# Patient Record
Sex: FEMALE | Race: OTHER | Employment: OTHER | URBAN - METROPOLITAN AREA
[De-identification: names, ages, dates, MRNs, and addresses within clinical notes are randomized per-mention and may not be internally consistent; named-entity substitution may affect disease eponyms.]

---

## 2017-01-03 ENCOUNTER — APPOINTMENT (OUTPATIENT)
Dept: PHYSICAL THERAPY | Facility: HOSPITAL | Age: 67
End: 2017-01-03
Attending: ORTHOPAEDIC SURGERY
Payer: COMMERCIAL

## 2017-01-14 ENCOUNTER — TELEPHONE (OUTPATIENT)
Dept: INTERNAL MEDICINE CLINIC | Facility: CLINIC | Age: 67
End: 2017-01-14

## 2017-01-18 NOTE — TELEPHONE ENCOUNTER
PA for Simvastatin 20 mg tab completed with [employer] via CMM response time 3-5 business days KEY [id number].

## 2017-01-30 ENCOUNTER — TELEPHONE (OUTPATIENT)
Dept: INTERNAL MEDICINE CLINIC | Facility: CLINIC | Age: 67
End: 2017-01-30

## 2017-01-30 RX ORDER — SIMVASTATIN 20 MG
20 TABLET ORAL NIGHTLY
Qty: 90 TABLET | Refills: 1 | Status: SHIPPED | OUTPATIENT
Start: 2017-01-30 | End: 2018-12-10

## 2017-01-30 NOTE — TELEPHONE ENCOUNTER
Current outpatient prescriptions:  Needs 90 days refill         •    LANTUS SOLOSTAR 100 UNIT/ML Subcutaneous Solution Pen-injector, INJECT 32 UNITS SUBCUTANEOUSLY IN THE MORNING AND 25 UNITS IN THE EVENING, Disp: 15 mL, Rfl: 1  •    •  • simvastatin 20

## 2017-02-02 ENCOUNTER — TELEPHONE (OUTPATIENT)
Dept: OPHTHALMOLOGY | Facility: CLINIC | Age: 67
End: 2017-02-02

## 2017-02-02 NOTE — TELEPHONE ENCOUNTER
Patients daughter wanted to schedule appt for patient with Dr Stevie Smith for a NP DM EE. Wanted to see if patient can get in sooner than next available due to patients insurance expiring soon. Please advise.  Thank you

## 2017-02-14 ENCOUNTER — OFFICE VISIT (OUTPATIENT)
Dept: OPHTHALMOLOGY | Facility: CLINIC | Age: 67
End: 2017-02-14

## 2017-02-14 DIAGNOSIS — Z98.890 HISTORY OF PTERYGIUM EXCISION: ICD-10-CM

## 2017-02-14 DIAGNOSIS — E10.9 TYPE 1 DIABETES MELLITUS WITHOUT RETINOPATHY (HCC): Primary | ICD-10-CM

## 2017-02-14 DIAGNOSIS — H02.88A MEIBOMIAN GLAND DYSFUNCTION (MGD), BILATERAL, BOTH UPPER AND LOWER LIDS: ICD-10-CM

## 2017-02-14 DIAGNOSIS — H02.88B MEIBOMIAN GLAND DYSFUNCTION (MGD), BILATERAL, BOTH UPPER AND LOWER LIDS: ICD-10-CM

## 2017-02-14 DIAGNOSIS — H25.13 AGE-RELATED NUCLEAR CATARACT OF BOTH EYES: ICD-10-CM

## 2017-02-14 PROCEDURE — 99243 OFF/OP CNSLTJ NEW/EST LOW 30: CPT | Performed by: OPHTHALMOLOGY

## 2017-02-14 PROCEDURE — 99212 OFFICE O/P EST SF 10 MIN: CPT | Performed by: OPHTHALMOLOGY

## 2017-02-14 PROCEDURE — 92015 DETERMINE REFRACTIVE STATE: CPT | Performed by: OPHTHALMOLOGY

## 2017-02-14 NOTE — ASSESSMENT & PLAN NOTE
Diabetes type I: no background retinopathy, no signs of neovascularization noted. Discussed ocular and systemic benefits of blood sugar control.

## 2017-02-14 NOTE — PATIENT INSTRUCTIONS
Type 1 diabetes mellitus without retinopathy ([facility] [address]  Diabetes type I: no background retinopathy, no signs of neovascularization noted. Discussed ocular and systemic benefits of blood sugar control.     Age-related nuclear cataract of both eyes  Discussed mi

## 2017-02-14 NOTE — ASSESSMENT & PLAN NOTE
Patient had pterygium surgery in [facility] about 30 years ago. No treatment is needed.

## 2017-02-14 NOTE — ASSESSMENT & PLAN NOTE
Patient is instructed to use warm compresses twice a day to both eyelids forever for ocular comfort. Blepharitis info given. Diagnosis discussed with patient.   Use artificial tears (any over the counter brand is okay) up to 4 times per day as needed for

## 2017-02-14 NOTE — ASSESSMENT & PLAN NOTE
Discussed mild cataracts in both eyes that are not affecting vision and are not surgical at this time. New glasses today; suggest update.

## 2017-02-14 NOTE — PROGRESS NOTES
Deon Almonte is a 73year old female.     HPI:     HPI     Diabetic Eye Exam    Additional comments: Pt has been a diabetic for 20 years  20 years on pills/ 2 years on Insulin   Pt checks her BS once a day  Pt's last blood sugar was 111 this morning  Las Father      CAD   • Diabetes Other    • Dementia Other    • Glaucoma Neg    • Macular degeneration Neg        Social History:   Smoking Status: Never Smoker                      Smokeless Status: Never Used                        Alcohol Use: No Multiple Vitamin (MULTIVITAMINS) Oral Cap Take  by mouth.  take 1 capsule by oral route  every day Disp:  Rfl:        Allergies:    Metformin               Diarrhea    ROS:     ROS     Positive for: Endocrine, Eyes    Negative for: Constitutional, Derrel Boxer Sphere Cylinder Axis Dist Add Near   Right -0.75 +0.50 180 20/20- +2.75 20/20   Left +1.00 Sphere  20/20- +2.75 20/20         Final Rx      Sphere Cylinder Axis Add   Right -0.75 +0.50 180 +2.75   Left +1.00 Sphere  +2.75       Type:  Progressive bifocal

## 2017-03-13 ENCOUNTER — TELEPHONE (OUTPATIENT)
Dept: INTERNAL MEDICINE CLINIC | Facility: CLINIC | Age: 67
End: 2017-03-13

## 2017-03-13 DIAGNOSIS — R52 PAIN: Primary | ICD-10-CM

## 2017-03-13 RX ORDER — HYDRALAZINE HYDROCHLORIDE 100 MG/1
100 TABLET, FILM COATED ORAL 2 TIMES DAILY
Qty: 180 TABLET | Refills: 0 | Status: SHIPPED | OUTPATIENT
Start: 2017-03-13 | End: 2017-06-29 | Stop reason: ALTCHOICE

## 2017-03-13 NOTE — TELEPHONE ENCOUNTER
Dr. Chris Devine, I have that she is testing once a day, would you like to keep her on the same plan?

## 2017-03-13 NOTE — TELEPHONE ENCOUNTER
Patient is asking for a order to get a discount for her strips she has a machine that is [employer] brand called [facility] on [address] needs order with diagnosis and how many times she checks a day  Send to [address] [facility] in [place]

## 2017-03-16 RX ORDER — GLIPIZIDE 10 MG/1
TABLET ORAL
Qty: 45 TABLET | Refills: 3 | Status: SHIPPED | OUTPATIENT
Start: 2017-03-16 | End: 2017-10-02

## 2017-03-18 RX ORDER — INSULIN GLARGINE 100 [IU]/ML
INJECTION, SOLUTION SUBCUTANEOUS
Qty: 15 ML | Refills: 0 | Status: SHIPPED | OUTPATIENT
Start: 2017-03-18 | End: 2017-04-26

## 2017-03-20 ENCOUNTER — TELEPHONE (OUTPATIENT)
Dept: INTERNAL MEDICINE CLINIC | Facility: CLINIC | Age: 67
End: 2017-03-20

## 2017-03-20 NOTE — TELEPHONE ENCOUNTER
Prior authorization required for the following:    GLIPIZIDE 10 MG Oral Tab 45 tablet 3 3/16/2017        Sig: TAKE 1/2 TABLET BY MOUTH EVERY MORNING & TAKE ONE TABLET EVERY EVENING      E-Prescribing Status: Receipt confirmed by pharmacy (3/16/2017 10:52 A

## 2017-03-22 NOTE — TELEPHONE ENCOUNTER
PA for Glipizide 10 mg tab completed with [employer] via CMM response time 3-5 business days KEY [id number].

## 2017-03-23 ENCOUNTER — OFFICE VISIT (OUTPATIENT)
Dept: INTERNAL MEDICINE CLINIC | Facility: CLINIC | Age: 67
End: 2017-03-23

## 2017-03-23 VITALS
HEART RATE: 76 BPM | BODY MASS INDEX: 35.96 KG/M2 | TEMPERATURE: 97 F | SYSTOLIC BLOOD PRESSURE: 136 MMHG | WEIGHT: 242.81 LBS | DIASTOLIC BLOOD PRESSURE: 38 MMHG | HEIGHT: 69 IN | OXYGEN SATURATION: 95 %

## 2017-03-23 DIAGNOSIS — IMO0001 UNCONTROLLED TYPE 2 DIABETES MELLITUS WITHOUT COMPLICATION, WITHOUT LONG-TERM CURRENT USE OF INSULIN: Primary | ICD-10-CM

## 2017-03-23 DIAGNOSIS — E11.649 TYPE 2 DIABETES MELLITUS WITH HYPOGLYCEMIA WITHOUT COMA, WITH LONG-TERM CURRENT USE OF INSULIN (HCC): ICD-10-CM

## 2017-03-23 DIAGNOSIS — I10 ESSENTIAL HYPERTENSION WITH GOAL BLOOD PRESSURE LESS THAN 130/80: ICD-10-CM

## 2017-03-23 DIAGNOSIS — I83.90 VARICOSE VEIN OF LEG: ICD-10-CM

## 2017-03-23 DIAGNOSIS — E53.8 B12 DEFICIENCY: ICD-10-CM

## 2017-03-23 DIAGNOSIS — Z79.4 TYPE 2 DIABETES MELLITUS WITH HYPOGLYCEMIA WITHOUT COMA, WITH LONG-TERM CURRENT USE OF INSULIN (HCC): ICD-10-CM

## 2017-03-23 PROBLEM — R93.89 ABNORMAL COMPUTED TOMOGRAPHY ANGIOGRAPHY (CTA): Status: ACTIVE | Noted: 2017-03-23

## 2017-03-23 LAB
ALBUMIN SERPL BCP-MCNC: 3.9 G/DL (ref 3.5–4.8)
ALBUMIN/GLOB SERPL: 1 {RATIO} (ref 1–2)
ALP SERPL-CCNC: 67 U/L (ref 32–100)
ALT SERPL-CCNC: 18 U/L (ref 14–54)
ANION GAP SERPL CALC-SCNC: 11 MMOL/L (ref 0–18)
AST SERPL-CCNC: 23 U/L (ref 15–41)
BILIRUB SERPL-MCNC: 0.6 MG/DL (ref 0.3–1.2)
BUN SERPL-MCNC: 14 MG/DL (ref 8–20)
BUN/CREAT SERPL: 14.7 (ref 10–20)
CALCIUM SERPL-MCNC: 9.2 MG/DL (ref 8.5–10.5)
CHLORIDE SERPL-SCNC: 98 MMOL/L (ref 95–110)
CHOLEST SERPL-MCNC: 139 MG/DL (ref 110–200)
CO2 SERPL-SCNC: 28 MMOL/L (ref 22–32)
CREAT SERPL-MCNC: 0.95 MG/DL (ref 0.5–1.5)
CREAT UR-MCNC: 50.3 MG/DL
GLOBULIN PLAS-MCNC: 3.8 G/DL (ref 2.5–3.7)
GLUCOSE SERPL-MCNC: 123 MG/DL (ref 70–99)
HBA1C MFR BLD: 6.3 % (ref 4–6)
HDLC SERPL-MCNC: 47 MG/DL
LDLC SERPL CALC-MCNC: 75 MG/DL (ref 0–99)
MICROALBUMIN UR-MCNC: 0.2 MG/DL (ref 0–1.8)
MICROALBUMIN/CREAT UR: 4 MG/G{CREAT} (ref 0–20)
NONHDLC SERPL-MCNC: 92 MG/DL
OSMOLALITY UR CALC.SUM OF ELEC: 286 MOSM/KG (ref 275–295)
POTASSIUM SERPL-SCNC: 4.2 MMOL/L (ref 3.3–5.1)
PROT SERPL-MCNC: 7.7 G/DL (ref 5.9–8.4)
SODIUM SERPL-SCNC: 137 MMOL/L (ref 136–144)
TRIGL SERPL-MCNC: 83 MG/DL (ref 1–149)
VIT B12 SERPL-MCNC: 419 PG/ML (ref 181–914)

## 2017-03-23 PROCEDURE — 36415 COLL VENOUS BLD VENIPUNCTURE: CPT | Performed by: INTERNAL MEDICINE

## 2017-03-23 PROCEDURE — 99214 OFFICE O/P EST MOD 30 MIN: CPT | Performed by: INTERNAL MEDICINE

## 2017-03-23 PROCEDURE — G0463 HOSPITAL OUTPT CLINIC VISIT: HCPCS | Performed by: INTERNAL MEDICINE

## 2017-03-23 RX ORDER — MECLIZINE HYDROCHLORIDE 25 MG/1
25 TABLET ORAL 3 TIMES DAILY PRN
Qty: 90 TABLET | Refills: 1 | Status: SHIPPED | OUTPATIENT
Start: 2017-03-23

## 2017-03-23 RX ORDER — METOPROLOL SUCCINATE 50 MG/1
TABLET, EXTENDED RELEASE ORAL
Qty: 180 TABLET | Refills: 1 | Status: SHIPPED | OUTPATIENT
Start: 2017-03-23 | End: 2017-12-04 | Stop reason: ALTCHOICE

## 2017-03-23 RX ORDER — MECLIZINE HYDROCHLORIDE 25 MG/1
25 TABLET ORAL 3 TIMES DAILY PRN
Qty: 90 TABLET | Refills: 1 | Status: SHIPPED | OUTPATIENT
Start: 2017-03-23 | End: 2017-03-23

## 2017-03-23 RX ORDER — VALSARTAN AND HYDROCHLOROTHIAZIDE 320; 25 MG/1; MG/1
1 TABLET, FILM COATED ORAL
Qty: 90 TABLET | Refills: 1 | Status: SHIPPED | OUTPATIENT
Start: 2017-03-23 | End: 2017-10-02

## 2017-03-23 NOTE — PROGRESS NOTES
HPI:    Patient ID: Carmina Miller is a 73year old female. HPI Comments: Varicose veins. No pain. Diabetes  Patient here for follow up of Diabetes. Has been taking medications regularly. Checks sugars 1 times daily.   Fasting sugar K 3.3 12/07/2016 01:12 AM   CL 92* 12/07/2016 12:05 AM   CO2 30 12/07/2016 12:05 AM   CREATSERUM 0.94 12/07/2016 12:05 AM   CA 8.9 12/07/2016 12:05 AM   AST 20 12/07/2016 01:12 AM   ALT 17 12/07/2016 01:12 AM   TSH 0.38 03/07/2013 07:52 PM   T4F 1.28 05/24 HydrALAZINE HCl 100 MG Oral Tab Take 1 tablet (100 mg total) by mouth 2 (two) times daily. Disp: 180 tablet Rfl: 0   simvastatin 20 MG Oral Tab Take 1 tablet (20 mg total) by mouth nightly.  take 1 tablet (20MG)  by oral route  3 times a week M/W/F Disp: 90 Comment SCANNED TO MEDIA TAB - 01/03/2014    THYROIDECTOMY  08/17/15    Comment TOTAL    FRACTURE SURGERY Left     Comment LEFT FOOT SURGERY WITH HARDWARE    KNEE SCOPE,ABRASN ARTHROPLASTY Right 10-4-16    CATARACT EXTRACTION W/  INTRAOCULAR LENS IMPLANT Psychiatric: She has a normal mood and affect. Her behavior is normal. Thought content normal.   Nursing note and vitals reviewed.   Bilateral barefoot skin diabetic exam is normal, visualized feet and the appearance is normal. Except B/L big toes with thic Meclizine HCl 25 MG Oral Tab 90 tablet 1      Sig: Take 1 tablet (25 mg total) by mouth 3 (three) times daily as needed.  take 1 tablet (25MG)  by oral route 3 times every day as needed      Valsartan-Hydrochlorothiazide 320-25 MG Oral Tab 90 tablet 1

## 2017-03-23 NOTE — PATIENT INSTRUCTIONS
ASSESSMENT/PLAN:   Uncontrolled type 2 diabetes mellitus without complication, without long-term current use of insulin (hcc)  (primary encounter diagnosis) Good control. Careful with diet and excercise at least 30 minutes 3-4 times a week.  Check sugars at

## 2017-03-25 NOTE — PROGRESS NOTES
Quick Note:    CMP Normal (electrolytes, sugar, kidney and liver functions),   Lipid (choilesterol) is good,   B12 is good but on the lower limits of normal will need to recheck again in 3 months. A1c is better. Will recheck again in 3-6 months.   Urine sh

## 2017-04-04 ENCOUNTER — TELEPHONE (OUTPATIENT)
Dept: INTERNAL MEDICINE CLINIC | Facility: CLINIC | Age: 67
End: 2017-04-04

## 2017-04-05 NOTE — TELEPHONE ENCOUNTER
Spoke with patient's daughter, Mame uNnez (legal on file) and advised that medication was temporary filled for 30 days but was not covered by patient's insurance.   Advised patient that if her mom still needed medication for vertigo, to please let the office kn

## 2017-04-10 PROBLEM — E66.01 SEVERE OBESITY (BMI 35.0-39.9) WITH COMORBIDITY (HCC): Chronic | Status: ACTIVE | Noted: 2017-04-10

## 2017-04-24 NOTE — PROGRESS NOTES
Received faxed request from pharmacy for NPI number and ICD 10 codes. Re-ordered patient's testing strips to include what was requested.

## 2017-04-26 ENCOUNTER — TELEPHONE (OUTPATIENT)
Dept: INTERNAL MEDICINE CLINIC | Facility: CLINIC | Age: 67
End: 2017-04-26

## 2017-04-26 NOTE — TELEPHONE ENCOUNTER
Current outpatient prescriptions:     •   LANTUS SOLOSTAR 100 UNIT/ML Subcutaneous Solution Pen-injector, INJECT 32 UNITS EVERY MORNING & 25 UNITS EVERY EVENING, Disp: 15 mL, Rfl: 3 send to   [place] [facility]         •  Needs a new prescription  for  Reli on

## 2017-04-27 ENCOUNTER — TELEPHONE (OUTPATIENT)
Dept: INTERNAL MEDICINE CLINIC | Facility: CLINIC | Age: 67
End: 2017-04-27

## 2017-04-28 NOTE — TELEPHONE ENCOUNTER
[facility] sent fax [facility] machine no longer covered new Rx for Dinh contour sent to pharmacy as requested.

## 2017-05-01 ENCOUNTER — TELEPHONE (OUTPATIENT)
Dept: INTERNAL MEDICINE CLINIC | Facility: CLINIC | Age: 67
End: 2017-05-01

## 2017-05-19 ENCOUNTER — TELEPHONE (OUTPATIENT)
Dept: INTERNAL MEDICINE CLINIC | Facility: CLINIC | Age: 67
End: 2017-05-19

## 2017-05-19 DIAGNOSIS — E03.9 HYPOTHYROIDISM (ACQUIRED): Primary | Chronic | ICD-10-CM

## 2017-05-19 RX ORDER — LEVOTHYROXINE SODIUM 175 UG/1
175 TABLET ORAL
Qty: 90 TABLET | Refills: 1 | Status: CANCELLED | OUTPATIENT
Start: 2017-05-19

## 2017-05-19 NOTE — TELEPHONE ENCOUNTER
Can we do TSH? If she needs RF now, can do 90 only. Other wise til get results on TSH which maybe 3 days.

## 2017-05-19 NOTE — TELEPHONE ENCOUNTER
Needs refills  Does she continue same dosage her thyroid has not been checked was told to it was going to be check       Current outpatient prescriptions:   •  Levothyroxine Sodium 175 MCG Oral Tab, Take 1 tablet (175 mcg total) by mouth before breakfast.,

## 2017-05-19 NOTE — PROGRESS NOTES
Patient last seen in office 3/23/17. Last TSH lab June 2016. Refill on Levothyroxine pended. Received error notification in inbox that a refill authorization failed so checked patient's chart and last filled 9/2016. Routing to clinical pool.     There i

## 2017-05-23 NOTE — TELEPHONE ENCOUNTER
Phone number belongs to daughter. Has four pills left. TSH lab ordered and patient will have lab draw tomorrow.

## 2017-05-24 ENCOUNTER — APPOINTMENT (OUTPATIENT)
Dept: LAB | Facility: HOSPITAL | Age: 67
End: 2017-05-24
Attending: INTERNAL MEDICINE
Payer: COMMERCIAL

## 2017-05-24 DIAGNOSIS — E03.9 HYPOTHYROIDISM (ACQUIRED): Chronic | ICD-10-CM

## 2017-05-24 PROCEDURE — 36415 COLL VENOUS BLD VENIPUNCTURE: CPT

## 2017-05-24 PROCEDURE — 84443 ASSAY THYROID STIM HORMONE: CPT

## 2017-05-25 ENCOUNTER — TELEPHONE (OUTPATIENT)
Dept: INTERNAL MEDICINE CLINIC | Facility: CLINIC | Age: 67
End: 2017-05-25

## 2017-05-25 RX ORDER — LEVOTHYROXINE SODIUM 175 UG/1
175 TABLET ORAL
Qty: 90 TABLET | Refills: 1 | Status: SHIPPED | OUTPATIENT
Start: 2017-05-25 | End: 2017-11-17

## 2017-05-25 NOTE — TELEPHONE ENCOUNTER
Does patient continue with same dosage of thyroid ?  If so she needs refill as soon as possible send to pharmacy

## 2017-06-29 ENCOUNTER — OFFICE VISIT (OUTPATIENT)
Dept: INTERNAL MEDICINE CLINIC | Facility: CLINIC | Age: 67
End: 2017-06-29

## 2017-06-29 VITALS
WEIGHT: 245 LBS | BODY MASS INDEX: 36.29 KG/M2 | HEART RATE: 73 BPM | SYSTOLIC BLOOD PRESSURE: 132 MMHG | DIASTOLIC BLOOD PRESSURE: 69 MMHG | TEMPERATURE: 98 F | OXYGEN SATURATION: 95 % | HEIGHT: 69 IN

## 2017-06-29 DIAGNOSIS — E53.8 B12 DEFICIENCY: ICD-10-CM

## 2017-06-29 DIAGNOSIS — K43.9 HERNIA OF ABDOMINAL WALL: ICD-10-CM

## 2017-06-29 DIAGNOSIS — I10 ESSENTIAL HYPERTENSION WITH GOAL BLOOD PRESSURE LESS THAN 130/80: ICD-10-CM

## 2017-06-29 DIAGNOSIS — R21 RASH: ICD-10-CM

## 2017-06-29 DIAGNOSIS — IMO0001 UNCONTROLLED TYPE 2 DIABETES MELLITUS WITHOUT COMPLICATION, WITHOUT LONG-TERM CURRENT USE OF INSULIN: ICD-10-CM

## 2017-06-29 DIAGNOSIS — Z12.39 BREAST CANCER SCREENING: ICD-10-CM

## 2017-06-29 DIAGNOSIS — R19.7 DIARRHEA, UNSPECIFIED TYPE: ICD-10-CM

## 2017-06-29 DIAGNOSIS — M85.80 OSTEOPENIA, UNSPECIFIED LOCATION: ICD-10-CM

## 2017-06-29 DIAGNOSIS — E03.9 HYPOTHYROIDISM (ACQUIRED): Primary | ICD-10-CM

## 2017-06-29 PROCEDURE — 36415 COLL VENOUS BLD VENIPUNCTURE: CPT | Performed by: INTERNAL MEDICINE

## 2017-06-29 PROCEDURE — G0463 HOSPITAL OUTPT CLINIC VISIT: HCPCS | Performed by: INTERNAL MEDICINE

## 2017-06-29 PROCEDURE — 99214 OFFICE O/P EST MOD 30 MIN: CPT | Performed by: INTERNAL MEDICINE

## 2017-06-29 RX ORDER — BETAMETHASONE DIPROPIONATE 0.5 MG/G
CREAM TOPICAL
Qty: 50 G | Refills: 0 | Status: SHIPPED | OUTPATIENT
Start: 2017-06-29 | End: 2018-07-05 | Stop reason: ALTCHOICE

## 2017-06-29 RX ORDER — DICYCLOMINE HYDROCHLORIDE 10 MG/1
10 CAPSULE ORAL 4 TIMES DAILY PRN
Qty: 40 CAPSULE | Refills: 1 | Status: SHIPPED | OUTPATIENT
Start: 2017-06-29 | End: 2017-07-09

## 2017-06-29 NOTE — PROGRESS NOTES
HPI:    Patient ID: Brooke Rasmussen is a 78year old female. Rash   This is a new problem. The current episode started more than 1 month ago. The problem has been waxing and waning since onset.  The affected locations include the chest (L екатерина Hypertension  Patient is here for follow up of hypertension. BP at home:  Not check. Has been compliant with medications. Exercise level: moderately active and has been following low salt diet. Weight has been stable.   Wt Readings from Last 3 Encounter Genitourinary: Negative for decreased urine volume, difficulty urinating, dysuria, flank pain, frequency, hematuria, urgency, vaginal bleeding, vaginal discharge and vaginal pain. Skin: Positive for rash. Negative for nail changes.    Neurological: Ruben Lockwood Valsartan-Hydrochlorothiazide 320-25 MG Oral Tab Take 1 tablet by mouth once daily.  Disp: 90 tablet Rfl: 1   GLIPIZIDE 10 MG Oral Tab TAKE 1/2 TABLET BY MOUTH EVERY MORNING & TAKE ONE TABLET EVERY EVENING Disp: 45 tablet Rfl: 3   HydrALAZINE HCl 100 MG Ora • Dementia Mother      Alzheimer's Disease   • Diabetes Brother    • Lipids Brother    • Hypertension Brother    • Diabetes Paternal Uncle    • Heart Disease Father      CAD   • Diabetes Other    • Dementia Other    • Glaucoma Neg    • Macular degeneration Lymphadenopathy:     She has no cervical adenopathy. Neurological: She is alert and oriented to person, place, and time. Skin: Skin is warm and dry. She is not diaphoretic. Psychiatric: She has a normal mood and affect.  Her behavior is normal. Judg Orders Placed This Encounter      CELIAC DISEASE SCREEN Reflex [E]      Adult Food Allergy Prof [E]    Meds This Visit:    Signed Prescriptions Disp Refills    Dicyclomine HCl 10 MG Oral Cap 40 capsule 1      Sig: Take 1 capsule (10 mg total) by mouth 4 (f

## 2017-06-29 NOTE — PATIENT INSTRUCTIONS
ASSESSMENT/PLAN:   Hypothyroidism (acquired)  (primary encounter diagnosis) Stable. Uncontrolled type 2 diabetes mellitus without complication, without long-term current use of insulin (hcc) Not well controlled. Eat regular.  Take glipizide 15 minutes 0.05 % External Cream 50 g 0      Sig: Apply q12hrs. For 1 week and 1 cm beyond rash.       Insulin Glargine (LANTUS SOLOSTAR) 100 UNIT/ML Subcutaneous Solution Pen-injector 15 mL 2      Sig: INJECT 32 UNITS EVERY MORNING & 25 UNITS EVERY EVENING

## 2017-07-05 LAB
CLAM IGE QN: <0.1 KUA/L (ref ?–0.1)
CODFISH IGE QN: <0.1 KUA/L (ref ?–0.1)
CORN IGE QN: <0.1 KUA/L (ref ?–0.1)
COW MILK IGE QN: <0.1 KUA/L (ref ?–0.1)
EGG WHITE IGE QN: <0.1 KUA/L (ref ?–0.1)
IGE SERPL-ACNC: 78.4 KU/L (ref 2–214)
PEANUT IGE QN: <0.1 KUA/L (ref ?–0.1)
SCALLOP IGE QN: <0.1 KUA/L (ref ?–0.1)
SESAME SEED IGE QN: <0.1 KUA/L (ref ?–0.1)
SHRIMP IGE QN: <0.1 KUA/L (ref ?–0.1)
SOYBEAN IGE QN: <0.1 KUA/L (ref ?–0.1)
WALNUT IGE QN: <0.1 KUA/L (ref ?–0.1)
WHEAT IGE QN: <0.1 KUA/L (ref ?–0.1)

## 2017-07-06 ENCOUNTER — TELEPHONE (OUTPATIENT)
Dept: INTERNAL MEDICINE CLINIC | Facility: CLINIC | Age: 67
End: 2017-07-06

## 2017-07-06 DIAGNOSIS — K46.9 NON-RECURRENT ABDOMINAL HERNIA WITHOUT OBSTRUCTION OR GANGRENE, UNSPECIFIED HERNIA TYPE: ICD-10-CM

## 2017-07-06 DIAGNOSIS — T78.40XA ALLERGIC, INITIAL ENCOUNTER: Primary | ICD-10-CM

## 2017-07-06 NOTE — ADDENDUM NOTE
Encounter addended by: Anjelica Willis on: 7/6/2017  2:39 PM<BR>    Actions taken: Letter status changed

## 2017-07-06 NOTE — ADDENDUM NOTE
Encounter addended by: Armando Walker on: 7/6/2017  2:38 PM<BR>    Actions taken: Letter status changed

## 2017-07-07 ENCOUNTER — TELEPHONE (OUTPATIENT)
Dept: INTERNAL MEDICINE CLINIC | Facility: CLINIC | Age: 67
End: 2017-07-07

## 2017-07-07 NOTE — TELEPHONE ENCOUNTER
Request Hong Konger speaking doctor for Surgeon, could you please change referral to Dr. Eric Gonzalez instead of Dr. Basilia Stewart thank you

## 2017-07-07 NOTE — TELEPHONE ENCOUNTER
Spoke with daughter on emergency contact given Dr. Jose Luis Melvin response and  instructions for patient to continue with home glucose monitoring and call in 1 week with readings. Daughter agreed.

## 2017-07-07 NOTE — TELEPHONE ENCOUNTER
Patient is calling to give you some  Glucose reading   July 1 st  114 am  89 pm   July 2nd   79 am   116 pm   July 3rd   120 am    180 pm   July 4 th  150 am       July 6th    121 am   July 7th    113 am

## 2017-07-18 ENCOUNTER — TELEPHONE (OUTPATIENT)
Dept: FAMILY MEDICINE CLINIC | Facility: CLINIC | Age: 67
End: 2017-07-18

## 2017-07-18 NOTE — TELEPHONE ENCOUNTER
Pt is due for [facility] [place] [facility] wellness exam anytime this calendar year. Left message to call back [phone].

## 2017-08-04 ENCOUNTER — OFFICE VISIT (OUTPATIENT)
Dept: SURGERY | Facility: CLINIC | Age: 67
End: 2017-08-04

## 2017-08-04 VITALS — HEIGHT: 69 IN | BODY MASS INDEX: 34.66 KG/M2 | WEIGHT: 234 LBS

## 2017-08-04 DIAGNOSIS — M62.08 DIASTASIS RECTI: ICD-10-CM

## 2017-08-04 DIAGNOSIS — K43.9 HERNIA OF ABDOMINAL WALL: Primary | ICD-10-CM

## 2017-08-04 PROCEDURE — G0463 HOSPITAL OUTPT CLINIC VISIT: HCPCS | Performed by: SURGERY

## 2017-08-04 PROCEDURE — 99204 OFFICE O/P NEW MOD 45 MIN: CPT | Performed by: SURGERY

## 2017-08-04 NOTE — PROGRESS NOTES
Visit Note    Active Problems   Diastasis recti  Hernia of abdominal wall  (primary encounter diagnosis)  Chief Complaint: Patient presents with:  Hernia: Pt referred by Dr. Meenu Wild regarding incisional hernia above umbilical area x 5 yrs +.   Pt states shankar Tab TAKE 1/2 TABLET BY MOUTH EVERY MORNING & TAKE ONE TABLET EVERY EVENING Disp: 45 tablet Rfl: 3   simvastatin 20 MG Oral Tab Take 1 tablet (20 mg total) by mouth nightly.  take 1 tablet (20MG)  by oral route  3 times a week M/W/F Disp: 90 tablet Rfl: 1 Other    • Dementia Other    • Glaucoma Neg    • Macular degeneration Neg      Social History    Social History  Social History   Marital status:   Spouse name: N/A    Years of education: N/A  Number of children: 4     Occupational History  None on pulses. Pulmonary/Chest: Effort normal and breath sounds normal.   Abdominal: Soft. Bowel sounds are normal. She exhibits no distension. There is no tenderness. Diastasis Recti present.  No epigastric Hernia was palpable on today's exam. CT was ordered

## 2017-08-06 NOTE — PATIENT INSTRUCTIONS
Assessment   Hernia of abdominal wall  (primary encounter diagnosis)  Diastasis recti    Plan                              Diastasis Recti present.  No epigastric Hernia was palpable on today's exam. CT was ordered to R/O the Possibility of one hernia been

## 2017-08-19 ENCOUNTER — HOSPITAL ENCOUNTER (OUTPATIENT)
Dept: CT IMAGING | Facility: HOSPITAL | Age: 67
Discharge: HOME | End: 2017-08-19
Attending: SURGERY
Payer: MEDICARE

## 2017-08-19 DIAGNOSIS — K43.9 HERNIA OF ABDOMINAL WALL: ICD-10-CM

## 2017-08-19 LAB — CREAT BLD-MCNC: 0.8 MG/DL (ref 0.5–1.5)

## 2017-08-19 PROCEDURE — 74177 CT ABD & PELVIS W/CONTRAST: CPT | Performed by: SURGERY

## 2017-08-19 PROCEDURE — 82565 ASSAY OF CREATININE: CPT

## 2017-08-24 ENCOUNTER — TELEPHONE (OUTPATIENT)
Dept: SURGERY | Facility: CLINIC | Age: 67
End: 2017-08-24

## 2017-08-25 NOTE — TELEPHONE ENCOUNTER
With  Angel, #[id number], contacted patient. Per MEV, CT scan confirms hernia and he would like to review results and recommendations in the office with patient.     Relayed above information and follow up visit was scheduled 09/06/2017 a

## 2017-09-06 ENCOUNTER — OFFICE VISIT (OUTPATIENT)
Dept: SURGERY | Facility: CLINIC | Age: 67
End: 2017-09-06

## 2017-09-06 DIAGNOSIS — K43.2 VENTRAL INCISIONAL HERNIA WITHOUT OBSTRUCTION OR GANGRENE: Primary | ICD-10-CM

## 2017-09-06 PROCEDURE — G0463 HOSPITAL OUTPT CLINIC VISIT: HCPCS | Performed by: SURGERY

## 2017-09-06 PROCEDURE — 99214 OFFICE O/P EST MOD 30 MIN: CPT | Performed by: SURGERY

## 2017-09-08 NOTE — PROGRESS NOTES
Follow Up Visit Note       Active Problems   Ventral incisional hernia without obstruction or gangrene, small  (primary encounter diagnosis)  Chief Complaint: Patient presents with:  Hernia: Hernia of abdominal wall.   Patient states she has no pain, no pro biliary dilatation up to 0.9 cm is noted. However, there is tapering of the   periampullary region. PANCREAS:                There is mild diffuse fatty replacement without discernible lesion, fluid collection, or ductal dilatation.   SPLEEN: infiltration of the ventral abdominal subcutaneous fat may relate to prior injection sites. There is a fat containing right femoral hernia of small size. OTHER:                       No free air or fluid is seen in the abdomen or pelvis.           Dictated Hr TAKE ONE TABLET BY MOUTH EVERY 12 HOURS Disp: 180 tablet Rfl: 1   Meclizine HCl 25 MG Oral Tab Take 1 tablet (25 mg total) by mouth 3 (three) times daily as needed.  take 1 tablet (25MG)  by oral route 3 times every day as needed Disp: 90 tablet Rfl: 1 or gangrene, small  (primary encounter diagnosis)      Plan                Conservative approach wait and watch. Hernia is small one and should not need repair unless gets larger and symptomatic. Call us as needed for any concern regards the problem.  Linda

## 2017-09-08 NOTE — PATIENT INSTRUCTIONS
Assessment   Ventral incisional hernia without obstruction or gangrene, small  (primary encounter diagnosis)      Plan                Conservative approach wait and watch. Hernia is small one and should not need repair unless gets larger and symptomatic.  C

## 2017-09-28 ENCOUNTER — TELEPHONE (OUTPATIENT)
Dept: INTERNAL MEDICINE CLINIC | Facility: CLINIC | Age: 67
End: 2017-09-28

## 2017-09-28 DIAGNOSIS — E53.8 B12 DEFICIENCY: ICD-10-CM

## 2017-09-28 DIAGNOSIS — IMO0001 UNCONTROLLED TYPE 2 DIABETES MELLITUS WITHOUT COMPLICATION, WITHOUT LONG-TERM CURRENT USE OF INSULIN: Primary | Chronic | ICD-10-CM

## 2017-09-30 ENCOUNTER — LAB ENCOUNTER (OUTPATIENT)
Dept: LAB | Facility: HOSPITAL | Age: 67
End: 2017-09-30
Attending: INTERNAL MEDICINE
Payer: MEDICARE

## 2017-09-30 DIAGNOSIS — E53.8 B12 DEFICIENCY: ICD-10-CM

## 2017-09-30 DIAGNOSIS — IMO0001 UNCONTROLLED TYPE 2 DIABETES MELLITUS WITHOUT COMPLICATION, WITHOUT LONG-TERM CURRENT USE OF INSULIN: Chronic | ICD-10-CM

## 2017-09-30 PROCEDURE — 80061 LIPID PANEL: CPT

## 2017-09-30 PROCEDURE — 82607 VITAMIN B-12: CPT

## 2017-09-30 PROCEDURE — 80053 COMPREHEN METABOLIC PANEL: CPT

## 2017-09-30 PROCEDURE — 83036 HEMOGLOBIN GLYCOSYLATED A1C: CPT

## 2017-09-30 PROCEDURE — 36415 COLL VENOUS BLD VENIPUNCTURE: CPT

## 2017-10-01 NOTE — PROGRESS NOTES
CMP Normal (electrolytes, kidney and liver functions),   Lipid (choilesterol) is good,   A1C is worse. Careful with diet. Lower carbs. Stay away from red meat, shellfish, pork. Try not to cheatham foods.   Check 2 hour postmeal sugar should be less than 140s

## 2017-10-02 ENCOUNTER — MED REC SCAN ONLY (OUTPATIENT)
Dept: INTERNAL MEDICINE CLINIC | Facility: CLINIC | Age: 67
End: 2017-10-02

## 2017-10-02 ENCOUNTER — OFFICE VISIT (OUTPATIENT)
Dept: INTERNAL MEDICINE CLINIC | Facility: CLINIC | Age: 67
End: 2017-10-02

## 2017-10-02 VITALS
HEIGHT: 69 IN | SYSTOLIC BLOOD PRESSURE: 148 MMHG | HEART RATE: 102 BPM | OXYGEN SATURATION: 100 % | WEIGHT: 239 LBS | TEMPERATURE: 98 F | DIASTOLIC BLOOD PRESSURE: 76 MMHG | BODY MASS INDEX: 35.4 KG/M2

## 2017-10-02 DIAGNOSIS — M85.80 OSTEOPENIA, UNSPECIFIED LOCATION: ICD-10-CM

## 2017-10-02 DIAGNOSIS — E03.9 HYPOTHYROIDISM (ACQUIRED): ICD-10-CM

## 2017-10-02 DIAGNOSIS — IMO0001 UNCONTROLLED TYPE 2 DIABETES MELLITUS WITHOUT COMPLICATION, WITHOUT LONG-TERM CURRENT USE OF INSULIN: ICD-10-CM

## 2017-10-02 DIAGNOSIS — Z12.39 BREAST CANCER SCREENING: Primary | ICD-10-CM

## 2017-10-02 DIAGNOSIS — E53.8 B12 DEFICIENCY: ICD-10-CM

## 2017-10-02 DIAGNOSIS — I10 ESSENTIAL HYPERTENSION WITH GOAL BLOOD PRESSURE LESS THAN 130/80: ICD-10-CM

## 2017-10-02 DIAGNOSIS — R19.7 DIARRHEA, UNSPECIFIED TYPE: ICD-10-CM

## 2017-10-02 PROCEDURE — 90653 IIV ADJUVANT VACCINE IM: CPT | Performed by: INTERNAL MEDICINE

## 2017-10-02 PROCEDURE — G0008 ADMIN INFLUENZA VIRUS VAC: HCPCS | Performed by: INTERNAL MEDICINE

## 2017-10-02 PROCEDURE — 90732 PPSV23 VACC 2 YRS+ SUBQ/IM: CPT | Performed by: INTERNAL MEDICINE

## 2017-10-02 PROCEDURE — 99214 OFFICE O/P EST MOD 30 MIN: CPT | Performed by: INTERNAL MEDICINE

## 2017-10-02 PROCEDURE — G0463 HOSPITAL OUTPT CLINIC VISIT: HCPCS | Performed by: INTERNAL MEDICINE

## 2017-10-02 PROCEDURE — G0009 ADMIN PNEUMOCOCCAL VACCINE: HCPCS | Performed by: INTERNAL MEDICINE

## 2017-10-02 RX ORDER — GLIPIZIDE 10 MG/1
TABLET ORAL
Qty: 45 TABLET | Refills: 1 | Status: SHIPPED | OUTPATIENT
Start: 2017-10-02 | End: 2017-12-19

## 2017-10-02 RX ORDER — VALSARTAN AND HYDROCHLOROTHIAZIDE 320; 25 MG/1; MG/1
1 TABLET, FILM COATED ORAL
Qty: 90 TABLET | Refills: 1 | Status: SHIPPED | OUTPATIENT
Start: 2017-10-02 | End: 2018-04-02

## 2017-10-02 NOTE — PATIENT INSTRUCTIONS
ASSESSMENT/PLAN:   Breast cancer screening  (primary encounter diagnosis) Check mammogram. Continue self breast exam every month. Hypothyroidism (acquired) Stable. B12 deficiency Stable 9-17.      Osteopenia, unspecified location C ania dexa in 2019 Subcutaneous Solution Pen-injector 10 pen 2      Sig: INJECT 35 UNITS EVERY MORNING & 28 UNITS EVERY EVENING      Valsartan-Hydrochlorothiazide 320-25 MG Oral Tab 90 tablet 1      Sig: Take 1 tablet by mouth once daily.       glipiZIDE 10 MG Oral Tab 45 tab

## 2017-10-02 NOTE — PROGRESS NOTES
HPI:    Patient ID: Poonam Stewart is a 78year old female. Diabetes   Pertinent negatives for hypoglycemia include no dizziness, headaches, seizures or tremors.  Pertinent negatives for diabetes include no chest pain, no fatigue, no polydip [place] 83 09/30/2017 07:22 AM         Lab Results  Component Value Date/Time   A1C 7.2 (H) 09/30/2017 07:22 AM      No results found for: VITD        Checks feet nightly, no open sores. Hypertension  Patient is here for follow up of hypertension.  BP at Gastrointestinal: Negative for abdominal distention and abdominal pain. Endocrine: Negative for cold intolerance, heat intolerance, polydipsia, polyphagia and polyuria. Genitourinary: Negative for dysuria.    Musculoskeletal: Negative for myalgias and n simvastatin 20 MG Oral Tab Take 1 tablet (20 mg total) by mouth nightly. take 1 tablet (20MG)  by oral route  3 times a week M/W/F Disp: 90 tablet Rfl: 1   GLUCOSAMINE CHONDROITIN COMPLX OR Take by mouth.  Disp:  Rfl:    Insulin Pen Needle (BD PEN NEEDLE UL • Macular degeneration Neg       Social History: Smoking status: Never Smoker                                                              Smokeless tobacco: Never Used                      Alcohol use:  No                 PHYSICAL EXAM:    Physical Exam Neck: Trachea normal and phonation normal. Neck supple. No thyroid mass and no thyromegaly present. Cardiovascular: Normal rate, regular rhythm, S1 normal, S2 normal, normal heart sounds, intact distal pulses and normal pulses.     Pulses:       Carotid p Hypothyroidism (acquired) Stable. B12 deficiency Stable 9-17. Osteopenia, unspecified location C ania dexa in 2019. Essential hypertension with goal blood pressure less than 130/80 ? Control. Bring in machine prior to next OV to check.  Careful Valsartan-Hydrochlorothiazide 320-25 MG Oral Tab 90 tablet 1      Sig: Take 1 tablet by mouth once daily.       glipiZIDE 10 MG Oral Tab 45 tablet 1      Sig: TAKE 1/2 TABLET BY MOUTH EVERY MORNING & TAKE ONE TABLET EVERY EVENING           Imaging & Referr

## 2017-10-09 ENCOUNTER — APPOINTMENT (OUTPATIENT)
Dept: LAB | Facility: HOSPITAL | Age: 67
End: 2017-10-09
Attending: INTERNAL MEDICINE
Payer: MEDICARE

## 2017-10-09 DIAGNOSIS — R19.7 DIARRHEA, UNSPECIFIED TYPE: ICD-10-CM

## 2017-10-09 PROCEDURE — 87329 GIARDIA AG IA: CPT

## 2017-10-09 PROCEDURE — 87493 C DIFF AMPLIFIED PROBE: CPT

## 2017-10-09 PROCEDURE — 87272 CRYPTOSPORIDIUM AG IF: CPT

## 2017-10-29 ENCOUNTER — HOSPITAL ENCOUNTER (OUTPATIENT)
Dept: MAMMOGRAPHY | Facility: HOSPITAL | Age: 67
Discharge: HOME | End: 2017-10-29
Attending: INTERNAL MEDICINE
Payer: MEDICARE

## 2017-10-29 DIAGNOSIS — Z12.39 BREAST CANCER SCREENING: ICD-10-CM

## 2017-10-29 PROCEDURE — 77067 SCR MAMMO BI INCL CAD: CPT | Performed by: INTERNAL MEDICINE

## 2017-10-30 ENCOUNTER — TELEPHONE (OUTPATIENT)
Dept: INTERNAL MEDICINE CLINIC | Facility: CLINIC | Age: 67
End: 2017-10-30

## 2017-10-30 RX ORDER — HYDRALAZINE HYDROCHLORIDE 100 MG/1
100 TABLET, FILM COATED ORAL 2 TIMES DAILY
Qty: 60 TABLET | Refills: 1 | Status: SHIPPED | OUTPATIENT
Start: 2017-10-30 | End: 2017-11-17

## 2017-10-30 NOTE — TELEPHONE ENCOUNTER
Spoke to pt. [facility] has taken bp with home bp machine averaging between 150/164-70 and at [facility] bp was at 172/76. [facility] noticed bp has been increasing since stopping Hydralazine HCI 100mg.   [facility] has been having  Headache during the mornin

## 2017-10-30 NOTE — TELEPHONE ENCOUNTER
Icelandic speaking pt  High blood pressure, headaches,nauseas for 4 days, pt does it have a b/pmachine that works, pt has been going to [facility] to check it and yesterday was 175 she said. Pt is requesting to see dr Natasha Ruiz only today asap.

## 2017-10-30 NOTE — TELEPHONE ENCOUNTER
Not Sure if rash was related to the hydralazine? But would recommend restarting monitor blood pressures different days different times follow-up in 6 weeks with blood pressure readings.   If rash recurs or rash gets worse she needs to call us sooner or any

## 2017-11-09 ENCOUNTER — TELEPHONE (OUTPATIENT)
Dept: INTERNAL MEDICINE CLINIC | Facility: CLINIC | Age: 67
End: 2017-11-09

## 2017-11-09 ENCOUNTER — NURSE ONLY (OUTPATIENT)
Dept: INTERNAL MEDICINE CLINIC | Facility: CLINIC | Age: 67
End: 2017-11-09

## 2017-11-09 VITALS — HEART RATE: 72 BPM | SYSTOLIC BLOOD PRESSURE: 146 MMHG | DIASTOLIC BLOOD PRESSURE: 67 MMHG

## 2017-11-09 DIAGNOSIS — Z01.30 BLOOD PRESSURE CHECK: Primary | ICD-10-CM

## 2017-11-09 DIAGNOSIS — L98.9 FIBROHISTIOCYTIC PROLIFERATION OF THE SKIN: Primary | ICD-10-CM

## 2017-11-09 NOTE — TELEPHONE ENCOUNTER
Spoke to [employer] (HIPPA verified) informed of Dr. Elvia hernandez. Verbalized understanding. No questions or concerns at the time of call.

## 2017-11-09 NOTE — TELEPHONE ENCOUNTER
Current Outpatient Prescriptions:   ••Levothyroxine Sodium 175 MCG Oral Tab, Take 1 tablet (175 mcg total) by mouth before breakfast., Disp: 90 tablet, Rfl: 1  •

## 2017-11-09 NOTE — TELEPHONE ENCOUNTER
Patient is complaining of elevated blood pressure (reading on your desk ) she is here in the office to get her blood pressure check with our machine and compare .  She also started taking the hydralazine again to see if that will help it has been 3 weeks it

## 2017-11-09 NOTE — TELEPHONE ENCOUNTER
Not sure if the headaches are from the hydralazine or from the blood pressure being elevated. Have her check her blood pressures twice a day for the next week may be headaches remain there than have her come and we can reevaluate.

## 2017-11-17 ENCOUNTER — TELEPHONE (OUTPATIENT)
Dept: INTERNAL MEDICINE CLINIC | Facility: CLINIC | Age: 67
End: 2017-11-17

## 2017-11-17 RX ORDER — HYDRALAZINE HYDROCHLORIDE 100 MG/1
100 TABLET, FILM COATED ORAL 3 TIMES DAILY
Qty: 90 TABLET | Refills: 1 | Status: SHIPPED | OUTPATIENT
Start: 2017-11-17 | End: 2017-12-19

## 2017-11-17 NOTE — TELEPHONE ENCOUNTER
Dr. Story Messing please see FYI message below. Pt called inquiring if she should continue taking BP medications. States noted bp increasing last night max 154/74 pulse 73. Todays readings max 162/82 pulse 76 in the morning and now (1025am) 140/73 pulse 74.  Jacinto Leigh

## 2017-11-17 NOTE — TELEPHONE ENCOUNTER
Spoke to pts daughter Jason Minaya (HIPPA verified). Relayed Dr. Garcia Brain message as shown below. Daughter verbalized understanding of whole message and had no further questions at this time.

## 2017-11-17 NOTE — TELEPHONE ENCOUNTER
Check blood pressure readings 2 times a day but different days different times. Increase hydralazine to 100 3 times daily. Careful with salt. Avoid red meat, shellfish, pork. Try not to cheatham foods. Follow-up on December 4.

## 2017-11-20 RX ORDER — LEVOTHYROXINE SODIUM 175 UG/1
TABLET ORAL
Qty: 90 TABLET | Refills: 0 | Status: SHIPPED | OUTPATIENT
Start: 2017-11-20 | End: 2018-02-20

## 2017-12-04 ENCOUNTER — TELEPHONE (OUTPATIENT)
Dept: INTERNAL MEDICINE CLINIC | Facility: CLINIC | Age: 67
End: 2017-12-04

## 2017-12-04 ENCOUNTER — OFFICE VISIT (OUTPATIENT)
Dept: INTERNAL MEDICINE CLINIC | Facility: CLINIC | Age: 67
End: 2017-12-04

## 2017-12-04 VITALS
TEMPERATURE: 98 F | OXYGEN SATURATION: 98 % | BODY MASS INDEX: 35.55 KG/M2 | WEIGHT: 240 LBS | HEART RATE: 71 BPM | SYSTOLIC BLOOD PRESSURE: 129 MMHG | DIASTOLIC BLOOD PRESSURE: 71 MMHG | HEIGHT: 69 IN

## 2017-12-04 DIAGNOSIS — E03.9 HYPOTHYROIDISM (ACQUIRED): Primary | ICD-10-CM

## 2017-12-04 DIAGNOSIS — E53.8 B12 DEFICIENCY: ICD-10-CM

## 2017-12-04 DIAGNOSIS — M85.80 OSTEOPENIA, UNSPECIFIED LOCATION: ICD-10-CM

## 2017-12-04 DIAGNOSIS — IMO0001 UNCONTROLLED TYPE 2 DIABETES MELLITUS WITHOUT COMPLICATION, WITHOUT LONG-TERM CURRENT USE OF INSULIN: ICD-10-CM

## 2017-12-04 DIAGNOSIS — I10 ESSENTIAL HYPERTENSION WITH GOAL BLOOD PRESSURE LESS THAN 130/80: ICD-10-CM

## 2017-12-04 PROCEDURE — 99214 OFFICE O/P EST MOD 30 MIN: CPT | Performed by: INTERNAL MEDICINE

## 2017-12-04 PROCEDURE — G0463 HOSPITAL OUTPT CLINIC VISIT: HCPCS | Performed by: INTERNAL MEDICINE

## 2017-12-04 RX ORDER — CARVEDILOL 12.5 MG/1
25 TABLET ORAL 2 TIMES DAILY WITH MEALS
Qty: 60 TABLET | Refills: 1 | Status: SHIPPED | OUTPATIENT
Start: 2017-12-04 | End: 2017-12-11 | Stop reason: ALTCHOICE

## 2017-12-04 RX ORDER — FLUOCINONIDE 0.5 MG/G
OINTMENT TOPICAL 2 TIMES DAILY
COMMUNITY
End: 2018-07-05 | Stop reason: ALTCHOICE

## 2017-12-04 NOTE — PATIENT INSTRUCTIONS
ASSESSMENT/PLAN:   Hypothyroidism (acquired)  (primary encounter diagnosis) Check blood. Essential hypertension with goal blood pressure less than 130/80 Good control. Careful with diet and excercise at least 30 minutes 3-4 times a week.  Check blood pr

## 2017-12-04 NOTE — PROGRESS NOTES
HPI:    Patient ID: Julian Arora is a 78year old female. HPI   Hypertension  Patient is here for follow up of hypertension. BP at home: not check. Has been compliant with medications.   Exercise level: somewhat active and has been follo 1.28 05/24/2016 05:40 PM          Lab Results  Component Value Date/Time   CHOLEST 134 09/30/2017 07:22 AM   HDL 51 09/30/2017 07:22 AM   TRIG 74 09/30/2017 07:22 AM   LDL 68 09/30/2017 07:22 AM   [place] 83 09/30/2017 07:22 AM         Lab Results  Compone Gastrointestinal: Negative for abdominal distention and abdominal pain. Endocrine: Negative for cold intolerance, heat intolerance, polydipsia, polyphagia and polyuria.    Neurological: Negative for dizziness, tremors, seizures, syncope, weakness, light tablet (20 mg total) by mouth nightly. take 1 tablet (20MG)  by oral route  3 times a week M/W/F Disp: 90 tablet Rfl: 1   GLUCOSAMINE CHONDROITIN COMPLX OR Take by mouth.  Disp:  Rfl:    Insulin Pen Needle (BD PEN NEEDLE ULTRAFINE) 29G X 12.7MM Does not phil Known Problems Daughter    • No Known Problems Maternal Grandmother    • No Known Problems Paternal Grandmother    • No Known Problems Maternal Aunt    • No Known Problems Paternal Aunt    • No Known Problems Maternal Cousin Female    • No Known Problems M and breath sounds normal. No accessory muscle usage. No apnea, no tachypnea and no bradypnea. No respiratory distress. She has no decreased breath sounds. She has no wheezes. She has no rhonchi. She has no rales. She exhibits no tenderness.    Musculoskelet Visit:  Signed Prescriptions Disp Refills    carvedilol 12.5 MG Oral Tab 60 tablet 1      Sig: Take 2 tablets (25 mg total) by mouth 2 (two) times daily with meals.       insulin glargine (LANTUS SOLOSTAR) 100 UNIT/ML Subcutaneous Solution Pen-injector 10 p

## 2017-12-11 ENCOUNTER — NURSE ONLY (OUTPATIENT)
Dept: INTERNAL MEDICINE CLINIC | Facility: CLINIC | Age: 67
End: 2017-12-11

## 2017-12-11 ENCOUNTER — TELEPHONE (OUTPATIENT)
Dept: INTERNAL MEDICINE CLINIC | Facility: CLINIC | Age: 67
End: 2017-12-11

## 2017-12-11 VITALS — SYSTOLIC BLOOD PRESSURE: 163 MMHG | HEART RATE: 66 BPM | DIASTOLIC BLOOD PRESSURE: 72 MMHG

## 2017-12-11 DIAGNOSIS — Z01.30 BLOOD PRESSURE CHECK: Primary | ICD-10-CM

## 2017-12-11 RX ORDER — AMLODIPINE BESYLATE 2.5 MG/1
2.5 TABLET ORAL 2 TIMES DAILY
Qty: 60 TABLET | Refills: 1 | Status: SHIPPED | OUTPATIENT
Start: 2017-12-11 | End: 2017-12-19

## 2017-12-11 NOTE — TELEPHONE ENCOUNTER
Current Outpatient Prescriptions:   ••  insulin glargine (LANTUS SOLOSTAR) 100 UNIT/ML Subcutaneous Solution Pen-injector, INJECT 35 UNITS EVERY MORNING & 20 UNITS EVERY EVENING, Disp: 10 pen, Rfl: 2  With  Refills please  •

## 2017-12-11 NOTE — TELEPHONE ENCOUNTER
Patient daughter received a message she is to  new medication which she is confused. She  Came in the mroning to get her blood pressure check but she feels the cardovol is making her sick has dizziness every times she takes it

## 2017-12-11 NOTE — PROGRESS NOTES
Pt states that since she started taking the carvedilol she has been experiencing dizzyness, blurry vision and just does not feel well for 2 hours after taking it, pt feels that she is taking too much BP medication, pt would like to speak with Md.

## 2017-12-11 NOTE — TELEPHONE ENCOUNTER
Informed Daughter Shanda Rayo rx refilled already. To call pharmacy [facility] in [place] to  rx. Call if questions or concerns. Verbalzied understanding

## 2017-12-12 ENCOUNTER — TELEPHONE (OUTPATIENT)
Dept: INTERNAL MEDICINE CLINIC | Facility: CLINIC | Age: 67
End: 2017-12-12

## 2017-12-12 NOTE — TELEPHONE ENCOUNTER
Some B/P's are higher. Would continue to check at different times on new meds. And write down time of when check and call in 1-2 weeks with new medication readings.

## 2017-12-12 NOTE — TELEPHONE ENCOUNTER
So her B/P is still high. So its not helping. She was on toprol an dB/P's seemed better. ? Try again? Toprol XL 50 q12 hrs. #60 with 1 Rf. ? Try again? Careful with diet and excercise at least 30 minutes 3-4 times a week.  Check blood pressures at different

## 2017-12-12 NOTE — TELEPHONE ENCOUNTER
Patient sates she not feeling good feels very disoriented and dizzy her blood pressure was 171 and her sugar was 151.  Can can she do

## 2017-12-14 ENCOUNTER — TELEPHONE (OUTPATIENT)
Dept: INTERNAL MEDICINE CLINIC | Facility: CLINIC | Age: 67
End: 2017-12-14

## 2017-12-14 NOTE — TELEPHONE ENCOUNTER
patient is requesting to see you today she still not feeling well her blood pressure medication is not working .  Any time

## 2017-12-14 NOTE — TELEPHONE ENCOUNTER
Pt states that Amlodipine is making her sick, feels heart palpitations and has diarrhea ever since she started medication. Current b/p is 170/79 and only had Hydralazine in the a.m.  Also, pt is not eating well because she has poor appetite; bs level was 16

## 2017-12-14 NOTE — TELEPHONE ENCOUNTER
Dermatologist note. She was very well controlled with Toprol. I am not sure she is reading the side effects before taking the medication therefore she gets medications side effects.   She needs to be careful about what she read she can always ask or best

## 2017-12-18 PROBLEM — E10.9 TYPE 1 DIABETES MELLITUS WITHOUT RETINOPATHY (HCC): Status: RESOLVED | Noted: 2017-02-14 | Resolved: 2017-12-18

## 2017-12-19 ENCOUNTER — OFFICE VISIT (OUTPATIENT)
Dept: INTERNAL MEDICINE CLINIC | Facility: CLINIC | Age: 67
End: 2017-12-19

## 2017-12-19 VITALS
OXYGEN SATURATION: 98 % | DIASTOLIC BLOOD PRESSURE: 56 MMHG | BODY MASS INDEX: 34.96 KG/M2 | SYSTOLIC BLOOD PRESSURE: 132 MMHG | HEIGHT: 69 IN | WEIGHT: 236 LBS | TEMPERATURE: 98 F | HEART RATE: 76 BPM

## 2017-12-19 DIAGNOSIS — M85.80 OSTEOPENIA, UNSPECIFIED LOCATION: ICD-10-CM

## 2017-12-19 DIAGNOSIS — E53.8 B12 DEFICIENCY: ICD-10-CM

## 2017-12-19 DIAGNOSIS — E03.9 HYPOTHYROIDISM (ACQUIRED): Primary | ICD-10-CM

## 2017-12-19 DIAGNOSIS — I10 ESSENTIAL HYPERTENSION WITH GOAL BLOOD PRESSURE LESS THAN 130/80: ICD-10-CM

## 2017-12-19 DIAGNOSIS — IMO0001 UNCONTROLLED TYPE 2 DIABETES MELLITUS WITHOUT COMPLICATION, WITHOUT LONG-TERM CURRENT USE OF INSULIN: ICD-10-CM

## 2017-12-19 PROCEDURE — 99214 OFFICE O/P EST MOD 30 MIN: CPT | Performed by: INTERNAL MEDICINE

## 2017-12-19 PROCEDURE — G0463 HOSPITAL OUTPT CLINIC VISIT: HCPCS | Performed by: INTERNAL MEDICINE

## 2017-12-19 RX ORDER — HYDRALAZINE HYDROCHLORIDE 100 MG/1
100 TABLET, FILM COATED ORAL 3 TIMES DAILY
Qty: 90 TABLET | Refills: 1 | Status: SHIPPED | OUTPATIENT
Start: 2017-12-19 | End: 2018-03-12

## 2017-12-19 RX ORDER — METOPROLOL SUCCINATE 50 MG/1
50 TABLET, EXTENDED RELEASE ORAL 2 TIMES DAILY
Qty: 180 TABLET | Refills: 1 | Status: SHIPPED | OUTPATIENT
Start: 2017-12-19 | End: 2018-04-02 | Stop reason: ALTCHOICE

## 2017-12-19 RX ORDER — GLIPIZIDE 10 MG/1
TABLET ORAL
Qty: 90 TABLET | Refills: 1 | Status: SHIPPED | OUTPATIENT
Start: 2017-12-19 | End: 2018-06-06

## 2017-12-19 NOTE — PATIENT INSTRUCTIONS
ASSESSMENT/PLAN:   Hypothyroidism (acquired)  (primary encounter diagnosis) Clinically and biochemically euthyroid. Essential hypertension with goal blood pressure less than 130/80 Better.  Careful with diet and excercise at least 30 minutes 3-4 times a

## 2017-12-19 NOTE — PROGRESS NOTES
HPI:    Patient ID: Karyn Giraldo is a 78year old female. HPI   No changes with rash L post. Ankle when stopped metoprolol. Wants to continue the same. Diabetes  Patient here for follow up of Diabetes.   Has been taking medications r Results  Component Value Date/Time    (H) 09/30/2017 07:22 AM    (L) 09/30/2017 07:22 AM   K 4.8 09/30/2017 07:22 AM   CL 98 09/30/2017 07:22 AM   CO2 31 09/30/2017 07:22 AM   CREATSERUM 0.84 09/30/2017 07:22 AM   CA 9.1 09/30/2017 07:22 AM SODIUM 175 MCG Oral Tab TAKE 1 TABLET BY MOUTH IN THE MORNING BEFORE BREAKFAST Disp: 90 tablet Rfl: 0   Valsartan-Hydrochlorothiazide 320-25 MG Oral Tab Take 1 tablet by mouth once daily.  Disp: 90 tablet Rfl: 1   Multiple Vitamins-Minerals (ONE-A-DAY WOMEN complication, not stated as uncontrolled     DIABETIC FOR APPROX 20 YRS   • Unspecified essential hypertension    • Vertigo, aural       Past Surgical History:  1993: APPENDECTOMY  1988: CATARACT EXTRACTION W/  INTRAOCULAR LENS IMPLA* Right      Comment: i Normal rate, regular rhythm, S1 normal, S2 normal, normal heart sounds, intact distal pulses and normal pulses. Pulses:       Carotid pulses are 2+ on the right side, and 2+ on the left side.        Radial pulses are 2+ on the right side, and 2+ on the l at different times on different dates. Careful with low sugars. Carry something with you and check sugar if can. Can carry carolyne cracker, etc. Decrease carbohydrates. But also, careful with fruits and natural sugars. One serving a day and no more than 1 ha

## 2018-01-09 ENCOUNTER — TELEPHONE (OUTPATIENT)
Dept: INTERNAL MEDICINE CLINIC | Facility: CLINIC | Age: 68
End: 2018-01-09

## 2018-01-09 DIAGNOSIS — IMO0001 UNCONTROLLED TYPE 2 DIABETES MELLITUS WITHOUT COMPLICATION, WITHOUT LONG-TERM CURRENT USE OF INSULIN: Primary | Chronic | ICD-10-CM

## 2018-01-09 NOTE — TELEPHONE ENCOUNTER
Spoke with pharmacist Mateus Wilson from [facility]. Per pharmacist, insurance has changed to Contour supplies instead of Accu-chek. Requesting DME order to include \"dispense Countour Next\" and have diagnosis. DME pending with 3 months supply with one refill.  Vern

## 2018-01-09 NOTE — TELEPHONE ENCOUNTER
Patient insurance does not want to print in regular paper they need a prescription paper for her diabetic supplies and machine as of January 1, 2018 Lauern Martinez (paper work in your desk ) please mail to her home

## 2018-01-09 NOTE — TELEPHONE ENCOUNTER
Spoke with daughter Horacio Cornejo, informed DME for diabetic supplies faxed to [facility] in [facility] [address]

## 2018-01-22 ENCOUNTER — TELEPHONE (OUTPATIENT)
Dept: INTERNAL MEDICINE CLINIC | Facility: CLINIC | Age: 68
End: 2018-01-22

## 2018-01-22 NOTE — TELEPHONE ENCOUNTER
Burmese speaking pt out of diabetic supplies  Insurance cover changed    PLEASE SEE ENCOUNTER FROM 01/09/18

## 2018-01-23 ENCOUNTER — TELEPHONE (OUTPATIENT)
Dept: INTERNAL MEDICINE CLINIC | Facility: CLINIC | Age: 68
End: 2018-01-23

## 2018-01-24 NOTE — TELEPHONE ENCOUNTER
Pt daughter requesting to speak to Dr Thomas Kemar   her MOM out of diabetic supplies,almos for a week   please see previus  encounter. Pharmacy has been faxing forms for  To fill and sign. she said but the one faxed yesterday   it was incomplete she said

## 2018-01-24 NOTE — TELEPHONE ENCOUNTER
Spoke with pharmacist, informed order for diabetic testing supplies has been faxed. They want the DME signed with NPI number for physician. DME signed with NPI number. Faxed to [facility] in [facility] [address]. Daughter aware.

## 2018-01-24 NOTE — TELEPHONE ENCOUNTER
Complete as far as I know and everything was done 3 times we have faxed the forms back to the pharmacy and not sure what the pharmacies information is that they are not getting it right.

## 2018-02-20 NOTE — TELEPHONE ENCOUNTER
Needs refills 2 months      Current Outpatient Prescriptions:     •  LEVOTHYROXINE SODIUM 175 MCG Oral Tab, TAKE 1 TABLET BY MOUTH IN THE MORNING BEFORE BREAKFAST, Disp: 30 tablet, Rfl: 2

## 2018-02-21 RX ORDER — LEVOTHYROXINE SODIUM 175 UG/1
TABLET ORAL
Qty: 90 TABLET | Refills: 0 | Status: SHIPPED | OUTPATIENT
Start: 2018-02-21 | End: 2018-04-04 | Stop reason: DRUGHIGH

## 2018-02-21 NOTE — TELEPHONE ENCOUNTER
Hypothyroid Medications  Protocol Criteria:  Appointment scheduled in the past 12 months or the next 3 months  TSH resulted in the past 12 months that is normal  Recent Outpatient Visits            2 months ago Hypothyroidism (acquired)    [facility], [facility],

## 2018-03-12 RX ORDER — HYDRALAZINE HYDROCHLORIDE 100 MG/1
TABLET, FILM COATED ORAL
Qty: 90 TABLET | Refills: 0 | Status: SHIPPED | OUTPATIENT
Start: 2018-03-12 | End: 2018-04-11

## 2018-03-14 ENCOUNTER — TELEPHONE (OUTPATIENT)
Dept: INTERNAL MEDICINE CLINIC | Facility: CLINIC | Age: 68
End: 2018-03-14

## 2018-03-14 NOTE — TELEPHONE ENCOUNTER
Needs a new rx for   BENNY CONTOUR NEXT TEST In Vitro Strip, TEST ONCE A DAY, Disp: 100 strip, Rfl: 0.     She is checking twice a day  Please send new rx

## 2018-04-02 ENCOUNTER — TELEPHONE (OUTPATIENT)
Dept: INTERNAL MEDICINE CLINIC | Facility: CLINIC | Age: 68
End: 2018-04-02

## 2018-04-02 ENCOUNTER — OFFICE VISIT (OUTPATIENT)
Dept: INTERNAL MEDICINE CLINIC | Facility: CLINIC | Age: 68
End: 2018-04-02

## 2018-04-02 VITALS
DIASTOLIC BLOOD PRESSURE: 60 MMHG | HEART RATE: 67 BPM | SYSTOLIC BLOOD PRESSURE: 122 MMHG | BODY MASS INDEX: 35.4 KG/M2 | OXYGEN SATURATION: 97 % | TEMPERATURE: 98 F | HEIGHT: 69 IN | WEIGHT: 239 LBS

## 2018-04-02 DIAGNOSIS — R51.9 HEADACHE AROUND THE EYES: ICD-10-CM

## 2018-04-02 DIAGNOSIS — E53.8 B12 DEFICIENCY: ICD-10-CM

## 2018-04-02 DIAGNOSIS — E03.9 HYPOTHYROIDISM (ACQUIRED): ICD-10-CM

## 2018-04-02 DIAGNOSIS — I83.90 VARICOSE VEIN OF LEG: ICD-10-CM

## 2018-04-02 DIAGNOSIS — R21 RASH: ICD-10-CM

## 2018-04-02 DIAGNOSIS — IMO0001 UNCONTROLLED TYPE 2 DIABETES MELLITUS WITHOUT COMPLICATION, WITHOUT LONG-TERM CURRENT USE OF INSULIN: Primary | ICD-10-CM

## 2018-04-02 DIAGNOSIS — I10 ESSENTIAL HYPERTENSION WITH GOAL BLOOD PRESSURE LESS THAN 130/80: ICD-10-CM

## 2018-04-02 DIAGNOSIS — M85.80 OSTEOPENIA, UNSPECIFIED LOCATION: ICD-10-CM

## 2018-04-02 PROCEDURE — 99215 OFFICE O/P EST HI 40 MIN: CPT | Performed by: INTERNAL MEDICINE

## 2018-04-02 RX ORDER — CLONIDINE HYDROCHLORIDE 0.1 MG/1
0.1 TABLET ORAL 2 TIMES DAILY
Qty: 60 TABLET | Refills: 1 | Status: SHIPPED | OUTPATIENT
Start: 2018-04-02 | End: 2018-05-31

## 2018-04-02 RX ORDER — VALSARTAN AND HYDROCHLOROTHIAZIDE 320; 25 MG/1; MG/1
1 TABLET, FILM COATED ORAL
Qty: 90 TABLET | Refills: 1 | Status: SHIPPED | OUTPATIENT
Start: 2018-04-02 | End: 2018-07-26 | Stop reason: ALTCHOICE

## 2018-04-02 RX ORDER — AMLODIPINE BESYLATE 5 MG/1
5 TABLET ORAL 2 TIMES DAILY
Qty: 60 TABLET | Refills: 1 | Status: SHIPPED | OUTPATIENT
Start: 2018-04-02 | End: 2018-04-02 | Stop reason: ALTCHOICE

## 2018-04-02 NOTE — TELEPHONE ENCOUNTER
New Rx for clonidine 0.1 mg twice a day. Can start from tonight or maybe start from tomorrow hold off on the metoprolol for right now follow-up with the dermatologist.  This is a completely different medicine she has never tried this before.

## 2018-04-02 NOTE — PATIENT INSTRUCTIONS
ASSESSMENT/PLAN:   Uncontrolled type 2 diabetes mellitus without complication, without long-term current use of insulin (hcc)  (primary encounter diagnosis) Good control. Careful with diet and excercise at least 30 minutes 3-4 times a week.  Check sugars at strip 6      Sig: TEST 2 times  A DAY           Imaging & Referrals:  OPHTHALMOLOGY - INTERNAL  DERM - INTERNAL  OP REFERRAL TO INTERVENTIONAL RADIOLOGY  XR DEXA BONE DENSITOMETRY (CPT=77080)  CT BRAIN OR HEAD ([id number])

## 2018-04-02 NOTE — TELEPHONE ENCOUNTER
Pt states that she remembered that Dr. Malian Otoole prescribed Amlodipine before and she had an allergic reaction to it and that's why she had gone back on the metoprolol. Pt states that Amlodipine caused chest pressure, sob and BP went up.  Pt states that she wo

## 2018-04-02 NOTE — PROGRESS NOTES
HPI:    Patient ID: Rohini Barajas is a 78year old female. Tried JASON hose to knees and then to waist and too tight. Varicose vein RLE. Rash comes and goes on L ankle. NT. Occ. Itched. Saw dermatology.  Told to stop metoprolol but when d 12/19/17 : 132/56  12/11/17 : (!) 163/72    Labs:     Lab Results  Component Value Date/Time    (H) 09/30/2017 07:22 AM    (L) 09/30/2017 07:22 AM   K 4.8 09/30/2017 07:22 AM   CL 98 09/30/2017 07:22 AM   CO2 31 09/30/2017 07:22 AM   MABEL Constitutional: Negative. Negative for activity change, appetite change, chills, diaphoresis, fatigue, fever, unexpected weight change and weight loss.    HENT: Negative for congestion, dental problem, drooling, ear discharge, ear pain, facial swelling, he Valsartan-Hydrochlorothiazide 320-25 MG Oral Tab Take 1 tablet by mouth once daily.  Disp: 90 tablet Rfl: 1   Glucose Blood (BENNY CONTOUR NEXT TEST) In Vitro Strip TEST 2 times  A DAY Disp: 100 strip Rfl: 6   HYDRALAZINE  MG Oral Tab TAKE 1 TABLET B Allergies:  Metformin               Diarrhea    HISTORY:  Past Medical History:   Diagnosis Date   • Appendicitis    • Cholelithiasis    • Disorder of thyroid    • Goiter     Multinodular goiter S/P thyroidectomy.     • High blood pressure    • High cholest Social History: Smoking status: Never Smoker                                                              Smokeless tobacco: Never Used                      Alcohol use:  No                 PHYSICAL EXAM:    Physical Exam   Constitutional: She is oriented t Eyes: Conjunctivae and EOM are normal. Pupils are equal, round, and reactive to light. Left eye exhibits no chemosis, no discharge, no exudate and no hordeolum. No foreign body present in the left eye.    Neck: Trachea normal and phonation normal. Neck supp Left: No supraclavicular adenopathy present. Neurological: She is alert and oriented to person, place, and time. She displays no atrophy and no tremor. No cranial nerve deficit or sensory deficit. She exhibits normal muscle tone.    Skin: Skin is war Orders Placed This Encounter      Lipid Panel [E]      Comp Metabolic Panel (14) [E]      CELIAC DISEASE SCREEN Reflex [E]    Meds This Visit:  Signed Prescriptions Disp Refills    AmLODIPine Besylate 5 MG Oral Tab 60 tablet 1      Sig: Take 1 tablet (5 mg

## 2018-04-03 ENCOUNTER — LAB ENCOUNTER (OUTPATIENT)
Dept: LAB | Facility: HOSPITAL | Age: 68
End: 2018-04-03
Attending: INTERNAL MEDICINE
Payer: MEDICARE

## 2018-04-03 DIAGNOSIS — IMO0001 UNCONTROLLED TYPE 2 DIABETES MELLITUS WITHOUT COMPLICATION, WITHOUT LONG-TERM CURRENT USE OF INSULIN: ICD-10-CM

## 2018-04-03 DIAGNOSIS — R51.9 HEADACHE AROUND THE EYES: ICD-10-CM

## 2018-04-03 PROCEDURE — 83516 IMMUNOASSAY NONANTIBODY: CPT

## 2018-04-03 PROCEDURE — 83036 HEMOGLOBIN GLYCOSYLATED A1C: CPT | Performed by: INTERNAL MEDICINE

## 2018-04-03 PROCEDURE — 84443 ASSAY THYROID STIM HORMONE: CPT | Performed by: INTERNAL MEDICINE

## 2018-04-03 PROCEDURE — 80053 COMPREHEN METABOLIC PANEL: CPT

## 2018-04-03 PROCEDURE — 84439 ASSAY OF FREE THYROXINE: CPT | Performed by: INTERNAL MEDICINE

## 2018-04-03 PROCEDURE — 82784 ASSAY IGA/IGD/IGG/IGM EACH: CPT

## 2018-04-03 PROCEDURE — 80061 LIPID PANEL: CPT

## 2018-04-03 PROCEDURE — 36415 COLL VENOUS BLD VENIPUNCTURE: CPT | Performed by: INTERNAL MEDICINE

## 2018-04-05 ENCOUNTER — TELEPHONE (OUTPATIENT)
Dept: INTERNAL MEDICINE CLINIC | Facility: CLINIC | Age: 68
End: 2018-04-05

## 2018-04-05 NOTE — TELEPHONE ENCOUNTER
Patient was called. LMTCB regarding blood test results. Please transfer call to Triage RN at ext. [phone].  Thanks !!

## 2018-04-05 NOTE — TELEPHONE ENCOUNTER
----- Message from Alexandru Valenzuela MD sent at 4/4/2018  7:35 PM CDT -----  Hemoglobin A1c is good. Great job. Throat shows may be slightly hypothyroid. Would increase levothyroxine to 188 mcg. Recheck levels in 3 months. Orders written. New Rx sent to pharmacy.

## 2018-04-05 NOTE — TELEPHONE ENCOUNTER
----- Message from Alexandru Fernandez MD sent at 4/4/2018  7:25 PM CDT -----  CMP Normal (electrolytes, sugar, kidney and liver functions),   Lipid (choilesterol) is good,   Celiac panel is negative.

## 2018-04-05 NOTE — PROGRESS NOTES
CMP Normal (electrolytes, sugar, kidney and liver functions),   Lipid (choilesterol) is good,   Celiac panel is negative.

## 2018-04-05 NOTE — TELEPHONE ENCOUNTER
Patient was called. LMTCB regarding blood test results. Please transfer call to Triage RN at ext. [phone].  Thanks !!

## 2018-04-06 NOTE — TELEPHONE ENCOUNTER
Information was given to daughter, daughter voiced understanding and will relate the message to the pt regarding the new medication.

## 2018-04-06 NOTE — TELEPHONE ENCOUNTER
Pt's daughter has been informed of lab results and Md's advise, daughter voiced understanding and will relate message to pt.

## 2018-04-11 RX ORDER — HYDRALAZINE HYDROCHLORIDE 100 MG/1
TABLET, FILM COATED ORAL
Qty: 270 TABLET | Refills: 0 | Status: SHIPPED | OUTPATIENT
Start: 2018-04-11 | End: 2018-08-03

## 2018-05-08 ENCOUNTER — TELEPHONE (OUTPATIENT)
Dept: INTERNAL MEDICINE CLINIC | Facility: CLINIC | Age: 68
End: 2018-05-08

## 2018-05-10 ENCOUNTER — TELEPHONE (OUTPATIENT)
Dept: INTERNAL MEDICINE CLINIC | Facility: CLINIC | Age: 68
End: 2018-05-10

## 2018-05-10 NOTE — TELEPHONE ENCOUNTER
Current Outpatient Prescriptions:   •  HYDRALAZINE  MG Oral Tab, take 1 tablet by mouth 3 times a day, Disp: 270 tablet, Rfl: 3    needs strip uses counter next refills  She checks 2 a day with norman

## 2018-05-10 NOTE — TELEPHONE ENCOUNTER
Patient has refills left at pharmacy for test strips and just received 90 day supply for hydralazine on 4/11/18.

## 2018-05-11 NOTE — TELEPHONE ENCOUNTER
Spoke with pharmacy and pharmacist Tamra Su states that pt only received #90 pills of the Hydralazine not 90 day, that is why pt needs a refill so soon, Tamra Su will get refill ready for pt.  also Tamra Su will fax over the order for the strips, it needs to have Dx and s

## 2018-05-14 ENCOUNTER — TELEPHONE (OUTPATIENT)
Dept: INTERNAL MEDICINE CLINIC | Facility: CLINIC | Age: 68
End: 2018-05-14

## 2018-05-14 ENCOUNTER — OFFICE VISIT (OUTPATIENT)
Dept: INTERNAL MEDICINE CLINIC | Facility: CLINIC | Age: 68
End: 2018-05-14

## 2018-05-14 VITALS
WEIGHT: 228 LBS | TEMPERATURE: 99 F | SYSTOLIC BLOOD PRESSURE: 136 MMHG | BODY MASS INDEX: 33.77 KG/M2 | HEIGHT: 69 IN | HEART RATE: 84 BPM | DIASTOLIC BLOOD PRESSURE: 67 MMHG

## 2018-05-14 DIAGNOSIS — R42 VERTIGO: ICD-10-CM

## 2018-05-14 DIAGNOSIS — R42 DIZZY SPELLS: Primary | ICD-10-CM

## 2018-05-14 DIAGNOSIS — H92.01 DISCOMFORT OF RIGHT EAR: ICD-10-CM

## 2018-05-14 PROCEDURE — G0463 HOSPITAL OUTPT CLINIC VISIT: HCPCS | Performed by: INTERNAL MEDICINE

## 2018-05-14 PROCEDURE — 99214 OFFICE O/P EST MOD 30 MIN: CPT | Performed by: INTERNAL MEDICINE

## 2018-05-14 RX ORDER — MECLIZINE HYDROCHLORIDE 25 MG/1
25 TABLET ORAL 3 TIMES DAILY PRN
Qty: 90 TABLET | Refills: 0 | Status: SHIPPED | OUTPATIENT
Start: 2018-05-14 | End: 2018-07-05 | Stop reason: ALTCHOICE

## 2018-05-14 NOTE — TELEPHONE ENCOUNTER
Patient was called. States for the past two days she has been experiencing ringing sensation in both ears, headaches, dizziness, one episode of vomitus yesterday and drowsiness. Her vitals signs this morning are / 73, HR 88 bpm. Glucose Levels of 152. Patient says is following a healthy dietary intake and complaint with all her medications. Inquiring for recommendations from Dr. Cindy Birch. Clinical info is being sent to MD for review and further advise. Would you like to see this patient in Consultation today ?

## 2018-05-14 NOTE — TELEPHONE ENCOUNTER
Ku[place]i speaking calling to ask dr Ann Ayala if she can see her today, pt is very dizzy, nauseas, blood  Very is high, meds not working she said.   Please advise

## 2018-05-14 NOTE — TELEPHONE ENCOUNTER
Patient was called. Appt with Dr. Jarrell Smith has been scheduled on 05/14/18 at 5:45 pm @ [facility]. Patient made aware and agreed. No further questions / concerns at this time.

## 2018-05-15 NOTE — PATIENT INSTRUCTIONS
ASSESSMENT/PLAN:   Dizzy spells  (primary encounter diagnosis) seems to be due to vertigo will start we will treat with meclizine take as prescribed let us know if not better she should have the CAT scan done if symptoms persist get better.   Vertigo as abo

## 2018-05-15 NOTE — PROGRESS NOTES
HPI:    Patient ID: Nallely Mccauley is a 80year old female. HPI   Patient comes in today with complaint of dizzy spells room spinning and some muscles to ears some right side.   Patient had seen Dr. Itzel Camacho about a month ago had some new head BENNY MICROLET LANCETS Does not apply Misc Testing 3 times a day  Diagnosis E11.65 Disp: 300 each Rfl: 2   glipiZIDE 10 MG Oral Tab TAKE 1/2 TABLET BY MOUTH EVERY MORNING & TAKE ONE TABLET EVERY EVENING Disp: 90 tablet Rfl: 1   Fluocinonide 0.05 % Extern unspecified hyperlipidemia    • Type II or unspecified type diabetes mellitus without mention of complication, not stated as uncontrolled     DIABETIC FOR APPROX 20 YRS   • Unspecified essential hypertension    • Vertigo, aural       Past Surgical History: Normocephalic and atraumatic. Eyes: Conjunctivae are normal. Pupils are equal, round, and reactive to light. No scleral icterus. Neck: Normal range of motion. Neck supple. Cardiovascular: Normal rate and regular rhythm.     Pulmonary/Chest: Effort nor

## 2018-05-22 ENCOUNTER — TELEPHONE (OUTPATIENT)
Dept: INTERNAL MEDICINE CLINIC | Facility: CLINIC | Age: 68
End: 2018-05-22

## 2018-05-22 DIAGNOSIS — IMO0001 UNCONTROLLED TYPE 2 DIABETES MELLITUS WITHOUT COMPLICATION, WITHOUT LONG-TERM CURRENT USE OF INSULIN: Primary | Chronic | ICD-10-CM

## 2018-05-22 RX ORDER — INSULIN GLARGINE 100 [IU]/ML
INJECTION, SOLUTION SUBCUTANEOUS
Qty: 30 ML | Refills: 1 | Status: SHIPPED | OUTPATIENT
Start: 2018-05-22 | End: 2018-10-03

## 2018-05-22 NOTE — TELEPHONE ENCOUNTER
JANETH--Dr. Drea Salazar office received a referral for pt and they have been trying to get a hold of the pt and leaving messages but there has been no call back from pt. They will try the ell number and if still no response then they will inform  Dr. Terri Penn.

## 2018-05-31 RX ORDER — CLONIDINE HYDROCHLORIDE 0.1 MG/1
TABLET ORAL
Qty: 60 TABLET | Refills: 0 | Status: SHIPPED | OUTPATIENT
Start: 2018-05-31 | End: 2018-06-06

## 2018-05-31 NOTE — TELEPHONE ENCOUNTER
Pt stated was seen by Dr. Primo Dempsey last year eye Doctor, at [facility]. Pt stated will call back to make appt to see Dr. Alysia Rodriguez.

## 2018-06-04 NOTE — TELEPHONE ENCOUNTER
Daughter informed Pt is eligible for Medicare Annual Health Assessment visit and offered assistance in scheduling, states will call back to schedule.

## 2018-06-05 ENCOUNTER — TELEPHONE (OUTPATIENT)
Dept: INTERNAL MEDICINE CLINIC | Facility: CLINIC | Age: 68
End: 2018-06-05

## 2018-06-05 NOTE — TELEPHONE ENCOUNTER
Needs refill   Current Outpatient Prescriptions:   •  CLONIDINE HCL 0.1 MG Oral Tab, take 1 tablet by mouth twice a day, Disp: 60 tablet, Rfl: 0  •  •  glipiZIDE 10 MG Oral Tab, TAKE 1/2 TABLET BY MOUTH EVERY MORNING & TAKE ONE TABLET EVERY EVENING, Disp:

## 2018-06-06 ENCOUNTER — MED REC SCAN ONLY (OUTPATIENT)
Dept: INTERNAL MEDICINE CLINIC | Facility: CLINIC | Age: 68
End: 2018-06-06

## 2018-06-06 RX ORDER — CLONIDINE HYDROCHLORIDE 0.1 MG/1
0.1 TABLET ORAL 2 TIMES DAILY
Qty: 60 TABLET | Refills: 1 | Status: SHIPPED | OUTPATIENT
Start: 2018-06-06 | End: 2018-09-05

## 2018-06-06 RX ORDER — GLIPIZIDE 10 MG/1
TABLET ORAL
Qty: 90 TABLET | Refills: 0 | Status: SHIPPED | OUTPATIENT
Start: 2018-06-06 | End: 2018-09-05

## 2018-06-15 ENCOUNTER — TELEPHONE (OUTPATIENT)
Dept: INTERNAL MEDICINE CLINIC | Facility: CLINIC | Age: 68
End: 2018-06-15

## 2018-06-15 ENCOUNTER — OFFICE VISIT (OUTPATIENT)
Dept: INTERNAL MEDICINE CLINIC | Facility: CLINIC | Age: 68
End: 2018-06-15

## 2018-06-15 VITALS
HEART RATE: 78 BPM | DIASTOLIC BLOOD PRESSURE: 75 MMHG | BODY MASS INDEX: 34.36 KG/M2 | SYSTOLIC BLOOD PRESSURE: 150 MMHG | HEIGHT: 69 IN | RESPIRATION RATE: 18 BRPM | TEMPERATURE: 99 F | WEIGHT: 232 LBS

## 2018-06-15 DIAGNOSIS — E66.01 SEVERE OBESITY (BMI 35.0-39.9) WITH COMORBIDITY (HCC): ICD-10-CM

## 2018-06-15 DIAGNOSIS — H93.11 TINNITUS OF RIGHT EAR: ICD-10-CM

## 2018-06-15 DIAGNOSIS — I10 HTN (HYPERTENSION), MALIGNANT: Primary | ICD-10-CM

## 2018-06-15 DIAGNOSIS — E23.2 DIABETES INSIPIDUS (HCC): ICD-10-CM

## 2018-06-15 PROCEDURE — 99214 OFFICE O/P EST MOD 30 MIN: CPT | Performed by: INTERNAL MEDICINE

## 2018-06-15 PROCEDURE — G0463 HOSPITAL OUTPT CLINIC VISIT: HCPCS | Performed by: INTERNAL MEDICINE

## 2018-06-15 NOTE — TELEPHONE ENCOUNTER
Patient will see Dr. Jaden Peng and bring all her medications with . She states last time she saw one of your partners they did change any medication because only the primary had to.

## 2018-06-15 NOTE — PROGRESS NOTES
HPI:    Patient ID: Bobby Soria is a 80year old female. HPI she  came in today due   High bp .  She  States that   When she is taking her bp medication she is not feeling good, she feels that her gait is unsteady she has ringing on her r disturbance. The patient is not nervous/anxious.             Current Outpatient Prescriptions:  GLIPIZIDE 10 MG Oral Tab TAKE 1/2 TABLET BY MOUTH EVERY MORNING AND TAKE 1 TABLET BY MOUTH EVERY EVENING  Disp: 90 tablet Rfl: 0   CloNIDine HCl 0.1 MG Oral Tab times daily as needed. take 1 tablet (25MG)  by oral route 3 times every day as needed Disp: 90 tablet Rfl: 1   simvastatin 20 MG Oral Tab Take 1 tablet (20 mg total) by mouth nightly.  take 1 tablet (20MG)  by oral route  3 times a week M/W/F Disp: 90 tabl Dementia Other    • No Known Problems Self    • No Known Problems Sister    • No Known Problems Daughter    • No Known Problems Maternal Grandmother    • No Known Problems Paternal Grandmother    • No Known Problems Maternal Aunt    • No Known Problems Pat No tracheal deviation present. No thyroid mass and no thyromegaly present. Cardiovascular: Normal rate, regular rhythm, normal heart sounds and intact distal pulses. Exam reveals no gallop and no friction rub. No murmur heard.   Pulmonary/Chest: Effor encounter.       Meds This Visit:  No prescriptions requested or ordered in this encounter    Imaging & Referrals:  NEPHROLOGY - INTERNAL  ENT - INTERNAL        SD#[id number]

## 2018-06-15 NOTE — TELEPHONE ENCOUNTER
Patient feels very sick when she takes her blood pressure medication feels light headed and nauseas .  Can  you see her to review her blood pressure medication or please advice

## 2018-06-15 NOTE — PATIENT INSTRUCTIONS
Htn (hypertension), malignant  (primary encounter diagnosis) not well controlled , advised her to continue with current meds , explained to her that symptoms can be due to uncontrolled blood pressure , we may need to increase her clonidine , patient is ref

## 2018-06-19 ENCOUNTER — OFFICE VISIT (OUTPATIENT)
Dept: OTOLARYNGOLOGY | Facility: CLINIC | Age: 68
End: 2018-06-19

## 2018-06-19 ENCOUNTER — OFFICE VISIT (OUTPATIENT)
Dept: AUDIOLOGY | Facility: CLINIC | Age: 68
End: 2018-06-19

## 2018-06-19 VITALS
TEMPERATURE: 98 F | BODY MASS INDEX: 34 KG/M2 | WEIGHT: 232 LBS | SYSTOLIC BLOOD PRESSURE: 143 MMHG | DIASTOLIC BLOOD PRESSURE: 64 MMHG

## 2018-06-19 DIAGNOSIS — H90.3 SENSORINEURAL HEARING LOSS, BILATERAL: ICD-10-CM

## 2018-06-19 DIAGNOSIS — R42 DIZZINESS: ICD-10-CM

## 2018-06-19 DIAGNOSIS — H93.13 RINGING IN EAR, BILATERAL: Primary | ICD-10-CM

## 2018-06-19 DIAGNOSIS — H91.20 SUDDEN-ONSET SENSORINEURAL HEARING LOSS: ICD-10-CM

## 2018-06-19 PROCEDURE — 92557 COMPREHENSIVE HEARING TEST: CPT | Performed by: AUDIOLOGIST

## 2018-06-19 PROCEDURE — 92567 TYMPANOMETRY: CPT | Performed by: AUDIOLOGIST

## 2018-06-19 PROCEDURE — 99214 OFFICE O/P EST MOD 30 MIN: CPT | Performed by: OTOLARYNGOLOGY

## 2018-06-19 PROCEDURE — G0463 HOSPITAL OUTPT CLINIC VISIT: HCPCS | Performed by: OTOLARYNGOLOGY

## 2018-06-19 RX ORDER — PREDNISONE 10 MG/1
TABLET ORAL
Qty: 44 TABLET | Refills: 0 | Status: SHIPPED | OUTPATIENT
Start: 2018-06-19 | End: 2018-07-05 | Stop reason: ALTCHOICE

## 2018-06-19 RX ORDER — VALACYCLOVIR HYDROCHLORIDE 500 MG/1
500 TABLET, FILM COATED ORAL EVERY 8 HOURS
Qty: 21 TABLET | Refills: 0 | Status: SHIPPED | OUTPATIENT
Start: 2018-06-19 | End: 2018-06-26

## 2018-06-19 NOTE — PROGRESS NOTES
AUDIOLOGY REPORT      Laura Del Valle is a 80year old female     Referring Provider: Cm Wagner   YOB: 1950  Medical Record: [id number]      Patient Hearing History:   Patient was referred for hearing testing by Dr. Bonnie Seo.

## 2018-06-19 NOTE — PROGRESS NOTES
[place]  is a 80year old female. Patient presents with:  Ringing In Ear: for 1 month, dizziness, no pain      HISTORY OF PRESENT ILLNESS  She is now 8 days out from total thyroidectomy for a multinodular goiter.  She is doing quite well Maternal Cousin Male    • No Known Problems Paternal Cousin Female    • No Known Problems Paternal Cousin Male    • Glaucoma Neg    • Macular degeneration Neg    • Breast Cancer Neg    • Ovarian Cancer Neg    • DCIS Neg    • LCIS Neg    • BRCA gene + Neg Sitting, Cuff Size: adult)   Temp 97.6 °F (36.4 °C) (Tympanic)   Wt 232 lb (105.2 kg)   BMI 34.26 kg/m²        Constitutional Normal Overall appearance - Normal.   Psychiatric Normal Orientation - Oriented to time, place, person & situation.  Appropriate mo SOLOSTAR 100 UNIT/ML Subcutaneous Solution Pen-injector, inject 35 units every morning and 20 units every evening, Disp: 30 mL, Rfl: 1  •  Meclizine HCl 25 MG Oral Tab, Take 1 tablet (25 mg total) by mouth 3 (three) times daily as needed. , Disp: 90 tablet, GLUCOSAMINE CHONDROITIN COMPLX OR, Take by mouth., Disp: , Rfl:   •  Insulin Pen Needle (BD PEN NEEDLE ULTRAFINE) 29G X 12.7MM Does not apply Misc, Dx. 250.00, Disp: 100 each, Rfl: 6  •  Omega-3-acid Ethyl Esters (LOVAZA) 1 G Oral Cap, Take 1 g by mouth 2

## 2018-06-21 ENCOUNTER — TELEPHONE (OUTPATIENT)
Dept: OTOLARYNGOLOGY | Facility: CLINIC | Age: 68
End: 2018-06-21

## 2018-06-21 NOTE — TELEPHONE ENCOUNTER
Spoke with pt via language line . States she started medications prescribed (valtrex and prednisone) and around noon today the ringing in her ears increased and she developed a frontal headache.   She states she has never had a headache like thi

## 2018-06-21 NOTE — TELEPHONE ENCOUNTER
Pt states she is having a severe headache and her ears are still ringing. Informed pt clinical staff gone fore the day. Pt is primarily [address] speaking. Please us  to return call. Thank you.

## 2018-06-22 NOTE — TELEPHONE ENCOUNTER
Discussed with BLANKA. Pt should make sure she is not taking her prednisone all at once. She should split the dose up throughout the day. Report back if symptoms do not improve. Pt contacted via language line  and notified with understanding.

## 2018-06-23 ENCOUNTER — TELEPHONE (OUTPATIENT)
Dept: INTERNAL MEDICINE CLINIC | Facility: CLINIC | Age: 68
End: 2018-06-23

## 2018-06-23 NOTE — TELEPHONE ENCOUNTER
Pt is diabetic she was send to ENT for ear issue she was put on a medications.  This medication gives her sayda blood pressure please advise

## 2018-06-25 ENCOUNTER — TELEPHONE (OUTPATIENT)
Dept: OTOLARYNGOLOGY | Facility: CLINIC | Age: 68
End: 2018-06-25

## 2018-06-25 NOTE — TELEPHONE ENCOUNTER
Pt states now she is having dizziness and nausea and vomiting which started last night at 2 am. Advised pt to hold off on taking prednisone and valacyclovir. Pt states she doesn't know if it is the prednisone or valacyclovir.  , please advise on ho

## 2018-06-25 NOTE — TELEPHONE ENCOUNTER
Probably the prednisone. Its the only med that can help with her issue. She should stop med if things have gotten so bad.

## 2018-06-25 NOTE — TELEPHONE ENCOUNTER
Pt still continues to feel bad even after splitting up doses of rx - pls call - [place]  speaking

## 2018-06-25 NOTE — TELEPHONE ENCOUNTER
pts daughter Zak Lai called. Following up, pt is still dizzy with nausea. Please call the patient. She speaks English too. Thank you.

## 2018-06-26 NOTE — TELEPHONE ENCOUNTER
Spoke with daughter will inform patient should follow up with Doctor to evaluate BP and blood sugar on prednisone.

## 2018-06-26 NOTE — TELEPHONE ENCOUNTER
More likely tgo raise sugars. Needs ot check sugars. But can raise B/P. Needs to monitor. Can come in and see anyone.

## 2018-06-27 ENCOUNTER — TELEPHONE (OUTPATIENT)
Dept: OTOLARYNGOLOGY | Facility: CLINIC | Age: 68
End: 2018-06-27

## 2018-06-27 NOTE — TELEPHONE ENCOUNTER
Pt has been off of prednisone and valacyclovir for past 2 days, states still having the dizziness and nauseas. One episode of emesis last night.  Pt is inquiring if she should and if she can restart the prednisone and valacyclovir since these medications we

## 2018-06-27 NOTE — TELEPHONE ENCOUNTER
Pts daughter states pt stopped taking prednisone but dizziness continues, asking if she should start taking rx again. Pls advise thank you.

## 2018-06-27 NOTE — TELEPHONE ENCOUNTER
Spoke to pt daughter and relayed Dr. Timoteo Garcia message as shown below. Verbalized understanding and had no further questions at this time.     Ana Maria Arriola MD   You 22 minutes ago (3:12 PM)     Yes (Routing comment)

## 2018-06-28 ENCOUNTER — TELEPHONE (OUTPATIENT)
Dept: INTERNAL MEDICINE CLINIC | Facility: CLINIC | Age: 68
End: 2018-06-28

## 2018-06-28 NOTE — TELEPHONE ENCOUNTER
Prednisone increases sugars. Usu. effect is temp. Increase 38 qam and 25 units qpm. Check sugars more often and after meals. Watch for lo sugars. Call in 3 days.

## 2018-06-28 NOTE — TELEPHONE ENCOUNTER
Patient was given prednisone for her ear infection and now her sugar is elevated it has been running  200s and  300s she wants to know if increased or add medication please advice

## 2018-06-29 NOTE — TELEPHONE ENCOUNTER
Spoke with patient's daughter. Informed of Dr. Jase Alegre instructions. Patient should check sugar more often and careful for low sugars  Should call on Monday with readings.

## 2018-06-30 ENCOUNTER — HOSPITAL ENCOUNTER (OUTPATIENT)
Dept: CT IMAGING | Facility: HOSPITAL | Age: 68
Discharge: HOME | End: 2018-06-30
Attending: INTERNAL MEDICINE
Payer: MEDICARE

## 2018-06-30 DIAGNOSIS — R51.9 HEADACHE AROUND THE EYES: ICD-10-CM

## 2018-06-30 PROCEDURE — 70450 CT HEAD/BRAIN W/O DYE: CPT | Performed by: INTERNAL MEDICINE

## 2018-07-03 ENCOUNTER — TELEPHONE (OUTPATIENT)
Dept: INTERNAL MEDICINE CLINIC | Facility: CLINIC | Age: 68
End: 2018-07-03

## 2018-07-03 ENCOUNTER — OFFICE VISIT (OUTPATIENT)
Dept: NEPHROLOGY | Facility: CLINIC | Age: 68
End: 2018-07-03

## 2018-07-03 VITALS — BODY MASS INDEX: 32.58 KG/M2 | HEIGHT: 69 IN | WEIGHT: 220 LBS

## 2018-07-03 DIAGNOSIS — I10 UNCONTROLLED HYPERTENSION: Primary | ICD-10-CM

## 2018-07-03 DIAGNOSIS — N28.9 RENAL INSUFFICIENCY: Primary | ICD-10-CM

## 2018-07-03 PROCEDURE — G0463 HOSPITAL OUTPT CLINIC VISIT: HCPCS | Performed by: INTERNAL MEDICINE

## 2018-07-03 PROCEDURE — 99205 OFFICE O/P NEW HI 60 MIN: CPT | Performed by: INTERNAL MEDICINE

## 2018-07-03 NOTE — PATIENT INSTRUCTIONS
Renal ultrasound - call to make an appointment  Follow up in 4 weeks   Bring home blood pressure readings on next visit   Urine test

## 2018-07-03 NOTE — PROGRESS NOTES
Consult Requested By: Dr. Nava Ip     Reason for Consult: Uncontrolled Hypertension     HPI:     Patient is a 80 yrs old [address] speaking female with pmh of multinodular goiter s/p thyroidectomy, HL, DM II, HTN x 4 yrs who presented today for work up and TOTAL  No date: TUBAL LIGATION   Family History   Problem Relation Age of Onset   • Diabetes Mother    • Hypertension Mother    • Dementia Mother      Alzheimer's Disease   • Diabetes Brother    • Lipids Brother    • Hypertension Brother    • Diabetes Sita Ramírez Tab Take 1 tablet (100 mcg total) by mouth before breakfast. Disp: 90 tablet Rfl: 1   Valsartan-Hydrochlorothiazide 320-25 MG Oral Tab Take 1 tablet by mouth once daily.  Disp: 90 tablet Rfl: 1   Multiple Vitamins-Minerals (ONE-A-DAY WOMENS 50 PLUS OR) Take Disp: 100 each Rfl: 6       Allergies:    Metformin               DIARRHEA  Amlodipine              SHORTNESS OF BREATH    Comment:Chest pressure      ROS:     Constitutional:  Negative for decreased activity, fever, irritability and lethargy  ENMT:  Nora Kuhn kidneys and adrenals  - Aic good control - on insulin  - monitor blood pressure at home and bring readings on next visit   -UA unremarkable, UACR wnl  -Lab work showed BUN/Cr 14/1.0 mg/dl with an eGFR 59 ml/min      Follow up in 4 weeks        Orders This

## 2018-07-05 ENCOUNTER — MED REC SCAN ONLY (OUTPATIENT)
Dept: INTERNAL MEDICINE CLINIC | Facility: CLINIC | Age: 68
End: 2018-07-05

## 2018-07-05 ENCOUNTER — OFFICE VISIT (OUTPATIENT)
Dept: OTOLARYNGOLOGY | Facility: CLINIC | Age: 68
End: 2018-07-05

## 2018-07-05 ENCOUNTER — OFFICE VISIT (OUTPATIENT)
Dept: AUDIOLOGY | Facility: CLINIC | Age: 68
End: 2018-07-05

## 2018-07-05 VITALS
HEIGHT: 69 IN | TEMPERATURE: 91 F | BODY MASS INDEX: 32.58 KG/M2 | SYSTOLIC BLOOD PRESSURE: 140 MMHG | WEIGHT: 220 LBS | DIASTOLIC BLOOD PRESSURE: 70 MMHG

## 2018-07-05 DIAGNOSIS — H90.3 SENSORINEURAL HEARING LOSS, BILATERAL: Primary | ICD-10-CM

## 2018-07-05 DIAGNOSIS — R42 DIZZINESS: ICD-10-CM

## 2018-07-05 DIAGNOSIS — H91.90 HEARING LOSS, UNSPECIFIED HEARING LOSS TYPE, UNSPECIFIED LATERALITY: Primary | ICD-10-CM

## 2018-07-05 PROCEDURE — 92567 TYMPANOMETRY: CPT | Performed by: AUDIOLOGIST

## 2018-07-05 PROCEDURE — 92557 COMPREHENSIVE HEARING TEST: CPT | Performed by: AUDIOLOGIST

## 2018-07-05 PROCEDURE — G0463 HOSPITAL OUTPT CLINIC VISIT: HCPCS | Performed by: OTOLARYNGOLOGY

## 2018-07-05 PROCEDURE — 99214 OFFICE O/P EST MOD 30 MIN: CPT | Performed by: OTOLARYNGOLOGY

## 2018-07-05 NOTE — PROGRESS NOTES
AUDIOGRAM     Maxx Patel maurisio Tirado Click was referred for follow-up testing by Magdaleno Saxena.    4/21/1950  [id number]    History: 6/19/2018  Audiometric Test Results:  Pure-tone air and bone-conduction test results reveal a moderate sensori-neural heari

## 2018-07-05 NOTE — PROGRESS NOTES
Jesse Silverman is a 80year old female. Patient presents with:  Ear Problem: Ringing in the right ear f/u  Dizziness: F/u      HISTORY OF PRESENT ILLNESS  She is now 8 days out from total thyroidectomy for a multinodular goiter.  She is doing Daughter    • No Known Problems Maternal Grandmother    • No Known Problems Paternal Grandmother    • No Known Problems Maternal Aunt    • No Known Problems Paternal Aunt    • No Known Problems Maternal Cousin Female    • No Known Problems Maternal Cousin Anxiety and depression. Integumentary Negative Frequent skin infections, pigment change and rash. Hema/Lymph Negative Easy bleeding and easy bruising.            PHYSICAL EXAM    /70   Temp (!) 90.8 °F (32.7 °C)   Ht 5' 9\" (1.753 m)   Wt 220 lb ( differently: inject 30 units every morning and 15 units every evening), Disp: 30 mL, Rfl: 1  •  Glucose Blood (BENNY CONTOUR NEXT TEST) In Vitro Strip, TEST 2 times  A DAY Dx: Uncontrolled type 2 diabetes mellitus without complication, without long-term cu shows stable right-sided hearing loss. We discussed looking into this further with an MRI or more conservatively with a repeat audiogram in 6 months.   At this time she chooses the latter will return to see me towards the end of the year for repeat audiogr

## 2018-07-16 ENCOUNTER — TELEPHONE (OUTPATIENT)
Dept: INTERNAL MEDICINE CLINIC | Facility: CLINIC | Age: 68
End: 2018-07-16

## 2018-07-16 ENCOUNTER — LAB ENCOUNTER (OUTPATIENT)
Dept: LAB | Facility: HOSPITAL | Age: 68
End: 2018-07-16
Attending: INTERNAL MEDICINE
Payer: MEDICARE

## 2018-07-16 ENCOUNTER — HOSPITAL ENCOUNTER (OUTPATIENT)
Dept: ULTRASOUND IMAGING | Facility: HOSPITAL | Age: 68
Discharge: HOME | End: 2018-07-16
Attending: INTERNAL MEDICINE
Payer: MEDICARE

## 2018-07-16 DIAGNOSIS — I10 UNCONTROLLED HYPERTENSION: ICD-10-CM

## 2018-07-16 DIAGNOSIS — E03.9 HYPOTHYROIDISM (ACQUIRED): ICD-10-CM

## 2018-07-16 LAB
BACTERIA UR QL AUTO: NEGATIVE /HPF
BILIRUB UR QL: NEGATIVE
CLARITY UR: CLEAR
COLOR UR: YELLOW
GLUCOSE UR-MCNC: NEGATIVE MG/DL
HGB UR QL STRIP.AUTO: NEGATIVE
HYALINE CASTS #/AREA URNS AUTO: 1 /LPF
KETONES UR-MCNC: NEGATIVE MG/DL
NITRITE UR QL STRIP.AUTO: NEGATIVE
PH UR: 6 [PH] (ref 5–8)
PROT UR-MCNC: NEGATIVE MG/DL
RBC #/AREA URNS AUTO: <1 /HPF
SP GR UR STRIP: 1.01 (ref 1–1.03)
T4 FREE SERPL-MCNC: 1.61 NG/DL (ref 0.58–1.64)
TSH SERPL-ACNC: 0.25 UIU/ML (ref 0.45–5.33)
UROBILINOGEN UR STRIP-ACNC: <2
VIT C UR-MCNC: NEGATIVE MG/DL
WBC #/AREA URNS AUTO: 1 /HPF

## 2018-07-16 PROCEDURE — 84443 ASSAY THYROID STIM HORMONE: CPT

## 2018-07-16 PROCEDURE — 84439 ASSAY OF FREE THYROXINE: CPT

## 2018-07-16 PROCEDURE — 76770 US EXAM ABDO BACK WALL COMP: CPT | Performed by: INTERNAL MEDICINE

## 2018-07-16 PROCEDURE — 36415 COLL VENOUS BLD VENIPUNCTURE: CPT

## 2018-07-16 PROCEDURE — 81001 URINALYSIS AUTO W/SCOPE: CPT

## 2018-07-16 NOTE — TELEPHONE ENCOUNTER
They need to verify which statin patient is taking . They received a answer from Dr. Charity Rico that she should be taking  simvastatin but ins and pharmacy has no record .

## 2018-07-16 NOTE — TELEPHONE ENCOUNTER
Should be on simvastatin as far as I know. Did not change. How is it that insurance and pharmacy has no record. ? Is she taking? ?

## 2018-07-17 ENCOUNTER — TELEPHONE (OUTPATIENT)
Dept: INTERNAL MEDICINE CLINIC | Facility: CLINIC | Age: 68
End: 2018-07-17

## 2018-07-17 NOTE — TELEPHONE ENCOUNTER
This medication is on recall please advice. What is she to take       •  Valsartan-Hydrochlorothiazide 320-25 MG Oral Tab, Take 1 tablet by mouth once daily. , Disp: 90 tablet, Rfl: 1

## 2018-07-19 RX ORDER — CANDESARTAN CILEXETIL AND HYDROCHLOROTHIAZIDE 32; 12.5 MG/1; MG/1
1 TABLET ORAL DAILY
Qty: 30 TABLET | Refills: 2 | Status: SHIPPED | OUTPATIENT
Start: 2018-07-19 | End: 2018-10-14

## 2018-07-19 NOTE — TELEPHONE ENCOUNTER
STOP. See new Rx, sent to pharmacy please notify patient. Please make patient aware since is a different drug not sure what type of reaction she is getting And how her blood pressure readings are going ago. Careful with diet and excercise at least 30 minut

## 2018-07-19 NOTE — TELEPHONE ENCOUNTER
Pt has been informed to stop the valsartan and start on the new medication, pt was instructed of Md's advise, pt voiced understanding.

## 2018-07-21 ENCOUNTER — TELEPHONE (OUTPATIENT)
Dept: INTERNAL MEDICINE CLINIC | Facility: CLINIC | Age: 68
End: 2018-07-21

## 2018-07-21 NOTE — TELEPHONE ENCOUNTER
Pt is looking for US results, also pt states that her BP readings have been elevated for the past couple of days, 150, 162, 174 today was 151/81, pt has not started the new BP med yet, will start today.

## 2018-07-23 NOTE — TELEPHONE ENCOUNTER
Pt has been informed of Md's advise, pt voiced understanding, she already started taking the new BP med yesterday and will call with readings later this week.

## 2018-07-26 ENCOUNTER — OFFICE VISIT (OUTPATIENT)
Dept: INTERNAL MEDICINE CLINIC | Facility: CLINIC | Age: 68
End: 2018-07-26

## 2018-07-26 VITALS
DIASTOLIC BLOOD PRESSURE: 68 MMHG | HEIGHT: 69 IN | HEART RATE: 94 BPM | WEIGHT: 222 LBS | OXYGEN SATURATION: 97 % | TEMPERATURE: 99 F | SYSTOLIC BLOOD PRESSURE: 135 MMHG | BODY MASS INDEX: 32.88 KG/M2

## 2018-07-26 DIAGNOSIS — IMO0001 UNCONTROLLED TYPE 2 DIABETES MELLITUS WITHOUT COMPLICATION, WITHOUT LONG-TERM CURRENT USE OF INSULIN: ICD-10-CM

## 2018-07-26 DIAGNOSIS — E03.9 HYPOTHYROIDISM (ACQUIRED): ICD-10-CM

## 2018-07-26 DIAGNOSIS — I10 HTN (HYPERTENSION), MALIGNANT: ICD-10-CM

## 2018-07-26 DIAGNOSIS — N28.9 RENAL INSUFFICIENCY: Primary | ICD-10-CM

## 2018-07-26 DIAGNOSIS — H93.11 TINNITUS OF RIGHT EAR: ICD-10-CM

## 2018-07-26 DIAGNOSIS — E53.8 B12 DEFICIENCY: ICD-10-CM

## 2018-07-26 PROCEDURE — G0463 HOSPITAL OUTPT CLINIC VISIT: HCPCS | Performed by: INTERNAL MEDICINE

## 2018-07-26 PROCEDURE — 99214 OFFICE O/P EST MOD 30 MIN: CPT | Performed by: INTERNAL MEDICINE

## 2018-07-26 RX ORDER — MECLIZINE HCL 12.5 MG/1
12.5 TABLET ORAL 3 TIMES DAILY PRN
Qty: 30 TABLET | Refills: 0 | Status: SHIPPED | OUTPATIENT
Start: 2018-07-26 | End: 2018-08-03

## 2018-07-26 NOTE — PROGRESS NOTES
HPI:    Patient ID: Marielle Acosta is a 80year old female. Feels like hung over feeling since awakened this AM.       HTN   Pertinent negatives include no chest pain, headaches, palpitations or shortness of breath.       Diabetes  Patient h 04/03/2018 09:07 AM    (L) 04/03/2018 09:07 AM   K 4.0 04/03/2018 09:07 AM   CL 98 04/03/2018 09:07 AM   CO2 28 04/03/2018 09:07 AM   CREATSERUM 1.01 04/03/2018 09:07 AM   CA 8.9 04/03/2018 09:07 AM   AST 24 04/03/2018 09:07 AM   ALT 17 04/03/2018 09 negative. Current Outpatient Prescriptions:  Meclizine HCl 12.5 MG Oral Tab Take 1 tablet (12.5 mg total) by mouth 3 (three) times daily as needed.  Disp: 30 tablet Rfl: 0   Insulin Pen Needle (BD PEN NEEDLE ULTRAFINE) 29G X 12.7MM Does not apply M (20 mg total) by mouth nightly. take 1 tablet (20MG)  by oral route  3 times a week M/W/F Disp: 90 tablet Rfl: 1   GLUCOSAMINE CHONDROITIN COMPLX OR Take by mouth.  Disp:  Rfl:    Omega-3-acid Ethyl Esters (LOVAZA) 1 G Oral Cap Take 1 g by mouth 2 (two) johnson • No Known Problems Maternal Cousin Female    • No Known Problems Maternal Cousin Male    • No Known Problems Paternal Cousin Female    • No Known Problems Paternal Cousin Male    • Glaucoma Neg    • Macular degeneration Neg    • Breast Cancer Neg    • O and not cyanotic. She does not have dentures. No oral lesions. No trismus in the jaw. No dental abscesses, uvula swelling or lacerations. No oropharyngeal exudate, posterior oropharyngeal edema, posterior oropharyngeal erythema or tonsillar abscesses.    Ey submandibular, no tonsillar, no preauricular, no posterior auricular and no occipital adenopathy present. She has no cervical adenopathy. Right cervical: No superficial cervical, no deep cervical and no posterior cervical adenopathy present. a week. Check sugars at different times on different dates. Careful with low sugars. Carry something with you and check sugar if can. Can carry carolyne cracker, etc. Decrease carbohydrates. But also, careful with fruits and natural sugars. One serving a day

## 2018-07-26 NOTE — PATIENT INSTRUCTIONS
al Exam         ASSESSMENT/PLAN:   Renal insufficiency  (primary encounter diagnosis) F/U Renal. No motrin, ibuprofen, advil, alleve, naprosyn  with these medications. Htn (hypertension), malignant Better.  Careful with diet and excercise at least 30 mi

## 2018-07-28 PROBLEM — E04.9 GOITER: Status: ACTIVE | Noted: 2018-07-28

## 2018-08-03 ENCOUNTER — OFFICE VISIT (OUTPATIENT)
Dept: NEPHROLOGY | Facility: CLINIC | Age: 68
End: 2018-08-03

## 2018-08-03 VITALS
HEART RATE: 93 BPM | WEIGHT: 224 LBS | HEIGHT: 69 IN | TEMPERATURE: 98 F | SYSTOLIC BLOOD PRESSURE: 124 MMHG | BODY MASS INDEX: 33.18 KG/M2 | DIASTOLIC BLOOD PRESSURE: 55 MMHG

## 2018-08-03 DIAGNOSIS — I10 ESSENTIAL HYPERTENSION: Primary | ICD-10-CM

## 2018-08-03 PROCEDURE — 99214 OFFICE O/P EST MOD 30 MIN: CPT | Performed by: INTERNAL MEDICINE

## 2018-08-03 PROCEDURE — G0463 HOSPITAL OUTPT CLINIC VISIT: HCPCS | Performed by: INTERNAL MEDICINE

## 2018-08-03 RX ORDER — HYDRALAZINE HYDROCHLORIDE 100 MG/1
TABLET, FILM COATED ORAL
Qty: 270 TABLET | Refills: 1 | Status: SHIPPED | OUTPATIENT
Start: 2018-08-03 | End: 2019-01-17

## 2018-08-03 NOTE — PROGRESS NOTES
Progress Note:      Patient is a 80 yrs old [address] speaking female with pmh of multinodular goiter s/p thyroidectomy, HL, DM II, HTN x 4 yrs who presented today for work up and evaluation of uncontrolled HTN    Lab work showed BUN/Cr 14/1.0 mg/dl with a Daughter    • No Known Problems Maternal Grandmother    • No Known Problems Paternal Grandmother    • No Known Problems Maternal Aunt    • No Known Problems Paternal Aunt    • No Known Problems Maternal Cousin Female    • No Known Problems Maternal Cousin 1 tablet (20MG)  by oral route  3 times a week M/W/F (Patient taking differently: Take 20 mg by mouth nightly.  ) Disp: 90 tablet Rfl: 1   GLUCOSAMINE CHONDROITIN COMPLX OR Take by mouth.  Disp:  Rfl:    Omega-3-acid Ethyl Esters (LOVAZA) 1 G Oral Cap Take Constitutional: appears well hydrated alert and responsive no acute distress noted  Head/Face: normocephalic  Eyes/Vision: normal extraocular motion is intact  Nose/Mouth/Throat:mucous membranes are moist   Neck/Thyroid: neck is supple without adenopat

## 2018-08-06 ENCOUNTER — TELEPHONE (OUTPATIENT)
Dept: INTERNAL MEDICINE CLINIC | Facility: CLINIC | Age: 68
End: 2018-08-06

## 2018-08-06 NOTE — TELEPHONE ENCOUNTER
[facility] called [phone] in regards to rx for Haleigh Freeze, requesting new rx.   Please send in new rx for

## 2018-09-04 ENCOUNTER — TELEPHONE (OUTPATIENT)
Dept: INTERNAL MEDICINE CLINIC | Facility: CLINIC | Age: 68
End: 2018-09-04

## 2018-09-04 NOTE — TELEPHONE ENCOUNTER
•  GLIPIZIDE 10 MG Oral Tab, TAKE 1/2 TABLET BY MOUTH EVERY MORNING AND TAKE 1 TABLET BY MOUTH EVERY EVENING , Disp: 90 tablet, Rfl: 0  •  CloNIDine HCl 0.1 MG Oral Tab, Take 1 tablet (0.1 mg total) by mouth 2 (two) times daily. , Disp: 60 tablet, Rfl

## 2018-09-05 RX ORDER — GLIPIZIDE 10 MG/1
TABLET ORAL
Qty: 90 TABLET | Refills: 0 | Status: SHIPPED | OUTPATIENT
Start: 2018-09-05 | End: 2018-10-25

## 2018-09-05 RX ORDER — CLONIDINE HYDROCHLORIDE 0.1 MG/1
TABLET ORAL
Qty: 60 TABLET | Refills: 0 | Status: SHIPPED | OUTPATIENT
Start: 2018-09-05 | End: 2018-10-07

## 2018-10-03 RX ORDER — INSULIN GLARGINE 100 [IU]/ML
INJECTION, SOLUTION SUBCUTANEOUS
Qty: 30 ML | Refills: 0 | Status: SHIPPED | OUTPATIENT
Start: 2018-10-03 | End: 2018-10-25

## 2018-10-08 RX ORDER — CLONIDINE HYDROCHLORIDE 0.1 MG/1
TABLET ORAL
Qty: 60 TABLET | Refills: 1 | Status: SHIPPED | OUTPATIENT
Start: 2018-10-08 | End: 2018-12-10

## 2018-10-15 RX ORDER — CANDESARTAN CILEXETIL AND HYDROCHLOROTHIAZIDE 32; 12.5 MG/1; MG/1
TABLET ORAL
Qty: 30 TABLET | Refills: 1 | Status: SHIPPED | OUTPATIENT
Start: 2018-10-15 | End: 2018-12-10

## 2018-10-15 RX ORDER — LEVOTHYROXINE SODIUM 175 UG/1
TABLET ORAL
Qty: 90 TABLET | Refills: 0 | Status: SHIPPED | OUTPATIENT
Start: 2018-10-15 | End: 2018-11-09 | Stop reason: DRUGHIGH

## 2018-10-25 ENCOUNTER — OFFICE VISIT (OUTPATIENT)
Dept: INTERNAL MEDICINE CLINIC | Facility: CLINIC | Age: 68
End: 2018-10-25

## 2018-10-25 VITALS
TEMPERATURE: 98 F | WEIGHT: 217 LBS | HEIGHT: 69 IN | SYSTOLIC BLOOD PRESSURE: 120 MMHG | OXYGEN SATURATION: 99 % | HEART RATE: 92 BPM | DIASTOLIC BLOOD PRESSURE: 61 MMHG | BODY MASS INDEX: 32.14 KG/M2

## 2018-10-25 DIAGNOSIS — E03.9 HYPOTHYROIDISM (ACQUIRED): Chronic | ICD-10-CM

## 2018-10-25 DIAGNOSIS — R21 RASH: ICD-10-CM

## 2018-10-25 DIAGNOSIS — I10 ESSENTIAL HYPERTENSION: Chronic | ICD-10-CM

## 2018-10-25 DIAGNOSIS — I83.813 VARICOSE VEINS OF BOTH LOWER EXTREMITIES WITH PAIN: ICD-10-CM

## 2018-10-25 DIAGNOSIS — IMO0001 UNCONTROLLED TYPE 2 DIABETES MELLITUS WITHOUT COMPLICATION, WITHOUT LONG-TERM CURRENT USE OF INSULIN: Primary | Chronic | ICD-10-CM

## 2018-10-25 DIAGNOSIS — M25.511 ACUTE PAIN OF RIGHT SHOULDER: ICD-10-CM

## 2018-10-25 DIAGNOSIS — I10 HTN (HYPERTENSION), MALIGNANT: ICD-10-CM

## 2018-10-25 DIAGNOSIS — Z12.39 BREAST CANCER SCREENING: ICD-10-CM

## 2018-10-25 PROCEDURE — 99214 OFFICE O/P EST MOD 30 MIN: CPT | Performed by: INTERNAL MEDICINE

## 2018-10-25 PROCEDURE — 36415 COLL VENOUS BLD VENIPUNCTURE: CPT | Performed by: INTERNAL MEDICINE

## 2018-10-25 RX ORDER — TIZANIDINE HYDROCHLORIDE 4 MG/1
4 CAPSULE, GELATIN COATED ORAL NIGHTLY
Qty: 90 CAPSULE | Refills: 1 | Status: SHIPPED | OUTPATIENT
Start: 2018-10-25 | End: 2018-12-10

## 2018-10-25 RX ORDER — GLIPIZIDE 10 MG/1
TABLET ORAL
Qty: 90 TABLET | Refills: 0 | Status: SHIPPED | OUTPATIENT
Start: 2018-10-25 | End: 2018-12-10

## 2018-10-25 NOTE — PROGRESS NOTES
HPI:    Patient ID: Nia Meyer is a 80year old female. HPI   Hypertension  Patient is here for follow up of hypertension. BP at home: same. Has been compliant with medications.   Exercise level: somewhat active or trying to do more an CA 8.9 04/03/2018 09:07 AM    AST 24 04/03/2018 09:07 AM    ALT 17 04/03/2018 09:07 AM    TSH 0.25 (L) 07/16/2018 11:15 AM    T4F 1.61 07/16/2018 11:15 AM        Lab Results   Component Value Date/Time    CHOLEST 122 04/03/2018 09:07 AM    HDL 49 04/03/20 Glucose Blood In [employer] q12hrs.  Dx. E11.65 Disp: 200 strip Rfl: 6   CANDESARTAN CILEXETIL-HCTZ 32-12.5 MG Oral Tab TAKE ONE TABLET BY MOUTH ONE TIME DAILY  Disp: 30 tablet Rfl: 1   LEVOTHYROXINE SODIUM 175 MCG Oral Tab TAKE ONE TABLET BY MOUTH HISTORY:  Past Medical History:   Diagnosis Date   • Appendicitis    • Cholelithiasis    • Goiter     Multinodular goiter S/P thyroidectomy.     • Osteoarthritis     DIANE  KNEE PAIN, ARTHRITIS IN SPINE   • Osteopenia    • Vertigo, aural       Past Surgical Constitutional: She is oriented to person, place, and time. She appears well-developed and well-nourished. No distress. Neck: Trachea normal and phonation normal. No thyroid mass and no thyromegaly present.    Cardiovascular: Normal rate, regular rhythm, Left wrist: She exhibits normal range of motion, no tenderness, no swelling, no effusion, no crepitus, no deformity and no laceration. Cervical back: She exhibits decreased range of motion, pain and spasm.  She exhibits no tenderness, no bony te Uncontrolled type 2 diabetes mellitus without complication, without long-term current use of insulin (hcc)  (primary encounter diagnosis) Not good control with hypoglycemia.  Decrease insulin to 35 units in AM and 12 units in PM. Try 1/2 glypizide to 1/2 ta Sig: Take 1 capsule (4 mg total) by mouth nightly. • Glucose Blood In Vitro Strip 200 strip 6     Sig: Checks q12hrs.  Dx. E11.65       Imaging & Referrals:  [facility] - INTERNAL  OP REFERRAL TO INTERVENTIONAL RADIOLOGY  XR SHOULDER, COMPLETE (MIN 2 VIEWS),

## 2018-10-25 NOTE — PATIENT INSTRUCTIONS
ASSESSMENT/PLAN:   Uncontrolled type 2 diabetes mellitus without complication, without long-term current use of insulin (hcc)  (primary encounter diagnosis) Not good control with hypoglycemia.  Decrease insulin to 35 units in AM and 12 units in PM. Try 1/ total) by mouth nightly. • Glucose Blood In Vitro Strip 200 strip 6     Sig: Checks q12hrs.  Dx. E11.65       Imaging & Referrals:  [facility] - INTERNAL  OP REFERRAL TO INTERVENTIONAL RADIOLOGY  XR SHOULDER, COMPLETE (MIN 2 VIEWS), RIGHT (CPT=73030)  XR WRIST

## 2018-10-26 ENCOUNTER — TELEPHONE (OUTPATIENT)
Dept: INTERNAL MEDICINE CLINIC | Facility: CLINIC | Age: 68
End: 2018-10-26

## 2018-10-26 NOTE — TELEPHONE ENCOUNTER
Medicare New Prescription Order for Diabetic Testing Supplies was signed by pcp and faxed to [phone]; received successfully. Form sent to scan.

## 2018-11-02 DIAGNOSIS — I83.813 VARICOSE VEINS OF BOTH LOWER EXTREMITIES WITH PAIN: Primary | ICD-10-CM

## 2018-11-06 ENCOUNTER — TELEPHONE (OUTPATIENT)
Dept: INTERNAL MEDICINE CLINIC | Facility: CLINIC | Age: 68
End: 2018-11-06

## 2018-11-07 NOTE — TELEPHONE ENCOUNTER
Dr. Charity Rico, her daughter was inform. Can you please re-place blood test orders, said she's going to take to to [facility]. Thank you.

## 2018-11-07 NOTE — TELEPHONE ENCOUNTER
I spoke to the lab and I was informed that specimen was never scan into the packing list and it was never received by the lab.

## 2018-11-07 NOTE — TELEPHONE ENCOUNTER
Not sure Dr. Santino Sosa, I was not in charge of that blood drawn. I will call pt and inform her that she needs to go to the lab.

## 2018-11-09 ENCOUNTER — LAB ENCOUNTER (OUTPATIENT)
Dept: LAB | Facility: HOSPITAL | Age: 68
End: 2018-11-09
Attending: INTERNAL MEDICINE
Payer: MEDICARE

## 2018-11-09 DIAGNOSIS — E03.9 HYPOTHYROIDISM: Primary | ICD-10-CM

## 2018-11-09 PROCEDURE — 84439 ASSAY OF FREE THYROXINE: CPT

## 2018-11-09 PROCEDURE — 84443 ASSAY THYROID STIM HORMONE: CPT

## 2018-11-09 PROCEDURE — 36415 COLL VENOUS BLD VENIPUNCTURE: CPT

## 2018-11-09 NOTE — TELEPHONE ENCOUNTER
Need new orders (TSH and free T4). Patient presented to lab at [facility] this morning. Had them draw as not to inconvenience patient any more, but do need new orders entered.

## 2018-11-09 NOTE — TELEPHONE ENCOUNTER
Spoke to Dr. Neftaly Culp to clarify. She states ok to leave order as is for TSH with reflex to Ft4. I did call the lab and confirmed that they have the order and the blood sample and they are processing now.

## 2018-11-09 NOTE — TELEPHONE ENCOUNTER
I see that there is an order already in place from today for a TSH with reflex to FT4 placed this morning. The lab is currently in process. Dr. Yannick Culp did you want separate TSH and FT4? If so I can call the lab at [phone] and have them add on.  Otherwise TSH

## 2018-11-20 ENCOUNTER — TELEPHONE (OUTPATIENT)
Dept: INTERNAL MEDICINE CLINIC | Facility: CLINIC | Age: 68
End: 2018-11-20

## 2018-11-20 ENCOUNTER — HOSPITAL ENCOUNTER (OUTPATIENT)
Dept: ULTRASOUND IMAGING | Facility: HOSPITAL | Age: 68
Discharge: HOME | End: 2018-11-20
Attending: RADIOLOGY
Payer: MEDICARE

## 2018-11-20 DIAGNOSIS — I83.813 VARICOSE VEINS OF BOTH LOWER EXTREMITIES WITH PAIN: ICD-10-CM

## 2018-11-20 PROCEDURE — 93970 EXTREMITY STUDY: CPT | Performed by: RADIOLOGY

## 2018-11-20 NOTE — TELEPHONE ENCOUNTER
Spoke with José Miguel Stokes who was doing an ultrasound as an outpatient. Noticed a small DVT peroneal vein. It was just done as a routine. .  Patient without symptoms. Recheck ultrasound in 2 weeks if patient not traveling to monitor.

## 2018-11-21 ENCOUNTER — OFFICE VISIT (OUTPATIENT)
Dept: INTERNAL MEDICINE CLINIC | Facility: CLINIC | Age: 68
End: 2018-11-21

## 2018-11-21 VITALS
SYSTOLIC BLOOD PRESSURE: 145 MMHG | WEIGHT: 215 LBS | BODY MASS INDEX: 32 KG/M2 | DIASTOLIC BLOOD PRESSURE: 66 MMHG | HEART RATE: 86 BPM

## 2018-11-21 DIAGNOSIS — E11.9 CONTROLLED TYPE 2 DIABETES MELLITUS WITHOUT COMPLICATION, WITHOUT LONG-TERM CURRENT USE OF INSULIN (HCC): Chronic | ICD-10-CM

## 2018-11-21 DIAGNOSIS — Z00.00 ENCOUNTER FOR MEDICARE ANNUAL WELLNESS EXAM: ICD-10-CM

## 2018-11-21 DIAGNOSIS — Z12.39 BREAST CANCER SCREENING: ICD-10-CM

## 2018-11-21 DIAGNOSIS — I82.402 DEEP VEIN THROMBOSIS (DVT) OF LEFT LOWER EXTREMITY, UNSPECIFIED CHRONICITY, UNSPECIFIED VEIN (HCC): ICD-10-CM

## 2018-11-21 DIAGNOSIS — R21 RASH: ICD-10-CM

## 2018-11-21 DIAGNOSIS — Z00.00 ENCOUNTER FOR ANNUAL HEALTH EXAMINATION: ICD-10-CM

## 2018-11-21 DIAGNOSIS — I10 ESSENTIAL HYPERTENSION WITH GOAL BLOOD PRESSURE LESS THAN 130/80: ICD-10-CM

## 2018-11-21 DIAGNOSIS — E03.9 HYPOTHYROIDISM (ACQUIRED): Primary | Chronic | ICD-10-CM

## 2018-11-21 PROCEDURE — G0439 PPPS, SUBSEQ VISIT: HCPCS | Performed by: INTERNAL MEDICINE

## 2018-11-21 PROCEDURE — 96160 PT-FOCUSED HLTH RISK ASSMT: CPT | Performed by: INTERNAL MEDICINE

## 2018-11-21 NOTE — PATIENT INSTRUCTIONS
Jose Sommers's SCREENING SCHEDULE   Tests on this list are recommended by your physician but may not be covered, or covered at this frequency, by your insurer. Please check with your insurance carrier before scheduling to verify coverage. Men who are 73-68 years old and have smoked more than 100 cigarettes in their lifetime   • Anyone with a family history    Colorectal Cancer Screening  Covered up to Age 76     Colonoscopy Screen   Covered every 10 years- more often if abnormal Colonoscopy Annually Orders placed or performed in visit on 10/25/16   • FLU VACC PRSV FREE INC ANTIG   Orders placed or performed in visit on 10/08/15   • FLU VAC NO PRSV 4 LIONEL 3 YRS+   Orders placed or performed in visit on 10/06/14   • FLU VAC NO PRSV 4 LIONEL 3 YRS+ using their home computer and printer. (the forms are also available in [address])  [email or web address]  This link also has information from the [address] [place] [address] regarding Advance Directives.          Physical Exam     Vaccination History at local pharmacy. Bake foods more and grill occasionally. Avoid fried foods. No salt. Use other seasonings. Encounter for annual health examination Check blood.      Encounter for Medicare annual wellness exam    Breast cancer screening Check mammogram

## 2018-11-21 NOTE — PROGRESS NOTES
HPI:    Patient ID: Neeraj Ramirez is a 80year old female. HPI   Neeraj Ramirez is a 80year old female who presents for a complete physical exam.   HPI:   Patient presents with:   Well Adult: medicare        Symptoms: is menopausal.     Wt Readings f TiZANidine HCl 4 MG Oral Cap Take 1 capsule (4 mg total) by mouth nightly. Disp: 90 capsule Rfl: 0   glipiZIDE 10 MG Oral Tab 1/2 tab prebreakfest and 1/2 predinner.  Disp: 90 tablet Rfl: 0   insulin glargine (LANTUS SOLOSTAR) 100 UNIT/ML Subcutaneous Solut • Goiter     Multinodular goiter S/P thyroidectomy.     • Osteoarthritis     DIANE  KNEE PAIN, ARTHRITIS IN SPINE   • Osteopenia    • Vertigo, aural       Past Surgical History:   Procedure Laterality Date   • APPENDECTOMY  1993   • CATARACT EXTRACTION W/  IN Alcohol use: No      Drug use: No      Sexual activity: Yes        Partners: Male    Other Topics      Concerns:        Caffeine Concern: Yes          Coffee, tea - 2 cups per day         Hx of Pap: all Paps normal      Hypertension  Patient is here fo CO2 28 04/03/2018 09:07 AM    CREATSERUM 1.01 04/03/2018 09:07 AM    CA 8.9 04/03/2018 09:07 AM    AST 24 04/03/2018 09:07 AM    ALT 17 04/03/2018 09:07 AM    TSH 7.50 (H) 11/09/2018 09:32 AM    T4F 1.17 11/09/2018 09:32 AM        Lab Results   Component Skin: Positive for rash (Seems to be getting worse. Denies contacts. Denies any new products. Denies change with diet to explain rash. Saw Derm. Told allergic reaction. Still has a rash. ). Allergic/Immunologic: Negative for environmental allergies. Insulin Pen Needle (BD PEN NEEDLE ULTRAFINE) 29G X 12.7MM Does not apply Misc Dx. E11.9. Checks q12hrs. BD ultrafine MINI.  Disp: 100 each Rfl: 6   Glucose Blood (BENNY CONTOUR NEXT TEST) In Vitro Strip TEST 2 times  A DAY Dx: Uncontrolled type 2 diabetes m • THYROIDECTOMY  08/17/15    TOTAL   • TUBAL LIGATION        Family History   Problem Relation Age of Onset   • Diabetes Mother    • Hypertension Mother    • Dementia Mother         Alzheimer's Disease   • Diabetes Brother    • Lipids Brother    • Hyperten Left Ear: Tympanic membrane, external ear and ear canal normal. No lacerations. No drainage, swelling or tenderness. No foreign bodies. No mastoid tenderness.  Tympanic membrane is not injected, not scarred, not perforated, not erythematous, not retracted a Neck: Trachea normal and phonation normal. Neck supple. Normal carotid pulses, no hepatojugular reflux and no JVD present. No tracheal tenderness present. Carotid bruit is not present. No tracheal deviation present.  No thyroid mass and no thyromegaly prese Head (left side): No submental, no submandibular, no tonsillar, no preauricular, no posterior auricular and no occipital adenopathy present. She has no cervical adenopathy.         Right cervical: No superficial cervical, no deep cervical and no po She has been screened for Falls and is low risk: Fall/Risk Scorin    Cognitive Assessment     What day of the week is this?: Correct  What month is it?: Correct  What year is it?: Correct  Recall \"Ball\": Correct  Recall \"Flag\": Correct  Recall \"Tr Duncan Youngblood was screened for Alcohol abuse and had a score of 0 so is at low risk.     Patient Care Team: Patient Care Team:  Nayan Peña MD as PCP - General (Internal Medicine)  Willi Aggarwal, PT as Physical Therapist (Physical Therapy)  Zachery She  has a past medical history of Appendicitis, Cholelithiasis, Goiter, Osteoarthritis, Osteopenia, and Vertigo, aural.    She  has a past surgical history that includes appendectomy (1993); cholecystectomy (2006); tubal ligation; electrocardiogram, compl • Pneumococcal (Prevnar 13) 11/08/2016   • Pneumovax 23 10/02/2017   • Zoster Vaccine Live (Zostavax) 10/04/2014        ASSESSMENT AND OTHER RELEVANT CHRONIC CONDITIONS:   Alison Parada is a 80year old female who presents for a Medicare Assessment. Fasting Blood Sugar (FSB)Annually Glucose (mg/dL)   Date Value   04/03/2018 118 (H)     GLUCOSE (P) (mg/dL)   Date Value   07/15/2016 153 (H)          Cardiovascular Disease Screening     LDL Annually LDL Cholesterol (mg/dL)   Date Value   04/03/2018 61 End-stage renal disease   Hemophiliacs who received Factor VIII or IX concentrates   Clients of institutions for the mentally retarded   Persons who live in the same house as a HepB virus carrier   Homosexual men   Illicit injectable drug abusers     Robin Gastrointestinal: Negative for nausea, vomiting, abdominal pain, diarrhea, constipation, blood in stool, abdominal distention, anal bleeding and rectal pain. Endocrine: Negative for cold intolerance, heat intolerance, polydipsia, polyphagia and polyuria. HENT:   Head: Normocephalic and atraumatic. Right Ear: Tympanic membrane, external ear and ear canal normal. No lacerations. No drainage, swelling or tenderness. No foreign bodies. No mastoid tenderness.  Tympanic membrane is not injected, not scarred, no Eyes: Conjunctivae, EOM and lids are normal. Pupils are equal, round, and reactive to light. Right eye exhibits no chemosis, no discharge, no exudate and no hordeolum. No foreign body present in the right eye.  Left eye exhibits no chemosis, no discharge, n Abdominal: Soft. Bowel sounds are normal. She exhibits no distension, no fluid wave, no ascites and no mass. There is no hepatosplenomegaly, splenomegaly or hepatomegaly. There is no tenderness.  There is no rigidity, no rebound, no guarding and no CVA tend • >=3 YRS FLUZONE OR FLUARIX QUAD PRESERVE FREE SINGLE DOSE (58836) FLU CLINIC 10/06/2014, 10/06/2014, 10/08/2015   • FLUAD High Dose 65 yr and older (24021) 10/02/2017   • HIGH DOSE FLU 65 YRS AND OLDER PRSV FREE SINGLE D (17425) FLU CLINIC 10/25/2016   • -     [facility] SCREENING BILAT (CPT=77067); Future    Rash  Follow up with dermatology. Deep vein thrombosis (DVT) of left lower extremity, unspecified chronicity, unspecified vein (HCC) Recheck ultrasound in 2 weeks.           Diet assessment: good     PLAN Flex Sigmoidoscopy Screen every 10 years No results found for this or any previous visit. No flowsheet data found. Fecal Occult Blood Annually No results found for: FOB No flowsheet data found.     Glaucoma Screening      Ophthalmology Visit Annually: Not covered by Medicare Part B No vaccine history found This may be covered with your pharmacy  prescription benefits      [facility] [facility] Internal Lab or Procedure External Lab or Procedure      Annual Monitoring of Persistent     Medications

## 2018-11-30 ENCOUNTER — TELEPHONE (OUTPATIENT)
Dept: INTERNAL MEDICINE CLINIC | Facility: CLINIC | Age: 68
End: 2018-11-30

## 2018-11-30 NOTE — TELEPHONE ENCOUNTER
She is never mentioned pain. Needs to be seen and evaluated. Cannot just send pain meds for pain which was not evaluated. In and see anyone. Or ER.

## 2018-11-30 NOTE — TELEPHONE ENCOUNTER
[place] speaking   Pt in a lot pain, pt has a test scheduled Dec 12th.  Pt would like to ask Dr Marisel Lyn if she can send pain pills  or inflammatory to her  Aleve and ibuprofen not working it all

## 2018-12-04 ENCOUNTER — HOSPITAL ENCOUNTER (OUTPATIENT)
Dept: ULTRASOUND IMAGING | Facility: HOSPITAL | Age: 68
Discharge: HOME | End: 2018-12-04
Attending: CLINICAL NURSE SPECIALIST
Payer: MEDICARE

## 2018-12-04 DIAGNOSIS — I83.893 VARICOSE VEINS OF LEG WITH SWELLING, BILATERAL: ICD-10-CM

## 2018-12-04 PROCEDURE — 93970 EXTREMITY STUDY: CPT | Performed by: CLINICAL NURSE SPECIALIST

## 2018-12-10 ENCOUNTER — TELEPHONE (OUTPATIENT)
Dept: INTERNAL MEDICINE CLINIC | Facility: CLINIC | Age: 68
End: 2018-12-10

## 2018-12-10 RX ORDER — CLONIDINE HYDROCHLORIDE 0.1 MG/1
0.1 TABLET ORAL 2 TIMES DAILY
Qty: 180 TABLET | Refills: 1 | Status: SHIPPED | OUTPATIENT
Start: 2018-12-10 | End: 2019-05-26

## 2018-12-10 RX ORDER — GLIPIZIDE 10 MG/1
TABLET ORAL
Qty: 90 TABLET | Refills: 0 | Status: SHIPPED | OUTPATIENT
Start: 2018-12-10 | End: 2019-03-06

## 2018-12-10 RX ORDER — SIMVASTATIN 20 MG
20 TABLET ORAL NIGHTLY
Qty: 90 TABLET | Refills: 0 | Status: SHIPPED | OUTPATIENT
Start: 2018-12-10 | End: 2019-08-19

## 2018-12-10 RX ORDER — TIZANIDINE HYDROCHLORIDE 4 MG/1
4 CAPSULE, GELATIN COATED ORAL NIGHTLY
Qty: 90 CAPSULE | Refills: 0 | Status: SHIPPED | OUTPATIENT
Start: 2018-12-10 | End: 2019-01-17 | Stop reason: ALTCHOICE

## 2018-12-10 RX ORDER — CANDESARTAN CILEXETIL AND HYDROCHLOROTHIAZIDE 32; 12.5 MG/1; MG/1
1 TABLET ORAL
Qty: 90 TABLET | Refills: 0 | Status: SHIPPED | OUTPATIENT
Start: 2018-12-10 | End: 2019-02-01 | Stop reason: ALTCHOICE

## 2018-12-10 NOTE — TELEPHONE ENCOUNTER
Current Outpatient Medications:   •  •  insulin glargine (LANTUS SOLOSTAR) 100 UNIT/ML Subcutaneous Solution Pen-injector, 35 units in AM and 12 units in PM., Disp: 30 mL, Rfl: 0  •  glipiZIDE 10 MG Oral Tab, 1/2 tab prebreakfest and 1/2 predinner., Disp

## 2018-12-12 ENCOUNTER — HOSPITAL ENCOUNTER (OUTPATIENT)
Dept: GENERAL RADIOLOGY | Facility: HOSPITAL | Age: 68
Discharge: HOME | End: 2018-12-12
Attending: INTERNAL MEDICINE
Payer: MEDICARE

## 2018-12-12 ENCOUNTER — APPOINTMENT (OUTPATIENT)
Dept: LAB | Facility: HOSPITAL | Age: 68
End: 2018-12-12
Attending: INTERNAL MEDICINE
Payer: MEDICARE

## 2018-12-12 ENCOUNTER — OFFICE VISIT (OUTPATIENT)
Dept: RHEUMATOLOGY | Facility: CLINIC | Age: 68
End: 2018-12-12

## 2018-12-12 VITALS
SYSTOLIC BLOOD PRESSURE: 113 MMHG | DIASTOLIC BLOOD PRESSURE: 64 MMHG | WEIGHT: 212.38 LBS | HEART RATE: 62 BPM | HEIGHT: 69 IN | BODY MASS INDEX: 31.45 KG/M2

## 2018-12-12 DIAGNOSIS — R21 RASH: ICD-10-CM

## 2018-12-12 DIAGNOSIS — R76.8 POSITIVE ANA (ANTINUCLEAR ANTIBODY): ICD-10-CM

## 2018-12-12 DIAGNOSIS — R21 RASH: Primary | ICD-10-CM

## 2018-12-12 DIAGNOSIS — M25.50 POLYARTHRALGIA: ICD-10-CM

## 2018-12-12 DIAGNOSIS — M25.511 ACUTE PAIN OF RIGHT SHOULDER: ICD-10-CM

## 2018-12-12 PROBLEM — L40.9 PSORIASIS: Status: ACTIVE | Noted: 2018-12-12

## 2018-12-12 PROCEDURE — 85613 RUSSELL VIPER VENOM DILUTED: CPT

## 2018-12-12 PROCEDURE — 86225 DNA ANTIBODY NATIVE: CPT

## 2018-12-12 PROCEDURE — 86146 BETA-2 GLYCOPROTEIN ANTIBODY: CPT | Performed by: INTERNAL MEDICINE

## 2018-12-12 PROCEDURE — 86431 RHEUMATOID FACTOR QUANT: CPT | Performed by: INTERNAL MEDICINE

## 2018-12-12 PROCEDURE — 83516 IMMUNOASSAY NONANTIBODY: CPT | Performed by: INTERNAL MEDICINE

## 2018-12-12 PROCEDURE — 73110 X-RAY EXAM OF WRIST: CPT | Performed by: INTERNAL MEDICINE

## 2018-12-12 PROCEDURE — G0463 HOSPITAL OUTPT CLINIC VISIT: HCPCS | Performed by: INTERNAL MEDICINE

## 2018-12-12 PROCEDURE — 84165 PROTEIN E-PHORESIS SERUM: CPT

## 2018-12-12 PROCEDURE — 86140 C-REACTIVE PROTEIN: CPT | Performed by: INTERNAL MEDICINE

## 2018-12-12 PROCEDURE — 73030 X-RAY EXAM OF SHOULDER: CPT | Performed by: INTERNAL MEDICINE

## 2018-12-12 PROCEDURE — 86147 CARDIOLIPIN ANTIBODY EA IG: CPT

## 2018-12-12 PROCEDURE — 86235 NUCLEAR ANTIGEN ANTIBODY: CPT

## 2018-12-12 PROCEDURE — 85610 PROTHROMBIN TIME: CPT

## 2018-12-12 PROCEDURE — 86160 COMPLEMENT ANTIGEN: CPT | Performed by: INTERNAL MEDICINE

## 2018-12-12 PROCEDURE — 85390 FIBRINOLYSINS SCREEN I&R: CPT

## 2018-12-12 PROCEDURE — 36415 COLL VENOUS BLD VENIPUNCTURE: CPT

## 2018-12-12 PROCEDURE — 85730 THROMBOPLASTIN TIME PARTIAL: CPT

## 2018-12-12 PROCEDURE — 99204 OFFICE O/P NEW MOD 45 MIN: CPT | Performed by: INTERNAL MEDICINE

## 2018-12-12 PROCEDURE — 85598 HEXAGNAL PHOSPH PLTLT NEUTRL: CPT

## 2018-12-12 PROCEDURE — 86200 CCP ANTIBODY: CPT | Performed by: INTERNAL MEDICINE

## 2018-12-12 NOTE — PATIENT INSTRUCTIONS
- you were seen today for +ADRIEL but my thought on lupus is low on the differential  - rash and joint pain looks more looks more like psoriasis and psoriatic arthritis   - plan to obtain more blood work and xrays  - plan to have you see dermatology, possibly consumir aceite de pescado es agregarlo a un jugo, [facility] o [facility].  Las semillas de linaza, el aceite de semillas de linaza, el aceite de canola, la nuez de nogal, los frijoles de soya y el tofu son convertidos en ácidos grasos omega-3 por el organismo de infección cutánea (enrojecimiento, olor creciente, hinchazón, pus)  · [place] de 100.4°F (38°C) o más cody, o edwin le [place] indicado lo proveedor de atención médica  Date Last Reviewed: 1/12/2014  © 8025-0341 [employer].  [facility] psoriásica no desaparece. Es Jarome Goran afección de pan plazo (crónica) que requiere tratamiento prolongado. Los medicamentos son Jarome Goran parte importante del tratamiento. Los siguientes medicamentos se usan con frecuencia:  · Calmantes  de venta dayo o recetados. professional medical care. Always follow your healthcare professional's instructions.

## 2018-12-12 NOTE — PROGRESS NOTES
Estefani Horvath is a 80year old female who presents for Patient presents with:  Abnormal Labs: ADRIEL +  Joint Pain: bilateral wrists, R shoulder, nasal bridge, mid-back pain   Itchiness: scalp itchy + bumps  Rash: legs and chest  .   HPI:     I had the plea or feet. Work showed ADRIEL 1: 640 with elevated ESR of 46    She reports sicca symptoms, mid back pain but no radicular apathy symptoms.   Denies any miscarriages or previous blood clots, raynaunds, chest pain or shortness of breath, abdominal pain, nausea o Misc Dx. E11.9. Checks q12hrs. BD ultrafine MINI.  Disp: 100 each Rfl: 6   Glucose Blood (BENNY CONTOUR NEXT TEST) In Vitro Strip TEST 2 times  A DAY Dx: Uncontrolled type 2 diabetes mellitus without complication, without long-term current use of insulin (H Diabetes Brother    • Lipids Brother    • Hypertension Brother    • Diabetes Paternal Uncle    • Heart Disease Father         CAD   • Diabetes Other    • Dementia Other    • No Known Problems Self    • No Known Problems Sister    • No Known Problems Daught NAD  HEENT: EOMI, PERRLA, no injection or icterus, oral mucosa moist, no oral lesions. No lymphadenopathy. No facial rash  CVS: RRR, no murmurs rubs or gallops. Equal 2+ distal pulses.    LUNGS: CTAB, no increased work of breathing  ABDOMEN:  soft NT/ND, +B mOsm/kg 282   GFR, Non-      >=60 58 (L)   GFR, -American      >=60 >60   Patient Fasting?        Yes     Component      Latest Ref Rng & Units 7/16/2018   URINE-COLOR      Yellow Yellow   CLARITY URINE      Clear Clear   SPECIFIC GRA extremity that was unprovoked  - Plan to obtain SSA, SSB, dsDNA, smith, RNP, C3 and C4, APL panel    Rash and joint pain likely secondary to Psoriasis and Psoriatic arthritis  - We will refer to dermatology and may need a biopsy  - Plan to obtain rheumatoi

## 2018-12-15 ENCOUNTER — TELEPHONE (OUTPATIENT)
Dept: INTERNAL MEDICINE CLINIC | Facility: CLINIC | Age: 68
End: 2018-12-15

## 2018-12-15 NOTE — TELEPHONE ENCOUNTER
She should follow up with Dr. Sienna Danielson. After rest of xrays done.  Maybe referred [pain ie coming from neck etc.

## 2018-12-20 ENCOUNTER — TELEPHONE (OUTPATIENT)
Dept: RHEUMATOLOGY | Facility: CLINIC | Age: 68
End: 2018-12-20

## 2018-12-20 DIAGNOSIS — R21 RASH: Primary | ICD-10-CM

## 2018-12-20 NOTE — TELEPHONE ENCOUNTER
Please see below. Contacted pt via  Jack Griffiths ([phone]) to inform that Dr. Rossy Rios is out of the office and will review lab results upon return. F/u appt 1/16 - does she need to be seen sooner? Please advise.      FYI- Pt states she will be seei

## 2018-12-26 NOTE — TELEPHONE ENCOUNTER
Call patient back Via . She was unavailable but spoke to her daughter. She was seen approxi-1 week ago for joint pain and rash.   Blood work is consistent with possible lupus she was found to have + antiphospholipid antibodies, low C4 a

## 2018-12-28 ENCOUNTER — HOSPITAL ENCOUNTER (OUTPATIENT)
Dept: GENERAL RADIOLOGY | Facility: HOSPITAL | Age: 68
Discharge: HOME | End: 2018-12-28
Attending: INTERNAL MEDICINE
Payer: MEDICARE

## 2018-12-28 DIAGNOSIS — M25.50 PAIN IN JOINT, MULTIPLE SITES: ICD-10-CM

## 2018-12-28 DIAGNOSIS — R21 RASH: ICD-10-CM

## 2018-12-28 DIAGNOSIS — R76.8 POSITIVE ANA (ANTINUCLEAR ANTIBODY): ICD-10-CM

## 2018-12-28 DIAGNOSIS — M25.50 POLYARTHRALGIA: ICD-10-CM

## 2018-12-28 PROCEDURE — 73030 X-RAY EXAM OF SHOULDER: CPT | Performed by: INTERNAL MEDICINE

## 2018-12-28 PROCEDURE — 73130 X-RAY EXAM OF HAND: CPT | Performed by: INTERNAL MEDICINE

## 2019-01-08 ENCOUNTER — HOSPITAL ENCOUNTER (OUTPATIENT)
Dept: ULTRASOUND IMAGING | Facility: HOSPITAL | Age: 69
Discharge: HOME | End: 2019-01-08
Attending: CLINICAL NURSE SPECIALIST
Payer: MEDICARE

## 2019-01-08 ENCOUNTER — TELEPHONE (OUTPATIENT)
Dept: INTERNAL MEDICINE CLINIC | Facility: CLINIC | Age: 69
End: 2019-01-08

## 2019-01-08 DIAGNOSIS — I83.813 VARICOSE VEINS OF BOTH LOWER EXTREMITIES WITH PAIN: ICD-10-CM

## 2019-01-08 DIAGNOSIS — I83.893 VARICOSE VEINS OF LEG WITH SWELLING, BILATERAL: ICD-10-CM

## 2019-01-08 DIAGNOSIS — R21 RASH: ICD-10-CM

## 2019-01-08 DIAGNOSIS — M25.50 ARTHRALGIA, UNSPECIFIED JOINT: Primary | ICD-10-CM

## 2019-01-08 PROCEDURE — 93971 EXTREMITY STUDY: CPT | Performed by: CLINICAL NURSE SPECIALIST

## 2019-01-08 NOTE — TELEPHONE ENCOUNTER
Patient daughter is calling patient need referrals to see Dermatology, rheumatology  and veins specialist. They need to be under new insurance  [employer]  As soon as possible

## 2019-01-09 ENCOUNTER — OFFICE VISIT (OUTPATIENT)
Dept: DERMATOLOGY CLINIC | Facility: CLINIC | Age: 69
End: 2019-01-09
Payer: MEDICARE

## 2019-01-09 DIAGNOSIS — L40.0 PSORIASIS VULGARIS: Primary | ICD-10-CM

## 2019-01-09 PROCEDURE — G0463 HOSPITAL OUTPT CLINIC VISIT: HCPCS | Performed by: DERMATOLOGY

## 2019-01-09 PROCEDURE — 99203 OFFICE O/P NEW LOW 30 MIN: CPT | Performed by: DERMATOLOGY

## 2019-01-09 RX ORDER — CLOBETASOL PROPIONATE 0.46 MG/ML
1 SOLUTION TOPICAL 2 TIMES DAILY
Qty: 50 ML | Refills: 6 | Status: SHIPPED | OUTPATIENT
Start: 2019-01-09 | End: 2019-06-04

## 2019-01-09 RX ORDER — CLOBETASOL PROPIONATE 0.5 MG/G
1 CREAM TOPICAL 2 TIMES DAILY
Qty: 45 G | Refills: 1 | Status: SHIPPED | OUTPATIENT
Start: 2019-01-09 | End: 2019-04-18

## 2019-01-16 ENCOUNTER — OFFICE VISIT (OUTPATIENT)
Dept: RHEUMATOLOGY | Facility: CLINIC | Age: 69
End: 2019-01-16
Payer: MEDICARE

## 2019-01-16 ENCOUNTER — TELEPHONE (OUTPATIENT)
Dept: INTERNAL MEDICINE CLINIC | Facility: CLINIC | Age: 69
End: 2019-01-16

## 2019-01-16 ENCOUNTER — APPOINTMENT (OUTPATIENT)
Dept: LAB | Facility: HOSPITAL | Age: 69
End: 2019-01-16
Attending: INTERNAL MEDICINE
Payer: MEDICARE

## 2019-01-16 VITALS
SYSTOLIC BLOOD PRESSURE: 131 MMHG | BODY MASS INDEX: 30.42 KG/M2 | DIASTOLIC BLOOD PRESSURE: 67 MMHG | HEIGHT: 70 IN | WEIGHT: 212.5 LBS | HEART RATE: 93 BPM

## 2019-01-16 DIAGNOSIS — L40.50 PSORIATIC ARTHRITIS (HCC): ICD-10-CM

## 2019-01-16 DIAGNOSIS — D68.61 ANTIPHOSPHOLIPID SYNDROME (HCC): ICD-10-CM

## 2019-01-16 DIAGNOSIS — R76.0 ANTIPHOSPHOLIPID ANTIBODY POSITIVE: ICD-10-CM

## 2019-01-16 DIAGNOSIS — I82.4Z2 ACUTE DEEP VEIN THROMBOSIS (DVT) OF DISTAL VEIN OF LEFT LOWER EXTREMITY (HCC): Primary | ICD-10-CM

## 2019-01-16 DIAGNOSIS — L40.50 PSORIATIC ARTHRITIS (HCC): Primary | ICD-10-CM

## 2019-01-16 PROBLEM — N18.30 CKD (CHRONIC KIDNEY DISEASE) STAGE 3, GFR 30-59 ML/MIN (HCC): Chronic | Status: ACTIVE | Noted: 2019-01-16

## 2019-01-16 LAB
ALBUMIN SERPL BCP-MCNC: 3.5 G/DL (ref 3.5–4.8)
ALBUMIN/GLOB SERPL: 0.9 {RATIO} (ref 1–2)
ALP SERPL-CCNC: 75 U/L (ref 32–100)
ALT SERPL-CCNC: 13 U/L (ref 14–54)
ANION GAP SERPL CALC-SCNC: 15 MMOL/L (ref 0–18)
AST SERPL-CCNC: 19 U/L (ref 15–41)
BASOPHILS # BLD: 0 K/UL (ref 0–0.2)
BASOPHILS NFR BLD: 1 %
BILIRUB SERPL-MCNC: 0.3 MG/DL (ref 0.3–1.2)
BUN SERPL-MCNC: 34 MG/DL (ref 8–20)
BUN/CREAT SERPL: 25.8 (ref 10–20)
CALCIUM SERPL-MCNC: 9.1 MG/DL (ref 8.5–10.5)
CHLORIDE SERPL-SCNC: 94 MMOL/L (ref 95–110)
CO2 SERPL-SCNC: 25 MMOL/L (ref 22–32)
CREAT SERPL-MCNC: 1.32 MG/DL (ref 0.5–1.5)
EOSINOPHIL # BLD: 0.1 K/UL (ref 0–0.7)
EOSINOPHIL NFR BLD: 2 %
ERYTHROCYTE [DISTWIDTH] IN BLOOD BY AUTOMATED COUNT: 13.3 % (ref 11–15)
GLOBULIN PLAS-MCNC: 3.9 G/DL (ref 2.5–3.7)
GLUCOSE SERPL-MCNC: 101 MG/DL (ref 70–99)
HCT VFR BLD AUTO: 32.1 % (ref 35–48)
HGB BLD-MCNC: 10.8 G/DL (ref 12–16)
LYMPHOCYTES # BLD: 1 K/UL (ref 1–4)
LYMPHOCYTES NFR BLD: 15 %
MCH RBC QN AUTO: 29.4 PG (ref 27–32)
MCHC RBC AUTO-ENTMCNC: 33.7 G/DL (ref 32–37)
MCV RBC AUTO: 87.2 FL (ref 80–100)
MONOCYTES # BLD: 0.5 K/UL (ref 0–1)
MONOCYTES NFR BLD: 7 %
NEUTROPHILS # BLD AUTO: 5.1 K/UL (ref 1.8–7.7)
NEUTROPHILS NFR BLD: 76 %
OSMOLALITY UR CALC.SUM OF ELEC: 286 MOSM/KG (ref 275–295)
PATIENT FASTING: NO
PLATELET # BLD AUTO: 377 K/UL (ref 140–400)
PMV BLD AUTO: 7.7 FL (ref 7.4–10.3)
POTASSIUM SERPL-SCNC: 4.6 MMOL/L (ref 3.3–5.1)
PROT SERPL-MCNC: 7.4 G/DL (ref 5.9–8.4)
RBC # BLD AUTO: 3.68 M/UL (ref 3.7–5.4)
SODIUM SERPL-SCNC: 134 MMOL/L (ref 136–144)
WBC # BLD AUTO: 6.7 K/UL (ref 4–11)

## 2019-01-16 PROCEDURE — 36415 COLL VENOUS BLD VENIPUNCTURE: CPT | Performed by: INTERNAL MEDICINE

## 2019-01-16 PROCEDURE — G0463 HOSPITAL OUTPT CLINIC VISIT: HCPCS | Performed by: INTERNAL MEDICINE

## 2019-01-16 PROCEDURE — 86480 TB TEST CELL IMMUN MEASURE: CPT

## 2019-01-16 PROCEDURE — 85025 COMPLETE CBC W/AUTO DIFF WBC: CPT | Performed by: INTERNAL MEDICINE

## 2019-01-16 PROCEDURE — 82955 ASSAY OF G6PD ENZYME: CPT | Performed by: INTERNAL MEDICINE

## 2019-01-16 PROCEDURE — 80053 COMPREHEN METABOLIC PANEL: CPT | Performed by: INTERNAL MEDICINE

## 2019-01-16 PROCEDURE — 86704 HEP B CORE ANTIBODY TOTAL: CPT | Performed by: INTERNAL MEDICINE

## 2019-01-16 PROCEDURE — 86803 HEPATITIS C AB TEST: CPT | Performed by: INTERNAL MEDICINE

## 2019-01-16 PROCEDURE — 86706 HEP B SURFACE ANTIBODY: CPT | Performed by: INTERNAL MEDICINE

## 2019-01-16 PROCEDURE — 99213 OFFICE O/P EST LOW 20 MIN: CPT | Performed by: INTERNAL MEDICINE

## 2019-01-17 ENCOUNTER — OFFICE VISIT (OUTPATIENT)
Dept: INTERNAL MEDICINE CLINIC | Facility: CLINIC | Age: 69
End: 2019-01-17
Payer: MEDICARE

## 2019-01-17 ENCOUNTER — TELEPHONE (OUTPATIENT)
Dept: INTERNAL MEDICINE CLINIC | Facility: CLINIC | Age: 69
End: 2019-01-17

## 2019-01-17 VITALS
BODY MASS INDEX: 30.96 KG/M2 | DIASTOLIC BLOOD PRESSURE: 53 MMHG | WEIGHT: 209 LBS | SYSTOLIC BLOOD PRESSURE: 121 MMHG | HEART RATE: 80 BPM | TEMPERATURE: 98 F | HEIGHT: 69 IN

## 2019-01-17 DIAGNOSIS — Z12.39 BREAST CANCER SCREENING: ICD-10-CM

## 2019-01-17 DIAGNOSIS — I82.90 ACUTE VENOUS THROMBOSIS: ICD-10-CM

## 2019-01-17 DIAGNOSIS — E11.9 CONTROLLED TYPE 2 DIABETES MELLITUS WITHOUT COMPLICATION, WITHOUT LONG-TERM CURRENT USE OF INSULIN (HCC): Primary | Chronic | ICD-10-CM

## 2019-01-17 DIAGNOSIS — R76.0 ANTIPHOSPHOLIPID ANTIBODY POSITIVE: ICD-10-CM

## 2019-01-17 DIAGNOSIS — I10 ESSENTIAL HYPERTENSION WITH GOAL BLOOD PRESSURE LESS THAN 130/80: ICD-10-CM

## 2019-01-17 LAB
HBV CORE AB SERPL QL IA: NONREACTIVE
HBV SURFACE AB SER-ACNC: <3.1 MIU/ML (ref ?–10)
HBV SURFACE AG SERPL QL IA: NONREACTIVE
HCV AB SERPL QL IA: NONREACTIVE

## 2019-01-17 PROCEDURE — 99214 OFFICE O/P EST MOD 30 MIN: CPT | Performed by: INTERNAL MEDICINE

## 2019-01-17 RX ORDER — HYDRALAZINE HYDROCHLORIDE 100 MG/1
TABLET, FILM COATED ORAL
Qty: 270 TABLET | Refills: 1 | Status: SHIPPED | OUTPATIENT
Start: 2019-01-17 | End: 2019-01-17

## 2019-01-17 RX ORDER — HYDRALAZINE HYDROCHLORIDE 100 MG/1
TABLET, FILM COATED ORAL
Qty: 270 TABLET | Refills: 1 | Status: SHIPPED | OUTPATIENT
Start: 2019-01-17 | End: 2019-07-25

## 2019-01-17 NOTE — PATIENT INSTRUCTIONS
ASSESSMENT/PLAN:   Controlled type 2 diabetes mellitus without complication, without long-term current use of insulin (hcc)  (primary encounter diagnosis) Not well controlled. Slightly hypoglycemia.  Decrease lantus to 35 units in AM and 8 units in PM. Call Referrals:  JOHN SCREENING BILAT (CPT=[id number])

## 2019-01-17 NOTE — PATIENT INSTRUCTIONS
- you have two issues going on (likely 2 autoimmune diseases going ont)  1.   You were seen today for joint pain likely due to psoriatic arthritis   - plan to get more blood work like Hep B, C and TB before we start you on any medications   - will provide y trata la artritis psoriásica? La artritis psoriásica no desaparece. Es [facility] afección de pan plazo (crónica) que requiere tratamiento prolongado. Los medicamentos son [place] parte importante del tratamiento.  Los siguientes medicamentos se usan con frecuencia is not intended as a substitute for professional medical care. Always follow your healthcare professional's instructions. Anticuerpo antifosfolipídico  [facility] otros nombres? APA, anticoagulante del lupus, anticuerpos anticardiolipina. posible que necesite gagandeep análisis si usted:  · Tiene abortos espontáneos reiterados  · Desarrolla coágulos sanguíneos anormales que podrían provocar ataques al corazón o ataques cerebrales  · Tiene el síndrome del anticuerpo antifosfolipídico, un dior de tratamiento que incluye warfarina, un medicamento anticoagulante. Lo proveedor de Cristinaie tomando en cuenta lo estado de horace general.   ¿Cómo se realiza gagandeep análisis?   ONEOK, se requiere Kun Reyna

## 2019-01-17 NOTE — PROGRESS NOTES
Tiffanie Dasilva is a 80year old female. HPI:   Patient presents with: Follow - Up: discuss labs results   Joint Pain: shoulders, elbow, wrists, fingers.  Right > Left       I had the pleasure of seeing Tiffanie Dasilva on 1/16/20 Normal dsDNA, SSA, SSB, Scl-70, centromere, em, RNP. Negative RF and CCP    HISTORY:  Past Medical History:   Diagnosis Date   • Appendicitis    • Cholelithiasis    • Goiter     Multinodular goiter S/P thyroidectomy.     • Osteoarthritis     DIANE  KNEE PA ULTRAFINE) 29G X 12.7MM Does not apply Misc Dx. E11.9. Checks q12hrs. BD ultrafine MINI.  Disp: 100 each Rfl: 6   Glucose Blood (BENNY CONTOUR NEXT TEST) In Vitro Strip TEST 2 times  A DAY Dx: Uncontrolled type 2 diabetes mellitus without complication, with anterior region, L shoulder not pain, FROM  Hip: normal log roll, no lateral hip pain, MULUGETA test negative b/l  Knees: FROM, no warmth or effusion present. No pain with ROM.    Ankles: FROM, no pain or swelling or warmth on palpation  Feet: no pain with MTP Anti-Sjogren's B      Negative Negative   Scleroderma (Scl-70) (JAH) Antibody, IgG      0 - 40 AU/mL 3   Anti Double Strand DNA      <10 <10     Component      Latest Ref Rng & Units 12/12/2018 10/25/2018   ADRIEL Titer/Pattern      <80  640 (A)   Reviewed also  - X-rays the wrist showed carpal narrowing and on examination she does have chronic synovitis in her wrists. - Plan to start a DMARD.   Need to obtain hepatitis B, C and QuantiFERON TB prior to starting  - Plan to avoid TNF inhibitor because of her p

## 2019-01-17 NOTE — PROGRESS NOTES
HPI:    Patient ID: Juaquin Hilton is a 80year old female. HPI   Had ultrasound in preparation for varicose vein removal.  Found superficial clot. Follow-up approximately a week later showed a superficial clot had not progressed.   Venous Patient is here for follow up of hypertension. BP at home:  Not check. Has been compliant with medications. Exercise level: moderately active and has been following low salt diet. Weight has been down.   Wt Readings from Last 3 Encounters:  01/17/19 : 20 Diclofenac Sodium 50 MG Oral Tab EC Take 1 tablet (50 mg total) by mouth 2 (two) times daily.  Disp: 30 tablet Rfl: 0   insulin glargine (LANTUS SOLOSTAR) 100 UNIT/ML Subcutaneous Solution Pen-injector 35 units in AM and 8 units in PM. Disp: 30 mL Rfl: 0 BENNY MICROLET LANCETS Does not apply Misc Testing 3 times a day  Diagnosis E11.65 Disp: 300 each Rfl: 2   Multiple Vitamins-Minerals (ONE-A-DAY WOMENS 50 PLUS OR) Take by mouth.  Disp:  Rfl:    Blood Glucose Monitoring Suppl ([employer] CONTOUR NEXT MONITOR) w/ • Dementia Other    • No Known Problems Self    • No Known Problems Sister    • No Known Problems Daughter    • No Known Problems Maternal Grandmother    • No Known Problems Paternal Grandmother    • No Known Problems Maternal Aunt    • No Known Problems P Psychiatric: She has a normal mood and affect. Her behavior is normal.   Nursing note and vitals reviewed.   Bilateral barefoot skin diabetic exam is normal, visualized feet and the appearance is normal.  Bilateral sensation of both feet is normal.  Pulsati No orders of the defined types were placed in this encounter.       Meds This Visit:  Requested Prescriptions     Signed Prescriptions Disp Refills   • Diclofenac Sodium 50 MG Oral Tab EC 30 tablet 0     Sig: Take 1 tablet (50 mg total) by mouth 2 (two) johnson

## 2019-01-17 NOTE — TELEPHONE ENCOUNTER
Spoke with Dr. Serena Starkey.   Since we are doing conservative management for the superficial clot and she is not progressing it is fine to just watch for right now but they do want to see her back in sooner Dr. Ama Renee office will give patient a call to schedule her s

## 2019-01-17 NOTE — TELEPHONE ENCOUNTER
Left a message with A [facility] nurse regarding an appt request with dr Shy Hicks.    They will contact pt to schedule an appt

## 2019-01-18 LAB
M TB IFN-G CD4+ BCKGRND COR BLD-ACNC: 0.07 IU/ML
M TB IFN-G CD4+ T-CELLS BLD-ACNC: 0.14 IU/ML
M TB TUBERC IFN-G BLD QL: NEGATIVE
M TB TUBERC IGNF/MITOGEN IGNF CONTROL: 8.98 IU/ML

## 2019-01-19 LAB — GLUCOSE-6-PHOSPHATE DEHYDROGENASE: 11.7 U/G HB

## 2019-01-20 PROBLEM — I82.90 ACUTE VENOUS THROMBOSIS: Status: ACTIVE | Noted: 2019-01-16

## 2019-01-20 NOTE — PROGRESS NOTES
Judith Krueger is a 80year old female. Patient presents with:  Rash: New pt presenting with rash to to scalp, chest and bilateral lower legs. Pt beleives it may be psoriasis. c/o itching and dryness.  - Pt notes previously had consult wit day  Diagnosis E11.65 Disp: 300 each Rfl: 2   Multiple Vitamins-Minerals (ONE-A-DAY WOMENS 50 PLUS OR) Take by mouth.  Disp:  Rfl:    Blood Glucose Monitoring Suppl ([employer] CONTOUR NEXT MONITOR) w/Device Does not apply Kit 1 kit by Does not apply route daily Disp: 90 tablet Rfl: 0   CloNIDine HCl 0.1 MG Oral Tab Take 1 tablet (0.1 mg total) by mouth 2 (two) times daily. Disp: 180 tablet Rfl: 1   Candesartan Cilexetil-HCTZ 32-12.5 MG Oral Tab Take 1 tablet by mouth once daily.  Disp: 90 tablet Rfl: 0   glipiZIDE Solution Pen-injector 35 units in AM and 8 units in PM. Disp: 30 mL Rfl: 0   HydrALAZINE HCl 100 MG Oral Tab TAKE 1 TABLET BY MOUTH 3 TIMES A DAY Disp: 270 tablet Rfl: 1     Allergies:     Metformin               DIARRHEA  Amlodipine              SHORTNESS Exposure: Not Asked        Hobby Hazards: Not Asked        Sleep Concern: Not Asked        Stress Concern: Not Asked        Weight Concern: Not Asked        Special Diet: Not Asked        Back Care: Not Asked        Exercise: Not Asked        Bike Helmet: blockers without improvement. No joint complaints presently. Patient frustrated and is lesions are fairly widespread very itchy. In particular scalp is most bothersome.   No one else in the family with a history of psoriasis or skin problems as far she i fact that psoriasis does cluster with other auto inflammatory conditions including thyroid arthritis discussed. Obesity hypertension hyperlipidemia diabetes frequently seen in conjunction with this.   Her active psoriasis increases risk for hypertension he

## 2019-01-21 ENCOUNTER — TELEPHONE (OUTPATIENT)
Dept: INTERNAL MEDICINE CLINIC | Facility: CLINIC | Age: 69
End: 2019-01-21

## 2019-01-21 NOTE — TELEPHONE ENCOUNTER
Patient want to know when is  when she has to be fasting to her appointment with the specialist ?or does he tell her when to come back for any procedure

## 2019-01-22 ENCOUNTER — MED REC SCAN ONLY (OUTPATIENT)
Dept: DERMATOLOGY CLINIC | Facility: CLINIC | Age: 69
End: 2019-01-22

## 2019-01-28 NOTE — TELEPHONE ENCOUNTER
Pt has a new Insurance plan and would like for her medication to be send to her Mail order pharmacy.   Pt will bring letter with pharmacy info

## 2019-01-29 ENCOUNTER — OFFICE VISIT (OUTPATIENT)
Dept: HEMATOLOGY/ONCOLOGY | Facility: HOSPITAL | Age: 69
End: 2019-01-29
Attending: INTERNAL MEDICINE
Payer: MEDICARE

## 2019-01-29 VITALS
WEIGHT: 209 LBS | TEMPERATURE: 98 F | HEART RATE: 79 BPM | DIASTOLIC BLOOD PRESSURE: 44 MMHG | RESPIRATION RATE: 16 BRPM | OXYGEN SATURATION: 98 % | BODY MASS INDEX: 30.96 KG/M2 | SYSTOLIC BLOOD PRESSURE: 134 MMHG | HEIGHT: 69 IN

## 2019-01-29 DIAGNOSIS — I82.532 CHRONIC DEEP VEIN THROMBOSIS (DVT) OF LEFT POPLITEAL VEIN (HCC): Primary | ICD-10-CM

## 2019-01-29 DIAGNOSIS — R76.0 ANTIPHOSPHOLIPID ANTIBODY POSITIVE: ICD-10-CM

## 2019-01-29 PROCEDURE — 99203 OFFICE O/P NEW LOW 30 MIN: CPT | Performed by: INTERNAL MEDICINE

## 2019-01-29 NOTE — PROGRESS NOTES
JENN     Sigifredo Levy is a 80year old female here for evaluation of Antiphospholipid antibody positive  Chronic deep vein thrombosis (dvt) of left popliteal vein (hcc)  (primary encounter diagnosis)    Patient went to see IR for evaluation Apply 1 mL topically 2 (two) times daily. Disp: 50 mL Rfl: 6   triamcinolone acetonide 0.1 % External Cream Apply 1 Application topically 2 (two) times daily. Disp: 454 g Rfl: 2   simvastatin 20 MG Oral Tab Take 1 tablet (20 mg total) by mouth nightly.  joya mouth. Disp:  Rfl:    Omega-3-acid Ethyl Esters (LOVAZA) 1 G Oral Cap Take 1 g by mouth 2 (two) times daily with meals.    Disp:  Rfl:      Allergies:     Metformin               DIARRHEA  Amlodipine              SHORTNESS OF BREATH    Comment:Chest pressur Not Asked        Sleep Concern: Not Asked        Stress Concern: Not Asked        Weight Concern: Not Asked        Special Diet: Not Asked        Back Care: Not Asked        Exercise: Not Asked        Bike Helmet: Not Asked        Seat Belt: Not Asked heard.  Pulmonary/Chest: Effort normal and breath sounds normal. No respiratory distress. She has no wheezes. Abdominal: Soft. Bowel sounds are normal. She exhibits no distension. There is no tenderness. Musculoskeletal: She exhibits no edema.    Signif Positive        Resulting Agency             Collected:  12/12/2018 13:36 Status:  Final result Dx:  Rash; Polyarthralgia; Positive ADRIEL (a...     Ref Range & Units 12/12/18 1336   Cardiolipin IgG Antibody 0.0 - 14.9 GPL 6.2    Cardiolipin IgM Antibody 0.0 - antibody. Antiphospholipid antibodies have been associated with an increased incidence of thromboembolism and spontaneous .      If the possibility of antiphospholipid syndrome is suspected clinically, recommend correlation with repeat DRVVT/hex MID:      Incompetent. Diameter 3.1 mm with reflux time of 2.20 seconds. DISTAL:                  Incompetent. Diameter 2.5 mm with reflux time of 0.58 seconds. SMALL SAPHENOUS VEIN:  PROXIMAL:       Competent.   Diameter 2 small saphenous, posterior tibial and peroneal veins appear normal.                   The left femoral and popliteal veins appear normal.  Normal flow was demonstrated with color and pulsed Doppler.   Visualized portions of the great and small saphenous and extremities. SPECTRAL:      Where interrogated, augmentation is visualized on compression. OTHER:             A few superficial varicosities are noted.        =====  CONCLUSION:   Deep venous thrombosis involving one of the left peroneal veins.

## 2019-01-31 ENCOUNTER — TELEPHONE (OUTPATIENT)
Dept: INTERNAL MEDICINE CLINIC | Facility: CLINIC | Age: 69
End: 2019-01-31

## 2019-01-31 DIAGNOSIS — E11.9 CONTROLLED TYPE 2 DIABETES MELLITUS WITHOUT COMPLICATION, WITHOUT LONG-TERM CURRENT USE OF INSULIN (HCC): Primary | Chronic | ICD-10-CM

## 2019-01-31 RX ORDER — LANCETS 30 GAUGE
EACH MISCELLANEOUS
Qty: 50 EACH | Refills: 6 | Status: SHIPPED | OUTPATIENT
Start: 2019-01-31 | End: 2019-04-01

## 2019-01-31 NOTE — TELEPHONE ENCOUNTER
Pt needs a PRINTED SCRIPT for Lantus. Needs a 90 days supply and refills if possible. Requesting to be inform once Rx script it's ready for  at [address] [place], [place] [address].

## 2019-01-31 NOTE — TELEPHONE ENCOUNTER
Per pt her insurance is not going to cover her current glucometer. Glucometer that will be cover is Accucheck. Please prescribe an Accucheck glucometer. Pt wants a PRINTED SCRIPT for the glucometer and all the diabetic supplies.      To order: Accucheck glu

## 2019-02-01 ENCOUNTER — OFFICE VISIT (OUTPATIENT)
Dept: NEPHROLOGY | Facility: CLINIC | Age: 69
End: 2019-02-01
Payer: MEDICARE

## 2019-02-01 VITALS
SYSTOLIC BLOOD PRESSURE: 119 MMHG | HEIGHT: 69 IN | BODY MASS INDEX: 30.9 KG/M2 | TEMPERATURE: 99 F | HEART RATE: 89 BPM | WEIGHT: 208.63 LBS | DIASTOLIC BLOOD PRESSURE: 56 MMHG

## 2019-02-01 DIAGNOSIS — R76.0 ANTIPHOSPHOLIPID ANTIBODY POSITIVE: ICD-10-CM

## 2019-02-01 DIAGNOSIS — N18.30 CKD (CHRONIC KIDNEY DISEASE), STAGE III (HCC): Primary | ICD-10-CM

## 2019-02-01 PROCEDURE — G0463 HOSPITAL OUTPT CLINIC VISIT: HCPCS | Performed by: INTERNAL MEDICINE

## 2019-02-01 PROCEDURE — 99215 OFFICE O/P EST HI 40 MIN: CPT | Performed by: INTERNAL MEDICINE

## 2019-02-01 RX ORDER — LOSARTAN POTASSIUM 100 MG/1
100 TABLET ORAL DAILY
Qty: 90 TABLET | Refills: 1 | Status: SHIPPED | OUTPATIENT
Start: 2019-02-01 | End: 2019-07-13

## 2019-02-01 NOTE — PATIENT INSTRUCTIONS
Stop candersartan/hctz   Start losartan 100 mg daily   Urine test and blood test in 2-3 weeks   Follow up in 4 weeks   Stop diclofenac

## 2019-02-01 NOTE — PROGRESS NOTES
Progress Note:      Patient is a 80 yrs old [address] speaking female with pmh of multinodular goiter s/p thyroidectomy, HL, DM II, HTN x 4 yrs, +ve antiphospholipid with popliteal DVT in Nov 2018, psoriatic arthritis  who presented today for follow up Disease   • Diabetes Brother    • Lipids Brother    • Hypertension Brother    • Diabetes Paternal Uncle    • Heart Disease Father         CAD   • Diabetes Other    • Dementia Other    • No Known Problems Self    • No Known Problems Sister    • No Known Pro by mouth nightly. take 1 tablet (20MG)  by oral route  3 times a week M/W/F Disp: 90 tablet Rfl: 0   CloNIDine HCl 0.1 MG Oral Tab Take 1 tablet (0.1 mg total) by mouth 2 (two) times daily.  Disp: 180 tablet Rfl: 1   glipiZIDE 10 MG Oral Tab 1/2 tab prebrea needed for Nausea.  Disp: 20 tablet Rfl: 0       Allergies:    Metformin               DIARRHEA  Amlodipine              SHORTNESS OF BREATH    Comment:Chest pressure      ROS:     Constitutional:  Negative for decreased activity, fever, irritability and le unremarkable, UACR wnl  - Lab work showed BUN/Cr 14/1.0 mg/dl with an eGFR 59 ml/min -> creatinine 1.3 mg/dl with an eGFR 42 ml/min  - US kidney showed normal size kidneys - no hydronephrosis      2.  Antiphospholipid antibody +ve and psoriatic arthritis

## 2019-02-06 ENCOUNTER — TELEPHONE (OUTPATIENT)
Dept: INTERNAL MEDICINE CLINIC | Facility: CLINIC | Age: 69
End: 2019-02-06

## 2019-02-06 ENCOUNTER — HOSPITAL ENCOUNTER (EMERGENCY)
Facility: HOSPITAL | Age: 69
Discharge: HOME | End: 2019-02-06
Attending: EMERGENCY MEDICINE
Payer: MEDICARE

## 2019-02-06 ENCOUNTER — TELEPHONE (OUTPATIENT)
Dept: RHEUMATOLOGY | Facility: CLINIC | Age: 69
End: 2019-02-06

## 2019-02-06 ENCOUNTER — OFFICE VISIT (OUTPATIENT)
Dept: RHEUMATOLOGY | Facility: CLINIC | Age: 69
End: 2019-02-06
Payer: MEDICARE

## 2019-02-06 VITALS
WEIGHT: 209 LBS | DIASTOLIC BLOOD PRESSURE: 61 MMHG | HEIGHT: 69 IN | HEART RATE: 97 BPM | BODY MASS INDEX: 30.96 KG/M2 | SYSTOLIC BLOOD PRESSURE: 103 MMHG

## 2019-02-06 VITALS
RESPIRATION RATE: 16 BRPM | WEIGHT: 209 LBS | DIASTOLIC BLOOD PRESSURE: 52 MMHG | OXYGEN SATURATION: 96 % | SYSTOLIC BLOOD PRESSURE: 94 MMHG | HEIGHT: 69 IN | BODY MASS INDEX: 30.96 KG/M2 | TEMPERATURE: 98 F | HEART RATE: 78 BPM

## 2019-02-06 DIAGNOSIS — L40.50 PSORIATIC ARTHRITIS (HCC): ICD-10-CM

## 2019-02-06 DIAGNOSIS — E86.0 DEHYDRATION: ICD-10-CM

## 2019-02-06 DIAGNOSIS — K62.5 BRIGHT RED BLOOD PER RECTUM: ICD-10-CM

## 2019-02-06 DIAGNOSIS — L40.9 PSORIASIS: Primary | ICD-10-CM

## 2019-02-06 DIAGNOSIS — K52.9 GASTROENTERITIS: Primary | ICD-10-CM

## 2019-02-06 DIAGNOSIS — D68.61 ANTIPHOSPHOLIPID SYNDROME (HCC): ICD-10-CM

## 2019-02-06 LAB
ANION GAP SERPL CALC-SCNC: 13 MMOL/L (ref 0–18)
ANTIBODY SCREEN: POSITIVE
BASOPHILS # BLD AUTO: 0.02 X10(3) UL (ref 0–0.2)
BASOPHILS NFR BLD AUTO: 0.3 %
BUN SERPL-MCNC: 27 MG/DL (ref 8–20)
BUN/CREAT SERPL: 22.7 (ref 10–20)
CALCIUM SERPL-MCNC: 9 MG/DL (ref 8.5–10.5)
CHLORIDE SERPL-SCNC: 97 MMOL/L (ref 95–110)
CO2 SERPL-SCNC: 24 MMOL/L (ref 22–32)
CREAT SERPL-MCNC: 1.19 MG/DL (ref 0.5–1.5)
DEPRECATED RDW RBC AUTO: 40.6 FL (ref 35.1–46.3)
EOSINOPHIL # BLD AUTO: 0.05 X10(3) UL (ref 0–0.7)
EOSINOPHIL NFR BLD AUTO: 0.7 %
ERYTHROCYTE [DISTWIDTH] IN BLOOD BY AUTOMATED COUNT: 13 % (ref 11–15)
GLUCOSE BLDC GLUCOMTR-MCNC: 82 MG/DL (ref 70–99)
GLUCOSE SERPL-MCNC: 107 MG/DL (ref 70–99)
HCT VFR BLD AUTO: 31.7 % (ref 35–48)
HGB BLD-MCNC: 10.5 G/DL (ref 12–16)
IMM GRANULOCYTES # BLD AUTO: 0.03 X10(3) UL (ref 0–1)
IMM GRANULOCYTES NFR BLD: 0.4 %
LYMPHOCYTES # BLD AUTO: 1 X10(3) UL (ref 1–4)
LYMPHOCYTES NFR BLD AUTO: 13.3 %
MCH RBC QN AUTO: 28.5 PG (ref 26–34)
MCHC RBC AUTO-ENTMCNC: 33.1 G/DL (ref 31–37)
MCV RBC AUTO: 86.1 FL (ref 80–100)
MONOCYTES # BLD AUTO: 0.43 X10(3) UL (ref 0.1–1)
MONOCYTES NFR BLD AUTO: 5.7 %
NEUTROPHILS # BLD AUTO: 6.01 X10 (3) UL (ref 1.5–7.7)
NEUTROPHILS # BLD AUTO: 6.01 X10(3) UL (ref 1.5–7.7)
NEUTROPHILS NFR BLD AUTO: 79.6 %
OSMOLALITY UR CALC.SUM OF ELEC: 284 MOSM/KG (ref 275–295)
PLATELET # BLD AUTO: 361 10(3)UL (ref 150–450)
POTASSIUM SERPL-SCNC: 3.5 MMOL/L (ref 3.3–5.1)
RBC # BLD AUTO: 3.68 X10(6)UL (ref 3.8–5.3)
RH BLOOD TYPE: POSITIVE
SODIUM SERPL-SCNC: 134 MMOL/L (ref 136–144)
WBC # BLD AUTO: 7.5 X10(3) UL (ref 4–11)

## 2019-02-06 PROCEDURE — 80048 BASIC METABOLIC PNL TOTAL CA: CPT

## 2019-02-06 PROCEDURE — 82962 GLUCOSE BLOOD TEST: CPT

## 2019-02-06 PROCEDURE — 85025 COMPLETE CBC W/AUTO DIFF WBC: CPT

## 2019-02-06 PROCEDURE — 86850 RBC ANTIBODY SCREEN: CPT

## 2019-02-06 PROCEDURE — 99213 OFFICE O/P EST LOW 20 MIN: CPT | Performed by: INTERNAL MEDICINE

## 2019-02-06 PROCEDURE — 96361 HYDRATE IV INFUSION ADD-ON: CPT

## 2019-02-06 PROCEDURE — 86900 BLOOD TYPING SEROLOGIC ABO: CPT | Performed by: EMERGENCY MEDICINE

## 2019-02-06 PROCEDURE — 82272 OCCULT BLD FECES 1-3 TESTS: CPT

## 2019-02-06 PROCEDURE — 96374 THER/PROPH/DIAG INJ IV PUSH: CPT

## 2019-02-06 PROCEDURE — 86880 COOMBS TEST DIRECT: CPT | Performed by: EMERGENCY MEDICINE

## 2019-02-06 PROCEDURE — G0463 HOSPITAL OUTPT CLINIC VISIT: HCPCS | Performed by: INTERNAL MEDICINE

## 2019-02-06 PROCEDURE — 86900 BLOOD TYPING SEROLOGIC ABO: CPT

## 2019-02-06 PROCEDURE — 99284 EMERGENCY DEPT VISIT MOD MDM: CPT

## 2019-02-06 PROCEDURE — 86850 RBC ANTIBODY SCREEN: CPT | Performed by: EMERGENCY MEDICINE

## 2019-02-06 PROCEDURE — 86077 PHYS BLOOD BANK SERV XMATCH: CPT | Performed by: EMERGENCY MEDICINE

## 2019-02-06 PROCEDURE — 80048 BASIC METABOLIC PNL TOTAL CA: CPT | Performed by: EMERGENCY MEDICINE

## 2019-02-06 PROCEDURE — 86901 BLOOD TYPING SEROLOGIC RH(D): CPT

## 2019-02-06 PROCEDURE — 85025 COMPLETE CBC W/AUTO DIFF WBC: CPT | Performed by: EMERGENCY MEDICINE

## 2019-02-06 PROCEDURE — 86901 BLOOD TYPING SEROLOGIC RH(D): CPT | Performed by: EMERGENCY MEDICINE

## 2019-02-06 PROCEDURE — 86870 RBC ANTIBODY IDENTIFICATION: CPT | Performed by: EMERGENCY MEDICINE

## 2019-02-06 RX ORDER — ONDANSETRON 2 MG/ML
4 INJECTION INTRAMUSCULAR; INTRAVENOUS ONCE
Status: COMPLETED | OUTPATIENT
Start: 2019-02-06 | End: 2019-02-06

## 2019-02-06 RX ORDER — ONDANSETRON 4 MG/1
4 TABLET, ORALLY DISINTEGRATING ORAL EVERY 8 HOURS PRN
Qty: 20 TABLET | Refills: 0 | Status: SHIPPED | OUTPATIENT
Start: 2019-02-06 | End: 2019-04-18

## 2019-02-06 RX ORDER — METHYLPREDNISOLONE 4 MG/1
TABLET ORAL
Qty: 1 PACKAGE | Refills: 0 | Status: SHIPPED | OUTPATIENT
Start: 2019-02-06 | End: 2019-04-18

## 2019-02-06 NOTE — PATIENT INSTRUCTIONS
- you were seen today for psoriatic arthrtiis  - plan to start medrol dose pack to help with the pain and inflammation to see if it helps  - plan to start Shelbi Cleaves but need to get prior authorization from insurance so will contact you if its approved or not

## 2019-02-06 NOTE — TELEPHONE ENCOUNTER
Plan to start [facility] for psoriatic arthritis, PA will be needed.  Pt will sign form

## 2019-02-06 NOTE — PROGRESS NOTES
Giovana Hughes is a 80year old female.     HPI:   Patient presents with:  Hand Pain: bilateral; fingers, wrists; swelling  Shoulder Pain: bilateral; pain radiates to elbow      I had the pleasure of seeing Giovana Hughes on 1/16/2 the right hand and wrist.  Right elbow and shoulder. Her rash is significantly improved in her left leg and scalp. She is also found to have slightly elevated creatinine of 1.3. She does have diabetes and follows with nephrology.   Currently pending repe Oral Tab Take 1 tablet (0.1 mg total) by mouth 2 (two) times daily. Disp: 180 tablet Rfl: 1   glipiZIDE 10 MG Oral Tab 1/2 tab prebreakfest and 1/2 predinner.  Disp: 90 tablet Rfl: 0   Levothyroxine Sodium 100 MCG Oral Tab Take 1 tablet (100 mcg total) by m no murmurs rubs or gallops. Equal 2+ distal pulses.    LUNGS: CTAB, no increased work of breathing  ABDOMEN:  soft NT/ND, +BS, no HSM  SKIN: plaque-like rash on L ankle, calf, chest, behind L ear, gluteal region that has improved, no nail changes   MSK:  Ce 48 (L)   Patient Fasting?        No   WBC      4.0 - 11.0 K/UL 6.7   RBC      3.70 - 5.40 M/UL 3.68 (L)   Hemoglobin      12.0 - 16.0 g/dL 10.8 (L)   Hematocrit      35.0 - 48.0 % 32.1 (L)   MCV      80.0 - 100.0 fL 87.2   MCH      27.0 - 32.0 pg 29.4   MCH 5.21 g/dL 3.74 (L)   ALPHA-1-GLOBULINS      0.19 - 0.46 g/dL 0.41   ALPHA-2-GLOBULINS      0.48 - 1.05 g/dL 0.88   BETA GLOBULINS      0.68 - 1.23 g/dL 1.00   GAMMA GLOBULINS      0.62 - 1.70 g/dL 1.49   ALBUMIN/GLOBULIN RATIO      1.00 - 2.00 0.99 (L)   S her scalp and her rash is improved. She also reports joint pain and swelling in her wrists, MCPs and PIPs with morning stiffness. On examination there is synovitis in her wrists and decreased range of motion.   Symptoms consistent with psoriatic arthritis repeating blood work renal function, UA and urine protein in 1 week.     Per pt can contact daughter Carlos Moeller [phone]  Pt will f/u in 6 weeks    Terry Hanna MD  2/6/2019   1:45 PM

## 2019-02-06 NOTE — ED INITIAL ASSESSMENT (HPI)
Patient here for evaluation of diarrhea that began today-describes stool as loose. 4 episodes of loose stool with 4th episode bright red blood. States she feel hot, c/o abdominal pain this morning.

## 2019-02-07 ENCOUNTER — OFFICE VISIT (OUTPATIENT)
Dept: INTERNAL MEDICINE CLINIC | Facility: CLINIC | Age: 69
End: 2019-02-07
Payer: MEDICARE

## 2019-02-07 VITALS
HEART RATE: 87 BPM | SYSTOLIC BLOOD PRESSURE: 111 MMHG | DIASTOLIC BLOOD PRESSURE: 65 MMHG | OXYGEN SATURATION: 97 % | WEIGHT: 208 LBS | BODY MASS INDEX: 30.81 KG/M2 | TEMPERATURE: 98 F | HEIGHT: 69 IN

## 2019-02-07 DIAGNOSIS — E11.9 CONTROLLED TYPE 2 DIABETES MELLITUS WITHOUT COMPLICATION, WITHOUT LONG-TERM CURRENT USE OF INSULIN (HCC): Chronic | ICD-10-CM

## 2019-02-07 DIAGNOSIS — I10 ESSENTIAL HYPERTENSION WITH GOAL BLOOD PRESSURE LESS THAN 130/80: Primary | ICD-10-CM

## 2019-02-07 DIAGNOSIS — K62.5 BRBPR (BRIGHT RED BLOOD PER RECTUM): ICD-10-CM

## 2019-02-07 DIAGNOSIS — R76.0 ANTIPHOSPHOLIPID ANTIBODY POSITIVE: ICD-10-CM

## 2019-02-07 DIAGNOSIS — Z12.39 BREAST CANCER SCREENING: ICD-10-CM

## 2019-02-07 PROCEDURE — 99214 OFFICE O/P EST MOD 30 MIN: CPT | Performed by: INTERNAL MEDICINE

## 2019-02-07 NOTE — TELEPHONE ENCOUNTER
Patient called c/o sudden onset of abdominal pain and rectal bleeding. Red blood in toilet bowl. Has never had this before. Was given losartan yesterday   Also given medrol dose pack today  Instructed not to take this medication.   Advise should go to ER

## 2019-02-07 NOTE — PATIENT INSTRUCTIONS
ASSESSMENT/PLAN:   Essential hypertension with goal blood pressure less than 130/80  (primary encounter diagnosis) Good control. Careful with diet and excercise at least 30 minutes 3-4 times a week.  Check blood pressures at different times on different

## 2019-02-07 NOTE — PROGRESS NOTES
HPI:    Patient ID: Dennise Lang is a 80year old female. HPI   2013 colonoscopy 3 benign Bx. and small hemmorrhoids. Hypertension  Patient is here for follow up of hypertension. BP at home: same. Has been compliant with medications. 02/06/2019 06:21 PM    AST 19 01/16/2019 07:44 PM    ALT 13 (L) 01/16/2019 07:44 PM    TSH 7.50 (H) 11/09/2018 09:32 AM    T4F 1.17 11/09/2018 09:32 AM        Lab Results   Component Value Date/Time    CHOLEST 122 04/03/2018 09:07 AM    HDL 49 04/03/2018 0 Disp: 50 strip Rfl: 6   Lancets Does not apply Misc Checks qam. Dx. E11.9.  Disp: 50 each Rfl: 6   insulin glargine (LANTUS SOLOSTAR) 100 UNIT/ML Subcutaneous Solution Pen-injector 35 units in AM and 8 units in PM. Disp: 30 mL Rfl: 1   HydrALAZINE HCl 100 M CONTOUR NEXT MONITOR) w/Device Does not apply Kit 1 kit by Does not apply route daily. Testing 3 times a day,  Diagnosis  E11.65 Disp: 1 kit Rfl: 0   GLUCOSAMINE CHONDROITIN COMPLX OR Take by mouth.  Disp:  Rfl:    Omega-3-acid Ethyl Esters (LOVAZA) 1 G Ora • Dementia Other    • No Known Problems Self    • No Known Problems Sister    • No Known Problems Daughter    • No Known Problems Maternal Grandmother    • No Known Problems Paternal Grandmother    • No Known Problems Maternal Aunt    • No Known Problems Soft. Bowel sounds are normal. She exhibits no distension and no mass. There is no tenderness. There is no rebound, no guarding and no CVA tenderness. Musculoskeletal: She exhibits no edema. Lymphadenopathy:     She has no cervical adenopathy.    Neurol Urine      Meds This Visit:  Requested Prescriptions      No prescriptions requested or ordered in this encounter       Imaging & Referrals:  JOHN SCREENING BILAT (CPT=[id number])        MR#[id number]

## 2019-02-07 NOTE — ED PROVIDER NOTES
Patient Seen in: [facility] Emergency Department    History   Patient presents with:  GI Bleeding (gastrointestinal)    Stated Complaint:     HPI    Patient presents to the emergency department today with diarrhea that started approximately 10 AM mg total) by mouth daily. Glucose Blood In Vitro Strip,  Checks qam, E11.9 with new meter and supplies needed for meter.    Lancets Does not apply Misc,  Checks qam. Dx. E11.9.   insulin glargine (LANTUS SOLOSTAR) 100 UNIT/ML Subcutaneous Solution Pen-inj (three) times daily as needed. take 1 tablet (25MG)  by oral route 3 times every day as needed   GLUCOSAMINE CHONDROITIN COMPLX OR,  Take by mouth. Omega-3-acid Ethyl Esters (LOVAZA) 1 G Oral Cap,  Take 1 g by mouth 2 (two) times daily with meals. 97.9 °F (36.6 °C) (Oral)   Resp 16   Ht 175.3 cm (5' 9\")   Wt 94.8 kg   SpO2 96%   BMI 30.86 kg/m²         Physical Exam  Constitutional:  Alert, well nourished adult lying in bed in no distress. Vital signs noted. Eye:  No scleral icterus.   Eyelids phil (*)     BUN 27 (*)     BUN/CREA Ratio 22.7 (*)     GFR, Non- 47 (*)     GFR, -American 55 (*)     All other components within normal limits   CBC W/ DIFFERENTIAL - Abnormal; Notable for the following components:    RBC 3.68 (*)     H file for this visit.     Follow-up:  MD Kiley Mohamud [facility] [id number] [phone]    In 1 day  For re-check      Medications Prescribed:  Current Discharge Medication List    START taking these medications    ondansetron 4 MG Oral

## 2019-02-11 NOTE — TELEPHONE ENCOUNTER
[facility] [phone] states that she faxed over today the benefits summary and prior authorization for to be completed and faxed to [facility].

## 2019-02-12 LAB
DIRECT COOMBS POLY: NEGATIVE
TREATCELL: 11

## 2019-02-13 NOTE — TELEPHONE ENCOUNTER
Alex GRESHAM denied - not on [employer]'s list of preferred drugs. Preferred drug for health plan: Cosentyx. Please advise.

## 2019-02-13 NOTE — TELEPHONE ENCOUNTER
Madi seeking additional information for [employer] PA:     \"Please explain in detail why each of the following lower cost formulary alternatives would be clinically ineffective or inappropriate for this member: Elfrieda [place] syringe and Cosentyx [place] syringe\".

## 2019-02-14 ENCOUNTER — TELEPHONE (OUTPATIENT)
Dept: RHEUMATOLOGY | Facility: CLINIC | Age: 69
End: 2019-02-14

## 2019-02-14 NOTE — TELEPHONE ENCOUNTER
FYI- Will await labs prior to sending in PA for Cosentyx - possible contraindication to methotrexate due to kidney function?

## 2019-02-14 NOTE — TELEPHONE ENCOUNTER
Medication PA Requested:            Cosentyx 150mg pen injector                   Pt insurance/number to contact: [employer] ([phone])  Insurance ID# and group: [id number]  Dx.: L40.50  Medications tried previously with date and SIG:   Pt authorization num

## 2019-02-17 ENCOUNTER — LAB ENCOUNTER (OUTPATIENT)
Dept: LAB | Facility: HOSPITAL | Age: 69
End: 2019-02-17
Attending: INTERNAL MEDICINE
Payer: MEDICARE

## 2019-02-17 DIAGNOSIS — E11.9 CONTROLLED TYPE 2 DIABETES MELLITUS WITHOUT COMPLICATION, WITHOUT LONG-TERM CURRENT USE OF INSULIN (HCC): Chronic | ICD-10-CM

## 2019-02-17 DIAGNOSIS — R76.0 ANTIPHOSPHOLIPID ANTIBODY POSITIVE: ICD-10-CM

## 2019-02-17 DIAGNOSIS — N18.30 CKD (CHRONIC KIDNEY DISEASE), STAGE III (HCC): ICD-10-CM

## 2019-02-17 LAB
ALBUMIN SERPL-MCNC: 3.1 G/DL (ref 3.4–5)
ALBUMIN/GLOB SERPL: 0.7 {RATIO} (ref 1–2)
ALP LIVER SERPL-CCNC: 77 U/L (ref 55–142)
ALT SERPL-CCNC: 15 U/L (ref 13–56)
ANION GAP SERPL CALC-SCNC: 8 MMOL/L (ref 0–18)
AST SERPL-CCNC: 9 U/L (ref 15–37)
BILIRUB SERPL-MCNC: 0.5 MG/DL (ref 0.1–2)
BILIRUB UR QL: NEGATIVE
BUN BLD-MCNC: 15 MG/DL (ref 7–18)
BUN/CREAT SERPL: 17 (ref 10–20)
CALCIUM BLD-MCNC: 8.4 MG/DL (ref 8.5–10.1)
CHLORIDE SERPL-SCNC: 101 MMOL/L (ref 98–107)
CHOLEST SMN-MCNC: 141 MG/DL (ref ?–200)
CO2 SERPL-SCNC: 27 MMOL/L (ref 21–32)
COLOR UR: YELLOW
CREAT BLD-MCNC: 0.88 MG/DL (ref 0.55–1.02)
CREAT UR-SCNC: 173 MG/DL
EST. AVERAGE GLUCOSE BLD GHB EST-MCNC: 120 MG/DL (ref 68–126)
GLOBULIN PLAS-MCNC: 4.3 G/DL (ref 2.8–4.4)
GLUCOSE BLD-MCNC: 141 MG/DL (ref 70–99)
GLUCOSE UR-MCNC: NEGATIVE MG/DL
HBA1C MFR BLD HPLC: 5.8 % (ref ?–5.7)
HDLC SERPL-MCNC: 50 MG/DL (ref 40–59)
KETONES UR-MCNC: NEGATIVE MG/DL
LDLC SERPL CALC-MCNC: 74 MG/DL (ref ?–100)
M PROTEIN MFR SERPL ELPH: 7.4 G/DL (ref 6.4–8.2)
MICROALBUMIN UR-MCNC: 11.8 MG/DL
MICROALBUMIN/CREAT 24H UR-RTO: 68.2 UG/MG (ref ?–30)
NITRITE UR QL STRIP.AUTO: NEGATIVE
NONHDLC SERPL-MCNC: 91 MG/DL (ref ?–130)
OSMOLALITY SERPL CALC.SUM OF ELEC: 285 MOSM/KG (ref 275–295)
PH UR: 5 [PH] (ref 5–8)
PHOSPHATE SERPL-MCNC: 3.3 MG/DL (ref 2.5–4.9)
POTASSIUM SERPL-SCNC: 3.9 MMOL/L (ref 3.5–5.1)
PROT UR-MCNC: 30 MG/DL
PROT UR-MCNC: 47.2 MG/DL
RBC #/AREA URNS AUTO: 10 /HPF
SODIUM SERPL-SCNC: 136 MMOL/L (ref 136–145)
SP GR UR STRIP: 1.02 (ref 1–1.03)
TRIGL SERPL-MCNC: 87 MG/DL (ref 30–149)
UROBILINOGEN UR STRIP-ACNC: <2
VIT C UR-MCNC: NEGATIVE MG/DL
WBC #/AREA URNS AUTO: 4 /HPF

## 2019-02-17 PROCEDURE — 84156 ASSAY OF PROTEIN URINE: CPT

## 2019-02-17 PROCEDURE — 80061 LIPID PANEL: CPT

## 2019-02-17 PROCEDURE — 83036 HEMOGLOBIN GLYCOSYLATED A1C: CPT

## 2019-02-17 PROCEDURE — 36415 COLL VENOUS BLD VENIPUNCTURE: CPT

## 2019-02-17 PROCEDURE — 84100 ASSAY OF PHOSPHORUS: CPT

## 2019-02-17 PROCEDURE — 82570 ASSAY OF URINE CREATININE: CPT

## 2019-02-17 PROCEDURE — 80053 COMPREHEN METABOLIC PANEL: CPT

## 2019-02-17 PROCEDURE — 81001 URINALYSIS AUTO W/SCOPE: CPT

## 2019-02-17 PROCEDURE — 82043 UR ALBUMIN QUANTITATIVE: CPT

## 2019-02-18 ENCOUNTER — PATIENT MESSAGE (OUTPATIENT)
Dept: RHEUMATOLOGY | Facility: CLINIC | Age: 69
End: 2019-02-18

## 2019-02-18 DIAGNOSIS — L40.50 PSORIATIC ARTHRITIS (HCC): Primary | ICD-10-CM

## 2019-02-19 ENCOUNTER — TELEPHONE (OUTPATIENT)
Dept: NEPHROLOGY | Facility: CLINIC | Age: 69
End: 2019-02-19

## 2019-02-19 DIAGNOSIS — R31.29 MICROSCOPIC HEMATURIA: Primary | ICD-10-CM

## 2019-02-19 NOTE — TELEPHONE ENCOUNTER
From: Sujatha Cassidy  To: Madhuri Matamoros MD  Sent: 2/18/2019 9:25 PM CST  Subject: Test Results Question    Dear dr. Caitlin Doyle,    Just dropping a quick message to give you the latest test results. ..       Urine test  Microalbumin 11.8mg/dl   Creatin

## 2019-02-19 NOTE — TELEPHONE ENCOUNTER
[address] please see mychart message from pt and advise. See also prior auth encounter documentation 2/14/19.

## 2019-02-20 NOTE — PROGRESS NOTES
Tried contacting pts daughter Barrie Ovalles twice today regarding treatment plan. First time it went voicemail and second there was no answer. Sent patient a my chart message regarding treatment plan.

## 2019-02-21 RX ORDER — METHOTREXATE 2.5 MG/1
10 TABLET ORAL WEEKLY
Qty: 80 TABLET | Refills: 0 | Status: SHIPPED | OUTPATIENT
Start: 2019-02-21 | End: 2019-03-20

## 2019-02-21 RX ORDER — FOLIC ACID 1 MG/1
1 TABLET ORAL DAILY
Qty: 90 TABLET | Refills: 0 | Status: SHIPPED | OUTPATIENT
Start: 2019-02-21 | End: 2019-03-20

## 2019-02-21 NOTE — PROGRESS NOTES
Sent daughter a message via [employer].  Wrote a prescription for methotrexate and folic acid, it was sen to her pharmacy today

## 2019-02-21 NOTE — TELEPHONE ENCOUNTER
PA process stopped due to pt will be started on methotrexate first. Please see 2/20 MyChart encounter.

## 2019-02-26 ENCOUNTER — APPOINTMENT (OUTPATIENT)
Dept: LAB | Facility: HOSPITAL | Age: 69
End: 2019-02-26
Attending: INTERNAL MEDICINE
Payer: MEDICARE

## 2019-02-26 DIAGNOSIS — R31.29 MICROSCOPIC HEMATURIA: ICD-10-CM

## 2019-02-26 LAB
BILIRUB UR QL: NEGATIVE
CLARITY UR: CLEAR
COLOR UR: YELLOW
GLUCOSE UR-MCNC: NEGATIVE MG/DL
KETONES UR-MCNC: NEGATIVE MG/DL
LEUKOCYTE ESTERASE UR QL STRIP.AUTO: NEGATIVE
NITRITE UR QL STRIP.AUTO: NEGATIVE
PH UR: 6 [PH] (ref 5–8)
PROT UR-MCNC: NEGATIVE MG/DL
RBC #/AREA URNS AUTO: <1 /HPF
SP GR UR STRIP: 1.01 (ref 1–1.03)
UROBILINOGEN UR STRIP-ACNC: <2
VIT C UR-MCNC: NEGATIVE MG/DL
WBC #/AREA URNS AUTO: <1 /HPF

## 2019-02-26 PROCEDURE — 81001 URINALYSIS AUTO W/SCOPE: CPT

## 2019-02-27 ENCOUNTER — TELEPHONE (OUTPATIENT)
Dept: RHEUMATOLOGY | Facility: CLINIC | Age: 69
End: 2019-02-27

## 2019-02-27 NOTE — TELEPHONE ENCOUNTER
Trev Rain would like to know if the doctor is going appeal for the xeljanx SR medication for the patient. No further information was given.

## 2019-02-27 NOTE — TELEPHONE ENCOUNTER
Pt was started on alternate therapy (methotrexate) per 2/20 MyChart encounter. [employer] representative Fam Miller contacted and updated.

## 2019-03-01 ENCOUNTER — PATIENT MESSAGE (OUTPATIENT)
Dept: RHEUMATOLOGY | Facility: CLINIC | Age: 69
End: 2019-03-01

## 2019-03-01 ENCOUNTER — OFFICE VISIT (OUTPATIENT)
Dept: NEPHROLOGY | Facility: CLINIC | Age: 69
End: 2019-03-01
Payer: MEDICARE

## 2019-03-01 VITALS
BODY MASS INDEX: 30.81 KG/M2 | TEMPERATURE: 99 F | WEIGHT: 208 LBS | DIASTOLIC BLOOD PRESSURE: 64 MMHG | HEIGHT: 69 IN | HEART RATE: 90 BPM | SYSTOLIC BLOOD PRESSURE: 125 MMHG

## 2019-03-01 DIAGNOSIS — R76.0 ANTIPHOSPHOLIPID ANTIBODY POSITIVE: Primary | ICD-10-CM

## 2019-03-01 PROCEDURE — G0463 HOSPITAL OUTPT CLINIC VISIT: HCPCS | Performed by: INTERNAL MEDICINE

## 2019-03-01 PROCEDURE — 99214 OFFICE O/P EST MOD 30 MIN: CPT | Performed by: INTERNAL MEDICINE

## 2019-03-01 RX ORDER — METHYLPREDNISOLONE 4 MG/1
TABLET ORAL
Qty: 1 PACKAGE | Refills: 0 | Status: SHIPPED | OUTPATIENT
Start: 2019-03-01 | End: 2019-04-18

## 2019-03-01 NOTE — PROGRESS NOTES
Progress Note:      Patient is a 80 yrs old [address] speaking female with pmh of multinodular goiter s/p thyroidectomy, HL, DM II x 25 yrs , HTN x 4 yrs, +ve antiphospholipid with popliteal DVT in Nov 2018, psoriatic arthritis who presented today for foll ARTHROPLASTY Right 10-4-16   • KNEE TOTAL REPLACEMENT Right 10/4/2016    Performed by Jesus Blum MD at [address] [place] [address] MAIN OR   • THYROIDECTOMY  08/17/15    TOTAL   • TUBAL LIGATION        Family History   Problem Relation Age of Onset   • Diabetes Mother    • In [place], E11.9 with new meter and supplies needed for meter. Disp: 50 strip Rfl: 6   Lancets Does not apply Misc Checks qam. Dx. E11.9.  Disp: 50 each Rfl: 6   insulin glargine (LANTUS SOLOSTAR) 100 UNIT/ML Subcutaneous Solution Pen-inject 50 PLUS OR) Take by mouth. Disp:  Rfl:    Blood Glucose Monitoring Suppl (BENNY CONTOUR NEXT MONITOR) w/Device Does not apply Kit 1 kit by Does not apply route daily.  Testing 3 times a day,  Diagnosis  E11.65 Disp: 1 kit Rfl: 0   Meclizine HCl 25 MG Oral T membranes are moist   Neck/Thyroid: neck is supple without adenopathy  Lymphatic: no abnormal cervical, supraclavicular adenopathy is noted  Respiratory:  lungs are clear to auscultation bilaterally  Cardiovascular: regular rate and rhythm   Abdomen: soft, encounter       Imaging & Referrals:  None     3/1/2019  Darshan Goins MD

## 2019-03-01 NOTE — TELEPHONE ENCOUNTER
From: Nel Aquino  To: Rafael Shaw MD  Sent: 3/1/2019 12:55 PM CST  Subject: Non-Urgent Medical Question    Dear Dr. Tejeda Later,    I'm writing this message to update you on my mother's condition after going on the methotrexate.  She got on it l

## 2019-03-06 ENCOUNTER — TELEPHONE (OUTPATIENT)
Dept: INTERNAL MEDICINE CLINIC | Facility: CLINIC | Age: 69
End: 2019-03-06

## 2019-03-06 ENCOUNTER — APPOINTMENT (OUTPATIENT)
Dept: LAB | Facility: HOSPITAL | Age: 69
End: 2019-03-06
Attending: INTERNAL MEDICINE
Payer: MEDICARE

## 2019-03-06 DIAGNOSIS — I82.532 CHRONIC DEEP VEIN THROMBOSIS (DVT) OF LEFT POPLITEAL VEIN (HCC): ICD-10-CM

## 2019-03-06 DIAGNOSIS — I82.532 CHRONIC DEEP VEIN THROMBOSIS (DVT) OF LEFT POPLITEAL VEIN (HCC): Primary | ICD-10-CM

## 2019-03-06 PROCEDURE — 86147 CARDIOLIPIN ANTIBODY EA IG: CPT

## 2019-03-06 PROCEDURE — 36415 COLL VENOUS BLD VENIPUNCTURE: CPT

## 2019-03-06 PROCEDURE — 86146 BETA-2 GLYCOPROTEIN ANTIBODY: CPT

## 2019-03-06 RX ORDER — GLIPIZIDE 10 MG/1
TABLET ORAL
Qty: 90 TABLET | Refills: 0 | Status: SHIPPED | OUTPATIENT
Start: 2019-03-06 | End: 2019-06-07

## 2019-03-07 LAB
CARDIOLIPIN IGG SERPL-ACNC: 3.9 GPL (ref 0–14.9)
CARDIOLIPIN IGM SERPL-ACNC: 88.7 MPL (ref 0–12.4)

## 2019-03-08 LAB
BETA 2 GLYCOPROTEIN 1 AB, IGG: 9.9 U/ML (ref ?–15)
BETA 2 GLYCOPROTEIN 1 AB, IGM: 152.8 U/ML (ref ?–15)

## 2019-03-13 RX ORDER — CANDESARTAN CILEXETIL AND HYDROCHLOROTHIAZIDE 32; 12.5 MG/1; MG/1
1 TABLET ORAL
Qty: 90 TABLET | Refills: 0 | OUTPATIENT
Start: 2019-03-13

## 2019-03-16 ENCOUNTER — LAB ENCOUNTER (OUTPATIENT)
Dept: LAB | Facility: HOSPITAL | Age: 69
End: 2019-03-16
Attending: INTERNAL MEDICINE
Payer: MEDICARE

## 2019-03-16 DIAGNOSIS — I82.532 CHRONIC DEEP VEIN THROMBOSIS (DVT) OF LEFT POPLITEAL VEIN (HCC): ICD-10-CM

## 2019-03-16 DIAGNOSIS — L40.50 PSORIATIC ARTHRITIS (HCC): ICD-10-CM

## 2019-03-16 DIAGNOSIS — D68.61 ANTIPHOSPHOLIPID SYNDROME (HCC): ICD-10-CM

## 2019-03-16 LAB
ALBUMIN SERPL-MCNC: 3.4 G/DL (ref 3.4–5)
ALBUMIN/GLOB SERPL: 0.8 {RATIO} (ref 1–2)
ALP LIVER SERPL-CCNC: 66 U/L (ref 55–142)
ALT SERPL-CCNC: 20 U/L (ref 13–56)
ANION GAP SERPL CALC-SCNC: 9 MMOL/L (ref 0–18)
AST SERPL-CCNC: 23 U/L (ref 15–37)
BASOPHILS # BLD AUTO: 0.03 X10(3) UL (ref 0–0.2)
BASOPHILS NFR BLD AUTO: 0.5 %
BILIRUB SERPL-MCNC: 0.5 MG/DL (ref 0.1–2)
BUN BLD-MCNC: 16 MG/DL (ref 7–18)
BUN/CREAT SERPL: 19.5 (ref 10–20)
CALCIUM BLD-MCNC: 9 MG/DL (ref 8.5–10.1)
CHLORIDE SERPL-SCNC: 102 MMOL/L (ref 98–107)
CO2 SERPL-SCNC: 28 MMOL/L (ref 21–32)
CREAT BLD-MCNC: 0.82 MG/DL (ref 0.55–1.02)
CRP SERPL-MCNC: 1.69 MG/DL (ref ?–0.3)
DEPRECATED RDW RBC AUTO: 47.5 FL (ref 35.1–46.3)
EOSINOPHIL # BLD AUTO: 0.16 X10(3) UL (ref 0–0.7)
EOSINOPHIL NFR BLD AUTO: 2.8 %
ERYTHROCYTE [DISTWIDTH] IN BLOOD BY AUTOMATED COUNT: 14.7 % (ref 11–15)
ERYTHROCYTE [SEDIMENTATION RATE] IN BLOOD: 57 MM/HR (ref 0–30)
GLOBULIN PLAS-MCNC: 4.2 G/DL (ref 2.8–4.4)
GLUCOSE BLD-MCNC: 94 MG/DL (ref 70–99)
HCT VFR BLD AUTO: 35.4 % (ref 35–48)
HGB BLD-MCNC: 11 G/DL (ref 12–16)
IMM GRANULOCYTES # BLD AUTO: 0.02 X10(3) UL (ref 0–1)
IMM GRANULOCYTES NFR BLD: 0.3 %
LYMPHOCYTES # BLD AUTO: 1.26 X10(3) UL (ref 1–4)
LYMPHOCYTES NFR BLD AUTO: 21.8 %
M PROTEIN MFR SERPL ELPH: 7.6 G/DL (ref 6.4–8.2)
MCH RBC QN AUTO: 28 PG (ref 26–34)
MCHC RBC AUTO-ENTMCNC: 31.1 G/DL (ref 31–37)
MCV RBC AUTO: 90.1 FL (ref 80–100)
MONOCYTES # BLD AUTO: 0.51 X10(3) UL (ref 0.1–1)
MONOCYTES NFR BLD AUTO: 8.8 %
NEUTROPHILS # BLD AUTO: 3.79 X10 (3) UL (ref 1.5–7.7)
NEUTROPHILS # BLD AUTO: 3.79 X10(3) UL (ref 1.5–7.7)
NEUTROPHILS NFR BLD AUTO: 65.8 %
OSMOLALITY SERPL CALC.SUM OF ELEC: 289 MOSM/KG (ref 275–295)
PLATELET # BLD AUTO: 338 10(3)UL (ref 150–450)
POTASSIUM SERPL-SCNC: 4.2 MMOL/L (ref 3.5–5.1)
RBC # BLD AUTO: 3.93 X10(6)UL (ref 3.8–5.3)
SODIUM SERPL-SCNC: 139 MMOL/L (ref 136–145)
WBC # BLD AUTO: 5.8 X10(3) UL (ref 4–11)

## 2019-03-16 PROCEDURE — 85390 FIBRINOLYSINS SCREEN I&R: CPT

## 2019-03-16 PROCEDURE — 86140 C-REACTIVE PROTEIN: CPT

## 2019-03-16 PROCEDURE — 86146 BETA-2 GLYCOPROTEIN ANTIBODY: CPT

## 2019-03-16 PROCEDURE — 85610 PROTHROMBIN TIME: CPT

## 2019-03-16 PROCEDURE — 80053 COMPREHEN METABOLIC PANEL: CPT

## 2019-03-16 PROCEDURE — 36415 COLL VENOUS BLD VENIPUNCTURE: CPT

## 2019-03-16 PROCEDURE — 86147 CARDIOLIPIN ANTIBODY EA IG: CPT

## 2019-03-16 PROCEDURE — 85730 THROMBOPLASTIN TIME PARTIAL: CPT

## 2019-03-16 PROCEDURE — 85652 RBC SED RATE AUTOMATED: CPT

## 2019-03-16 PROCEDURE — 85025 COMPLETE CBC W/AUTO DIFF WBC: CPT

## 2019-03-16 PROCEDURE — 85613 RUSSELL VIPER VENOM DILUTED: CPT

## 2019-03-16 PROCEDURE — 85598 HEXAGNAL PHOSPH PLTLT NEUTRL: CPT

## 2019-03-18 LAB
APTT PPP: 30.3 SECONDS (ref 23.2–35.3)
CONFIRM APTT STACLOT: POSITIVE
CONFIRM DRVVT: 1.4 S (ref 0–1.1)
CONFIRM DRVVT: 1.4 S (ref 0–1.1)
PROTHROMBIN TIME: 13 SECONDS (ref 11.8–14.5)
SCREEN DRVVT: 1.4 S (ref 0–1.1)

## 2019-03-19 ENCOUNTER — OFFICE VISIT (OUTPATIENT)
Dept: HEMATOLOGY/ONCOLOGY | Facility: HOSPITAL | Age: 69
End: 2019-03-19
Attending: INTERNAL MEDICINE
Payer: MEDICARE

## 2019-03-19 VITALS
HEIGHT: 69 IN | BODY MASS INDEX: 30.66 KG/M2 | HEART RATE: 96 BPM | RESPIRATION RATE: 18 BRPM | WEIGHT: 207 LBS | SYSTOLIC BLOOD PRESSURE: 150 MMHG | TEMPERATURE: 99 F | DIASTOLIC BLOOD PRESSURE: 58 MMHG

## 2019-03-19 DIAGNOSIS — D68.61 ANTIPHOSPHOLIPID SYNDROME (HCC): Primary | ICD-10-CM

## 2019-03-19 DIAGNOSIS — R76.0 ANTIPHOSPHOLIPID ANTIBODY POSITIVE: ICD-10-CM

## 2019-03-19 LAB
BETA 2 GLYCOPROTEIN 1 AB, IGG: 5 U/ML (ref ?–15)
BETA 2 GLYCOPROTEIN 1 AB, IGM: 112.3 U/ML (ref ?–15)

## 2019-03-19 PROCEDURE — 99214 OFFICE O/P EST MOD 30 MIN: CPT | Performed by: INTERNAL MEDICINE

## 2019-03-19 RX ORDER — WARFARIN SODIUM 5 MG/1
5 TABLET ORAL NIGHTLY
Qty: 30 TABLET | Refills: 0 | Status: SHIPPED | OUTPATIENT
Start: 2019-03-19 | End: 2019-04-08

## 2019-03-19 RX ORDER — ENOXAPARIN SODIUM 150 MG/ML
1 INJECTION SUBCUTANEOUS EVERY 12 HOURS
Qty: 10 SYRINGE | Refills: 0 | Status: SHIPPED | OUTPATIENT
Start: 2019-03-19 | End: 2019-03-28

## 2019-03-19 NOTE — PATIENT INSTRUCTIONS
Injectarse 0.63 ml a las 8PM y 8 AM    Jackelin coumadina a las 10 PM    Yary con clinica de anticoagulacion el viernes para revisar el INR, debe de [employer] 2-3. Enoxaparin injection  Brand Name: Lovenox  What is this medicine?   ENOXAPARIN (ee nox a PA care professional if they continue or are bothersome):  · pain, redness, or irritation at site where injected  What may interact with this medicine?     · aspirin and aspirin-like medicines  · certain medicines that treat or prevent blood clots  · dipyridam that your condition has gotten worse. If you are going to have surgery, tell your doctor or health care professional that you are taking this medicine. Do not stop taking this medicine without first talking to your doctor.  Be sure to refill your prescrip If you are told to stop taking your medicine until your next blood test, call your doctor or healthcare professional if you do not hear anything within 24 hours of the test to find out your new dose or when to restart your prior dose.   A special MedGuide w infections  · azathioprine  · barbiturate medicines for inducing sleep or treating seizures  · certain medicines for diabetes  · certain medicines for heart rhythm problems  · certain medicines for hepatitis C virus infections like daclatasvir, dasabuvir; disorders  · cancer  · diabetes  · heart disease  · high blood pressure  · history of bleeding in the gastrointestinal tract  · history of stroke or other brain injury or disease  · kidney or liver disease  · protein C deficiency  · protein S deficiency  · to eat a consistent amount of foods with vitamin K.  Other foods with vitamin K that you should eat in consistent amounts are asparagus, basil, black eyed peas, broccoli, brussel sprouts, cabbage, green onions, green tea, parsley, green leafy vegetables lik clots that may cause serious health problems. These problems include heart attack (myocardial infarction), stroke, a blockage in an artery or vein (thrombus), or a blood clot that travels to the lungs (pulmonary embolism).     Before you start taking this m taking warfarin for quite a while, carry an ID card or get a Medic-Alert bracelet. This will alert medical staff in case you aren’t able to do so yourself. Also carry with you the name and number of the person to contact in case of an emergency.   · Keep yo as soon as you get your lab results.      What to do at home  1. Adjust your warfarin dose as directed by your healthcare provider, ____________________ (name of healthcare provider).   2. Have your blood clotting test done every _______ days at ___________ them you eat steady (about the same day to day). If you change your diet for any reason, such as for illness or to lose weight, tell your healthcare provider.   · Foods that have vitamin K include asparagus, avocado, broccoli, cabbage, kale, spinach, and so swallowing or breathing  [NOTE: This information topic may not include all directions, precautions, medical conditions, medicine/food interactions, and warnings for this medicine.  Check with your doctor, nurse, or pharmacist for any questions that you may

## 2019-03-19 NOTE — PROGRESS NOTES
JENN Oneil Husbands is a 80year old female here for evaluation of Antiphospholipid syndrome (hcc)  (primary encounter diagnosis)  Antiphospholipid antibody positive    Patient here for f/u for testing for anti phospholipid antibodies r 1 Package Rfl: 0   ondansetron 4 MG Oral Tablet Dispersible Take 1 tablet (4 mg total) by mouth every 8 (eight) hours as needed for Nausea. Disp: 20 tablet Rfl: 0   losartan 100 MG Oral Tab Take 1 tablet (100 mg total) by mouth daily.  Disp: 90 tablet Rfl: E11.65 Disp: 200 strip Rfl: 1   BENNY MICROLET LANCETS Does not apply Misc Testing 3 times a day  Diagnosis E11.65 Disp: 300 each Rfl: 2   Multiple Vitamins-Minerals (ONE-A-DAY WOMENS 50 PLUS OR) Take by mouth.  Disp:  Rfl:    Blood Glucose Monitoring Suppl strain: Not on file      Food insecurity:        Worry: Not on file        Inability: Not on file      Transportation needs:        Medical: Not on file        Non-medical: Not on file    Tobacco Use      Smoking status: Never Smoker      Smokeless tobacco Brother    • Lipids Brother    • Hypertension Brother    • Diabetes Paternal Uncle    • Heart Disease Father         CAD   • Diabetes Other    • Dementia Other    • No Known Problems Self    • No Known Problems Sister    • No Known Problems Daughter    • N antibody IgM and [id number] IgM positive.      Discussed with the patient that she meets modified [place] criteria for antiphospholipid syndrome based on having one episode of DVT, as well as per laboratory criteria with positive lupus anticoagulant on Negative Positive (A)    DRVVT Ratio      0.0 - 1.1 1.4 (H)    DRVV Mix Ratio      0.0 - 1.1 1.4 (H)    DRVV Confirm      0.0 - 1.1 1.4 (H)    BETA 2 GLYCOPROTEIN 1 AB, IgM      <=15.0 U/mL 112.3 (H) 152.8 (H)   BETA 2 GLYCOPROTEIN 1 AB, IgG      <=15.0 U/

## 2019-03-20 ENCOUNTER — OFFICE VISIT (OUTPATIENT)
Dept: RHEUMATOLOGY | Facility: CLINIC | Age: 69
End: 2019-03-20
Payer: MEDICARE

## 2019-03-20 ENCOUNTER — TELEPHONE (OUTPATIENT)
Dept: INTERNAL MEDICINE CLINIC | Facility: CLINIC | Age: 69
End: 2019-03-20

## 2019-03-20 VITALS
SYSTOLIC BLOOD PRESSURE: 144 MMHG | HEART RATE: 87 BPM | BODY MASS INDEX: 31 KG/M2 | DIASTOLIC BLOOD PRESSURE: 71 MMHG | WEIGHT: 207 LBS

## 2019-03-20 DIAGNOSIS — L40.9 PSORIASIS: ICD-10-CM

## 2019-03-20 DIAGNOSIS — L40.50 PSORIATIC ARTHRITIS (HCC): Primary | ICD-10-CM

## 2019-03-20 DIAGNOSIS — R76.0 ANTIPHOSPHOLIPID ANTIBODY POSITIVE: Primary | ICD-10-CM

## 2019-03-20 DIAGNOSIS — D68.61 ANTIPHOSPHOLIPID SYNDROME (HCC): ICD-10-CM

## 2019-03-20 DIAGNOSIS — M25.50 POLYARTHRALGIA: ICD-10-CM

## 2019-03-20 DIAGNOSIS — Z51.81 MEDICATION MONITORING ENCOUNTER: ICD-10-CM

## 2019-03-20 PROCEDURE — 99214 OFFICE O/P EST MOD 30 MIN: CPT | Performed by: INTERNAL MEDICINE

## 2019-03-20 PROCEDURE — G0463 HOSPITAL OUTPT CLINIC VISIT: HCPCS | Performed by: INTERNAL MEDICINE

## 2019-03-20 RX ORDER — ONDANSETRON 4 MG/1
4 TABLET, FILM COATED ORAL EVERY 8 HOURS PRN
Qty: 30 TABLET | Refills: 0 | Status: SHIPPED | OUTPATIENT
Start: 2019-03-20 | End: 2021-09-23 | Stop reason: ALTCHOICE

## 2019-03-20 RX ORDER — FOLIC ACID 1 MG/1
1 TABLET ORAL DAILY
Qty: 90 TABLET | Refills: 0 | Status: SHIPPED | OUTPATIENT
Start: 2019-03-20 | End: 2019-07-17

## 2019-03-20 RX ORDER — METHOTREXATE 2.5 MG/1
20 TABLET ORAL WEEKLY
Qty: 80 TABLET | Refills: 0 | Status: SHIPPED | OUTPATIENT
Start: 2019-03-20 | End: 2019-06-07

## 2019-03-20 NOTE — TELEPHONE ENCOUNTER
To Dr. Perez Pastures today. Recheck on antiphospholipid is positive. She is a high chance of having a clot. Because of the high chance she needs to be on lifelong anticoagulation with Coumadin. She is now started on Lovenox and Coumadin.   Will need an INR in the

## 2019-03-20 NOTE — PATIENT INSTRUCTIONS
- you were seen today for psoriatic arthritis  - plan to continue methotrexate 6 pills weekly for 1 more week, if she continues to have pain and swelling increase to 8 pills weekly but split the dose in half (4 in am and 4 in pm)  - would recommend for the

## 2019-03-20 NOTE — PROGRESS NOTES
Mirna Nunes is a 80year old female. HPI:   Patient presents with: Follow - Up: Pt states she still feels discomfort, nausea, itchyness.   Other: Pt states methotrexate is not helping      I had the pleasure of seeing Roz Briscoe that the pruritus has resolved. She was also seen by hematology to evaluate for APL.  Repeat testing 12 weeks apart confirmed the diagnosis of APL and she will be started on Lovenox and coumadin    She was started on methotrexate approxi-1 month ago, miguel UNIT/ML Subcutaneous Solution Pen-injector 35 units in AM and 12 units in PM. Disp: 30 mL Rfl: 0   Tofacitinib Citrate (XELJANZ XR) 11 MG Oral Tablet 24 Hr Take 11 mg by mouth daily.  Disp: 30 tablet Rfl: 0   methylPREDNISolone 4 MG Oral Tablet Therapy Pack ULTRAFINE) 29G X 12.7MM Does not apply Misc Dx. E11.9. Checks q12hrs. BD ultrafine MINI.  Disp: 100 each Rfl: 6   Glucose Blood (BENNY CONTOUR NEXT TEST) In Vitro Strip TEST 2 times  A DAY Dx: Uncontrolled type 2 diabetes mellitus without complication, with ROM, flexion 110 degrees, TTP in anterior region, L shoulder not pain, FROM  Hip: normal log roll, no lateral hip pain, MULUGETA test negative b/l  Knees: FROM, no warmth or effusion present. No pain with ROM.    Ankles: FROM, no pain or swelling or warmth on BUN      7 - 18 mg/dL 16   CREATININE      0.55 - 1.02 mg/dL 0.82   BUN/CREAT Ratio      10.0 - 20.0 19.5   CALCIUM      8.5 - 10.1 mg/dL 9.0   CALCULATED OSMOLALITY      275 - 295 mOsm/kg 289   GFR, Non-      >=60 74   GFR, -Ameri 1.2 (H)   DRVV Mix Ratio      0.0 - 1.1 1.3 (H)   DRVV Confirm      0.0 - 1.1 1.2 (H)   BETA 2 GLYCOPROTEIN 1 AB, IgM      <=15.0 U/mL 66.8 (H)   BETA 2 GLYCOPROTEIN 1 AB, IgG      <=15.0 U/mL 6.3   Cardiolipin IgG Antibody      0.0 - 14.9 GPL 6.2   Cardio peroneal veins with mild partial flow at the mid aspect. Otherwise, no evidence of left lower extremity deep venous thrombosis. 2. Unremarkable right lower extremity study. No evidence of right-sided DVT.     ASSESSMENT/PLAN:   80year-old Turkish-speaking to start.   - If her symptoms do not improve by next visit may consider switching to [facility] or starting Cosentyx    APL  - She was found to have an incidental left DVT, ultrasound was done for screening of varicose veins  - APL Abs were positive (tripple +)

## 2019-03-21 LAB
CARDIOLIPIN IGG SERPL-ACNC: 3.8 GPL (ref 0–14.9)
CARDIOLIPIN IGM SERPL-ACNC: 74 MPL (ref 0–12.4)

## 2019-03-21 RX ORDER — ENOXAPARIN SODIUM 150 MG/ML
1 INJECTION SUBCUTANEOUS EVERY 12 HOURS
Qty: 10 ML | Refills: 0 | OUTPATIENT
Start: 2019-03-21

## 2019-03-22 ENCOUNTER — TELEPHONE (OUTPATIENT)
Dept: INTERNAL MEDICINE CLINIC | Facility: CLINIC | Age: 69
End: 2019-03-22

## 2019-03-22 ENCOUNTER — ANTI-COAG VISIT (OUTPATIENT)
Dept: INTERNAL MEDICINE CLINIC | Facility: CLINIC | Age: 69
End: 2019-03-22
Payer: MEDICARE

## 2019-03-22 DIAGNOSIS — D68.61 ANTIPHOSPHOLIPID SYNDROME (HCC): ICD-10-CM

## 2019-03-22 DIAGNOSIS — E11.9 CONTROLLED TYPE 2 DIABETES MELLITUS WITHOUT COMPLICATION, WITHOUT LONG-TERM CURRENT USE OF INSULIN (HCC): Chronic | ICD-10-CM

## 2019-03-22 DIAGNOSIS — I82.532 CHRONIC DEEP VEIN THROMBOSIS (DVT) OF LEFT POPLITEAL VEIN (HCC): Primary | ICD-10-CM

## 2019-03-22 LAB
INR: 1 (ref 0.8–1.2)
TEST STRIP LOT #: NORMAL NUMERIC

## 2019-03-22 PROCEDURE — 93793 ANTICOAG MGMT PT WARFARIN: CPT | Performed by: INTERNAL MEDICINE

## 2019-03-22 PROCEDURE — 85610 PROTHROMBIN TIME: CPT | Performed by: INTERNAL MEDICINE

## 2019-03-22 PROCEDURE — 36416 COLLJ CAPILLARY BLOOD SPEC: CPT | Performed by: INTERNAL MEDICINE

## 2019-03-22 NOTE — TELEPHONE ENCOUNTER
Patient called asking for an order for an INR machine to do checking at home. Also wants a new order for her glucometer strips.  States she checks her sugar BID but her test strips that she picks up from the pharmacy keep saying to check once daily, there

## 2019-03-25 ENCOUNTER — TELEPHONE (OUTPATIENT)
Dept: INTERNAL MEDICINE CLINIC | Facility: CLINIC | Age: 69
End: 2019-03-25

## 2019-03-25 DIAGNOSIS — D68.61 ANTIPHOSPHOLIPID SYNDROME (HCC): ICD-10-CM

## 2019-03-25 NOTE — TELEPHONE ENCOUNTER
Zak Lopes  ID [id number] [facility]    Patient has been taking Coumadin 5 mg a day since 3/19/19   And Lovenox twice a day since 3/19  She is out of Lovenox and the insurance told her they will not cover further refills would cost her over $300

## 2019-03-25 NOTE — TELEPHONE ENCOUNTER
See orders placed this encounter  We will have to call the MD INR company and fax some stuff over. To find out what information that the knee with the knee the patient's insurance and they will authorized with insurance and get the machine. Covered.

## 2019-03-25 NOTE — TELEPHONE ENCOUNTER
Patient was here on Friday for INR never received instruction she finished all injection today when is her next time to do an INR

## 2019-03-25 NOTE — TELEPHONE ENCOUNTER
Get dosing of Coumadin that she is on she does need to increase the dose we can give her new sheet. Always always if I am not here someone needs to be notified of INR that is a more urgent. Especially for her.

## 2019-03-26 ENCOUNTER — MED REC SCAN ONLY (OUTPATIENT)
Dept: INTERNAL MEDICINE CLINIC | Facility: CLINIC | Age: 69
End: 2019-03-26

## 2019-03-26 NOTE — TELEPHONE ENCOUNTER
Spoke with pt and instructed her to take 7.5 mg today, 5 mg tomorrow and 5 mg Wednesday, pt was informed to come back on Thursday for INR check, pt was also instructed that Dr. Yehuda Davsi wants her to continue on the Lovenox injections until further notice, Pe

## 2019-03-28 ENCOUNTER — ANTI-COAG VISIT (OUTPATIENT)
Dept: INTERNAL MEDICINE CLINIC | Facility: CLINIC | Age: 69
End: 2019-03-28
Payer: MEDICARE

## 2019-03-28 DIAGNOSIS — D68.61 ANTIPHOSPHOLIPID SYNDROME (HCC): ICD-10-CM

## 2019-03-28 LAB
INR: 1.5 (ref 0.8–1.2)
TEST STRIP LOT #: ABNORMAL NUMERIC

## 2019-03-28 PROCEDURE — 36416 COLLJ CAPILLARY BLOOD SPEC: CPT | Performed by: INTERNAL MEDICINE

## 2019-03-28 PROCEDURE — 85610 PROTHROMBIN TIME: CPT | Performed by: INTERNAL MEDICINE

## 2019-03-28 PROCEDURE — 93793 ANTICOAG MGMT PT WARFARIN: CPT | Performed by: INTERNAL MEDICINE

## 2019-03-28 RX ORDER — ENOXAPARIN SODIUM 150 MG/ML
1 INJECTION SUBCUTANEOUS EVERY 12 HOURS
Qty: 10 SYRINGE | Refills: 0 | Status: SHIPPED | OUTPATIENT
Start: 2019-03-28 | End: 2019-04-02

## 2019-03-28 NOTE — PROGRESS NOTES
EM ANTICOAG TREND 3/28/2019   LAST INR DATE  3/28/2019   ANTICOAG FULL INSTRUCTIONS 7.5 mg every Mon, Thu; 5 mg all other days   LAST 7 DAY DOSE TOTAL  37.5 mg   RETURN DATE  4/4/2019   TARGET END DATE  Indefinite   NEXT 7 DAY DOSE TOTAL  40 mg   DAILY DOS

## 2019-03-28 NOTE — PROGRESS NOTES
Verbally confirmed with Dr Braden Marrufo. Patient to continue injections until her INR result comes out >2.0 for 2 consecutive checkings. Patient states understanding. Will be needing refill as she only has 5 syringes left.    Scheduled patient for 4/4/19 fo

## 2019-03-28 NOTE — PROGRESS NOTES
Patient called back. Informed patient to take 7.5mg every Monday and Thursday. 5mg rest of the other days. Patients states understanding, however, would like to know if she is to continue taking Enoxaparin injections?

## 2019-03-28 NOTE — TELEPHONE ENCOUNTER
Patient state she only has 5 syringes left of the Enoxaparin. Wondering if she will get a refill for this?

## 2019-04-01 ENCOUNTER — TELEPHONE (OUTPATIENT)
Dept: INTERNAL MEDICINE CLINIC | Facility: CLINIC | Age: 69
End: 2019-04-01

## 2019-04-01 DIAGNOSIS — E11.9 CONTROLLED TYPE 2 DIABETES MELLITUS WITHOUT COMPLICATION, WITHOUT LONG-TERM CURRENT USE OF INSULIN (HCC): Chronic | ICD-10-CM

## 2019-04-01 RX ORDER — LANCETS 30 GAUGE
EACH MISCELLANEOUS
Qty: 50 EACH | Refills: 6 | Status: SHIPPED | OUTPATIENT
Start: 2019-04-01 | End: 2019-04-11

## 2019-04-02 RX ORDER — ENOXAPARIN SODIUM 150 MG/ML
INJECTION SUBCUTANEOUS
Qty: 10 ML | Refills: 0 | Status: SHIPPED | OUTPATIENT
Start: 2019-04-02 | End: 2019-04-08

## 2019-04-04 ENCOUNTER — ANTI-COAG VISIT (OUTPATIENT)
Dept: INTERNAL MEDICINE CLINIC | Facility: CLINIC | Age: 69
End: 2019-04-04
Payer: MEDICARE

## 2019-04-04 DIAGNOSIS — D68.61 ANTIPHOSPHOLIPID SYNDROME (HCC): ICD-10-CM

## 2019-04-04 PROCEDURE — 93793 ANTICOAG MGMT PT WARFARIN: CPT | Performed by: INTERNAL MEDICINE

## 2019-04-04 PROCEDURE — 36416 COLLJ CAPILLARY BLOOD SPEC: CPT | Performed by: INTERNAL MEDICINE

## 2019-04-04 PROCEDURE — 85610 PROTHROMBIN TIME: CPT | Performed by: INTERNAL MEDICINE

## 2019-04-04 NOTE — PROGRESS NOTES
Per patient she informed me that she is only injecting herself between (Enoxaparin) 0.3-0.4mL Q12hrs as these were instructions given to the daughter by the pharmacist. Pharmacist had told the daughter that patient should NOT be injecting herself the whole

## 2019-04-04 NOTE — PROGRESS NOTES
Called daughter, no answer. Left VM to give us a call back to discuss her medications dosaging. Jarvis Mas

## 2019-04-04 NOTE — PROGRESS NOTES
Spoke to the patient to inform her of medications instructions and INR orders. Patients daughter states understanding by repeating to me the instructions for the warfarin and the Enoxaparin, denies having any further questions.   Already scheduled her to

## 2019-04-08 DIAGNOSIS — L40.50 PSORIATIC ARTHRITIS (HCC): ICD-10-CM

## 2019-04-08 RX ORDER — WARFARIN SODIUM 5 MG/1
5 TABLET ORAL NIGHTLY
Qty: 30 TABLET | Refills: 0 | Status: CANCELLED | OUTPATIENT
Start: 2019-04-08

## 2019-04-08 RX ORDER — METHOTREXATE 2.5 MG/1
TABLET ORAL
Qty: 48 TABLET | Refills: 0 | OUTPATIENT
Start: 2019-04-08

## 2019-04-08 RX ORDER — ENOXAPARIN SODIUM 150 MG/ML
INJECTION SUBCUTANEOUS
Qty: 10 ML | Refills: 0 | Status: SHIPPED | OUTPATIENT
Start: 2019-04-08 | End: 2019-04-15

## 2019-04-08 RX ORDER — WARFARIN SODIUM 5 MG/1
5 TABLET ORAL NIGHTLY
Qty: 30 TABLET | Refills: 5 | Status: SHIPPED | OUTPATIENT
Start: 2019-04-08 | End: 2019-04-30

## 2019-04-08 RX ORDER — WARFARIN SODIUM 5 MG/1
5 TABLET ORAL NIGHTLY
Qty: 30 TABLET | Refills: 0 | Status: SHIPPED | OUTPATIENT
Start: 2019-04-08 | End: 2019-04-08

## 2019-04-08 NOTE — TELEPHONE ENCOUNTER
Needs refills    Current Outpatient Medications:   ••  Warfarin Sodium 5 MG Oral Tab, Take 1 tablet (5 mg total) by mouth nightly.  Then as instructed by the coumadin clinic., Disp: 30 tablet, Rfl: 0  •   •

## 2019-04-08 NOTE — TELEPHONE ENCOUNTER
Review pended refill request as it does not fall under a protocol.     Last Rx:3/19/19  LOV: 3/20/19

## 2019-04-08 NOTE — TELEPHONE ENCOUNTER
Requested medication: METHOTREXATE 2.5 MG Oral Tab  Last refilled: 3/20/19 #80 tab with 0 refills  LOV: 3/20/19  Future Appointments   Date Time Provider Zac Choi   4/12/2019 11:30 AM [facility] [address] [facility] [place] 429   4/18/2019 11:30 AM Roise Meigs., MD [id number] [facility] [id number]   6/6/2019  5:00 PM Carolyn Masters MD [facility] [place] [facility]   7/17/2019  6:30 PM Sweetie Macias MD [facility] access hospital       Called pharmacy and spoke with [place] regarding this refill is too soon. Per insurance pt is able to be dispensed a certain amount of tablets at a time. 48 tabs were dispensed, 32 tabs remaining. Okay to disregard refill.

## 2019-04-08 NOTE — TELEPHONE ENCOUNTER
Current Outpatient Medications:   •  ENOXAPARIN SODIUM 150 MG/ML Subcutaneous Solution, Inject 0.63 mL  into the skin every 12 hours. , Disp: 10 mL, Rfl: 0    •  Warfarin Sodium 5 MG Oral Tab, Take 1 tablet (5 mg total) by mouth nightly.  Then as instructe

## 2019-04-09 NOTE — TELEPHONE ENCOUNTER
Third time explaining to patient that she should continue on it with until her INR becomes therapeutic which is 2 and 3. She was not I am therapeutic with last visit.   So this was explained 3 times in Kazakh to the patient

## 2019-04-11 ENCOUNTER — TELEPHONE (OUTPATIENT)
Dept: INTERNAL MEDICINE CLINIC | Facility: CLINIC | Age: 69
End: 2019-04-11

## 2019-04-11 DIAGNOSIS — E11.9 CONTROLLED TYPE 2 DIABETES MELLITUS WITHOUT COMPLICATION, WITHOUT LONG-TERM CURRENT USE OF INSULIN (HCC): Chronic | ICD-10-CM

## 2019-04-11 RX ORDER — LANCETS 30 GAUGE
EACH MISCELLANEOUS
Qty: 100 EACH | Refills: 6 | Status: SHIPPED | OUTPATIENT
Start: 2019-04-11

## 2019-04-11 NOTE — TELEPHONE ENCOUNTER
Madi stated that lancets and test strips SIG aren't the same. Lancets says to check qam and test strips to check q12hrs. Please clarify every how often do you want the patient to check her glucose levels.      Madi- P: [phone]   Order#: [id number]

## 2019-04-12 ENCOUNTER — ANTI-COAG VISIT (OUTPATIENT)
Dept: INTERNAL MEDICINE CLINIC | Facility: CLINIC | Age: 69
End: 2019-04-12
Payer: MEDICARE

## 2019-04-12 DIAGNOSIS — D68.61 ANTIPHOSPHOLIPID SYNDROME (HCC): ICD-10-CM

## 2019-04-12 PROCEDURE — 85610 PROTHROMBIN TIME: CPT | Performed by: INTERNAL MEDICINE

## 2019-04-12 PROCEDURE — 93793 ANTICOAG MGMT PT WARFARIN: CPT | Performed by: INTERNAL MEDICINE

## 2019-04-12 PROCEDURE — 36416 COLLJ CAPILLARY BLOOD SPEC: CPT | Performed by: INTERNAL MEDICINE

## 2019-04-12 NOTE — PROGRESS NOTES
Patient was informed of coumadin dosage. During phone call patient asked why does she need to keep injecting Lovenox if her blood is thick.  I informed her that her blood it's not thin, it's thick and like it was previously discussed she has to inject [employer]

## 2019-04-15 ENCOUNTER — TELEPHONE (OUTPATIENT)
Dept: INTERNAL MEDICINE CLINIC | Facility: CLINIC | Age: 69
End: 2019-04-15

## 2019-04-15 ENCOUNTER — ANTI-COAG VISIT (OUTPATIENT)
Dept: INTERNAL MEDICINE CLINIC | Facility: CLINIC | Age: 69
End: 2019-04-15
Payer: MEDICARE

## 2019-04-15 DIAGNOSIS — D68.61 ANTIPHOSPHOLIPID SYNDROME (HCC): ICD-10-CM

## 2019-04-15 PROCEDURE — 85610 PROTHROMBIN TIME: CPT | Performed by: INTERNAL MEDICINE

## 2019-04-15 PROCEDURE — 93793 ANTICOAG MGMT PT WARFARIN: CPT | Performed by: INTERNAL MEDICINE

## 2019-04-15 PROCEDURE — 36416 COLLJ CAPILLARY BLOOD SPEC: CPT | Performed by: INTERNAL MEDICINE

## 2019-04-15 RX ORDER — ENOXAPARIN SODIUM 150 MG/ML
INJECTION SUBCUTANEOUS
Qty: 10 ML | Refills: 0 | Status: SHIPPED | OUTPATIENT
Start: 2019-04-15 | End: 2019-04-15

## 2019-04-15 RX ORDER — ENOXAPARIN SODIUM 150 MG/ML
INJECTION SUBCUTANEOUS
Qty: 10 ML | Refills: 0 | Status: SHIPPED | OUTPATIENT
Start: 2019-04-15 | End: 2019-04-18

## 2019-04-15 NOTE — TELEPHONE ENCOUNTER
Pt came in for INR check and states that she takes Glucerna Carb Steady shakes because sometimes she does not feel like eating. Label states that there is 25% of Vitamin K in this product. Patient wants to know if she should continue taking this product.  P

## 2019-04-18 ENCOUNTER — TELEPHONE (OUTPATIENT)
Dept: INTERNAL MEDICINE CLINIC | Facility: CLINIC | Age: 69
End: 2019-04-18

## 2019-04-18 ENCOUNTER — ANTI-COAG VISIT (OUTPATIENT)
Dept: INTERNAL MEDICINE CLINIC | Facility: CLINIC | Age: 69
End: 2019-04-18

## 2019-04-18 ENCOUNTER — OFFICE VISIT (OUTPATIENT)
Dept: INTERNAL MEDICINE CLINIC | Facility: CLINIC | Age: 69
End: 2019-04-18
Payer: MEDICARE

## 2019-04-18 VITALS
WEIGHT: 206 LBS | DIASTOLIC BLOOD PRESSURE: 60 MMHG | HEIGHT: 69 IN | HEART RATE: 84 BPM | SYSTOLIC BLOOD PRESSURE: 136 MMHG | OXYGEN SATURATION: 98 % | BODY MASS INDEX: 30.51 KG/M2 | TEMPERATURE: 99 F

## 2019-04-18 DIAGNOSIS — D68.61 ANTIPHOSPHOLIPID SYNDROME (HCC): ICD-10-CM

## 2019-04-18 DIAGNOSIS — E11.9 CONTROLLED TYPE 2 DIABETES MELLITUS WITHOUT COMPLICATION, WITHOUT LONG-TERM CURRENT USE OF INSULIN (HCC): Primary | Chronic | ICD-10-CM

## 2019-04-18 DIAGNOSIS — N18.30 CKD (CHRONIC KIDNEY DISEASE) STAGE 3, GFR 30-59 ML/MIN (HCC): Chronic | ICD-10-CM

## 2019-04-18 DIAGNOSIS — I10 ESSENTIAL HYPERTENSION WITH GOAL BLOOD PRESSURE LESS THAN 130/80: ICD-10-CM

## 2019-04-18 DIAGNOSIS — R63.4 WEIGHT LOSS: ICD-10-CM

## 2019-04-18 PROCEDURE — 85610 PROTHROMBIN TIME: CPT | Performed by: INTERNAL MEDICINE

## 2019-04-18 PROCEDURE — 36416 COLLJ CAPILLARY BLOOD SPEC: CPT | Performed by: INTERNAL MEDICINE

## 2019-04-18 PROCEDURE — 99214 OFFICE O/P EST MOD 30 MIN: CPT | Performed by: INTERNAL MEDICINE

## 2019-04-18 RX ORDER — ENOXAPARIN SODIUM 150 MG/ML
INJECTION SUBCUTANEOUS
Qty: 10 ML | Refills: 0 | Status: SHIPPED | OUTPATIENT
Start: 2019-04-18 | End: 2019-04-22

## 2019-04-18 NOTE — PROGRESS NOTES
HPI:    Patient ID: Vivi Porras is a 80year old female. HPI   Hypertension  Patient is here for follow up of hypertension. BP at home: same. Has been compliant with medications.   Exercise level: somewhat active and has been following AM    AST 23 03/16/2019 09:19 AM    ALT 20 03/16/2019 09:19 AM    TSH 7.50 (H) 11/09/2018 09:32 AM    T4F 1.17 11/09/2018 09:32 AM        Lab Results   Component Value Date/Time    CHOLEST 141 02/17/2019 08:53 AM    HDL 50 02/17/2019 08:53 AM    TRIG 87 02 90 tablet Rfl: 0   glipiZIDE 10 MG Oral Tab take 1/2 tablet prebreakfast and 1/2 tablet predinner Disp: 90 tablet Rfl: 0   losartan 100 MG Oral Tab Take 1 tablet (100 mg total) by mouth daily.  Disp: 90 tablet Rfl: 1   HydrALAZINE HCl 100 MG Oral Tab TAKE 1 total) by mouth every 8 (eight) hours as needed for Nausea. Disp: 30 tablet Rfl: 0   Multiple Vitamins-Minerals (ONE-A-DAY WOMENS 50 PLUS OR) Take by mouth.  Disp:  Rfl:    Meclizine HCl 25 MG Oral Tab Take 1 tablet (25 mg total) by mouth 3 (three) times da Female    • No Known Problems Paternal Cousin Male    • Glaucoma Neg    • Macular degeneration Neg    • Breast Cancer Neg    • Ovarian Cancer Neg    • DCIS Neg    • LCIS Neg    • BRCA gene + Neg    • Ashkenazi Christian Descent Neg       Social History: Trina Summers (hcc)  (primary encounter diagnosis) Not good control. Careful with diet and excercise at least 30 minutes 3-4 times a week. Check sugars at different times on different dates. Careful with low sugars. Carry something with you and check sugar if can.  Can c

## 2019-04-18 NOTE — PATIENT INSTRUCTIONS
ASSESSMENT/PLAN:   Controlled type 2 diabetes mellitus without complication, without long-term current use of insulin (hcc)  (primary encounter diagnosis) Not good control. Careful with diet and excercise at least 30 minutes 3-4 times a week.  Check sugar

## 2019-04-19 ENCOUNTER — APPOINTMENT (OUTPATIENT)
Dept: LAB | Facility: HOSPITAL | Age: 69
End: 2019-04-19
Attending: INTERNAL MEDICINE
Payer: MEDICARE

## 2019-04-19 PROCEDURE — 85025 COMPLETE CBC W/AUTO DIFF WBC: CPT | Performed by: INTERNAL MEDICINE

## 2019-04-19 PROCEDURE — 84439 ASSAY OF FREE THYROXINE: CPT | Performed by: INTERNAL MEDICINE

## 2019-04-19 PROCEDURE — 36415 COLL VENOUS BLD VENIPUNCTURE: CPT | Performed by: INTERNAL MEDICINE

## 2019-04-19 PROCEDURE — 84443 ASSAY THYROID STIM HORMONE: CPT | Performed by: INTERNAL MEDICINE

## 2019-04-22 ENCOUNTER — ANTI-COAG VISIT (OUTPATIENT)
Dept: INTERNAL MEDICINE CLINIC | Facility: CLINIC | Age: 69
End: 2019-04-22
Payer: MEDICARE

## 2019-04-22 DIAGNOSIS — D68.61 ANTIPHOSPHOLIPID SYNDROME (HCC): ICD-10-CM

## 2019-04-22 PROCEDURE — 36416 COLLJ CAPILLARY BLOOD SPEC: CPT | Performed by: INTERNAL MEDICINE

## 2019-04-22 PROCEDURE — 85610 PROTHROMBIN TIME: CPT | Performed by: INTERNAL MEDICINE

## 2019-04-22 PROCEDURE — 93793 ANTICOAG MGMT PT WARFARIN: CPT | Performed by: INTERNAL MEDICINE

## 2019-04-22 RX ORDER — ENOXAPARIN SODIUM 150 MG/ML
INJECTION SUBCUTANEOUS
Qty: 10 ML | Refills: 3 | Status: SHIPPED | OUTPATIENT
Start: 2019-04-22 | End: 2019-04-30

## 2019-04-26 ENCOUNTER — TELEPHONE (OUTPATIENT)
Dept: INTERNAL MEDICINE CLINIC | Facility: CLINIC | Age: 69
End: 2019-04-26

## 2019-04-27 NOTE — TELEPHONE ENCOUNTER
Pt. Calling regarding increase thyroid dose. Yesterday began with thyroid. Afternoon, headache. 170/100. Compliant with rest of meds. Denies any other Sx. Was taking 188 mcg of thyroid meds. Now. Only taking 112. Told to increase to 200 mcg starting day.

## 2019-04-29 ENCOUNTER — TELEPHONE (OUTPATIENT)
Dept: INTERNAL MEDICINE CLINIC | Facility: CLINIC | Age: 69
End: 2019-04-29

## 2019-04-29 ENCOUNTER — ANTI-COAG VISIT (OUTPATIENT)
Dept: INTERNAL MEDICINE CLINIC | Facility: CLINIC | Age: 69
End: 2019-04-29
Payer: MEDICARE

## 2019-04-29 DIAGNOSIS — D68.61 ANTIPHOSPHOLIPID SYNDROME (HCC): ICD-10-CM

## 2019-04-29 PROCEDURE — 85610 PROTHROMBIN TIME: CPT | Performed by: INTERNAL MEDICINE

## 2019-04-29 PROCEDURE — 36416 COLLJ CAPILLARY BLOOD SPEC: CPT | Performed by: INTERNAL MEDICINE

## 2019-04-29 PROCEDURE — 93793 ANTICOAG MGMT PT WARFARIN: CPT | Performed by: INTERNAL MEDICINE

## 2019-04-29 NOTE — TELEPHONE ENCOUNTER
Called patient back to explain to her to continue BP meds a little longer and to also continue with Synthroid meds. Give us a call back to let us know how she is doing.

## 2019-04-29 NOTE — TELEPHONE ENCOUNTER
Pt came in for INR check and BP check. BP in office today was at 148/65, pulse 81. Pt brought in 15 BP readings from Saturday and Sunday. (on Dr. Zena Rocha desk). Pt feels that thyroid med is causing her elevated BP, taking 200 mcg daily.  Pt with slight hea

## 2019-04-30 ENCOUNTER — MED REC SCAN ONLY (OUTPATIENT)
Dept: INTERNAL MEDICINE CLINIC | Facility: CLINIC | Age: 69
End: 2019-04-30

## 2019-04-30 ENCOUNTER — TELEPHONE (OUTPATIENT)
Dept: INTERNAL MEDICINE CLINIC | Facility: CLINIC | Age: 69
End: 2019-04-30

## 2019-04-30 RX ORDER — ENOXAPARIN SODIUM 150 MG/ML
INJECTION SUBCUTANEOUS
Qty: 20 SYRINGE | Refills: 3 | Status: SHIPPED | OUTPATIENT
Start: 2019-04-30 | End: 2019-05-17

## 2019-04-30 RX ORDER — WARFARIN SODIUM 5 MG/1
5 TABLET ORAL 2 TIMES DAILY
Qty: 180 TABLET | Refills: 1 | Status: SHIPPED | OUTPATIENT
Start: 2019-04-30 | End: 2019-11-22

## 2019-04-30 NOTE — TELEPHONE ENCOUNTER
Pt needs refills and dose or quantity changes on the warfarin and lovenox, pharmacy has only been giving her injections for 5 days # 10 and her insurance won't pay for more so she has to pay out of pocket, she needs #14 for one week also needs warfarin ref

## 2019-05-01 NOTE — TELEPHONE ENCOUNTER
[facility]  ID #[id number]  Notified patient that refill was sent to the pharmacy on warfarin and Enoxaparin injection with corrected amount. Patient agreed.

## 2019-05-02 ENCOUNTER — TELEPHONE (OUTPATIENT)
Dept: INTERNAL MEDICINE CLINIC | Facility: CLINIC | Age: 69
End: 2019-05-02

## 2019-05-02 NOTE — TELEPHONE ENCOUNTER
Pharmacy states that insurance does not pay for lovenox bid, they will only pay for it once daily so that is why pt keeps running out before the end of the week. Needs PA called in [phone] ID # [id number] group # [id number].

## 2019-05-02 NOTE — TELEPHONE ENCOUNTER
Once a day is not the treatment dosing it is preventative dosing we will need to start the prior Auth and we need she needs ASAP she cannot be off the Lovenox.

## 2019-05-06 NOTE — TELEPHONE ENCOUNTER
Called aby and said that they noticed a claim from the pharmacy that the patient already picked up injections for one week.  They went ahead and submitted another prior auth for another 5 days,

## 2019-05-07 ENCOUNTER — TELEPHONE (OUTPATIENT)
Dept: INTERNAL MEDICINE CLINIC | Facility: CLINIC | Age: 69
End: 2019-05-07

## 2019-05-07 ENCOUNTER — ANTI-COAG VISIT (OUTPATIENT)
Dept: INTERNAL MEDICINE CLINIC | Facility: CLINIC | Age: 69
End: 2019-05-07
Payer: MEDICARE

## 2019-05-07 DIAGNOSIS — D68.61 ANTIPHOSPHOLIPID SYNDROME (HCC): ICD-10-CM

## 2019-05-07 PROCEDURE — 93793 ANTICOAG MGMT PT WARFARIN: CPT | Performed by: INTERNAL MEDICINE

## 2019-05-07 PROCEDURE — 36416 COLLJ CAPILLARY BLOOD SPEC: CPT | Performed by: INTERNAL MEDICINE

## 2019-05-07 PROCEDURE — 85610 PROTHROMBIN TIME: CPT | Performed by: INTERNAL MEDICINE

## 2019-05-07 NOTE — TELEPHONE ENCOUNTER
Patient stated that she has lumps on her abdomen area and has bruises all over. She said lumps are due to Lovenox. She also wanted to report her b/p it was 129/65 taken at the office.

## 2019-05-07 NOTE — PROGRESS NOTES
Patient notified of new coumadin dose through interpretation. And should recheck INR 5/13/19 appt made.

## 2019-05-07 NOTE — TELEPHONE ENCOUNTER
Blood pressures look good. Unfortunate that is from the Lovenox. She needs to be careful when she does poke herself she can try heating pack or moist heat to the area and it went kind of rubbing it a little bit and that will help.   But because of the blo

## 2019-05-08 ENCOUNTER — MED REC SCAN ONLY (OUTPATIENT)
Dept: INTERNAL MEDICINE CLINIC | Facility: CLINIC | Age: 69
End: 2019-05-08

## 2019-05-13 ENCOUNTER — ANTI-COAG VISIT (OUTPATIENT)
Dept: INTERNAL MEDICINE CLINIC | Facility: CLINIC | Age: 69
End: 2019-05-13
Payer: MEDICARE

## 2019-05-13 DIAGNOSIS — D68.61 ANTIPHOSPHOLIPID SYNDROME (HCC): ICD-10-CM

## 2019-05-13 PROCEDURE — 36416 COLLJ CAPILLARY BLOOD SPEC: CPT | Performed by: INTERNAL MEDICINE

## 2019-05-13 PROCEDURE — 93793 ANTICOAG MGMT PT WARFARIN: CPT | Performed by: INTERNAL MEDICINE

## 2019-05-13 PROCEDURE — 85610 PROTHROMBIN TIME: CPT | Performed by: INTERNAL MEDICINE

## 2019-05-13 NOTE — PROGRESS NOTES
Patient was unable to talk at the moment. She will return our phone call.      EM ANTICOAG TREND 5/13/2019   LAST INR  1.3   LAST INR DATE  5/13/2019   ANTICOAG FULL INSTRUCTIONS 10 mg every day   LAST 7 DAY DOSE TOTAL  60 mg   RETURN DATE  5/27/2019   BRIANNA

## 2019-05-17 RX ORDER — ENOXAPARIN SODIUM 150 MG/ML
INJECTION SUBCUTANEOUS
Qty: 20 SYRINGE | Refills: 3 | Status: SHIPPED | OUTPATIENT
Start: 2019-05-17 | End: 2019-10-14

## 2019-05-21 DIAGNOSIS — L40.50 PSORIATIC ARTHRITIS (HCC): ICD-10-CM

## 2019-05-21 RX ORDER — FOLIC ACID 1 MG/1
TABLET ORAL
Qty: 90 TABLET | Refills: 3 | Status: SHIPPED | OUTPATIENT
Start: 2019-05-21 | End: 2019-10-14

## 2019-05-21 NOTE — TELEPHONE ENCOUNTER
LOV:3-20  Last Filled:#90 with 0 refill  Labs:   Future Appointments   Date Time Provider Zac Silvai   5/28/2019  3:00 PM [facility] NURSING [id number] [place] [address]   6/6/2019  5:00 PM Rohith Jung MD [facility] [place] [facility]   7/17/2019  6:30 PM Melba Morrow MD [address] [place] [address]   8/19/2019  3:30 PM Tavon Zheng MD [id number] [place] [address]       Please Advise

## 2019-05-25 NOTE — TELEPHONE ENCOUNTER
PA at bedside Refill passed per [facility] protocol.   Diabetes Medications  Protocol Criteria:  · Appointment scheduled in the past 6 months or the next 3 months  · A1C < 7.5 in the past 6 months  · Creatinine in the past 12 months  · Creatinine result < 1.5   Rece

## 2019-05-28 ENCOUNTER — TELEPHONE (OUTPATIENT)
Dept: INTERNAL MEDICINE CLINIC | Facility: CLINIC | Age: 69
End: 2019-05-28

## 2019-05-28 ENCOUNTER — ANTI-COAG VISIT (OUTPATIENT)
Dept: INTERNAL MEDICINE CLINIC | Facility: CLINIC | Age: 69
End: 2019-05-28
Payer: MEDICARE

## 2019-05-28 DIAGNOSIS — D68.61 ANTIPHOSPHOLIPID SYNDROME (HCC): ICD-10-CM

## 2019-05-28 PROCEDURE — 36416 COLLJ CAPILLARY BLOOD SPEC: CPT | Performed by: INTERNAL MEDICINE

## 2019-05-28 PROCEDURE — 85610 PROTHROMBIN TIME: CPT | Performed by: INTERNAL MEDICINE

## 2019-05-28 PROCEDURE — 93793 ANTICOAG MGMT PT WARFARIN: CPT | Performed by: INTERNAL MEDICINE

## 2019-05-28 RX ORDER — CLONIDINE HYDROCHLORIDE 0.1 MG/1
TABLET ORAL
Qty: 180 TABLET | Refills: 1 | Status: SHIPPED | OUTPATIENT
Start: 2019-05-28 | End: 2019-08-03

## 2019-05-28 NOTE — TELEPHONE ENCOUNTER
Refill passed per [facility] protocol.   Hypertensive Medications  Protocol Criteria:  · Appointment scheduled in the past 6 months or in the next 3 months  · BMP or CMP in the past 12 months  · Creatinine result < 2  Recent Outpatient Visits 03/16/2019    [facility] [phone] 03/16/2019

## 2019-05-28 NOTE — TELEPHONE ENCOUNTER
Okay to go to dentist just for teeth cleaning. Make sure the dentist is aware that she is taking Coumadin and is so that they will probably need to just give her something local that they can do and be aware of the fact that she is taking Coumadin.   SHOUL

## 2019-05-28 NOTE — PROGRESS NOTES
Patient was informed of coumadin dosage and to STOP lovenox, verbalized understanding. Denied any questions/concerns at the time of call. NV scheduled for 06/04/19.

## 2019-05-28 NOTE — TELEPHONE ENCOUNTER
Patient wants to know if is she able to go to the dentist for a cleaning? Wanted to make sure since she's taking coumadin. Please advice.

## 2019-06-02 ENCOUNTER — LAB ENCOUNTER (OUTPATIENT)
Dept: LAB | Facility: HOSPITAL | Age: 69
End: 2019-06-02
Attending: INTERNAL MEDICINE
Payer: MEDICARE

## 2019-06-02 DIAGNOSIS — L40.50 PSORIATIC ARTHRITIS (HCC): ICD-10-CM

## 2019-06-02 DIAGNOSIS — R76.0 ANTIPHOSPHOLIPID ANTIBODY POSITIVE: ICD-10-CM

## 2019-06-02 PROCEDURE — 82043 UR ALBUMIN QUANTITATIVE: CPT

## 2019-06-02 PROCEDURE — 85652 RBC SED RATE AUTOMATED: CPT

## 2019-06-02 PROCEDURE — 81001 URINALYSIS AUTO W/SCOPE: CPT

## 2019-06-02 PROCEDURE — 86140 C-REACTIVE PROTEIN: CPT

## 2019-06-02 PROCEDURE — 82570 ASSAY OF URINE CREATININE: CPT

## 2019-06-02 PROCEDURE — 36415 COLL VENOUS BLD VENIPUNCTURE: CPT

## 2019-06-02 PROCEDURE — 85025 COMPLETE CBC W/AUTO DIFF WBC: CPT

## 2019-06-02 PROCEDURE — 80053 COMPREHEN METABOLIC PANEL: CPT

## 2019-06-03 ENCOUNTER — TELEPHONE (OUTPATIENT)
Dept: NEPHROLOGY | Facility: CLINIC | Age: 69
End: 2019-06-03

## 2019-06-03 DIAGNOSIS — R31.29 MICROSCOPIC HEMATURIA: Primary | ICD-10-CM

## 2019-06-03 NOTE — TELEPHONE ENCOUNTER
Urine showed small amount of blood. Kidney ultrasound - ordered .  Call to make an appointment    Will see you at the follow up visit on 6/6

## 2019-06-04 ENCOUNTER — ANTI-COAG VISIT (OUTPATIENT)
Dept: INTERNAL MEDICINE CLINIC | Facility: CLINIC | Age: 69
End: 2019-06-04
Payer: MEDICARE

## 2019-06-04 DIAGNOSIS — D68.61 ANTIPHOSPHOLIPID SYNDROME (HCC): ICD-10-CM

## 2019-06-04 DIAGNOSIS — L40.50 PSORIATIC ARTHRITIS (HCC): ICD-10-CM

## 2019-06-04 PROCEDURE — 36416 COLLJ CAPILLARY BLOOD SPEC: CPT | Performed by: INTERNAL MEDICINE

## 2019-06-04 PROCEDURE — 93793 ANTICOAG MGMT PT WARFARIN: CPT | Performed by: INTERNAL MEDICINE

## 2019-06-04 PROCEDURE — 85610 PROTHROMBIN TIME: CPT | Performed by: INTERNAL MEDICINE

## 2019-06-04 RX ORDER — LEVOTHYROXINE SODIUM 0.1 MG/1
200 TABLET ORAL
Qty: 180 TABLET | Refills: 1 | Status: SHIPPED | OUTPATIENT
Start: 2019-06-04 | End: 2019-11-24

## 2019-06-04 RX ORDER — CLOBETASOL PROPIONATE 0.46 MG/ML
1 SOLUTION TOPICAL 2 TIMES DAILY
Qty: 50 ML | Refills: 6 | Status: SHIPPED | OUTPATIENT
Start: 2019-06-04 | End: 2019-10-14

## 2019-06-04 RX ORDER — METHOTREXATE 2.5 MG/1
20 TABLET ORAL WEEKLY
Qty: 80 TABLET | Refills: 0 | OUTPATIENT
Start: 2019-06-04

## 2019-06-04 RX ORDER — LEVOTHYROXINE SODIUM 0.1 MG/1
200 TABLET ORAL
COMMUNITY
End: 2019-06-04

## 2019-06-04 NOTE — PROGRESS NOTES
Pt has been informed to continue on the 10 mg of warfarin, pt voiced understanding, next appt has been scheduled.

## 2019-06-04 NOTE — TELEPHONE ENCOUNTER
Synthroid 100mcg - patient states that she was instructed by you  to take 2 tabs (200mcg) PO daily. I didn't see it in her med list but she did show me her pill bottle said \"1 tab (100mcg) PO daily\".    Methotrexate - usually gets refills from [facility]

## 2019-06-06 ENCOUNTER — OFFICE VISIT (OUTPATIENT)
Dept: NEPHROLOGY | Facility: CLINIC | Age: 69
End: 2019-06-06
Payer: MEDICARE

## 2019-06-06 VITALS
HEART RATE: 95 BPM | WEIGHT: 218.63 LBS | DIASTOLIC BLOOD PRESSURE: 55 MMHG | BODY MASS INDEX: 32.76 KG/M2 | SYSTOLIC BLOOD PRESSURE: 141 MMHG | HEIGHT: 68.5 IN | TEMPERATURE: 99 F

## 2019-06-06 DIAGNOSIS — R31.29 MICROSCOPIC HEMATURIA: Primary | ICD-10-CM

## 2019-06-06 DIAGNOSIS — R76.0 ANTIPHOSPHOLIPID ANTIBODY POSITIVE: ICD-10-CM

## 2019-06-06 DIAGNOSIS — I10 ESSENTIAL HYPERTENSION: ICD-10-CM

## 2019-06-06 PROCEDURE — G0463 HOSPITAL OUTPT CLINIC VISIT: HCPCS | Performed by: INTERNAL MEDICINE

## 2019-06-06 PROCEDURE — 99214 OFFICE O/P EST MOD 30 MIN: CPT | Performed by: INTERNAL MEDICINE

## 2019-06-06 NOTE — TELEPHONE ENCOUNTER
Patient's daughter returned call. Dr. Cesar Escalera message relayed. Scheduling phone# provided to get kidney US done. Patient will see Dr. Real Thompson this evening at the office.

## 2019-06-06 NOTE — PROGRESS NOTES
Progress Note:      Patient is a 80 yrs old [address] speaking female with pmh of multinodular goiter s/p thyroidectomy, HL, DM II x 25 yrs , HTN x 4 yrs, +ve antiphospholipid with popliteal DVT in Nov 2018 on [facility], psoriatic arthritis who presented today fo SCOPE,ABRASN ARTHROPLASTY Right 10-4-16   • KNEE TOTAL REPLACEMENT Right 10/4/2016    Performed by Dariela Escobedo MD at [address] [place] [address] MAIN OR   • THYROIDECTOMY  08/17/15    TOTAL   • TUBAL LIGATION        Family History   Problem Relation Age of Onset   • Diabete Solution Pen-injector INJECT 30UNITS IN THE MORNING AND 6 UNITS IN THE EVENING Disp: 30 mL Rfl: 1   Lancets Does not apply Misc Checks qam and PM. Dx. E11.9. Disp: 100 each Rfl: 6   Glucose Blood In Vitro [employer] q12hrs.  Dx. E11.65 Disp: 200 strip Rfl breakfast. Disp: 90 tablet Rfl: 1   Ondansetron HCl (ZOFRAN) 4 mg tablet Take 1 tablet (4 mg total) by mouth every 8 (eight) hours as needed for Nausea. Disp: 30 tablet Rfl: 0   simvastatin 20 MG Oral Tab Take 1 tablet (20 mg total) by mouth nightly.  take intact  Nose/Mouth/Throat:mucous membranes are moist   Neck/Thyroid: neck is supple without adenopathy  Lymphatic: no abnormal cervical, supraclavicular adenopathy is noted  Respiratory:  lungs are clear to auscultation bilaterally  Cardiovascular: regular Prescriptions      No prescriptions requested or ordered in this encounter       Imaging & Referrals:  UROLOGY - INTERNAL     6/6/2019  Nel Walsh MD

## 2019-06-07 DIAGNOSIS — L40.50 PSORIATIC ARTHRITIS (HCC): ICD-10-CM

## 2019-06-07 RX ORDER — GLIPIZIDE 10 MG/1
TABLET ORAL
Qty: 90 TABLET | Refills: 1 | Status: SHIPPED | OUTPATIENT
Start: 2019-06-07 | End: 2020-01-08

## 2019-06-07 NOTE — TELEPHONE ENCOUNTER
Sent to Dr Darcy Long for Dr Victorina Marroquin please advise on methotrexate refill. Thanks. LOV 03/20/19  Last Labs :  Component      Latest Ref Rng & Units 6/2/2019   WBC      4.0 - 11.0 x10(3) uL 6.1   RBC      3.80 - 5.30 x10(6)uL 3.60 (L)   Hemoglobin      12.0 - 16.0 g/dL 11.1 (L)   Hematocrit      35.0 - 48.0 % 33.8 (L)   MCV      80.0 - 100.0 fL 93.9   MCH      26.0 - 34.0 pg 30.8   MCHC      31.0 - 37.0 g/dL 32.8   RDW-SD      35.1 - 46.3 fL 54.5 (H)   RDW      11.0 - 15.0 % 16.1 (H)   Platelet Count      260.6 - 450.0 10(3)uL 306.0   Prelim Neutrophil Abs      1.50 - 7.70 x10 (3) uL 3.85   Neutrophils Absolute      1.50 - 7.70 x10(3) uL 3.85   Lymphocytes Absolute      1.00 - 4.00 x10(3) uL 1.30   Monocytes Absolute      0.10 - 1.00 x10(3) uL 0.71   Eosinophils Absolute      0.00 - 0.70 x10(3) uL 0.21   Basophils Absolute      0.00 - 0.20 x10(3) uL 0.03   Immature Granulocyte Absolute      0.00 - 1.00 x10(3) uL 0.04   Neutrophils %      % 62.6   Lymphocytes %      % 21.2   Monocytes %      % 11.6   Eosinophils %      % 3.4   Basophils %      % 0.5   Immature Granulocyte %      % 0.7   Glucose      70 - 99 mg/dL 92   Sodium      136 - 145 mmol/L 139   Potassium      3.5 - 5.1 mmol/L 4.0   Chloride      98 - 112 mmol/L 103   Carbon Dioxide, Total      21.0 - 32.0 mmol/L 28.0   ANION GAP      0 - 18 mmol/L 8   BUN      7 - 18 mg/dL 16   CREATININE      0.55 - 1.02 mg/dL 0.94   BUN/CREAT Ratio      10.0 - 20.0 17.0   CALCIUM      8.5 - 10.1 mg/dL 9.2   CALCULATED OSMOLALITY      275 - 295 mOsm/kg 289   GFR, Non-      >=60 63   GFR, -American      >=60 72   ALT (SGPT)      13 - 56 U/L 31   AST (SGOT)      15 - 37 U/L 15   ALKALINE PHOSPHATASE      55 - 142 U/L 75   Total Bilirubin      0.1 - 2.0 mg/dL 0.4   TOTAL PROTEIN      6.4 - 8.2 g/dL 7.4   Albumin      3.4 - 5.0 g/dL 3.6   Globulin      2.8 - 4.4 g/dL 3.8   A/G Ratio      1.0 - 2.0 0.9 (L)   Patient Fasting?        Yes   MALB URINE      mg/dL 0. 97   CREATININE UR RANDOM      mg/dL 67.40   MALB/CRE CALC      <=30.0 ug/mg 14.4   C-REACTIVE PROTEIN      <0.30 mg/dL 0.62 (H)   SED RATE      0 - 30 mm/Hr 43 (H)

## 2019-06-07 NOTE — TELEPHONE ENCOUNTER
Refill passed per [facility] protocol.   Diabetes Medications  Protocol Criteria:  · Appointment scheduled in the past 6 months or the next 3 months  · A1C < 7.5 in the past 6 months  · Creatinine in the past 12 months  · Creatinine result < 1.5   Rece

## 2019-06-15 ENCOUNTER — HOSPITAL ENCOUNTER (OUTPATIENT)
Dept: ULTRASOUND IMAGING | Facility: HOSPITAL | Age: 69
Discharge: HOME | End: 2019-06-15
Attending: INTERNAL MEDICINE
Payer: MEDICARE

## 2019-06-15 DIAGNOSIS — R31.29 MICROSCOPIC HEMATURIA: ICD-10-CM

## 2019-06-15 PROCEDURE — 76770 US EXAM ABDO BACK WALL COMP: CPT | Performed by: INTERNAL MEDICINE

## 2019-06-19 ENCOUNTER — TELEPHONE (OUTPATIENT)
Dept: INTERNAL MEDICINE CLINIC | Facility: CLINIC | Age: 69
End: 2019-06-19

## 2019-06-19 DIAGNOSIS — E11.9 CONTROLLED TYPE 2 DIABETES MELLITUS WITHOUT COMPLICATION, WITHOUT LONG-TERM CURRENT USE OF INSULIN (HCC): Primary | Chronic | ICD-10-CM

## 2019-06-19 RX ORDER — AMOXICILLIN 500 MG/1
500 CAPSULE ORAL 3 TIMES DAILY
Qty: 30 CAPSULE | Refills: 0 | Status: SHIPPED | OUTPATIENT
Start: 2019-06-19 | End: 2019-06-29

## 2019-06-19 NOTE — TELEPHONE ENCOUNTER
Patient with cough for a week with a lot phlegm what can she take over the counter or she can get something called in . She is afraid to take some over the counter with all medication she takes . Please advice .  She is aware Dr. Joseluis Hanna not in office

## 2019-06-19 NOTE — TELEPHONE ENCOUNTER
Looking for referral for home INR monitor   Called manage care as referral was put in computer in March and marked does not need authorization

## 2019-06-19 NOTE — TELEPHONE ENCOUNTER
Start antibiotics ASAP and take as directed with food til done. Take probiotics while on antibiotics if can to prevent yeast infections.  Most colds are viral, Can try Mucinex or Robitussin if very thick secretions, if watery, can try dayquil or nyquil whic

## 2019-06-19 NOTE — TELEPHONE ENCOUNTER
What id the color of the phlegm? Any fever ? Sob? Pain when she coughs? Any sinus pressure ? Sore throat?

## 2019-06-19 NOTE — TELEPHONE ENCOUNTER
Spoke with pt, pt states that she has had this cough for over a week, cough with grayish phlegm, sore throat, hurts to swallow, head congestion, no temp., no SOB, no pain when coughing, no sinus pressure. Please advise.

## 2019-06-20 RX ORDER — CLOBETASOL PROPIONATE 0.5 MG/G
CREAM TOPICAL
Qty: 45 G | Refills: 0 | Status: SHIPPED | OUTPATIENT
Start: 2019-06-20 | End: 2021-07-21 | Stop reason: ALTCHOICE

## 2019-06-20 NOTE — TELEPHONE ENCOUNTER
LOV 1/9/2019 for psoriasis. Pt currently does not have any f/u appointments schedule.  Kindly advise on refill request.

## 2019-06-21 RX ORDER — CLOBETASOL PROPIONATE 0.5 MG/G
CREAM TOPICAL
Qty: 45 G | Refills: 0 | OUTPATIENT
Start: 2019-06-21

## 2019-06-21 NOTE — TELEPHONE ENCOUNTER
Spoke w/ pt using Uzbek interpretor on language line.  Pt reports medication is helping her but psoriasis flares up when she runs out of medication, pt refused to schedule follow up at this time but took down clinic phone # to call back and schedule a fol

## 2019-06-26 ENCOUNTER — ANTI-COAG VISIT (OUTPATIENT)
Dept: INTERNAL MEDICINE CLINIC | Facility: CLINIC | Age: 69
End: 2019-06-26
Payer: MEDICARE

## 2019-06-26 DIAGNOSIS — D68.61 ANTIPHOSPHOLIPID SYNDROME (HCC): ICD-10-CM

## 2019-06-26 PROCEDURE — 36416 COLLJ CAPILLARY BLOOD SPEC: CPT | Performed by: INTERNAL MEDICINE

## 2019-06-26 PROCEDURE — 85610 PROTHROMBIN TIME: CPT | Performed by: INTERNAL MEDICINE

## 2019-06-26 PROCEDURE — 93793 ANTICOAG MGMT PT WARFARIN: CPT | Performed by: INTERNAL MEDICINE

## 2019-06-26 NOTE — PROGRESS NOTES
Pt came in today for INR   Pt was notified will receive a call when physician place order for next dosage. Pt noted understanding.

## 2019-07-10 ENCOUNTER — MED REC SCAN ONLY (OUTPATIENT)
Dept: INTERNAL MEDICINE CLINIC | Facility: CLINIC | Age: 69
End: 2019-07-10

## 2019-07-15 RX ORDER — LOSARTAN POTASSIUM 100 MG/1
TABLET ORAL
Qty: 90 TABLET | Refills: 0 | Status: SHIPPED | OUTPATIENT
Start: 2019-07-15 | End: 2019-09-12

## 2019-07-15 NOTE — TELEPHONE ENCOUNTER
LOV 6/6/19. RTC 4 months (10/6/19). Upcoming appt scheduled 10/10/19. No changes made to losartan at LOV. Rx refilled per [facility] [place] refill protocol in her absence.

## 2019-07-17 ENCOUNTER — OFFICE VISIT (OUTPATIENT)
Dept: RHEUMATOLOGY | Facility: CLINIC | Age: 69
End: 2019-07-17
Payer: MEDICARE

## 2019-07-17 VITALS
DIASTOLIC BLOOD PRESSURE: 62 MMHG | BODY MASS INDEX: 31.61 KG/M2 | HEIGHT: 68.5 IN | HEART RATE: 79 BPM | SYSTOLIC BLOOD PRESSURE: 147 MMHG | WEIGHT: 211 LBS

## 2019-07-17 DIAGNOSIS — D68.61 ANTIPHOSPHOLIPID SYNDROME (HCC): ICD-10-CM

## 2019-07-17 DIAGNOSIS — L40.9 PSORIASIS: ICD-10-CM

## 2019-07-17 DIAGNOSIS — L40.50 PSORIATIC ARTHRITIS (HCC): Primary | ICD-10-CM

## 2019-07-17 DIAGNOSIS — Z51.81 MEDICATION MONITORING ENCOUNTER: ICD-10-CM

## 2019-07-17 DIAGNOSIS — R76.8 POSITIVE ANA (ANTINUCLEAR ANTIBODY): ICD-10-CM

## 2019-07-17 PROCEDURE — 99214 OFFICE O/P EST MOD 30 MIN: CPT | Performed by: INTERNAL MEDICINE

## 2019-07-17 RX ORDER — FOLIC ACID 1 MG/1
1 TABLET ORAL DAILY
Qty: 90 TABLET | Refills: 1 | Status: SHIPPED | OUTPATIENT
Start: 2019-07-17 | End: 2020-03-04

## 2019-07-17 NOTE — PATIENT INSTRUCTIONS
You were seen today for psoriatic arthritis  Since you are having some flares lets increase the Methotrexate to 10 pills weekly  If she still has flares then may add sulfasalazine  Let me know when you need a refill  Blood work September F/u 3 mos

## 2019-07-17 NOTE — PROGRESS NOTES
Con Ruiz is a 77year old female.     HPI:   Patient presents with:  Psoriatic Arthritis: pt reports improvement in symptoms   Hand Pain: R wrist inflammation yesterday - resolved       I had the pleasure of seeing Geoff Rodriguez Reviewed   Family Hx Reviewed     Medications (Active prior to today's visit):    Current Outpatient Medications:  LOSARTAN 100 MG Oral Tab TAKE ONE TABLET BY MOUTH ONE TIME DAILY  Disp: 90 tablet Rfl: 0   CLOBETASOL PROPIONATE 0.05 % External Cream apply take 1 tablet (20MG)  by oral route  3 times a week M/W/F ) Disp: 90 tablet Rfl: 0   Insulin Pen Needle (BD PEN NEEDLE ULTRAFINE) 29G X 12.7MM Does not apply Misc Dx. E11.9. Checks q12hrs. BD ultrafine MINI.  Disp: 100 each Rfl: 6   Glucose Blood (BENNY CON facial rash  CVS: RRR, no murmurs rubs or gallops. Equal 2+ distal pulses.    LUNGS: CTAB, no increased work of breathing  ABDOMEN:  soft NT/ND, +BS, no HSM  SKIN: plaque-like rash on L ankle, calf, chest, behind L ear, gluteal region that has improved, no 0.00 - 0.70 x10(3) uL 0.21   Basophils Absolute      0.00 - 0.20 x10(3) uL 0.03   Immature Granulocyte Absolute      0.00 - 1.00 x10(3) uL 0.04   Neutrophils %      % 62.6   Lymphocytes %      % 21.2   Monocytes %      % 11.6   Eosinophils %      % 3.4   B Ratio      0.0 - 1.1 1.4 (H)    DRVV Confirm      0.0 - 1.1 1.4 (H)    BETA 2 GLYCOPROTEIN 1 AB, IgM      <=15.0 U/mL 112.3 (H) 152.8 (H)   BETA 2 GLYCOPROTEIN 1 AB, IgG      <=15.0 U/mL 5.0 9.9   Cardiolipin IgG Antibody      0.0 - 14.9 GPL  3.9   Cardiol <80  640 (A)   Reviewed By:        Julio C Loo M.D.    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 - Her symptoms have improved but she continues to have intermittent flares in her wrist and MTPs. Also inflammatory markers remain high.   Plan to i

## 2019-07-23 ENCOUNTER — TELEPHONE (OUTPATIENT)
Dept: INTERNAL MEDICINE CLINIC | Facility: CLINIC | Age: 69
End: 2019-07-23

## 2019-07-23 NOTE — TELEPHONE ENCOUNTER
Patient's pharmacy, [facility] in [place], asking if patient should be restarted on a statin therapy.  They are aware Dr. Joseluis Hanna won't be back until early August.

## 2019-07-24 ENCOUNTER — ANTI-COAG VISIT (OUTPATIENT)
Dept: INTERNAL MEDICINE CLINIC | Facility: CLINIC | Age: 69
End: 2019-07-24

## 2019-07-24 ENCOUNTER — ANTI-COAG VISIT (OUTPATIENT)
Dept: INTERNAL MEDICINE CLINIC | Facility: CLINIC | Age: 69
End: 2019-07-24
Payer: MEDICARE

## 2019-07-24 ENCOUNTER — TELEPHONE (OUTPATIENT)
Dept: INTERNAL MEDICINE CLINIC | Facility: CLINIC | Age: 69
End: 2019-07-24

## 2019-07-24 DIAGNOSIS — D68.61 ANTIPHOSPHOLIPID SYNDROME (HCC): ICD-10-CM

## 2019-07-24 PROCEDURE — 36416 COLLJ CAPILLARY BLOOD SPEC: CPT | Performed by: INTERNAL MEDICINE

## 2019-07-24 PROCEDURE — 93793 ANTICOAG MGMT PT WARFARIN: CPT | Performed by: INTERNAL MEDICINE

## 2019-07-24 PROCEDURE — 85610 PROTHROMBIN TIME: CPT | Performed by: INTERNAL MEDICINE

## 2019-07-24 NOTE — TELEPHONE ENCOUNTER
Not sure who discontinued statins? I reviewed the last note from Dr. Yuliana Hawley, in the med list statins are listed. If Dr. Yuliana Hawley has discontinued statins, I think it is better not to resume now.   I will CC Dr. Yuliana Hawley in this message as well so that she can

## 2019-07-25 RX ORDER — HYDRALAZINE HYDROCHLORIDE 100 MG/1
TABLET, FILM COATED ORAL
Qty: 270 TABLET | Refills: 1 | Status: SHIPPED | OUTPATIENT
Start: 2019-07-25 | End: 2019-12-31

## 2019-07-25 NOTE — TELEPHONE ENCOUNTER
Spoke with pt and she states that she still continues to take the simvastatin but she only takes it a couple of times per week. Pharmacy informed.  [facility] states that pt has not filled this Rx since 12/2018 and that is why they thought pt no longer t

## 2019-07-30 ENCOUNTER — MED REC SCAN ONLY (OUTPATIENT)
Dept: INTERNAL MEDICINE CLINIC | Facility: CLINIC | Age: 69
End: 2019-07-30

## 2019-07-30 ENCOUNTER — ANTI-COAG VISIT (OUTPATIENT)
Dept: INTERNAL MEDICINE CLINIC | Facility: CLINIC | Age: 69
End: 2019-07-30
Payer: MEDICARE

## 2019-07-30 ENCOUNTER — TELEPHONE (OUTPATIENT)
Dept: INTERNAL MEDICINE CLINIC | Facility: CLINIC | Age: 69
End: 2019-07-30

## 2019-07-30 ENCOUNTER — ANTI-COAG VISIT (OUTPATIENT)
Dept: INTERNAL MEDICINE CLINIC | Facility: CLINIC | Age: 69
End: 2019-07-30

## 2019-07-30 VITALS — SYSTOLIC BLOOD PRESSURE: 154 MMHG | HEART RATE: 81 BPM | DIASTOLIC BLOOD PRESSURE: 54 MMHG

## 2019-07-30 DIAGNOSIS — D68.61 ANTIPHOSPHOLIPID SYNDROME (HCC): ICD-10-CM

## 2019-07-30 DIAGNOSIS — I82.532 CHRONIC DEEP VEIN THROMBOSIS (DVT) OF LEFT POPLITEAL VEIN (HCC): Primary | ICD-10-CM

## 2019-07-30 LAB — INR: 2.7 (ref 0.8–1.2)

## 2019-07-30 PROCEDURE — 36416 COLLJ CAPILLARY BLOOD SPEC: CPT | Performed by: INTERNAL MEDICINE

## 2019-07-30 PROCEDURE — 85610 PROTHROMBIN TIME: CPT | Performed by: INTERNAL MEDICINE

## 2019-07-30 PROCEDURE — 93793 ANTICOAG MGMT PT WARFARIN: CPT | Performed by: INTERNAL MEDICINE

## 2019-07-30 NOTE — PROGRESS NOTES
Spoke to the patient informing her of her coumadin orders; continue 10mg PO daily, recheck on 8/6/19.    Advised her to call the coumadin clinic to set up appt to have it checked over there as we will not be checking it as of Aug. Patient states understandi

## 2019-07-30 NOTE — TELEPHONE ENCOUNTER
Yes her blood pressure is little bit in the higher side. However there are a few numbers that is good as well. Let us monitor the blood pressure for another 1 more week. If it is still high we will adjust medication for her.   Let HER  try to eliminate s

## 2019-07-30 NOTE — TELEPHONE ENCOUNTER
Home BP  150/66     87    PM  134/65     71    PM  155/77     67    AM  154/65     88    AM  142/71     71    AM  143/64     77    PM  157/79     63    AM  144/59     79    PM  139/62     74    PM  154/66     69    AM  138/76     73    PM

## 2019-07-31 NOTE — TELEPHONE ENCOUNTER
Pt called back, explained to pt to continue checking for one more wk, if still high, dr will look to see to adjust some of her BP meds. Pt states understanding, denies having any further questions.  She will give us a call back in a wk to give us her read

## 2019-07-31 NOTE — TELEPHONE ENCOUNTER
The Coumadin clinic referral order should be signed by Dr. Sigifredo Holliday upon her return.   Thank you

## 2019-08-02 ENCOUNTER — TELEPHONE (OUTPATIENT)
Dept: INTERNAL MEDICINE CLINIC | Facility: CLINIC | Age: 69
End: 2019-08-02

## 2019-08-03 ENCOUNTER — TELEPHONE (OUTPATIENT)
Dept: INTERNAL MEDICINE CLINIC | Facility: CLINIC | Age: 69
End: 2019-08-03

## 2019-08-03 RX ORDER — CLONIDINE HYDROCHLORIDE 0.1 MG/1
TABLET ORAL
Qty: 180 TABLET | Refills: 1 | Status: SHIPPED | OUTPATIENT
Start: 2019-08-03 | End: 2019-08-19

## 2019-08-04 NOTE — TELEPHONE ENCOUNTER
Blood pressures are still kind of high. Would increase clonidine to 0.1 mg in the morning and 0.2 mg in the p.m.

## 2019-08-05 ENCOUNTER — TELEPHONE (OUTPATIENT)
Dept: INTERNAL MEDICINE CLINIC | Facility: CLINIC | Age: 69
End: 2019-08-05

## 2019-08-05 NOTE — TELEPHONE ENCOUNTER
Pt daughter called in she stated she was returning the call to [facility].   Can call her back anytime after 5:15 pm    Called site [facility] said she will call back in 5 mins daughter was advised

## 2019-08-06 ENCOUNTER — ANTI-COAG VISIT (OUTPATIENT)
Dept: INTERNAL MEDICINE CLINIC | Facility: CLINIC | Age: 69
End: 2019-08-06
Payer: MEDICARE

## 2019-08-06 DIAGNOSIS — D68.61 ANTIPHOSPHOLIPID SYNDROME (HCC): ICD-10-CM

## 2019-08-06 DIAGNOSIS — I82.532 CHRONIC DEEP VEIN THROMBOSIS (DVT) OF LEFT POPLITEAL VEIN (HCC): ICD-10-CM

## 2019-08-06 LAB
INR: 4.1 (ref 0.8–1.2)
TEST STRIP LOT #: ABNORMAL NUMERIC

## 2019-08-06 PROCEDURE — 93793 ANTICOAG MGMT PT WARFARIN: CPT | Performed by: INTERNAL MEDICINE

## 2019-08-06 PROCEDURE — 36416 COLLJ CAPILLARY BLOOD SPEC: CPT | Performed by: INTERNAL MEDICINE

## 2019-08-06 PROCEDURE — 85610 PROTHROMBIN TIME: CPT | Performed by: INTERNAL MEDICINE

## 2019-08-06 NOTE — TELEPHONE ENCOUNTER
Call transferred from [facility] after providing dose to patient, to schedule appt. S/w Julia Via language line Doyle Pitch ID# [id number]  Appointment scheduled for 8/13 at [facility].

## 2019-08-06 NOTE — PROGRESS NOTES
Informed pt coumadin dosage. Next INR check is 8/12/19. Pt transferred to coumadin clinic to make appt. Verbalized understanding.

## 2019-08-12 ENCOUNTER — TELEPHONE (OUTPATIENT)
Dept: INTERNAL MEDICINE CLINIC | Facility: CLINIC | Age: 69
End: 2019-08-12

## 2019-08-12 DIAGNOSIS — E03.9 HYPOTHYROIDISM (ACQUIRED): Primary | Chronic | ICD-10-CM

## 2019-08-12 DIAGNOSIS — I10 ESSENTIAL HYPERTENSION WITH GOAL BLOOD PRESSURE LESS THAN 130/80: Primary | ICD-10-CM

## 2019-08-12 DIAGNOSIS — E11.9 CONTROLLED TYPE 2 DIABETES MELLITUS WITHOUT COMPLICATION, WITHOUT LONG-TERM CURRENT USE OF INSULIN (HCC): Chronic | ICD-10-CM

## 2019-08-12 RX ORDER — METOPROLOL SUCCINATE 25 MG/1
25 TABLET, EXTENDED RELEASE ORAL 2 TIMES DAILY
Qty: 60 TABLET | Refills: 1 | Status: SHIPPED | OUTPATIENT
Start: 2019-08-12 | End: 2019-08-12

## 2019-08-12 NOTE — TELEPHONE ENCOUNTER
Can do tylenol 650 mg every 6 hrs. Max. 3000 mg in 24 hrs. Maybe add toprol 25 mg every 12 hrs. Monitor HR and B/P. Careful with diet and excercise at least 30 minutes 3-4 times a week. Check blood pressures at different times on different days.  Can purcha

## 2019-08-12 NOTE — TELEPHONE ENCOUNTER
Patient is having frequent headaches She thinks is her blood pressure she checks at home and is always around 156 or 154 what can she take for a headches since she is taking blood thinners please advice

## 2019-08-13 ENCOUNTER — ANTI-COAG VISIT (OUTPATIENT)
Dept: INTERNAL MEDICINE CLINIC | Facility: CLINIC | Age: 69
End: 2019-08-13
Payer: MEDICARE

## 2019-08-13 DIAGNOSIS — D68.61 ANTIPHOSPHOLIPID SYNDROME (HCC): ICD-10-CM

## 2019-08-13 DIAGNOSIS — R76.0 ANTIPHOSPHOLIPID ANTIBODY POSITIVE: ICD-10-CM

## 2019-08-13 DIAGNOSIS — I82.532 CHRONIC DEEP VEIN THROMBOSIS (DVT) OF LEFT POPLITEAL VEIN (HCC): ICD-10-CM

## 2019-08-13 LAB — INR: 3.7 (ref 2–3)

## 2019-08-13 PROCEDURE — 36416 COLLJ CAPILLARY BLOOD SPEC: CPT

## 2019-08-13 PROCEDURE — 85610 PROTHROMBIN TIME: CPT

## 2019-08-13 PROCEDURE — 93793 ANTICOAG MGMT PT WARFARIN: CPT

## 2019-08-13 NOTE — TELEPHONE ENCOUNTER
Will check renal artery ultrasound to see if there is any blockages in the arteries in the kidneys that is causing blood pressure elevation since kidneys control blood pressure. Orders were written for thyroid to be checked she is due for blood work.   She

## 2019-08-14 ENCOUNTER — APPOINTMENT (OUTPATIENT)
Dept: LAB | Facility: HOSPITAL | Age: 69
End: 2019-08-14
Attending: INTERNAL MEDICINE
Payer: MEDICARE

## 2019-08-14 DIAGNOSIS — E11.9 CONTROLLED TYPE 2 DIABETES MELLITUS WITHOUT COMPLICATION, WITHOUT LONG-TERM CURRENT USE OF INSULIN (HCC): Chronic | ICD-10-CM

## 2019-08-14 DIAGNOSIS — E03.9 HYPOTHYROIDISM (ACQUIRED): Chronic | ICD-10-CM

## 2019-08-14 LAB
ALBUMIN SERPL-MCNC: 3.6 G/DL (ref 3.4–5)
ALBUMIN/GLOB SERPL: 1 {RATIO} (ref 1–2)
ALP LIVER SERPL-CCNC: 70 U/L (ref 55–142)
ALT SERPL-CCNC: 20 U/L (ref 13–56)
ANION GAP SERPL CALC-SCNC: 7 MMOL/L (ref 0–18)
AST SERPL-CCNC: 24 U/L (ref 15–37)
BILIRUB SERPL-MCNC: 0.6 MG/DL (ref 0.1–2)
BUN BLD-MCNC: 18 MG/DL (ref 7–18)
BUN/CREAT SERPL: 18.6 (ref 10–20)
CALCIUM BLD-MCNC: 9.2 MG/DL (ref 8.5–10.1)
CHLORIDE SERPL-SCNC: 105 MMOL/L (ref 98–112)
CHOLEST SMN-MCNC: 115 MG/DL (ref ?–200)
CO2 SERPL-SCNC: 28 MMOL/L (ref 21–32)
CREAT BLD-MCNC: 0.97 MG/DL (ref 0.55–1.02)
EST. AVERAGE GLUCOSE BLD GHB EST-MCNC: 108 MG/DL (ref 68–126)
GLOBULIN PLAS-MCNC: 3.7 G/DL (ref 2.8–4.4)
GLUCOSE BLD-MCNC: 96 MG/DL (ref 70–99)
HBA1C MFR BLD HPLC: 5.4 % (ref ?–5.7)
HDLC SERPL-MCNC: 59 MG/DL (ref 40–59)
LDLC SERPL CALC-MCNC: 45 MG/DL (ref ?–100)
M PROTEIN MFR SERPL ELPH: 7.3 G/DL (ref 6.4–8.2)
NONHDLC SERPL-MCNC: 56 MG/DL (ref ?–130)
OSMOLALITY SERPL CALC.SUM OF ELEC: 292 MOSM/KG (ref 275–295)
PATIENT FASTING: YES
PATIENT FASTING: YES
POTASSIUM SERPL-SCNC: 4.6 MMOL/L (ref 3.5–5.1)
SODIUM SERPL-SCNC: 140 MMOL/L (ref 136–145)
TRIGL SERPL-MCNC: 57 MG/DL (ref 30–149)
TSI SER-ACNC: 1.32 MIU/ML (ref 0.36–3.74)
VLDLC SERPL CALC-MCNC: 11 MG/DL (ref 0–30)

## 2019-08-14 PROCEDURE — 84443 ASSAY THYROID STIM HORMONE: CPT

## 2019-08-14 PROCEDURE — 80053 COMPREHEN METABOLIC PANEL: CPT

## 2019-08-14 PROCEDURE — 36415 COLL VENOUS BLD VENIPUNCTURE: CPT

## 2019-08-14 PROCEDURE — 83036 HEMOGLOBIN GLYCOSYLATED A1C: CPT

## 2019-08-14 PROCEDURE — 80061 LIPID PANEL: CPT

## 2019-08-16 NOTE — TELEPHONE ENCOUNTER
Spoke to pt, said she already had the labs done on Wednesday and will also discuss results with  on Monday (her appt date)

## 2019-08-18 RX ORDER — METOPROLOL SUCCINATE 25 MG/1
TABLET, EXTENDED RELEASE ORAL
COMMUNITY
Start: 2019-08-12 | End: 2019-08-19

## 2019-08-19 ENCOUNTER — OFFICE VISIT (OUTPATIENT)
Dept: INTERNAL MEDICINE CLINIC | Facility: CLINIC | Age: 69
End: 2019-08-19
Payer: MEDICARE

## 2019-08-19 VITALS
DIASTOLIC BLOOD PRESSURE: 58 MMHG | SYSTOLIC BLOOD PRESSURE: 152 MMHG | HEART RATE: 73 BPM | OXYGEN SATURATION: 98 % | WEIGHT: 208 LBS | TEMPERATURE: 98 F | BODY MASS INDEX: 31 KG/M2

## 2019-08-19 DIAGNOSIS — Z12.39 BREAST CANCER SCREENING: ICD-10-CM

## 2019-08-19 DIAGNOSIS — I82.532 CHRONIC DEEP VEIN THROMBOSIS (DVT) OF LEFT POPLITEAL VEIN (HCC): ICD-10-CM

## 2019-08-19 DIAGNOSIS — D68.61 ANTIPHOSPHOLIPID SYNDROME (HCC): ICD-10-CM

## 2019-08-19 DIAGNOSIS — E03.9 HYPOTHYROIDISM (ACQUIRED): Chronic | ICD-10-CM

## 2019-08-19 DIAGNOSIS — I10 ESSENTIAL HYPERTENSION WITH GOAL BLOOD PRESSURE LESS THAN 130/80: ICD-10-CM

## 2019-08-19 DIAGNOSIS — E11.9 CONTROLLED TYPE 2 DIABETES MELLITUS WITHOUT COMPLICATION, WITHOUT LONG-TERM CURRENT USE OF INSULIN (HCC): Primary | Chronic | ICD-10-CM

## 2019-08-19 DIAGNOSIS — R51.9 HEADACHE DISORDER: ICD-10-CM

## 2019-08-19 DIAGNOSIS — N18.30 CKD (CHRONIC KIDNEY DISEASE) STAGE 3, GFR 30-59 ML/MIN (HCC): Chronic | ICD-10-CM

## 2019-08-19 PROCEDURE — 99214 OFFICE O/P EST MOD 30 MIN: CPT | Performed by: INTERNAL MEDICINE

## 2019-08-19 RX ORDER — CLONIDINE HYDROCHLORIDE 0.1 MG/1
TABLET ORAL
Qty: 180 TABLET | Refills: 1 | Status: SHIPPED | OUTPATIENT
Start: 2019-08-19 | End: 2019-12-19

## 2019-08-19 RX ORDER — SIMVASTATIN 20 MG
TABLET ORAL
Qty: 90 TABLET | Refills: 0 | Status: SHIPPED | OUTPATIENT
Start: 2019-08-19 | End: 2020-08-22

## 2019-08-19 NOTE — PATIENT INSTRUCTIONS
ASSESSMENT/PLAN:   Controlled type 2 diabetes mellitus without complication, without long-term current use of insulin (hcc)  (primary encounter diagnosis) Good control. Careful with diet and excercise at least 30 minutes 3-4 times a week.  Check sugars at d simvastatin 20 MG Oral Tab 90 tablet 0     Sig: take 1 tablet (20MG)  by oral route  2 times a week       Imaging & Referrals:  [facility] SCREENING BILAT (CPT=77067)  CT BRAIN OR HEAD ([id number])

## 2019-08-19 NOTE — TELEPHONE ENCOUNTER
Sent pt a [place] message to see if she responds. If she does not, please mail certified letter with details of Dr. Oj Arndt directives. Will postpone for 1 day.

## 2019-08-19 NOTE — PROGRESS NOTES
HPI:    Patient ID: Aubrey Springer is a 77year old female. HPI   Hypertension  Patient is here for follow up of hypertension. BP at home: same. Has been compliant with medications.   Exercise level: not active and has been following low sa 1.7 04/19/2019 08:56 AM        Lab Results   Component Value Date/Time    CHOLEST 115 08/14/2019 07:51 AM    HDL 59 08/14/2019 07:51 AM    TRIG 57 08/14/2019 07:51 AM    LDL 45 08/14/2019 07:51 AM    NONHDLC 56 08/14/2019 07:51 AM       Lab Results   [place] self-injury, sleep disturbance and suicidal ideas. The patient is not nervous/anxious and is not hyperactive. All other systems reviewed and are negative.           Current Outpatient Medications:  cloNIDine HCl 0.1 MG Oral Tab 0.1 mg every morning and 0 Vitro [employer]. Dx. E11.65 Disp: 200 strip Rfl: 6   Ondansetron HCl (ZOFRAN) 4 mg tablet Take 1 tablet (4 mg total) by mouth every 8 (eight) hours as needed for Nausea.  Disp: 30 tablet Rfl: 0   triamcinolone acetonide 0.1 % External Cream Apply Past Surgical History:   Procedure Laterality Date   • APPENDECTOMY  1993   • CATARACT EXTRACTION W/  INTRAOCULAR LENS IMPLANT Right 1988    in [facility]   • CHOLECYSTECTOMY  2006   • ELECTROCARDIOGRAM, COMPLETE  01/03/2014    SCANNED TO MEDIA TAB - 01/03/2 membrane, external ear and ear canal normal. No lacerations. No drainage, swelling or tenderness. No foreign bodies. No mastoid tenderness. Tympanic membrane is not injected, not scarred, not perforated, not erythematous, not retracted and not bulging.  Tym thyromegaly present. Cardiovascular: Normal rate, regular rhythm, S1 normal, S2 normal, normal heart sounds, intact distal pulses and normal pulses. Pulses:       Carotid pulses are 2+ on the right side, and 2+ on the left side.        Radial pulses are diaphoretic. Psychiatric: She has a normal mood and affect. Her behavior is normal. Thought content normal.   Nursing note and vitals reviewed.            ASSESSMENT/PLAN:   Controlled type 2 diabetes mellitus without complication, without long-term curre Prescriptions     Signed Prescriptions Disp Refills   • cloNIDine HCl 0.1 MG Oral Tab 180 tablet 1     Si.1 mg every morning and 0.2 mg nightly.    • simvastatin 20 MG Oral Tab 90 tablet 0     Sig: take 1 tablet (20MG)  by oral route  2 times a week

## 2019-08-20 ENCOUNTER — MED REC SCAN ONLY (OUTPATIENT)
Dept: INTERNAL MEDICINE CLINIC | Facility: CLINIC | Age: 69
End: 2019-08-20

## 2019-08-20 ENCOUNTER — ANTI-COAG VISIT (OUTPATIENT)
Dept: INTERNAL MEDICINE CLINIC | Facility: CLINIC | Age: 69
End: 2019-08-20
Payer: MEDICARE

## 2019-08-20 DIAGNOSIS — R76.0 ANTIPHOSPHOLIPID ANTIBODY POSITIVE: ICD-10-CM

## 2019-08-20 DIAGNOSIS — D68.61 ANTIPHOSPHOLIPID SYNDROME (HCC): ICD-10-CM

## 2019-08-20 DIAGNOSIS — I82.532 CHRONIC DEEP VEIN THROMBOSIS (DVT) OF LEFT POPLITEAL VEIN (HCC): ICD-10-CM

## 2019-08-20 LAB — INR: 2 (ref 2–3)

## 2019-08-20 PROCEDURE — 36416 COLLJ CAPILLARY BLOOD SPEC: CPT

## 2019-08-20 PROCEDURE — 85610 PROTHROMBIN TIME: CPT

## 2019-08-20 PROCEDURE — 93793 ANTICOAG MGMT PT WARFARIN: CPT

## 2019-08-20 NOTE — TELEPHONE ENCOUNTER
The patient was seen in the office on 8/19/19 and the change of medication was addressed. Essential hypertension with goal blood pressure less than 130/80 Not well controlled.  Increase clonidine 1 in Am ad 2 in PM. Careful with diet and excercise at eli

## 2019-08-26 DIAGNOSIS — L40.50 PSORIATIC ARTHRITIS (HCC): ICD-10-CM

## 2019-08-27 ENCOUNTER — TELEPHONE (OUTPATIENT)
Dept: INTERNAL MEDICINE CLINIC | Facility: CLINIC | Age: 69
End: 2019-08-27

## 2019-08-27 NOTE — TELEPHONE ENCOUNTER
Pt states her US apt got rescheduled for Friday was told to by  not to take her diabetic medications and was informed to refer back to her primary care physician for adjustment of diabetic medications. Please Advise.

## 2019-08-27 NOTE — TELEPHONE ENCOUNTER
PT AWAKE POST PROCEDURE, ANSWERING QUESTIONS APPROPRIATELY.  PT DENIES PAIN
OR DISCOMFORT.  PT IN SR 60-70'S, SPO2 93% RA. With Language Line #[id number], advised Dr Lauren Broderick note and verbalized understanding. Note      It is an ultrasound of her kidneys she does not have to be n.p.o. I do not see why she should be able to not take her insulin.

## 2019-08-27 NOTE — TELEPHONE ENCOUNTER
With Language Line #[id number], spoke with patient (verified name and ), clarified diabetes medication, states that she is taking Glipizide and insulin Lantus. With US kidney schedule this coming 19 at 08:30 am.    Dr Valenzuela=patient calling and r

## 2019-08-27 NOTE — TELEPHONE ENCOUNTER
It is an ultrasound of her kidneys she does not have to be n.p.o. I do not see why she should be able to not take her insulin.

## 2019-08-28 ENCOUNTER — TELEPHONE (OUTPATIENT)
Dept: CASE MANAGEMENT | Age: 69
End: 2019-08-28

## 2019-08-28 NOTE — TELEPHONE ENCOUNTER
Told her this at the appointment on August 19 and I said that let see how she does with the headache it is on her after visit summary. I also said that with a  and myself.

## 2019-08-28 NOTE — TELEPHONE ENCOUNTER
Hi,     Patient was notified with a  about the CT Brain prior authorization from her insurance. She states she is not aware of the CT [place] that was ordered. She is only aware and scheduled for an ultrasound. Please contact patient to inform about the CT Brain she only speaks Lao.     Thank you,   Claude Pitter

## 2019-08-29 NOTE — TELEPHONE ENCOUNTER
Spoke with patient via [address] [place] [address]  Dieter Rolling ID# [id number]. Informed that she has an order to get a CT of the head per Dr. Marcelo Gaines, telephone number provided. Advised when she goes for the US of the kidney before she leaves to schedule the CT of the head, and that a staff member onsite can assist her. Also advised she can have one of her daughters call to schedule the test. Patient verbalized to  understanding, no further questions at this time.

## 2019-08-30 ENCOUNTER — HOSPITAL ENCOUNTER (OUTPATIENT)
Dept: ULTRASOUND IMAGING | Facility: HOSPITAL | Age: 69
Discharge: HOME | End: 2019-08-30
Attending: INTERNAL MEDICINE
Payer: MEDICARE

## 2019-08-30 DIAGNOSIS — I10 ESSENTIAL HYPERTENSION WITH GOAL BLOOD PRESSURE LESS THAN 130/80: ICD-10-CM

## 2019-08-30 PROCEDURE — 76775 US EXAM ABDO BACK WALL LIM: CPT | Performed by: INTERNAL MEDICINE

## 2019-08-30 PROCEDURE — 93975 VASCULAR STUDY: CPT | Performed by: INTERNAL MEDICINE

## 2019-09-10 ENCOUNTER — ANTI-COAG VISIT (OUTPATIENT)
Dept: INTERNAL MEDICINE CLINIC | Facility: CLINIC | Age: 69
End: 2019-09-10
Payer: MEDICARE

## 2019-09-10 DIAGNOSIS — R76.0 ANTIPHOSPHOLIPID ANTIBODY POSITIVE: ICD-10-CM

## 2019-09-10 DIAGNOSIS — I82.532 CHRONIC DEEP VEIN THROMBOSIS (DVT) OF LEFT POPLITEAL VEIN (HCC): ICD-10-CM

## 2019-09-10 DIAGNOSIS — D68.61 ANTIPHOSPHOLIPID SYNDROME (HCC): ICD-10-CM

## 2019-09-10 LAB — INR: 2.6 (ref 2–3)

## 2019-09-10 PROCEDURE — 36416 COLLJ CAPILLARY BLOOD SPEC: CPT

## 2019-09-10 PROCEDURE — 93793 ANTICOAG MGMT PT WARFARIN: CPT

## 2019-09-10 PROCEDURE — 85610 PROTHROMBIN TIME: CPT

## 2019-09-12 ENCOUNTER — IMMUNIZATION (OUTPATIENT)
Dept: INTERNAL MEDICINE CLINIC | Facility: CLINIC | Age: 69
End: 2019-09-12
Payer: MEDICARE

## 2019-09-12 ENCOUNTER — MED REC SCAN ONLY (OUTPATIENT)
Dept: INTERNAL MEDICINE CLINIC | Facility: CLINIC | Age: 69
End: 2019-09-12

## 2019-09-12 DIAGNOSIS — Z23 NEED FOR VACCINATION: ICD-10-CM

## 2019-09-12 PROCEDURE — G0008 ADMIN INFLUENZA VIRUS VAC: HCPCS | Performed by: INTERNAL MEDICINE

## 2019-09-12 PROCEDURE — 90662 IIV NO PRSV INCREASED AG IM: CPT | Performed by: INTERNAL MEDICINE

## 2019-09-12 NOTE — TELEPHONE ENCOUNTER
90 days with refill   Current Outpatient Medications:   ••  LOSARTAN 100 MG Oral Tab, TAKE ONE TABLET BY MOUTH ONE TIME DAILY , Disp: 90 tablet, Rfl: 0  sp: , Rfl:

## 2019-09-13 RX ORDER — LOSARTAN POTASSIUM 100 MG/1
100 TABLET ORAL
Qty: 90 TABLET | Refills: 1 | Status: SHIPPED | OUTPATIENT
Start: 2019-09-13 | End: 2019-12-23 | Stop reason: ALTCHOICE

## 2019-09-13 NOTE — TELEPHONE ENCOUNTER
Refill passed per [facility] protocol.   Hypertensive Medications  Protocol Criteria:  · Appointment scheduled in the past 6 months or in the next 3 months  · BMP or CMP in the past 12 months  · Creatinine result < 2  Recent Outpatient Visits 105 08/14/2019    CO2 28.0 08/14/2019    GLOBULIN 3.7 08/14/2019    AGRATIO 1.1 07/15/2016    ANIONGAP 7 08/14/2019    OSMOCALC 292 08/14/2019

## 2019-09-20 ENCOUNTER — HOSPITAL ENCOUNTER (OUTPATIENT)
Dept: CT IMAGING | Facility: HOSPITAL | Age: 69
Discharge: HOME | End: 2019-09-20
Attending: INTERNAL MEDICINE
Payer: MEDICARE

## 2019-09-20 ENCOUNTER — LAB ENCOUNTER (OUTPATIENT)
Dept: LAB | Facility: HOSPITAL | Age: 69
End: 2019-09-20
Attending: INTERNAL MEDICINE
Payer: MEDICARE

## 2019-09-20 DIAGNOSIS — L40.9 PSORIASIS: ICD-10-CM

## 2019-09-20 DIAGNOSIS — R76.8 POSITIVE ANA (ANTINUCLEAR ANTIBODY): ICD-10-CM

## 2019-09-20 DIAGNOSIS — D68.61 ANTIPHOSPHOLIPID SYNDROME (HCC): ICD-10-CM

## 2019-09-20 DIAGNOSIS — R51.9 HEADACHE DISORDER: ICD-10-CM

## 2019-09-20 DIAGNOSIS — Z51.81 MEDICATION MONITORING ENCOUNTER: ICD-10-CM

## 2019-09-20 DIAGNOSIS — L40.50 PSORIATIC ARTHRITIS (HCC): ICD-10-CM

## 2019-09-20 LAB
ALBUMIN SERPL-MCNC: 3.5 G/DL (ref 3.4–5)
ALT SERPL-CCNC: 17 U/L (ref 13–56)
AST SERPL-CCNC: 14 U/L (ref 15–37)
BASOPHILS # BLD AUTO: 0.04 X10(3) UL (ref 0–0.2)
BASOPHILS NFR BLD AUTO: 0.5 %
CREAT BLD-MCNC: 0.99 MG/DL (ref 0.55–1.02)
CRP SERPL-MCNC: 1.17 MG/DL (ref ?–0.3)
DEPRECATED RDW RBC AUTO: 47.1 FL (ref 35.1–46.3)
EOSINOPHIL # BLD AUTO: 0.17 X10(3) UL (ref 0–0.7)
EOSINOPHIL NFR BLD AUTO: 2.2 %
ERYTHROCYTE [DISTWIDTH] IN BLOOD BY AUTOMATED COUNT: 13.9 % (ref 11–15)
ERYTHROCYTE [SEDIMENTATION RATE] IN BLOOD: 72 MM/HR (ref 0–30)
HCT VFR BLD AUTO: 31 % (ref 35–48)
HGB BLD-MCNC: 10.2 G/DL (ref 12–16)
IMM GRANULOCYTES # BLD AUTO: 0.03 X10(3) UL (ref 0–1)
IMM GRANULOCYTES NFR BLD: 0.4 %
LYMPHOCYTES # BLD AUTO: 1.46 X10(3) UL (ref 1–4)
LYMPHOCYTES NFR BLD AUTO: 18.6 %
MCH RBC QN AUTO: 31.5 PG (ref 26–34)
MCHC RBC AUTO-ENTMCNC: 32.9 G/DL (ref 31–37)
MCV RBC AUTO: 95.7 FL (ref 80–100)
MONOCYTES # BLD AUTO: 0.62 X10(3) UL (ref 0.1–1)
MONOCYTES NFR BLD AUTO: 7.9 %
NEUTROPHILS # BLD AUTO: 5.53 X10 (3) UL (ref 1.5–7.7)
NEUTROPHILS # BLD AUTO: 5.53 X10(3) UL (ref 1.5–7.7)
NEUTROPHILS NFR BLD AUTO: 70.4 %
PLATELET # BLD AUTO: 420 10(3)UL (ref 150–450)
RBC # BLD AUTO: 3.24 X10(6)UL (ref 3.8–5.3)
WBC # BLD AUTO: 7.9 X10(3) UL (ref 4–11)

## 2019-09-20 PROCEDURE — 85652 RBC SED RATE AUTOMATED: CPT

## 2019-09-20 PROCEDURE — 84450 TRANSFERASE (AST) (SGOT): CPT

## 2019-09-20 PROCEDURE — 84460 ALANINE AMINO (ALT) (SGPT): CPT

## 2019-09-20 PROCEDURE — 70450 CT HEAD/BRAIN W/O DYE: CPT | Performed by: INTERNAL MEDICINE

## 2019-09-20 PROCEDURE — 85025 COMPLETE CBC W/AUTO DIFF WBC: CPT

## 2019-09-20 PROCEDURE — 86140 C-REACTIVE PROTEIN: CPT

## 2019-09-20 PROCEDURE — 82565 ASSAY OF CREATININE: CPT

## 2019-09-20 PROCEDURE — 36415 COLL VENOUS BLD VENIPUNCTURE: CPT

## 2019-09-20 PROCEDURE — 82040 ASSAY OF SERUM ALBUMIN: CPT

## 2019-09-24 ENCOUNTER — NURSE TRIAGE (OUTPATIENT)
Dept: OTHER | Age: 69
End: 2019-09-24

## 2019-09-24 NOTE — TELEPHONE ENCOUNTER
Dr. Keyla Fernandez after given pt her directions on how to take her medication and gave her a sooner appt with . Pls see result encounter. Pt stated that  she just checked her blood pressure and it was 174/62 HR86.  She stated that she does not feel a he

## 2019-09-25 ENCOUNTER — OFFICE VISIT (OUTPATIENT)
Dept: RHEUMATOLOGY | Facility: CLINIC | Age: 69
End: 2019-09-25
Payer: MEDICARE

## 2019-09-25 VITALS
SYSTOLIC BLOOD PRESSURE: 160 MMHG | BODY MASS INDEX: 31.46 KG/M2 | HEART RATE: 90 BPM | DIASTOLIC BLOOD PRESSURE: 67 MMHG | WEIGHT: 210 LBS | HEIGHT: 68.5 IN | RESPIRATION RATE: 16 BRPM

## 2019-09-25 DIAGNOSIS — Z51.81 MEDICATION MONITORING ENCOUNTER: ICD-10-CM

## 2019-09-25 DIAGNOSIS — L40.9 PSORIASIS: ICD-10-CM

## 2019-09-25 DIAGNOSIS — D68.61 ANTIPHOSPHOLIPID SYNDROME (HCC): ICD-10-CM

## 2019-09-25 DIAGNOSIS — L40.50 PSORIATIC ARTHRITIS (HCC): Primary | ICD-10-CM

## 2019-09-25 DIAGNOSIS — R76.8 POSITIVE ANA (ANTINUCLEAR ANTIBODY): ICD-10-CM

## 2019-09-25 PROCEDURE — 99214 OFFICE O/P EST MOD 30 MIN: CPT | Performed by: INTERNAL MEDICINE

## 2019-09-25 NOTE — PATIENT INSTRUCTIONS
You are seen today for psoriatic arthritis  Continue methotrexate 10 pills weekly and folic acid daily  You continue to have flares plan to start initiation of Cosentyx, prior authorization will be needed  Follow up in about 6 to 8 weeks

## 2019-09-25 NOTE — PROGRESS NOTES
Aubrey Springer is a 77year old female. HPI:   Patient presents with:   Follow - Up  Psoriatic Arthritis: pt c/o pain and swelling of joints  Medication Follow-Up      I had the pleasure of seeing Aubrey Springer on 9/25/2019 for Take 1 tablet (100 mg total) by mouth once daily. Disp: 90 tablet Rfl: 1   Methotrexate Sodium 2.5 MG Oral Tab Take 10 tablets (25 mg total) by mouth once a week.  Disp: 120 tablet Rfl: 0   cloNIDine HCl 0.1 MG Oral Tab 0.1 mg every morning and 0.2 mg night ultrafine MINI.  Disp: 100 each Rfl: 6   Glucose Blood (BENNY CONTOUR NEXT TEST) In Vitro Strip TEST 2 times  A DAY Dx: Uncontrolled type 2 diabetes mellitus without complication, without long-term current use of insulin ([facility])  E11.65 Disp: 200 strip Rfl: 1 changes   MSK:  Cervical spine: FROM  Hands: no synovitis in DIP, PIP and MCP, strong full fists  Wrist: b/l wrist with chronic synovitis, minimal swelling.  Able to extend and flex with minimal pain  Elbow: FROM, no pain or swelling or warmth on palpation % 0.5   Immature Granulocyte %      % 0.4   CREATININE      0.55 - 1.02 mg/dL 0.99   eGFR NON-AFR.  AMERICAN      >=60 59 (L)   eGFR       >=60 68   Albumin      3.4 - 5.0 g/dL 3.5   ALT (SGPT)      13 - 56 U/L 17   AST (SGOT)      15 - 37 Antibody      0.0 - 14.9 GPL 6.2   Cardiolipin IgM Antibody      0.0 - 12.4 MPL 82.2 (H)       Component      Latest Ref Rng & Units 12/12/2018   TOTAL PROTEIN      6.5 - 9.1 g/dL 7.5   Albumin      3.75 - 5.21 g/dL 3.74 (L)   ALPHA-1-GLOBULINS      0.19 - DVT.    ASSESSMENT/PLAN:   61-year-old Solomon Islander-speaking female initially referred for a + ADRIEL and rash. She was seen by dermatology and rashes consistent with psoriasis. She was prescribed clobetasol cream and shampoo her scalp and her rash is improved. Lovenox and Coumadin    Kidney insuffiencey- resolved  - She follows with nephrology     HA and blurry vision likely due to uncontrolled HTN  - no jaw pain but also has elevated inflammatory markers.   Elevated ESR likely due to uncontrolled psoriatic arthr

## 2019-09-26 ENCOUNTER — TELEPHONE (OUTPATIENT)
Dept: RHEUMATOLOGY | Facility: CLINIC | Age: 69
End: 2019-09-26

## 2019-09-26 NOTE — TELEPHONE ENCOUNTER
Spoke to patient and states she was already contacted and given specific instructions on how to take her medications. She is to f/u with BP readings in a week. Chart reviewed. Refer to result notes CT brain 9/20/19.   Documentation noted that patient was

## 2019-09-26 NOTE — TELEPHONE ENCOUNTER
Increase clonidine to 0.1 mg in the morning which is 1 tablet and 1 tablet of 0.01 mg in the afternoon and 1 tablet of 0.2 mg in the evening. In 1 week with blood pressure readings.   Yes patient does spend a lot of time she goes over the same questions ov

## 2019-10-01 NOTE — TELEPHONE ENCOUNTER
Active 9/26/2019 Lena Garcia 4/21/1950 Cosentyx Pending 9/30: PA already in place! 9/27/2019-9/27/2021, authorization #: [id number]. Spoke with Valerie Wiggins and we are helping her to sign up for the [employer] PAP.  We will send you the form you

## 2019-10-05 ENCOUNTER — LAB ENCOUNTER (OUTPATIENT)
Dept: LAB | Facility: HOSPITAL | Age: 69
End: 2019-10-05
Attending: INTERNAL MEDICINE
Payer: MEDICARE

## 2019-10-05 DIAGNOSIS — N18.30 CKD (CHRONIC KIDNEY DISEASE), STAGE III (HCC): ICD-10-CM

## 2019-10-05 DIAGNOSIS — R80.9 NON-NEPHROTIC RANGE PROTEINURIA: ICD-10-CM

## 2019-10-05 PROCEDURE — 36415 COLL VENOUS BLD VENIPUNCTURE: CPT

## 2019-10-05 PROCEDURE — 80048 BASIC METABOLIC PNL TOTAL CA: CPT

## 2019-10-06 ENCOUNTER — APPOINTMENT (OUTPATIENT)
Dept: LAB | Facility: HOSPITAL | Age: 69
End: 2019-10-06
Attending: INTERNAL MEDICINE
Payer: MEDICARE

## 2019-10-08 ENCOUNTER — ANTI-COAG VISIT (OUTPATIENT)
Dept: INTERNAL MEDICINE CLINIC | Facility: CLINIC | Age: 69
End: 2019-10-08
Payer: MEDICARE

## 2019-10-08 DIAGNOSIS — R76.0 ANTIPHOSPHOLIPID ANTIBODY POSITIVE: ICD-10-CM

## 2019-10-08 DIAGNOSIS — D68.61 ANTIPHOSPHOLIPID SYNDROME (HCC): ICD-10-CM

## 2019-10-08 DIAGNOSIS — I82.532 CHRONIC DEEP VEIN THROMBOSIS (DVT) OF LEFT POPLITEAL VEIN (HCC): ICD-10-CM

## 2019-10-08 PROCEDURE — 85610 PROTHROMBIN TIME: CPT

## 2019-10-08 PROCEDURE — 93793 ANTICOAG MGMT PT WARFARIN: CPT

## 2019-10-08 PROCEDURE — 36416 COLLJ CAPILLARY BLOOD SPEC: CPT

## 2019-10-09 ENCOUNTER — TELEPHONE (OUTPATIENT)
Dept: INTERNAL MEDICINE CLINIC | Facility: CLINIC | Age: 69
End: 2019-10-09

## 2019-10-09 NOTE — PROGRESS NOTES
See TE 10/8/19 anti coagulation note. With Language Line #[id number], spoke with patient and states that she is aware of the coumadin dose.     Notes recorded by Huang Hernandez MD on 10/9/2019 at 3:10 PM CDT  Who is managing her INR can you check please wit

## 2019-10-10 ENCOUNTER — OFFICE VISIT (OUTPATIENT)
Dept: NEPHROLOGY | Facility: CLINIC | Age: 69
End: 2019-10-10
Payer: MEDICARE

## 2019-10-10 VITALS
HEART RATE: 94 BPM | DIASTOLIC BLOOD PRESSURE: 50 MMHG | BODY MASS INDEX: 31.08 KG/M2 | SYSTOLIC BLOOD PRESSURE: 146 MMHG | TEMPERATURE: 99 F | WEIGHT: 209.81 LBS | HEIGHT: 69 IN

## 2019-10-10 DIAGNOSIS — R31.29 MICROSCOPIC HEMATURIA: ICD-10-CM

## 2019-10-10 DIAGNOSIS — N18.30 CKD (CHRONIC KIDNEY DISEASE), STAGE III (HCC): Primary | ICD-10-CM

## 2019-10-10 PROCEDURE — 99214 OFFICE O/P EST MOD 30 MIN: CPT | Performed by: INTERNAL MEDICINE

## 2019-10-10 NOTE — PATIENT INSTRUCTIONS
Follow up in 8 weeks   Blood test in 4 weeks   Schedule an appointment with urologist   Call in 2 weeks with home BP readings when not in pain

## 2019-10-10 NOTE — PROGRESS NOTES
Progress Note:      Patient is a 77 yrs old [address] speaking female with pmh of multinodular goiter s/p thyroidectomy, HL, DM II x 25 yrs , HTN x 4 yrs, +ve antiphospholipid with popliteal DVT in Nov 2018 on [facility], psoriatic arthritis who presented today fo Performed by Josi Sullivan MD at [address] [place] [address] OR   • THYROIDECTOMY  08/17/15    TOTAL   • TUBAL LIGATION        Family History   Problem Relation Age of Onset   • Diabetes Mother    • Hypertension Mother    • Dementia Mother         Alzheimer's Disease   • mouth before breakfast and 1/2 tablet before dinner Disp: 90 tablet Rfl: 1   Levothyroxine Sodium 100 MCG Oral Tab Take 2 tablets (200 mcg total) by mouth before breakfast. Disp: 180 tablet Rfl: 1   FOLIC ACID 1 MG Oral Tab take 1 tablet by mouth daily Dis area two times a day  Disp: 45 g Rfl: 0   Clobetasol Propionate 0.05 % External Solution Apply 1 mL topically 2 (two) times daily. Disp: 50 mL Rfl: 6   Enoxaparin Sodium 150 MG/ML Subcutaneous Solution Inject 0.63 mL  into the skin every 12 hours.  Disp: 20 adenopathy  Lymphatic: no abnormal cervical, supraclavicular adenopathy is noted  Respiratory:  lungs are clear to auscultation bilaterally  Cardiovascular: regular rate and rhythm   Abdomen: soft, non-tender, non-distended  Skin/Hair:  no abnormal bruisin DIL    Orders This Visit:  Orders Placed This Encounter      Renal Function Panel      Meds This Visit:  Requested Prescriptions      No prescriptions requested or ordered in this encounter       Imaging & Referrals:  None     10/10/2019  Sterling Camilo,

## 2019-10-14 ENCOUNTER — TELEPHONE (OUTPATIENT)
Dept: INTERNAL MEDICINE CLINIC | Facility: CLINIC | Age: 69
End: 2019-10-14

## 2019-10-14 ENCOUNTER — OFFICE VISIT (OUTPATIENT)
Dept: NEUROLOGY | Facility: CLINIC | Age: 69
End: 2019-10-14
Payer: MEDICARE

## 2019-10-14 VITALS
DIASTOLIC BLOOD PRESSURE: 60 MMHG | HEART RATE: 88 BPM | BODY MASS INDEX: 30.96 KG/M2 | HEIGHT: 69 IN | WEIGHT: 209 LBS | SYSTOLIC BLOOD PRESSURE: 160 MMHG | RESPIRATION RATE: 16 BRPM

## 2019-10-14 DIAGNOSIS — G44.209 TENSION HEADACHE: Primary | ICD-10-CM

## 2019-10-14 DIAGNOSIS — H53.8 BLURRED VISION, BILATERAL: ICD-10-CM

## 2019-10-14 PROCEDURE — 99204 OFFICE O/P NEW MOD 45 MIN: CPT | Performed by: OTHER

## 2019-10-14 RX ORDER — PROPRANOLOL HCL 60 MG
60 CAPSULE, EXTENDED RELEASE 24HR ORAL DAILY
Qty: 30 CAPSULE | Refills: 3 | Status: SHIPPED | OUTPATIENT
Start: 2019-10-14 | End: 2019-11-13

## 2019-10-14 NOTE — PROGRESS NOTES
Neurology Outpatient Consult Note    Zo Valentine : 1950   Referring Physician: Dr. Lisa Aguirre  HPI:     Zo Valentine is a 77year old female who is being seen in neurologic evaluation.     Patient being seen in [facility] daily., Disp: 90 tablet, Rfl: 1  glipiZIDE 10 MG Oral Tab, take 1/2 tablet by mouth before breakfast and 1/2 tablet before dinner, Disp: 90 tablet, Rfl: 1  Levothyroxine Sodium 100 MCG Oral Tab, Take 2 tablets (200 mcg total) by mouth before breakfast., Shad Barber Disp: 30 tablet, Rfl: 0  triamcinolone acetonide 0.1 % External Cream, Apply 1 Application topically 2 (two) times daily. , Disp: 454 g, Rfl: 2  Levothyroxine Sodium 88 MCG Oral Tab, Take 1 tablet (88 mcg total) by mouth before breakfast., Disp: 90 tablet, Paternal Cousin Male    • Colon Cancer Son 37   • Glaucoma Neg    • Macular degeneration Neg    • Breast Cancer Neg    • Ovarian Cancer Neg    • DCIS Neg    • LCIS Neg    • BRCA gene + Neg    • Ashkenazi Catholic Descent Neg       Social History:  Social His bilateral biceps, brachioradialis, patella  Coordination / gait: no finger-nose-finger dysmetria, normal gait    Studies / labs: Reviewed  ESR 72  CRP 1.17  HgbA1c 5.4  TSH nl    CT head, images and report reviewed  CONCLUSION:   1.  No acute intracranial p

## 2019-10-14 NOTE — TELEPHONE ENCOUNTER
Noted; CHRISTOPHER clinical staff, if we can please call and instruct re: initiating propranolol 60 mg/day, monitor BP and HR and advise us if below limits outlined (HR < 60, BP < 135/85). Can cause drowsiness, dizziness. Rx sent.

## 2019-10-14 NOTE — PATIENT INSTRUCTIONS
Dolor de [place] tensional    [place] tensión muscular es kodak causa muy común de dolor de [place]. También se conoce edwin “dolor de ghanshyam por estrés”.  Cuando están estresadas, algunas personas tensan los músculos de los hombros, del luz y el cuero cabellud Programe kodak visita de control con lo proveedor de atención médica si lo dolor de ghanshyam no ha karen en las 24 horas siguientes. Hable con lo proveedor si tiene karen de ghanshyam frecuentes. El proveedor puede darle un plan de tratamiento.  Pregúntele si Cuanto antes reconozca los síntomas de un dolor de ghanshyam tensional, más fácil será tratarlo.  Los karen de ghanshyam tensionales pueden:  · Comenzar con fatiga, tensión o dolor en el luz y los hombros.  · Producir kodak sensación edwin si Lillia Marcie kodak cinta · El acetaminofén, que se [place] para aliviar el dolor, y algunas fórmulas que contienen cafeína.   · Los relajantes musculares pueden reducir las contracciones musculares dolorosas.   [place] nasima medicamentos en forma persaud  Tenga en cuenta que:  · El consumo d © 7300-4856 [address] [facility], [address] [facility]. Todos los derechos reservados. Esta información no pretende sustituir la atención médica profesional. Sólo lo médico puede diagnosticar y tratar un problema de horace. Shankar Gonzalez y [address], [place]win el ibuprofeno y el paracetamol, pueden aliviar el dolor de [place]. Recuerde: Ethel dé aspirina a kodak persona de menos de 25 años por el riesgo de que presenten síndrome de [place].  Use calmantes para el · Dolor de ghanshyam acompañado de dificultad para articular las palabras, cambios en la visión, o insensibilidad o debilidad en los brazos o en las piernas. · Un dolor de ghanshyam que dura más de aura amanda.   · Flaca de ghanshyam frecuentes, especialmente por l

## 2019-10-14 NOTE — TELEPHONE ENCOUNTER
Can try propranolol XL may be 60-week at night. Patient needs to check blood pressures and heart rate will help blood pressure and lower heart rate. Heart rate less than 60 do not want.   Being careful with blood pressure we want to get around less than 1

## 2019-10-14 NOTE — TELEPHONE ENCOUNTER
Spoke to aIn, patient's daughter and notified her of below. She was understanding and thankful for the call.

## 2019-10-14 NOTE — TELEPHONE ENCOUNTER
Dr. Jonah Lui, is this message intended for Dr. Wanda Ogden or are you ordering propranolol XL and are these instructions you want RN triage to give the patient? Please advise.

## 2019-10-14 NOTE — TELEPHONE ENCOUNTER
----- Message from Rocael Cancino MD sent at 10/14/2019  4:19 PM CDT -----  Regarding: RE: ? propranolol  Thank you. I saw your note in the Epic chart. I will let our office know to call pt.     Briscoe Bernheim  ----- Message -----  From: Zackary Carrel., MD  Sent:

## 2019-10-14 NOTE — TELEPHONE ENCOUNTER
----- Message from Cristofer Day MD sent at 10/14/2019 10:53 AM CDT -----  Regarding: ? propranolol  Carmelo Ruiz,    I just sent you the note for this pt today, but realized I forgot to ask you directly: do you think it would be feasible to put her on propra

## 2019-10-16 NOTE — TELEPHONE ENCOUNTER
Copy of [employer] pt assistance application mailed to pt. Pt aware to complete section 1-5 and return to office for completion of provider portion.

## 2019-11-05 ENCOUNTER — ANTI-COAG VISIT (OUTPATIENT)
Dept: INTERNAL MEDICINE CLINIC | Facility: CLINIC | Age: 69
End: 2019-11-05
Payer: MEDICARE

## 2019-11-05 DIAGNOSIS — D68.61 ANTIPHOSPHOLIPID SYNDROME (HCC): ICD-10-CM

## 2019-11-05 DIAGNOSIS — R76.0 ANTIPHOSPHOLIPID ANTIBODY POSITIVE: ICD-10-CM

## 2019-11-05 DIAGNOSIS — I82.532 CHRONIC DEEP VEIN THROMBOSIS (DVT) OF LEFT POPLITEAL VEIN (HCC): ICD-10-CM

## 2019-11-05 PROCEDURE — 93793 ANTICOAG MGMT PT WARFARIN: CPT

## 2019-11-05 PROCEDURE — 85610 PROTHROMBIN TIME: CPT

## 2019-11-05 PROCEDURE — 36416 COLLJ CAPILLARY BLOOD SPEC: CPT

## 2019-11-06 ENCOUNTER — TELEPHONE (OUTPATIENT)
Dept: NEPHROLOGY | Facility: CLINIC | Age: 69
End: 2019-11-06

## 2019-11-06 NOTE — TELEPHONE ENCOUNTER
Patient needs clarification, patient was to follow up with Dr.Sher Corinna Pinto in 8 weeks. Patient scheduled with Urology 12/17/19 and asking if appointment with Dr.Sher Corinna Pinto should be changed to later date after her appointment with Urology.  Please call with yara

## 2019-11-06 NOTE — TELEPHONE ENCOUNTER
Patient calling with results from blood pressure readings. Patient indicates her reading are higher when done at the doctors office, but when checked at home the readings are lower.    Am Hr Pm Hr Noc hr  DATE BP  10/25 150/78 76  154/70 65  134/5       97

## 2019-11-08 NOTE — TELEPHONE ENCOUNTER
Patient contacted using [employer] interpretor Gene [id number]. Dr. Jamaal Whiteside's advice regarding blood pressure readings. Patient understands and will do as advised.

## 2019-11-08 NOTE — TELEPHONE ENCOUNTER
These are only 4 days reading - call back with atleast 2 weeks for readings.  Once a day readings and 4-5 times a week is sufficient

## 2019-11-08 NOTE — TELEPHONE ENCOUNTER
Patient contacted using [employer] I[place]ian interpretor Gene [id number].  Advised per Dr. Jenna Meyer that it is ok to see Urology first. Original appointment was cancelled and rescheduled for 12/23/19 at 10:30 am.

## 2019-11-08 NOTE — TELEPHONE ENCOUNTER
Prescriber portion of pt application faxed to [facility], confirmation received. Pt's daughter updated.

## 2019-11-14 ENCOUNTER — MA CHART PREP (OUTPATIENT)
Dept: FAMILY MEDICINE CLINIC | Facility: CLINIC | Age: 69
End: 2019-11-14

## 2019-11-14 PROBLEM — R31.29 MICROSCOPIC HEMATURIA: Status: ACTIVE | Noted: 2019-11-14

## 2019-11-14 PROBLEM — I67.2 INTRACRANIAL ATHEROSCLEROSIS: Status: ACTIVE | Noted: 2019-11-14

## 2019-11-15 ENCOUNTER — TELEPHONE (OUTPATIENT)
Dept: RHEUMATOLOGY | Facility: CLINIC | Age: 69
End: 2019-11-15

## 2019-11-15 NOTE — TELEPHONE ENCOUNTER
Notification received from [employer] - pt may be eligible for assistance from an alternate funding source (Medicare -[phone]).  Pt should contact alternate program. While awaiting outcome, pt may receive up to 3 month supply of Cosentyx from [employer]

## 2019-11-15 NOTE — TELEPHONE ENCOUNTER
Notification received from [employer] - pt may be eligible for assistance from an alternate funding source (Medicare -[id number]).  Pt should contact alternate program. While awaiting outcome, pt may receive up tp 3 month supply of Cosentyx from [employer]

## 2019-11-15 NOTE — TELEPHONE ENCOUNTER
Addi from [facility] called said she is trying schedule delivery for patient's medication    Please call

## 2019-11-18 DIAGNOSIS — L40.50 PSORIATIC ARTHRITIS (HCC): ICD-10-CM

## 2019-11-18 NOTE — TELEPHONE ENCOUNTER
Last filled: 8-28-19 #120 tab with 0 refills   LOV: 9-25-19  Future Appointments   Date Time Provider Zac Steph   11/25/2019  2:30 PM Lashae Boone MD [id number]   12/3/2019  6:15 PM [facility] Sobia Hogan   12/17/201 positive (tripple +). She is also found to have low C4  - She has no other features of lupus therefore diagnosis is mostly consistent with antiphospholipid syndrome.   Repeat testing 12 weeks apart confirm diagnosis of APL  - She is following with hematolo

## 2019-11-18 NOTE — TELEPHONE ENCOUNTER
See 9/26 telephone encounter - daughter Gr Space was informed of alternate funding option and will discuss with mother.

## 2019-11-18 NOTE — TELEPHONE ENCOUNTER
Contacted pt's daughter Maximino Tam and informed of below. She will discuss with her mother and apply for Medicare LIS.  Pt's daughter is aware that [facility] pt assistance program can provide up to 3 month supply of Cosentyx while awaiting answer from Medicare L

## 2019-11-20 DIAGNOSIS — L40.50 PSORIATIC ARTHRITIS (HCC): ICD-10-CM

## 2019-11-22 DIAGNOSIS — L40.50 PSORIATIC ARTHRITIS (HCC): ICD-10-CM

## 2019-11-22 RX ORDER — WARFARIN SODIUM 5 MG/1
TABLET ORAL
Qty: 180 TABLET | Refills: 0 | Status: SHIPPED | OUTPATIENT
Start: 2019-11-22 | End: 2020-04-06

## 2019-11-23 RX ORDER — WARFARIN SODIUM 5 MG/1
TABLET ORAL
Qty: 180 TABLET | Refills: 0 | OUTPATIENT
Start: 2019-11-23

## 2019-11-23 NOTE — PROGRESS NOTES
HPI:    Patient ID: Tiffanie Dasilva is a 77year old female.     HPI   Tiffanie Dasilva is a 77year old female who presents for a complete physical exam.   HPI:   Patient presents with:  Wellness Visit: medicare annual       No LMP r 2.5 MG Oral Tab Take 10 tablets by mouth once a week. 120 tablet 0   • losartan 100 MG Oral Tab Take 1 tablet (100 mg total) by mouth once daily. 90 tablet 1   • cloNIDine HCl 0.1 MG Oral Tab 0.1 mg every morning and 0.2 mg nightly.  180 tablet 1   • simvas Ethyl Esters (LOVAZA) 1 G Oral Cap Take 1 g by mouth 2 (two) times daily with meals.        • LEVOTHYROXINE SODIUM 100 MCG Oral Tab Take 2 tablets (200 mcg total) by mouth before breakfast. 180 tablet 1   • CLOBETASOL PROPIONATE 0.05 % External Cream apply Glaucoma Neg    • Macular degeneration Neg    • Breast Cancer Neg    • Ovarian Cancer Neg    • DCIS Neg    • LCIS Neg    • BRCA gene + Neg    • Ashkenazi Restorationism Descent Neg       Social History:  Social History    Socioeconomic History      Marital status: sugars 1 times daily. Fasting sugars average 's. Watches diabetic diet, low salt.   Diabetic Flow sheet  DIABETIC FLOWSHEET (EEH) Latest Ref Rng & Units 11/25/2019 11/25/2019 11/25/2019 11/25/2019   BMI (kg/m2) - - - - -   Hgb A1c <5.7 % - - - - pain and vomiting. Endocrine: Negative for cold intolerance, heat intolerance, polydipsia, polyphagia and polyuria.    Genitourinary: Negative for decreased urine volume, difficulty urinating, dysuria, flank pain, frequency, hematuria, menstrual problem, Blood In [place]. Dx. E11.65 200 strip 6   • Ondansetron HCl (ZOFRAN) 4 mg tablet Take 1 tablet (4 mg total) by mouth every 8 (eight) hours as needed for Nausea.  30 tablet 0   • Insulin Pen Needle (BD PEN NEEDLE ULTRAFINE) 29G X 12.7MM Do • APPENDECTOMY  1993   • CATARACT EXTRACTION W/  INTRAOCULAR LENS IMPLANT Right 1988    in [facility]   • CHOLECYSTECTOMY  2006   • ELECTROCARDIOGRAM, COMPLETE  01/03/2014    SCANNED TO MEDIA TAB - 01/03/2014   • FRACTURE SURGERY Left     LEFT FOOT SURGERY W Tympanic membrane, external ear and ear canal normal. No lacerations. No drainage, swelling or tenderness. No foreign bodies. No mastoid tenderness.  Tympanic membrane is not injected, not scarred, not perforated, not erythematous, not retracted and not bul hemorrhage. No scleral icterus. Right eye exhibits normal extraocular motion and no nystagmus. Left eye exhibits normal extraocular motion and no nystagmus. Neck: Trachea normal and phonation normal. Neck supple.  Normal carotid pulses, no hepatojugular r submental, no submandibular, no preauricular, no posterior auricular and no occipital adenopathy present. Head (left side): No submental, no submandibular, no preauricular, no posterior auricular and no occipital adenopathy present.      She has no c But also, careful with fruits and natural sugars. One serving a day and no more than 1 handful every day. Check feet  every AM and careful with sores and ulcers on feet bilaterally.  Check eyes every year with dilated eye exam. Check sugar before 110, eat mo see flow sheet section for details.       Depression Screening (PHQ-2/PHQ-9): Over the LAST 2 WEEKS   Little interest or pleasure in doing things (over the last two weeks)?: Not at all  Feeling down, depressed, or hopeless (over the last two weeks)?: Not at both upper and lower lids     History of pterygium excision     Essential hypertension with goal blood pressure less than 130/80     B12 deficiency     Abnormal computed tomography angiography (CTA)     Severe obesity (BMI 35.0-39. 9) with comorbidity ([address] dinner  [DISCONTINUED] Levothyroxine Sodium 100 MCG Oral Tab, Take 2 tablets (200 mcg total) by mouth before breakfast.  insulin glargine (LANTUS SOLOSTAR) 100 UNIT/ML Subcutaneous Solution Pen-injector, INJECT 30UNITS IN THE MORNING AND 6 UNITS IN THE ALBINO her son; Dementia in her mother and another family member; Diabetes in her brother, mother, paternal uncle, and another family member; Heart Disease in her father; Hypertension in her brother and mother; Lipids in her brother; No Known Problems in her daug annoyed because I misunderstand what they say:  Sometimes   I misunderstand what others are saying and make inappropriate responses:  Sometimes I avoid social activities because I cannot hear well and fear I will reply improperly:  No   Family members and health examination         Diet assessment: good     PLAN:  The patient indicates understanding of these issues and agrees to the plan. Reinforced healthy diet, lifestyle, and exercise. Prescription medication ordered. Lab work ordered.   Further testing Glaucoma, AA>50, > 65 Data entered on: 7/8/2019   Last Dilated Eye Exam 6/22/2019       Bone Density Screening      Dexascan Every two years Last Dexa Scan:   XR DEXA BONE DENSITOMETRY (CPT=77080) 06/04/2016    No flowsheet data found.     Pap and P (mmol/L)   Date Value   10/05/2019 4.9     POTASSIUM (P) (mmol/L)   Date Value   07/15/2016 3.9    No flowsheet data found. Creatinine  Annually Creatinine (mg/dL)   Date Value   10/05/2019 1.07 (H)    No flowsheet data found.     Drug Serum Conc  Annual

## 2019-11-25 ENCOUNTER — MED REC SCAN ONLY (OUTPATIENT)
Dept: INTERNAL MEDICINE CLINIC | Facility: CLINIC | Age: 69
End: 2019-11-25

## 2019-11-25 ENCOUNTER — OFFICE VISIT (OUTPATIENT)
Dept: INTERNAL MEDICINE CLINIC | Facility: CLINIC | Age: 69
End: 2019-11-25
Payer: MEDICARE

## 2019-11-25 VITALS
DIASTOLIC BLOOD PRESSURE: 82 MMHG | HEART RATE: 67 BPM | SYSTOLIC BLOOD PRESSURE: 152 MMHG | OXYGEN SATURATION: 98 % | TEMPERATURE: 98 F | RESPIRATION RATE: 18 BRPM | BODY MASS INDEX: 31.99 KG/M2 | HEIGHT: 69 IN | WEIGHT: 216 LBS

## 2019-11-25 DIAGNOSIS — Z12.39 BREAST CANCER SCREENING: ICD-10-CM

## 2019-11-25 DIAGNOSIS — Z00.00 ENCOUNTER FOR ANNUAL HEALTH EXAMINATION: ICD-10-CM

## 2019-11-25 DIAGNOSIS — R31.21 ASYMPTOMATIC MICROSCOPIC HEMATURIA: ICD-10-CM

## 2019-11-25 DIAGNOSIS — E11.9 CONTROLLED TYPE 2 DIABETES MELLITUS WITHOUT COMPLICATION, WITHOUT LONG-TERM CURRENT USE OF INSULIN (HCC): Chronic | ICD-10-CM

## 2019-11-25 DIAGNOSIS — I10 ESSENTIAL HYPERTENSION WITH GOAL BLOOD PRESSURE LESS THAN 130/80: ICD-10-CM

## 2019-11-25 DIAGNOSIS — N18.30 CKD (CHRONIC KIDNEY DISEASE) STAGE 3, GFR 30-59 ML/MIN (HCC): Primary | Chronic | ICD-10-CM

## 2019-11-25 DIAGNOSIS — E03.9 HYPOTHYROIDISM (ACQUIRED): Chronic | ICD-10-CM

## 2019-11-25 PROCEDURE — 99497 ADVNCD CARE PLAN 30 MIN: CPT | Performed by: INTERNAL MEDICINE

## 2019-11-25 PROCEDURE — 96160 PT-FOCUSED HLTH RISK ASSMT: CPT | Performed by: INTERNAL MEDICINE

## 2019-11-25 PROCEDURE — 99397 PER PM REEVAL EST PAT 65+ YR: CPT | Performed by: INTERNAL MEDICINE

## 2019-11-25 PROCEDURE — G0439 PPPS, SUBSEQ VISIT: HCPCS | Performed by: INTERNAL MEDICINE

## 2019-11-25 PROCEDURE — 81003 URINALYSIS AUTO W/O SCOPE: CPT | Performed by: INTERNAL MEDICINE

## 2019-11-25 RX ORDER — LEVOTHYROXINE SODIUM 0.1 MG/1
200 TABLET ORAL
Qty: 180 TABLET | Refills: 1 | Status: SHIPPED | OUTPATIENT
Start: 2019-11-25 | End: 2020-04-06

## 2019-11-25 NOTE — PATIENT INSTRUCTIONS
ASSESSMENT/PLAN:   Ckd (chronic kidney disease) stage 3, gfr 30-59 ml/min ([facility])  (primary encounter diagnosis)No motrin, ibuprofen, advil, alleve, naprosyn  with these medications.  Follow up with renal.     Controlled type 2 diabetes mellitus without UROGRAM(W+WO)SH(RNL=44581)     Kerri Montelongo's SCREENING SCHEDULE   Tests on this list are recommended by your physician but may not be covered, or covered at this frequency, by your insurer.  Please check with your insurance carrier before sc Colonoscopy Screen   Covered every 10 years- more often if abnormal Colonoscopy due on 12/20/2023 Update [facility] if applicable    Flex Sigmoidoscopy Screen  Covered every 5 years No results found for this or any previous visit.  No flowsheet data Orders placed or performed in visit on 10/08/15   • FLU VAC NO PRSV 4 LIONEL 3 YRS+   Orders placed or performed in visit on 10/06/14   • FLU VAC NO PRSV 4 LIONEL 3 YRS+   • FLU VAC NO PRSV 4 LIONEL 3 YRS+    Please get every year    Pneumococcal 13 (Prevnar)  Co [place] citizen)  [email or web address]  This link also has information from the [address] [place] [address] regarding Advance Directives.

## 2019-11-25 NOTE — TELEPHONE ENCOUNTER
Refill passed per [facility]3], [facility] protocol.   Conversation: Refill Request   (Newest Message First)    6/4/19 1:10 PM   Efrain Macias MD routed this conversation to [facility] Staff     6/4/19 12:04 PM   Darshan Brian CMA route In 1 week [facility]     In 3 weeks Kierra Bales MD [facility], [address] [facility] blood in urine     In 4 weeks Mars Cee MD [facility], [facility], [address] [place] [address]

## 2019-11-27 ENCOUNTER — MED REC SCAN ONLY (OUTPATIENT)
Dept: INTERNAL MEDICINE CLINIC | Facility: CLINIC | Age: 69
End: 2019-11-27

## 2019-12-03 ENCOUNTER — ANTI-COAG VISIT (OUTPATIENT)
Dept: INTERNAL MEDICINE CLINIC | Facility: CLINIC | Age: 69
End: 2019-12-03
Payer: MEDICARE

## 2019-12-03 DIAGNOSIS — R76.0 ANTIPHOSPHOLIPID ANTIBODY POSITIVE: ICD-10-CM

## 2019-12-03 DIAGNOSIS — D68.61 ANTIPHOSPHOLIPID SYNDROME (HCC): ICD-10-CM

## 2019-12-03 DIAGNOSIS — I82.532 CHRONIC DEEP VEIN THROMBOSIS (DVT) OF LEFT POPLITEAL VEIN (HCC): ICD-10-CM

## 2019-12-03 PROCEDURE — 93793 ANTICOAG MGMT PT WARFARIN: CPT

## 2019-12-03 PROCEDURE — 36416 COLLJ CAPILLARY BLOOD SPEC: CPT

## 2019-12-03 PROCEDURE — 85610 PROTHROMBIN TIME: CPT

## 2019-12-09 ENCOUNTER — NURSE ONLY (OUTPATIENT)
Dept: INTERNAL MEDICINE CLINIC | Facility: CLINIC | Age: 69
End: 2019-12-09
Payer: MEDICARE

## 2019-12-09 ENCOUNTER — MED REC SCAN ONLY (OUTPATIENT)
Dept: INTERNAL MEDICINE CLINIC | Facility: CLINIC | Age: 69
End: 2019-12-09

## 2019-12-09 ENCOUNTER — TELEPHONE (OUTPATIENT)
Dept: INTERNAL MEDICINE CLINIC | Facility: CLINIC | Age: 69
End: 2019-12-09

## 2019-12-09 VITALS — HEART RATE: 64 BPM | DIASTOLIC BLOOD PRESSURE: 70 MMHG | SYSTOLIC BLOOD PRESSURE: 171 MMHG

## 2019-12-09 DIAGNOSIS — Z01.30 BLOOD PRESSURE CHECK: Primary | ICD-10-CM

## 2019-12-09 NOTE — TELEPHONE ENCOUNTER
Her B/P readings were better when she was on diovan. I tim like to switch her back to diovan 160 mg 2 times a day in place of losartan if OK with pt. ? Then, have her schedule another RN only visit in 1 week after change to re-assess B/P.

## 2019-12-10 NOTE — TELEPHONE ENCOUNTER
Attempt made to contact patient's daughter Jocelyne Wade at [phone], and phone did not ring. Spoke with patient's daughter named Cony Willson who could not verify the patient's date of birth.      This nurse spoke with the patient and informed her of Dr. Sherrie de

## 2019-12-10 NOTE — TELEPHONE ENCOUNTER
Attempted to call and no voice mail. Sent a Insem Spa.     We need to call the daughter after 3:00 pm

## 2019-12-11 ENCOUNTER — TELEPHONE (OUTPATIENT)
Dept: RHEUMATOLOGY | Facility: CLINIC | Age: 69
End: 2019-12-11

## 2019-12-11 RX ORDER — VALSARTAN 160 MG/1
160 TABLET ORAL 2 TIMES DAILY
Qty: 60 TABLET | Refills: 1 | Status: SHIPPED | OUTPATIENT
Start: 2019-12-11 | End: 2020-02-03

## 2019-12-11 NOTE — TELEPHONE ENCOUNTER
Shipment of Cosentyx received by office - 2x 150mg pens. Contacted [employer] to f/u. Per representative Rhianna Rajput pt has been approved for assistance through February 12th, 2020 while establishing alternative funding with Medicare LIS.  Remaining loading dose

## 2019-12-12 NOTE — TELEPHONE ENCOUNTER
Pt's daughter called back and has scheduled injection teaching for 12/18 in [facility].

## 2019-12-16 ENCOUNTER — TELEPHONE (OUTPATIENT)
Dept: INTERNAL MEDICINE CLINIC | Facility: CLINIC | Age: 69
End: 2019-12-16

## 2019-12-16 NOTE — TELEPHONE ENCOUNTER
Jocelyne Wade (patient's daughter), stated there seems to be a misunderstanding with her mom's appointment. Her mom needs to come in tomorrow 12/17 and she would like to schedule her for tomorrow.     Please give her a call back and if she does not , yo

## 2019-12-17 ENCOUNTER — ANTI-COAG VISIT (OUTPATIENT)
Dept: INTERNAL MEDICINE CLINIC | Facility: CLINIC | Age: 69
End: 2019-12-17
Payer: MEDICARE

## 2019-12-17 ENCOUNTER — OFFICE VISIT (OUTPATIENT)
Dept: SURGERY | Facility: CLINIC | Age: 69
End: 2019-12-17
Payer: MEDICARE

## 2019-12-17 ENCOUNTER — APPOINTMENT (OUTPATIENT)
Dept: LAB | Facility: HOSPITAL | Age: 69
End: 2019-12-17
Attending: INTERNAL MEDICINE
Payer: MEDICARE

## 2019-12-17 VITALS — SYSTOLIC BLOOD PRESSURE: 152 MMHG | DIASTOLIC BLOOD PRESSURE: 84 MMHG | WEIGHT: 216 LBS | BODY MASS INDEX: 32 KG/M2

## 2019-12-17 DIAGNOSIS — R76.0 ANTIPHOSPHOLIPID ANTIBODY POSITIVE: ICD-10-CM

## 2019-12-17 DIAGNOSIS — D68.61 ANTIPHOSPHOLIPID SYNDROME (HCC): ICD-10-CM

## 2019-12-17 DIAGNOSIS — I82.532 CHRONIC DEEP VEIN THROMBOSIS (DVT) OF LEFT POPLITEAL VEIN (HCC): ICD-10-CM

## 2019-12-17 DIAGNOSIS — R31.29 MICROSCOPIC HEMATURIA: Primary | ICD-10-CM

## 2019-12-17 DIAGNOSIS — N18.30 CKD (CHRONIC KIDNEY DISEASE), STAGE III (HCC): ICD-10-CM

## 2019-12-17 PROCEDURE — 99203 OFFICE O/P NEW LOW 30 MIN: CPT | Performed by: UROLOGY

## 2019-12-17 PROCEDURE — 93793 ANTICOAG MGMT PT WARFARIN: CPT

## 2019-12-17 PROCEDURE — 85610 PROTHROMBIN TIME: CPT

## 2019-12-17 PROCEDURE — 80069 RENAL FUNCTION PANEL: CPT

## 2019-12-17 PROCEDURE — 36416 COLLJ CAPILLARY BLOOD SPEC: CPT

## 2019-12-17 PROCEDURE — 36415 COLL VENOUS BLD VENIPUNCTURE: CPT

## 2019-12-17 RX ORDER — PROPRANOLOL HCL 60 MG
CAPSULE, EXTENDED RELEASE 24HR ORAL
COMMUNITY
Start: 2019-12-11 | End: 2020-02-01

## 2019-12-17 NOTE — PROGRESS NOTES
[facility]  Initial Office Consultation    HPI:   Sujatha Cassidy is a 77year old female here today for consultation at the request of, and a copy of this note will be sent to, Tisha Enriquez.  Ian Shaikh MD.  She is accompanied by her husb reviewed. Constitutional: She is oriented to person, place, and time. She appears well-developed and well-nourished. No distress. HENT:   Head: Atraumatic. Eyes: Conjunctivae are normal.   Neck: Normal range of motion. Cardiovascular: Normal rate. results of the workup as outlined above. The patient wishes to proceed with the workup as discussed. They asked appropriate questions all of which were answered to their satisfaction.      PLAN:  1. Schedule and obtain CT-Urogram for upper tract evaluation

## 2019-12-18 ENCOUNTER — NURSE ONLY (OUTPATIENT)
Dept: RHEUMATOLOGY | Facility: CLINIC | Age: 69
End: 2019-12-18
Payer: MEDICARE

## 2019-12-18 DIAGNOSIS — L40.50 PSORIATIC ARTHROPATHY (HCC): Primary | ICD-10-CM

## 2019-12-18 DIAGNOSIS — Z51.81 ENCOUNTER FOR THERAPEUTIC DRUG MONITORING: ICD-10-CM

## 2019-12-18 PROCEDURE — 99211 OFF/OP EST MAY X REQ PHY/QHP: CPT | Performed by: INTERNAL MEDICINE

## 2019-12-18 NOTE — PATIENT INSTRUCTIONS
Secukinumab injection  Brand Name: Cosentyx  What is this medicine? SECUKINUMAB (sek ue KIN ue mab) is used to treat psoriasis. It is also used to treat psoriatic arthritis and ankylosing spondylitis. How should I use this medicine?   This medicine is f It is important not to miss your dose. Call your doctor of health care professional if you are unable to keep an appointment. If you give yourself the medicine and you miss a dose, take it as soon as you can.  If it is almost time for your next dose, take o Call your doctor or healthcare professional for advice if you get a fever, chills or sore throat, or other symptoms of a cold or flu. Do not treat yourself. This drug decreases your body's ability to fight infections.  Try to avoid being around people who a

## 2019-12-19 ENCOUNTER — TELEPHONE (OUTPATIENT)
Dept: INTERNAL MEDICINE CLINIC | Facility: CLINIC | Age: 69
End: 2019-12-19

## 2019-12-19 ENCOUNTER — NURSE ONLY (OUTPATIENT)
Dept: INTERNAL MEDICINE CLINIC | Facility: CLINIC | Age: 69
End: 2019-12-19
Payer: MEDICARE

## 2019-12-19 VITALS — HEART RATE: 81 BPM | DIASTOLIC BLOOD PRESSURE: 68 MMHG | SYSTOLIC BLOOD PRESSURE: 165 MMHG

## 2019-12-19 DIAGNOSIS — Z01.30 BLOOD PRESSURE CHECK: Primary | ICD-10-CM

## 2019-12-19 RX ORDER — CLONIDINE HYDROCHLORIDE 0.1 MG/1
TABLET ORAL
Qty: 180 TABLET | Refills: 1 | Status: SHIPPED | OUTPATIENT
Start: 2019-12-19 | End: 2019-12-23

## 2019-12-19 NOTE — PROGRESS NOTES
Patient came in with daughter who provided translation for [employer] teaching. Name and  verified. Patient educated on proper handling/storage/disposal of medications. Patient informed of proper injection and aseptic technique.  Patient educated on potent

## 2019-12-19 NOTE — TELEPHONE ENCOUNTER
Pt came in for b/p check today, also entered in vitals.     Left Arm    151/68     Pulse   78    Right Arm  165/68    Pulse    81

## 2019-12-19 NOTE — TELEPHONE ENCOUNTER
Increase clonidine 0.1 mg in the morning and 0.3 mg at night. Very important to make sure take clonidine approximately couple hours from the prior dose. Do not take too far apart because cat can come times raise the blood pressure.   Follow-up in another

## 2019-12-20 NOTE — TELEPHONE ENCOUNTER
With  [facility] ID#   Informed pt of messages by Pt stating there are too many pills.   Pt is current taking Clonidine 0.1mg in AM , then at 4pm  then 10pm    Tried to explain over and over with pt same , unable to understand and continues to talk a

## 2019-12-20 NOTE — TELEPHONE ENCOUNTER
Pt/daughter numbers call no answer, spoke to [place]- stated she do not live with Pt    Need to know what time pt takes clonidine

## 2019-12-21 NOTE — TELEPHONE ENCOUNTER
Called with the assistance of [employer] (#). Agiliance [id number]  relayed Dr Elvia Gomez message clonidine one tablet in am one tablet at 4pm and 2 tablets at 10pm, patient repeated instructions.  Will f/u with BP check on 12/26/19 at [facility]

## 2019-12-21 NOTE — TELEPHONE ENCOUNTER
Can do that way but 2 tabs at 10 PM.  Patient has done that at every office visit will says she wants to start medications to many medications  Medications explained several times to her about the need for medication she cannot come off.

## 2019-12-23 ENCOUNTER — OFFICE VISIT (OUTPATIENT)
Dept: NEPHROLOGY | Facility: CLINIC | Age: 69
End: 2019-12-23
Payer: MEDICARE

## 2019-12-23 ENCOUNTER — HOSPITAL ENCOUNTER (OUTPATIENT)
Dept: CT IMAGING | Facility: HOSPITAL | Age: 69
Discharge: HOME | End: 2019-12-23
Attending: INTERNAL MEDICINE
Payer: MEDICARE

## 2019-12-23 VITALS
DIASTOLIC BLOOD PRESSURE: 60 MMHG | TEMPERATURE: 98 F | BODY MASS INDEX: 31.72 KG/M2 | WEIGHT: 214.19 LBS | HEART RATE: 72 BPM | SYSTOLIC BLOOD PRESSURE: 150 MMHG | HEIGHT: 69 IN

## 2019-12-23 DIAGNOSIS — N18.30 CKD (CHRONIC KIDNEY DISEASE), STAGE III (HCC): Primary | ICD-10-CM

## 2019-12-23 DIAGNOSIS — R31.21 ASYMPTOMATIC MICROSCOPIC HEMATURIA: ICD-10-CM

## 2019-12-23 DIAGNOSIS — R31.29 MICROSCOPIC HEMATURIA: ICD-10-CM

## 2019-12-23 PROCEDURE — 99214 OFFICE O/P EST MOD 30 MIN: CPT | Performed by: INTERNAL MEDICINE

## 2019-12-23 PROCEDURE — 74178 CT ABD&PLV WO CNTR FLWD CNTR: CPT | Performed by: INTERNAL MEDICINE

## 2019-12-23 RX ORDER — CLONIDINE HYDROCHLORIDE 0.1 MG/1
TABLET ORAL
Qty: 360 TABLET | Refills: 1 | Status: SHIPPED | OUTPATIENT
Start: 2019-12-23 | End: 2019-12-26

## 2019-12-23 NOTE — PROGRESS NOTES
Progress Note:      Patient is a 77 yrs old [address] speaking female with pmh of multinodular goiter s/p thyroidectomy, HL, DM II x 25 yrs , HTN x 4 yrs, +ve antiphospholipid with popliteal DVT in Nov 2018 on [facility], psoriatic arthritis who presented today fo 10/4/2016    Performed by Rexann Prader, MD at [facility] OR   • THYROIDECTOMY  08/17/15    TOTAL   • TUBAL LIGATION        Family History   Problem Relation Age of Onset   • Diabetes Mother    • Hypertension Mother    • Dementia Mother         Alzheimer' METHOTREXATE 2.5 MG Oral Tab Take 10 tablets by mouth once a week. 120 tablet 0   • simvastatin 20 MG Oral Tab take 1 tablet (20MG)  by oral route  2 times a week 90 tablet 0   • folic acid 1 MG Oral Tab Take 1 tablet (1 mg total) by mouth daily.  90 tablet every day as needed 90 tablet 1   • Omega-3-acid Ethyl Esters (LOVAZA) 1 G Oral Cap Take 1 g by mouth 2 (two) times daily with meals. • hydrALAZINE HCl 100 MG Oral Tab 1 tablet at 9 AM in the morning.   Take half a tablet at 1 PM in the afternoon and deformities  Extremities: no edema  Neurological:  Grossly normal       ASSESSMENT/PLAN:     1. HTN:   - BP initially high and retake 140/50s. Counseled about anxiety  - on hydralazine 100 mg TID, clonidine 0.3 mg BID, losartan 100 mg.  Clonidine dose was i

## 2019-12-23 NOTE — TELEPHONE ENCOUNTER
Refill passed per [facility] protocol.   Hypertensive Medications  Protocol Criteria:  · Appointment scheduled in the past 6 months or in the next 3 months  · BMP or CMP in the past 12 months  · Creatinine result < 2  Recent Outpatient Visits 12/17/2019    K 4.8 12/17/2019     12/17/2019    CO2 30.0 12/17/2019    GLOBULIN 3.7 08/14/2019    AGRATIO 1.1 07/15/2016    ANIONGAP 4 12/17/2019    OSMOCALC 294 12/17/2019

## 2019-12-23 NOTE — TELEPHONE ENCOUNTER
Patient's daughter requesting refill as she is out of pills due to dose increase. Pharmacy requesting new directions on refill.

## 2019-12-26 ENCOUNTER — TELEPHONE (OUTPATIENT)
Dept: INTERNAL MEDICINE CLINIC | Facility: CLINIC | Age: 69
End: 2019-12-26

## 2019-12-26 ENCOUNTER — NURSE ONLY (OUTPATIENT)
Dept: INTERNAL MEDICINE CLINIC | Facility: CLINIC | Age: 69
End: 2019-12-26
Payer: MEDICARE

## 2019-12-26 VITALS — SYSTOLIC BLOOD PRESSURE: 149 MMHG | HEART RATE: 67 BPM | DIASTOLIC BLOOD PRESSURE: 66 MMHG

## 2019-12-26 DIAGNOSIS — Z01.30 BLOOD PRESSURE CHECK: Primary | ICD-10-CM

## 2019-12-26 DIAGNOSIS — I10 ESSENTIAL HYPERTENSION: Primary | ICD-10-CM

## 2019-12-26 RX ORDER — CLONIDINE HYDROCHLORIDE 0.1 MG/1
TABLET ORAL
Qty: 360 TABLET | Refills: 1 | Status: SHIPPED | OUTPATIENT
Start: 2019-12-26 | End: 2020-03-24

## 2019-12-26 NOTE — TELEPHONE ENCOUNTER
I talked to [place] the daughter who is on HIPPA. The daughter stated that she does not think that increases her medication is right. Her mother is on to many medications.     The daughter feels that the patient  needs to see specialist to control her bl

## 2019-12-26 NOTE — TELEPHONE ENCOUNTER
Pt came in for b/p check today:    Left arm    153/66          Pulse  75    Right arm  149/66         Pulse   67

## 2019-12-26 NOTE — TELEPHONE ENCOUNTER
Increase in the medicines and maximizing whatever medications she is on will help in terms of blood pressure either way she needs blood pressure control.   She can see a specialist which has not a problem but we cannot stop the medications which is what she

## 2019-12-27 ENCOUNTER — TELEPHONE (OUTPATIENT)
Dept: INTERNAL MEDICINE CLINIC | Facility: CLINIC | Age: 69
End: 2019-12-27

## 2019-12-27 NOTE — TELEPHONE ENCOUNTER
This message referral was already placed for renal doctors for blood pressure control. If she is willing to take medications. She has a lot of the side effects even before a lot of the medications.   She does not want to be on so any medication she keeps

## 2019-12-27 NOTE — TELEPHONE ENCOUNTER
[place] Bobby           12/27/19 10:06 AM   Note      Patient on the phone states she does not agree with Dr Rowena Casiano making her dosage higher. She states she has nauseas and headaches due to medication. She also experiences lack of appetite and dizziness.

## 2019-12-27 NOTE — TELEPHONE ENCOUNTER
Call transferred by CSS: Pt informed in Albanian of Dr Diallo Garcia response/recommendations below.  Pt states is just tired of having blurry vision almost daily, at times has dizziness, nausea, and loss of appetite from high blood pressure and nothing Dr Braden Marrufo

## 2019-12-27 NOTE — TELEPHONE ENCOUNTER
Patient on the phone states she does not agree with Dr Travis Marcus making her dosage higher. She states she has nauseas and headaches due to medication. She also experiences lack of appetite and dizziness.

## 2019-12-31 ENCOUNTER — OFFICE VISIT (OUTPATIENT)
Dept: INTERNAL MEDICINE CLINIC | Facility: CLINIC | Age: 69
End: 2019-12-31
Payer: MEDICARE

## 2019-12-31 VITALS
SYSTOLIC BLOOD PRESSURE: 138 MMHG | RESPIRATION RATE: 18 BRPM | HEART RATE: 58 BPM | DIASTOLIC BLOOD PRESSURE: 59 MMHG | TEMPERATURE: 98 F | HEIGHT: 69 IN | WEIGHT: 213 LBS | OXYGEN SATURATION: 97 % | BODY MASS INDEX: 31.55 KG/M2

## 2019-12-31 DIAGNOSIS — Z12.39 BREAST CANCER SCREENING: ICD-10-CM

## 2019-12-31 DIAGNOSIS — E03.9 HYPOTHYROIDISM (ACQUIRED): Chronic | ICD-10-CM

## 2019-12-31 DIAGNOSIS — I10 ESSENTIAL HYPERTENSION WITH GOAL BLOOD PRESSURE LESS THAN 130/80: Primary | ICD-10-CM

## 2019-12-31 DIAGNOSIS — R68.89 BLOOD PRESSURE ALTERATION: ICD-10-CM

## 2019-12-31 DIAGNOSIS — N18.30 CKD (CHRONIC KIDNEY DISEASE) STAGE 3, GFR 30-59 ML/MIN (HCC): Chronic | ICD-10-CM

## 2019-12-31 DIAGNOSIS — E11.9 CONTROLLED TYPE 2 DIABETES MELLITUS WITHOUT COMPLICATION, WITHOUT LONG-TERM CURRENT USE OF INSULIN (HCC): Chronic | ICD-10-CM

## 2019-12-31 DIAGNOSIS — I73.9 PVD (PERIPHERAL VASCULAR DISEASE) (HCC): ICD-10-CM

## 2019-12-31 DIAGNOSIS — R42 DIZZINESS: ICD-10-CM

## 2019-12-31 PROCEDURE — 99215 OFFICE O/P EST HI 40 MIN: CPT | Performed by: INTERNAL MEDICINE

## 2019-12-31 RX ORDER — HYDRALAZINE HYDROCHLORIDE 100 MG/1
TABLET, FILM COATED ORAL
Qty: 270 TABLET | Refills: 1 | Status: SHIPPED | OUTPATIENT
Start: 2019-12-31 | End: 2020-01-22

## 2019-12-31 NOTE — PATIENT INSTRUCTIONS
Wt Readings from Last 3 Encounters:  12/31/19 : 213 lb (96.6 kg)  12/23/19 : 214 lb 3.2 oz (97.2 kg)  12/17/19 : 216 lb (98 kg)      Physical Exam         ASSESSMENT/PLAN:   Essential hypertension with goal blood pressure less than 130/80  (primary encount after eating before going to bed to allow digestion. PVD. Check NICOLAS. No orders of the defined types were placed in this encounter.       Meds This Visit:  Requested Prescriptions     Signed Prescriptions Disp Refills   • hydrALAZINE HCl 100 MG Oral

## 2019-12-31 NOTE — PROGRESS NOTES
HPI:    Patient ID: Amalia Wilson is a 77year old female. Patient here for follow up of Diabetes. Has been taking medications regularly. Checks sugars 1 times daily. Fasting sugars average 120's. No lows.    Watches diabetic diet, low 09/20/2019 08:39 AM    TSH 1.320 08/14/2019 07:51 AM    T4F 1.7 04/19/2019 08:56 AM        Lab Results   Component Value Date/Time    CHOLEST 115 08/14/2019 07:51 AM    HDL 59 08/14/2019 07:51 AM    TRIG 57 08/14/2019 07:51 AM    LDL 45 08/14/2019 07:51 AM headaches (Frontal. Diffuse. Off-and-on. Mild not disturbing no associated symptoms. Not sure if associated with blood pressure or low sugar. Does not check blood pressure sugar at that time. ).  Negative for dizziness, tremors, seizures, syncope, facial before dinner 90 tablet 1   • insulin glargine (LANTUS SOLOSTAR) 100 UNIT/ML Subcutaneous Solution Pen-injector INJECT 30UNITS IN THE MORNING AND 6 UNITS IN THE EVENING 30 mL 1   • Lancets Does not apply Misc Checks qam and PM. Dx. E11.9. 100 each 6   • Gl Appendicitis    • Cholelithiasis    • Goiter     Multinodular goiter S/P thyroidectomy.     • Osteoarthritis     DIANE  KNEE PAIN, ARTHRITIS IN SPINE   • Osteopenia    • Vertigo, aural       Past Surgical History:   Procedure Laterality Date   • APPENDECTOMY °F (36.6 °C) (Oral)   Resp 18   Ht 5' 9\" (1.753 m)   Wt 213 lb (96.6 kg)   SpO2 97%   BMI 31.45 kg/m²   BP Readings from Last 3 Encounters:  12/31/19 : 138/59  12/26/19 : 149/66  12/23/19 : 150/60    Wt Readings from Last 3 Encounters:  12/31/19 : 213 lb jaw. No dental abscesses, uvula swelling or lacerations. No oropharyngeal exudate, posterior oropharyngeal edema, posterior oropharyngeal erythema or tonsillar abscesses. Eyes: Pupils are equal, round, and reactive to light.  Conjunctivae and EOM are norm edema, no deformity, no laceration, no pain, no spasm and normal pulse. Lymphadenopathy:        Head (right side): No submental, no submandibular, no preauricular, no posterior auricular and no occipital adenopathy present. Head (left side):  No dates. Careful with low sugars. Carry something with you and check sugar if can. Can carry carolyne cracker, etc. Decrease carbohydrates. But also, careful with fruits and natural sugars. One serving a day and no more than 1 handful every day.  Check feet  bonnie detail. No orders of the defined types were placed in this encounter. Meds This Visit:  Requested Prescriptions     Signed Prescriptions Disp Refills   • hydrALAZINE HCl 100 MG Oral Tab 270 tablet 1     Si tablet at 9 AM in the morning.   Take h

## 2020-01-02 ENCOUNTER — MED REC SCAN ONLY (OUTPATIENT)
Dept: INTERNAL MEDICINE CLINIC | Facility: CLINIC | Age: 70
End: 2020-01-02

## 2020-01-08 RX ORDER — GLIPIZIDE 10 MG/1
TABLET ORAL
Qty: 90 TABLET | Refills: 1 | Status: SHIPPED | OUTPATIENT
Start: 2020-01-08 | End: 2020-08-28

## 2020-01-09 NOTE — TELEPHONE ENCOUNTER
Refill passed per [facility] protocol.     Requested Prescriptions   Pending Prescriptions Disp Refills   • GLIPIZIDE 10 MG Oral Tab [Pharmacy Med Name: Glipizide 10 Mg Tab Apot] 90 tablet 0     Sig: take 1/2 tablet by mouth before breakfast and 1/2 ta

## 2020-01-14 ENCOUNTER — ANTI-COAG VISIT (OUTPATIENT)
Dept: INTERNAL MEDICINE CLINIC | Facility: CLINIC | Age: 70
End: 2020-01-14
Payer: MEDICARE

## 2020-01-14 DIAGNOSIS — I82.532 CHRONIC DEEP VEIN THROMBOSIS (DVT) OF LEFT POPLITEAL VEIN (HCC): ICD-10-CM

## 2020-01-14 DIAGNOSIS — D68.61 ANTIPHOSPHOLIPID SYNDROME (HCC): ICD-10-CM

## 2020-01-14 DIAGNOSIS — R76.0 ANTIPHOSPHOLIPID ANTIBODY POSITIVE: ICD-10-CM

## 2020-01-14 LAB — INR: 4.2 (ref 0.8–1.2)

## 2020-01-14 PROCEDURE — 36416 COLLJ CAPILLARY BLOOD SPEC: CPT

## 2020-01-14 PROCEDURE — 85610 PROTHROMBIN TIME: CPT

## 2020-01-14 PROCEDURE — 93793 ANTICOAG MGMT PT WARFARIN: CPT

## 2020-01-15 ENCOUNTER — OFFICE VISIT (OUTPATIENT)
Dept: SURGERY | Facility: CLINIC | Age: 70
End: 2020-01-15
Payer: MEDICARE

## 2020-01-15 ENCOUNTER — TELEPHONE (OUTPATIENT)
Dept: SURGERY | Facility: CLINIC | Age: 70
End: 2020-01-15

## 2020-01-15 VITALS
DIASTOLIC BLOOD PRESSURE: 71 MMHG | WEIGHT: 213 LBS | SYSTOLIC BLOOD PRESSURE: 161 MMHG | HEIGHT: 69 IN | TEMPERATURE: 98 F | HEART RATE: 64 BPM | BODY MASS INDEX: 31.55 KG/M2

## 2020-01-15 DIAGNOSIS — R35.1 NOCTURIA: ICD-10-CM

## 2020-01-15 DIAGNOSIS — R31.29 MICROSCOPIC HEMATURIA: Primary | ICD-10-CM

## 2020-01-15 PROCEDURE — 51798 US URINE CAPACITY MEASURE: CPT | Performed by: UROLOGY

## 2020-01-15 PROCEDURE — 52000 CYSTOURETHROSCOPY: CPT | Performed by: UROLOGY

## 2020-01-15 RX ORDER — CIPROFLOXACIN 500 MG/1
500 TABLET, FILM COATED ORAL ONCE
Status: COMPLETED | OUTPATIENT
Start: 2020-01-15 | End: 2020-01-15

## 2020-01-15 RX ADMIN — CIPROFLOXACIN 500 MG: 500 TABLET, FILM COATED ORAL at 14:27:00

## 2020-01-15 NOTE — TELEPHONE ENCOUNTER
Per pt has cystoscopy today 01/15 at 1pm. Is wanting to know if she should be taking her blood pressure medication. Also went to coumadin clinic yesterday and her blood was at 4.2. Wanting to know if this is okay for the procedure.  Was not able to reach an

## 2020-01-15 NOTE — PROCEDURES
Jemima Wilson  : 1950  Referring Physician: Kermit Rodríguez. Ton Solares MD    Patient presents with: Patient presents today for cystoscopy to investigate cause of microscopic hematuria with ZH. Patient denies gross hematuria and dysuria.      Me

## 2020-01-21 RX ORDER — HYDRALAZINE HYDROCHLORIDE 100 MG/1
TABLET, FILM COATED ORAL
Qty: 270 TABLET | Refills: 0 | OUTPATIENT
Start: 2020-01-21

## 2020-01-21 NOTE — TELEPHONE ENCOUNTER
Daughter Bibiana Maciel calling back and verified pt is taking hydralazine with new instruction as sent to [address] [place] [address] on 12/31/19.      Wants Dr Alexandria Castano to be informed that BP has still been high with new dose of Hydralazine; reports systolic has been [phone] (unsure of d

## 2020-01-21 NOTE — TELEPHONE ENCOUNTER
Contacted the patient's daughter Raul Headley verified.  She will check with her mother and make sure she is following the directions for the new prescription and call us back

## 2020-01-21 NOTE — TELEPHONE ENCOUNTER
Please follow up with patient as med just refilled to same pharmacy on 12/31/19 with different dosing.

## 2020-01-22 ENCOUNTER — ANTI-COAG VISIT (OUTPATIENT)
Dept: INTERNAL MEDICINE CLINIC | Facility: CLINIC | Age: 70
End: 2020-01-22
Payer: MEDICARE

## 2020-01-22 DIAGNOSIS — R76.0 ANTIPHOSPHOLIPID ANTIBODY POSITIVE: ICD-10-CM

## 2020-01-22 DIAGNOSIS — D68.61 ANTIPHOSPHOLIPID SYNDROME (HCC): ICD-10-CM

## 2020-01-22 DIAGNOSIS — I82.532 CHRONIC DEEP VEIN THROMBOSIS (DVT) OF LEFT POPLITEAL VEIN (HCC): ICD-10-CM

## 2020-01-22 LAB
INR: 1.8 (ref 0.8–1.2)
TEST STRIP EXPIRATION DATE: ABNORMAL DATE

## 2020-01-22 PROCEDURE — 93793 ANTICOAG MGMT PT WARFARIN: CPT

## 2020-01-22 PROCEDURE — 85610 PROTHROMBIN TIME: CPT

## 2020-01-22 PROCEDURE — 36416 COLLJ CAPILLARY BLOOD SPEC: CPT

## 2020-01-22 RX ORDER — HYDRALAZINE HYDROCHLORIDE 100 MG/1
TABLET, FILM COATED ORAL
Qty: 270 TABLET | Refills: 1 | Status: SHIPPED | OUTPATIENT
Start: 2020-01-22 | End: 2020-03-24

## 2020-01-22 NOTE — TELEPHONE ENCOUNTER
Check to make sure she is taking all her B/P meds. And at correct doses. Maybe have her see renal. Not sure what to do now. Goal B/P at 120/70's. maxed on meds. Every med. She seems to have some Side effect or other. She wants to come off her meds.  And sta

## 2020-01-23 NOTE — TELEPHONE ENCOUNTER
Contacted pt's daughter, Randy Alpers. She declines to make an appt with Dr. Piero Camilo at this time as her mother wishes to switch to Dr. Essie Charlton for a second opinion. She is scheduled to see him on 2/10/2020.

## 2020-01-23 NOTE — TELEPHONE ENCOUNTER
Hi Dr. Braden Marrufo, last I saw her was in December and she was to be on 100 mg TID. I can have her come early to my clinic next week to sort out her BP meds and readings. She has anxiety issues and retake BP are better.      Staff, pls see if she can come

## 2020-01-23 NOTE — TELEPHONE ENCOUNTER
I talked to the daughter Ivelisse Dickinson who is on HIPPA. She stated the patient is taking her medications and the proper doses has instructed. The patient has an appointment to see Dr. Glenda Pruitt on 2/10/20 at 1:00 pm who the daughter stated Dr. Sigifredo Holliday recommended.

## 2020-01-23 NOTE — TELEPHONE ENCOUNTER
Sure why the prescription sleep seen refused. I refilled it on January 22, 2020 with 90-day with a refill order.

## 2020-01-24 ENCOUNTER — LAB ENCOUNTER (OUTPATIENT)
Dept: LAB | Facility: HOSPITAL | Age: 70
End: 2020-01-24
Attending: INTERNAL MEDICINE
Payer: MEDICARE

## 2020-01-24 DIAGNOSIS — Z51.81 ENCOUNTER FOR THERAPEUTIC DRUG MONITORING: ICD-10-CM

## 2020-01-24 DIAGNOSIS — L40.50 PSORIATIC ARTHROPATHY (HCC): ICD-10-CM

## 2020-01-24 LAB
ALBUMIN SERPL-MCNC: 3.5 G/DL (ref 3.4–5)
ALT SERPL-CCNC: 82 U/L (ref 13–56)
AST SERPL-CCNC: 49 U/L (ref 15–37)
BASOPHILS # BLD AUTO: 0.02 X10(3) UL (ref 0–0.2)
BASOPHILS NFR BLD AUTO: 0.3 %
CREAT BLD-MCNC: 1.19 MG/DL (ref 0.55–1.02)
CRP SERPL-MCNC: <0.29 MG/DL (ref ?–0.3)
DEPRECATED RDW RBC AUTO: 50.5 FL (ref 35.1–46.3)
EOSINOPHIL # BLD AUTO: 0.3 X10(3) UL (ref 0–0.7)
EOSINOPHIL NFR BLD AUTO: 5 %
ERYTHROCYTE [DISTWIDTH] IN BLOOD BY AUTOMATED COUNT: 14.6 % (ref 11–15)
ERYTHROCYTE [SEDIMENTATION RATE] IN BLOOD: 37 MM/HR (ref 0–30)
HCT VFR BLD AUTO: 34.5 % (ref 35–48)
HGB BLD-MCNC: 11.3 G/DL (ref 12–16)
IMM GRANULOCYTES # BLD AUTO: 0.02 X10(3) UL (ref 0–1)
IMM GRANULOCYTES NFR BLD: 0.3 %
LYMPHOCYTES # BLD AUTO: 1.06 X10(3) UL (ref 1–4)
LYMPHOCYTES NFR BLD AUTO: 17.5 %
MCH RBC QN AUTO: 31.5 PG (ref 26–34)
MCHC RBC AUTO-ENTMCNC: 32.8 G/DL (ref 31–37)
MCV RBC AUTO: 96.1 FL (ref 80–100)
MONOCYTES # BLD AUTO: 0.5 X10(3) UL (ref 0.1–1)
MONOCYTES NFR BLD AUTO: 8.3 %
NEUTROPHILS # BLD AUTO: 4.16 X10 (3) UL (ref 1.5–7.7)
NEUTROPHILS # BLD AUTO: 4.16 X10(3) UL (ref 1.5–7.7)
NEUTROPHILS NFR BLD AUTO: 68.6 %
PLATELET # BLD AUTO: 351 10(3)UL (ref 150–450)
RBC # BLD AUTO: 3.59 X10(6)UL (ref 3.8–5.3)
WBC # BLD AUTO: 6.1 X10(3) UL (ref 4–11)

## 2020-01-24 PROCEDURE — 84450 TRANSFERASE (AST) (SGOT): CPT

## 2020-01-24 PROCEDURE — 85652 RBC SED RATE AUTOMATED: CPT

## 2020-01-24 PROCEDURE — 86140 C-REACTIVE PROTEIN: CPT

## 2020-01-24 PROCEDURE — 82040 ASSAY OF SERUM ALBUMIN: CPT

## 2020-01-24 PROCEDURE — 84460 ALANINE AMINO (ALT) (SGPT): CPT

## 2020-01-24 PROCEDURE — 85025 COMPLETE CBC W/AUTO DIFF WBC: CPT

## 2020-01-24 PROCEDURE — 82565 ASSAY OF CREATININE: CPT

## 2020-01-24 PROCEDURE — 36415 COLL VENOUS BLD VENIPUNCTURE: CPT

## 2020-01-27 ENCOUNTER — OFFICE VISIT (OUTPATIENT)
Dept: INTERNAL MEDICINE CLINIC | Facility: CLINIC | Age: 70
End: 2020-01-27
Payer: MEDICARE

## 2020-01-27 VITALS
BODY MASS INDEX: 31.67 KG/M2 | HEART RATE: 65 BPM | TEMPERATURE: 99 F | RESPIRATION RATE: 18 BRPM | SYSTOLIC BLOOD PRESSURE: 148 MMHG | HEIGHT: 69 IN | WEIGHT: 213.81 LBS | DIASTOLIC BLOOD PRESSURE: 60 MMHG

## 2020-01-27 DIAGNOSIS — R11.0 NAUSEA: ICD-10-CM

## 2020-01-27 DIAGNOSIS — N18.30 CKD (CHRONIC KIDNEY DISEASE) STAGE 3, GFR 30-59 ML/MIN (HCC): Chronic | ICD-10-CM

## 2020-01-27 DIAGNOSIS — R51.9 HEADACHE DISORDER: ICD-10-CM

## 2020-01-27 DIAGNOSIS — E66.01 SEVERE OBESITY (BMI 35.0-39.9) WITH COMORBIDITY (HCC): Chronic | ICD-10-CM

## 2020-01-27 DIAGNOSIS — M17.11 PRIMARY OSTEOARTHRITIS OF RIGHT KNEE: Chronic | ICD-10-CM

## 2020-01-27 DIAGNOSIS — E04.9 GOITER: ICD-10-CM

## 2020-01-27 DIAGNOSIS — I82.532 CHRONIC DEEP VEIN THROMBOSIS (DVT) OF LEFT POPLITEAL VEIN (HCC): ICD-10-CM

## 2020-01-27 DIAGNOSIS — E11.9 CONTROLLED TYPE 2 DIABETES MELLITUS WITHOUT COMPLICATION, WITHOUT LONG-TERM CURRENT USE OF INSULIN (HCC): Primary | Chronic | ICD-10-CM

## 2020-01-27 DIAGNOSIS — E03.9 HYPOTHYROIDISM (ACQUIRED): Chronic | ICD-10-CM

## 2020-01-27 DIAGNOSIS — R93.89 ABNORMAL COMPUTED TOMOGRAPHY ANGIOGRAPHY (CTA): ICD-10-CM

## 2020-01-27 DIAGNOSIS — L29.9 ITCHING OF EAR: ICD-10-CM

## 2020-01-27 DIAGNOSIS — D68.61 ANTIPHOSPHOLIPID SYNDROME (HCC): ICD-10-CM

## 2020-01-27 DIAGNOSIS — Z00.00 ENCOUNTER FOR ANNUAL HEALTH EXAMINATION: ICD-10-CM

## 2020-01-27 DIAGNOSIS — I10 ESSENTIAL HYPERTENSION WITH GOAL BLOOD PRESSURE LESS THAN 130/80: ICD-10-CM

## 2020-01-27 DIAGNOSIS — H02.88B MEIBOMIAN GLAND DYSFUNCTION (MGD), BILATERAL, BOTH UPPER AND LOWER LIDS: ICD-10-CM

## 2020-01-27 DIAGNOSIS — H93.13 TINNITUS OF BOTH EARS: ICD-10-CM

## 2020-01-27 DIAGNOSIS — H02.88A MEIBOMIAN GLAND DYSFUNCTION (MGD), BILATERAL, BOTH UPPER AND LOWER LIDS: ICD-10-CM

## 2020-01-27 DIAGNOSIS — R31.29 MICROSCOPIC HEMATURIA: ICD-10-CM

## 2020-01-27 DIAGNOSIS — M85.80 OSTEOPENIA, UNSPECIFIED LOCATION: ICD-10-CM

## 2020-01-27 DIAGNOSIS — L40.50 PSORIATIC ARTHRITIS (HCC): ICD-10-CM

## 2020-01-27 LAB
APPEARANCE: CLEAR
BACTERIA UR QL AUTO: NEGATIVE /HPF
BILIRUB UR QL: NEGATIVE
BILIRUBIN: NEGATIVE
CLARITY UR: CLEAR
COLOR UR: COLORLESS
GLUCOSE (URINE DIPSTICK): NEGATIVE MG/DL
GLUCOSE UR-MCNC: NEGATIVE MG/DL
KETONES (URINE DIPSTICK): NEGATIVE MG/DL
KETONES UR-MCNC: NEGATIVE MG/DL
LEUKOCYTE ESTERASE UR QL STRIP.AUTO: NEGATIVE
MULTISTIX LOT#: NORMAL NUMERIC
NITRITE UR QL STRIP.AUTO: NEGATIVE
NITRITE, URINE: NEGATIVE
PH UR: 7 [PH] (ref 5–8)
PH, URINE: 7 (ref 4.5–8)
PROT UR-MCNC: NEGATIVE MG/DL
PROTEIN (URINE DIPSTICK): NEGATIVE MG/DL
RBC #/AREA URNS AUTO: <1 /HPF
SP GR UR STRIP: 1 (ref 1–1.03)
SPECIFIC GRAVITY: 1.01 (ref 1–1.03)
URINE-COLOR: YELLOW
UROBILINOGEN UR STRIP-ACNC: <2
UROBILINOGEN,SEMI-QN: 0.2 MG/DL (ref 0–1.9)
WBC #/AREA URNS AUTO: <1 /HPF

## 2020-01-27 PROCEDURE — 96160 PT-FOCUSED HLTH RISK ASSMT: CPT | Performed by: INTERNAL MEDICINE

## 2020-01-27 PROCEDURE — 99397 PER PM REEVAL EST PAT 65+ YR: CPT | Performed by: INTERNAL MEDICINE

## 2020-01-27 PROCEDURE — 81003 URINALYSIS AUTO W/O SCOPE: CPT | Performed by: INTERNAL MEDICINE

## 2020-01-27 PROCEDURE — G0439 PPPS, SUBSEQ VISIT: HCPCS | Performed by: INTERNAL MEDICINE

## 2020-01-27 RX ORDER — BETAMETHASONE DIPROPIONATE 0.5 MG/ML
1 LOTION, AUGMENTED TOPICAL 2 TIMES DAILY
Qty: 1 BOTTLE | Refills: 1 | Status: SHIPPED | OUTPATIENT
Start: 2020-01-27 | End: 2021-07-21 | Stop reason: ALTCHOICE

## 2020-01-27 NOTE — PROGRESS NOTES
HPI:    Patient ID: Aaron Tipton is a 77year old female.     Patient presents with:  Wellness Visit: medicare annual     Aaron Tipton is a 77year old female who presents for a complete physical exam.   HPI:   Patient presents Apply 1 Application topically 2 (two) times daily. 1 Bottle 1   • hydrALAZINE HCl 100 MG Oral Tab 1 tablet at 9 AM in the morning. Take half a tablet at 1 PM in the afternoon and take 1 tablet at 9 PM in the evening.  270 tablet 1   • GLIPIZIDE 10 MG Oral 1   • BENNY MICROLET LANCETS Does not apply Misc Testing 3 times a day  Diagnosis E11.65 (Patient not taking: Reported on 2/1/2020 ) 300 each 2   • Multiple Vitamins-Minerals (ONE-A-DAY WOMENS 50 PLUS OR) Take by mouth.      • Blood Glucose Monitoring Suppl ELECTROCARDIOGRAM, COMPLETE  01/03/2014    SCANNED TO MEDIA TAB - 01/03/2014   • FRACTURE SURGERY Left     LEFT FOOT SURGERY WITH HARDWARE   • KNEE SCOPE,ABRASN ARTHROPLASTY Right 10-4-16   • KNEE TOTAL REPLACEMENT Right 10/4/2016    Performed by Helena Lopez, Has been taking medications regularly. Checks sugars 1 times daily. Fasting sugars average 120's. No lows. Watches diabetic diet, low salt.   Diabetic Flow sheet  DIABETIC FLOWSHEET [facility]) Latest Ref Rng & Units 2/10/2020 2/3/2020 2/1/2020 1/29/2020 12/17/2019 08:20 AM    CREATSERUM 1.19 (H) 01/24/2020 08:22 AM    CA 8.5 12/17/2019 08:20 AM    AST 49 (H) 01/24/2020 08:22 AM    ALT 82 (H) 01/24/2020 08:22 AM    TSH 1.320 08/14/2019 07:51 AM    T4F 1.7 04/19/2019 08:56 AM        Lab Results   Component pain, sneezing, sore throat, trouble swallowing and voice change. Ears seems to itch all the time. More seasonal.  Denies any pain. Eyes: Negative for photophobia, pain, discharge, redness, itching and visual disturbance.    Respiratory: Negative in the afternoon and take 1 tablet at 9 PM in the evening.  270 tablet 1   • GLIPIZIDE 10 MG Oral Tab take 1/2 tablet by mouth before breakfast and 1/2 tablet before dinner 90 tablet 1   • cloNIDine HCl 0.1 MG Oral Tab take 2 tablets every morning and 2 tab Vitamins-Minerals (ONE-A-DAY WOMENS 50 PLUS OR) Take by mouth. • Blood Glucose Monitoring Suppl ([employer] CONTOUR NEXT MONITOR) w/Device Does not apply Kit 1 kit by Does not apply route daily.  Testing 3 times a day,  Diagnosis  E11.65 1 kit 0   • Meclizin Constitutional: She is oriented to person, place, and time. Vital signs are normal. She appears well-developed and well-nourished. She is active. Non-toxic appearance. She does not have a sickly appearance. She does not appear ill. No distress.  She is n no hordeolum. No foreign body present in the right eye. Left eye exhibits no chemosis, no discharge, no exudate and no hordeolum. No foreign body present in the left eye. Right conjunctiva is not injected. Right conjunctiva has no hemorrhage.  Left conjunct laceration, no pain, no spasm and normal pulse. Lymphadenopathy:        Head (right side): No submental, no submandibular, no preauricular, no posterior auricular and no occipital adenopathy present.         Head (left side): No submental, no submandibu Panel [E]      Comp Metabolic Panel (14) [E]      Hemoglobin A1C [E]      CELIAC DISEASE SCREEN Reflex [E]      Urinalysis, Routine [E]      POC Urinalysis, Automated Dip without microscopy (PCA and EMMG ONLY) [68055]      Urine Culture, Routine [E]      M including the explanation and discussion of advance directives standard forms performed Face to Face with patient and Family/surrogate (if present), and forms available to patient in AVS  16+ minutes was spent with all Advanced Care Planning elements today Lab Results   Component Value Date    CHOLEST 115 08/14/2019    HDL 59 08/14/2019    LDL 45 08/14/2019    TRIG 57 08/14/2019          Last Chemistry Labs:   Lab Results   Component Value Date    AST 49 (H) 01/24/2020    ALT 82 (H) 01/24/2020    CA 8.5 12 glargine (LANTUS SOLOSTAR) 100 UNIT/ML Subcutaneous Solution Pen-injector, INJECT 30UNITS IN THE MORNING AND 6 UNITS IN THE EVENING  Lancets Does not apply Misc, Checks qam and PM. Dx. E11.9. Glucose Blood In [employer], Checks q12hrs.  Dx. E11.65  Ondans in her son; Dementia in her mother and another family member; Diabetes in her brother, mother, paternal uncle, and another family member; Heart Disease in her father; Hypertension in her brother and mother; Lipids in her brother; No Known Problems in her d misunderstand what others are saying and make inappropriate responses:  Sometimes I avoid social activities because I cannot hear well and fear I will reply improperly:  No   Family members and friends have told me they think I may have hearing loss:   No every year with dilated eye exam.     CKD (chronic kidney disease) stage 3, GFR 30-59 ml/min ([facility])No motrin, ibuprofen, advil, alleve, naprosyn  with these medications. Encounter for annual health examination Check urine.      Tinnitus of both ears  - Stable.         Age-related nuclear cataract of both eyes Follow up with optho.         Meibomian gland dysfunction (MGD), bilateral, both upper and lower lids Stable.  Follow up with optho.         History of pterygium excision S/P excision.         B12 de if symptoms worsen or fail to improve, for extended follow up. Alexandria Barnett.  Noel Simons MD, 1/27/2020     General Health     In the past six months, have you lost more than 10 pounds without trying?: 1 - Yes  Has your appetite been poor?: Yes  How does the pa Chlamydia  Annually if high risk No results found for: CHLAMYDIA No flowsheet data found.     Screening Mammogram      Mammogram Annually to 77, then as discussed Mammogram due on 10/29/2018 Update Health Maintenance if applicable     Immunizations (Update Malb/Cre Calc (ug/mg)   Date Value   10/06/2019 13.5        LDL  Annually LDL Cholesterol (mg/dL)   Date Value   08/14/2019 45    No flowsheet data found.      Dilated Eye exam  Annually Data entered on: 7/8/2019   Last Dilated Eye Exam 6/22/2019     No jigar

## 2020-01-27 NOTE — PATIENT INSTRUCTIONS
ASSESSMENT AND OTHER RELEVANT CHRONIC CONDITIONS:   Dennise Lang is a 77year old female who presents for a Medicare Assessment.      PLAN SUMMARY:   Diagnoses and all orders for this visit:    Controlled type 2 diabetes mellitus without co allow digestion. Other orders  -     Betamethasone Dipropionate Aug 0.05 % External Lotion; Apply 1 Application topically 2 (two) times daily.        Val Hernandez's SCREENING SCHEDULE   Tests on this list are recommended by your physician cigarettes in their lifetime   • Anyone with a family history    Colorectal Cancer Screening  Covered up to Age 76     Colonoscopy Screen   Covered every 10 years- more often if abnormal Colonoscopy due on 12/20/2023 Update Health Maintenance if applicable FLU VACC HIGH DOSE PRSV FREE   Orders placed or performed in visit on 10/25/16   • FLU VACC PRSV FREE INC ANTIG   Orders placed or performed in visit on 10/08/15   • FLU VAC NO PRSV 4 LIONEL 3 YRS+   Orders placed or performed in visit on 10/06/14   • FLU VAC to review and print using their home computer and printer. (the forms are also available in [address])  [email or web address]  This link also has information from the [facility] regarding Advance Directives.

## 2020-01-29 ENCOUNTER — OFFICE VISIT (OUTPATIENT)
Dept: RHEUMATOLOGY | Facility: CLINIC | Age: 70
End: 2020-01-29
Payer: MEDICARE

## 2020-01-29 VITALS
HEIGHT: 69 IN | BODY MASS INDEX: 31.84 KG/M2 | SYSTOLIC BLOOD PRESSURE: 168 MMHG | HEART RATE: 64 BPM | DIASTOLIC BLOOD PRESSURE: 66 MMHG | WEIGHT: 215 LBS

## 2020-01-29 DIAGNOSIS — L40.50 PSORIATIC ARTHROPATHY (HCC): Primary | ICD-10-CM

## 2020-01-29 DIAGNOSIS — R79.89 ELEVATED LFTS: ICD-10-CM

## 2020-01-29 DIAGNOSIS — Z51.81 ENCOUNTER FOR THERAPEUTIC DRUG MONITORING: ICD-10-CM

## 2020-01-29 DIAGNOSIS — D68.61 ANTIPHOSPHOLIPID SYNDROME (HCC): ICD-10-CM

## 2020-01-29 DIAGNOSIS — L40.50 PSORIATIC ARTHRITIS (HCC): ICD-10-CM

## 2020-01-29 DIAGNOSIS — L40.9 PSORIASIS: ICD-10-CM

## 2020-01-29 PROCEDURE — 99214 OFFICE O/P EST MOD 30 MIN: CPT | Performed by: INTERNAL MEDICINE

## 2020-01-30 NOTE — PROGRESS NOTES
Gale Villagran is a 77year old female.     HPI:   Patient presents with:  Medication Follow-Up: patient states her joints hurt less than before      I had the pleasure of seeing Gale Villagran on 1/29/2020 for follow up +ADRIEL and r PM in the afternoon and take 1 tablet at 9 PM in the evening.  270 tablet 1   • GLIPIZIDE 10 MG Oral Tab take 1/2 tablet by mouth before breakfast and 1/2 tablet before dinner 90 tablet 1   • cloNIDine HCl 0.1 MG Oral Tab take 2 tablets every morning and 2 NEXT TEST) In Vitro Strip TEST 2 times  A DAY Dx: Uncontrolled type 2 diabetes mellitus without complication, without long-term current use of insulin (HCC)  E11.65 200 strip 1   • BENNY MICROLET LANCETS Does not apply Misc Testing 3 times a day  Diagnosis pain, MULUGETA test negative b/l  Knees: FROM, no warmth or effusion present. No pain with ROM.    Ankles: FROM, no pain or swelling or warmth on palpation  Feet: no pain with MTP squeeze, no toe swelling or pain or warmth on palpation with FROM  Spine: no lum Component      Latest Ref Rng & Units 1/16/2019   Quantiferon TB NIL      IU/mL 0.140   QUANTIFERON TB MINUS NIL      IU/mL 0.070   Quantiferon TB Mitogen minus NIL      IU/mL 8.977   QTB. RESULT      Negative Negative   HEPATITIS B SURF AB QUANT g/dL 1.00   GAMMA GLOBULINS      0.62 - 1.70 g/dL 1.49   ALBUMIN/GLOBULIN RATIO      1.00 - 2.00 0.99 (L)   SPE INTERPRETATION       Minimally decreased albumin is present. . . .   Reviewed By:       Fredo Mobley M.D.    C-Citrullinated Peptide IgG AB she initially had synovitis in her wrists and decreased range of motion. Symptoms consistent with psoriatic arthritis. She also was found to have an incidental L LE DVT diagnosed in December 2018.   Further workup was done and she was found to have + anti

## 2020-01-30 NOTE — PATIENT INSTRUCTIONS
Blood work looks better  Go down on MTX to 8 pills weekly for 2 weeks then down to 6 pills weekly  Cont cosentyx  Blood work in 3 mos   Follow up in 3 mos

## 2020-02-01 ENCOUNTER — OFFICE VISIT (OUTPATIENT)
Dept: NEUROLOGY | Facility: CLINIC | Age: 70
End: 2020-02-01
Payer: MEDICARE

## 2020-02-01 VITALS
DIASTOLIC BLOOD PRESSURE: 60 MMHG | BODY MASS INDEX: 31.84 KG/M2 | SYSTOLIC BLOOD PRESSURE: 150 MMHG | RESPIRATION RATE: 16 BRPM | WEIGHT: 215 LBS | HEIGHT: 69 IN | HEART RATE: 68 BPM

## 2020-02-01 DIAGNOSIS — M54.2 NECK PAIN: ICD-10-CM

## 2020-02-01 DIAGNOSIS — G44.209 TENSION HEADACHE: Primary | ICD-10-CM

## 2020-02-01 PROCEDURE — 99213 OFFICE O/P EST LOW 20 MIN: CPT | Performed by: OTHER

## 2020-02-01 RX ORDER — PROPRANOLOL HCL 60 MG
1 CAPSULE, EXTENDED RELEASE 24HR ORAL DAILY
Qty: 90 CAPSULE | Refills: 4 | Status: SHIPPED | OUTPATIENT
Start: 2020-02-01 | End: 2020-03-24

## 2020-02-03 RX ORDER — VALSARTAN 160 MG/1
TABLET ORAL
Qty: 180 TABLET | Refills: 1 | Status: SHIPPED | OUTPATIENT
Start: 2020-02-03 | End: 2020-04-03

## 2020-02-05 ENCOUNTER — ANTI-COAG VISIT (OUTPATIENT)
Dept: INTERNAL MEDICINE CLINIC | Facility: CLINIC | Age: 70
End: 2020-02-05
Payer: MEDICARE

## 2020-02-05 DIAGNOSIS — D68.61 ANTIPHOSPHOLIPID SYNDROME (HCC): ICD-10-CM

## 2020-02-05 DIAGNOSIS — R76.0 ANTIPHOSPHOLIPID ANTIBODY POSITIVE: ICD-10-CM

## 2020-02-05 DIAGNOSIS — I82.532 CHRONIC DEEP VEIN THROMBOSIS (DVT) OF LEFT POPLITEAL VEIN (HCC): ICD-10-CM

## 2020-02-05 LAB
INR: 2.1 (ref 0.8–1.2)
TEST STRIP EXPIRATION DATE: ABNORMAL DATE

## 2020-02-05 PROCEDURE — 85610 PROTHROMBIN TIME: CPT

## 2020-02-05 PROCEDURE — 36416 COLLJ CAPILLARY BLOOD SPEC: CPT

## 2020-02-05 PROCEDURE — 93793 ANTICOAG MGMT PT WARFARIN: CPT

## 2020-02-06 ENCOUNTER — PATIENT MESSAGE (OUTPATIENT)
Dept: RHEUMATOLOGY | Facility: CLINIC | Age: 70
End: 2020-02-06

## 2020-02-06 NOTE — PROGRESS NOTES
[facility]    Enedina Montanez is a 77year old female. HPI:     Patient being seen in follow-up. I saw her in clinic last in October 2019. Interval notes reviewed.   It appears her rheumatologic condition most taking differently: take 1 tablet (20MG)  by oral route  1 times a week ) 90 tablet 0   • folic acid 1 MG Oral Tab Take 1 tablet (1 mg total) by mouth daily.  90 tablet 1   • insulin glargine (LANTUS SOLOSTAR) 100 UNIT/ML Subcutaneous Solution Pen-injector 2/1/2020 ) 300 each 2   • Meclizine HCl 25 MG Oral Tab Take 1 tablet (25 mg total) by mouth 3 (three) times daily as needed.  take 1 tablet (25MG)  by oral route 3 times every day as needed (Patient not taking: Reported on 2/1/2020 ) 90 tablet 1       Aller mg/day     –Trigger point injections to target left plus/minus right trapezius, splenius capitis muscles if symptoms do not improve       Chema Moreau MD

## 2020-02-07 NOTE — TELEPHONE ENCOUNTER
From: Judith Krueger  To: Brenton Lepe MD  Sent: 2/6/2020 8:17 PM CST  Subject: Prescription Question    Just dropping a message to inform you that the Medicare LIS program denied my mother coverage for cosentyx. .. [employer] has their own presc

## 2020-02-10 ENCOUNTER — OFFICE VISIT (OUTPATIENT)
Dept: NEPHROLOGY | Facility: CLINIC | Age: 70
End: 2020-02-10
Payer: MEDICARE

## 2020-02-10 ENCOUNTER — TELEPHONE (OUTPATIENT)
Dept: INTERNAL MEDICINE CLINIC | Facility: CLINIC | Age: 70
End: 2020-02-10

## 2020-02-10 VITALS
WEIGHT: 219.63 LBS | DIASTOLIC BLOOD PRESSURE: 69 MMHG | BODY MASS INDEX: 32.53 KG/M2 | SYSTOLIC BLOOD PRESSURE: 163 MMHG | HEIGHT: 69 IN | HEART RATE: 74 BPM

## 2020-02-10 DIAGNOSIS — L40.50 PSORIATIC ARTHRITIS (HCC): Primary | ICD-10-CM

## 2020-02-10 DIAGNOSIS — N18.30 CKD (CHRONIC KIDNEY DISEASE) STAGE 3, GFR 30-59 ML/MIN (HCC): Chronic | ICD-10-CM

## 2020-02-10 DIAGNOSIS — E11.9 CONTROLLED TYPE 2 DIABETES MELLITUS WITHOUT COMPLICATION, WITHOUT LONG-TERM CURRENT USE OF INSULIN (HCC): Chronic | ICD-10-CM

## 2020-02-10 DIAGNOSIS — E03.9 HYPOTHYROIDISM (ACQUIRED): Chronic | ICD-10-CM

## 2020-02-10 PROCEDURE — 99215 OFFICE O/P EST HI 40 MIN: CPT | Performed by: INTERNAL MEDICINE

## 2020-02-10 RX ORDER — MINOXIDIL 2.5 MG/1
2.5 TABLET ORAL 2 TIMES DAILY
Qty: 60 TABLET | Refills: 3 | Status: SHIPPED | OUTPATIENT
Start: 2020-02-10 | End: 2020-06-06

## 2020-02-10 RX ORDER — SPIRONOLACTONE 25 MG/1
25 TABLET ORAL 2 TIMES DAILY
Qty: 60 TABLET | Refills: 3 | Status: SHIPPED | OUTPATIENT
Start: 2020-02-10 | End: 2020-06-06

## 2020-02-10 NOTE — PATIENT INSTRUCTIONS
ADD MINOXIDIL 2.5 MG TWICE A DAY    TAKE HYDRALAZINE TWICE A DAY ONLY    STOP CLONIDINE    ADD SPIRINOLACTONE  25MG  TWICE A DAY    DO LABS IN THREE WEEKS, SEE ME FOUR WEEKS     CHECK BP  TWICE A DAY AND PIERRE DOWN    KEEP UNDER 140/80    NICE TO MEET YOU B

## 2020-02-11 NOTE — ASSESSMENT & PLAN NOTE
UA 10/6/19  Normal cystourethroscopy. Negative microscopic hematuria work-up including CT urogram and flexible cystourethroscopy. Cystocele identified on pelvic exam. No further urological work-up indicated at this time for patient's microhematuria.  Benigno

## 2020-02-11 NOTE — TELEPHONE ENCOUNTER
Patient wants to get a repeat on the bone density check with osteopenia or bone loss. She is also. Follow-up with the CT of the lung. Orders written for both of those.

## 2020-02-25 NOTE — H&P
[address] [facility] [address] Gastroenterology                                                                                                  Clinic History and Physical     Pa Left/sigmoid colon incompletely distended and suboptimally evaluated. No suspicious inflammatory manifestations or bowel obstructions from the visualized area.     She describes shortness of breath when climbing stairs but no acute chest pain or shortness REPLACEMENT Right 10/4/2016    Performed by Ry Patel MD at [facility] OR   • THYROIDECTOMY  08/17/15    TOTAL   • TUBAL LIGATION        Family Hx:   Family History   Problem Relation Age of Onset   • Diabetes Mother    • Hypertension Mother    • Dem daily. 90 capsule 4   • methotrexate 2.5 MG Oral Tab Take 8 tablets (20 mg total) by mouth every 7 days. 96 tablet 0   • Betamethasone Dipropionate Aug 0.05 % External Lotion Apply 1 Application topically 2 (two) times daily.  1 Bottle 1   • hydrALAZINE HCl q12hrs. BD ultrafine MINI.  100 each 6   • Glucose Blood (BENNY CONTOUR NEXT TEST) In Vitro Strip TEST 2 times  A DAY Dx: Uncontrolled type 2 diabetes mellitus without complication, without long-term current use of insulin ([facility])  E11.65 200 strip 1   • ENRIQUE distress  HEENT: conjunctiva pink, the sclera appears anicteric, oropharynx clear, mucus membranes appear moist  CV: regular rate and rhythm, the extremities are warm and well perfused   Lung: effort normal and breath sounds normal, no respiratory distress daily which may be titrated pending the patient's needs. Additionally, we discussed testing for H. pylori and if negative starting a trial of Protonix 20 mg daily x2-3 months and reassessing for symptomatic response.   No red flag upper GI symptoms that wo contact prescribing MD (Dr. Noel Simons) to confirm if the above instructions are appropriate (per Dr. Salome Stafford procedure preferences)  -Diabetes meds: Glipizide, insulin (Please contact prescribing MD (Dr. Noel Simons) for specific recommendations including number o CHLOE Khan Caro  3/3/2020

## 2020-02-26 ENCOUNTER — ANTI-COAG VISIT (OUTPATIENT)
Dept: INTERNAL MEDICINE CLINIC | Facility: CLINIC | Age: 70
End: 2020-02-26
Payer: MEDICARE

## 2020-02-26 ENCOUNTER — TELEPHONE (OUTPATIENT)
Dept: INTERNAL MEDICINE CLINIC | Facility: CLINIC | Age: 70
End: 2020-02-26

## 2020-02-26 DIAGNOSIS — I82.532 CHRONIC DEEP VEIN THROMBOSIS (DVT) OF LEFT POPLITEAL VEIN (HCC): ICD-10-CM

## 2020-02-26 DIAGNOSIS — R76.0 ANTIPHOSPHOLIPID ANTIBODY POSITIVE: ICD-10-CM

## 2020-02-26 DIAGNOSIS — D68.61 ANTIPHOSPHOLIPID SYNDROME (HCC): ICD-10-CM

## 2020-02-26 LAB
INR: 1.8 (ref 0.8–1.2)
TEST STRIP EXPIRATION DATE: ABNORMAL DATE

## 2020-02-26 PROCEDURE — 93793 ANTICOAG MGMT PT WARFARIN: CPT

## 2020-02-26 PROCEDURE — 36416 COLLJ CAPILLARY BLOOD SPEC: CPT

## 2020-02-26 PROCEDURE — 85610 PROTHROMBIN TIME: CPT

## 2020-02-26 NOTE — TELEPHONE ENCOUNTER
Today's INR 1.8  Goal 2.0-3.0    Per protocol, weekly dose increased by 10% (actual increase 6.7%). Repeat INR in 2 weeks.

## 2020-03-02 ENCOUNTER — PATIENT MESSAGE (OUTPATIENT)
Dept: NEUROLOGY | Facility: CLINIC | Age: 70
End: 2020-03-02

## 2020-03-03 ENCOUNTER — TELEPHONE (OUTPATIENT)
Dept: GASTROENTEROLOGY | Facility: CLINIC | Age: 70
End: 2020-03-03

## 2020-03-03 ENCOUNTER — OFFICE VISIT (OUTPATIENT)
Dept: GASTROENTEROLOGY | Facility: CLINIC | Age: 70
End: 2020-03-03
Payer: MEDICARE

## 2020-03-03 VITALS
WEIGHT: 213 LBS | HEART RATE: 100 BPM | SYSTOLIC BLOOD PRESSURE: 131 MMHG | HEIGHT: 69 IN | BODY MASS INDEX: 31.55 KG/M2 | DIASTOLIC BLOOD PRESSURE: 56 MMHG

## 2020-03-03 DIAGNOSIS — R10.10 UPPER ABDOMINAL PAIN: Primary | ICD-10-CM

## 2020-03-03 DIAGNOSIS — R11.0 NAUSEA: ICD-10-CM

## 2020-03-03 DIAGNOSIS — R19.8 IRREGULAR BOWEL HABITS: ICD-10-CM

## 2020-03-03 DIAGNOSIS — R63.4 WEIGHT LOSS: ICD-10-CM

## 2020-03-03 DIAGNOSIS — Z80.0 FAMILY HISTORY OF COLON CANCER: Primary | ICD-10-CM

## 2020-03-03 DIAGNOSIS — Z80.0 FAMILY HISTORY OF COLON CANCER: ICD-10-CM

## 2020-03-03 PROCEDURE — 99204 OFFICE O/P NEW MOD 45 MIN: CPT | Performed by: NURSE PRACTITIONER

## 2020-03-03 NOTE — TELEPHONE ENCOUNTER
Scheduled for:  Colonoscopy   Provider Name:  Dr Perla Gerardo  Date:  05/15/2020  Location:  [facility]  Sedation: MAC  Time: 10:00am (pt is aware to arrive at 9:00am)    Prep:  Colyte  Meds/Allergies Reconciled?:  Physician reviewed    Diagnosis with codes:  Family His

## 2020-03-03 NOTE — TELEPHONE ENCOUNTER
From: Artem Diop  To:  Sweetie Villa MD  Sent: 3/2/2020 8:27 PM CST  Subject: Prescription Question    Hi Dr. Daniel Ashton will be needing a refill authorization for the Propranolol HCI 60 mg in the next several days but she wanted to kno

## 2020-03-03 NOTE — PATIENT INSTRUCTIONS
Recommend:  -Schedule colonoscopy w/Dr. Colin Whitehead, Dr. Garrison Essex, or Dr. Venessa Bryant w/ [facility]  Dx: [facility] [place]   -Eligible for NE: No r/t medical hx  -Prep: Split dose Colyte/TriLyte or equivalent  -Anti-platelets and anti-coagulants: Warfarin (Please contact prescribing

## 2020-03-03 NOTE — TELEPHONE ENCOUNTER
Dr. Niya Ortiz-    Patient is scheduled for a colonoscopy w/ Dr. Daniel Coombs on 5/15/2020 @ 1000 .     Please advise on Warfarin order adjustments. Thank You.

## 2020-03-03 NOTE — TELEPHONE ENCOUNTER
Dr. Mckenzie Sep-    Patient is scheduled for a colonoscopy w/ Dr. Ni Aguero on 5/15/2020 @ 1000 . She will be on a clear liquid diet after breakfast the day before the procedure up to 3 hours prior to the procedure.     Please advise on Warfarin, Lantus and Glipizi

## 2020-03-03 NOTE — TELEPHONE ENCOUNTER
GI RNs - Please advise on DM orders as well as Warfarin orders patient is scheduled for 05/15/2020.   Dr Santino Sosa

## 2020-03-03 NOTE — TELEPHONE ENCOUNTER
She will take all her medicines like she normally does even on the morning of surgery with a little sip of water except her spironolactone and her methotrexate. He will not take any of her diabetic morning medicines on the morning of the procedure.   The n

## 2020-03-04 ENCOUNTER — PATIENT MESSAGE (OUTPATIENT)
Dept: NEUROLOGY | Facility: CLINIC | Age: 70
End: 2020-03-04

## 2020-03-04 DIAGNOSIS — L40.50 PSORIATIC ARTHRITIS (HCC): ICD-10-CM

## 2020-03-04 RX ORDER — FOLIC ACID 1 MG/1
TABLET ORAL
Qty: 90 TABLET | Refills: 1 | Status: SHIPPED | OUTPATIENT
Start: 2020-03-04 | End: 2020-04-29

## 2020-03-04 NOTE — TELEPHONE ENCOUNTER
Lovenox bridge   Received:  Today   Message Contents   Manny Gann, RN  Sae Putnam RN Cc: Stevie Weinstein MD   Caller: Unspecified (Today,  9:49 AM)             Refugia Romina,   As discussed on the phone: Dr. Camacho Cornejo gave Dr. Taylor Credit a recommendation for b

## 2020-03-04 NOTE — TELEPHONE ENCOUNTER
Triage RN's:    Dr. Mi Solis has recommended coumadin/lovenox orders for colonoscopy on 5/15/2020. Per their RN, Dr. Mi Solis will not be placing orders as he is not following the pt's coumadin orders or INR.      Dr. Joseluis Hanna will need to place orders for recommended

## 2020-03-04 NOTE — TELEPHONE ENCOUNTER
I spoke to Daughter Irineo Sheth (ABIGAIL on file) and relayed medication adjustments from Dr. Palmira Carmichael and Lucie Gonzales below. Per her request a letter with these instructions was printed and sent. Will f/u w/ coumadin clinic regarding lovenox bridge.

## 2020-03-04 NOTE — TELEPHONE ENCOUNTER
Plan for colonoscopy seen by [facility]. We do not teach or bridge to lovenox injections. We will dose patient's coumadin post procedure and monitor INR's.  Per MD instructions, once INR levels are between 2.0--3.0 for 2-3 days, we will instruct patient to discont

## 2020-03-04 NOTE — TELEPHONE ENCOUNTER
Note from [facility] reviewed. Triage RN's & Dr. Alexei Sellers RN's:    Please schedule patient for a nurse education visit for Lovenox teaching. This is a new medication for this patient. Daughter Candelaria Tavarez is aware of plan.  Unfortunately our office does not provide teach

## 2020-03-04 NOTE — TELEPHONE ENCOUNTER
Last filled: 7/17/19 #90 tab with 1 refill  LOV: 1/29/2020  Future Appointments   Date Time Provider Zac Choi   3/11/2020 10:15 AM EC [facility]ADIN CLIN[facility]   3/13/2020  4:40 PM Elsie Browning MD [facility] Chantelle ALANIZ   4/20/2020 to DVT     Elevated LFT  - She is on methotrexate can cause elevated LFTs, plan on decreasing it to 8 pills weekly for 2 weeks then go down to 6 pills weekly  - Repeat blood work in 3 months     APL  - She was found to have an incidental left DVT, ultrasou

## 2020-03-04 NOTE — TELEPHONE ENCOUNTER
AGUS/ [facility] RN's: This patient is having a procedure on 5/15/2020 w/ Dr Yamini Lubin (GI). Dr. Chica Caraballo has orders for her to stop her coumadin 3 days prior to her procedure and begin a lovenox bridge.     I wanted to verify that your clinic can see

## 2020-03-05 NOTE — TELEPHONE ENCOUNTER
From: Christina Arthur  To: Kay Sanford MD  Sent: 3/4/2020 7:44 PM CST  Subject: Prescription Question    Hi Dr. Diamond Serrano,  I was unable to reply to your message. My only option appears to be to create a new message.  Just wanted to let you know

## 2020-03-06 ENCOUNTER — APPOINTMENT (OUTPATIENT)
Dept: LAB | Facility: HOSPITAL | Age: 70
End: 2020-03-06
Attending: INTERNAL MEDICINE
Payer: MEDICARE

## 2020-03-06 PROCEDURE — 87338 HPYLORI STOOL AG IA: CPT

## 2020-03-06 NOTE — TELEPHONE ENCOUNTER
Spoke with daughter last night.   Specifically, we did discuss rebound tachycardia that can sometimes occur on abrupt discontinuation of propranolol, and advised daughter that patient should be advised not to abruptly discontinue medications in the future w

## 2020-03-07 ENCOUNTER — TELEPHONE (OUTPATIENT)
Dept: GASTROENTEROLOGY | Facility: CLINIC | Age: 70
End: 2020-03-07

## 2020-03-07 ENCOUNTER — LAB ENCOUNTER (OUTPATIENT)
Dept: LAB | Facility: HOSPITAL | Age: 70
End: 2020-03-07
Attending: INTERNAL MEDICINE
Payer: MEDICARE

## 2020-03-07 DIAGNOSIS — R63.4 WEIGHT LOSS: ICD-10-CM

## 2020-03-07 DIAGNOSIS — R10.10 UPPER ABDOMINAL PAIN: ICD-10-CM

## 2020-03-07 DIAGNOSIS — E03.9 HYPOTHYROIDISM (ACQUIRED): ICD-10-CM

## 2020-03-07 DIAGNOSIS — R19.8 IRREGULAR BOWEL HABITS: ICD-10-CM

## 2020-03-07 DIAGNOSIS — E11.9 CONTROLLED TYPE 2 DIABETES MELLITUS WITHOUT COMPLICATION, WITHOUT LONG-TERM CURRENT USE OF INSULIN (HCC): ICD-10-CM

## 2020-03-07 DIAGNOSIS — N18.30 CKD (CHRONIC KIDNEY DISEASE) STAGE 3, GFR 30-59 ML/MIN (HCC): ICD-10-CM

## 2020-03-07 DIAGNOSIS — L40.50 PSORIATIC ARTHRITIS (HCC): ICD-10-CM

## 2020-03-07 DIAGNOSIS — R19.8 IRREGULAR BOWEL HABITS: Primary | ICD-10-CM

## 2020-03-07 LAB
ANION GAP SERPL CALC-SCNC: 7 MMOL/L (ref 0–18)
BACTERIA UR QL AUTO: NEGATIVE /HPF
BASOPHILS # BLD AUTO: 0.04 X10(3) UL (ref 0–0.2)
BASOPHILS NFR BLD AUTO: 0.7 %
BILIRUB UR QL: NEGATIVE
BUN BLD-MCNC: 28 MG/DL (ref 7–18)
BUN/CREAT SERPL: 26.7 (ref 10–20)
CALCIUM BLD-MCNC: 8.8 MG/DL (ref 8.5–10.1)
CHLORIDE SERPL-SCNC: 107 MMOL/L (ref 98–112)
CLARITY UR: CLEAR
CO2 SERPL-SCNC: 25 MMOL/L (ref 21–32)
COLOR UR: YELLOW
CREAT BLD-MCNC: 1.05 MG/DL (ref 0.55–1.02)
CREAT UR-SCNC: 137 MG/DL
DEPRECATED RDW RBC AUTO: 48.9 FL (ref 35.1–46.3)
EOSINOPHIL # BLD AUTO: 0.35 X10(3) UL (ref 0–0.7)
EOSINOPHIL NFR BLD AUTO: 6.2 %
ERYTHROCYTE [DISTWIDTH] IN BLOOD BY AUTOMATED COUNT: 14.6 % (ref 11–15)
GLUCOSE BLD-MCNC: 107 MG/DL (ref 70–99)
GLUCOSE UR-MCNC: NEGATIVE MG/DL
HCT VFR BLD AUTO: 32.1 % (ref 35–48)
HGB BLD-MCNC: 10.7 G/DL (ref 12–16)
HGB UR QL STRIP.AUTO: NEGATIVE
IMM GRANULOCYTES # BLD AUTO: 0.02 X10(3) UL (ref 0–1)
IMM GRANULOCYTES NFR BLD: 0.4 %
INR BLD: 2.05 (ref 0.9–1.2)
KETONES UR-MCNC: NEGATIVE MG/DL
LEUKOCYTE ESTERASE UR QL STRIP.AUTO: NEGATIVE
LYMPHOCYTES # BLD AUTO: 1.11 X10(3) UL (ref 1–4)
LYMPHOCYTES NFR BLD AUTO: 19.6 %
MCH RBC QN AUTO: 31.7 PG (ref 26–34)
MCHC RBC AUTO-ENTMCNC: 33.3 G/DL (ref 31–37)
MCV RBC AUTO: 95 FL (ref 80–100)
MICROALBUMIN UR-MCNC: 3.3 MG/DL
MICROALBUMIN/CREAT 24H UR-RTO: 24.1 UG/MG (ref ?–30)
MONOCYTES # BLD AUTO: 0.76 X10(3) UL (ref 0.1–1)
MONOCYTES NFR BLD AUTO: 13.4 %
NEUTROPHILS # BLD AUTO: 3.38 X10 (3) UL (ref 1.5–7.7)
NEUTROPHILS # BLD AUTO: 3.38 X10(3) UL (ref 1.5–7.7)
NEUTROPHILS NFR BLD AUTO: 59.7 %
NITRITE UR QL STRIP.AUTO: NEGATIVE
OSMOLALITY SERPL CALC.SUM OF ELEC: 294 MOSM/KG (ref 275–295)
PATIENT FASTING Y/N/NP: YES
PH UR: 5 [PH] (ref 5–8)
PLATELET # BLD AUTO: 309 10(3)UL (ref 150–450)
POTASSIUM SERPL-SCNC: 4.7 MMOL/L (ref 3.5–5.1)
PROT UR-MCNC: NEGATIVE MG/DL
PROTHROMBIN TIME: 23.3 SECONDS (ref 11.8–14.5)
RBC # BLD AUTO: 3.38 X10(6)UL (ref 3.8–5.3)
RBC #/AREA URNS AUTO: 2 /HPF
SODIUM SERPL-SCNC: 139 MMOL/L (ref 136–145)
SP GR UR STRIP: 1.02 (ref 1–1.03)
UROBILINOGEN UR STRIP-ACNC: <2
WBC # BLD AUTO: 5.7 X10(3) UL (ref 4–11)
WBC #/AREA URNS AUTO: 1 /HPF

## 2020-03-07 PROCEDURE — 85025 COMPLETE CBC W/AUTO DIFF WBC: CPT

## 2020-03-07 PROCEDURE — 82043 UR ALBUMIN QUANTITATIVE: CPT

## 2020-03-07 PROCEDURE — 84244 ASSAY OF RENIN: CPT

## 2020-03-07 PROCEDURE — 36415 COLL VENOUS BLD VENIPUNCTURE: CPT

## 2020-03-07 PROCEDURE — 80048 BASIC METABOLIC PNL TOTAL CA: CPT

## 2020-03-07 PROCEDURE — 82570 ASSAY OF URINE CREATININE: CPT

## 2020-03-07 PROCEDURE — 82088 ASSAY OF ALDOSTERONE: CPT

## 2020-03-07 PROCEDURE — 81001 URINALYSIS AUTO W/SCOPE: CPT

## 2020-03-07 PROCEDURE — 85610 PROTHROMBIN TIME: CPT

## 2020-03-07 NOTE — TELEPHONE ENCOUNTER
Called over the weekend about stat PT/INR. Therapeutic. No procedure seen in next 48 hours. Just wanted to pass along in case needs to be reordered and teaching that INR should be done preprocedure and review which medications need to be held.

## 2020-03-09 NOTE — TELEPHONE ENCOUNTER
Thank you Dr. Rosette Aviles. Apologies for the confusion. Nursing: Please contact the patient and reinforce that the INR should only be done the day of her procedure.   Per my previous order, I have reordered the INR for the day of her upcoming procedure (5/15/

## 2020-03-09 NOTE — TELEPHONE ENCOUNTER
Unable to reach patient, no voicemail set up, and JANETH says not to leave messages. JANETH also lists daughter Abhay Santos as contact [phone]. I left message on her voicemail that we could not get through to her mom, and to have her call our office.

## 2020-03-10 ENCOUNTER — TELEPHONE (OUTPATIENT)
Dept: GASTROENTEROLOGY | Facility: CLINIC | Age: 70
End: 2020-03-10

## 2020-03-10 LAB
ALDOSTERONE: 28.5 NG/DL
H PYLORI AG STL QL IA: NEGATIVE
RENIN ACTIVITY: 35.3 NG/ML/HR

## 2020-03-10 RX ORDER — PANTOPRAZOLE SODIUM 20 MG/1
20 TABLET, DELAYED RELEASE ORAL
Qty: 30 TABLET | Refills: 3 | Status: SHIPPED | OUTPATIENT
Start: 2020-03-10 | End: 2020-04-09

## 2020-03-10 NOTE — TELEPHONE ENCOUNTER
Spoke to patient w/  Judy Montague [id number]. Results relayed to patient and she verbalized understanding. Medication regimen was reviewed and she will pick it up from the pharmacy when ready. OV f/u was made w/ Dr Dung Kim for post procedure.  No

## 2020-03-10 NOTE — TELEPHONE ENCOUNTER
Using [facility], patient contacted reviewed below. States understanding, has no further questions. Closing this encounter. Please see 3/3 encounter for discussion regarding Coumadin and Lovenox orders from Dr Alexandria Castano.

## 2020-03-11 ENCOUNTER — ANTI-COAG VISIT (OUTPATIENT)
Dept: INTERNAL MEDICINE CLINIC | Facility: CLINIC | Age: 70
End: 2020-03-11
Payer: MEDICARE

## 2020-03-11 ENCOUNTER — TELEPHONE (OUTPATIENT)
Dept: INTERNAL MEDICINE CLINIC | Facility: CLINIC | Age: 70
End: 2020-03-11

## 2020-03-11 DIAGNOSIS — R76.0 ANTIPHOSPHOLIPID ANTIBODY POSITIVE: ICD-10-CM

## 2020-03-11 DIAGNOSIS — D68.61 ANTIPHOSPHOLIPID SYNDROME (HCC): ICD-10-CM

## 2020-03-11 DIAGNOSIS — I82.532 CHRONIC DEEP VEIN THROMBOSIS (DVT) OF LEFT POPLITEAL VEIN (HCC): ICD-10-CM

## 2020-03-11 LAB — INR: 2.4 (ref 2–3)

## 2020-03-11 PROCEDURE — 93793 ANTICOAG MGMT PT WARFARIN: CPT

## 2020-03-11 PROCEDURE — 85610 PROTHROMBIN TIME: CPT

## 2020-03-11 PROCEDURE — 36416 COLLJ CAPILLARY BLOOD SPEC: CPT

## 2020-03-12 NOTE — TELEPHONE ENCOUNTER
Unable to reach this patient;  Can you clarify what you want to know from her or about her? We can try to reach out to emergency contact if needed. She has an appointment with Dr. Caleb Argueta tomorrow.

## 2020-03-13 ENCOUNTER — OFFICE VISIT (OUTPATIENT)
Dept: NEPHROLOGY | Facility: CLINIC | Age: 70
End: 2020-03-13
Payer: MEDICARE

## 2020-03-13 VITALS
WEIGHT: 213.19 LBS | DIASTOLIC BLOOD PRESSURE: 75 MMHG | HEART RATE: 97 BPM | BODY MASS INDEX: 31.58 KG/M2 | HEIGHT: 69 IN | SYSTOLIC BLOOD PRESSURE: 146 MMHG

## 2020-03-13 DIAGNOSIS — N18.30 CKD (CHRONIC KIDNEY DISEASE) STAGE 3, GFR 30-59 ML/MIN (HCC): Chronic | ICD-10-CM

## 2020-03-13 DIAGNOSIS — I10 ESSENTIAL HYPERTENSION WITH GOAL BLOOD PRESSURE LESS THAN 130/80: ICD-10-CM

## 2020-03-13 DIAGNOSIS — E11.9 CONTROLLED TYPE 2 DIABETES MELLITUS WITHOUT COMPLICATION, WITHOUT LONG-TERM CURRENT USE OF INSULIN (HCC): Chronic | ICD-10-CM

## 2020-03-13 DIAGNOSIS — L40.50 PSORIATIC ARTHRITIS (HCC): Primary | ICD-10-CM

## 2020-03-13 PROCEDURE — 99214 OFFICE O/P EST MOD 30 MIN: CPT | Performed by: INTERNAL MEDICINE

## 2020-03-13 RX ORDER — PROPRANOLOL HYDROCHLORIDE 120 MG/1
120 CAPSULE, EXTENDED RELEASE ORAL DAILY
Qty: 30 CAPSULE | Refills: 2 | Status: SHIPPED | OUTPATIENT
Start: 2020-03-13 | End: 2020-07-20

## 2020-03-13 NOTE — TELEPHONE ENCOUNTER
She called me and left a message to call her back. She is Lao-speaking I called her she hung up the phone, she wanted to talk to Dr. Gabriela Remy that is why I send  to triage, to call her and check what is going on because Dr Gabriela Remy was not at the office. Masha Acuña

## 2020-03-13 NOTE — TELEPHONE ENCOUNTER
Per  #[id number]. Multiple attempts made to contact patient. Unable to leave voicemail on patients home/cell number. [phone] \"disconnected\". [phone]  Voicemail left by .

## 2020-03-13 NOTE — PATIENT INSTRUCTIONS
Change propanolol to 120mg each am    Stop hydralazine    Continue other meds    Keep pulse under 80    Keep bp under 140/80    Labs good    Send me email one week to discuss condtion    Good to see you both!!

## 2020-03-24 ENCOUNTER — OFFICE VISIT (OUTPATIENT)
Dept: INTERNAL MEDICINE CLINIC | Facility: CLINIC | Age: 70
End: 2020-03-24
Payer: MEDICARE

## 2020-03-24 ENCOUNTER — HOSPITAL ENCOUNTER (OUTPATIENT)
Dept: GENERAL RADIOLOGY | Facility: HOSPITAL | Age: 70
Discharge: HOME | End: 2020-03-24
Attending: INTERNAL MEDICINE
Payer: MEDICARE

## 2020-03-24 ENCOUNTER — HOSPITAL ENCOUNTER (EMERGENCY)
Facility: HOSPITAL | Age: 70
Discharge: HOME | End: 2020-03-24
Attending: EMERGENCY MEDICINE
Payer: MEDICARE

## 2020-03-24 VITALS
RESPIRATION RATE: 18 BRPM | OXYGEN SATURATION: 98 % | TEMPERATURE: 98 F | BODY MASS INDEX: 31.25 KG/M2 | HEIGHT: 69 IN | SYSTOLIC BLOOD PRESSURE: 122 MMHG | HEART RATE: 69 BPM | WEIGHT: 211 LBS | DIASTOLIC BLOOD PRESSURE: 63 MMHG

## 2020-03-24 VITALS
TEMPERATURE: 99 F | OXYGEN SATURATION: 98 % | DIASTOLIC BLOOD PRESSURE: 79 MMHG | BODY MASS INDEX: 31.25 KG/M2 | HEART RATE: 70 BPM | HEIGHT: 69 IN | RESPIRATION RATE: 17 BRPM | WEIGHT: 211 LBS | SYSTOLIC BLOOD PRESSURE: 135 MMHG

## 2020-03-24 DIAGNOSIS — M25.511 ACUTE PAIN OF RIGHT SHOULDER: ICD-10-CM

## 2020-03-24 DIAGNOSIS — Z79.01 ANTICOAGULATED: ICD-10-CM
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
## 2020-03-24 DIAGNOSIS — S42.291A CLOSED FRACTURE OF HEAD OF RIGHT HUMERUS, INITIAL ENCOUNTER: Primary | ICD-10-CM

## 2020-03-24 DIAGNOSIS — M25.532 WRIST PAIN, ACUTE, LEFT: ICD-10-CM
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
## 2020-03-24 LAB
BASOPHILS # BLD AUTO: 0.02 X10(3) UL (ref 0–0.2)
BASOPHILS NFR BLD AUTO: 0.2 %
DEPRECATED RDW RBC AUTO: 52 FL (ref 35.1–46.3)
EOSINOPHIL # BLD AUTO: 0.03 X10(3) UL (ref 0–0.7)
EOSINOPHIL NFR BLD AUTO: 0.3 %
ERYTHROCYTE [DISTWIDTH] IN BLOOD BY AUTOMATED COUNT: 15.4 % (ref 11–15)
HCT VFR BLD AUTO: 27.5 % (ref 35–48)
HGB BLD-MCNC: 9.5 G/DL (ref 12–16)
IMM GRANULOCYTES # BLD AUTO: 0.03 X10(3) UL (ref 0–1)
IMM GRANULOCYTES NFR BLD: 0.3 %
INR BLD: 2.33 (ref 0.9–1.2)
LYMPHOCYTES # BLD AUTO: 0.9 X10(3) UL (ref 1–4)
LYMPHOCYTES NFR BLD AUTO: 8.2 %
MCH RBC QN AUTO: 32.6 PG (ref 26–34)
MCHC RBC AUTO-ENTMCNC: 34.5 G/DL (ref 31–37)
MCV RBC AUTO: 94.5 FL (ref 80–100)
MONOCYTES # BLD AUTO: 1.26 X10(3) UL (ref 0.1–1)
MONOCYTES NFR BLD AUTO: 11.4 %
NEUTROPHILS # BLD AUTO: 8.8 X10 (3) UL (ref 1.5–7.7)
NEUTROPHILS # BLD AUTO: 8.8 X10(3) UL (ref 1.5–7.7)
NEUTROPHILS NFR BLD AUTO: 79.6 %
PLATELET # BLD AUTO: 278 10(3)UL (ref 150–450)
PROTHROMBIN TIME: 25.9 SECONDS (ref 11.8–14.5)
RBC # BLD AUTO: 2.91 X10(6)UL (ref 3.8–5.3)
WBC # BLD AUTO: 11 X10(3) UL (ref 4–11)

## 2020-03-24 PROCEDURE — 99214 OFFICE O/P EST MOD 30 MIN: CPT | Performed by: INTERNAL MEDICINE

## 2020-03-24 PROCEDURE — 85025 COMPLETE CBC W/AUTO DIFF WBC: CPT | Performed by: EMERGENCY MEDICINE

## 2020-03-24 PROCEDURE — 73030 X-RAY EXAM OF SHOULDER: CPT | Performed by: INTERNAL MEDICINE

## 2020-03-24 PROCEDURE — 99284 EMERGENCY DEPT VISIT MOD MDM: CPT

## 2020-03-24 PROCEDURE — 36415 COLL VENOUS BLD VENIPUNCTURE: CPT

## 2020-03-24 PROCEDURE — 73110 X-RAY EXAM OF WRIST: CPT | Performed by: INTERNAL MEDICINE

## 2020-03-24 PROCEDURE — 85610 PROTHROMBIN TIME: CPT | Performed by: EMERGENCY MEDICINE

## 2020-03-24 RX ORDER — HYDROCODONE BITARTRATE AND ACETAMINOPHEN 5; 325 MG/1; MG/1
1-2 TABLET ORAL EVERY 6 HOURS PRN
Qty: 10 TABLET | Refills: 0 | Status: SHIPPED | OUTPATIENT
Start: 2020-03-24 | End: 2020-03-31

## 2020-03-24 RX ORDER — ACETAMINOPHEN AND CODEINE PHOSPHATE 300; 30 MG/1; MG/1
1 TABLET ORAL 3 TIMES DAILY PRN
Qty: 30 TABLET | Refills: 0 | Status: SHIPPED | OUTPATIENT
Start: 2020-03-24 | End: 2020-10-29

## 2020-03-24 NOTE — PROGRESS NOTES
HPI:    Patient ID: Vivi Porras is a 77year old female.     HPI  Patient comes in today with daughter with complaint of right shoulder pain and left wrist pain she had fallen and landed on the shoulder and the outstretched left hand patient Dipropionate Aug 0.05 % External Lotion Apply 1 Application topically 2 (two) times daily.  1 Bottle 1   • GLIPIZIDE 10 MG Oral Tab take 1/2 tablet by mouth before breakfast and 1/2 tablet before dinner 90 tablet 1   • Secukinumab, 300 MG Dose, (COSENTYX SE 0   • Meclizine HCl 25 MG Oral Tab Take 1 tablet (25 mg total) by mouth 3 (three) times daily as needed.  take 1 tablet (25MG)  by oral route 3 times every day as needed 90 tablet 1   • Omega-3-acid Ethyl Esters (LOVAZA) 1 G Oral Cap Take 1 g by mouth 2 (tw Grandmother    • No Known Problems Paternal Grandmother    • No Known Problems Maternal Aunt    • No Known Problems Paternal Aunt    • No Known Problems Maternal Cousin Female    • No Known Problems Maternal Cousin Male    • No Known Problems Paternal Cous defined types were placed in this encounter.       Meds This Visit:  Requested Prescriptions     Signed Prescriptions Disp Refills   • Acetaminophen-Codeine (TYLENOL WITH CODEINE #3) 300-30 MG Oral Tab 30 tablet 0     Sig: Take 1 tablet by mouth 3 (three) t

## 2020-03-24 NOTE — ED INITIAL ASSESSMENT (HPI)
Patient presents to ED with c/o of right shoulder pain after tripping and falling on sidewalk. PCP sent patient for outpt xray, received results and was sent to ED. Breonna Granados does take coumadin for hx dvt. Denies head injury, denies loc.

## 2020-03-24 NOTE — ED PROVIDER NOTES
Patient Seen in: [facility] AND [facility] Emergency Department      History   Patient presents with:  Upper Extremity Injury    Stated Complaint: Right shoulder injury    HPI    28-year-old female with history of prior DVT presently taking Coumadin along with t injury  Other systems are as noted in HPI. Constitutional and vital signs reviewed. All other systems reviewed and negative except as noted above.     Physical Exam     ED Triage Vitals [03/24/20 1555]   /55   Pulse 65   Resp 18   Temp 98.1 °F ( components:    RBC 2.91 (*)     HGB 9.5 (*)     HCT 27.5 (*)     RDW-SD 52.0 (*)     RDW 15.4 (*)     Neutrophil Absolute Prelim 8.80 (*)     Neutrophil Absolute 8.80 (*)     Lymphocyte Absolute 0.90 (*)     Monocyte Absolute 1.26 (*)     All other compone

## 2020-03-26 NOTE — TELEPHONE ENCOUNTER
Spoke to pt. Relayed Dr. Diallo Garcia message as shown below. Pt verbalized understanding of whole message with no further questions or requests at this time. Dr. Redmond (hospitalist)

## 2020-04-01 ENCOUNTER — ANTI-COAG VISIT (OUTPATIENT)
Dept: INTERNAL MEDICINE CLINIC | Facility: CLINIC | Age: 70
End: 2020-04-01
Payer: MEDICARE

## 2020-04-01 DIAGNOSIS — I82.532 CHRONIC DEEP VEIN THROMBOSIS (DVT) OF LEFT POPLITEAL VEIN (HCC): ICD-10-CM

## 2020-04-01 DIAGNOSIS — D68.61 ANTIPHOSPHOLIPID SYNDROME (HCC): ICD-10-CM

## 2020-04-01 DIAGNOSIS — Z51.81 ENCOUNTER FOR THERAPEUTIC DRUG MONITORING: Primary | ICD-10-CM

## 2020-04-01 PROCEDURE — 36416 COLLJ CAPILLARY BLOOD SPEC: CPT

## 2020-04-01 PROCEDURE — 93793 ANTICOAG MGMT PT WARFARIN: CPT

## 2020-04-01 PROCEDURE — 85610 PROTHROMBIN TIME: CPT

## 2020-04-03 ENCOUNTER — TELEPHONE (OUTPATIENT)
Dept: INTERNAL MEDICINE CLINIC | Facility: CLINIC | Age: 70
End: 2020-04-03

## 2020-04-03 RX ORDER — VALSARTAN 160 MG/1
TABLET ORAL
Qty: 180 TABLET | Refills: 1 | Status: SHIPPED | OUTPATIENT
Start: 2020-04-03 | End: 2020-07-08

## 2020-04-03 NOTE — TELEPHONE ENCOUNTER
Pt has [employer] on file, spoke with [facility] and they have it in stock, can you please escribe to them.

## 2020-04-06 ENCOUNTER — TELEPHONE (OUTPATIENT)
Dept: INTERNAL MEDICINE CLINIC | Facility: CLINIC | Age: 70
End: 2020-04-06

## 2020-04-06 RX ORDER — WARFARIN SODIUM 5 MG/1
TABLET ORAL
Qty: 180 TABLET | Refills: 0 | Status: SHIPPED | OUTPATIENT
Start: 2020-04-06 | End: 2020-10-02

## 2020-04-06 RX ORDER — LEVOTHYROXINE SODIUM 0.1 MG/1
200 TABLET ORAL
Qty: 180 TABLET | Refills: 1 | Status: SHIPPED | OUTPATIENT
Start: 2020-04-06 | End: 2020-11-07

## 2020-04-06 NOTE — TELEPHONE ENCOUNTER
Patient requesting refill sent to [facility] pharmacy    LEVOTHYROXINE SODIUM 100 MCG Oral Tab    WARFARIN SODIUM 5 MG Oral Tab    Acetaminophen-Codeine (TYLENOL WITH CODEINE #3) 300-30 MG

## 2020-04-06 NOTE — TELEPHONE ENCOUNTER
Patient cancelled follow up appointment scheduled for 4/21. Next available is 8/4.  Patient feels that is too far out asking if she can be seen sometime in June

## 2020-04-06 NOTE — TELEPHONE ENCOUNTER
Spoke to patient agree to re schedule for July 20, 2020.  Marcelo Rice made the appointment for 7/20/2020 at 10:30 am

## 2020-04-06 NOTE — TELEPHONE ENCOUNTER
Will send only levothyroxine andCoumadin refill. She has a prescription for Tylenol with codeine from March 24, 2020. She should not be out this is from Dr. Evelyn Livingston. She had 30 tablets. She should not of gone to all 30.

## 2020-04-07 NOTE — TELEPHONE ENCOUNTER
Message regarding this was already handled please look at prior message and make sure it was handled

## 2020-04-08 ENCOUNTER — PATIENT MESSAGE (OUTPATIENT)
Dept: RHEUMATOLOGY | Facility: CLINIC | Age: 70
End: 2020-04-08

## 2020-04-10 ENCOUNTER — TELEPHONE (OUTPATIENT)
Dept: GASTROENTEROLOGY | Facility: CLINIC | Age: 70
End: 2020-04-10

## 2020-04-10 NOTE — TELEPHONE ENCOUNTER
Patient aware that as of now her procedure will take place as planned in May. The office will contact her if it needs to be rescheduled because of COVID 19. Patient had no further questions.  Cleo Pedersen #[id number] used to translate this call.

## 2020-04-10 NOTE — TELEPHONE ENCOUNTER
Patient requesting to speak with nurse regarding colonoscopy scheduled 5/15/2020, patient asking if this procedure is still on or will be rescheduled, please call with  at:[phone],thanks,

## 2020-04-10 NOTE — TELEPHONE ENCOUNTER
Patient requesting to speak with nurse if she should continue taking rx:Pantoprozole Sodium 20 MG, patient indicates Sunni Garcia prescribed with refills. Please call with  at:[phone],thanks.

## 2020-04-10 NOTE — TELEPHONE ENCOUNTER
Recommendations from [address] [place] [address] office note from March 2020 reviewed with patient. Patient to try trial of Pantoprazole 20mg for 2-3 months time and reevaluate.   Patient had enough refills (3) for this trial.  Instructed to call office if any further questi

## 2020-04-15 ENCOUNTER — ANTI-COAG VISIT (OUTPATIENT)
Dept: INTERNAL MEDICINE CLINIC | Facility: CLINIC | Age: 70
End: 2020-04-15
Payer: MEDICARE

## 2020-04-15 ENCOUNTER — TELEPHONE (OUTPATIENT)
Dept: INTERNAL MEDICINE CLINIC | Facility: CLINIC | Age: 70
End: 2020-04-15

## 2020-04-15 DIAGNOSIS — Z51.81 ENCOUNTER FOR THERAPEUTIC DRUG MONITORING: ICD-10-CM

## 2020-04-15 DIAGNOSIS — I82.532 CHRONIC DEEP VEIN THROMBOSIS (DVT) OF LEFT POPLITEAL VEIN (HCC): ICD-10-CM

## 2020-04-15 DIAGNOSIS — D68.61 ANTIPHOSPHOLIPID SYNDROME (HCC): ICD-10-CM

## 2020-04-15 PROCEDURE — 36416 COLLJ CAPILLARY BLOOD SPEC: CPT

## 2020-04-15 PROCEDURE — 85610 PROTHROMBIN TIME: CPT

## 2020-04-15 PROCEDURE — 93793 ANTICOAG MGMT PT WARFARIN: CPT

## 2020-04-15 NOTE — TELEPHONE ENCOUNTER
Future Appointments   Date Time Provider Zac Choi   4/29/2020 10:15 AM [facility]   4/29/2020  6:40 PM Leodan Mendoza MD [facility]   5/15/2020 10:00 AM TE[facility]ORA, PROCEDURE [facility] None   5/21/2020  1:00 PM Elif

## 2020-04-15 NOTE — TELEPHONE ENCOUNTER
Today's INR 3.3  Goal 2.0-3.0    Understands per protocol, weekly dose decreased by 10%, actual decrease 12.5%. Repeat INR in 2 weeks.

## 2020-04-15 NOTE — TELEPHONE ENCOUNTER
Dr Wilder Langford, per patient's message below, she would like Telephone visit after 5pm. Okay to keep 6:40pm appt. For 4/29? Can reschedule for earlier time (5:30 or 6:00pm) if patient's drop.

## 2020-04-26 ENCOUNTER — LAB ENCOUNTER (OUTPATIENT)
Dept: LAB | Facility: HOSPITAL | Age: 70
End: 2020-04-26
Attending: INTERNAL MEDICINE
Payer: MEDICARE

## 2020-04-26 DIAGNOSIS — Z51.81 ENCOUNTER FOR THERAPEUTIC DRUG MONITORING: ICD-10-CM

## 2020-04-26 DIAGNOSIS — L40.50 PSORIATIC ARTHROPATHY (HCC): ICD-10-CM

## 2020-04-26 DIAGNOSIS — D68.61 ANTIPHOSPHOLIPID SYNDROME (HCC): ICD-10-CM

## 2020-04-26 DIAGNOSIS — L40.9 PSORIASIS: ICD-10-CM

## 2020-04-26 PROCEDURE — 85652 RBC SED RATE AUTOMATED: CPT

## 2020-04-26 PROCEDURE — 84460 ALANINE AMINO (ALT) (SGPT): CPT

## 2020-04-26 PROCEDURE — 84450 TRANSFERASE (AST) (SGOT): CPT

## 2020-04-26 PROCEDURE — 82040 ASSAY OF SERUM ALBUMIN: CPT

## 2020-04-26 PROCEDURE — 82565 ASSAY OF CREATININE: CPT

## 2020-04-26 PROCEDURE — 86140 C-REACTIVE PROTEIN: CPT

## 2020-04-26 PROCEDURE — 85025 COMPLETE CBC W/AUTO DIFF WBC: CPT

## 2020-04-26 PROCEDURE — 36415 COLL VENOUS BLD VENIPUNCTURE: CPT

## 2020-04-29 ENCOUNTER — VIRTUAL PHONE E/M (OUTPATIENT)
Dept: RHEUMATOLOGY | Facility: CLINIC | Age: 70
End: 2020-04-29
Payer: MEDICARE

## 2020-04-29 ENCOUNTER — ANTI-COAG VISIT (OUTPATIENT)
Dept: INTERNAL MEDICINE CLINIC | Facility: CLINIC | Age: 70
End: 2020-04-29
Payer: MEDICARE

## 2020-04-29 DIAGNOSIS — Z51.81 ENCOUNTER FOR THERAPEUTIC DRUG MONITORING: ICD-10-CM

## 2020-04-29 DIAGNOSIS — D68.61 ANTIPHOSPHOLIPID SYNDROME (HCC): ICD-10-CM

## 2020-04-29 DIAGNOSIS — L40.50 PSORIATIC ARTHRITIS (HCC): ICD-10-CM

## 2020-04-29 DIAGNOSIS — Z51.81 ENCOUNTER FOR THERAPEUTIC DRUG MONITORING: Primary | ICD-10-CM

## 2020-04-29 DIAGNOSIS — I82.532 CHRONIC DEEP VEIN THROMBOSIS (DVT) OF LEFT POPLITEAL VEIN (HCC): ICD-10-CM

## 2020-04-29 PROCEDURE — 99442 PHONE E/M BY PHYS 11-20 MIN: CPT | Performed by: INTERNAL MEDICINE

## 2020-04-29 PROCEDURE — 93793 ANTICOAG MGMT PT WARFARIN: CPT

## 2020-04-29 PROCEDURE — 85610 PROTHROMBIN TIME: CPT

## 2020-04-29 PROCEDURE — 36416 COLLJ CAPILLARY BLOOD SPEC: CPT

## 2020-04-29 RX ORDER — FOLIC ACID 1 MG/1
1 TABLET ORAL DAILY
Qty: 90 TABLET | Refills: 1 | Status: SHIPPED | OUTPATIENT
Start: 2020-04-29 | End: 2020-11-09

## 2020-04-29 NOTE — PROGRESS NOTES
Karyna Pruitt is a 72year old female. HPI:   No chief complaint on file. Virtual Telephone Check-In    Karyna Pruitt verbally consents to a Virtual/Telephone Check-In visit on 04/29/20.     Patient understands and accept Reviewed   Family Hx Reviewed     Medications (Active prior to today's visit):  Current Outpatient Medications   Medication Sig Dispense Refill   • Warfarin Sodium 5 MG Oral Tab take 1 tablet by mouth two times a day 180 tablet 0   • Levothyroxine Sodium 1 apply to the affected area two times a day  (Patient not taking: Reported on 3/24/2020) 45 g 0   • insulin glargine (LANTUS SOLOSTAR) 100 UNIT/ML Subcutaneous Solution Pen-injector INJECT 30UNITS IN THE MORNING AND 6 UNITS IN THE EVENING 30 mL 1   • Lancet Hemoglobin      12.0 - 16.0 g/dL 11.1 (L) 11.3 (L)   Hematocrit      35.0 - 48.0 % 34.3 (L) 34.5 (L)   MCV      80.0 - 100.0 fL 99.4 96.1   MCH      26.0 - 34.0 pg 32.2 31.5   MCHC      31.0 - 37.0 g/dL 32.4 32.8   RDW-SD      35.1 - 46.3 fL 54.3 (H) 50. Positive .  . .    PT      11.8 - 14.5 seconds 13.0    APTT      23.2 - 35.3 seconds 30.3    Hexagonal Phase Staclot LA      Negative Positive (A)    DRVVT Ratio      0.0 - 1.1 1.4 (H)    DRVV Mix Ratio      0.0 - 1.1 1.4 (H)    DRVV Confirm      0.0 - 1.1 Negative   Scleroderma (Scl-70) (JAH) Antibody, IgG      0 - 40 AU/mL 3   Anti Double Strand DNA      <10 <10     Component      Latest Ref Rng & Units 12/12/2018 10/25/2018   [facility] Titer/Pattern      <80  640 (A)   Reviewed By:        Danitza Hernandez M.D.    [facility] dermatology and they were confident that she has psoriasis. She was placed on clobetasol cream and shampoo her scalp and her lesions have improved significantly.   She reports her pruritus has improved also  - X-rays the wrist showed carpal narrowing and o

## 2020-04-30 ENCOUNTER — TELEPHONE (OUTPATIENT)
Dept: INTERNAL MEDICINE CLINIC | Facility: CLINIC | Age: 70
End: 2020-04-30

## 2020-04-30 ENCOUNTER — PATIENT MESSAGE (OUTPATIENT)
Dept: NEPHROLOGY | Facility: CLINIC | Age: 70
End: 2020-04-30

## 2020-04-30 ENCOUNTER — TELEPHONE (OUTPATIENT)
Dept: GASTROENTEROLOGY | Facility: CLINIC | Age: 70
End: 2020-04-30

## 2020-04-30 NOTE — PATIENT INSTRUCTIONS
We discussed your psoriatic arthritis  Cont cosentyx every 4 weeks  Decrease methotrexate 6 pills weekly  Continue folic acid daily  Blood work in 3 months  Follow up in 3 months

## 2020-04-30 NOTE — TELEPHONE ENCOUNTER
Patient has was referred by Lilibeth Carrillo Dr for Levonix Injections, instead of rx:Warfarin. Please call with  at:[phone],thanks.

## 2020-04-30 NOTE — TELEPHONE ENCOUNTER
It appears that this was sent to the [facility] in error. Will route to Dr. Laura Frank triage pool. Please refer to 3/3/2020 encounter.     Thank you    \"Note      Triage RN's:     Dr. Sierra Pratt has recommended coumadin/lovenox orders for colonoscopy on 5/15/20

## 2020-04-30 NOTE — TELEPHONE ENCOUNTER
Patient has medication questions regarding colonoscopy procedure scheduled 5/15/2020. Patient received letter dated 3/4/20 with medication instructions, however their are still medications she takes that are not listed and ask when she should stop taking rx:Minoxidil; rx:Levothyroxine; and rx:Pantroprozole; rx: Folic Acid; rx:Propranolol. Please call with  at:[phone],thanks.

## 2020-04-30 NOTE — TELEPHONE ENCOUNTER
Patient is scheduled for a colonoscopy on 5/15. Please see letter sent to patient on 3/04. Patient has questions about her anticoagulate medication as well as the lovenox injections. Patient speaks Nepali, please advise.

## 2020-04-30 NOTE — TELEPHONE ENCOUNTER
I reviewed colonoscopy instructions with the patient. She is aware that she is to hold the Valsartan the night before and the morning of. Aware that Dr. Richardson Ra to provide instructions on anticoagulation (order lovenox/teach how to give injections). Aware that our office does not prescribe Lovenox. Please see 3/3/2020 encounter. Patient voiced understanding and will call Dr. Sal Scanlon office after we hang up.     Kriss Scripture #[id number]

## 2020-05-01 ENCOUNTER — TELEPHONE (OUTPATIENT)
Dept: INTERNAL MEDICINE CLINIC | Facility: CLINIC | Age: 70
End: 2020-05-01

## 2020-05-01 DIAGNOSIS — Z51.81 ENCOUNTER FOR THERAPEUTIC DRUG MONITORING: ICD-10-CM

## 2020-05-01 DIAGNOSIS — L40.50 PSORIATIC ARTHRITIS (HCC): ICD-10-CM

## 2020-05-01 DIAGNOSIS — D68.61 ANTIPHOSPHOLIPID SYNDROME (HCC): ICD-10-CM

## 2020-05-01 DIAGNOSIS — E11.9 CONTROLLED TYPE 2 DIABETES MELLITUS WITHOUT COMPLICATION, WITHOUT LONG-TERM CURRENT USE OF INSULIN (HCC): Primary | ICD-10-CM

## 2020-05-01 RX ORDER — ENOXAPARIN SODIUM 150 MG/ML
1 INJECTION SUBCUTANEOUS EVERY 12 HOURS
Qty: 15 SYRINGE | Refills: 1 | Status: SHIPPED | OUTPATIENT
Start: 2020-05-01 | End: 2020-07-20

## 2020-05-01 NOTE — TELEPHONE ENCOUNTER
With  #[id number], left message to call back    Kathie Sabillon, [address]  [phone]

## 2020-05-01 NOTE — TELEPHONE ENCOUNTER
Patient seen in coumadin clinic on 4/29. Reviewed letter from GI and instructions for coumadin/lovenox. Advised to reach out to PCP for lovenox. Appointment made in coumadin clinic post colonoscopy for 5/18.

## 2020-05-01 NOTE — TELEPHONE ENCOUNTER
Increase insulin to 35 in the morning and 10 units in the evening. Follow-up with endocrinology or diabetic specialist Dr. Pranay Conrad or Dr. Nelli Zaidi. Referrals entered.

## 2020-05-01 NOTE — TELEPHONE ENCOUNTER
Spoke to patient. I informed patient that  RX was sent to pharmacy and also gave Dr. Story Messing instructions about the coumadin and Lovenox. Patient understood.  Patient states that her sugar is been high when she check her sugar after meal for 2 to 3 hour  Is

## 2020-05-01 NOTE — TELEPHONE ENCOUNTER
I don't follow her coumadin levels, she follows up with coumadin clinic. I can order Lovenox again if she does not have any leftover at home? We had showed her how to inject lovenox. We can show her again if she wants. So she will do lovenox 1 mg /kg injections 2 times a day 3 days prior to her colonossopy and 3-4  days after wards, to begin again after colonoscopy, when GI says OK. She will need to stop lovenox injections 1 day  prior to coloscopy. She will stop her coumadin 3 days prior to colonoscopy and and restart coumadin after colonoscopy that day as long as OK with GI. She can only stop Lovenox injections once her INR becomes therapeutic which is 2-3, that is why I said 3-4 days after colonoscopy, MAYBE to stop lovenox. She will be taking Coumadin and Lovenox after the colonoscopy until the Coumadin level becomes therapeutic between 2 and 3 and then she can stop the Lovenox and continue the Coumadin. She will need to follow-up with Coumadin clinic to check on her INRs.

## 2020-05-01 NOTE — TELEPHONE ENCOUNTER
DIONISIOI please see message below I am not sure she needs a refill on her Lovenox that is what I was asking. Also to make sure she understands the Coumadin plan moving to go over with her and her daughter.

## 2020-05-01 NOTE — TELEPHONE ENCOUNTER
Rx. Sent. After procedure, will be on both, then when INR 2-3, can stop lovenox and coumadin only . BUT ONLY after INR reaches 2-3.

## 2020-05-01 NOTE — TELEPHONE ENCOUNTER
Spoke with patient. She states she doesn't have Lovenox. She also states she understand the coumadin plan. She has a question after the test she has to take lovenox  2 times a day with coumadin.

## 2020-05-01 NOTE — TELEPHONE ENCOUNTER
From: Raegan Grace  To: Perry Smiley MD  Sent: 4/30/2020 6:32 PM CDT  Subject: Visit Nohemy Host Dr. Gage Vides all is well with you. Just wanted to give you an update on Mom's condition.  She continues to feel well and ha

## 2020-05-02 NOTE — TELEPHONE ENCOUNTER
Patient notified of provider's recommendation. Patient verbalized understanding. Repeated instructions. Patient deferred to see Endocrinologist at this time. She has many appts, and upcoming procedure. Patient to continue monitoring glucose.

## 2020-05-05 RX ORDER — PROPRANOLOL HYDROCHLORIDE 60 MG/1
60 TABLET ORAL 2 TIMES DAILY
Qty: 180 TABLET | Refills: 2 | Status: SHIPPED | OUTPATIENT
Start: 2020-05-05 | End: 2021-02-08

## 2020-05-12 ENCOUNTER — TELEPHONE (OUTPATIENT)
Dept: ORTHOPEDICS CLINIC | Facility: CLINIC | Age: 70
End: 2020-05-12

## 2020-05-12 ENCOUNTER — TELEPHONE (OUTPATIENT)
Dept: INTERNAL MEDICINE CLINIC | Facility: CLINIC | Age: 70
End: 2020-05-12

## 2020-05-12 DIAGNOSIS — S42.291A CLOSED FRACTURE OF HEAD OF RIGHT HUMERUS, INITIAL ENCOUNTER: Primary | ICD-10-CM

## 2020-05-12 NOTE — TELEPHONE ENCOUNTER
S/w adalgisa and she states pt fell on 3/24 and went to ER and had XR of right shoulder. Pt was told she has fx and was put in a sling and pt stopped using the sling a few days ago.  She states after the ER visit she tried to call Dr. Lobo Crystal office and they

## 2020-05-12 NOTE — TELEPHONE ENCOUNTER
Per daughter pt was seen on 3/24 for humerus fracture, states she is still having pain and has hard time lifting things, states ortho appt was never made, asking for appointment. Please call thank you.

## 2020-05-12 NOTE — TELEPHONE ENCOUNTER
Kelli/ Daughter of patient is requesting a referral Dr. King Culp (Orthopedic/ [facility]). Daughter states she was advised by Dr. Dany Espinal office to get referrral first before proceeding with appointment. Daughter states appointment is for a right shoulder fracture follow up and daughter states Dr. Slmie Moulton is aware of issue.     # [phone]

## 2020-05-12 NOTE — TELEPHONE ENCOUNTER
Pt's daughter calling back said other office cant take her due to insurance asled her to call PCP to get the referral changed please advise

## 2020-05-13 NOTE — TELEPHONE ENCOUNTER
Referral # [id number] needs to be changed to show [address] [place] [address] location for Dr. Alysa Briscoe since patients insurance is not accepted at the [place] location.

## 2020-05-18 ENCOUNTER — TELEPHONE (OUTPATIENT)
Dept: GASTROENTEROLOGY | Facility: CLINIC | Age: 70
End: 2020-05-18

## 2020-05-18 ENCOUNTER — ANTI-COAG VISIT (OUTPATIENT)
Dept: INTERNAL MEDICINE CLINIC | Facility: CLINIC | Age: 70
End: 2020-05-18
Payer: MEDICARE

## 2020-05-18 DIAGNOSIS — Z51.81 ENCOUNTER FOR THERAPEUTIC DRUG MONITORING: ICD-10-CM

## 2020-05-18 DIAGNOSIS — D68.61 ANTIPHOSPHOLIPID SYNDROME (HCC): ICD-10-CM

## 2020-05-18 DIAGNOSIS — I82.532 CHRONIC DEEP VEIN THROMBOSIS (DVT) OF LEFT POPLITEAL VEIN (HCC): ICD-10-CM

## 2020-05-18 PROCEDURE — 85610 PROTHROMBIN TIME: CPT

## 2020-05-18 PROCEDURE — 36416 COLLJ CAPILLARY BLOOD SPEC: CPT

## 2020-05-18 PROCEDURE — 93793 ANTICOAG MGMT PT WARFARIN: CPT

## 2020-05-18 NOTE — TELEPHONE ENCOUNTER
Patient has upcoming appt 5/21/20, patient asking if this follow up is still needed due to her colonoscopy being cancelled. Please call with  at:[phone],thanks.

## 2020-05-18 NOTE — TELEPHONE ENCOUNTER
Bonnie Mcfarland recommended 2-3 month follow up on 3/3/2020 appointment notes. Changed to televisit for May 27,2020.

## 2020-05-20 ENCOUNTER — HOSPITAL ENCOUNTER (OUTPATIENT)
Dept: GENERAL RADIOLOGY | Facility: HOSPITAL | Age: 70
Discharge: HOME | End: 2020-05-20
Attending: ORTHOPAEDIC SURGERY
Payer: MEDICARE

## 2020-05-20 ENCOUNTER — OFFICE VISIT (OUTPATIENT)
Dept: ORTHOPEDICS CLINIC | Facility: CLINIC | Age: 70
End: 2020-05-20
Payer: MEDICARE

## 2020-05-20 VITALS — HEART RATE: 58 BPM | SYSTOLIC BLOOD PRESSURE: 155 MMHG | DIASTOLIC BLOOD PRESSURE: 59 MMHG

## 2020-05-20 DIAGNOSIS — M25.511 RIGHT SHOULDER PAIN, UNSPECIFIED CHRONICITY: Primary | ICD-10-CM

## 2020-05-20 DIAGNOSIS — S42.91XP: ICD-10-CM

## 2020-05-20 DIAGNOSIS — M25.511 RIGHT SHOULDER PAIN, UNSPECIFIED CHRONICITY: ICD-10-CM

## 2020-05-20 PROCEDURE — 99203 OFFICE O/P NEW LOW 30 MIN: CPT | Performed by: ORTHOPAEDIC SURGERY

## 2020-05-20 PROCEDURE — 3077F SYST BP >= 140 MM HG: CPT | Performed by: ORTHOPAEDIC SURGERY

## 2020-05-20 PROCEDURE — 3078F DIAST BP <80 MM HG: CPT | Performed by: ORTHOPAEDIC SURGERY

## 2020-05-20 PROCEDURE — 73030 X-RAY EXAM OF SHOULDER: CPT | Performed by: ORTHOPAEDIC SURGERY

## 2020-05-20 RX ORDER — PANTOPRAZOLE SODIUM 20 MG/1
TABLET, DELAYED RELEASE ORAL
COMMUNITY
Start: 2020-05-10 | End: 2020-07-08

## 2020-05-20 NOTE — PROGRESS NOTES
NURSING INTAKE COMMENTS: Patient presents with:   Injury: Right shoulder - here with her daughter who translates for her - onset 3/23/2020 she fell while walking and was in ER the next day - has x-rays in the system from that time - states she has pain with IN THE MORNING AND 10 UNITS IN THE EVENING 30 mL 1   • methotrexate 2.5 MG Oral Tab Take 6 tablets (15 mg total) by mouth every 7 days. 72 tablet 0   • folic acid 1 MG Oral Tab Take 1 tablet (1 mg total) by mouth daily.  90 tablet 1   • Warfarin Sodium 5 MG 0   • Lancets Does not apply Misc Checks qam and PM. Dx. E11.9. 100 each 6   • Glucose Blood In Vitro [employer] q12hrs.  Dx. E11.65 200 strip 6   • Ondansetron HCl (ZOFRAN) 4 mg tablet Take 1 tablet (4 mg total) by mouth every 8 (eight) hours as needed f Known Problems Paternal Aunt    • No Known Problems Maternal Cousin Female    • No Known Problems Maternal Cousin Male    • No Known Problems Paternal Cousin Female    • No Known Problems Paternal Cousin Male    • Colon Cancer Son 37   • Glaucoma Neg    • 11.1 (L) 04/26/2020    .0 04/26/2020      Lab Results   Component Value Date     (H) 03/07/2020    BUN 28 (H) 03/07/2020    CREATSERUM 1.07 (H) 04/26/2020    GFRNAA 53 (L) 04/26/2020    GFRAA 61 04/26/2020        Assessment and Plan:  Elizabet Ga

## 2020-05-26 ENCOUNTER — APPOINTMENT (OUTPATIENT)
Dept: PHYSICAL THERAPY | Facility: HOSPITAL | Age: 70
End: 2020-05-26
Attending: ORTHOPAEDIC SURGERY
Payer: MEDICARE

## 2020-05-27 ENCOUNTER — TELEPHONE (OUTPATIENT)
Dept: GASTROENTEROLOGY | Facility: CLINIC | Age: 70
End: 2020-05-27

## 2020-05-27 ENCOUNTER — OFFICE VISIT (OUTPATIENT)
Dept: PHYSICAL THERAPY | Facility: HOSPITAL | Age: 70
End: 2020-05-27
Attending: ORTHOPAEDIC SURGERY
Payer: MEDICARE

## 2020-05-27 DIAGNOSIS — S42.91XP: ICD-10-CM

## 2020-05-27 PROCEDURE — 97163 PT EVAL HIGH COMPLEX 45 MIN: CPT

## 2020-05-27 PROCEDURE — 97110 THERAPEUTIC EXERCISES: CPT

## 2020-05-27 NOTE — PROGRESS NOTES
SHOULDER EVALUATION:   Referring Physician: Dr. Rupa Munguia  Diagnosis: Shoulder fracture, right, with malunion, subsequent encounter (S42.91XP)     Date of Service: 5/27/2020     PATIENT SUMMARY   Christina Arthur is a R handed 72year old female walks. Ankle fracture of L 20 years ago, surgical repair. Knee replacement RLE. DM, HTN, blood thinners due to clots, thyroid. Just wants to move arm without pain. Past medical history was reviewed with Marleni Smith.  Significant findings include oste PROM R supine;   Flexion 120  Abduction 105  ER/IR at 90 deg abd: 35    Accessory motion: thoracic spine kyphotic and stiff in position, quick screen right on joint line shows hypomobility to posterior and inferior, stopped quickly due to discomfort total of 16 visits over a 90 day period.  Treatment will include: Manual Therapy, Neuromuscular Re-education, Therapeutic Activities, Therapeutic Exercise and Home Exercise Program instruction    Education or treatment limitation: language  Rehab Potential:

## 2020-05-27 NOTE — TELEPHONE ENCOUNTER
MA/CSS    Please assist this patient with rescheduling her appointment with Aurelio James.     Thank you

## 2020-05-27 NOTE — TELEPHONE ENCOUNTER
Nursing: May also forward to phone room as needed. I attempted contact with a  for the patient's tele-visit this afternoon x 2 and was unable to reach her.   The  left 2 voicemails instructing the patient to contact the

## 2020-05-28 NOTE — TELEPHONE ENCOUNTER
[id number], spoke to pt, made apt for 6/1 at 230pm for televisit

## 2020-05-28 NOTE — PROGRESS NOTES
TAMRA Tere Tele-visit    Parent/Caregiver verbally consents to a Virtual/Telephone Check-In service on 6/1/2020    Patient understands and accepts financial responsibility for any deductible, co-insurance and/or co-pays associated with this service.     This v hematochezia or melena.   No abdominal pain.          Pertinent Surgical Hx:  Cholecystectomy  Appendectomy  Tubal ligation  - See additional surgical hx below  - No known issues with sedation.  - No known history of sleep apnea.     Pertinent Family Hx:  + • Hypertension Brother    • Diabetes Paternal Uncle    • Heart Disease Father         CAD   • Diabetes Other    • Dementia Other    • No Known Problems Self    • No Known Problems Sister    • No Known Problems Daughter    • No Known Problems Maternal Gra Acetaminophen-Codeine (TYLENOL WITH CODEINE #3) 300-30 MG Oral Tab Take 1 tablet by mouth 3 (three) times daily as needed for Pain. 30 tablet 0   • Propranolol HCl  MG Oral Capsule SR 24 Hr Take 1 capsule (120 mg total) by mouth daily.  30 capsule 2 2 diabetes mellitus without complication, without long-term current use of insulin (HCC)  E11.65 200 strip 1   • BENNY MICROLET LANCETS Does not apply Misc Testing 3 times a day  Diagnosis E11.65 300 each 2   • Multiple Vitamins-Minerals (ONE-A-DAY WOMENS habits: Both patient's upper and lower GI symptoms have more or less resolved. She does not have any further episodes of abdominal pain or significant breakthrough symptoms. Bowel movements are much more regular with minimal medical management.   At prese tests are ordered as detailed in the plan of care above.     CHLOE Covarrubias  6/1/2020

## 2020-05-29 ENCOUNTER — OFFICE VISIT (OUTPATIENT)
Dept: PHYSICAL THERAPY | Facility: HOSPITAL | Age: 70
End: 2020-05-29
Attending: ORTHOPAEDIC SURGERY
Payer: MEDICARE

## 2020-05-29 PROCEDURE — 97110 THERAPEUTIC EXERCISES: CPT

## 2020-05-29 PROCEDURE — 97140 MANUAL THERAPY 1/> REGIONS: CPT

## 2020-05-29 NOTE — PROGRESS NOTES
Dx:  Shoulder fracture, right, with malunion, subsequent encounter (S42.91XP)          Insurance (Authorized # of Visits):  [facility]  2/12          Authorizing Physician: Dr. Michoacano Lorenzo  Next MD visit: none scheduled  Fall Risk: standard         Precautions oscillation    Post glide gr I-II  Inf glide gr I-II  Post-inf glide gr I-II         TE:   Scap squeezes x30  Codman pendulum 30s each way (ant post, lat, circles); education time 8 min                      HEP: Pendulums, scap squeezes, flexion AAROM tabl

## 2020-06-01 ENCOUNTER — VIRTUAL PHONE E/M (OUTPATIENT)
Dept: GASTROENTEROLOGY | Facility: CLINIC | Age: 70
End: 2020-06-01
Payer: MEDICARE

## 2020-06-01 ENCOUNTER — TELEPHONE (OUTPATIENT)
Dept: GASTROENTEROLOGY | Facility: CLINIC | Age: 70
End: 2020-06-01

## 2020-06-01 ENCOUNTER — ANTI-COAG VISIT (OUTPATIENT)
Dept: INTERNAL MEDICINE CLINIC | Facility: CLINIC | Age: 70
End: 2020-06-01
Payer: MEDICARE

## 2020-06-01 DIAGNOSIS — D68.61 ANTIPHOSPHOLIPID SYNDROME (HCC): ICD-10-CM

## 2020-06-01 DIAGNOSIS — Z51.81 ENCOUNTER FOR THERAPEUTIC DRUG MONITORING: ICD-10-CM

## 2020-06-01 DIAGNOSIS — R11.0 NAUSEA: ICD-10-CM

## 2020-06-01 DIAGNOSIS — Z80.0 FAMILY HISTORY OF COLON CANCER: Primary | ICD-10-CM

## 2020-06-01 DIAGNOSIS — Z80.0 FAMILY HISTORY OF COLON CANCER: ICD-10-CM

## 2020-06-01 DIAGNOSIS — R19.8 IRREGULAR BOWEL HABITS: ICD-10-CM

## 2020-06-01 DIAGNOSIS — I82.532 CHRONIC DEEP VEIN THROMBOSIS (DVT) OF LEFT POPLITEAL VEIN (HCC): ICD-10-CM

## 2020-06-01 DIAGNOSIS — R10.10 UPPER ABDOMINAL PAIN: Primary | ICD-10-CM

## 2020-06-01 PROCEDURE — 99443 PHONE E/M BY PHYS 21-30 MIN: CPT | Performed by: NURSE PRACTITIONER

## 2020-06-01 PROCEDURE — 36416 COLLJ CAPILLARY BLOOD SPEC: CPT

## 2020-06-01 PROCEDURE — 85610 PROTHROMBIN TIME: CPT

## 2020-06-01 PROCEDURE — 93793 ANTICOAG MGMT PT WARFARIN: CPT

## 2020-06-01 NOTE — TELEPHONE ENCOUNTER
Scheduling: Please contact the patient to schedule a nonurgent colonoscopy. She was scheduled for May 2020 however this was canceled due to COVID-19. This may be rescheduled nonurgently.   She has a bowel preparation at home already per my previous orders

## 2020-06-03 ENCOUNTER — OFFICE VISIT (OUTPATIENT)
Dept: PHYSICAL THERAPY | Facility: HOSPITAL | Age: 70
End: 2020-06-03
Attending: ORTHOPAEDIC SURGERY
Payer: MEDICARE

## 2020-06-03 PROCEDURE — 97110 THERAPEUTIC EXERCISES: CPT

## 2020-06-03 NOTE — PROGRESS NOTES
Dx:  Shoulder fracture, right, with malunion, subsequent encounter (S42.91XP)          Insurance (Authorized # of Visits):  [facility]  3/12          Authorizing Physician: Dr. Schmidt   Next MD visit: none scheduled  Fall Risk: standard         Precautions Therapeutic Exercise and Home Exercise Program instruction  Date: 5/29/2020  TX#: 2/16 Date:   6/3/2020              TX#: 3/16 Date:                 TX#: 4/ Date:                 TX#: 5/ Date:    Tx#: 6/   MT:   Broad contact and padded with towel at joint

## 2020-06-08 RX ORDER — MINOXIDIL 2.5 MG/1
TABLET ORAL
Qty: 60 TABLET | Refills: 5 | Status: SHIPPED | OUTPATIENT
Start: 2020-06-08 | End: 2020-07-20

## 2020-06-08 RX ORDER — MINOXIDIL 2.5 MG/1
2.5 TABLET ORAL 2 TIMES DAILY
Qty: 60 TABLET | Refills: 3 | Status: SHIPPED | OUTPATIENT
Start: 2020-06-08 | End: 2021-03-26

## 2020-06-08 RX ORDER — SPIRONOLACTONE 25 MG/1
TABLET ORAL
Qty: 60 TABLET | Refills: 5 | Status: SHIPPED | OUTPATIENT
Start: 2020-06-08 | End: 2021-02-08

## 2020-06-10 ENCOUNTER — APPOINTMENT (OUTPATIENT)
Dept: PHYSICAL THERAPY | Facility: HOSPITAL | Age: 70
End: 2020-06-10
Attending: ORTHOPAEDIC SURGERY
Payer: MEDICARE

## 2020-06-11 ENCOUNTER — TELEPHONE (OUTPATIENT)
Dept: INTERNAL MEDICINE CLINIC | Facility: CLINIC | Age: 70
End: 2020-06-11

## 2020-06-11 NOTE — TELEPHONE ENCOUNTER
With [employer] , agent # [id number], verified pt name and . Pt states a few months ago she fell and broke her right shoulder. Pt has been having therapy but is still having discomfort.  Pt states her daughter is taking magnesium and vitamin B

## 2020-06-11 NOTE — TELEPHONE ENCOUNTER
Patient's daughter recommended she start taking magnesium with vitamin b to help her muscles relax. She states she still has some difficulty lifting and straightening her arm.  Patient would like to know if it is recommended she take the supplement based on

## 2020-06-11 NOTE — TELEPHONE ENCOUNTER
With [employer] , Tanya Hinson, agent # [id number], verified pt name and  and reviewed doctor's medication recommendations as noted below. Advised pt Vitamin B12 complex available OTC and to call back if any questions.

## 2020-06-15 ENCOUNTER — OFFICE VISIT (OUTPATIENT)
Dept: PHYSICAL THERAPY | Facility: HOSPITAL | Age: 70
End: 2020-06-15
Attending: ORTHOPAEDIC SURGERY
Payer: MEDICARE

## 2020-06-15 PROCEDURE — 97110 THERAPEUTIC EXERCISES: CPT

## 2020-06-15 NOTE — PROGRESS NOTES
Dx:  Shoulder fracture, right, with malunion, subsequent encounter (S42.91XP)          Insurance (Authorized # of Visits):  [address]  4/12          Authorizing Physician: Dr. Ramon Frank MD visit: none scheduled  Fall Risk: standard         Precautions I-II  Post-inf glide gr I-II   MT:   Lateral distraction gentle oscillation 5 min   MT:   Lateral distraction gentle oscillation 2 min       TE:   Scap squeezes x30  Codman pendulum 30s each way (ant post, lat, circles); education time 8 min  TE:   PROM sh

## 2020-06-17 ENCOUNTER — OFFICE VISIT (OUTPATIENT)
Dept: PHYSICAL THERAPY | Facility: HOSPITAL | Age: 70
End: 2020-06-17
Attending: ORTHOPAEDIC SURGERY
Payer: MEDICARE

## 2020-06-17 PROCEDURE — 97110 THERAPEUTIC EXERCISES: CPT

## 2020-06-17 NOTE — PROGRESS NOTES
Dx:  Shoulder fracture, right, with malunion, subsequent encounter (S42.91XP)          Insurance (Authorized # of Visits):  [facility]  5/12          Authorizing Physician: Dr. Devika Ramos  Next MD visit: none scheduled  Fall Risk: standard         Precautions distraction gentle oscillation 2 min   MT:   Lateral distraction gentle oscillation 5 min    TE:   Scap squeezes x30  Codman pendulum 30s each way (ant post, lat, circles); education time 8 min  TE:   PROM shoulder flexion, abduction, ER at side, 45, 90; 2

## 2020-06-19 NOTE — TELEPHONE ENCOUNTER
Cinthya Burt APN & Nursing Staff:  FYI    GI RNs given diabetic and lovenox instructions below per Dr. Jairo Olivo for endoscopy on 8-. Please be sure the patient and daughter have proper lovenox and injection education.  They are aware that Dr. Surya Ochoa offic

## 2020-06-19 NOTE — TELEPHONE ENCOUNTER
Dr. Rowena Casiano & RN triage staff-    Patient's colonoscopy has been rescheduled for 8-. Please advise if the same orders for her coumadin/lovenox bridging and diabetic medication from March will remain the same.     Copied below from 3-3-2020 encounter:

## 2020-06-19 NOTE — TELEPHONE ENCOUNTER
GI/RN--    I reschedule this patient (see below)    Please contact Dr. Jonah Lui to confirm that the orders regarding her DM and Coumadin from March are the same.  Thank you

## 2020-06-19 NOTE — TELEPHONE ENCOUNTER
Scheduled for:  Colonoscopy 63136  Provider Name:  Dr Candice Valle  Date:  8/12/20  Location:    [facility]  Sedation:  MAC  Time:  1400 (pt is aware to arrive at 1300)   Prep:  Colyte, mailed 6/19/20 Thai  Meds/Allergies Reconciled?:  Physician reviewed   Diagnosis

## 2020-06-19 NOTE — TELEPHONE ENCOUNTER
Spoke with daughter Terence Juan who was able to verbally repeat and write down the following pre-op orders:    Sunday 8-9-2020:  Do Not take Coumadin  Begin taking lovenox as directed    Monday 8-:  Do NOT take coumadin  Continue Lovenox as directed    T

## 2020-06-22 NOTE — TELEPHONE ENCOUNTER
Coumadin clinic is managing her INR and also hematology Dr. Serena Starkey. Just 1 to verify the directions if she has enough Lovenox with the patient.

## 2020-06-23 NOTE — TELEPHONE ENCOUNTER
Noted instructions for GI procedure for holding warfarin and bridging with lovenox. I will review with patient at next [facility] visit on 6/29. [facility] will continue to manage coumadin and monitor INR. Thank you.

## 2020-06-24 ENCOUNTER — OFFICE VISIT (OUTPATIENT)
Dept: PHYSICAL THERAPY | Facility: HOSPITAL | Age: 70
End: 2020-06-24
Attending: ORTHOPAEDIC SURGERY
Payer: MEDICARE

## 2020-06-24 PROCEDURE — 97140 MANUAL THERAPY 1/> REGIONS: CPT

## 2020-06-24 PROCEDURE — 97110 THERAPEUTIC EXERCISES: CPT

## 2020-06-24 NOTE — PROGRESS NOTES
Dx:  Shoulder fracture, right, with malunion, subsequent encounter (S42.91XP)          Insurance (Authorized # of Visits):  [facility]  5/12          Authorizing Physician: Dr. Nicolle Martin  Next MD visit: none scheduled  Fall Risk: standard         Precautions TX#: 4/16 Date:    6/17/2020             TX#: 5/16 Date: 6/24/2020  Tx#: 6/16   MT:   Broad contact and padded with towel at joint line for all: (total 30 min)  Lateral distraction gentle oscillation    Post glide gr I-II  Inf glide gr I-II  Post-inf g

## 2020-06-29 ENCOUNTER — TELEPHONE (OUTPATIENT)
Dept: INTERNAL MEDICINE CLINIC | Facility: CLINIC | Age: 70
End: 2020-06-29

## 2020-06-29 ENCOUNTER — ANTI-COAG VISIT (OUTPATIENT)
Dept: INTERNAL MEDICINE CLINIC | Facility: CLINIC | Age: 70
End: 2020-06-29
Payer: MEDICARE

## 2020-06-29 DIAGNOSIS — D68.61 ANTIPHOSPHOLIPID SYNDROME (HCC): ICD-10-CM

## 2020-06-29 DIAGNOSIS — I82.532 CHRONIC DEEP VEIN THROMBOSIS (DVT) OF LEFT POPLITEAL VEIN (HCC): ICD-10-CM

## 2020-06-29 DIAGNOSIS — Z51.81 ENCOUNTER FOR THERAPEUTIC DRUG MONITORING: ICD-10-CM

## 2020-06-29 PROCEDURE — 85610 PROTHROMBIN TIME: CPT

## 2020-06-29 PROCEDURE — 36416 COLLJ CAPILLARY BLOOD SPEC: CPT

## 2020-06-29 PROCEDURE — 93793 ANTICOAG MGMT PT WARFARIN: CPT

## 2020-06-29 NOTE — TELEPHONE ENCOUNTER
Got a refill request for alendronate 70 mg once a week.   Do not have on patient refill request not sure

## 2020-06-29 NOTE — TELEPHONE ENCOUNTER
Colonoscopy planned for August 12. Per MD instructions, will be taking lovenox BID and will start holding warfarin on August 9. ACC to watch for INR on the day of the procedure and call in the afternoon with dosing instructions.  If an INR is not do

## 2020-06-30 ENCOUNTER — OFFICE VISIT (OUTPATIENT)
Dept: PHYSICAL THERAPY | Facility: HOSPITAL | Age: 70
End: 2020-06-30
Attending: ORTHOPAEDIC SURGERY
Payer: MEDICARE

## 2020-06-30 PROCEDURE — 97110 THERAPEUTIC EXERCISES: CPT

## 2020-06-30 NOTE — PROGRESS NOTES
Dx:  Shoulder fracture, right, with malunion, subsequent encounter (S42.91XP)          Insurance (Authorized # of Visits):  [address]  7/12          Authorizing Physician: Dr. Aide Loya  Next MD visit: none scheduled  Fall Risk: standard         Precautions Date:    6/17/2020             TX#: 5/16 Date: 6/24/2020  Tx#: 6/16 6/30/2020 7/16   MT:   Broad contact and padded with towel at joint line for all: (total 30 min)  Lateral distraction gentle oscillation    Post glide gr I-II  Inf glide gr I-II  Post-inf

## 2020-07-03 ENCOUNTER — TELEPHONE (OUTPATIENT)
Dept: INTERNAL MEDICINE CLINIC | Facility: CLINIC | Age: 70
End: 2020-07-03

## 2020-07-03 ENCOUNTER — OFFICE VISIT (OUTPATIENT)
Dept: PHYSICAL THERAPY | Facility: HOSPITAL | Age: 70
End: 2020-07-03
Attending: ORTHOPAEDIC SURGERY
Payer: MEDICARE

## 2020-07-03 PROCEDURE — 97140 MANUAL THERAPY 1/> REGIONS: CPT

## 2020-07-03 PROCEDURE — 97110 THERAPEUTIC EXERCISES: CPT

## 2020-07-03 NOTE — TELEPHONE ENCOUNTER
MESSAGE:   RE:  Alendronate 70mg    REASON FOR INTERVENTION  Current guidelines published by the [facility] recommend osteoporosis treatment be considered for postmenopausal woman and men after the age of 48 and older with a hip or ve

## 2020-07-03 NOTE — PROGRESS NOTES
Dx:  Shoulder fracture, right, with malunion, subsequent encounter (S42.91XP)          Insurance (Authorized # of Visits):  [facility]  8/12          Authorizing Physician: Dr. Mark Parada  Next MD visit: none scheduled  Fall Risk: standard         Precautions min   MT:   Lateral distraction gentle oscillation 2 min   MT:   Lateral distraction gentle oscillation 5 min MT:   Lateral distraction gentle oscillation 5 min  Long axis traction 3 min oscillation MT:   ---   MT:   General STM around shoulder blade to tr

## 2020-07-03 NOTE — TELEPHONE ENCOUNTER
Cannot do anything without a bone density. She is due for a bone density repeated she has not done yet.

## 2020-07-04 RX ORDER — BLOOD SUGAR DIAGNOSTIC
STRIP MISCELLANEOUS
Qty: 200 STRIP | Refills: 0 | Status: SHIPPED | OUTPATIENT
Start: 2020-07-04 | End: 2020-11-03

## 2020-07-04 RX ORDER — LANCETS
EACH MISCELLANEOUS
Qty: 204 EACH | Refills: 11 | Status: SHIPPED | OUTPATIENT
Start: 2020-07-04

## 2020-07-06 ENCOUNTER — MED REC SCAN ONLY (OUTPATIENT)
Dept: INTERNAL MEDICINE CLINIC | Facility: CLINIC | Age: 70
End: 2020-07-06

## 2020-07-06 ENCOUNTER — OFFICE VISIT (OUTPATIENT)
Dept: PHYSICAL THERAPY | Facility: HOSPITAL | Age: 70
End: 2020-07-06
Attending: ORTHOPAEDIC SURGERY
Payer: MEDICARE

## 2020-07-06 PROCEDURE — 97140 MANUAL THERAPY 1/> REGIONS: CPT

## 2020-07-06 PROCEDURE — 97110 THERAPEUTIC EXERCISES: CPT

## 2020-07-06 RX ORDER — SPIRONOLACTONE 25 MG/1
25 TABLET ORAL 2 TIMES DAILY
Qty: 60 TABLET | Refills: 3 | Status: SHIPPED | OUTPATIENT
Start: 2020-07-06 | End: 2020-07-20

## 2020-07-06 NOTE — PROGRESS NOTES
Dx:  Shoulder fracture, right, with malunion, subsequent encounter (S42.91XP)          Insurance (Authorized # of Visits):  [facility]  9/12          Authorizing Physician: Dr. Mitzy Frank MD visit: none scheduled  Fall Risk: standard         Precautions gentle oscillation 5 min  Long axis traction 3 min oscillation MT:   ---   MT:   General STM around shoulder blade to traps 2 min   ST mobilization 8 min total MT:   SL PA to mid T/S gr II-III  ST mobilization   STM around shoulder blade   10 min   TE:   P

## 2020-07-08 ENCOUNTER — OFFICE VISIT (OUTPATIENT)
Dept: PHYSICAL THERAPY | Facility: HOSPITAL | Age: 70
End: 2020-07-08
Attending: ORTHOPAEDIC SURGERY
Payer: MEDICARE

## 2020-07-08 PROCEDURE — 97140 MANUAL THERAPY 1/> REGIONS: CPT

## 2020-07-08 PROCEDURE — 97110 THERAPEUTIC EXERCISES: CPT

## 2020-07-08 RX ORDER — PANTOPRAZOLE SODIUM 20 MG/1
20 TABLET, DELAYED RELEASE ORAL
Qty: 90 TABLET | Refills: 1 | Status: SHIPPED | OUTPATIENT
Start: 2020-07-08 | End: 2020-12-29

## 2020-07-08 RX ORDER — VALSARTAN 160 MG/1
TABLET ORAL
Qty: 180 TABLET | Refills: 1 | Status: SHIPPED | OUTPATIENT
Start: 2020-07-08 | End: 2020-12-29

## 2020-07-08 NOTE — TELEPHONE ENCOUNTER
Requested Prescriptions     Pending Prescriptions Disp Refills   • PANTOPRAZOLE SODIUM 20 MG Oral Tab EC [Pharmacy Med Name: Pantoprazole Sodium Ec 20 Mg Tab Auro] 30 tablet 0     Sig: take 1 tablet by mouth in the morning before breakfast     Last seen CL

## 2020-07-08 NOTE — PROGRESS NOTES
Dx:  Shoulder fracture, right, with malunion, subsequent encounter (S42.91XP)          Ins[place] ([place]ized # of [place]its[place]  [facility]  [place]          Authorizing Physician: Dr. Chi Maharaj  Next MD visit: none scheduled  Fall Risk: standard         Precaution overhead activities. Pt will demonstrate shoulder flexion and abduction strength >3/5 in order to perform functional activities. Pt will recognize impairments with posture and be able to self correct as needed independently.    Pt will report 50% improv squeezes, flexion AAROM table slide, supine elbow flexion, shoulder aarom supine; wand press supine; ER with cane, sh flex slide; aarom cane; SL ER, single limb aarom flexion wall slide    Charges: MT - 1, TE - 2      Total Timed Treatment: 45 min  Total T

## 2020-07-11 ENCOUNTER — TELEPHONE (OUTPATIENT)
Dept: INTERNAL MEDICINE CLINIC | Facility: CLINIC | Age: 70
End: 2020-07-11

## 2020-07-11 DIAGNOSIS — E11.9 CONTROLLED TYPE 2 DIABETES MELLITUS WITHOUT COMPLICATION, WITHOUT LONG-TERM CURRENT USE OF INSULIN (HCC): Primary | Chronic | ICD-10-CM

## 2020-07-11 RX ORDER — BLOOD PRESSURE TEST KIT
KIT MISCELLANEOUS
Qty: 100 EACH | Refills: 11 | Status: SHIPPED | OUTPATIENT
Start: 2020-07-11

## 2020-07-14 ENCOUNTER — OFFICE VISIT (OUTPATIENT)
Dept: PHYSICAL THERAPY | Facility: HOSPITAL | Age: 70
End: 2020-07-14
Attending: ORTHOPAEDIC SURGERY
Payer: MEDICARE

## 2020-07-14 PROCEDURE — 97110 THERAPEUTIC EXERCISES: CPT

## 2020-07-14 PROCEDURE — 97140 MANUAL THERAPY 1/> REGIONS: CPT

## 2020-07-14 NOTE — PROGRESS NOTES
ProgressSummary  Pt has attended 11 visits in Physical Therapy.      Dx:  Shoulder fracture, right, with malunion, subsequent encounter (S42.91XP)          Insurance (Authorized # of Visits):  [facility]  11/12; now plan 21      Authorizing Physician:  >120deg in order to perform overhead activities. PROM met, AAROM near, AROM progressing  Pt will demonstrate shoulder flexion and abduction strength >3/5 in order to perform functional activities.  Progressing, still in lower half of range  Pt will recogniz x30  Side lie abd/scaption 2x10  Wand flexion supine 2x10   Scap squeezes x20       TE:   Pendulums 1# weight x30 each way   SL ER unweighted, 3x10  SL abd full range x8, upper range 2x10  AAROM wall slide flexion 2x10, single arm wall slide flexion x5   T

## 2020-07-15 ENCOUNTER — TELEPHONE (OUTPATIENT)
Dept: INTERNAL MEDICINE CLINIC | Facility: CLINIC | Age: 70
End: 2020-07-15

## 2020-07-15 NOTE — TELEPHONE ENCOUNTER
Patient states she has not received medication. She only has enough for tonight and tomorrow.  Asking if she can have a 5 day supply sent to local pharmacy to last her until she receives it from [facility]    valsartan 160 MG Oral Tab

## 2020-07-16 RX ORDER — VALSARTAN 160 MG/1
160 TABLET ORAL DAILY
Qty: 10 TABLET | Refills: 0 | Status: SHIPPED | OUTPATIENT
Start: 2020-07-16 | End: 2020-07-20

## 2020-07-16 NOTE — TELEPHONE ENCOUNTER
Cold transfer   With [employer]   Sanjay Bone ID# [id number]    Identified  patient's name and     Pt asking of status of Losartan     Informed we will send short supply to her local pharmacy, pt requesting to be sent to [place] as listed       RX sent

## 2020-07-17 ENCOUNTER — OFFICE VISIT (OUTPATIENT)
Dept: PHYSICAL THERAPY | Facility: HOSPITAL | Age: 70
End: 2020-07-17
Attending: ORTHOPAEDIC SURGERY
Payer: MEDICARE

## 2020-07-17 PROCEDURE — 97110 THERAPEUTIC EXERCISES: CPT

## 2020-07-17 PROCEDURE — 97140 MANUAL THERAPY 1/> REGIONS: CPT

## 2020-07-17 NOTE — PROGRESS NOTES
ProgressSummary  Pt has attended 11 visits in Physical Therapy.      Dx:  Shoulder fracture, right, with malunion, subsequent encounter (S42.91XP)          Insurance (Authorized # of Visits):  [facility]  12/12; now plan 21      Authorizing Physician:  a total of 16 visits over a 90 day period. Treatment will include: Manual Therapy, Neuromuscular Re-education, Therapeutic Activities, Therapeutic Exercise and Home Exercise Program instruction  Date: 6/24/2020  Tx#: 6/16 6/30/2020  7/16 7/3/2020  8/16 7/6 2x10  Supine flexion (wand pressup 2x8  Wall slide bilateral flexion x10, RUE only 4x5; wall slide scaption RUE only 3x5   HEP: Pendulums, scap squeezes, flexion AAROM table slide, supine elbow flexion, shoulder aarom supine; wand press supine; ER with can

## 2020-07-19 PROBLEM — R11.0 NAUSEA: Status: RESOLVED | Noted: 2020-01-27 | Resolved: 2020-07-19

## 2020-07-19 PROBLEM — Z71.85 VACCINE COUNSELING: Status: ACTIVE | Noted: 2020-07-19

## 2020-07-20 ENCOUNTER — OFFICE VISIT (OUTPATIENT)
Dept: INTERNAL MEDICINE CLINIC | Facility: CLINIC | Age: 70
End: 2020-07-20
Payer: MEDICARE

## 2020-07-20 ENCOUNTER — TELEPHONE (OUTPATIENT)
Dept: INTERNAL MEDICINE CLINIC | Facility: CLINIC | Age: 70
End: 2020-07-20

## 2020-07-20 VITALS
DIASTOLIC BLOOD PRESSURE: 72 MMHG | OXYGEN SATURATION: 98 % | HEART RATE: 60 BPM | TEMPERATURE: 99 F | SYSTOLIC BLOOD PRESSURE: 138 MMHG | BODY MASS INDEX: 34 KG/M2 | WEIGHT: 227 LBS

## 2020-07-20 DIAGNOSIS — E03.9 HYPOTHYROIDISM (ACQUIRED): Chronic | ICD-10-CM

## 2020-07-20 DIAGNOSIS — I10 ESSENTIAL HYPERTENSION WITH GOAL BLOOD PRESSURE LESS THAN 130/80: ICD-10-CM

## 2020-07-20 DIAGNOSIS — Z71.85 VACCINE COUNSELING: ICD-10-CM

## 2020-07-20 DIAGNOSIS — R51.9 CHRONIC NONINTRACTABLE HEADACHE, UNSPECIFIED HEADACHE TYPE: ICD-10-CM

## 2020-07-20 DIAGNOSIS — Z12.31 OTHER SCREENING MAMMOGRAM: ICD-10-CM

## 2020-07-20 DIAGNOSIS — E11.9 CONTROLLED TYPE 2 DIABETES MELLITUS WITHOUT COMPLICATION, WITHOUT LONG-TERM CURRENT USE OF INSULIN (HCC): Chronic | ICD-10-CM

## 2020-07-20 DIAGNOSIS — N18.30 CKD (CHRONIC KIDNEY DISEASE) STAGE 3, GFR 30-59 ML/MIN (HCC): Primary | ICD-10-CM

## 2020-07-20 DIAGNOSIS — G89.29 CHRONIC NONINTRACTABLE HEADACHE, UNSPECIFIED HEADACHE TYPE: ICD-10-CM

## 2020-07-20 DIAGNOSIS — D68.61 ANTIPHOSPHOLIPID SYNDROME (HCC): ICD-10-CM

## 2020-07-20 DIAGNOSIS — D64.9 ANEMIA, UNSPECIFIED TYPE: ICD-10-CM

## 2020-07-20 PROCEDURE — 3075F SYST BP GE 130 - 139MM HG: CPT | Performed by: INTERNAL MEDICINE

## 2020-07-20 PROCEDURE — 99215 OFFICE O/P EST HI 40 MIN: CPT | Performed by: INTERNAL MEDICINE

## 2020-07-20 PROCEDURE — 93000 ELECTROCARDIOGRAM COMPLETE: CPT | Performed by: INTERNAL MEDICINE

## 2020-07-20 PROCEDURE — 3078F DIAST BP <80 MM HG: CPT | Performed by: INTERNAL MEDICINE

## 2020-07-20 RX ORDER — ENOXAPARIN SODIUM 150 MG/ML
1 INJECTION SUBCUTANEOUS EVERY 12 HOURS
Qty: 15 SYRINGE | Refills: 1 | Status: SHIPPED | OUTPATIENT
Start: 2020-07-20 | End: 2020-07-26

## 2020-07-20 RX ORDER — VALSARTAN 160 MG/1
160 TABLET ORAL DAILY
Qty: 10 TABLET | Refills: 0 | Status: SHIPPED | OUTPATIENT
Start: 2020-07-20 | End: 2020-07-21

## 2020-07-20 NOTE — PATIENT INSTRUCTIONS
ASSESSMENT/PLAN:   Ckd (chronic kidney disease) stage 3, gfr 30-59 ml/min (hcc)  (primary encounter diagnosis) No motrin, ibuprofen, advil, alleve, naprosyn  with these medications. Antiphospholipid syndrome (hcc) Follow with hematology.      Other MG/ML Subcutaneous Solution 15 Syringe 1     Sig: Inject 0.63 mL (95 mg total) into the skin every 12 (twelve) hours. • valsartan 160 MG Oral Tab 10 tablet 0     Sig: Take 1 tablet (160 mg total) by mouth daily.        Imaging & Referrals:  [facility]IO[facility]

## 2020-07-20 NOTE — TELEPHONE ENCOUNTER
1.  Can we go over the Coumadin instructions 1 more time with patient regarding the colonoscopy. I went over it twice in the office and she still wanted me to go over the third time and I know several times she is been over it.   Maybe talk to the daughter

## 2020-07-20 NOTE — PROGRESS NOTES
HPI:    Patient ID: Nel Aquino is a 72year old female. Patient presents with:   Follow - Up     Nel Aquino is a 72year old female who presents for a pre-operative physical exam. Patient is to have colonoscopy, to be do mouth before breakfast. 180 tablet 1   • Acetaminophen-Codeine (TYLENOL WITH CODEINE #3) 300-30 MG Oral Tab Take 1 tablet by mouth 3 (three) times daily as needed for Pain.  30 tablet 0   • Betamethasone Dipropionate Aug 0.05 % External Lotion Apply 1 Appli times every day as needed 90 tablet 1   • Omega-3-acid Ethyl Esters (LOVAZA) 1 G Oral Cap Take 1 g by mouth 2 (two) times daily with meals.        • PEG 3350-KCl-NaBcb-NaCl-NaSulf (COLYTE WITH FLAVOR PACKS) 240 g Oral Recon Soln Take prep as directed by gas • No Known Problems Maternal Cousin Female    • No Known Problems Maternal Cousin Male    • No Known Problems Paternal Cousin Female    • No Known Problems Paternal Cousin Male    • Colon Cancer Son 37   • Glaucoma Neg    • Macular degeneration Neg    • evaluation / risk stratification. Medical conditions are optimized for surgery and perioperative medication recommendations are outlined above.      Assessment:  Ckd (chronic kidney disease) stage 3, gfr 30-59 ml/min ([facility])  (primary encounter diagnosis)  A Take 1 tablet (160 mg total) by mouth daily. - - Take 1 tablet (160 mg total) by mouth daily.    LOSARTAN POTASSIUM 100 MG OR TABS - - - - -   VALSARTAN-HYDROCHLOROTHIAZIDE 320-25 MG OR TABS - - - - -   CANDESARTAN CILEXETIL-HCTZ 32-12.5 MG OR TABS - - - - AM            Wt Readings from Last 3 Encounters:  07/20/20 : 227 lb (103 kg)  03/24/20 : 211 lb (95.7 kg)  03/24/20 : 211 lb (95.7 kg)    BP Readings from Last 3 Encounters:  07/20/20 : 138/72  05/20/20 : 155/59  03/24/20 : 122/63    Labs:   Lab Results for cold intolerance, heat intolerance, polydipsia, polyphagia and polyuria.    Genitourinary: Negative for decreased urine volume, difficulty urinating, dysuria, flank pain, frequency, hematuria, menstrual problem, pelvic pain, urgency, vaginal bleeding, v total) by mouth 2 (two) times daily. 60 tablet 3   • Propranolol HCl 60 MG Oral Tab Take 1 tablet (60 mg total) by mouth 2 (two) times daily.  180 tablet 2   • insulin glargine (LANTUS SOLOSTAR) 100 UNIT/ML Subcutaneous Solution Pen-injector INJECT 35UNITS TEST 2 times  A DAY Dx: Uncontrolled type 2 diabetes mellitus without complication, without long-term current use of insulin (HCC)  E11.65 200 strip 1   • BENNY MICROLET LANCETS Does not apply Misc Testing 3 times a day  Diagnosis E11.65 300 each 2   • Mul Kiran Agustin MD at [address] [place] [address] OR   • THYROIDECTOMY  08/17/15    TOTAL   • TUBAL LIGATION        Family History   Problem Relation Age of Onset   • Diabetes Mother    • Hypertension Mother    • Dementia Mother         Alzheimer's Disease   • Diabetes Brother    • atraumatic. Right Ear: Tympanic membrane, external ear and ear canal normal. No lacerations. No drainage, swelling or tenderness. No foreign bodies. No mastoid tenderness.  Tympanic membrane is not injected, not scarred, not perforated, not erythematous, Left conjunctiva has no hemorrhage. No scleral icterus. Right eye exhibits normal extraocular motion and no nystagmus. Left eye exhibits normal extraocular motion and no nystagmus. Neck: Trachea normal and phonation normal. Neck supple.  Normal carotid pu cervical: No superficial cervical, no deep cervical and no posterior cervical adenopathy present. Right: No supraclavicular adenopathy present. Left: No supraclavicular adenopathy present.    Neurological: She is alert and oriented to person, pressure less than 130/80 ? White coat. Knows machine is accurate. Has 2 machines. Careful with diet and excercise at least 30 minutes 3-4 times a week. Check blood pressures at different times on different days. Can purchase own blood pressure monitor.  If

## 2020-07-21 ENCOUNTER — OFFICE VISIT (OUTPATIENT)
Dept: PHYSICAL THERAPY | Facility: HOSPITAL | Age: 70
End: 2020-07-21
Attending: ORTHOPAEDIC SURGERY
Payer: MEDICARE

## 2020-07-21 ENCOUNTER — LAB ENCOUNTER (OUTPATIENT)
Dept: LAB | Facility: HOSPITAL | Age: 70
End: 2020-07-21
Attending: INTERNAL MEDICINE
Payer: MEDICARE

## 2020-07-21 DIAGNOSIS — Z51.81 ENCOUNTER FOR THERAPEUTIC DRUG MONITORING: ICD-10-CM

## 2020-07-21 DIAGNOSIS — L40.50 PSORIATIC ARTHRITIS (HCC): ICD-10-CM

## 2020-07-21 DIAGNOSIS — D68.61 ANTIPHOSPHOLIPID SYNDROME (HCC): ICD-10-CM

## 2020-07-21 LAB
ALBUMIN SERPL-MCNC: 3.5 G/DL (ref 3.4–5)
ALT SERPL-CCNC: 23 U/L (ref 13–56)
AST SERPL-CCNC: 19 U/L (ref 15–37)
BASOPHILS # BLD AUTO: 0.05 X10(3) UL (ref 0–0.2)
BASOPHILS NFR BLD AUTO: 0.8 %
CREAT BLD-MCNC: 1.29 MG/DL (ref 0.55–1.02)
CRP SERPL-MCNC: <0.29 MG/DL (ref ?–0.3)
DEPRECATED HBV CORE AB SER IA-ACNC: 58.1 NG/ML (ref 18–340)
DEPRECATED RDW RBC AUTO: 47.3 FL (ref 35.1–46.3)
EOSINOPHIL # BLD AUTO: 0.21 X10(3) UL (ref 0–0.7)
EOSINOPHIL NFR BLD AUTO: 3.3 %
ERYTHROCYTE [DISTWIDTH] IN BLOOD BY AUTOMATED COUNT: 13.5 % (ref 11–15)
ERYTHROCYTE [SEDIMENTATION RATE] IN BLOOD: 34 MM/HR (ref 0–30)
HCT VFR BLD AUTO: 35.1 % (ref 35–48)
HGB BLD-MCNC: 11.7 G/DL (ref 12–16)
IMM GRANULOCYTES # BLD AUTO: 0.02 X10(3) UL (ref 0–1)
IMM GRANULOCYTES NFR BLD: 0.3 %
IRON SATURATION: 30 % (ref 15–50)
IRON SERPL-MCNC: 141 UG/DL (ref 50–170)
LYMPHOCYTES # BLD AUTO: 1.41 X10(3) UL (ref 1–4)
LYMPHOCYTES NFR BLD AUTO: 22 %
MCH RBC QN AUTO: 32.1 PG (ref 26–34)
MCHC RBC AUTO-ENTMCNC: 33.3 G/DL (ref 31–37)
MCV RBC AUTO: 96.2 FL (ref 80–100)
MONOCYTES # BLD AUTO: 0.7 X10(3) UL (ref 0.1–1)
MONOCYTES NFR BLD AUTO: 10.9 %
NEUTROPHILS # BLD AUTO: 4.03 X10 (3) UL (ref 1.5–7.7)
NEUTROPHILS # BLD AUTO: 4.03 X10(3) UL (ref 1.5–7.7)
NEUTROPHILS NFR BLD AUTO: 62.7 %
PLATELET # BLD AUTO: 302 10(3)UL (ref 150–450)
RBC # BLD AUTO: 3.65 X10(6)UL (ref 3.8–5.3)
TOTAL IRON BINDING CAPACITY: 469 UG/DL (ref 240–450)
TRANSFERRIN SERPL-MCNC: 315 MG/DL (ref 200–360)
TSI SER-ACNC: 0.45 MIU/ML (ref 0.36–3.74)
VIT B12 SERPL-MCNC: 541 PG/ML (ref 193–986)
WBC # BLD AUTO: 6.4 X10(3) UL (ref 4–11)

## 2020-07-21 PROCEDURE — 82728 ASSAY OF FERRITIN: CPT | Performed by: INTERNAL MEDICINE

## 2020-07-21 PROCEDURE — 84466 ASSAY OF TRANSFERRIN: CPT | Performed by: INTERNAL MEDICINE

## 2020-07-21 PROCEDURE — 85652 RBC SED RATE AUTOMATED: CPT

## 2020-07-21 PROCEDURE — 97110 THERAPEUTIC EXERCISES: CPT

## 2020-07-21 PROCEDURE — 84443 ASSAY THYROID STIM HORMONE: CPT | Performed by: INTERNAL MEDICINE

## 2020-07-21 PROCEDURE — 84460 ALANINE AMINO (ALT) (SGPT): CPT

## 2020-07-21 PROCEDURE — 84450 TRANSFERASE (AST) (SGOT): CPT

## 2020-07-21 PROCEDURE — 97140 MANUAL THERAPY 1/> REGIONS: CPT

## 2020-07-21 PROCEDURE — 82565 ASSAY OF CREATININE: CPT

## 2020-07-21 PROCEDURE — 82607 VITAMIN B-12: CPT | Performed by: INTERNAL MEDICINE

## 2020-07-21 PROCEDURE — 83540 ASSAY OF IRON: CPT | Performed by: INTERNAL MEDICINE

## 2020-07-21 PROCEDURE — 85025 COMPLETE CBC W/AUTO DIFF WBC: CPT

## 2020-07-21 PROCEDURE — 86140 C-REACTIVE PROTEIN: CPT

## 2020-07-21 PROCEDURE — 36415 COLL VENOUS BLD VENIPUNCTURE: CPT

## 2020-07-21 PROCEDURE — 82040 ASSAY OF SERUM ALBUMIN: CPT

## 2020-07-21 NOTE — PROGRESS NOTES
Dx:  Shoulder fracture, right, with malunion, subsequent encounter (S42.91XP)          Insurance (Authorized # of Visits):  [facility]  12/25; now plan 21      Authorizing Physician: Dr. Mark Parada  Next MD visit: none scheduled  Fall Risk: standard progressing  Pt will demonstrate shoulder flexion and abduction strength >3/5 in order to perform functional activities.  Progressing, still in lower half of range  Pt will recognize impairments with posture and be able to self correct as needed independent slide; aarom cane; SL ER, single limb aarom flexion wall slide    Charges: MT - 1, TE - 2      Total Timed Treatment: 48 min  Total Treatment Time: 48 min

## 2020-07-23 NOTE — TELEPHONE ENCOUNTER
Note    6-29-20.    Colonoscopy planned for August 12.      Per MD instructions, will be taking lovenox BID and will start holding warfarin on August 9.      ACC to watch for INR on the day of the procedure and call in the afternoon with dosing instructions

## 2020-07-23 NOTE — TELEPHONE ENCOUNTER
Dr Mckenzie Sep: I do not see specific instructions regarding your coumadin instructions. Please advise. Called [facility]. Valsartan 160mg was shipped and arrived Monday 7/20/20 10am hour. Patient should have received medication.

## 2020-07-23 NOTE — TELEPHONE ENCOUNTER
Please clarify if we are providing patient with the following instructions as noted per GI.      Sunday 8-9-2020:  Do Not take Coumadin  Begin taking lovenox as directed     Monday 8-:  Do NOT take coumadin  Continue Lovenox as directed     Tuesday 8

## 2020-07-24 ENCOUNTER — OFFICE VISIT (OUTPATIENT)
Dept: PHYSICAL THERAPY | Facility: HOSPITAL | Age: 70
End: 2020-07-24
Attending: ORTHOPAEDIC SURGERY
Payer: MEDICARE

## 2020-07-24 ENCOUNTER — TELEPHONE (OUTPATIENT)
Dept: INTERNAL MEDICINE CLINIC | Facility: CLINIC | Age: 70
End: 2020-07-24

## 2020-07-24 DIAGNOSIS — Z79.01 LONG TERM (CURRENT) USE OF ANTICOAGULANTS: ICD-10-CM

## 2020-07-24 DIAGNOSIS — I82.532 CHRONIC DEEP VEIN THROMBOSIS OF LEFT POPLITEAL VEIN (HCC): Primary | ICD-10-CM

## 2020-07-24 DIAGNOSIS — Z51.81 ENCOUNTER FOR THERAPEUTIC DRUG MONITORING: ICD-10-CM

## 2020-07-24 PROCEDURE — 97140 MANUAL THERAPY 1/> REGIONS: CPT

## 2020-07-24 PROCEDURE — 97110 THERAPEUTIC EXERCISES: CPT

## 2020-07-24 NOTE — PROGRESS NOTES
Dx:  Shoulder fracture, right, with malunion, subsequent encounter (S42.91XP)          Insurance (Authorized # of Visits):  [facility]  14[place]; now plan 21      Authorizing Physician: Dr. Abraham Estevez  Next MD visit: none scheduled  Fall Risk: standard 7/21/2020 13/20 7/24/2020 14/20    MT:   SL PA to mid T/S gr II-III  ST mobilization   Subscap STM, subscap stretch  Up rotation mobilization ST joint   20 min total   MT:   SL PA to mid T/S gr II-III  ST mobilization   Up rotation mobilization ST joint

## 2020-07-24 NOTE — TELEPHONE ENCOUNTER
Called patient no answered left a voice message to please give us a called to explain the instructions before her colonoscopy.

## 2020-07-25 ENCOUNTER — TELEPHONE (OUTPATIENT)
Dept: INTERNAL MEDICINE CLINIC | Facility: CLINIC | Age: 70
End: 2020-07-25

## 2020-07-25 NOTE — TELEPHONE ENCOUNTER
Current Outpatient Medications:     •  Enoxaparin Sodium 150 MG/ML Subcutaneous Solution, Inject 0.63 mL (95 mg total) into the skin every 12 (twelve) hours. , Disp: 15 Syringe, Rfl: 1 REFILL

## 2020-07-26 PROBLEM — I82.532: Status: ACTIVE | Noted: 2020-07-26

## 2020-07-26 PROBLEM — Z79.01 LONG TERM (CURRENT) USE OF ANTICOAGULANTS: Status: ACTIVE | Noted: 2020-07-26

## 2020-07-26 PROBLEM — Z51.81 ENCOUNTER FOR THERAPEUTIC DRUG MONITORING: Status: ACTIVE | Noted: 2020-07-26

## 2020-07-26 RX ORDER — ENOXAPARIN SODIUM 150 MG/ML
1 INJECTION SUBCUTANEOUS EVERY 12 HOURS
Qty: 15 SYRINGE | Refills: 1 | Status: SHIPPED | OUTPATIENT
Start: 2020-07-26 | End: 2020-10-29 | Stop reason: ALTCHOICE

## 2020-07-27 ENCOUNTER — ANTI-COAG VISIT (OUTPATIENT)
Dept: INTERNAL MEDICINE CLINIC | Facility: CLINIC | Age: 70
End: 2020-07-27
Payer: MEDICARE

## 2020-07-27 DIAGNOSIS — I82.532 CHRONIC DEEP VEIN THROMBOSIS OF LEFT POPLITEAL VEIN (HCC): ICD-10-CM

## 2020-07-27 DIAGNOSIS — Z51.81 ENCOUNTER FOR THERAPEUTIC DRUG MONITORING: ICD-10-CM

## 2020-07-27 DIAGNOSIS — D68.61 ANTIPHOSPHOLIPID SYNDROME (HCC): ICD-10-CM

## 2020-07-27 DIAGNOSIS — I82.532 CHRONIC DEEP VEIN THROMBOSIS (DVT) OF LEFT POPLITEAL VEIN (HCC): ICD-10-CM

## 2020-07-27 DIAGNOSIS — Z79.01 LONG TERM (CURRENT) USE OF ANTICOAGULANTS: ICD-10-CM

## 2020-07-27 LAB
INR: 3 (ref 0.8–1.2)
TEST STRIP EXPIRATION DATE: ABNORMAL DATE

## 2020-07-27 PROCEDURE — 36416 COLLJ CAPILLARY BLOOD SPEC: CPT

## 2020-07-27 PROCEDURE — 85610 PROTHROMBIN TIME: CPT

## 2020-07-27 PROCEDURE — 93793 ANTICOAG MGMT PT WARFARIN: CPT

## 2020-07-28 ENCOUNTER — OFFICE VISIT (OUTPATIENT)
Dept: PHYSICAL THERAPY | Facility: HOSPITAL | Age: 70
End: 2020-07-28
Attending: ORTHOPAEDIC SURGERY
Payer: MEDICARE

## 2020-07-28 ENCOUNTER — TELEPHONE (OUTPATIENT)
Dept: INTERNAL MEDICINE CLINIC | Facility: CLINIC | Age: 70
End: 2020-07-28

## 2020-07-28 PROCEDURE — 97110 THERAPEUTIC EXERCISES: CPT

## 2020-07-28 PROCEDURE — 97140 MANUAL THERAPY 1/> REGIONS: CPT

## 2020-07-28 NOTE — PROGRESS NOTES
Dx:  Shoulder fracture, right, with malunion, subsequent encounter (S42.91XP)          Insurance (Authorized # of Visits):  [facility]  14/25; now plan 21      Authorizing Physician: Dr. Eleuterio Malave  Next MD visit: none scheduled  Fall Risk: standard Neuromuscular Re-education, Therapeutic Activities, Therapeutic Exercise and Home Exercise Program instruction  7/8/2020  10/16 7/14/2020  11/16 7/17/2020  12/20 7/21/2020  13/20 7/24/2020  14/20  7/28/2020  15/20   MT:   JAY PA to mid T/S gr II-III  ST mob min

## 2020-07-30 ENCOUNTER — TELEPHONE (OUTPATIENT)
Dept: RHEUMATOLOGY | Facility: CLINIC | Age: 70
End: 2020-07-30

## 2020-07-30 DIAGNOSIS — Z51.81 ENCOUNTER FOR THERAPEUTIC DRUG MONITORING: ICD-10-CM

## 2020-07-30 DIAGNOSIS — L40.50 PSORIATIC ARTHRITIS (HCC): ICD-10-CM

## 2020-07-30 DIAGNOSIS — D68.61 ANTIPHOSPHOLIPID SYNDROME (HCC): ICD-10-CM

## 2020-07-30 NOTE — TELEPHONE ENCOUNTER
Patient would like a refill on methotrexate medication. Per pt she is out of medication and would also like refills on the medication.  Pharmacy: [facility] (listed)     Current Outpatient Medications   Medication Sig Dispense Refill   • methotrexate 2.5

## 2020-07-30 NOTE — TELEPHONE ENCOUNTER
Ok to tell pt.  - ok for her to take methotrexate on 8/9 - should be fine with colonoscopy since prep is on 8/11

## 2020-07-30 NOTE — TELEPHONE ENCOUNTER
Patient states that she is having a colonoscopy on 8-12-20. Per the patient she is schedule to take the methotrexate medication on 8-9-2. Pt would like to know if she can take her medication on that day or should she not take the medication on 8-9-20.  Pt w

## 2020-07-30 NOTE — TELEPHONE ENCOUNTER
Called and spoke with patient and name and  verified, informed per Dr. Paramjit Gil in [address] [facility] for her to take methotrexate on  - should be fine with colonoscopy since prep is on .  Also in informed patient Dr. Brittany Fields review result note on 20 sent to

## 2020-07-30 NOTE — TELEPHONE ENCOUNTER
LOV: 4/29/2020      Please also see medication below about taking MTX week of colonoscopy and advise.    Future Appointments   Date Time Provider Zac Choi   7/31/2020 10:45 AM Ruperto العلي [facility] PT EM [facility]   8/12/2020  2:00 PM MALIHA ARAIZA E Immature Granulocyte %      % 0.3   CREATININE      0.55 - 1.02 mg/dL 1.29 (H)   eGFR NON-AFR.  AMERICAN      >=60 42 (L)   eGFR       >=60 49 (L)   Albumin      3.4 - 5.0 g/dL 3.5   ALT (SGPT)      13 - 56 U/L 23   AST (SGOT)      15 - 37

## 2020-07-30 NOTE — TELEPHONE ENCOUNTER
Her anemia is very mild.  I would encourage her to discuss with pcp or wait til dr. Gabo Crawford returns and discuss at her next visit with her - enio help schedule follow up with dr. Marilee Pascual

## 2020-07-31 ENCOUNTER — OFFICE VISIT (OUTPATIENT)
Dept: PHYSICAL THERAPY | Facility: HOSPITAL | Age: 70
End: 2020-07-31
Attending: ORTHOPAEDIC SURGERY
Payer: MEDICARE

## 2020-07-31 PROCEDURE — 97140 MANUAL THERAPY 1/> REGIONS: CPT

## 2020-07-31 PROCEDURE — 97110 THERAPEUTIC EXERCISES: CPT

## 2020-07-31 NOTE — PROGRESS NOTES
Dx:  Shoulder fracture, right, with malunion, subsequent encounter (S42.91XP)          [place] of [place]its[place]  [facility] [place]; now plan 21      Authorizing Physician: Dr. Henrik Jarrett  Next MD visit: none scheduled  Fall Risk: standard Activities, Therapeutic Exercise and Home Exercise Program instruction  7/8/2020  10/16 7/14/2020  11/16 7/17/2020  12/20 7/21/2020  13/20 7/24/2020  14/20  7/28/2020  15/20 7/31/2020  16/20   MT:   JAY PA to mid T/S gr II-III  ST mobilization   [facility] table slide, supine elbow flexion, shoulder aarom supine; wand press supine; ER with cane, sh flex slide; aarom cane; SL ER, single limb aarom flexion wall slide    Charges: MT - 1, TE - 2      Total Timed Treatment: 45 min  Total Treatment Time: 45 min

## 2020-08-03 ENCOUNTER — TELEPHONE (OUTPATIENT)
Dept: INTERNAL MEDICINE CLINIC | Facility: CLINIC | Age: 70
End: 2020-08-03

## 2020-08-03 NOTE — TELEPHONE ENCOUNTER
Using  Reina Farfan #[id number] message given below and patient scheduled follow up for 8/26  with Dr Julito Monterroso to discuss her anemia.

## 2020-08-11 ENCOUNTER — TELEPHONE (OUTPATIENT)
Dept: INTERNAL MEDICINE CLINIC | Facility: CLINIC | Age: 70
End: 2020-08-11

## 2020-08-11 NOTE — TELEPHONE ENCOUNTER
Get orange glucose tablets. Make sure she checks her sugars before bed if it is less than 110 and she should probably just take a glucose tablet just to be on the safe side.

## 2020-08-11 NOTE — TELEPHONE ENCOUNTER
Used  ID [id number]. Patient is scheduled for colonoscopy tomorrow. She was told to only take half of her insulin dose this afternoon. She checked her sugar about ten minutes ago and it was 101.  She states she will begin her prep at 5:00 pm

## 2020-08-11 NOTE — TELEPHONE ENCOUNTER
Spoke with the patient and instructed her on Dr. Ozzy Juarez order and plan of care from below. Patient voiced understanding and agreed with the plan of care.

## 2020-08-12 ENCOUNTER — HOSPITAL ENCOUNTER (OUTPATIENT)
Facility: HOSPITAL | Age: 70
Setting detail: HOSPITAL OUTPATIENT SURGERY
Discharge: HOME | End: 2020-08-12
Attending: INTERNAL MEDICINE | Admitting: INTERNAL MEDICINE
Payer: MEDICARE

## 2020-08-12 ENCOUNTER — TELEPHONE (OUTPATIENT)
Dept: GASTROENTEROLOGY | Facility: CLINIC | Age: 70
End: 2020-08-12

## 2020-08-12 ENCOUNTER — PATIENT MESSAGE (OUTPATIENT)
Dept: GASTROENTEROLOGY | Facility: CLINIC | Age: 70
End: 2020-08-12

## 2020-08-12 ENCOUNTER — ANESTHESIA EVENT (OUTPATIENT)
Dept: ENDOSCOPY | Facility: HOSPITAL | Age: 70
End: 2020-08-12
Payer: MEDICARE

## 2020-08-12 ENCOUNTER — ANESTHESIA (OUTPATIENT)
Dept: ENDOSCOPY | Facility: HOSPITAL | Age: 70
End: 2020-08-12
Payer: MEDICARE

## 2020-08-12 VITALS
RESPIRATION RATE: 18 BRPM | OXYGEN SATURATION: 97 % | TEMPERATURE: 99 F | SYSTOLIC BLOOD PRESSURE: 139 MMHG | BODY MASS INDEX: 35 KG/M2 | HEART RATE: 70 BPM | WEIGHT: 223 LBS | DIASTOLIC BLOOD PRESSURE: 63 MMHG | HEIGHT: 67 IN

## 2020-08-12 DIAGNOSIS — Z80.0 FAMILY HISTORY OF COLON CANCER: ICD-10-CM

## 2020-08-12 LAB
GLUCOSE BLDC GLUCOMTR-MCNC: 121 MG/DL (ref 70–99)
INR BLD: 1.29 (ref 0.9–1.2)
PROTHROMBIN TIME: 15.9 SECONDS (ref 11.8–14.5)
SARS-COV-2 RNA RESP QL NAA+PROBE: NOT DETECTED

## 2020-08-12 PROCEDURE — 45385 COLONOSCOPY W/LESION REMOVAL: CPT | Performed by: INTERNAL MEDICINE

## 2020-08-12 PROCEDURE — 45380 COLONOSCOPY AND BIOPSY: CPT | Performed by: INTERNAL MEDICINE

## 2020-08-12 PROCEDURE — 0DBM8ZX EXCISION OF DESCENDING COLON, VIA NATURAL OR ARTIFICIAL OPENING ENDOSCOPIC, DIAGNOSTIC: ICD-10-PCS | Performed by: INTERNAL MEDICINE

## 2020-08-12 PROCEDURE — 0DBL8ZX EXCISION OF TRANSVERSE COLON, VIA NATURAL OR ARTIFICIAL OPENING ENDOSCOPIC, DIAGNOSTIC: ICD-10-PCS | Performed by: INTERNAL MEDICINE

## 2020-08-12 PROCEDURE — 0DBN8ZX EXCISION OF SIGMOID COLON, VIA NATURAL OR ARTIFICIAL OPENING ENDOSCOPIC, DIAGNOSTIC: ICD-10-PCS | Performed by: INTERNAL MEDICINE

## 2020-08-12 RX ORDER — SODIUM CHLORIDE, SODIUM LACTATE, POTASSIUM CHLORIDE, CALCIUM CHLORIDE 600; 310; 30; 20 MG/100ML; MG/100ML; MG/100ML; MG/100ML
INJECTION, SOLUTION INTRAVENOUS CONTINUOUS
Status: DISCONTINUED | OUTPATIENT
Start: 2020-08-12 | End: 2020-08-12

## 2020-08-12 RX ORDER — DEXTROSE MONOHYDRATE 25 G/50ML
50 INJECTION, SOLUTION INTRAVENOUS
Status: DISCONTINUED | OUTPATIENT
Start: 2020-08-12 | End: 2020-08-12

## 2020-08-12 RX ORDER — NALOXONE HYDROCHLORIDE 0.4 MG/ML
80 INJECTION, SOLUTION INTRAMUSCULAR; INTRAVENOUS; SUBCUTANEOUS AS NEEDED
Status: DISCONTINUED | OUTPATIENT
Start: 2020-08-12 | End: 2020-08-12

## 2020-08-12 NOTE — OR NURSING
During the Sign In timeout patient's Rapid Sars-Cov-2 by  PCR and  last doses of Warfarin and Coumadin were reviewed . Last INR result was noted from 7/27/2020. Dr You Vargas requesting INR now. Order for INR placed now per Dr You Vargas.  Patient notified of delay a

## 2020-08-12 NOTE — ANESTHESIA PREPROCEDURE EVALUATION
Anesthesia PreOp Note    HPI:     Mady Martinez is a 72year old female who presents for preoperative consultation requested by: Daquan Andrade MD    Date of Surgery: 8/12/2020    Procedure(s):  COLONOSCOPY  Indication: Family history of c Date Noted: 02/14/2017      Meibomian gland dysfunction (MGD), bilateral, both upper and lower lids         Date Noted: 02/14/2017      History of pterygium excision         Date Noted: 02/14/2017      Primary osteoarthritis of right knee         Da Pantoprazole Sodium 20 MG Oral Tab EC, Take 1 tablet (20 mg total) by mouth every morning before breakfast., Disp: 90 tablet, Rfl: 1, 8/12/2020 at 0800  valsartan 160 MG Oral Tab, Take 1 tablet by mouth 2  times daily. , Disp: 180 tablet, Rfl: 1, 8/9/2020 Secukinumab, 300 MG Dose, (COSENTYX SENSOREADY, 300 MG,) 150 MG/ML Subcutaneous Solution Auto-injector, Inject 300 mg into the skin See Admin Instructions.  Inject 300mg at week 0, 1, 2, 3, 4, then monthly thereafter., Disp: 10 pen, Rfl: 0, Taking  simvasta Omega-3-acid Ethyl Esters (LOVAZA) 1 G Oral Cap, Take 1 g by mouth 2 (two) times daily with meals.   , Disp: , Rfl: , Taking      lactated ringers infusion, , Intravenous, Continuous, Carlos Fletcher MD    No current [facility]-ordered outpatient medications on Substance and Sexual Activity      Alcohol use: No      Drug use: No      Sexual activity: Yes        Partners: Male    Lifestyle      Physical activity:        Days per week: Not on file        Minutes per session: Not on file      Stress: Not on file INR 3.0 (A) 07/27/2020       Vital Signs: Body mass index is 34.93 kg/m². height is 1.702 m (5' 7\") and weight is 101.2 kg (223 lb). Her oral temperature is 98.5 °F (36.9 °C). Her blood pressure is 169/65 (abnormal) and her pulse is 67.  Her respiratio

## 2020-08-12 NOTE — TELEPHONE ENCOUNTER
Dr. Janis Perez    Patient had a colonoscopy today with Dr. Nicole Waterman. She had some polyps removed. INR was 1.2 in gi lab today. Patient was told to restart coumadin. Please advise when the patient should restart lovenox.   He would rather have patient hold of

## 2020-08-12 NOTE — ANESTHESIA POSTPROCEDURE EVALUATION
Patient: Judith Krueger    Procedure Summary     Date:  08/12/20 Room / Location:  [address] [place] [address] ENDOSCOPY 04 / [address] [place] [address] ENDOSCOPY    Anesthesia Start:  2327 Anesthesia Stop:  8714    Procedure:  COLONOSCOPY (N/A ) Diagnosis:       Family history of colon ca

## 2020-08-12 NOTE — OPERATIVE REPORT
[facility] HOSP - [facility] Endoscopy Report      Preoperative Diagnosis:  - colon cancer screening  - family history of colon cancer      Postoperative Diagnosis:  - colon polyps x 4  - small internal hemorrhoids      Procedure:    Colonoscopy       Oralia Ddod

## 2020-08-12 NOTE — H&P
History & Physical Examination    Patient Name: Aaron Tipton  MRN: [id number]  [facility]: [id number]  YOB: 1950    Diagnosis: colon screening, family history colon cancer      insulin glargine (LANTUS SOLOSTAR) 100 UNIT/ML Subcutaneo 8/12/2020 at 0700  Acetaminophen-Codeine (TYLENOL WITH CODEINE #3) 300-30 MG Oral Tab, Take 1 tablet by mouth 3 (three) times daily as needed for Pain., Disp: 30 tablet, Rfl: 0, Taking  PEG 3350-KCl-NaBcb-NaCl-NaSulf (COLYTE WITH FLAVOR PACKS) 240 g Oral R complication, without long-term current use of insulin (HCC)  E11.65, Disp: 200 strip, Rfl: 1, Taking  BENNY MICROLET LANCETS Does not apply Misc, Testing 3 times a day  Diagnosis E11.65, Disp: 300 each, Rfl: 2, Taking  Multiple Vitamins-Minerals (ONE-A-DA Mother         Alzheimer's Disease   • Diabetes Brother    • Lipids Brother    • Hypertension Brother    • Diabetes Paternal Uncle    • Heart Disease Father         CAD   • Diabetes Other    • Dementia Other    • No Known Problems Self    • No Known Proble

## 2020-08-12 NOTE — TELEPHONE ENCOUNTER
RN to find out when the pt should restart lovenox   Colonoscopy today and polyps removed but if she has to restart lovenox today then ok     inr 1.2 in gi lab    Please call pts daughter Cherry Courtney

## 2020-08-13 ENCOUNTER — ANTI-COAG VISIT (OUTPATIENT)
Dept: INTERNAL MEDICINE CLINIC | Facility: CLINIC | Age: 70
End: 2020-08-13

## 2020-08-13 ENCOUNTER — TELEPHONE (OUTPATIENT)
Dept: INTERNAL MEDICINE CLINIC | Facility: CLINIC | Age: 70
End: 2020-08-13

## 2020-08-13 DIAGNOSIS — Z79.01 LONG TERM (CURRENT) USE OF ANTICOAGULANTS: ICD-10-CM

## 2020-08-13 DIAGNOSIS — Z51.81 ENCOUNTER FOR THERAPEUTIC DRUG MONITORING: ICD-10-CM

## 2020-08-13 DIAGNOSIS — D68.61 ANTIPHOSPHOLIPID SYNDROME (HCC): ICD-10-CM

## 2020-08-13 DIAGNOSIS — I82.532 CHRONIC DEEP VEIN THROMBOSIS (DVT) OF LEFT POPLITEAL VEIN (HCC): ICD-10-CM

## 2020-08-13 DIAGNOSIS — I82.532 CHRONIC DEEP VEIN THROMBOSIS OF LEFT POPLITEAL VEIN (HCC): ICD-10-CM

## 2020-08-13 PROCEDURE — 93793 ANTICOAG MGMT PT WARFARIN: CPT | Performed by: INTERNAL MEDICINE

## 2020-08-13 NOTE — TELEPHONE ENCOUNTER
Anticoagulation Clinic:    Please assist patient with post procedure INR. Patient instructed to stay on lovenox until INR therapeutic and stable for 2-3 days.     Thank you

## 2020-08-13 NOTE — TELEPHONE ENCOUNTER
After the procedure, she needs to go back on the lovenox and have the coumadin re started per anticoagulation clinic.  Once INR therapeutic range and stable 2-3 days, may stop lovenox. This is the third time going over the directions with the patient.   Lisa Miguel

## 2020-08-14 ENCOUNTER — TELEPHONE (OUTPATIENT)
Dept: GASTROENTEROLOGY | Facility: CLINIC | Age: 70
End: 2020-08-14

## 2020-08-14 ENCOUNTER — ANTI-COAG VISIT (OUTPATIENT)
Dept: INTERNAL MEDICINE CLINIC | Facility: CLINIC | Age: 70
End: 2020-08-14
Payer: MEDICARE

## 2020-08-14 DIAGNOSIS — I82.532 CHRONIC DEEP VEIN THROMBOSIS (DVT) OF LEFT POPLITEAL VEIN (HCC): ICD-10-CM

## 2020-08-14 DIAGNOSIS — D68.61 ANTIPHOSPHOLIPID SYNDROME (HCC): ICD-10-CM

## 2020-08-14 DIAGNOSIS — Z79.01 LONG TERM (CURRENT) USE OF ANTICOAGULANTS: ICD-10-CM

## 2020-08-14 DIAGNOSIS — I82.532 CHRONIC DEEP VEIN THROMBOSIS OF LEFT POPLITEAL VEIN (HCC): ICD-10-CM

## 2020-08-14 DIAGNOSIS — Z51.81 ENCOUNTER FOR THERAPEUTIC DRUG MONITORING: ICD-10-CM

## 2020-08-14 LAB
INR: 1.5 (ref 0.8–1.2)
TEST STRIP EXPIRATION DATE: ABNORMAL DATE

## 2020-08-14 PROCEDURE — 85610 PROTHROMBIN TIME: CPT

## 2020-08-14 PROCEDURE — 93793 ANTICOAG MGMT PT WARFARIN: CPT

## 2020-08-14 PROCEDURE — 36416 COLLJ CAPILLARY BLOOD SPEC: CPT

## 2020-08-14 NOTE — TELEPHONE ENCOUNTER
----- Message from Andre Mooney MD sent at 8/13/2020  7:56 PM CDT -----  I wanted to get back to you with your colonoscopy results. You had 4 colon polyps removed which were benign.   I would advise a repeat colonoscopy in 3 years to make sure no new p

## 2020-08-14 NOTE — TELEPHONE ENCOUNTER
Entered into Epic: Recall colonoscopy in 3 years per Dr. Maribel Niño. Last done on 8/12/2020. Next due 8/12/2023. HM updated. Results letter mailed.

## 2020-08-16 PROBLEM — D12.6 TUBULAR ADENOMA OF COLON: Status: ACTIVE | Noted: 2020-08-16

## 2020-08-17 ENCOUNTER — ANTI-COAG VISIT (OUTPATIENT)
Dept: INTERNAL MEDICINE CLINIC | Facility: CLINIC | Age: 70
End: 2020-08-17
Payer: MEDICARE

## 2020-08-17 ENCOUNTER — OFFICE VISIT (OUTPATIENT)
Dept: PHYSICAL THERAPY | Facility: HOSPITAL | Age: 70
End: 2020-08-17
Attending: ORTHOPAEDIC SURGERY
Payer: MEDICARE

## 2020-08-17 DIAGNOSIS — D68.61 ANTIPHOSPHOLIPID SYNDROME (HCC): ICD-10-CM

## 2020-08-17 DIAGNOSIS — Z51.81 ENCOUNTER FOR THERAPEUTIC DRUG MONITORING: ICD-10-CM

## 2020-08-17 DIAGNOSIS — I82.532 CHRONIC DEEP VEIN THROMBOSIS (DVT) OF LEFT POPLITEAL VEIN (HCC): ICD-10-CM

## 2020-08-17 DIAGNOSIS — Z79.01 LONG TERM (CURRENT) USE OF ANTICOAGULANTS: ICD-10-CM

## 2020-08-17 DIAGNOSIS — I82.532 CHRONIC DEEP VEIN THROMBOSIS OF LEFT POPLITEAL VEIN (HCC): ICD-10-CM

## 2020-08-17 LAB
INR: 3.2 (ref 0.8–1.2)
TEST STRIP EXPIRATION DATE: ABNORMAL DATE

## 2020-08-17 PROCEDURE — 85610 PROTHROMBIN TIME: CPT

## 2020-08-17 PROCEDURE — 93793 ANTICOAG MGMT PT WARFARIN: CPT

## 2020-08-17 PROCEDURE — 97140 MANUAL THERAPY 1/> REGIONS: CPT

## 2020-08-17 PROCEDURE — 36416 COLLJ CAPILLARY BLOOD SPEC: CPT

## 2020-08-17 PROCEDURE — 97110 THERAPEUTIC EXERCISES: CPT

## 2020-08-17 NOTE — PROGRESS NOTES
Dx:  Shoulder fracture, right, with malunion, subsequent encounter (S42.91XP)          Insurance (Authorized # of Visits[place]  [facility]  [place]; now plan 21      Authorizing Physician: Dr. Michoacano Lorenzo  Next MD visit: none scheduled  Fall Risk: standar make a follow up appt with orthopedics. Goals:   (to be met in 20 visits)   Pt will demonstrate shoulder flexion >120deg in order to perform overhead activities.  PROM met, AAROM near, AROM progressing  Pt will demonstrate shoulder flexion and abducti flexion to eccentric lowering at ~100deg x5  Pulleys AAROM flexion 3# 2x10, 1# 3x5    Pt management, discuss prognosis, recommend followup with physician, spoke to daughter re follow up, plans for remaining sessions   HEP: Pendulums, scap squeezes, flexion

## 2020-08-18 ENCOUNTER — HOSPITAL ENCOUNTER (OUTPATIENT)
Dept: MAMMOGRAPHY | Facility: HOSPITAL | Age: 70
Discharge: HOME | End: 2020-08-18
Attending: INTERNAL MEDICINE
Payer: MEDICARE

## 2020-08-18 DIAGNOSIS — Z12.31 OTHER SCREENING MAMMOGRAM: ICD-10-CM

## 2020-08-18 PROCEDURE — 77063 BREAST TOMOSYNTHESIS BI: CPT | Performed by: INTERNAL MEDICINE

## 2020-08-18 PROCEDURE — 77067 SCR MAMMO BI INCL CAD: CPT | Performed by: INTERNAL MEDICINE

## 2020-08-19 ENCOUNTER — OFFICE VISIT (OUTPATIENT)
Dept: PHYSICAL THERAPY | Facility: HOSPITAL | Age: 70
End: 2020-08-19
Attending: ORTHOPAEDIC SURGERY
Payer: MEDICARE

## 2020-08-19 PROCEDURE — 97110 THERAPEUTIC EXERCISES: CPT

## 2020-08-19 NOTE — PROGRESS NOTES
Dx:  Shoulder fracture, right, with malunion, subsequent encounter (S42.91XP)          Insurance (Authorized # of Visits):  [employer]  18/20 per POC; 22 approved    Authorizing Physician: Dr. Rupa Munguia  Next MD visit: none scheduled  Fall Risk: standard improvement in function of RUE. Progressing   Pt will be independent with HEP and self management.      Plan:   Patient will be seen for 2 x/week or a total of 16 visits over a 90 day period. Treatment will include: Manual Therapy, Neuromuscular Re-educatio 4x5  Manually assisted abduction to isometric hold at end range, eccentric lower 4x5       HEP: Pendulums, scap squeezes, flexion AAROM table slide, supine elbow flexion, shoulder aarom supine; wand press supine; ER with cane, sh flex slide; aarom cane; SL

## 2020-08-20 ENCOUNTER — APPOINTMENT (OUTPATIENT)
Dept: PHYSICAL THERAPY | Facility: HOSPITAL | Age: 70
End: 2020-08-20
Attending: ORTHOPAEDIC SURGERY
Payer: MEDICARE

## 2020-08-22 RX ORDER — SIMVASTATIN 20 MG
TABLET ORAL
Qty: 24 TABLET | Refills: 1 | Status: SHIPPED | OUTPATIENT
Start: 2020-08-22 | End: 2021-04-05

## 2020-08-24 ENCOUNTER — TELEPHONE (OUTPATIENT)
Dept: INTERNAL MEDICINE CLINIC | Facility: CLINIC | Age: 70
End: 2020-08-24

## 2020-08-24 NOTE — TELEPHONE ENCOUNTER
Please see order in there from February 10, 2020. I have talked with patient even though most recent visit about doing the bone density. Patient has a lot of other tests that need to be done. She just had the colonoscopy done.   I am not sure why they ar

## 2020-08-24 NOTE — TELEPHONE ENCOUNTER
[facility] Nurse Dept Hedis and Osteoprosis Team checking status on fax sent on 8/17 requesting an order for bone density test for the patient which recommended, please advise

## 2020-08-25 ENCOUNTER — PATIENT MESSAGE (OUTPATIENT)
Dept: RHEUMATOLOGY | Facility: CLINIC | Age: 70
End: 2020-08-25

## 2020-08-25 NOTE — TELEPHONE ENCOUNTER
[facility] Nurse contacted office. Olga Posada did not know that there was an outstanding order for Dexa Scan. She had faxed information to your office in March 2020 and no one contacted her back. RN verified correct fax #.     Olga Posada will make n

## 2020-08-25 NOTE — TELEPHONE ENCOUNTER
From: Aaron Tipton  To: Nain Quinonez MD  Sent: 8/25/2020 8:26 AM CDT  Subject: Other    I have an appointment with Dr Jonathan Odom scheduled for the 26th. .is there any way to make it a phone appointment? If not, that's ok too ill still come in.  Just

## 2020-08-25 NOTE — TELEPHONE ENCOUNTER
Please see request below and advise, appt scheduled for 6:20 pm.    Last office visit notes, virtual visit:    ASSESSMENT/PLAN:   62-year-old Yoruba-speaking female initially referred for a + ADRIEL and rash.   She was seen by dermatology and rashes consisten have low C4  - She has no other features of lupus therefore diagnosis is mostly consistent with antiphospholipid syndrome.   Repeat testing 12 weeks apart confirm diagnosis of APL  - She is following with hematology, continue Coumadin     Kidney insuffience

## 2020-08-26 ENCOUNTER — OFFICE VISIT (OUTPATIENT)
Dept: PHYSICAL THERAPY | Facility: HOSPITAL | Age: 70
End: 2020-08-26
Attending: ORTHOPAEDIC SURGERY
Payer: MEDICARE

## 2020-08-26 ENCOUNTER — VIRTUAL PHONE E/M (OUTPATIENT)
Dept: RHEUMATOLOGY | Facility: CLINIC | Age: 70
End: 2020-08-26
Payer: MEDICARE

## 2020-08-26 DIAGNOSIS — Z51.81 ENCOUNTER FOR THERAPEUTIC DRUG MONITORING: ICD-10-CM

## 2020-08-26 DIAGNOSIS — L40.50 PSORIATIC ARTHRITIS (HCC): Primary | ICD-10-CM

## 2020-08-26 DIAGNOSIS — D68.61 ANTIPHOSPHOLIPID SYNDROME (HCC): ICD-10-CM

## 2020-08-26 PROCEDURE — 97110 THERAPEUTIC EXERCISES: CPT

## 2020-08-26 PROCEDURE — 99442 PHONE E/M BY PHYS 11-20 MIN: CPT | Performed by: INTERNAL MEDICINE

## 2020-08-26 NOTE — PROGRESS NOTES
Dx:  Shoulder fracture, right, with malunion, subsequent encounter (S42.91XP)          Insurance (Authorized # of Visits):  [facility]  19/20 per POC; 22 approved    Authorizing Physician: Dr. Maddy Parra  Next MD visit: none scheduled  Fall Risk: standard flexion >120deg in order to perform overhead activities. PROM met, AAROM near, AROM progressing  Pt will demonstrate shoulder flexion and abduction strength >3/5 in order to perform functional activities.  Progressing, still in lower half of range  Pt will 4x5  Clasped hands flexion to eccentric lowering at ~100deg x5  Pulleys AAROM flexion 3# 2x10, 1# 3x5    Pt management, discuss prognosis, recommend followup with physician, spoke to daughter re follow up, plans for remaining sessions TE:  PROM ER, PROM fl

## 2020-08-26 NOTE — PROGRESS NOTES
Dennise Lang is a 72year old female. HPI:   No chief complaint on file. Virtual Telephone Check-In    Dennise Lang verbally consents to a Virtual/Telephone Check-In visit on 08/26/2020.     Patient understands and acce Osteopenia    • Vertigo, aural       Social Hx Reviewed   Family Hx Reviewed     Medications (Active prior to today's visit):  Current Outpatient Medications   Medication Sig Dispense Refill   • simvastatin 20 MG Oral Tab TAKE 1 TABLET  BY MOUTH TWO TIMES gastro office. May substitute with Trilyte/generic equivalent if needed (Patient not taking: Reported on 7/20/2020 ) 1 Bottle 0   • Betamethasone Dipropionate Aug 0.05 % External Lotion Apply 1 Application topically 2 (two) times daily.  1 Bottle 1   • GLIP g by mouth 2 (two) times daily with meals. .cmed  Allergies:    Amlodipine              SHORTNESS OF BREATH    Comment:Chest pressure  Metformin               DIARRHEA  Metoprolol              DIARRHEA      ROS:   All other ROS are negative.      Marlee Galindo 0.070   Quantiferon TB Mitogen minus NIL      IU/mL 8.977   QTB. RESULT      Negative Negative   HEPATITIS B SURF AB QUANT      <10.00 mIU/mL <3.10   HEPATITIS B SURFACE AB QUAL      Nonreactive  Nonreactive   GLUCOSE-6-PHOSPHATE DEHYDROGEN      9.9 - 16.6 Minimally decreased albumin is present. . . .   Reviewed By:       Janna Dia M.D.    C-Citrullinated Peptide IgG AB      0.0 - 6.9 U/mL 1.4   RHEUMATOID FACTOR      <11 IU/mL <5   COMPLEMENT C4      18 - 55 mg/dL 13 (L)   COMPLEMENT C3      88 - 201 was found to have an incidental L LE DVT diagnosed in December 2018. Further workup was done and she was found to have + antiphospholipid antibodies.      Blood work:  ADRIEL 1: 640  ESR 46-->57-->43 and CRP 1.2 mg/dL-->1.69-->0.62  + APL (+ DRVVT, beta-2 gly

## 2020-08-26 NOTE — TELEPHONE ENCOUNTER
Per thom Jorge for virtual appointment.      Future Appointments   Date Time Provider Zac Choi   8/26/2020  6:20 PM Leodan Mendoza MD [facility]

## 2020-08-28 ENCOUNTER — APPOINTMENT (OUTPATIENT)
Dept: PHYSICAL THERAPY | Facility: HOSPITAL | Age: 70
End: 2020-08-28
Attending: ORTHOPAEDIC SURGERY
Payer: MEDICARE

## 2020-08-28 RX ORDER — GLIPIZIDE 10 MG/1
TABLET ORAL
Qty: 90 TABLET | Refills: 0 | Status: SHIPPED | OUTPATIENT
Start: 2020-08-28 | End: 2020-11-25

## 2020-08-31 ENCOUNTER — ANTI-COAG VISIT (OUTPATIENT)
Dept: INTERNAL MEDICINE CLINIC | Facility: CLINIC | Age: 70
End: 2020-08-31
Payer: MEDICARE

## 2020-08-31 DIAGNOSIS — I82.532 CHRONIC DEEP VEIN THROMBOSIS (DVT) OF LEFT POPLITEAL VEIN (HCC): ICD-10-CM

## 2020-08-31 DIAGNOSIS — Z79.01 LONG TERM (CURRENT) USE OF ANTICOAGULANTS: ICD-10-CM

## 2020-08-31 DIAGNOSIS — Z51.81 ENCOUNTER FOR THERAPEUTIC DRUG MONITORING: ICD-10-CM

## 2020-08-31 DIAGNOSIS — I82.532 CHRONIC DEEP VEIN THROMBOSIS OF LEFT POPLITEAL VEIN (HCC): ICD-10-CM

## 2020-08-31 DIAGNOSIS — D68.61 ANTIPHOSPHOLIPID SYNDROME (HCC): ICD-10-CM

## 2020-08-31 LAB
INR: 2.4 (ref 0.8–1.2)
TEST STRIP EXPIRATION DATE: ABNORMAL DATE

## 2020-08-31 PROCEDURE — 85610 PROTHROMBIN TIME: CPT

## 2020-08-31 PROCEDURE — 93793 ANTICOAG MGMT PT WARFARIN: CPT

## 2020-08-31 PROCEDURE — 36416 COLLJ CAPILLARY BLOOD SPEC: CPT

## 2020-09-01 ENCOUNTER — APPOINTMENT (OUTPATIENT)
Dept: PHYSICAL THERAPY | Facility: HOSPITAL | Age: 70
End: 2020-09-01
Attending: ORTHOPAEDIC SURGERY
Payer: MEDICARE

## 2020-09-02 ENCOUNTER — HOSPITAL ENCOUNTER (OUTPATIENT)
Dept: GENERAL RADIOLOGY | Facility: HOSPITAL | Age: 70
Discharge: HOME | End: 2020-09-02
Attending: ORTHOPAEDIC SURGERY
Payer: MEDICARE

## 2020-09-02 ENCOUNTER — TELEPHONE (OUTPATIENT)
Dept: PHYSICAL THERAPY | Facility: HOSPITAL | Age: 70
End: 2020-09-02

## 2020-09-02 ENCOUNTER — OFFICE VISIT (OUTPATIENT)
Dept: ORTHOPEDICS CLINIC | Facility: CLINIC | Age: 70
End: 2020-09-02
Payer: MEDICARE

## 2020-09-02 DIAGNOSIS — Z47.89 ORTHOPEDIC AFTERCARE: ICD-10-CM

## 2020-09-02 DIAGNOSIS — Z47.89 ORTHOPEDIC AFTERCARE: Primary | ICD-10-CM

## 2020-09-02 DIAGNOSIS — M19.111 POST-TRAUMATIC OSTEOARTHRITIS OF RIGHT SHOULDER: ICD-10-CM

## 2020-09-02 PROCEDURE — 99213 OFFICE O/P EST LOW 20 MIN: CPT | Performed by: ORTHOPAEDIC SURGERY

## 2020-09-02 PROCEDURE — 73030 X-RAY EXAM OF SHOULDER: CPT | Performed by: ORTHOPAEDIC SURGERY

## 2020-09-02 NOTE — PROGRESS NOTES
NURSING INTAKE COMMENTS: Patient presents with:  Fracture: right shoulder -- Daughter with pt. Went to 17 therapies, per pt. Rates pain 2/10. More activity increases pain.        HPI: This 72year old female presents today with complaints of activity relate Pads Code: E11.9. Checks twice daily. 100 each 11   • Pantoprazole Sodium 20 MG Oral Tab EC Take 1 tablet (20 mg total) by mouth every morning before breakfast. 90 tablet 1   • valsartan 160 MG Oral Tab Take 1 tablet by mouth 2  times daily.  180 tablet 1 mg total) by mouth every 8 (eight) hours as needed for Nausea. 30 tablet 0   • Insulin Pen Needle (BD PEN NEEDLE ULTRAFINE) 29G X 12.7MM Does not apply Misc Dx. E11.9. Checks q12hrs. BD ultrafine MINI.  100 each 6   • Glucose Blood (BENNY CONTOUR NEXT TEST) Problems Maternal Aunt    • No Known Problems Paternal Aunt    • No Known Problems Maternal Cousin Female    • No Known Problems Maternal Cousin Male    • No Known Problems Paternal Cousin Female    • No Known Problems Paternal Cousin Male    • Colon Cance 11:10 AM.  INDICATIONS: Encounter for screening mammogram for malignant neoplasm of breast  TECHNIQUE:  Full field direct screening mammography was performed and images were reviewed with the R2 QING [de-identified] CAD device.   3D tomosynthesis was performed and r osteoarthritis of right shoulder        Assessment: She has residual pain and weakness following a malunion of her right shoulder fracture. Plan:  We talked about the options for treatment, namely reverse shoulder arthroplasty if her symptoms do not impr

## 2020-09-03 ENCOUNTER — OFFICE VISIT (OUTPATIENT)
Dept: PHYSICAL THERAPY | Facility: HOSPITAL | Age: 70
End: 2020-09-03
Attending: ORTHOPAEDIC SURGERY
Payer: MEDICARE

## 2020-09-03 PROCEDURE — 97110 THERAPEUTIC EXERCISES: CPT

## 2020-09-03 NOTE — PROGRESS NOTES
ProgressSummary  Pt has attended 20 visits in Physical Therapy.      Dx:  Shoulder fracture, right, with malunion, subsequent encounter (S42.91XP)          Insurance (Authorized # of Visits):  [employer]  20/20 per POC; 25 approved    Christiano Lopez into scaption  AROM abduction 92, mild shoulder hike and lateral trunk lean  Scaption 96    ER passive placement to end range or mid range could not maintain independently  ER at side could not take any resistance    AROM IR to behind hip         PROM   Fl visits over a 90 day period. Treatment will include: Manual Therapy, Neuromuscular Re-education, Therapeutic Activities, Therapeutic Exercise and Home Exercise Program instruction  7/24/2020  14/20  7/28/2020  15/20 7/31/2020  16/20 8/17/2020  17/20 8/19/2 range, eccentric lower 4x5     TE:   PROM ER, PROM flexion with blocking scapula for inf capsule  Serratus place and hold 2x10  Supine press 3# wand 2x10  AAROM pulley flexion 1#, 0.5# 2x10 each  AAROM pulley abduction 1.5# 4x5   TE:   SL ER with towel, no

## 2020-09-08 ENCOUNTER — APPOINTMENT (OUTPATIENT)
Dept: PHYSICAL THERAPY | Facility: HOSPITAL | Age: 70
End: 2020-09-08
Attending: ORTHOPAEDIC SURGERY
Payer: MEDICARE

## 2020-09-10 ENCOUNTER — APPOINTMENT (OUTPATIENT)
Dept: PHYSICAL THERAPY | Facility: HOSPITAL | Age: 70
End: 2020-09-10
Attending: ORTHOPAEDIC SURGERY
Payer: MEDICARE

## 2020-09-11 ENCOUNTER — OFFICE VISIT (OUTPATIENT)
Dept: PHYSICAL THERAPY | Facility: HOSPITAL | Age: 70
End: 2020-09-11
Attending: ORTHOPAEDIC SURGERY
Payer: MEDICARE

## 2020-09-11 PROCEDURE — 97110 THERAPEUTIC EXERCISES: CPT

## 2020-09-11 NOTE — PROGRESS NOTES
Dx:  Shoulder fracture, right, with malunion, subsequent encounter (S42.91XP)          Insurance (Authorized # of Visits):  [employer]  20/20 per POC; 22 approved    Authorizing Physician: Dr. Maddy Parra  Next MD visit: none scheduled  Fall Risk: standard immediately hikes shoulder and is unable to control descent without pain. DASH 7/24: 81.7% disabled          Assessment:   Session today focused to RTC activation and control, as well as posterior deltoid activation.  Needed active assist to get to catrina blocking scapula for inf capsule  Serratus place and hold 2x10  Supine press 3# wand 2x10  AAROM pulley flexion 1#, 0.5# 2x10 each  AAROM pulley abduction 1.5# 4x5   TE:   SL ER with towel, no resistance 4x5  Heavy cuing for proper execution as pt tends to

## 2020-09-14 ENCOUNTER — TELEPHONE (OUTPATIENT)
Dept: PHYSICAL THERAPY | Facility: HOSPITAL | Age: 70
End: 2020-09-14

## 2020-09-15 ENCOUNTER — APPOINTMENT (OUTPATIENT)
Dept: PHYSICAL THERAPY | Facility: HOSPITAL | Age: 70
End: 2020-09-15
Attending: ORTHOPAEDIC SURGERY
Payer: MEDICARE

## 2020-09-17 ENCOUNTER — APPOINTMENT (OUTPATIENT)
Dept: PHYSICAL THERAPY | Facility: HOSPITAL | Age: 70
End: 2020-09-17
Attending: ORTHOPAEDIC SURGERY
Payer: MEDICARE

## 2020-09-18 NOTE — TELEPHONE ENCOUNTER
Addended by: JOHAN JACK on: 9/18/2020 03:58 PM     Modules accepted: Orders     Spoke to pt in regards to drs recommendations. Per pt, she will try to out the OTC Tylenol, but not the Metoprolol. States she was rx this in the past and caused her to have diarrhea. So it was DC.    She also mentioned her BP was 160/81 and HR 73 (had marisela

## 2020-09-22 ENCOUNTER — APPOINTMENT (OUTPATIENT)
Dept: PHYSICAL THERAPY | Facility: HOSPITAL | Age: 70
End: 2020-09-22
Attending: ORTHOPAEDIC SURGERY
Payer: MEDICARE

## 2020-09-23 ENCOUNTER — OFFICE VISIT (OUTPATIENT)
Dept: PHYSICAL THERAPY | Facility: HOSPITAL | Age: 70
End: 2020-09-23
Attending: ORTHOPAEDIC SURGERY
Payer: MEDICARE

## 2020-09-23 ENCOUNTER — IMMUNIZATION (OUTPATIENT)
Dept: INTERNAL MEDICINE CLINIC | Facility: CLINIC | Age: 70
End: 2020-09-23
Payer: MEDICARE

## 2020-09-23 ENCOUNTER — MED REC SCAN ONLY (OUTPATIENT)
Dept: INTERNAL MEDICINE CLINIC | Facility: CLINIC | Age: 70
End: 2020-09-23

## 2020-09-23 DIAGNOSIS — Z23 NEED FOR VACCINATION: ICD-10-CM

## 2020-09-23 PROCEDURE — 90662 IIV NO PRSV INCREASED AG IM: CPT | Performed by: INTERNAL MEDICINE

## 2020-09-23 PROCEDURE — 97110 THERAPEUTIC EXERCISES: CPT

## 2020-09-23 PROCEDURE — G0008 ADMIN INFLUENZA VIRUS VAC: HCPCS | Performed by: INTERNAL MEDICINE

## 2020-09-23 NOTE — PROGRESS NOTES
Dx:  Shoulder fracture, right, with malunion, subsequent encounter (S42.91XP)          Insurance (Authorized # of Visits):  [address]  22/25 per POC; 22 approved    Authorizing Physician: Dr. José Antonio Payne  Next MD visit: none scheduled  Fall Risk: standard isolated, but improved with repetition when incorporated into compound exercises or during rhythmic stabilization. Goals:   (to be met in 20 visits)   Pt will demonstrate shoulder flexion >120deg in order to perform overhead activities.  PROM met, A active assist and eccentric lower 3x8   Isometric ER at door 4x5, 3 sholds  Sh ext prone 2x10  Quad rockback 2x10     TE:   End range ER place and holds, 4x5  ER and IR rhythmic stabilization 2x5   Quad rockback 2x10    Modified wall pushup 2x10  Rows RTB

## 2020-09-24 ENCOUNTER — APPOINTMENT (OUTPATIENT)
Dept: PHYSICAL THERAPY | Facility: HOSPITAL | Age: 70
End: 2020-09-24
Attending: ORTHOPAEDIC SURGERY
Payer: MEDICARE

## 2020-09-28 ENCOUNTER — ANTI-COAG VISIT (OUTPATIENT)
Dept: INTERNAL MEDICINE CLINIC | Facility: CLINIC | Age: 70
End: 2020-09-28
Payer: MEDICARE

## 2020-09-28 DIAGNOSIS — Z79.01 LONG TERM (CURRENT) USE OF ANTICOAGULANTS: ICD-10-CM

## 2020-09-28 DIAGNOSIS — I82.532 CHRONIC DEEP VEIN THROMBOSIS (DVT) OF LEFT POPLITEAL VEIN (HCC): ICD-10-CM

## 2020-09-28 DIAGNOSIS — D68.61 ANTIPHOSPHOLIPID SYNDROME (HCC): ICD-10-CM

## 2020-09-28 DIAGNOSIS — I82.532 CHRONIC DEEP VEIN THROMBOSIS OF LEFT POPLITEAL VEIN (HCC): ICD-10-CM

## 2020-09-28 DIAGNOSIS — Z51.81 ENCOUNTER FOR THERAPEUTIC DRUG MONITORING: ICD-10-CM

## 2020-09-28 LAB
INR: 2.4 (ref 0.8–1.2)
TEST STRIP EXPIRATION DATE: ABNORMAL DATE

## 2020-09-28 PROCEDURE — 93793 ANTICOAG MGMT PT WARFARIN: CPT

## 2020-09-28 PROCEDURE — 85610 PROTHROMBIN TIME: CPT

## 2020-09-28 PROCEDURE — 36416 COLLJ CAPILLARY BLOOD SPEC: CPT

## 2020-10-02 ENCOUNTER — OFFICE VISIT (OUTPATIENT)
Dept: PHYSICAL THERAPY | Facility: HOSPITAL | Age: 70
End: 2020-10-02
Attending: INTERNAL MEDICINE
Payer: MEDICARE

## 2020-10-02 PROCEDURE — 97110 THERAPEUTIC EXERCISES: CPT

## 2020-10-02 RX ORDER — WARFARIN SODIUM 5 MG/1
TABLET ORAL
Qty: 180 TABLET | Refills: 0 | Status: SHIPPED | OUTPATIENT
Start: 2020-10-02 | End: 2021-04-27

## 2020-10-02 NOTE — PROGRESS NOTES
Dx:  Shoulder fracture, right, with malunion, subsequent encounter (S42.91XP)          Insurance (Authorized # of Visits):  [address]  22/25 per POC; 22 approved    Authorizing Physician: Dr. Aislinn Anders ref.  provider found  Next MD visit: none scheduled  Fall Ris to black theraband, which she was able to perform in the clinic with her given band. Goals:   (to be met in 20 visits)   Pt will demonstrate shoulder flexion >120deg in order to perform overhead activities.  PROM met, AAROM near, AROM progressing  P assist and eccentric lower 3x8   Isometric ER at door 4x5, 3 sholds  Sh ext prone 2x10  Quad rockback 2x10     TE:   End range ER place and holds, 4x5  ER and IR rhythmic stabilization 2x5   Quad rockback 2x10    Modified wall pushup 2x10  Rows RTB 2x10  S

## 2020-10-09 ENCOUNTER — OFFICE VISIT (OUTPATIENT)
Dept: PHYSICAL THERAPY | Facility: HOSPITAL | Age: 70
End: 2020-10-09
Attending: ORTHOPAEDIC SURGERY
Payer: MEDICARE

## 2020-10-09 PROCEDURE — 97140 MANUAL THERAPY 1/> REGIONS: CPT

## 2020-10-09 PROCEDURE — 97110 THERAPEUTIC EXERCISES: CPT

## 2020-10-09 NOTE — PROGRESS NOTES
Dx:  Shoulder fracture, right, with malunion, subsequent encounter (S42.91XP)          Insurance (Authorized # of Visits):  [employer]  24/25 per POC; 22 approved    Authorizing Physician: Dr. Michoacano Lorenzo  Next MD visit: none scheduled  Fall Risk: standard Handout given to demonstrate that exercise that can be performed in recliner for upper level ROM motor control training. Goals:   (to be met in 20 visits)   Pt will demonstrate shoulder flexion >120deg in order to perform overhead activities.  PROM 2x10  Bicep curl 3# 2x10  Wall walk x2 TE:   End range ER place and holds, 4x5, tapping facilitation to RTC  Quad rockback 2x10   Rockerboard taps on table, med/lat x10  Bosuball rocking x10  Modified pushups on bosuball (on table) x5, on inclined table (f

## 2020-10-16 ENCOUNTER — OFFICE VISIT (OUTPATIENT)
Dept: PHYSICAL THERAPY | Facility: HOSPITAL | Age: 70
End: 2020-10-16
Attending: INTERNAL MEDICINE
Payer: MEDICARE

## 2020-10-16 PROCEDURE — 97110 THERAPEUTIC EXERCISES: CPT

## 2020-10-16 NOTE — PROGRESS NOTES
Promise  Pt has attended 25 visits in Physical Therapy.      Dx:  Shoulder fracture, right, with malunion, subsequent encounter (S42.91XP)          Insurance (Authorized # of Visits):  [address]  25/25 per POC; 25 approved    Authorizing Physici Elbow flexion: L 5/5, R 4/5  Elbow ext: L 5/5, R 4-/5      DASH 7/24: 81.7% disabled          Assessment:   Since start of care, patient has improved the function of her arm within available AROM.  She can do lower range functional tasks, but demonstrates extend elbow and rotate trunk; small ranges only  Flexion unweighting 1# x15, 0.5# x15; cues to minimize trunk lean and shoulder hike with increasing fatigue  Tests and measurements TE:   SL ER active assist and eccentric lower 3x8   Isometric ER at door 4 [de-identified] certification required:  No  Please co-sign or sign and return this letter via fax as soon as possible to [phone]. I certify the need for these services furnished under this plan of treatment and while under my care.     X____________

## 2020-10-24 DIAGNOSIS — Z51.81 ENCOUNTER FOR THERAPEUTIC DRUG MONITORING: ICD-10-CM

## 2020-10-24 DIAGNOSIS — L40.50 PSORIATIC ARTHRITIS (HCC): ICD-10-CM

## 2020-10-24 DIAGNOSIS — D68.61 ANTIPHOSPHOLIPID SYNDROME (HCC): ICD-10-CM

## 2020-10-24 NOTE — TELEPHONE ENCOUNTER
* pended script updated to 4 tablets weekly per LOV note    LOV: 8/26/20 (virtual) - sent message with reminder to schedule 3 month f/u appt and complete labs   Future Appointments   Date Time Provider Zac Choi   10/26/2020 10:00 AM [facility]

## 2020-10-26 ENCOUNTER — ANTI-COAG VISIT (OUTPATIENT)
Dept: INTERNAL MEDICINE CLINIC | Facility: CLINIC | Age: 70
End: 2020-10-26
Payer: MEDICARE

## 2020-10-26 DIAGNOSIS — D68.61 ANTIPHOSPHOLIPID SYNDROME (HCC): ICD-10-CM

## 2020-10-26 DIAGNOSIS — Z51.81 ENCOUNTER FOR THERAPEUTIC DRUG MONITORING: ICD-10-CM

## 2020-10-26 DIAGNOSIS — Z79.01 LONG TERM (CURRENT) USE OF ANTICOAGULANTS: ICD-10-CM

## 2020-10-26 DIAGNOSIS — I82.532 CHRONIC DEEP VEIN THROMBOSIS (DVT) OF LEFT POPLITEAL VEIN (HCC): ICD-10-CM

## 2020-10-26 DIAGNOSIS — I82.532 CHRONIC DEEP VEIN THROMBOSIS OF LEFT POPLITEAL VEIN (HCC): ICD-10-CM

## 2020-10-26 PROCEDURE — 85610 PROTHROMBIN TIME: CPT

## 2020-10-26 PROCEDURE — 36416 COLLJ CAPILLARY BLOOD SPEC: CPT

## 2020-10-26 PROCEDURE — 93793 ANTICOAG MGMT PT WARFARIN: CPT

## 2020-10-28 PROBLEM — S42.291A CLOSED FRACTURE OF HEAD OF RIGHT HUMERUS: Status: RESOLVED | Noted: 2020-03-24 | Resolved: 2020-10-28

## 2020-10-29 ENCOUNTER — OFFICE VISIT (OUTPATIENT)
Dept: INTERNAL MEDICINE CLINIC | Facility: CLINIC | Age: 70
End: 2020-10-29
Payer: MEDICARE

## 2020-10-29 ENCOUNTER — TELEPHONE (OUTPATIENT)
Dept: INTERNAL MEDICINE CLINIC | Facility: CLINIC | Age: 70
End: 2020-10-29

## 2020-10-29 VITALS
BODY MASS INDEX: 37.48 KG/M2 | WEIGHT: 238.81 LBS | DIASTOLIC BLOOD PRESSURE: 72 MMHG | TEMPERATURE: 98 F | RESPIRATION RATE: 18 BRPM | HEART RATE: 58 BPM | SYSTOLIC BLOOD PRESSURE: 170 MMHG | HEIGHT: 67 IN

## 2020-10-29 DIAGNOSIS — H25.13 AGE-RELATED NUCLEAR CATARACT OF BOTH EYES: Primary | ICD-10-CM

## 2020-10-29 DIAGNOSIS — Z71.85 VACCINE COUNSELING: ICD-10-CM

## 2020-10-29 DIAGNOSIS — E11.9 CONTROLLED TYPE 2 DIABETES MELLITUS WITHOUT COMPLICATION, WITHOUT LONG-TERM CURRENT USE OF INSULIN (HCC): Chronic | ICD-10-CM

## 2020-10-29 DIAGNOSIS — D68.61 ANTIPHOSPHOLIPID SYNDROME (HCC): ICD-10-CM

## 2020-10-29 DIAGNOSIS — N18.32 STAGE 3B CHRONIC KIDNEY DISEASE (HCC): Chronic | ICD-10-CM

## 2020-10-29 DIAGNOSIS — E03.9 HYPOTHYROIDISM (ACQUIRED): Chronic | ICD-10-CM

## 2020-10-29 DIAGNOSIS — I10 ESSENTIAL HYPERTENSION WITH GOAL BLOOD PRESSURE LESS THAN 130/80: ICD-10-CM

## 2020-10-29 DIAGNOSIS — E53.8 B12 DEFICIENCY: ICD-10-CM

## 2020-10-29 DIAGNOSIS — M85.80 OSTEOPENIA, UNSPECIFIED LOCATION: ICD-10-CM

## 2020-10-29 DIAGNOSIS — Z12.31 ENCOUNTER FOR SCREENING MAMMOGRAM FOR MALIGNANT NEOPLASM OF BREAST: ICD-10-CM

## 2020-10-29 DIAGNOSIS — E04.9 GOITER: ICD-10-CM

## 2020-10-29 PROCEDURE — 3078F DIAST BP <80 MM HG: CPT | Performed by: INTERNAL MEDICINE

## 2020-10-29 PROCEDURE — 3077F SYST BP >= 140 MM HG: CPT | Performed by: INTERNAL MEDICINE

## 2020-10-29 PROCEDURE — 36415 COLL VENOUS BLD VENIPUNCTURE: CPT | Performed by: INTERNAL MEDICINE

## 2020-10-29 PROCEDURE — 3008F BODY MASS INDEX DOCD: CPT | Performed by: INTERNAL MEDICINE

## 2020-10-29 PROCEDURE — 99214 OFFICE O/P EST MOD 30 MIN: CPT | Performed by: INTERNAL MEDICINE

## 2020-10-29 RX ORDER — ACETAMINOPHEN AND CODEINE PHOSPHATE 300; 30 MG/1; MG/1
1 TABLET ORAL 3 TIMES DAILY PRN
Qty: 30 TABLET | Refills: 0 | Status: SHIPPED | OUTPATIENT
Start: 2020-10-29

## 2020-10-29 RX ORDER — INSULIN GLARGINE 100 [IU]/ML
INJECTION, SOLUTION SUBCUTANEOUS
Qty: 10 PEN | Refills: 1 | Status: SHIPPED | OUTPATIENT
Start: 2020-10-29 | End: 2020-10-29

## 2020-10-29 RX ORDER — INSULIN GLARGINE 100 [IU]/ML
INJECTION, SOLUTION SUBCUTANEOUS
Qty: 10 PEN | Refills: 1 | Status: SHIPPED | OUTPATIENT
Start: 2020-10-29 | End: 2021-05-06

## 2020-10-29 RX ORDER — CLONIDINE 0.2 MG/24H
1 PATCH, EXTENDED RELEASE TRANSDERMAL WEEKLY
Qty: 4 PATCH | Refills: 1 | Status: SHIPPED | OUTPATIENT
Start: 2020-10-29 | End: 2021-02-01 | Stop reason: DRUGHIGH

## 2020-10-29 RX ORDER — INSULIN GLARGINE 100 [IU]/ML
INJECTION, SOLUTION SUBCUTANEOUS
Qty: 30 ML | Refills: 1 | Status: SHIPPED | OUTPATIENT
Start: 2020-10-29 | End: 2020-10-29

## 2020-10-29 RX ORDER — BETAMETHASONE DIPROPIONATE 0.5 MG/ML
1 LOTION, AUGMENTED TOPICAL 2 TIMES DAILY
Qty: 60 ML | Refills: 1 | Status: SHIPPED | OUTPATIENT
Start: 2020-10-29 | End: 2021-07-21

## 2020-10-29 NOTE — PATIENT INSTRUCTIONS
ASSESSMENT/PLAN:   Age-related nuclear cataract of both eyes  (primary encounter diagnosis)ollow up with eye MD.     Antiphospholipid syndrome (hcc) Stable. B12 deficiency Stable.      Encounter for screening mammogram for malignant neoplasm of Visit:  Requested Prescriptions     Signed Prescriptions Disp Refills   • cloNIDine 0.2 MG/24HR Transdermal Patch Weekly 4 patch 1     Sig: Place 1 patch onto the skin once a week.    • insulin glargine (LANTUS SOLOSTAR) 100 UNIT/ML Subcutaneous Solution Pe

## 2020-10-29 NOTE — PROGRESS NOTES
HPI:    Patient ID: Enedina Montanez is a 72year old female. Patient presents with: Follow - Up    Patient here for follow up of Diabetes. Has been taking medications regularly. Checks sugars 1 times daily.   Fasting sugars average 100's K 4.7 03/07/2020 07:56 AM     03/07/2020 07:56 AM    CO2 25.0 03/07/2020 07:56 AM    CREATSERUM 1.29 (H) 07/21/2020 09:22 AM    CA 8.8 03/07/2020 07:56 AM    AST 19 07/21/2020 09:22 AM    ALT 23 07/21/2020 09:22 AM    TSH 0.450 07/21/2020 09:22 AM weakness, light-headedness, numbness and headaches. All other systems reviewed and are negative.         Current Outpatient Medications   Medication Sig Dispense Refill   • cloNIDine 0.2 MG/24HR Transdermal Patch Weekly Place 1 patch onto the skin once a into the skin See Admin Instructions. Inject 300mg at week 0, 1, 2, 3, 4, then monthly thereafter. 10 pen 0   • Lancets Does not apply Misc Checks qam and PM. Dx. E11.9. 100 each 6   • Glucose Blood In Vitro [employer] q12hrs.  Dx. E11.65 200 strip 6   • DIARRHEA  Metoprolol              DIARRHEA    HISTORY:  Past Medical History:   Diagnosis Date   • Appendicitis    • Cholelithiasis    • Deep vein thrombosis (HCC)     L leg    • Goiter     Multinodular goiter S/P thyroidectomy.     • Osteopenia    • Vertig Used    Alcohol use: No    Drug use: No       PHYSICAL EXAM:   BP (!) 170/72 (BP Location: Right arm, Patient Position: Sitting, Cuff Size: large)   Pulse 58   Temp 98.4 °F (36.9 °C) (Oral)   Resp 18   Ht 5' 7\" (1.702 m)   Wt 238 lb 12.8 oz (108.3 kg)   B Nursing note and vitals reviewed. ASSESSMENT/PLAN:   Age-related nuclear cataract of both eyes  (primary encounter diagnosis)ollow up with eye MD.     Antiphospholipid syndrome (hcc) Stable. B12 deficiency Stable.      Encounter for screenin Signed Prescriptions Disp Refills   • cloNIDine 0.2 MG/24HR Transdermal Patch Weekly 4 patch 1     Sig: Place 1 patch onto the skin once a week.    • insulin glargine (LANTUS SOLOSTAR) 100 UNIT/ML Subcutaneous Solution Pen-injector 10 pen 1     Sig: INJ

## 2020-10-29 NOTE — TELEPHONE ENCOUNTER
And put on the inside of the ear but not too deep and she is got some mild eczema on her inner ear that is why her ear is itchy not from wax.   So she put a light layer on the Q-tip and sticks it a little bit into the ear canal but not all the way in the ea

## 2020-10-29 NOTE — TELEPHONE ENCOUNTER
Spoke with patient. She states her ears itch in the inside of the ear not in the outside. fYI    Patient also state You already prescribe her that cream but you told her it was for the external area.

## 2020-10-30 NOTE — TELEPHONE ENCOUNTER
Other possibility is trying allergy medications may be a component of allergies. She can try Zyrtec 10 mg at night will interact with any of the medications and makes sleepy. She is available over-the-counter.

## 2020-10-30 NOTE — TELEPHONE ENCOUNTER
With assist of interpretor Spoke with pt and MD message below given. Pt verb understanding and agrees to plan.

## 2020-10-30 NOTE — PROGRESS NOTES
Lipid (cholesterol) is worse. Careful with diet. Avoid red meat, shellfish, pork. Try not to cheatham foods. Lower carb diet. Exercise is most important at least 30 minutes 3 times a week cardiovascular sustained aerobic exercise. Check lipid in 3 months.

## 2020-10-30 NOTE — TELEPHONE ENCOUNTER
Spoke with patient about Dr Rm End message and she states that that's the way she use it but still she has the itch.

## 2020-11-03 RX ORDER — BLOOD SUGAR DIAGNOSTIC
1 STRIP MISCELLANEOUS EVERY 12 HOURS
Qty: 200 STRIP | Refills: 11 | Status: SHIPPED | OUTPATIENT
Start: 2020-11-03 | End: 2020-12-30

## 2020-11-07 RX ORDER — LEVOTHYROXINE SODIUM 0.1 MG/1
200 TABLET ORAL
Qty: 180 TABLET | Refills: 0 | Status: SHIPPED | OUTPATIENT
Start: 2020-11-07 | End: 2021-02-05

## 2020-11-09 DIAGNOSIS — L40.50 PSORIATIC ARTHRITIS (HCC): ICD-10-CM

## 2020-11-09 DIAGNOSIS — D68.61 ANTIPHOSPHOLIPID SYNDROME (HCC): ICD-10-CM

## 2020-11-09 DIAGNOSIS — Z51.81 ENCOUNTER FOR THERAPEUTIC DRUG MONITORING: ICD-10-CM

## 2020-11-09 RX ORDER — FOLIC ACID 1 MG/1
TABLET ORAL
Qty: 90 TABLET | Refills: 0 | Status: SHIPPED | OUTPATIENT
Start: 2020-11-09 | End: 2021-02-05

## 2020-11-09 NOTE — TELEPHONE ENCOUNTER
[facility] pharmacy called to follow-up on refill request. Informed [facility] that script sent to local pharmacy [facility].     Unable to call daughter's now (see contact notes)    Will send My Chart message.

## 2020-11-09 NOTE — TELEPHONE ENCOUNTER
Requested Prescriptions     Pending Prescriptions Disp Refills   • FOLIC ACID 1 MG Oral Tab [Pharmacy Med Name: Folic Acid 1 Mg Tab Amne] 90 tablet 0     Sig: TAKE ONE TABLET BY MOUTH ONE TIME DAILY     LF: 4/29/20 #90 TAB W/ 1 RF  LOV: 8/26/20   Future Ap

## 2020-11-14 ENCOUNTER — LAB ENCOUNTER (OUTPATIENT)
Dept: LAB | Facility: HOSPITAL | Age: 70
End: 2020-11-14
Attending: INTERNAL MEDICINE
Payer: MEDICARE

## 2020-11-14 DIAGNOSIS — Z51.81 ENCOUNTER FOR THERAPEUTIC DRUG MONITORING: ICD-10-CM

## 2020-11-14 DIAGNOSIS — L40.50 PSORIATIC ARTHRITIS (HCC): ICD-10-CM

## 2020-11-14 DIAGNOSIS — D68.61 ANTIPHOSPHOLIPID SYNDROME (HCC): ICD-10-CM

## 2020-11-14 PROCEDURE — 82565 ASSAY OF CREATININE: CPT

## 2020-11-14 PROCEDURE — 84450 TRANSFERASE (AST) (SGOT): CPT

## 2020-11-14 PROCEDURE — 85652 RBC SED RATE AUTOMATED: CPT

## 2020-11-14 PROCEDURE — 82040 ASSAY OF SERUM ALBUMIN: CPT

## 2020-11-14 PROCEDURE — 86140 C-REACTIVE PROTEIN: CPT

## 2020-11-14 PROCEDURE — 36415 COLL VENOUS BLD VENIPUNCTURE: CPT

## 2020-11-14 PROCEDURE — 85025 COMPLETE CBC W/AUTO DIFF WBC: CPT

## 2020-11-14 PROCEDURE — 84460 ALANINE AMINO (ALT) (SGPT): CPT

## 2020-11-23 ENCOUNTER — ANTI-COAG VISIT (OUTPATIENT)
Dept: INTERNAL MEDICINE CLINIC | Facility: CLINIC | Age: 70
End: 2020-11-23
Payer: MEDICARE

## 2020-11-23 DIAGNOSIS — D68.61 ANTIPHOSPHOLIPID SYNDROME (HCC): ICD-10-CM

## 2020-11-23 DIAGNOSIS — I82.532 CHRONIC DEEP VEIN THROMBOSIS OF LEFT POPLITEAL VEIN (HCC): ICD-10-CM

## 2020-11-23 DIAGNOSIS — Z79.01 LONG TERM (CURRENT) USE OF ANTICOAGULANTS: ICD-10-CM

## 2020-11-23 DIAGNOSIS — Z51.81 ENCOUNTER FOR THERAPEUTIC DRUG MONITORING: ICD-10-CM

## 2020-11-23 PROCEDURE — 85610 PROTHROMBIN TIME: CPT

## 2020-11-23 PROCEDURE — 93793 ANTICOAG MGMT PT WARFARIN: CPT

## 2020-11-23 PROCEDURE — 36416 COLLJ CAPILLARY BLOOD SPEC: CPT

## 2020-11-25 RX ORDER — GLIPIZIDE 10 MG/1
TABLET ORAL
Qty: 90 TABLET | Refills: 1 | Status: SHIPPED | OUTPATIENT
Start: 2020-11-25 | End: 2021-04-28

## 2020-11-27 ENCOUNTER — PATIENT MESSAGE (OUTPATIENT)
Dept: RHEUMATOLOGY | Facility: CLINIC | Age: 70
End: 2020-11-27

## 2020-12-03 ENCOUNTER — TELEPHONE (OUTPATIENT)
Dept: NEPHROLOGY | Facility: CLINIC | Age: 70
End: 2020-12-03

## 2020-12-03 NOTE — TELEPHONE ENCOUNTER
Yoni Lowe requesting to speak with Rn regarding patient's BP. Please call with ref# [id number]. THank you.

## 2020-12-03 NOTE — TELEPHONE ENCOUNTER
Will have provider sign off on form tomorrow and fax to [facility] Copy will be mailed to patient's home address on file.

## 2020-12-03 NOTE — TELEPHONE ENCOUNTER
From: Artem Diop  To: Cynthia Mercado MD  Sent: 11/27/2020 4:24 PM CST  Subject: Prescription Question    Dr. Flor Vides,    I have previously applied for a patient assistance program for my Cosentyx medication. I’m due to apply for the 2021 year.

## 2020-12-09 NOTE — TELEPHONE ENCOUNTER
Yumi Marshall (daughter) would like to know the status of their request.    Please call Collin Flores back because she is the one who's taking care of it.   #[phone]  Thank you

## 2020-12-09 NOTE — TELEPHONE ENCOUNTER
Spoke with daughter and advised paperwork was faxed on 12/4/2020. Copy of paperwork mailed to pt's home address.

## 2020-12-28 ENCOUNTER — ANTI-COAG VISIT (OUTPATIENT)
Dept: INTERNAL MEDICINE CLINIC | Facility: CLINIC | Age: 70
End: 2020-12-28
Payer: MEDICARE

## 2020-12-28 DIAGNOSIS — Z79.01 LONG TERM (CURRENT) USE OF ANTICOAGULANTS: ICD-10-CM

## 2020-12-28 DIAGNOSIS — D68.61 ANTIPHOSPHOLIPID SYNDROME (HCC): ICD-10-CM

## 2020-12-28 DIAGNOSIS — Z51.81 ENCOUNTER FOR THERAPEUTIC DRUG MONITORING: ICD-10-CM

## 2020-12-28 DIAGNOSIS — I82.532 CHRONIC DEEP VEIN THROMBOSIS OF LEFT POPLITEAL VEIN (HCC): ICD-10-CM

## 2020-12-28 PROCEDURE — 93793 ANTICOAG MGMT PT WARFARIN: CPT

## 2020-12-28 PROCEDURE — 85610 PROTHROMBIN TIME: CPT

## 2020-12-28 PROCEDURE — 36416 COLLJ CAPILLARY BLOOD SPEC: CPT

## 2020-12-29 RX ORDER — VALSARTAN 160 MG/1
TABLET ORAL
Qty: 180 TABLET | Refills: 0 | Status: SHIPPED | OUTPATIENT
Start: 2020-12-29 | End: 2021-02-18

## 2020-12-29 RX ORDER — PANTOPRAZOLE SODIUM 20 MG/1
TABLET, DELAYED RELEASE ORAL
Qty: 90 TABLET | Refills: 0 | Status: SHIPPED | OUTPATIENT
Start: 2020-12-29 | End: 2021-05-06

## 2020-12-30 RX ORDER — BLOOD SUGAR DIAGNOSTIC
1 STRIP MISCELLANEOUS EVERY 12 HOURS
Qty: 200 STRIP | Refills: 11 | Status: SHIPPED | OUTPATIENT
Start: 2020-12-30 | End: 2021-01-07

## 2020-12-30 RX ORDER — BLOOD SUGAR DIAGNOSTIC
1 STRIP MISCELLANEOUS EVERY 12 HOURS
Qty: 200 STRIP | Refills: 11 | Status: SHIPPED | OUTPATIENT
Start: 2020-12-30 | End: 2022-03-31

## 2021-01-07 RX ORDER — BLOOD SUGAR DIAGNOSTIC
1 STRIP MISCELLANEOUS EVERY 12 HOURS
Qty: 200 STRIP | Refills: 11 | Status: SHIPPED | OUTPATIENT
Start: 2021-01-07 | End: 2022-01-14

## 2021-01-25 ENCOUNTER — ANTI-COAG VISIT (OUTPATIENT)
Dept: INTERNAL MEDICINE CLINIC | Facility: CLINIC | Age: 71
End: 2021-01-25
Payer: MEDICARE

## 2021-01-25 DIAGNOSIS — D68.61 ANTIPHOSPHOLIPID SYNDROME (HCC): ICD-10-CM

## 2021-01-25 DIAGNOSIS — I82.532 CHRONIC DEEP VEIN THROMBOSIS OF LEFT POPLITEAL VEIN (HCC): ICD-10-CM

## 2021-01-25 DIAGNOSIS — Z51.81 ENCOUNTER FOR THERAPEUTIC DRUG MONITORING: ICD-10-CM

## 2021-01-25 DIAGNOSIS — Z79.01 LONG TERM (CURRENT) USE OF ANTICOAGULANTS: ICD-10-CM

## 2021-01-25 LAB
INR: 3.4 (ref 0.8–1.2)
TEST STRIP EXPIRATION DATE: ABNORMAL DATE

## 2021-01-25 PROCEDURE — 85610 PROTHROMBIN TIME: CPT

## 2021-01-25 PROCEDURE — 36416 COLLJ CAPILLARY BLOOD SPEC: CPT

## 2021-01-25 PROCEDURE — 93793 ANTICOAG MGMT PT WARFARIN: CPT

## 2021-01-29 NOTE — TELEPHONE ENCOUNTER
Rx request for Propranolol HCL 60 mg. Please review and sign off if appropriate. Thank you.     Last office visit: 3/13/21  Future appt 3/5/21  Last Refill:5/5/20

## 2021-01-30 ENCOUNTER — LAB ENCOUNTER (OUTPATIENT)
Dept: LAB | Facility: HOSPITAL | Age: 71
End: 2021-01-30
Attending: INTERNAL MEDICINE
Payer: MEDICARE

## 2021-01-30 DIAGNOSIS — E11.9 CONTROLLED TYPE 2 DIABETES MELLITUS WITHOUT COMPLICATION, WITHOUT LONG-TERM CURRENT USE OF INSULIN (HCC): ICD-10-CM

## 2021-01-30 LAB
ALBUMIN SERPL-MCNC: 3.6 G/DL (ref 3.4–5)
ALBUMIN/GLOB SERPL: 0.9 {RATIO} (ref 1–2)
ALP LIVER SERPL-CCNC: 100 U/L
ALT SERPL-CCNC: 21 U/L
ANION GAP SERPL CALC-SCNC: 3 MMOL/L (ref 0–18)
AST SERPL-CCNC: 17 U/L (ref 15–37)
BILIRUB SERPL-MCNC: 0.4 MG/DL (ref 0.1–2)
BUN BLD-MCNC: 24 MG/DL (ref 7–18)
BUN/CREAT SERPL: 18.8 (ref 10–20)
CALCIUM BLD-MCNC: 9.1 MG/DL (ref 8.5–10.1)
CHLORIDE SERPL-SCNC: 107 MMOL/L (ref 98–112)
CHOLEST SMN-MCNC: 145 MG/DL (ref ?–200)
CO2 SERPL-SCNC: 30 MMOL/L (ref 21–32)
CREAT BLD-MCNC: 1.28 MG/DL
GLOBULIN PLAS-MCNC: 4.2 G/DL (ref 2.8–4.4)
GLUCOSE BLD-MCNC: 111 MG/DL (ref 70–99)
HDLC SERPL-MCNC: 58 MG/DL (ref 40–59)
LDLC SERPL CALC-MCNC: 68 MG/DL (ref ?–100)
M PROTEIN MFR SERPL ELPH: 7.8 G/DL (ref 6.4–8.2)
NONHDLC SERPL-MCNC: 87 MG/DL (ref ?–130)
OSMOLALITY SERPL CALC.SUM OF ELEC: 295 MOSM/KG (ref 275–295)
PATIENT FASTING Y/N/NP: YES
PATIENT FASTING Y/N/NP: YES
POTASSIUM SERPL-SCNC: 5.6 MMOL/L (ref 3.5–5.1)
SODIUM SERPL-SCNC: 140 MMOL/L (ref 136–145)
TRIGL SERPL-MCNC: 93 MG/DL (ref 30–149)
VLDLC SERPL CALC-MCNC: 19 MG/DL (ref 0–30)

## 2021-01-30 PROCEDURE — 80053 COMPREHEN METABOLIC PANEL: CPT

## 2021-01-30 PROCEDURE — 36415 COLL VENOUS BLD VENIPUNCTURE: CPT

## 2021-01-30 PROCEDURE — 80061 LIPID PANEL: CPT

## 2021-02-01 DIAGNOSIS — Z23 NEED FOR VACCINATION: ICD-10-CM

## 2021-02-05 ENCOUNTER — PATIENT MESSAGE (OUTPATIENT)
Dept: RHEUMATOLOGY | Facility: CLINIC | Age: 71
End: 2021-02-05

## 2021-02-05 DIAGNOSIS — D68.61 ANTIPHOSPHOLIPID SYNDROME (HCC): ICD-10-CM

## 2021-02-05 DIAGNOSIS — Z51.81 ENCOUNTER FOR THERAPEUTIC DRUG MONITORING: ICD-10-CM

## 2021-02-05 DIAGNOSIS — L40.50 PSORIATIC ARTHRITIS (HCC): ICD-10-CM

## 2021-02-05 RX ORDER — FOLIC ACID 1 MG/1
TABLET ORAL
Qty: 90 TABLET | Refills: 0 | Status: SHIPPED | OUTPATIENT
Start: 2021-02-05 | End: 2021-06-10

## 2021-02-05 RX ORDER — LEVOTHYROXINE SODIUM 0.1 MG/1
200 TABLET ORAL
Qty: 180 TABLET | Refills: 0 | Status: SHIPPED | OUTPATIENT
Start: 2021-02-05 | End: 2021-05-21

## 2021-02-05 NOTE — TELEPHONE ENCOUNTER
LOV: 8/26/20  Last Refilled:Folic GPKB#80, 0rfs  34/9/19  Methotrexate#48, 1rf  10/24/20  Labs:AST 17  ALT 21  1/30/21    Future Appointments   Date Time Provider Zac Choi   2/8/2021 10:15 AM [facility]   3/5/2021  1:40

## 2021-02-08 ENCOUNTER — ANTI-COAG VISIT (OUTPATIENT)
Dept: INTERNAL MEDICINE CLINIC | Facility: CLINIC | Age: 71
End: 2021-02-08
Payer: MEDICARE

## 2021-02-08 ENCOUNTER — LAB ENCOUNTER (OUTPATIENT)
Dept: LAB | Facility: HOSPITAL | Age: 71
End: 2021-02-08
Attending: INTERNAL MEDICINE
Payer: MEDICARE

## 2021-02-08 DIAGNOSIS — I82.532 CHRONIC DEEP VEIN THROMBOSIS OF LEFT POPLITEAL VEIN (HCC): ICD-10-CM

## 2021-02-08 DIAGNOSIS — Z79.01 LONG TERM (CURRENT) USE OF ANTICOAGULANTS: ICD-10-CM

## 2021-02-08 DIAGNOSIS — Z51.81 ENCOUNTER FOR THERAPEUTIC DRUG MONITORING: ICD-10-CM

## 2021-02-08 DIAGNOSIS — D68.61 ANTIPHOSPHOLIPID SYNDROME (HCC): ICD-10-CM

## 2021-02-08 DIAGNOSIS — E87.5 HYPERKALEMIA: ICD-10-CM

## 2021-02-08 LAB
ANION GAP SERPL CALC-SCNC: 3 MMOL/L (ref 0–18)
BUN BLD-MCNC: 20 MG/DL (ref 7–18)
BUN/CREAT SERPL: 17.9 (ref 10–20)
CALCIUM BLD-MCNC: 9.4 MG/DL (ref 8.5–10.1)
CHLORIDE SERPL-SCNC: 109 MMOL/L (ref 98–112)
CO2 SERPL-SCNC: 30 MMOL/L (ref 21–32)
CREAT BLD-MCNC: 1.12 MG/DL
GLUCOSE BLD-MCNC: 136 MG/DL (ref 70–99)
INR: 2 (ref 2–3)
OSMOLALITY SERPL CALC.SUM OF ELEC: 299 MOSM/KG (ref 275–295)
PATIENT FASTING Y/N/NP: YES
POTASSIUM SERPL-SCNC: 5.5 MMOL/L (ref 3.5–5.1)
SODIUM SERPL-SCNC: 142 MMOL/L (ref 136–145)

## 2021-02-08 PROCEDURE — 36416 COLLJ CAPILLARY BLOOD SPEC: CPT

## 2021-02-08 PROCEDURE — 93793 ANTICOAG MGMT PT WARFARIN: CPT

## 2021-02-08 PROCEDURE — 80048 BASIC METABOLIC PNL TOTAL CA: CPT

## 2021-02-08 PROCEDURE — 36415 COLL VENOUS BLD VENIPUNCTURE: CPT

## 2021-02-08 PROCEDURE — 85610 PROTHROMBIN TIME: CPT

## 2021-02-08 RX ORDER — PROPRANOLOL HYDROCHLORIDE 60 MG/1
120 TABLET ORAL DAILY
Qty: 180 TABLET | Refills: 0 | Status: SHIPPED | OUTPATIENT
Start: 2021-02-08 | End: 2021-02-10

## 2021-02-08 NOTE — TELEPHONE ENCOUNTER
LOV 3/13/20. No RTC. F/U scheduled 3/5/21.  Per office visit 3/15, Change propanolol to 120mg each am

## 2021-02-08 NOTE — TELEPHONE ENCOUNTER
Son calling to check status on refill request. Son states pt is out of medication and requesting refill as soon as possible.

## 2021-02-09 ENCOUNTER — TELEPHONE (OUTPATIENT)
Dept: INTERNAL MEDICINE CLINIC | Facility: CLINIC | Age: 71
End: 2021-02-09

## 2021-02-09 ENCOUNTER — TELEPHONE (OUTPATIENT)
Dept: OTHER | Age: 71
End: 2021-02-09

## 2021-02-09 DIAGNOSIS — N18.30 STAGE 3 CHRONIC KIDNEY DISEASE, UNSPECIFIED WHETHER STAGE 3A OR 3B CKD (HCC): Primary | ICD-10-CM

## 2021-02-09 NOTE — PROGRESS NOTES
BMP Normal (electrolytes), declining kidney function is better than prior but still declined. But potassium is still elevated. No motrin, ibuprofen, advil, alleve, naprosyn  with these medications.  AVOIDBananas, oranges, cantaloupe, honeydew, apricots, gra

## 2021-02-09 NOTE — TELEPHONE ENCOUNTER
Patient made an appointment to see Dr. Benson Letters from Nephrology but was advised to renew her referral. Referral pending for your review and approval.     Future Appointments   Date Time Provider Zac Choi   2/23/2021 10:15 AM [address]

## 2021-02-09 NOTE — TELEPHONE ENCOUNTER
Spoke to [address] [place] [address]. Advised of Dr Erwin Lam note. Verbalized understanding.

## 2021-02-09 NOTE — TELEPHONE ENCOUNTER
Received call from [address] [place] [address]. The got a prescription for Kaexalate suspension yesterday. That is now ready for . However today they received a second prescription for Kaexalate powder.      They would like to know if you want the patient to have

## 2021-02-09 NOTE — TELEPHONE ENCOUNTER
Patient has not yet viewed the results comments in [employer]. She has an active order for the COVID vaccine. Please see message below and advise before RN reaches out to patient.

## 2021-02-09 NOTE — TELEPHONE ENCOUNTER
With  Brenda Butler #[id number], Advised patient of Dr Jerrica Moreland note. Patient verbalized understanding.      Written by Lili Lucas MD on 2/8/2021  6:51 PM  BMP Normal (electrolytes), declining kidney function is better than prior but still

## 2021-02-09 NOTE — TELEPHONE ENCOUNTER
Patient is requesting a call back for her lab results from yesterday 02/08/21. Patient would like to know if she should continue taking medication below. Patient would like to know if she is safe to receive covid vaccine.      SPIRONOLACTONE 25 MG Ora

## 2021-02-10 RX ORDER — PROPRANOLOL HYDROCHLORIDE 60 MG/1
120 TABLET ORAL DAILY
Qty: 180 TABLET | Refills: 0 | Status: SHIPPED | OUTPATIENT
Start: 2021-02-10 | End: 2021-07-02

## 2021-02-10 NOTE — TELEPHONE ENCOUNTER
Current Outpatient Medications   Medication Sig Dispense Refill   • Propranolol HCl 60 MG Oral Tab Take 2 tablets (120 mg total) by mouth daily.  180 tablet 0

## 2021-02-18 ENCOUNTER — LAB ENCOUNTER (OUTPATIENT)
Dept: LAB | Facility: HOSPITAL | Age: 71
End: 2021-02-18
Attending: INTERNAL MEDICINE
Payer: MEDICARE

## 2021-02-18 DIAGNOSIS — E87.5 HYPERKALEMIA: ICD-10-CM

## 2021-02-18 LAB
ANION GAP SERPL CALC-SCNC: 3 MMOL/L (ref 0–18)
BUN BLD-MCNC: 23 MG/DL (ref 7–18)
BUN/CREAT SERPL: 21.5 (ref 10–20)
CALCIUM BLD-MCNC: 9.1 MG/DL (ref 8.5–10.1)
CHLORIDE SERPL-SCNC: 111 MMOL/L (ref 98–112)
CO2 SERPL-SCNC: 29 MMOL/L (ref 21–32)
CREAT BLD-MCNC: 1.07 MG/DL
GLUCOSE BLD-MCNC: 120 MG/DL (ref 70–99)
OSMOLALITY SERPL CALC.SUM OF ELEC: 301 MOSM/KG (ref 275–295)
PATIENT FASTING Y/N/NP: YES
POTASSIUM SERPL-SCNC: 5.2 MMOL/L (ref 3.5–5.1)
SODIUM SERPL-SCNC: 143 MMOL/L (ref 136–145)

## 2021-02-18 PROCEDURE — 84443 ASSAY THYROID STIM HORMONE: CPT | Performed by: INTERNAL MEDICINE

## 2021-02-18 PROCEDURE — 82728 ASSAY OF FERRITIN: CPT | Performed by: INTERNAL MEDICINE

## 2021-02-18 PROCEDURE — 80048 BASIC METABOLIC PNL TOTAL CA: CPT

## 2021-02-18 PROCEDURE — 36415 COLL VENOUS BLD VENIPUNCTURE: CPT

## 2021-02-18 PROCEDURE — 84466 ASSAY OF TRANSFERRIN: CPT | Performed by: INTERNAL MEDICINE

## 2021-02-18 PROCEDURE — 83540 ASSAY OF IRON: CPT | Performed by: INTERNAL MEDICINE

## 2021-02-19 NOTE — PROGRESS NOTES
BMP Normal (electrolytes), potassium is better but still elevated. Decline in kidney function is improving. No motrin, ibuprofen, advil, alleve, naprosyn  with these medications. Low potassium diet.   Decrease valsartan to 160 once a day rather than twice

## 2021-02-23 ENCOUNTER — ANTI-COAG VISIT (OUTPATIENT)
Dept: INTERNAL MEDICINE CLINIC | Facility: CLINIC | Age: 71
End: 2021-02-23
Payer: MEDICARE

## 2021-02-23 DIAGNOSIS — D68.61 ANTIPHOSPHOLIPID SYNDROME (HCC): ICD-10-CM

## 2021-02-23 DIAGNOSIS — Z51.81 ENCOUNTER FOR THERAPEUTIC DRUG MONITORING: ICD-10-CM

## 2021-02-23 DIAGNOSIS — I82.532 CHRONIC DEEP VEIN THROMBOSIS OF LEFT POPLITEAL VEIN (HCC): ICD-10-CM

## 2021-02-23 DIAGNOSIS — Z79.01 LONG TERM (CURRENT) USE OF ANTICOAGULANTS: ICD-10-CM

## 2021-02-23 LAB
INR: 1.8 (ref 0.8–1.2)
TEST STRIP EXPIRATION DATE: ABNORMAL DATE

## 2021-02-23 PROCEDURE — 85610 PROTHROMBIN TIME: CPT

## 2021-02-23 PROCEDURE — 36416 COLLJ CAPILLARY BLOOD SPEC: CPT

## 2021-02-23 PROCEDURE — 93793 ANTICOAG MGMT PT WARFARIN: CPT

## 2021-02-24 ENCOUNTER — TELEPHONE (OUTPATIENT)
Dept: INTERNAL MEDICINE CLINIC | Facility: CLINIC | Age: 71
End: 2021-02-24

## 2021-02-24 ENCOUNTER — OFFICE VISIT (OUTPATIENT)
Dept: INTERNAL MEDICINE CLINIC | Facility: CLINIC | Age: 71
End: 2021-02-24
Payer: MEDICARE

## 2021-02-24 VITALS
WEIGHT: 243.19 LBS | HEART RATE: 66 BPM | SYSTOLIC BLOOD PRESSURE: 150 MMHG | HEIGHT: 67 IN | BODY MASS INDEX: 38.17 KG/M2 | DIASTOLIC BLOOD PRESSURE: 66 MMHG

## 2021-02-24 DIAGNOSIS — E87.5 HYPERKALEMIA: ICD-10-CM

## 2021-02-24 DIAGNOSIS — I10 ESSENTIAL HYPERTENSION WITH GOAL BLOOD PRESSURE LESS THAN 130/80: Primary | ICD-10-CM

## 2021-02-24 DIAGNOSIS — R51.9 CHRONIC NONINTRACTABLE HEADACHE, UNSPECIFIED HEADACHE TYPE: ICD-10-CM

## 2021-02-24 DIAGNOSIS — E87.5 SERUM POTASSIUM ELEVATED: ICD-10-CM

## 2021-02-24 DIAGNOSIS — E11.9 CONTROLLED TYPE 2 DIABETES MELLITUS WITHOUT COMPLICATION, WITHOUT LONG-TERM CURRENT USE OF INSULIN (HCC): Chronic | ICD-10-CM

## 2021-02-24 DIAGNOSIS — G89.29 CHRONIC NONINTRACTABLE HEADACHE, UNSPECIFIED HEADACHE TYPE: ICD-10-CM

## 2021-02-24 PROCEDURE — 3077F SYST BP >= 140 MM HG: CPT | Performed by: NURSE PRACTITIONER

## 2021-02-24 PROCEDURE — 99214 OFFICE O/P EST MOD 30 MIN: CPT | Performed by: NURSE PRACTITIONER

## 2021-02-24 PROCEDURE — 3078F DIAST BP <80 MM HG: CPT | Performed by: NURSE PRACTITIONER

## 2021-02-24 PROCEDURE — 3008F BODY MASS INDEX DOCD: CPT | Performed by: NURSE PRACTITIONER

## 2021-02-24 NOTE — PROGRESS NOTES
HPI:    Patient ID: Juana Linn is a 72year old female.  #[id number]  Patient here for follow up of Diabetes. Has been taking medications regularly. Checks sugars 1 times daily.   Fasting sugars average 124  exercise level: not 12.8 oz (108.3 kg)  08/12/20 : 223 lb (101.2 kg)    BP Readings from Last 3 Encounters:  02/24/21 : 150/66  10/29/20 : (!) 170/72  08/12/20 : 139/63    Labs:   Lab Results   Component Value Date/Time     (H) 02/18/2021 08:30 AM     02/18/2021 The pain quality is similar to prior headaches. The quality of the pain is described as throbbing. The pain is moderate. Associated symptoms include nausea.  Pertinent negatives include no abdominal pain, anorexia, blurred vision, drainage, ear pain, eye pa Blood (ACCU-CHEK GUIDE) In Vitro Strip 1 strip by In Vitro route Q12H. 200 strip 11   • Glucose Blood (ACCU-CHEK GUIDE) In Vitro Strip 1 strip by In Vitro route Q12H. 200 strip 11   • PANTOPRAZOLE SODIUM 20 MG Oral Tab EC take 1 tablet by mouth every morni (eight) hours as needed for Nausea. 30 tablet 0   • Insulin Pen Needle (BD PEN NEEDLE ULTRAFINE) 29G X 12.7MM Does not apply Misc Dx. E11.9. Checks q12hrs. BD ultrafine MINI.  100 each 6   • Glucose Blood (BENNY CONTOUR NEXT TEST) In Vitro Strip TEST 2 time [facility] at [facility] MAIN OR   • THYROIDECTOMY  08/17/15    TOTAL   • TUBAL LIGATION        Family History   Problem Relation Age of Onset   • Diabetes Mother    • Hypertension Mother    • Dementia Mother         Alzheimer's Disease   • Diabetes Brother    • Lipids B and external ear normal. No decreased hearing is noted. Left Ear: Hearing and external ear normal. No decreased hearing is noted. Nose: Right sinus exhibits no maxillary sinus tenderness and no frontal sinus tenderness.  Left sinus exhibits no maxillary place, and time. She has normal reflexes. Gait normal. Coordination normal.   Reflex Scores:       Bicep reflexes are 2+ on the right side and 2+ on the left side. Brachioradialis reflexes are 2+ on the right side and 2+ on the left side.        Shona Schirmer sugars at different times on different dates. Careful with low sugars. Carry something with you and check sugar if can. Can carry carolyne cracker, etc. Decrease carbohydrates. But also, careful with fruits and natural sugars. One serving a day and no more th

## 2021-02-24 NOTE — TELEPHONE ENCOUNTER
Unable to draw pt's blood during office visit due to she is a hard stick, pt states that she will go to the hospital next week to have  BMP done then, pt states that she was told per Dr. Jayesh Pandey message that it could be done in 2 weeks.

## 2021-03-05 ENCOUNTER — OFFICE VISIT (OUTPATIENT)
Dept: NEPHROLOGY | Facility: CLINIC | Age: 71
End: 2021-03-05
Payer: MEDICARE

## 2021-03-05 ENCOUNTER — TELEPHONE (OUTPATIENT)
Dept: NEPHROLOGY | Facility: CLINIC | Age: 71
End: 2021-03-05

## 2021-03-05 VITALS
BODY MASS INDEX: 36.14 KG/M2 | HEART RATE: 60 BPM | HEIGHT: 69 IN | WEIGHT: 244 LBS | DIASTOLIC BLOOD PRESSURE: 66 MMHG | SYSTOLIC BLOOD PRESSURE: 151 MMHG

## 2021-03-05 DIAGNOSIS — I10 ESSENTIAL HYPERTENSION WITH GOAL BLOOD PRESSURE LESS THAN 130/80: Primary | ICD-10-CM

## 2021-03-05 DIAGNOSIS — E11.9 CONTROLLED TYPE 2 DIABETES MELLITUS WITHOUT COMPLICATION, WITHOUT LONG-TERM CURRENT USE OF INSULIN (HCC): Chronic | ICD-10-CM

## 2021-03-05 PROCEDURE — 99214 OFFICE O/P EST MOD 30 MIN: CPT | Performed by: INTERNAL MEDICINE

## 2021-03-05 PROCEDURE — 3077F SYST BP >= 140 MM HG: CPT | Performed by: INTERNAL MEDICINE

## 2021-03-05 PROCEDURE — 3078F DIAST BP <80 MM HG: CPT | Performed by: INTERNAL MEDICINE

## 2021-03-05 PROCEDURE — 3008F BODY MASS INDEX DOCD: CPT | Performed by: INTERNAL MEDICINE

## 2021-03-05 RX ORDER — NORTRIPTYLINE HYDROCHLORIDE 50 MG/1
50 CAPSULE ORAL NIGHTLY
Qty: 30 CAPSULE | Refills: 2 | Status: SHIPPED | OUTPATIENT
Start: 2021-03-05 | End: 2021-05-21

## 2021-03-05 RX ORDER — SPIRONOLACTONE 25 MG/1
25 TABLET ORAL DAILY
Qty: 30 TABLET | Refills: 3 | Status: SHIPPED | OUTPATIENT
Start: 2021-03-05 | End: 2021-09-15

## 2021-03-05 RX ORDER — HYDROCHLOROTHIAZIDE 25 MG/1
25 TABLET ORAL DAILY
Qty: 30 TABLET | Refills: 3 | Status: SHIPPED | OUTPATIENT
Start: 2021-03-05 | End: 2021-06-09

## 2021-03-05 RX ORDER — METOPROLOL TARTRATE 100 MG/1
100 TABLET ORAL 2 TIMES DAILY
Qty: 60 TABLET | Refills: 2 | Status: SHIPPED | OUTPATIENT
Start: 2021-03-05 | End: 2021-05-06

## 2021-03-05 NOTE — TELEPHONE ENCOUNTER
Carmelo juan   please see my note from today on Kit Aguilar   this is a tough management case    Regards  bebo

## 2021-03-05 NOTE — PATIENT INSTRUCTIONS
Take iron 325 mg once a day it is over-the-counter    Stop propanolol      For blood pressure add metoprolol   half a pill twice a day which should be 50 mg twice a day      Add spironolactone 25 mg/day    Add hydrochlorothiazide 25 mg/day        For heada

## 2021-03-05 NOTE — PROGRESS NOTES
Dear Reza Mata Note     Aaron Tipton    Was here with her son and Navin Dawson for follow-up today.   You had recently stopped her Aldactone but her blood pressure has gone up she still having headaches her potassium had been a little high Medication Sig Dispense Refill   • hydrochlorothiazide 25 MG Oral Tab Take 1 tablet (25 mg total) by mouth daily. 30 tablet 3   • Metoprolol Tartrate 100 MG Oral Tab Take 1 tablet (100 mg total) by mouth 2 (two) times daily.  60 tablet 2   • spironolacton (three) times daily as needed. take 1 tablet (25MG)  by oral route 3 times every day as needed 90 tablet 1   • Omega-3-acid Ethyl Esters (LOVAZA) 1 G Oral Cap Take 1 g by mouth 2 (two) times daily with meals.        • cloNIDine 0.3 MG/24HR Transdermal Patch without complication, without long-term current use of insulin (HCC)  E11.65 200 strip 1   • BENNY MICROLET LANCETS Does not apply Misc Testing 3 times a day  Diagnosis E11.65 300 each 2   • Blood Glucose Monitoring Suppl ([employer] CONTOUR NEXT MONITOR) w/Dev edema  Neurological: Very nervous       ASSESSMENT/PLAN:   Assessment   Essential hypertension with goal blood pressure less than 130/80  (primary encounter diagnosis)  Controlled type 2 diabetes mellitus without complication, without long-term current use

## 2021-03-06 ENCOUNTER — LAB ENCOUNTER (OUTPATIENT)
Dept: LAB | Facility: HOSPITAL | Age: 71
End: 2021-03-06
Attending: INTERNAL MEDICINE
Payer: MEDICARE

## 2021-03-06 DIAGNOSIS — R53.83 FATIGUE, UNSPECIFIED TYPE: ICD-10-CM

## 2021-03-06 DIAGNOSIS — D64.9 ANEMIA, UNSPECIFIED TYPE: ICD-10-CM

## 2021-03-06 LAB
BASOPHILS # BLD AUTO: 0.03 X10(3) UL (ref 0–0.2)
BASOPHILS NFR BLD AUTO: 0.4 %
DEPRECATED HBV CORE AB SER IA-ACNC: 40.9 NG/ML
DEPRECATED RDW RBC AUTO: 46.7 FL (ref 35.1–46.3)
EOSINOPHIL # BLD AUTO: 0.25 X10(3) UL (ref 0–0.7)
EOSINOPHIL NFR BLD AUTO: 3.6 %
ERYTHROCYTE [DISTWIDTH] IN BLOOD BY AUTOMATED COUNT: 13.2 % (ref 11–15)
HCT VFR BLD AUTO: 37.8 %
HGB BLD-MCNC: 12.5 G/DL
IMM GRANULOCYTES # BLD AUTO: 0.01 X10(3) UL (ref 0–1)
IMM GRANULOCYTES NFR BLD: 0.1 %
IRON SATURATION: 25 %
IRON SERPL-MCNC: 117 UG/DL
LYMPHOCYTES # BLD AUTO: 1.43 X10(3) UL (ref 1–4)
LYMPHOCYTES NFR BLD AUTO: 20.8 %
MCH RBC QN AUTO: 32.1 PG (ref 26–34)
MCHC RBC AUTO-ENTMCNC: 33.1 G/DL (ref 31–37)
MCV RBC AUTO: 96.9 FL
MONOCYTES # BLD AUTO: 0.77 X10(3) UL (ref 0.1–1)
MONOCYTES NFR BLD AUTO: 11.2 %
NEUTROPHILS # BLD AUTO: 4.37 X10 (3) UL (ref 1.5–7.7)
NEUTROPHILS # BLD AUTO: 4.37 X10(3) UL (ref 1.5–7.7)
NEUTROPHILS NFR BLD AUTO: 63.9 %
PLATELET # BLD AUTO: 301 10(3)UL (ref 150–450)
RBC # BLD AUTO: 3.9 X10(6)UL
TOTAL IRON BINDING CAPACITY: 468 UG/DL (ref 240–450)
TRANSFERRIN SERPL-MCNC: 314 MG/DL (ref 200–360)
WBC # BLD AUTO: 6.9 X10(3) UL (ref 4–11)

## 2021-03-06 PROCEDURE — 84466 ASSAY OF TRANSFERRIN: CPT

## 2021-03-06 PROCEDURE — 36415 COLL VENOUS BLD VENIPUNCTURE: CPT

## 2021-03-06 PROCEDURE — 82728 ASSAY OF FERRITIN: CPT

## 2021-03-06 PROCEDURE — 83540 ASSAY OF IRON: CPT

## 2021-03-06 PROCEDURE — 85025 COMPLETE CBC W/AUTO DIFF WBC: CPT

## 2021-03-06 PROCEDURE — 82607 VITAMIN B-12: CPT | Performed by: INTERNAL MEDICINE

## 2021-03-08 ENCOUNTER — IMMUNIZATION (OUTPATIENT)
Dept: LAB | Age: 71
End: 2021-03-08
Attending: HOSPITALIST
Payer: MEDICARE

## 2021-03-08 DIAGNOSIS — Z23 NEED FOR VACCINATION: Primary | ICD-10-CM

## 2021-03-08 PROCEDURE — 0001A SARSCOV2 VAC 30MCG/0.3ML IM: CPT

## 2021-03-09 ENCOUNTER — ANTI-COAG VISIT (OUTPATIENT)
Dept: INTERNAL MEDICINE CLINIC | Facility: CLINIC | Age: 71
End: 2021-03-09
Payer: MEDICARE

## 2021-03-09 DIAGNOSIS — I82.532 CHRONIC DEEP VEIN THROMBOSIS OF LEFT POPLITEAL VEIN (HCC): ICD-10-CM

## 2021-03-09 DIAGNOSIS — Z79.01 LONG TERM (CURRENT) USE OF ANTICOAGULANTS: ICD-10-CM

## 2021-03-09 DIAGNOSIS — D68.61 ANTIPHOSPHOLIPID SYNDROME (HCC): ICD-10-CM

## 2021-03-09 DIAGNOSIS — Z51.81 ENCOUNTER FOR THERAPEUTIC DRUG MONITORING: ICD-10-CM

## 2021-03-09 LAB
INR: 1.8 (ref 0.8–1.2)
TEST STRIP EXPIRATION DATE: ABNORMAL DATE

## 2021-03-09 PROCEDURE — 93793 ANTICOAG MGMT PT WARFARIN: CPT

## 2021-03-09 PROCEDURE — 36416 COLLJ CAPILLARY BLOOD SPEC: CPT

## 2021-03-09 PROCEDURE — 85610 PROTHROMBIN TIME: CPT

## 2021-03-19 NOTE — TELEPHONE ENCOUNTER
LMTCB Received paperwork for patient regarding genetic testing for cardiac concerns that she requested  I filled out my portion, and it is ready to be picked up by the patient -- in my triage folder  Please scan in to chart prior to   Thanks!

## 2021-03-26 RX ORDER — MINOXIDIL 2.5 MG/1
2.5 TABLET ORAL 2 TIMES DAILY
Qty: 60 TABLET | Refills: 5 | Status: SHIPPED | OUTPATIENT
Start: 2021-03-26 | End: 2021-05-14

## 2021-03-26 NOTE — TELEPHONE ENCOUNTER
Requested Prescriptions     Pending Prescriptions Disp Refills   • PANTOPRAZOLE SODIUM 20 MG Oral Tab EC [Pharmacy Med Name: Pantoprazole Sodium Ec 20 Mg Tab Auro] 90 tablet 0     Sig: take 1 tablet by mouth every morning before breakfast     Sunni HORNE I

## 2021-03-27 ENCOUNTER — LAB ENCOUNTER (OUTPATIENT)
Dept: LAB | Facility: HOSPITAL | Age: 71
End: 2021-03-27
Attending: INTERNAL MEDICINE
Payer: MEDICARE

## 2021-03-27 DIAGNOSIS — E11.9 CONTROLLED TYPE 2 DIABETES MELLITUS WITHOUT COMPLICATION, WITHOUT LONG-TERM CURRENT USE OF INSULIN (HCC): Chronic | ICD-10-CM

## 2021-03-27 DIAGNOSIS — I10 ESSENTIAL HYPERTENSION WITH GOAL BLOOD PRESSURE LESS THAN 130/80: ICD-10-CM

## 2021-03-27 LAB
ALBUMIN SERPL-MCNC: 3.7 G/DL (ref 3.4–5)
ALBUMIN/GLOB SERPL: 0.9 {RATIO} (ref 1–2)
ALP LIVER SERPL-CCNC: 113 U/L
ALT SERPL-CCNC: 25 U/L
ANION GAP SERPL CALC-SCNC: 3 MMOL/L (ref 0–18)
AST SERPL-CCNC: 18 U/L (ref 15–37)
BASOPHILS # BLD AUTO: 0.03 X10(3) UL (ref 0–0.2)
BASOPHILS NFR BLD AUTO: 0.4 %
BILIRUB SERPL-MCNC: 0.4 MG/DL (ref 0.1–2)
BILIRUB UR QL: NEGATIVE
BUN BLD-MCNC: 23 MG/DL (ref 7–18)
BUN/CREAT SERPL: 19.7 (ref 10–20)
CALCIUM BLD-MCNC: 9.5 MG/DL (ref 8.5–10.1)
CHLORIDE SERPL-SCNC: 103 MMOL/L (ref 98–112)
CO2 SERPL-SCNC: 32 MMOL/L (ref 21–32)
COLOR UR: YELLOW
CREAT BLD-MCNC: 1.17 MG/DL
CREAT UR-SCNC: 364 MG/DL
DEPRECATED RDW RBC AUTO: 43.5 FL (ref 35.1–46.3)
EOSINOPHIL # BLD AUTO: 0.2 X10(3) UL (ref 0–0.7)
EOSINOPHIL NFR BLD AUTO: 2.6 %
ERYTHROCYTE [DISTWIDTH] IN BLOOD BY AUTOMATED COUNT: 12.6 % (ref 11–15)
EST. AVERAGE GLUCOSE BLD GHB EST-MCNC: 140 MG/DL (ref 68–126)
GLOBULIN PLAS-MCNC: 4.1 G/DL (ref 2.8–4.4)
GLUCOSE BLD-MCNC: 149 MG/DL (ref 70–99)
GLUCOSE UR-MCNC: NEGATIVE MG/DL
HBA1C MFR BLD HPLC: 6.5 % (ref ?–5.7)
HCT VFR BLD AUTO: 39.8 %
HGB BLD-MCNC: 13 G/DL
IMM GRANULOCYTES # BLD AUTO: 0.02 X10(3) UL (ref 0–1)
IMM GRANULOCYTES NFR BLD: 0.3 %
KETONES UR-MCNC: NEGATIVE MG/DL
LYMPHOCYTES # BLD AUTO: 1.92 X10(3) UL (ref 1–4)
LYMPHOCYTES NFR BLD AUTO: 25.4 %
M PROTEIN MFR SERPL ELPH: 7.8 G/DL (ref 6.4–8.2)
MCH RBC QN AUTO: 31.1 PG (ref 26–34)
MCHC RBC AUTO-ENTMCNC: 32.7 G/DL (ref 31–37)
MCV RBC AUTO: 95.2 FL
MICROALBUMIN UR-MCNC: 9.08 MG/DL
MICROALBUMIN/CREAT 24H UR-RTO: 24.9 UG/MG (ref ?–30)
MONOCYTES # BLD AUTO: 0.75 X10(3) UL (ref 0.1–1)
MONOCYTES NFR BLD AUTO: 9.9 %
NEUTROPHILS # BLD AUTO: 4.64 X10 (3) UL (ref 1.5–7.7)
NEUTROPHILS # BLD AUTO: 4.64 X10(3) UL (ref 1.5–7.7)
NEUTROPHILS NFR BLD AUTO: 61.4 %
NITRITE UR QL STRIP.AUTO: NEGATIVE
OSMOLALITY SERPL CALC.SUM OF ELEC: 292 MOSM/KG (ref 275–295)
PATIENT FASTING Y/N/NP: YES
PH UR: 5 [PH] (ref 5–8)
PLATELET # BLD AUTO: 310 10(3)UL (ref 150–450)
POTASSIUM SERPL-SCNC: 4.9 MMOL/L (ref 3.5–5.1)
PROT UR-MCNC: NEGATIVE MG/DL
RBC # BLD AUTO: 4.18 X10(6)UL
SODIUM SERPL-SCNC: 138 MMOL/L (ref 136–145)
SP GR UR STRIP: 1.01 (ref 1–1.03)
UROBILINOGEN UR STRIP-ACNC: <2
WBC # BLD AUTO: 7.6 X10(3) UL (ref 4–11)

## 2021-03-27 PROCEDURE — 83036 HEMOGLOBIN GLYCOSYLATED A1C: CPT

## 2021-03-27 PROCEDURE — 3044F HG A1C LEVEL LT 7.0%: CPT | Performed by: INTERNAL MEDICINE

## 2021-03-27 PROCEDURE — 81001 URINALYSIS AUTO W/SCOPE: CPT

## 2021-03-27 PROCEDURE — 82570 ASSAY OF URINE CREATININE: CPT

## 2021-03-27 PROCEDURE — 80053 COMPREHEN METABOLIC PANEL: CPT

## 2021-03-27 PROCEDURE — 36415 COLL VENOUS BLD VENIPUNCTURE: CPT

## 2021-03-27 PROCEDURE — 3061F NEG MICROALBUMINURIA REV: CPT | Performed by: INTERNAL MEDICINE

## 2021-03-27 PROCEDURE — 85025 COMPLETE CBC W/AUTO DIFF WBC: CPT

## 2021-03-27 PROCEDURE — 82043 UR ALBUMIN QUANTITATIVE: CPT

## 2021-03-29 ENCOUNTER — IMMUNIZATION (OUTPATIENT)
Dept: LAB | Age: 71
End: 2021-03-29
Attending: HOSPITALIST
Payer: MEDICARE

## 2021-03-29 DIAGNOSIS — Z23 NEED FOR VACCINATION: Primary | ICD-10-CM

## 2021-03-29 PROCEDURE — 0002A SARSCOV2 VAC 30MCG/0.3ML IM: CPT

## 2021-03-29 RX ORDER — PANTOPRAZOLE SODIUM 20 MG/1
TABLET, DELAYED RELEASE ORAL
Qty: 90 TABLET | Refills: 0 | OUTPATIENT
Start: 2021-03-29

## 2021-03-30 ENCOUNTER — ANTI-COAG VISIT (OUTPATIENT)
Dept: INTERNAL MEDICINE CLINIC | Facility: CLINIC | Age: 71
End: 2021-03-30
Payer: MEDICARE

## 2021-03-30 DIAGNOSIS — Z51.81 ENCOUNTER FOR THERAPEUTIC DRUG MONITORING: ICD-10-CM

## 2021-03-30 DIAGNOSIS — I82.532 CHRONIC DEEP VEIN THROMBOSIS OF LEFT POPLITEAL VEIN (HCC): ICD-10-CM

## 2021-03-30 DIAGNOSIS — D68.61 ANTIPHOSPHOLIPID SYNDROME (HCC): ICD-10-CM

## 2021-03-30 DIAGNOSIS — Z79.01 LONG TERM (CURRENT) USE OF ANTICOAGULANTS: ICD-10-CM

## 2021-03-30 LAB
INR: 2.3 (ref 0.8–1.2)
TEST STRIP EXPIRATION DATE: ABNORMAL DATE

## 2021-03-30 PROCEDURE — 85610 PROTHROMBIN TIME: CPT

## 2021-03-30 PROCEDURE — 36416 COLLJ CAPILLARY BLOOD SPEC: CPT

## 2021-03-30 PROCEDURE — 93793 ANTICOAG MGMT PT WARFARIN: CPT

## 2021-04-02 ENCOUNTER — OFFICE VISIT (OUTPATIENT)
Dept: NEPHROLOGY | Facility: CLINIC | Age: 71
End: 2021-04-02
Payer: MEDICARE

## 2021-04-02 VITALS
BODY MASS INDEX: 35.1 KG/M2 | WEIGHT: 237 LBS | DIASTOLIC BLOOD PRESSURE: 75 MMHG | HEIGHT: 69 IN | SYSTOLIC BLOOD PRESSURE: 152 MMHG | HEART RATE: 66 BPM

## 2021-04-02 DIAGNOSIS — E11.9 CONTROLLED TYPE 2 DIABETES MELLITUS WITHOUT COMPLICATION, WITHOUT LONG-TERM CURRENT USE OF INSULIN (HCC): Primary | Chronic | ICD-10-CM

## 2021-04-02 DIAGNOSIS — I10 ESSENTIAL HYPERTENSION WITH GOAL BLOOD PRESSURE LESS THAN 130/80: ICD-10-CM

## 2021-04-02 DIAGNOSIS — N18.32 STAGE 3B CHRONIC KIDNEY DISEASE (HCC): Chronic | ICD-10-CM

## 2021-04-02 PROCEDURE — 3077F SYST BP >= 140 MM HG: CPT | Performed by: INTERNAL MEDICINE

## 2021-04-02 PROCEDURE — 3008F BODY MASS INDEX DOCD: CPT | Performed by: INTERNAL MEDICINE

## 2021-04-02 PROCEDURE — 3078F DIAST BP <80 MM HG: CPT | Performed by: INTERNAL MEDICINE

## 2021-04-02 PROCEDURE — 99214 OFFICE O/P EST MOD 30 MIN: CPT | Performed by: INTERNAL MEDICINE

## 2021-04-02 NOTE — PATIENT INSTRUCTIONS
Same medications      Try: Health medicine for a month for colon    If not helping stop it and try Prilosec OTC over-the-counter    No sign of anemia your potassium is good your kidneys are good your diabetes is good      See me back in 3 months    Please

## 2021-04-03 NOTE — PROGRESS NOTES
Progress Note     Karyna Pruitt    Is doing pretty well. Her pressures have come down to 140-150/80 she has occasional palpitations of her heart but no other complaints.     She seems to be tolerating her blood pressure medicines occasion daily. 60 tablet 5   • hydrochlorothiazide 25 MG Oral Tab Take 1 tablet (25 mg total) by mouth daily. 30 tablet 3   • Metoprolol Tartrate 100 MG Oral Tab Take 1 tablet (100 mg total) by mouth 2 (two) times daily.  60 tablet 2   • spironolactone 25 MG Oral T for Nausea. 30 tablet 0   • Insulin Pen Needle (BD PEN NEEDLE ULTRAFINE) 29G X 12.7MM Does not apply Misc Dx. E11.9. Checks q12hrs. BD ultrafine MINI.  100 each 6   • Glucose Blood (BENNY CONTOUR NEXT TEST) In Vitro Strip TEST 2 times  A DAY Dx: Brandee Whitehead 3/5/2021 ) 1 Bottle 1   • Secukinumab, 300 MG Dose, (COSENTYX SENSOREADY, 300 MG,) 150 MG/ML Subcutaneous Solution Auto-injector Inject 300 mg into the skin See Admin Instructions. Inject 300mg at week 0, 1, 2, 3, 4, then monthly thereafter.  (Patient not t abnormalities noted  Musculoskeletal: no deformities  Extremities: no edema  Neurological:  Grossly normal less nervous than usual       ASSESSMENT/PLAN:   Assessment   Controlled type 2 diabetes mellitus without complication, without long-term current use

## 2021-04-05 RX ORDER — SIMVASTATIN 20 MG
TABLET ORAL
Qty: 24 TABLET | Refills: 0 | Status: SHIPPED | OUTPATIENT
Start: 2021-04-05 | End: 2021-05-21

## 2021-04-09 ENCOUNTER — TELEPHONE (OUTPATIENT)
Dept: INTERNAL MEDICINE CLINIC | Facility: CLINIC | Age: 71
End: 2021-04-09

## 2021-04-09 ENCOUNTER — TELEPHONE (OUTPATIENT)
Dept: CASE MANAGEMENT | Age: 71
End: 2021-04-09

## 2021-04-09 RX ORDER — VALSARTAN 160 MG/1
160 TABLET ORAL DAILY
Qty: 180 TABLET | Refills: 0 | Status: SHIPPED | OUTPATIENT
Start: 2021-04-09 | End: 2021-08-09

## 2021-04-09 RX ORDER — MONTELUKAST SODIUM 10 MG/1
10 TABLET ORAL NIGHTLY
Qty: 7 TABLET | Refills: 0 | Status: SHIPPED | OUTPATIENT
Start: 2021-04-09 | End: 2021-07-21

## 2021-04-09 NOTE — TELEPHONE ENCOUNTER
Patient is eligible for a 2021 Medicare Annual Wellness visit. Daughter notified and states that she will call back.

## 2021-04-10 NOTE — TELEPHONE ENCOUNTER
Patient called from [phone] regarding a cough. Yesterday, began cough. No F,N,C, V, SOB. No nasal congestion. No H/O allergies. No pets. Given prescription for Singulair 10 mg at night. Discussed about side effects and use.   If other symptoms and not

## 2021-04-12 ENCOUNTER — NURSE TRIAGE (OUTPATIENT)
Dept: INTERNAL MEDICINE CLINIC | Facility: CLINIC | Age: 71
End: 2021-04-12

## 2021-04-12 DIAGNOSIS — R05.9 COUGH: Primary | ICD-10-CM

## 2021-04-12 RX ORDER — AZITHROMYCIN 250 MG/1
TABLET, FILM COATED ORAL
Qty: 6 TABLET | Refills: 0 | Status: SHIPPED | OUTPATIENT
Start: 2021-04-12 | End: 2021-04-17

## 2021-04-12 NOTE — TELEPHONE ENCOUNTER
Reason for Disposition  • Continuous (nonstop) coughing interferes with work or school and no improvement using cough treatment per Care Advice    Protocols used: COUGH-A-OH    Action Requested: Summary for Provider     []  Critical Lab, Recommendations Ne

## 2021-04-13 NOTE — TELEPHONE ENCOUNTER
Can do zithromax. Start antibiotics ASAP and take as directed with food til done. Take probiotics while on antibiotics if can to prevent yeast infections. Stop cholesterol medicines while on antibiotics. Increase fluids. Check CXR. Increase fluids.  No cold

## 2021-04-16 ENCOUNTER — TELEPHONE (OUTPATIENT)
Dept: INTERNAL MEDICINE CLINIC | Facility: CLINIC | Age: 71
End: 2021-04-16

## 2021-04-16 NOTE — TELEPHONE ENCOUNTER
Pt has an appt scheduled for tomorrow for an x-ray at 10:00. Patient, was told that she needs a referral for it and needs to be authorized by the insurance. Please, call pt with any questions.

## 2021-04-17 ENCOUNTER — HOSPITAL ENCOUNTER (OUTPATIENT)
Dept: GENERAL RADIOLOGY | Facility: HOSPITAL | Age: 71
Discharge: HOME | End: 2021-04-17
Attending: INTERNAL MEDICINE
Payer: MEDICARE

## 2021-04-17 DIAGNOSIS — R05.9 COUGH: ICD-10-CM

## 2021-04-17 PROCEDURE — 71046 X-RAY EXAM CHEST 2 VIEWS: CPT | Performed by: INTERNAL MEDICINE

## 2021-04-27 ENCOUNTER — ANTI-COAG VISIT (OUTPATIENT)
Dept: INTERNAL MEDICINE CLINIC | Facility: CLINIC | Age: 71
End: 2021-04-27
Payer: MEDICARE

## 2021-04-27 ENCOUNTER — TELEPHONE (OUTPATIENT)
Dept: INTERNAL MEDICINE CLINIC | Facility: CLINIC | Age: 71
End: 2021-04-27

## 2021-04-27 DIAGNOSIS — D68.61 ANTIPHOSPHOLIPID SYNDROME (HCC): ICD-10-CM

## 2021-04-27 DIAGNOSIS — I82.532 CHRONIC DEEP VEIN THROMBOSIS OF LEFT POPLITEAL VEIN (HCC): ICD-10-CM

## 2021-04-27 DIAGNOSIS — Z79.01 LONG TERM (CURRENT) USE OF ANTICOAGULANTS: ICD-10-CM

## 2021-04-27 DIAGNOSIS — Z51.81 ENCOUNTER FOR THERAPEUTIC DRUG MONITORING: ICD-10-CM

## 2021-04-27 PROCEDURE — 93793 ANTICOAG MGMT PT WARFARIN: CPT

## 2021-04-27 PROCEDURE — 85610 PROTHROMBIN TIME: CPT

## 2021-04-27 RX ORDER — WARFARIN SODIUM 5 MG/1
5 TABLET ORAL DAILY
Qty: 180 TABLET | Refills: 3 | Status: SHIPPED | OUTPATIENT
Start: 2021-04-27 | End: 2022-09-27

## 2021-04-27 NOTE — TELEPHONE ENCOUNTER
Please clarified if the patient should be taking the medication daily or  BID     We see two different ways you have order the medication to be taken    We will keep the mg as you order which is 5mg.     Pharmacy is requesting new RX for Coumadin    Please

## 2021-04-27 NOTE — TELEPHONE ENCOUNTER
Pharmacy asking for clarification of Warfarin order, as it has 2 sets of directions.     Please advise                  04/27/21 1352 Sign Samantha Donnelly MD Reorder from [id number]   04/27/21 135 Taking Flag Checked Samantha Donnelly MD    Provid

## 2021-04-27 NOTE — TELEPHONE ENCOUNTER
She gets her INR checked through Coumadin clinic. We just did a refill because sometimes the dose changes.

## 2021-04-27 NOTE — TELEPHONE ENCOUNTER
We will keep it at 5 twice daily due to the fact that sometimes she needs to take 7 and half sometimes she takes 5. That way she is not running out sooner.

## 2021-04-27 NOTE — TELEPHONE ENCOUNTER
Pharmacy asking if you want 5 mg daily or BID?      Please clarify Rx sent today    There are 2 different directions on Rx

## 2021-04-28 ENCOUNTER — TELEPHONE (OUTPATIENT)
Dept: INTERNAL MEDICINE CLINIC | Facility: CLINIC | Age: 71
End: 2021-04-28

## 2021-04-28 RX ORDER — GLIPIZIDE 10 MG/1
5 TABLET ORAL
Qty: 90 TABLET | Refills: 1 | Status: SHIPPED | OUTPATIENT
Start: 2021-04-28 | End: 2021-10-20

## 2021-04-28 NOTE — TELEPHONE ENCOUNTER
Patient is requesting a callback to discuss if she will need medication before seeing a dentist due to her having a knee implant and states has an appointment on 5/5/2021.     With  Michele patiño #[id number]

## 2021-04-29 NOTE — TELEPHONE ENCOUNTER
New guidelines for SBE prophylaxis says that she does not need prophylaxis. I believe she is talking about prophylaxis with antibiotics before dental procedure. But every orthopedics is different. We will have her check with her orthopedics doctor.   But

## 2021-04-30 ENCOUNTER — TELEPHONE (OUTPATIENT)
Dept: INTERNAL MEDICINE CLINIC | Facility: CLINIC | Age: 71
End: 2021-04-30

## 2021-04-30 NOTE — TELEPHONE ENCOUNTER
Pharmacy received 2 sets of directions for Rx Warfarin and would like to know which one they should be using .  Please advise         Current Outpatient Medications   Medication Sig Dispense Refill       1   • Warfarin Sodium 5 MG Oral Tab Take 1 tablet (5

## 2021-04-30 NOTE — TELEPHONE ENCOUNTER
With MARIO Dickson  Yonatan Mallory ID# [id number]. Identified patient's name and . Patient states she saw her dentist yesterday, and he informed patient he would reach out to Dr. Meenu Wild regarding her medications.     Patient requesting a follow up appo

## 2021-05-05 ENCOUNTER — TELEPHONE (OUTPATIENT)
Dept: INTERNAL MEDICINE CLINIC | Facility: CLINIC | Age: 71
End: 2021-05-05

## 2021-05-05 NOTE — TELEPHONE ENCOUNTER
Dr. Betty Matamoros from [address] [place] [address] calling to ask Dr. Chris Devine if patient has to be premedicated for dental work because of a history of joint replacement. Her appointment is on 5/19/21.      Advised Dr. Oswald Underwood that Dr. Chris Devine is not in the office today b

## 2021-05-05 NOTE — TELEPHONE ENCOUNTER
Review prior message regarding this. Was clearly stated about not needing prophylaxis. But patient was positive check with her orthopedics some orthopedic still do even though it is not in the guidelines.

## 2021-05-05 NOTE — TELEPHONE ENCOUNTER
Left detailed message with [facility] regarding Dr. Issa Mirza note.

## 2021-05-06 ENCOUNTER — OFFICE VISIT (OUTPATIENT)
Dept: INTERNAL MEDICINE CLINIC | Facility: CLINIC | Age: 71
End: 2021-05-06
Payer: MEDICARE

## 2021-05-06 VITALS
TEMPERATURE: 97 F | DIASTOLIC BLOOD PRESSURE: 70 MMHG | BODY MASS INDEX: 34.78 KG/M2 | HEART RATE: 77 BPM | OXYGEN SATURATION: 99 % | RESPIRATION RATE: 18 BRPM | WEIGHT: 234.81 LBS | SYSTOLIC BLOOD PRESSURE: 154 MMHG | HEIGHT: 69 IN

## 2021-05-06 DIAGNOSIS — I10 ESSENTIAL HYPERTENSION WITH GOAL BLOOD PRESSURE LESS THAN 130/80: ICD-10-CM

## 2021-05-06 DIAGNOSIS — E53.8 B12 DEFICIENCY: Primary | ICD-10-CM

## 2021-05-06 DIAGNOSIS — E03.9 HYPOTHYROIDISM (ACQUIRED): Chronic | ICD-10-CM

## 2021-05-06 DIAGNOSIS — M85.80 OSTEOPENIA, UNSPECIFIED LOCATION: ICD-10-CM

## 2021-05-06 DIAGNOSIS — E11.9 CONTROLLED TYPE 2 DIABETES MELLITUS WITHOUT COMPLICATION, WITHOUT LONG-TERM CURRENT USE OF INSULIN (HCC): Chronic | ICD-10-CM

## 2021-05-06 DIAGNOSIS — Z71.85 VACCINE COUNSELING: ICD-10-CM

## 2021-05-06 DIAGNOSIS — N18.32 STAGE 3B CHRONIC KIDNEY DISEASE (HCC): Chronic | ICD-10-CM

## 2021-05-06 PROCEDURE — 99215 OFFICE O/P EST HI 40 MIN: CPT | Performed by: INTERNAL MEDICINE

## 2021-05-06 PROCEDURE — 3008F BODY MASS INDEX DOCD: CPT | Performed by: INTERNAL MEDICINE

## 2021-05-06 PROCEDURE — 3078F DIAST BP <80 MM HG: CPT | Performed by: INTERNAL MEDICINE

## 2021-05-06 PROCEDURE — 3077F SYST BP >= 140 MM HG: CPT | Performed by: INTERNAL MEDICINE

## 2021-05-06 RX ORDER — METOPROLOL TARTRATE 100 MG/1
TABLET ORAL
Qty: 60 TABLET | Refills: 2 | Status: SHIPPED | OUTPATIENT
Start: 2021-05-06 | End: 2021-06-21

## 2021-05-06 RX ORDER — INSULIN GLARGINE 100 [IU]/ML
INJECTION, SOLUTION SUBCUTANEOUS
Qty: 10 PEN | Refills: 1 | Status: SHIPPED | OUTPATIENT
Start: 2021-05-06 | End: 2021-06-10

## 2021-05-06 RX ORDER — FAMOTIDINE 20 MG/1
20 TABLET ORAL 2 TIMES DAILY PRN
Qty: 60 TABLET | Refills: 2 | Status: SHIPPED | OUTPATIENT
Start: 2021-05-06 | End: 2021-06-10

## 2021-05-06 RX ORDER — METOPROLOL TARTRATE 100 MG/1
50 TABLET ORAL 2 TIMES DAILY
Qty: 60 TABLET | Refills: 2 | Status: SHIPPED | OUTPATIENT
Start: 2021-05-06 | End: 2021-05-06

## 2021-05-06 NOTE — PATIENT INSTRUCTIONS
ASSESSMENT/PLAN:   B12 deficiency  (primary encounter diagnosis)    Stage 3b chronic kidney disease (hcc)No motrin, ibuprofen, advil, alleve, naprosyn  with these medications.  Follow up with renal.     Controlled type 2 diabetes mellitus without com UNIT/ML Subcutaneous Solution Pen-injector 10 pen 1     Sig: INJECT 40 UNITS IN THE MORNING AND 5 UNITS IN THE EVENING       Imaging & Referrals:  None     RTC for physical.     Eye exam will be due 7-25-21.    Shingles (Herpes Zoster)     Talk to your heal system. How is shingles treated? For most people, shingles heals on its own in a few days or weeks. But treatment is advised to help ease pain, speed healing, and reduce the risk of complications.  Antiviral medicines are most often only prescribed if y vision, and even blindness. In very rare cases, shingles can also lead to pneumonia, hearing problems, brain inflammation, or even death.    When to get medical care  Call your healthcare provider if you have any of these:  · New symptoms or symptoms that best for you based on your age and health conditions. Cherri last reviewed this educational content on 6/1/2019  © 7908-1342 The [employer]. All rights reserved. This information is not intended as a substitute for professional medical care. keep an appointment. Where should I keep my medicine? This vaccine is only given in a clinic, pharmacy, doctor's office, or other health care setting and will not be stored at home. What should I tell my health care provider before I take this medicine? frecuencia, los primeros signos de la culebrilla son dolor, ardor, cosquilleo o picazón en kodak parte del jose o el cuerpo. Puede sentir edwin si tuviera gripe, con fiebre y [address]. · A los pocos días, aparece kodak erupción britney con ampollas pequeñas. Envuelva la bolsa en kodak toalla o un paño limpio y jose miguel. Nunca aplique hielo o kodak compresa de hielo directamente sobre la piel. · Use kodak loción de calamina para aliviar la picazón en la piel.   · Pregunte a lo proveedor de [employer] tratamiento;  · kodak erupción o ampollas cerca de los ojos;  · más secreciones, fiebre o erupciones después del tratamiento;  · dolor intenso en el pecho que no se [place]. ¿Cómo puede prevenirse la culebrilla?   Solo puede tener culebrilla si tuvo varicela debería recibir según lo edad y las afecciones de la horace que tenga. © 4674-3701 The [address] [place] Todos los derechos reservados.  Esta información no pretende sustituir la atención médica profesional. Sólo lo médico puede diagnosticar y tratar borrosa o cambios en la visión  Algunas personas podrían tener reacciones alérgicas a gagandeep medicamento. Entre los síntomas pueden incluirse: dificultad para respirar, erupción en la piel, comezón, hinchazón o mareos intensos.  Si nota algunos de [employer]

## 2021-05-06 NOTE — PROGRESS NOTES
HPI:    Patient ID: Aaron Tipton is a 80year old female. Patient presents with: Follow - Up     Patient here for follow up of Diabetes. Has been taking medications regularly. Checks sugars 1 times daily.   Fasting sugars average 110' AM     03/27/2021 08:07 AM    K 4.9 03/27/2021 08:07 AM     03/27/2021 08:07 AM    CO2 32.0 03/27/2021 08:07 AM    CREATSERUM 1.17 (H) 03/27/2021 08:07 AM    CA 9.5 03/27/2021 08:07 AM    AST 18 03/27/2021 08:07 AM    ALT 25 03/27/2021 08:07 AM pain, eye redness, eye watering, facial sweating, fever, hearing loss, insomnia, loss of balance, muscle aches, neck pain, numbness, phonophobia, photophobia, rhinorrhea, scalp tenderness, seizures, sinus pressure, sore throat, swollen glands, tingling, ti Eyes: Negative for blurred vision, photophobia, pain, discharge, redness, itching and visual disturbance. Respiratory: Negative. Negative for apnea, cough, choking, chest tightness, shortness of breath, wheezing and stridor.     Cardiovascular: Negativ Subcutaneous Solution Pen-injector INJECT 40 UNITS IN THE MORNING AND 5 UNITS IN THE EVENING 10 pen 1   • glipiZIDE 10 MG Oral Tab Take 0.5 tablets (5 mg total) by mouth 2 (two) times daily before meals.  90 tablet 1   • Warfarin Sodium 5 MG Oral Tab Take 1 mg tablet Take 1 tablet (4 mg total) by mouth every 8 (eight) hours as needed for Nausea. 30 tablet 0   • Insulin Pen Needle (BD PEN NEEDLE ULTRAFINE) 29G X 12.7MM Does not apply Misc Dx. E11.9. Checks q12hrs. BD ultrafine MINI.  100 each 6   • Glucose Bloo monthly thereafter.  (Patient not taking: Reported on 4/2/2021 ) 10 pen 0   • CLOBETASOL PROPIONATE 0.05 % External Cream apply to the affected area two times a day  (Patient not taking: Reported on 3/5/2021) 45 g 0     Allergies:  Amlodipine              S Glaucoma Neg    • Macular degeneration Neg    • Breast Cancer Neg    • Ovarian Cancer Neg    • DCIS Neg    • LCIS Neg    • BRCA gene + Neg    • Ashkenazi Taoism Descent Neg       Social History:   Social History    Tobacco Use      Smoking status: Never Sm maxillary sinus tenderness or frontal sinus tenderness. Left Sinus: No maxillary sinus tenderness or frontal sinus tenderness. Mouth/Throat:      Mouth: Mucous membranes are not pale, not dry and not cyanotic. Comments: Patient wearing mask. Right side of head: No submental, submandibular, preauricular, posterior auricular or occipital adenopathy. Left side of head: No submental, submandibular, preauricular, posterior auricular or occipital adenopathy.       Cervical: No cervical adenopat check at local pharmacy. Bake foods more and grill occasionally. Avoid fried foods. No salt. Use other seasonings. GERD. Careful with diet. Avoid hot spicy foods and caffeine and caffinated beverages. Careful with acidic foods. Elevate head of bed.  Sawyer [place]

## 2021-05-07 ENCOUNTER — LAB ENCOUNTER (OUTPATIENT)
Dept: LAB | Facility: HOSPITAL | Age: 71
End: 2021-05-07
Attending: INTERNAL MEDICINE
Payer: MEDICARE

## 2021-05-07 DIAGNOSIS — I10 ESSENTIAL HYPERTENSION WITH GOAL BLOOD PRESSURE LESS THAN 130/80: ICD-10-CM

## 2021-05-07 PROCEDURE — 83835 ASSAY OF METANEPHRINES: CPT

## 2021-05-07 PROCEDURE — 36415 COLL VENOUS BLD VENIPUNCTURE: CPT

## 2021-05-12 ENCOUNTER — TELEPHONE (OUTPATIENT)
Dept: INTERNAL MEDICINE CLINIC | Facility: CLINIC | Age: 71
End: 2021-05-12

## 2021-05-12 NOTE — TELEPHONE ENCOUNTER
S[place] Speaking - Patient is calling to confirm  if she could take medication that the dentist gave her, Amoxicillin,  with her script Warfarin.

## 2021-05-13 NOTE — TELEPHONE ENCOUNTER
OK to do. ALL antibiotics interact with amox. She needs to check her coumadin level sooner. She does this thru coumadin clinic.

## 2021-05-13 NOTE — TELEPHONE ENCOUNTER
Spoke with patient via [facility]  ID# [id number]; relayed PCP message below. Patient verbalized understanding.

## 2021-05-14 ENCOUNTER — TELEPHONE (OUTPATIENT)
Dept: INTERNAL MEDICINE CLINIC | Facility: CLINIC | Age: 71
End: 2021-05-14

## 2021-05-14 ENCOUNTER — NURSE ONLY (OUTPATIENT)
Dept: INTERNAL MEDICINE CLINIC | Facility: CLINIC | Age: 71
End: 2021-05-14
Payer: MEDICARE

## 2021-05-14 VITALS — DIASTOLIC BLOOD PRESSURE: 64 MMHG | HEART RATE: 64 BPM | SYSTOLIC BLOOD PRESSURE: 142 MMHG

## 2021-05-14 DIAGNOSIS — Z01.30 BLOOD PRESSURE CHECK: Primary | ICD-10-CM

## 2021-05-14 PROCEDURE — 3078F DIAST BP <80 MM HG: CPT | Performed by: INTERNAL MEDICINE

## 2021-05-14 PROCEDURE — 3077F SYST BP >= 140 MM HG: CPT | Performed by: INTERNAL MEDICINE

## 2021-05-14 RX ORDER — MINOXIDIL 2.5 MG/1
TABLET ORAL
Qty: 270 TABLET | Refills: 1 | Status: SHIPPED | OUTPATIENT
Start: 2021-05-14 | End: 2021-05-21

## 2021-05-14 NOTE — TELEPHONE ENCOUNTER
Patient informed about message bellow. Patient verbalized understanding.  Scheduled nurse visit next Friday at 11am

## 2021-05-14 NOTE — TELEPHONE ENCOUNTER
Increase minoxidil to 2.5 mg in AM and 5 mg at bedtime. RN only visit in 1 week to check B/P and HR.

## 2021-05-21 ENCOUNTER — NURSE ONLY (OUTPATIENT)
Dept: INTERNAL MEDICINE CLINIC | Facility: CLINIC | Age: 71
End: 2021-05-21
Payer: MEDICARE

## 2021-05-21 VITALS — SYSTOLIC BLOOD PRESSURE: 161 MMHG | HEART RATE: 67 BPM | DIASTOLIC BLOOD PRESSURE: 69 MMHG

## 2021-05-21 DIAGNOSIS — Z01.30 BP CHECK: Primary | ICD-10-CM

## 2021-05-21 PROCEDURE — 3077F SYST BP >= 140 MM HG: CPT | Performed by: INTERNAL MEDICINE

## 2021-05-21 PROCEDURE — 3078F DIAST BP <80 MM HG: CPT | Performed by: INTERNAL MEDICINE

## 2021-05-21 RX ORDER — SIMVASTATIN 20 MG
20 TABLET ORAL
Qty: 24 TABLET | Refills: 0 | Status: SHIPPED | OUTPATIENT
Start: 2021-05-24 | End: 2021-09-07

## 2021-05-21 RX ORDER — LEVOTHYROXINE SODIUM 0.1 MG/1
200 TABLET ORAL
Qty: 180 TABLET | Refills: 0 | Status: SHIPPED | OUTPATIENT
Start: 2021-05-21 | End: 2021-08-19

## 2021-05-21 RX ORDER — MINOXIDIL 2.5 MG/1
5 TABLET ORAL 2 TIMES DAILY
Qty: 360 TABLET | Refills: 1 | Status: SHIPPED | OUTPATIENT
Start: 2021-05-21 | End: 2021-06-10

## 2021-05-21 NOTE — PROGRESS NOTES
Increase minoxidil to 5 mg twice a day. Stop nortriptyline. And continue to monitor blood pressures for the next week. Goal blood pressure is 120-135/70-85.

## 2021-05-21 NOTE — PROGRESS NOTES
Was taking the nortriptyline that sometimes can raise the blood pressure that is why told her to stop it. She is not taking it and there is no issues.

## 2021-05-21 NOTE — PROGRESS NOTES
Patient presents for BP check. Name and  verified. Patient states she did take her BP meds this morning. BP reading after resting was 158/64 with a pulse of 69 via manual BP check.    BP reading after resting was 161/69  with a pulse of 67 via SpotMon

## 2021-05-21 NOTE — PROGRESS NOTES
Spoke with patient per Dr Gabriela Remy to take 5mg twice a day. She has 2.5mg at home told her to take 2(2.5mg) am and 2 (2.5mg)pm.  Patient will like to know which medication she needs to stop because she doesn't taking Nortriptyline.   Patient states she also b

## 2021-05-24 NOTE — PROGRESS NOTES
Spoke with patient she states that she found it at home someone prescribe it today for her but she never took it. She will come on Friday for a nurse visit for us to check her blood pressure.

## 2021-05-25 ENCOUNTER — ANTI-COAG VISIT (OUTPATIENT)
Dept: INTERNAL MEDICINE CLINIC | Facility: CLINIC | Age: 71
End: 2021-05-25
Payer: MEDICARE

## 2021-05-25 DIAGNOSIS — Z51.81 ENCOUNTER FOR THERAPEUTIC DRUG MONITORING: ICD-10-CM

## 2021-05-25 DIAGNOSIS — I82.532 CHRONIC DEEP VEIN THROMBOSIS OF LEFT POPLITEAL VEIN (HCC): ICD-10-CM

## 2021-05-25 DIAGNOSIS — Z79.01 LONG TERM (CURRENT) USE OF ANTICOAGULANTS: ICD-10-CM

## 2021-05-25 DIAGNOSIS — D68.61 ANTIPHOSPHOLIPID SYNDROME (HCC): ICD-10-CM

## 2021-05-25 PROCEDURE — 85610 PROTHROMBIN TIME: CPT

## 2021-05-25 PROCEDURE — 93793 ANTICOAG MGMT PT WARFARIN: CPT

## 2021-05-25 NOTE — PROGRESS NOTES
Patient scheduled her appointment yesterday.  Her appointment is 5/28/2021 at [place] office at [address] [place] [address].

## 2021-05-28 ENCOUNTER — NURSE ONLY (OUTPATIENT)
Dept: INTERNAL MEDICINE CLINIC | Facility: CLINIC | Age: 71
End: 2021-05-28
Payer: MEDICARE

## 2021-05-28 ENCOUNTER — TELEPHONE (OUTPATIENT)
Dept: INTERNAL MEDICINE CLINIC | Facility: CLINIC | Age: 71
End: 2021-05-28

## 2021-05-28 VITALS — SYSTOLIC BLOOD PRESSURE: 152 MMHG | DIASTOLIC BLOOD PRESSURE: 74 MMHG | HEART RATE: 80 BPM

## 2021-05-28 DIAGNOSIS — Z01.30 BP CHECK: Primary | ICD-10-CM

## 2021-05-28 PROCEDURE — 3077F SYST BP >= 140 MM HG: CPT | Performed by: INTERNAL MEDICINE

## 2021-05-28 PROCEDURE — 3078F DIAST BP <80 MM HG: CPT | Performed by: INTERNAL MEDICINE

## 2021-05-28 NOTE — PROGRESS NOTES
Not sure what is going on with her blood pressures. We do not seem to be making a dent in the blood pressures.   Can we make sure she is taking her medicines like she supposed to be doing and then maybe have her follow-up with renal.

## 2021-05-28 NOTE — PROGRESS NOTES
Patient presents for BP check. Name and  verified. Patient states she did take her BP meds this morning. However, did mention that she did wake up this morning with a headache and nausea.    BP reading after resting was 170/82  with a pulse of 87 via m

## 2021-05-28 NOTE — TELEPHONE ENCOUNTER
Tried calling Pt. Left message to call back with the assist of . Pt had nurse visit today for BP reading. Please refer to 'Nurse Only' encounter for reference.         Progress Notes  Tomasa Kenny MD (Physician) • • Internal Medici

## 2021-05-29 NOTE — TELEPHONE ENCOUNTER
We eliminated the only medicine that would be causing her blood pressure elevate which was nortriptyline. I do not know how much of this is stress or worry. But even at home she seems to be running high.   Also the medication she is on are for blood press

## 2021-05-29 NOTE — TELEPHONE ENCOUNTER
Dr Chris Devine:     Patient contacted; she confirmed she is taking all her BP as directed. She doesn't miss any doses. Patient does have an appt with Dr Gloria Mata 7/2/21. She will call to get sooner appt.      She asked if there is ay one medication that could b

## 2021-06-01 ENCOUNTER — MED REC SCAN ONLY (OUTPATIENT)
Dept: INTERNAL MEDICINE CLINIC | Facility: CLINIC | Age: 71
End: 2021-06-01

## 2021-06-03 NOTE — PROGRESS NOTES
Using language line  Russell Christiano  [id number]. I went over medications and she takes as per her medication record. She stated an appointment was made with Dr. Akshat Palencia for July 2, 2021. Dr. Akshat Palencia is not in the office in June.     Sent to Dr. Dwain Mendez

## 2021-06-04 ENCOUNTER — TELEPHONE (OUTPATIENT)
Dept: INTERNAL MEDICINE CLINIC | Facility: CLINIC | Age: 71
End: 2021-06-04

## 2021-06-04 ENCOUNTER — VIRTUAL PHONE E/M (OUTPATIENT)
Dept: INTERNAL MEDICINE CLINIC | Facility: CLINIC | Age: 71
End: 2021-06-04
Payer: MEDICARE

## 2021-06-04 DIAGNOSIS — R05.9 COUGH: Primary | ICD-10-CM

## 2021-06-04 DIAGNOSIS — R09.89 CHEST CONGESTION: ICD-10-CM

## 2021-06-04 DIAGNOSIS — I82.532 CHRONIC DEEP VEIN THROMBOSIS OF LEFT POPLITEAL VEIN (HCC): Primary | ICD-10-CM

## 2021-06-04 PROCEDURE — 99443 PHONE E/M BY PHYS 21-30 MIN: CPT | Performed by: INTERNAL MEDICINE

## 2021-06-04 RX ORDER — BENZONATATE 100 MG/1
100 CAPSULE ORAL 3 TIMES DAILY PRN
Qty: 20 CAPSULE | Refills: 0 | Status: SHIPPED | OUTPATIENT
Start: 2021-06-04 | End: 2021-06-11

## 2021-06-04 RX ORDER — AZITHROMYCIN 250 MG/1
TABLET, FILM COATED ORAL
Qty: 6 TABLET | Refills: 0 | Status: SHIPPED | OUTPATIENT
Start: 2021-06-04 | End: 2021-06-09

## 2021-06-04 NOTE — TELEPHONE ENCOUNTER
Patient is requesting a call back from Dr. Liborio Gonzalez, she will be traveling to [facility] on 6/14 and wants to know if she's safe to go due do to the blood clot that she's been having on her left knee.  She is taking Warfarin but wants to make sure she's safe to go

## 2021-06-04 NOTE — TELEPHONE ENCOUNTER
Reviewed Dr Tonja Valenzuela recommendations with pt in Welsh language and verbalized understanding. Stated two days ago developed dry, intermittent cough. No phelgm, fever, sob, wheezing, muscle ache, loss of taste or smell.  Pt was sick 3 weeks ago, took ant

## 2021-06-04 NOTE — TELEPHONE ENCOUNTER
Last INR was done on May 25 at 2.1. She has been stable and she has been on Coumadin she has been stable with the levels.   As long as levels remain the same and she does not change her diet dramatically and add more green leafy veggies, she should be okay

## 2021-06-05 ENCOUNTER — HOSPITAL ENCOUNTER (OUTPATIENT)
Dept: CV DIAGNOSTICS | Facility: HOSPITAL | Age: 71
Discharge: HOME | End: 2021-06-05
Attending: INTERNAL MEDICINE
Payer: MEDICARE

## 2021-06-05 DIAGNOSIS — R93.89 ABNORMAL CXR: ICD-10-CM

## 2021-06-05 PROCEDURE — 93306 TTE W/DOPPLER COMPLETE: CPT | Performed by: INTERNAL MEDICINE

## 2021-06-09 RX ORDER — HYDROCHLOROTHIAZIDE 25 MG/1
TABLET ORAL
Qty: 30 TABLET | Refills: 3 | Status: SHIPPED | OUTPATIENT
Start: 2021-06-09 | End: 2021-08-24

## 2021-06-10 ENCOUNTER — TELEPHONE (OUTPATIENT)
Dept: INTERNAL MEDICINE CLINIC | Facility: CLINIC | Age: 71
End: 2021-06-10

## 2021-06-10 DIAGNOSIS — D68.61 ANTIPHOSPHOLIPID SYNDROME (HCC): ICD-10-CM

## 2021-06-10 DIAGNOSIS — Z51.81 ENCOUNTER FOR THERAPEUTIC DRUG MONITORING: ICD-10-CM

## 2021-06-10 DIAGNOSIS — L40.50 PSORIATIC ARTHRITIS (HCC): ICD-10-CM

## 2021-06-10 RX ORDER — FOLIC ACID 1 MG/1
1 TABLET ORAL DAILY
Qty: 90 TABLET | Refills: 0 | Status: SHIPPED | OUTPATIENT
Start: 2021-06-10 | End: 2021-07-21

## 2021-06-10 RX ORDER — FAMOTIDINE 20 MG/1
20 TABLET ORAL 2 TIMES DAILY PRN
Qty: 180 TABLET | Refills: 1 | Status: SHIPPED | OUTPATIENT
Start: 2021-06-10 | End: 2021-07-21

## 2021-06-10 RX ORDER — MINOXIDIL 2.5 MG/1
5 TABLET ORAL 2 TIMES DAILY
Qty: 360 TABLET | Refills: 1 | Status: SHIPPED | OUTPATIENT
Start: 2021-06-10

## 2021-06-10 RX ORDER — INSULIN GLARGINE 100 [IU]/ML
INJECTION, SOLUTION SUBCUTANEOUS
Qty: 10 EACH | Refills: 1 | Status: SHIPPED | OUTPATIENT
Start: 2021-06-10 | End: 2021-08-19

## 2021-06-10 RX ORDER — FOLIC ACID 1 MG/1
TABLET ORAL
Qty: 90 TABLET | Refills: 0 | OUTPATIENT
Start: 2021-06-10

## 2021-06-10 NOTE — PROGRESS NOTES
Virtual Telephone Check-In    Jemima Wilson verbally consents to a Virtual/Telephone Check-In visit on 06/09/21. Patient has been referred to the [facility] website at [email or web address] to review the yearly Consent to Treat document.     Karlos Jerry

## 2021-06-10 NOTE — TELEPHONE ENCOUNTER
Pended    LOV: 08/26/2020  Future Appointments   Date Time Provider Zac Choi   6/22/2021 10:15 AM [facility] Franklyn Liz   7/2/2021  3:40 PM Nydia Yao MD [facility] Chantelle ALANIZ   8/19/2021  1:00 PM Morgan Ramirez MD [facility]

## 2021-06-10 NOTE — TELEPHONE ENCOUNTER
Monegasque speaking - pt would like on folic acid 1 MG.  Please advise       Current Outpatient Medications   Medication Sig Dispense Refill   • FOLIC ACID 1 MG Oral Tab TAKE ONE TABLET BY MOUTH ONE TIME DAILY  90 tablet 0

## 2021-06-10 NOTE — TELEPHONE ENCOUNTER
Guyanese speaking - pt would like a refill on Rx minoxidil 2.5 MG. Pt tried to get a refill and insurance said its too early. Pt has been taking 4 pills a day instead of her 2 that is on bottle.  Pt is leaving to [place] on 6/14 and would like the refill befo

## 2021-06-11 ENCOUNTER — TELEPHONE (OUTPATIENT)
Dept: INTERNAL MEDICINE CLINIC | Facility: CLINIC | Age: 71
End: 2021-06-11

## 2021-06-11 NOTE — TELEPHONE ENCOUNTER
Spoke to Dale daughter as listed and informed that patient needs to schedule a follow-up if she would like Dr. Kyle Jones to prescribe her medicine.

## 2021-06-11 NOTE — TELEPHONE ENCOUNTER
And stop the famotidine. I over-the-counter Prevacid 15 a day see if it helps with the stomach upset.   I do not think any of the other medicines would be causing her stomach to be upset unless it is relatively new related to any of the minoxidil etc.  But

## 2021-06-11 NOTE — TELEPHONE ENCOUNTER
Sent mychart msg advising refills sent to [place], included new script for Minoxidil with updated directions.

## 2021-06-11 NOTE — TELEPHONE ENCOUNTER
With MARIO Dickson  [place] ID# [id number]. Identified patient's name and . Informed patient of advice per Dr. Travis Marcus.

## 2021-06-11 NOTE — TELEPHONE ENCOUNTER
Dr. Malina Otoole: patient not sure if famotidine is causing diarrhea. She had stop pantoprazole 5/6/21 and prescribed famotidine. Rx was just refilled. I told her to hold off from taking it; please advise if ok to stop. Not sure what else she can take.    She

## 2021-06-21 ENCOUNTER — NURSE TRIAGE (OUTPATIENT)
Dept: INTERNAL MEDICINE CLINIC | Facility: CLINIC | Age: 71
End: 2021-06-21

## 2021-06-21 ENCOUNTER — APPOINTMENT (OUTPATIENT)
Dept: GENERAL RADIOLOGY | Age: 71
End: 2021-06-21
Attending: EMERGENCY MEDICINE
Payer: MEDICARE

## 2021-06-21 ENCOUNTER — HOSPITAL ENCOUNTER (OUTPATIENT)
Age: 71
Discharge: HOME | End: 2021-06-21
Payer: MEDICARE

## 2021-06-21 VITALS
SYSTOLIC BLOOD PRESSURE: 154 MMHG | HEART RATE: 69 BPM | DIASTOLIC BLOOD PRESSURE: 53 MMHG | OXYGEN SATURATION: 97 % | RESPIRATION RATE: 22 BRPM | TEMPERATURE: 98 F

## 2021-06-21 DIAGNOSIS — R06.2 WHEEZING: ICD-10-CM

## 2021-06-21 DIAGNOSIS — I51.7 CARDIOMEGALY: ICD-10-CM

## 2021-06-21 DIAGNOSIS — J98.01 BRONCHOSPASM: Primary | ICD-10-CM

## 2021-06-21 DIAGNOSIS — Z86.39 HISTORY OF DIABETES MELLITUS, TYPE II: ICD-10-CM

## 2021-06-21 PROCEDURE — 71046 X-RAY EXAM CHEST 2 VIEWS: CPT | Performed by: EMERGENCY MEDICINE

## 2021-06-21 PROCEDURE — 99213 OFFICE O/P EST LOW 20 MIN: CPT

## 2021-06-21 RX ORDER — ALBUTEROL SULFATE 2.5 MG/3ML
SOLUTION RESPIRATORY (INHALATION)
Qty: 30 EACH | Refills: 0 | Status: SHIPPED | OUTPATIENT
Start: 2021-06-21 | End: 2021-06-23

## 2021-06-21 RX ORDER — METOPROLOL TARTRATE 100 MG/1
TABLET ORAL
Qty: 60 TABLET | Refills: 5 | Status: SHIPPED | OUTPATIENT
Start: 2021-06-21

## 2021-06-21 NOTE — TELEPHONE ENCOUNTER
Action Requested: Summary for Provider     []  Critical Lab, Recommendations Needed  [] Need Additional Advice  [x]   FYI    []   Need Orders  [] Need Medications Sent to Pharmacy  []  Other     SUMMARY: Per protocol advised :  Be seen today , will go to [facility]

## 2021-06-21 NOTE — ED PROVIDER NOTES
Patient Seen in: [facility]      History   Patient presents with:  Cough/URI    Stated Complaint: cough    HPI/Subjective:   Dayana Lewis is a 80year old female here for sore throat and cough.  Sx started in April and have been above.    Physical Exam     ED Triage Vitals [06/21/21 1353]   /53   Pulse 69   Resp 22   Temp 98 °F (36.7 °C)   Temp src Temporal   SpO2 97 %   O2 Device None (Room air)       Current:/53   Pulse 69   Temp 98 °F (36.7 °C) (Temporal)   Resp 22 ED Course   Labs Reviewed - No data to display      MDM     DX:  (J98.01) Bronchospasm  (primary encounter diagnosis)  (R06.2) Wheezing  (Z86.39) History of diabetes mellitus, type II  (I51.7) Cardiomegaly    RX: Nebulizer machine given from the urgent worsening or any persistent conditions. All questions answered. No acute distress and cleared for home. Discussed Case/results with other healthcare provider: Dr. Reji Roper  Reason: Provider discretion.      Disposition and Plan     Clinical Impression:

## 2021-06-21 NOTE — ED INITIAL ASSESSMENT (HPI)
Sick for 2 months with congestion, cough, and intermittent sore throat. No fever. Completed 2 rounds of antibiotics. Symptoms improved but came right back. Traveled to [place], returned 4 days ago, and had negative covid test. Denies chest pain.  Some exerti

## 2021-06-22 ENCOUNTER — ANTI-COAG VISIT (OUTPATIENT)
Dept: INTERNAL MEDICINE CLINIC | Facility: CLINIC | Age: 71
End: 2021-06-22
Payer: MEDICARE

## 2021-06-22 DIAGNOSIS — Z79.01 LONG TERM (CURRENT) USE OF ANTICOAGULANTS: ICD-10-CM

## 2021-06-22 DIAGNOSIS — Z51.81 ENCOUNTER FOR THERAPEUTIC DRUG MONITORING: ICD-10-CM

## 2021-06-22 DIAGNOSIS — I82.532 CHRONIC DEEP VEIN THROMBOSIS OF LEFT POPLITEAL VEIN (HCC): ICD-10-CM

## 2021-06-22 DIAGNOSIS — D68.61 ANTIPHOSPHOLIPID SYNDROME (HCC): ICD-10-CM

## 2021-06-22 PROCEDURE — 93793 ANTICOAG MGMT PT WARFARIN: CPT

## 2021-06-22 PROCEDURE — 85610 PROTHROMBIN TIME: CPT

## 2021-06-23 ENCOUNTER — TELEPHONE (OUTPATIENT)
Dept: INTERNAL MEDICINE CLINIC | Facility: CLINIC | Age: 71
End: 2021-06-23

## 2021-06-23 DIAGNOSIS — R05.9 COUGH: Primary | ICD-10-CM

## 2021-06-23 RX ORDER — METHYLPREDNISOLONE 4 MG/1
TABLET ORAL
Qty: 1 EACH | Refills: 0 | Status: SHIPPED | OUTPATIENT
Start: 2021-06-23 | End: 2021-07-21 | Stop reason: ALTCHOICE

## 2021-06-23 RX ORDER — MONTELUKAST SODIUM 10 MG/1
10 TABLET ORAL NIGHTLY
Qty: 7 TABLET | Refills: 0 | Status: SHIPPED | OUTPATIENT
Start: 2021-06-23 | End: 2021-07-21

## 2021-06-23 RX ORDER — ALBUTEROL SULFATE 2.5 MG/3ML
SOLUTION RESPIRATORY (INHALATION)
Qty: 30 EACH | Refills: 0 | Status: SHIPPED | OUTPATIENT
Start: 2021-06-23 | End: 2021-07-21 | Stop reason: ALTCHOICE

## 2021-06-23 NOTE — TELEPHONE ENCOUNTER
Using language line  [id number]: Was seen in the urgent care on 6/21/21  Enedina Montanez is a 80year old female here for sore throat and cough. Sx started in April and have been on an off.  Sx are worse in morning and at night

## 2021-06-23 NOTE — TELEPHONE ENCOUNTER
She may have some reactive airway disease can try a short course of Medrol. Make her aware that it will raise the sugars. If she goes above 150 sugars,  she may need to give herself an extra 5 units of the Lantus.   Usually this is a temporary effect less

## 2021-06-28 ENCOUNTER — LAB ENCOUNTER (OUTPATIENT)
Dept: LAB | Facility: HOSPITAL | Age: 71
End: 2021-06-28
Attending: INTERNAL MEDICINE
Payer: MEDICARE

## 2021-06-28 DIAGNOSIS — N18.32 STAGE 3B CHRONIC KIDNEY DISEASE (HCC): ICD-10-CM

## 2021-06-28 LAB
ALBUMIN SERPL-MCNC: 3.6 G/DL (ref 3.4–5)
ALBUMIN/GLOB SERPL: 0.8 {RATIO} (ref 1–2)
ALP LIVER SERPL-CCNC: 86 U/L
ALT SERPL-CCNC: 26 U/L
ANION GAP SERPL CALC-SCNC: 8 MMOL/L (ref 0–18)
AST SERPL-CCNC: 19 U/L (ref 15–37)
BASOPHILS # BLD AUTO: 0.02 X10(3) UL (ref 0–0.2)
BASOPHILS NFR BLD AUTO: 0.2 %
BILIRUB SERPL-MCNC: 0.5 MG/DL (ref 0.1–2)
BUN BLD-MCNC: 27 MG/DL (ref 7–18)
BUN/CREAT SERPL: 22.7 (ref 10–20)
CALCIUM BLD-MCNC: 9.2 MG/DL (ref 8.5–10.1)
CHLORIDE SERPL-SCNC: 104 MMOL/L (ref 98–112)
CO2 SERPL-SCNC: 28 MMOL/L (ref 21–32)
CREAT BLD-MCNC: 1.19 MG/DL
DEPRECATED RDW RBC AUTO: 49.9 FL (ref 35.1–46.3)
EOSINOPHIL # BLD AUTO: 0.04 X10(3) UL (ref 0–0.7)
EOSINOPHIL NFR BLD AUTO: 0.5 %
ERYTHROCYTE [DISTWIDTH] IN BLOOD BY AUTOMATED COUNT: 14.2 % (ref 11–15)
GLOBULIN PLAS-MCNC: 4.3 G/DL (ref 2.8–4.4)
GLUCOSE BLD-MCNC: 101 MG/DL (ref 70–99)
HCT VFR BLD AUTO: 39.5 %
HGB BLD-MCNC: 12.8 G/DL
IMM GRANULOCYTES # BLD AUTO: 0.04 X10(3) UL (ref 0–1)
IMM GRANULOCYTES NFR BLD: 0.5 %
LYMPHOCYTES # BLD AUTO: 2.25 X10(3) UL (ref 1–4)
LYMPHOCYTES NFR BLD AUTO: 26.7 %
M PROTEIN MFR SERPL ELPH: 7.9 G/DL (ref 6.4–8.2)
MCH RBC QN AUTO: 31.1 PG (ref 26–34)
MCHC RBC AUTO-ENTMCNC: 32.4 G/DL (ref 31–37)
MCV RBC AUTO: 96.1 FL
MONOCYTES # BLD AUTO: 0.94 X10(3) UL (ref 0.1–1)
MONOCYTES NFR BLD AUTO: 11.2 %
NEUTROPHILS # BLD AUTO: 5.14 X10 (3) UL (ref 1.5–7.7)
NEUTROPHILS # BLD AUTO: 5.14 X10(3) UL (ref 1.5–7.7)
NEUTROPHILS NFR BLD AUTO: 60.9 %
OSMOLALITY SERPL CALC.SUM OF ELEC: 295 MOSM/KG (ref 275–295)
PATIENT FASTING Y/N/NP: YES
PLATELET # BLD AUTO: 325 10(3)UL (ref 150–450)
POTASSIUM SERPL-SCNC: 5.2 MMOL/L (ref 3.5–5.1)
RBC # BLD AUTO: 4.11 X10(6)UL
SODIUM SERPL-SCNC: 140 MMOL/L (ref 136–145)
WBC # BLD AUTO: 8.4 X10(3) UL (ref 4–11)

## 2021-06-28 PROCEDURE — 80053 COMPREHEN METABOLIC PANEL: CPT

## 2021-06-28 PROCEDURE — 36415 COLL VENOUS BLD VENIPUNCTURE: CPT

## 2021-06-28 PROCEDURE — 85025 COMPLETE CBC W/AUTO DIFF WBC: CPT

## 2021-06-29 ENCOUNTER — TELEPHONE (OUTPATIENT)
Dept: PULMONOLOGY | Facility: CLINIC | Age: 71
End: 2021-06-29

## 2021-06-29 ENCOUNTER — TELEPHONE (OUTPATIENT)
Dept: INTERNAL MEDICINE CLINIC | Facility: CLINIC | Age: 71
End: 2021-06-29

## 2021-06-29 DIAGNOSIS — N18.32 STAGE 3B CHRONIC KIDNEY DISEASE (HCC): Primary | Chronic | ICD-10-CM

## 2021-06-29 NOTE — TELEPHONE ENCOUNTER
Pt has not been seen by pulmonary. She is scheduled for consult w/ Dr. Jaquelin Leiva on 10/11/21. Dr. Jaquelin Leiva- pls see below. Ok to add pt to your schedule?

## 2021-06-29 NOTE — TELEPHONE ENCOUNTER
Good Afternoon Dr Jennifer Monterroso    Please see message below and generate new referral for Dr Stephanie Angeles if you agree with patient plan of care.      Thank you,  Asad Li

## 2021-06-29 NOTE — TELEPHONE ENCOUNTER
Pt states that she has an appointment with Dr. Leo Hurd/Nephrology on 7-2-21 and was told by his office staff that she needs another referral to be seen. Please, call pt with any questions.

## 2021-06-29 NOTE — TELEPHONE ENCOUNTER
Tamara Mulligan from Triage at Dr. Nixon  office called in to see if pt can have a sooner appt than October due to patients worsening cough and prominent pulmonary markings. I checked the schedule it is booked out.  Please follow up with the pt to schedule

## 2021-06-29 NOTE — TELEPHONE ENCOUNTER
With  Salma Funk ID# [id number]. Patient states she took the Medrol pack  ordered and cough is not getting any better. Patient is also using nebulizer and states she feels the same.      Patient states she has a persistent cough with

## 2021-06-30 NOTE — TELEPHONE ENCOUNTER
Rescheduled pt on 8/5 @ 12:30 pm at [facility] info given. Explained referral to Pulmonary/Dr. Ary Viramontes was obtained from PCP. She had no further concerns/questions at this time.

## 2021-07-02 ENCOUNTER — OFFICE VISIT (OUTPATIENT)
Dept: NEPHROLOGY | Facility: CLINIC | Age: 71
End: 2021-07-02
Payer: MEDICARE

## 2021-07-02 ENCOUNTER — TELEPHONE (OUTPATIENT)
Dept: NEPHROLOGY | Facility: CLINIC | Age: 71
End: 2021-07-02

## 2021-07-02 VITALS
SYSTOLIC BLOOD PRESSURE: 152 MMHG | HEIGHT: 69 IN | WEIGHT: 232 LBS | BODY MASS INDEX: 34.36 KG/M2 | DIASTOLIC BLOOD PRESSURE: 81 MMHG | HEART RATE: 59 BPM

## 2021-07-02 DIAGNOSIS — N18.32 STAGE 3B CHRONIC KIDNEY DISEASE (HCC): Chronic | ICD-10-CM

## 2021-07-02 DIAGNOSIS — I82.532 CHRONIC DEEP VEIN THROMBOSIS OF LEFT POPLITEAL VEIN (HCC): Primary | ICD-10-CM

## 2021-07-02 DIAGNOSIS — I10 ESSENTIAL HYPERTENSION WITH GOAL BLOOD PRESSURE LESS THAN 130/80: ICD-10-CM

## 2021-07-02 DIAGNOSIS — E11.9 CONTROLLED TYPE 2 DIABETES MELLITUS WITHOUT COMPLICATION, WITHOUT LONG-TERM CURRENT USE OF INSULIN (HCC): Chronic | ICD-10-CM

## 2021-07-02 PROCEDURE — 3008F BODY MASS INDEX DOCD: CPT | Performed by: INTERNAL MEDICINE

## 2021-07-02 PROCEDURE — 3077F SYST BP >= 140 MM HG: CPT | Performed by: INTERNAL MEDICINE

## 2021-07-02 PROCEDURE — 3079F DIAST BP 80-89 MM HG: CPT | Performed by: INTERNAL MEDICINE

## 2021-07-02 PROCEDURE — 99214 OFFICE O/P EST MOD 30 MIN: CPT | Performed by: INTERNAL MEDICINE

## 2021-07-02 RX ORDER — BENZONATATE 100 MG/1
100 CAPSULE ORAL 3 TIMES DAILY PRN
Qty: 30 CAPSULE | Refills: 0 | Status: SHIPPED | OUTPATIENT
Start: 2021-07-02 | End: 2021-09-23

## 2021-07-02 RX ORDER — AZITHROMYCIN 250 MG/1
TABLET, FILM COATED ORAL
Qty: 6 TABLET | Refills: 0 | Status: SHIPPED | OUTPATIENT
Start: 2021-07-02 | End: 2021-09-23 | Stop reason: ALTCHOICE

## 2021-07-02 NOTE — PATIENT INSTRUCTIONS
Take zpack x 5 days for bronchitis    Cut minoxidil to 5mg  Twice a day    See me September    Blood pressurres are good

## 2021-07-03 NOTE — TELEPHONE ENCOUNTER
Called with the assistance of language line solutions (#).  Luis Louise [id number]    Attempted to call patient, no answer, according to ABIGAIL, do not leave a message, contact her daughter Nancie Stahl    Attempted to contact Juliette Young

## 2021-07-03 NOTE — PROGRESS NOTES
Dear Thad Castellanos Note     Lamine Cheek    Was seen today in the clinic along with her son [facility]. She has her normal myriad of complaints want to cut down on her medicines today her blood pressure was 135/76.   For her blood pressure s emergencias. 30 each 0   • Metoprolol Tartrate 100 MG Oral Tab Take 1 tablet  by mouth 2  times daily. 60 tablet 5   • minoxidil 2.5 MG Oral Tab Take 2 tablets (5 mg total) by mouth 2 (two) times daily.  360 tablet 1   • famoTIDine 20 MG Oral Tab Take 1 tab Blood In Dewey Controls. Dx. E11.65 200 strip 6   • Insulin Pen Needle (BD PEN NEEDLE ULTRAFINE) 29G X 12.7MM Does not apply Misc Dx. E11.9. Checks q12hrs. BD ultrafine MINI.  100 each 6   • Glucose Blood (BENNY CONTOUR NEXT TEST) In Vitro Strip 3/5/2021 ) 30 tablet 0   • Betamethasone Dipropionate Aug 0.05 % External Lotion Apply 1 Application topically 2 (two) times daily.  (Patient not taking: Reported on 3/5/2021 ) 1 Bottle 1   • Secukinumab, 300 MG Dose, (COSENTYX SENSOREADY, 300 MG,) 150 MG/M adenopathy  Lymphatic: no abnormal cervical, supraclavicular adenopathy is noted  Respiratory:  lungs are clear to auscultation bilaterally  Cardiovascular: regular rate and rhythm   Abdomen: soft, non-tender, non-distended, BS normal  Skin/Hair: no unusua

## 2021-07-03 NOTE — TELEPHONE ENCOUNTER
Check on patient if she got in with Dr. Rosita Hogan? We will send Tessalon Perles to the office see if that will help her.

## 2021-07-07 NOTE — TELEPHONE ENCOUNTER
With Quintin Chan Nhan ID # [id number] Advised patient of Dr Pedro Lopez note. Patient verbalized understanding. She picked up her medication and she has an appointment with Dr. Jody Cruz.       Future Appointments   Date Time Provider Zac Choi   7/

## 2021-07-09 ENCOUNTER — PATIENT MESSAGE (OUTPATIENT)
Dept: RHEUMATOLOGY | Facility: CLINIC | Age: 71
End: 2021-07-09

## 2021-07-10 NOTE — TELEPHONE ENCOUNTER
From: Vivi Porras  To: Yajaira Mills MD  Sent: 7/9/2021 5:52 PM CDT  Subject: Prescription Question    Just sending a message to see when you have a slot open for an office visit. Nat Spar  also my mom has been having recurring coughs and lung inflam

## 2021-07-13 ENCOUNTER — ANTI-COAG VISIT (OUTPATIENT)
Dept: INTERNAL MEDICINE CLINIC | Facility: CLINIC | Age: 71
End: 2021-07-13
Payer: MEDICARE

## 2021-07-13 DIAGNOSIS — Z51.81 ENCOUNTER FOR THERAPEUTIC DRUG MONITORING: ICD-10-CM

## 2021-07-13 DIAGNOSIS — Z79.01 LONG TERM (CURRENT) USE OF ANTICOAGULANTS: ICD-10-CM

## 2021-07-13 DIAGNOSIS — I82.532 CHRONIC DEEP VEIN THROMBOSIS OF LEFT POPLITEAL VEIN (HCC): ICD-10-CM

## 2021-07-13 DIAGNOSIS — D68.61 ANTIPHOSPHOLIPID SYNDROME (HCC): ICD-10-CM

## 2021-07-13 LAB
INR: 2.3 (ref 0.8–1.2)
TEST STRIP EXPIRATION DATE: ABNORMAL DATE

## 2021-07-13 PROCEDURE — 85610 PROTHROMBIN TIME: CPT

## 2021-07-13 PROCEDURE — 93793 ANTICOAG MGMT PT WARFARIN: CPT

## 2021-07-13 NOTE — TELEPHONE ENCOUNTER
Scheduled.    Future Appointments   Date Time Provider Zac Choi   7/21/2021  5:40 PM Sharona Gusman MD [facility]

## 2021-07-13 NOTE — TELEPHONE ENCOUNTER
If patient can follow-up to discuss regarding the methotrexate. I have not seen her since August 2020.

## 2021-07-16 ENCOUNTER — TELEPHONE (OUTPATIENT)
Dept: INTERNAL MEDICINE CLINIC | Facility: CLINIC | Age: 71
End: 2021-07-16

## 2021-07-16 DIAGNOSIS — M25.50 ARTHRALGIA, UNSPECIFIED JOINT: Primary | ICD-10-CM

## 2021-07-16 NOTE — TELEPHONE ENCOUNTER
Patient is requesting a referral to continue seeing rheumatologist, Dr. Patricia Mccarthy with more than 1 visit.

## 2021-07-19 ENCOUNTER — LAB ENCOUNTER (OUTPATIENT)
Dept: LAB | Facility: HOSPITAL | Age: 71
End: 2021-07-19
Attending: INTERNAL MEDICINE
Payer: MEDICARE

## 2021-07-19 ENCOUNTER — TELEPHONE (OUTPATIENT)
Dept: RHEUMATOLOGY | Facility: CLINIC | Age: 71
End: 2021-07-19

## 2021-07-19 DIAGNOSIS — L40.50 PSORIATIC ARTHROPATHY (HCC): ICD-10-CM

## 2021-07-19 DIAGNOSIS — Z51.81 ENCOUNTER FOR THERAPEUTIC DRUG MONITORING: ICD-10-CM

## 2021-07-19 DIAGNOSIS — L40.50 PSORIATIC ARTHRITIS (HCC): Primary | ICD-10-CM

## 2021-07-19 LAB
CRP SERPL-MCNC: <0.29 MG/DL (ref ?–0.3)
ERYTHROCYTE [SEDIMENTATION RATE] IN BLOOD: 25 MM/HR

## 2021-07-19 PROCEDURE — 85652 RBC SED RATE AUTOMATED: CPT | Performed by: INTERNAL MEDICINE

## 2021-07-19 PROCEDURE — 36415 COLL VENOUS BLD VENIPUNCTURE: CPT | Performed by: INTERNAL MEDICINE

## 2021-07-19 PROCEDURE — 86140 C-REACTIVE PROTEIN: CPT | Performed by: INTERNAL MEDICINE

## 2021-07-19 RX ORDER — ALBUTEROL SULFATE 2.5 MG/3ML
SOLUTION RESPIRATORY (INHALATION)
Qty: 50 EACH | Refills: 1 | OUTPATIENT
Start: 2021-07-19

## 2021-07-19 NOTE — TELEPHONE ENCOUNTER
Ordered blood work for inflammation.  She already had her CBC and CMP done back in June therefore does not need to be repeated

## 2021-07-19 NOTE — TELEPHONE ENCOUNTER
Patient is requesting refill on the following medication. Also wants to know if any refills can be added on for future. Call with any questions.      Medication Detail    Medication Quantity Refills Start End   albuterol sulfate (2.5 MG/3ML) 0.083% Inhalati

## 2021-07-20 NOTE — TELEPHONE ENCOUNTER
----- Message from Aniyah Avalos MD sent at 1/16/2019  7:26 PM CST -----  Regarding: mutual patient  [facility],    I saw your patient Ms. Ruben Lauren for a + ADRIEL and rash. 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   There also Requested Prescriptions     Pending Prescriptions Disp Refills    atorvastatin (LIPITOR) 40 mg tablet

## 2021-07-20 NOTE — TELEPHONE ENCOUNTER
Left message to call back please transfer to triage. A Aspiret message was also sent. albuterol sulfate (2.5 MG/3ML) 0.083% Inhalation Nebu Soln          Sig: Coloco kodak ampolla en la gamboa de la maquina nebulizadora.  Uselo cada 6 horas kassy sea ne

## 2021-07-21 ENCOUNTER — OFFICE VISIT (OUTPATIENT)
Dept: RHEUMATOLOGY | Facility: CLINIC | Age: 71
End: 2021-07-21
Payer: MEDICARE

## 2021-07-21 ENCOUNTER — VIRTUAL PHONE E/M (OUTPATIENT)
Dept: INTERNAL MEDICINE CLINIC | Facility: CLINIC | Age: 71
End: 2021-07-21
Payer: MEDICARE

## 2021-07-21 VITALS
WEIGHT: 234 LBS | SYSTOLIC BLOOD PRESSURE: 146 MMHG | DIASTOLIC BLOOD PRESSURE: 66 MMHG | HEIGHT: 69 IN | HEART RATE: 64 BPM | BODY MASS INDEX: 34.66 KG/M2

## 2021-07-21 DIAGNOSIS — R05.9 COUGH: ICD-10-CM

## 2021-07-21 DIAGNOSIS — E11.9 CONTROLLED TYPE 2 DIABETES MELLITUS WITHOUT COMPLICATION, WITHOUT LONG-TERM CURRENT USE OF INSULIN (HCC): Chronic | ICD-10-CM

## 2021-07-21 DIAGNOSIS — L40.50 PSORIATIC ARTHRITIS (HCC): Primary | ICD-10-CM

## 2021-07-21 DIAGNOSIS — I10 ESSENTIAL HYPERTENSION WITH GOAL BLOOD PRESSURE LESS THAN 130/80: Primary | ICD-10-CM

## 2021-07-21 DIAGNOSIS — Z51.81 ENCOUNTER FOR THERAPEUTIC DRUG MONITORING: ICD-10-CM

## 2021-07-21 PROCEDURE — 3077F SYST BP >= 140 MM HG: CPT | Performed by: INTERNAL MEDICINE

## 2021-07-21 PROCEDURE — 3008F BODY MASS INDEX DOCD: CPT | Performed by: INTERNAL MEDICINE

## 2021-07-21 PROCEDURE — 3078F DIAST BP <80 MM HG: CPT | Performed by: INTERNAL MEDICINE

## 2021-07-21 PROCEDURE — 99214 OFFICE O/P EST MOD 30 MIN: CPT | Performed by: INTERNAL MEDICINE

## 2021-07-21 PROCEDURE — 99443 PHONE E/M BY PHYS 21-30 MIN: CPT | Performed by: NURSE PRACTITIONER

## 2021-07-21 RX ORDER — ALBUTEROL SULFATE 90 UG/1
1 AEROSOL, METERED RESPIRATORY (INHALATION) EVERY 6 HOURS PRN
Qty: 6.7 G | Refills: 1 | Status: SHIPPED | OUTPATIENT
Start: 2021-07-21

## 2021-07-21 RX ORDER — FAMOTIDINE 20 MG/1
20 TABLET ORAL 2 TIMES DAILY PRN
Qty: 180 TABLET | Refills: 0 | Status: SHIPPED | OUTPATIENT
Start: 2021-07-21 | End: 2021-08-19 | Stop reason: ALTCHOICE

## 2021-07-21 RX ORDER — MONTELUKAST SODIUM 10 MG/1
10 TABLET ORAL NIGHTLY
Qty: 90 TABLET | Refills: 0 | Status: SHIPPED | OUTPATIENT
Start: 2021-07-21 | End: 2021-08-19

## 2021-07-21 NOTE — TELEPHONE ENCOUNTER
The patient did not read her [employer] message. Using language line  Genesis Dillard [id number];     The patient was informed and an video appointment was scheduled for today 7/21/21  11:30 am     Future Appointments   Date Time Provider Department

## 2021-07-21 NOTE — PROGRESS NOTES
Sigifredo Levy is a 80year old female.     HPI:   Patient presents with:  Medication Follow-Up: Alternative for Methotrexate  Hand Pain  Test Results      Paz Ahumada was seen today 7/21/2021 Psoriasis with psoriatic arthritis and Antiphospholipi today's visit):  Current Outpatient Medications   Medication Sig Dispense Refill   • Montelukast Sodium (SINGULAIR) 10 MG Oral Tab Take 1 tablet (10 mg total) by mouth nightly.  90 tablet 0   • famoTIDine 20 MG Oral Tab Take 1 tablet (20 mg total) by mouth days. 48 tablet 1   • Glucose Blood (ACCU-CHEK GUIDE) In Vitro Strip 1 strip by In Vitro route Q12H. 200 strip 11   • Glucose Blood (ACCU-CHEK GUIDE) In Vitro Strip 1 strip by In Vitro route Q12H. 200 strip 11   • Alcohol Swabs Does not apply Pads Code: E1 Solution Auto-injector Inject 300 mg into the skin See Admin Instructions. Inject 300mg at week 0, 1, 2, 3, 4, then monthly thereafter.  (Patient not taking: Reported on 4/2/2021 ) 10 pen 0   • Ondansetron HCl (ZOFRAN) 4 mg tablet Take 1 tablet (4 mg total) Monocytes Absolute      0.10 - 1.00 x10(3) uL 0.78 0.50   Eosinophils Absolute      0.00 - 0.70 x10(3) uL 0.39 0.30   Basophils Absolute      0.00 - 0.20 x10(3) uL 0.05 0.02   Immature Granulocyte Absolute      0.00 - 1.00 x10(3) uL 0.02 0.02   Neutrophi 12/12/2018   Lupus Anticoag Screen Interp       Conclusion: Positive .  . .   PT      11.8 - 14.5 seconds 12.9   APTT      23.2 - 35.3 seconds 29.2   Hexagonal Phase Staclot LA      Negative Positive (A)   DRVVT Ratio      0.0 - 1.1 1.2 (H)   DRVV Mix Ratio seen about the triscaphe joint as well as multiple interphalangeal joints. There is narrowing of the fifth MCP joint    L Venous US 12/4/18:  1.  Extensive, mostly occlusive thrombus involving the more anterior of the 2 paired left peroneal veins with mild methotrexate is the cause of this but will discontinue as her joints are doing well just on Cosentyx  - She will be seeing pulmonology on August 5    R shoulder fracture s/p fall  Elevated LFT- normalized  - repeat blood work shows normal LFTs  - decrease

## 2021-07-21 NOTE — PROGRESS NOTES
Virtual Telephone Check-In    Staceychela Bryant verbally consents to a Virtual/Telephone Check-In visit on 07/21/21.     Patient understands and accepts financial responsibility for any deductible, co-insurance and/or co-pays associated with this Take 1 tablet  by mouth 2  times daily. , Disp: 60 tablet, Rfl: 5  •  minoxidil 2.5 MG Oral Tab, Take 2 tablets (5 mg total) by mouth 2 (two) times daily. , Disp: 360 tablet, Rfl: 1  •  insulin glargine (LANTUS SOLOSTAR) 100 UNIT/ML Subcutaneous Solution Pen 0  •  Alcohol Swabs Does not apply Pads, Code: E11.9. Checks twice daily. , Disp: 100 each, Rfl: 11  •  ACCU-CHEK FASTCLIX LANCETS Does not apply Misc, CHECK EVERY MORNING AND EVENING, Disp: 204 each, Rfl: 11  •  Secukinumab, 300 MG Dose, (COSENTYX SENSORE #[id number]  Summary of topics discussed: Tali HUFF, spoke with pt. Pt presents for virtual visit w/ c/o continued cough x 3 days. Has little phlegm and cough is minimum.  Stopped montelukast and famotidine d/t running out of medication. 6/2021 marisela VALSARTAN 320 MG OR TABS - - - - -   VALSARTAN 160 MG OR TABS - - - - -   LOSARTAN POTASSIUM 100 MG OR TABS - - - - -   VALSARTAN-HYDROCHLOROTHIAZIDE 320-25 MG OR TABS - - - - -   CANDESARTAN CILEXETIL-HCTZ 32-12.5 MG OR TABS - - - - -   Weight (enc rojas Careful with low sugars. Carry something with you and check sugar if can. Can carry carolyne cracker, etc. Decrease carbohydrates. But also, careful with fruits and natural sugars. One serving a day and no more than 1 handful every day.  Check feet  every AM a

## 2021-07-21 NOTE — PATIENT INSTRUCTIONS
You were seen for psoriatic arthritis   Plan to stop the methotrexate and folic acid  Continue the cosentyx monthly  We could add Leflunomide  Blood work in December  Follow up in December

## 2021-08-05 ENCOUNTER — OFFICE VISIT (OUTPATIENT)
Dept: PULMONOLOGY | Facility: CLINIC | Age: 71
End: 2021-08-05
Payer: MEDICARE

## 2021-08-05 VITALS
SYSTOLIC BLOOD PRESSURE: 160 MMHG | HEIGHT: 69 IN | WEIGHT: 235 LBS | DIASTOLIC BLOOD PRESSURE: 68 MMHG | BODY MASS INDEX: 34.8 KG/M2 | OXYGEN SATURATION: 98 % | HEART RATE: 60 BPM | RESPIRATION RATE: 18 BRPM

## 2021-08-05 DIAGNOSIS — I82.532 CHRONIC DEEP VEIN THROMBOSIS OF LEFT POPLITEAL VEIN (HCC): Primary | ICD-10-CM

## 2021-08-05 PROCEDURE — 3077F SYST BP >= 140 MM HG: CPT | Performed by: INTERNAL MEDICINE

## 2021-08-05 PROCEDURE — 3008F BODY MASS INDEX DOCD: CPT | Performed by: INTERNAL MEDICINE

## 2021-08-05 PROCEDURE — 3078F DIAST BP <80 MM HG: CPT | Performed by: INTERNAL MEDICINE

## 2021-08-05 PROCEDURE — 99203 OFFICE O/P NEW LOW 30 MIN: CPT | Performed by: INTERNAL MEDICINE

## 2021-08-05 RX ORDER — ALBUTEROL SULFATE 90 UG/1
AEROSOL, METERED RESPIRATORY (INHALATION)
Qty: 1 EACH | Refills: 0 | Status: SHIPPED | OUTPATIENT
Start: 2021-08-05 | End: 2021-09-23

## 2021-08-05 NOTE — PROGRESS NOTES
Dear Elisa Wolf:           As you know, Spencer Lyn is a 58-year-old female who I am now evaluating for cough and dyspnea syndrome.        HISTORY OF PRESENT ILLNESS: The patient has an extraordinary past medical history including psoriatic arthritis and ADRIEL posit auscultation and percussion. Cardiac regular rate and rhythm no murmur. Abdomen nontender, without hepatosplenomegaly and no mass appreciable. Extremities and Musculoskeletal without clubbing cyanosis nor edema, and mobility acceptable.  Neurologic grossly

## 2021-08-06 ENCOUNTER — TELEPHONE (OUTPATIENT)
Dept: INTERNAL MEDICINE CLINIC | Facility: CLINIC | Age: 71
End: 2021-08-06

## 2021-08-06 DIAGNOSIS — Z79.01 LONG TERM (CURRENT) USE OF ANTICOAGULANTS: Primary | ICD-10-CM

## 2021-08-06 DIAGNOSIS — D68.61 ANTIPHOSPHOLIPID SYNDROME (HCC): ICD-10-CM

## 2021-08-06 DIAGNOSIS — I82.532 CHRONIC DEEP VEIN THROMBOSIS OF LEFT POPLITEAL VEIN (HCC): ICD-10-CM

## 2021-08-06 DIAGNOSIS — Z51.81 ENCOUNTER FOR THERAPEUTIC DRUG MONITORING: ICD-10-CM

## 2021-08-09 ENCOUNTER — TELEPHONE (OUTPATIENT)
Dept: INTERNAL MEDICINE CLINIC | Facility: CLINIC | Age: 71
End: 2021-08-09

## 2021-08-09 DIAGNOSIS — E11.9 CONTROLLED TYPE 2 DIABETES MELLITUS WITHOUT COMPLICATION, WITHOUT LONG-TERM CURRENT USE OF INSULIN (HCC): Primary | ICD-10-CM

## 2021-08-09 RX ORDER — VALSARTAN 160 MG/1
160 TABLET ORAL DAILY
Qty: 180 TABLET | Refills: 1 | Status: SHIPPED | OUTPATIENT
Start: 2021-08-09 | End: 2022-07-29

## 2021-08-09 NOTE — TELEPHONE ENCOUNTER
Patient is requesting a referral for ophthalmologist Dr. Meghann Bridges. Patient has an appointment for her yearly eye exam on 8/14. Please fax referral to Dr. Stas Whitehead office.  Patient does not have fax number

## 2021-08-09 NOTE — TELEPHONE ENCOUNTER
Please advise, refill failed per protocol. Rx pended for review.     Please advise in regards to pt question about montelukast and famotidine

## 2021-08-09 NOTE — TELEPHONE ENCOUNTER
Patient is requesting a refill for valsartan 160 MG Oral Tab. Patient is requesting a 90 day supply with a refill. Please advise. Patient also needs to know if she should continue taking montelukast now that she does not have a cough. Please advise if patient should continue taking famotidine.

## 2021-08-10 ENCOUNTER — ANTI-COAG VISIT (OUTPATIENT)
Dept: INTERNAL MEDICINE CLINIC | Facility: CLINIC | Age: 71
End: 2021-08-10
Payer: MEDICARE

## 2021-08-10 DIAGNOSIS — I82.532 CHRONIC DEEP VEIN THROMBOSIS OF LEFT POPLITEAL VEIN (HCC): ICD-10-CM

## 2021-08-10 DIAGNOSIS — D68.61 ANTIPHOSPHOLIPID SYNDROME (HCC): ICD-10-CM

## 2021-08-10 DIAGNOSIS — Z79.01 LONG TERM (CURRENT) USE OF ANTICOAGULANTS: ICD-10-CM

## 2021-08-10 DIAGNOSIS — Z51.81 ENCOUNTER FOR THERAPEUTIC DRUG MONITORING: ICD-10-CM

## 2021-08-10 LAB — INR: 2.8 (ref 0.8–1.2)

## 2021-08-10 PROCEDURE — 85610 PROTHROMBIN TIME: CPT

## 2021-08-10 PROCEDURE — 93793 ANTICOAG MGMT PT WARFARIN: CPT

## 2021-08-11 NOTE — TELEPHONE ENCOUNTER
Good Afternoon Dr Benton Ba    Please see message below and generate referral for Optha if you agree with patient plan of care. Patient has upcoming appt on 8/14.     Thank you,  [facility]  [phone]

## 2021-08-17 PROBLEM — R05.9 COUGH: Status: RESOLVED | Noted: 2021-07-21 | Resolved: 2021-08-17

## 2021-08-19 ENCOUNTER — TELEPHONE (OUTPATIENT)
Dept: INTERNAL MEDICINE CLINIC | Facility: CLINIC | Age: 71
End: 2021-08-19

## 2021-08-19 ENCOUNTER — OFFICE VISIT (OUTPATIENT)
Dept: INTERNAL MEDICINE CLINIC | Facility: CLINIC | Age: 71
End: 2021-08-19
Payer: MEDICARE

## 2021-08-19 VITALS
SYSTOLIC BLOOD PRESSURE: 144 MMHG | WEIGHT: 235.19 LBS | RESPIRATION RATE: 17 BRPM | HEIGHT: 69 IN | TEMPERATURE: 98 F | HEART RATE: 69 BPM | DIASTOLIC BLOOD PRESSURE: 62 MMHG | BODY MASS INDEX: 34.83 KG/M2

## 2021-08-19 DIAGNOSIS — N18.32 STAGE 3B CHRONIC KIDNEY DISEASE (HCC): Chronic | ICD-10-CM

## 2021-08-19 DIAGNOSIS — I83.11 VARICOSE VEINS OF BOTH LOWER EXTREMITIES WITH INFLAMMATION: ICD-10-CM

## 2021-08-19 DIAGNOSIS — H92.01 RIGHT EAR PAIN: ICD-10-CM

## 2021-08-19 DIAGNOSIS — L40.50 PSORIATIC ARTHRITIS (HCC): ICD-10-CM

## 2021-08-19 DIAGNOSIS — E04.9 GOITER: ICD-10-CM

## 2021-08-19 DIAGNOSIS — R31.29 MICROSCOPIC HEMATURIA: ICD-10-CM

## 2021-08-19 DIAGNOSIS — L40.9 PSORIASIS: ICD-10-CM

## 2021-08-19 DIAGNOSIS — D68.61 ANTIPHOSPHOLIPID SYNDROME (HCC): ICD-10-CM

## 2021-08-19 DIAGNOSIS — M17.11 PRIMARY OSTEOARTHRITIS OF RIGHT KNEE: Chronic | ICD-10-CM

## 2021-08-19 DIAGNOSIS — R93.89 ABNORMAL COMPUTED TOMOGRAPHY ANGIOGRAPHY (CTA): ICD-10-CM

## 2021-08-19 DIAGNOSIS — Z79.01 LONG TERM (CURRENT) USE OF ANTICOAGULANTS: ICD-10-CM

## 2021-08-19 DIAGNOSIS — Z00.00 ENCOUNTER FOR ANNUAL HEALTH EXAMINATION: ICD-10-CM

## 2021-08-19 DIAGNOSIS — I83.12 VARICOSE VEINS OF BOTH LOWER EXTREMITIES WITH INFLAMMATION: ICD-10-CM

## 2021-08-19 DIAGNOSIS — I10 ESSENTIAL HYPERTENSION WITH GOAL BLOOD PRESSURE LESS THAN 130/80: ICD-10-CM

## 2021-08-19 DIAGNOSIS — R31.9 HEMATURIA, UNSPECIFIED TYPE: ICD-10-CM

## 2021-08-19 DIAGNOSIS — I82.532 CHRONIC DEEP VEIN THROMBOSIS OF LEFT POPLITEAL VEIN (HCC): ICD-10-CM

## 2021-08-19 DIAGNOSIS — M85.80 OSTEOPENIA, UNSPECIFIED LOCATION: ICD-10-CM

## 2021-08-19 DIAGNOSIS — H02.88A MEIBOMIAN GLAND DYSFUNCTION (MGD), BILATERAL, BOTH UPPER AND LOWER LIDS: ICD-10-CM

## 2021-08-19 DIAGNOSIS — E66.01 SEVERE OBESITY (BMI 35.0-39.9) WITH COMORBIDITY (HCC): Chronic | ICD-10-CM

## 2021-08-19 DIAGNOSIS — E11.9 CONTROLLED TYPE 2 DIABETES MELLITUS WITHOUT COMPLICATION, WITHOUT LONG-TERM CURRENT USE OF INSULIN (HCC): Chronic | ICD-10-CM

## 2021-08-19 DIAGNOSIS — H02.88B MEIBOMIAN GLAND DYSFUNCTION (MGD), BILATERAL, BOTH UPPER AND LOWER LIDS: ICD-10-CM

## 2021-08-19 DIAGNOSIS — I67.2 INTRACRANIAL ATHEROSCLEROSIS: ICD-10-CM

## 2021-08-19 DIAGNOSIS — E87.5 SERUM POTASSIUM ELEVATED: ICD-10-CM

## 2021-08-19 DIAGNOSIS — H25.13 AGE-RELATED NUCLEAR CATARACT OF BOTH EYES: ICD-10-CM

## 2021-08-19 DIAGNOSIS — E53.8 B12 DEFICIENCY: ICD-10-CM

## 2021-08-19 DIAGNOSIS — Z12.31 ENCOUNTER FOR SCREENING MAMMOGRAM FOR MALIGNANT NEOPLASM OF BREAST: ICD-10-CM

## 2021-08-19 DIAGNOSIS — R05.9 COUGH: ICD-10-CM

## 2021-08-19 DIAGNOSIS — Z98.890 HISTORY OF PTERYGIUM EXCISION: ICD-10-CM

## 2021-08-19 DIAGNOSIS — E03.9 HYPOTHYROIDISM (ACQUIRED): Chronic | ICD-10-CM

## 2021-08-19 DIAGNOSIS — D12.6 TUBULAR ADENOMA OF COLON: ICD-10-CM

## 2021-08-19 DIAGNOSIS — Z71.85 VACCINE COUNSELING: Primary | ICD-10-CM

## 2021-08-19 PROBLEM — H17.9 CORNEAL SCAR: Status: ACTIVE | Noted: 2021-08-19

## 2021-08-19 PROBLEM — H25.013 CORTICAL AGE-RELATED CATARACT OF BOTH EYES: Status: ACTIVE | Noted: 2021-08-19

## 2021-08-19 PROBLEM — H04.123 DRY EYE SYNDROME OF BILATERAL LACRIMAL GLANDS: Status: ACTIVE | Noted: 2021-08-19

## 2021-08-19 LAB
APPEARANCE: CLEAR
BILIRUB UR QL: NEGATIVE
BILIRUBIN: NEGATIVE
CLARITY UR: CLEAR
COLOR UR: YELLOW
GLUCOSE (URINE DIPSTICK): NEGATIVE MG/DL
GLUCOSE UR-MCNC: NEGATIVE MG/DL
HGB UR QL STRIP.AUTO: NEGATIVE
KETONES (URINE DIPSTICK): NEGATIVE MG/DL
KETONES UR-MCNC: NEGATIVE MG/DL
LEUKOCYTE ESTERASE UR QL STRIP.AUTO: NEGATIVE
LEUKOCYTES: NEGATIVE
MULTISTIX LOT#: ABNORMAL NUMERIC
NITRITE UR QL STRIP.AUTO: NEGATIVE
NITRITE, URINE: NEGATIVE
PH UR: 7 [PH] (ref 5–8)
PH, URINE: 7 (ref 4.5–8)
PROT UR-MCNC: NEGATIVE MG/DL
PROTEIN (URINE DIPSTICK): NEGATIVE MG/DL
SP GR UR STRIP: 1.01 (ref 1–1.03)
SPECIFIC GRAVITY: 1.02 (ref 1–1.03)
URINE-COLOR: YELLOW
UROBILINOGEN UR STRIP-ACNC: <2
UROBILINOGEN,SEMI-QN: 0.2 MG/DL (ref 0–1.9)

## 2021-08-19 PROCEDURE — 99397 PER PM REEVAL EST PAT 65+ YR: CPT | Performed by: INTERNAL MEDICINE

## 2021-08-19 PROCEDURE — 81003 URINALYSIS AUTO W/O SCOPE: CPT | Performed by: INTERNAL MEDICINE

## 2021-08-19 PROCEDURE — 3078F DIAST BP <80 MM HG: CPT | Performed by: INTERNAL MEDICINE

## 2021-08-19 PROCEDURE — 3008F BODY MASS INDEX DOCD: CPT | Performed by: INTERNAL MEDICINE

## 2021-08-19 PROCEDURE — 96160 PT-FOCUSED HLTH RISK ASSMT: CPT | Performed by: INTERNAL MEDICINE

## 2021-08-19 PROCEDURE — 3077F SYST BP >= 140 MM HG: CPT | Performed by: INTERNAL MEDICINE

## 2021-08-19 PROCEDURE — G0439 PPPS, SUBSEQ VISIT: HCPCS | Performed by: INTERNAL MEDICINE

## 2021-08-19 RX ORDER — INSULIN GLARGINE 100 [IU]/ML
INJECTION, SOLUTION SUBCUTANEOUS
Qty: 10 EACH | Refills: 1 | Status: SHIPPED | OUTPATIENT
Start: 2021-08-19

## 2021-08-19 RX ORDER — FAMOTIDINE 20 MG/1
20 TABLET ORAL 2 TIMES DAILY PRN
Qty: 180 TABLET | Refills: 0 | Status: SHIPPED | OUTPATIENT
Start: 2021-08-19 | End: 2021-09-23

## 2021-08-19 RX ORDER — LEVOTHYROXINE SODIUM 0.1 MG/1
200 TABLET ORAL
Qty: 180 TABLET | Refills: 0 | Status: SHIPPED | OUTPATIENT
Start: 2021-08-19 | End: 2021-11-23

## 2021-08-19 RX ORDER — PEN NEEDLE, DIABETIC 32GX 5/32"
NEEDLE, DISPOSABLE MISCELLANEOUS
Qty: 100 EACH | Refills: 6 | Status: SHIPPED | OUTPATIENT
Start: 2021-08-19

## 2021-08-19 NOTE — PATIENT INSTRUCTIONS
ASSESSMENT AND OTHER RELEVANT CHRONIC CONDITIONS:   Mady Martinez is a 80year old female who presents for a Medicare Assessment.      PLAN SUMMARY:   Diagnoses and all orders for this visit:    Vaccine counseling    Tubular adenoma of colon 140s.  Pre-meal should be 's. Both equally affected A1c. Stage 3b chronic kidney disease (HCC)No motrin, ibuprofen, advil, alleve, naprosyn  with these medications. Chronic deep vein thrombosis of left popliteal vein (HCC) on coumadin.  Compre common on the back, chest, or belly (abdomen). ? They usually appear on only one side of the body, spreading along the nerve pathway where the virus is reactivating. ?  The rash can also form around an eye, along one side of the face or neck, or in the m months or even years after you have had shingles. Antiviral medicines given during the first 72 hours of the rash can reduce the chance of postherpetic neuralgia. Other medicines can be prescribed to help ease the pain and improve quality of life.   · Bacte or older, even if you've had shingles in the past. Two shots of the RZV vaccine are recommended. You should get the second RZV shot 2 to 6 months after the first. The vaccine makes it less likely that you will develop shingles.  If you do develop shingles, (report these to your doctor or health care professional if they continue or are bothersome):  · chills  · headache  · fever  · nausea, vomiting  · redness, warmth, pain, swelling or itching at site where injected  · tiredness  What may interact with this tested or if previously tested but not diagnosed with pre-diabetes   One screening every 6 months if diagnosed with pre-diabetes Lab Results   Component Value Date     (H) 06/28/2021        Cardiovascular Disease Screening    Lipid Panel  Cholestero 08/18/2020    Mammogram due on 08/18/2021    Immunizations    Influenza Covered once per flu season  Please get every year 09/23/2020  No recommendations at this time    Pneumococcal Each vaccine (Xtybrme77 & Rmfadjmhv46) covered once after 65 Prevnar 13: the [employer] Rx. http://www. idph.[facility]. [place]/public/books/advin.htm  A link to the [employer]. This site has a lot of good information including definitions of the different types of Advance Directives.  It diabetes gestacional Análisis de por sinan al menos cada 3 años   Diabetes tipo 2 Todas las mujeres con prediabetes [employer]   Consumo no saludable de alcohol Todas las mujeres de gagandeep dior de edad En los exámenes de rutina   Presión arterial Todas l según el dior profesional. Hable con lo proveedor de atención médica para saber cuáles son [place] y análisis en lo zac.    Algunas personas deben realizarse pruebas según un cronograma diferente debido a nasima antecedentes médicos personales o proveedor [place] mujeres de gagandeep dior de edad Hable con lo proveedor de [address] contra Para [address] mujeres de gagandeep dior de edad que no tienen registro de ugo tenido esta in cada 10 años    Vacuna contra el herpes zóster (la culebrilla) Todas las mujeres de 55 años o [place]. Hay 2 vacunas disponibles:       · Vacuna recombinante contra el herpes zóster (Shingrix). Se recomienda edwin la vacuna preferida contra la culebrilla.  Se ad professional's instructions. Shingrix Injection 0.1 mg/mL  Usos  Para prevenir kodak infección viral.  Instrucciones  Roz medicamento se lo darán en el consultorio médico.  Roz medicamento se administra en forma de inyección en un músculo.   Roz med síntomas, busque asistencia médica rápidamente. Extra  Hable con lo médico, enfermero o farmacéutico si tiene alguna pregunta acerca de gagandeep medicamento. [email or web address] [employer]. com/[email or web address]/fdbpem/[email or web address]  NOTA IMPORTANTE: En gagandeep documento hay kodka explicaci cosquilleo o picazón en kodak parte del jose o el cuerpo. Puede sentir edwin si tuviera gripe, con fiebre y [address]. · A los pocos días, aparece kodak erupción britney con ampollas pequeñas.  La erupción puede tener la siguiente apariencia:   ? The [place] of [place] hielo o kodak compresa de hielo directamente sobre la piel. · Use kodak loción de calamina para aliviar la picazón en la piel. · Pregunte a lo proveedor de [employer] analgésicos de [place].  Si el dolor es intenso, es posible que el proveedor fiebre o erupciones después del tratamiento;  · dolor intenso en el pecho que no se shen. ¿Cómo puede prevenirse la culebrilla? Solo puede tener culebrilla si tuvo varicela en el pasado. Puede contagiarle el virus a kodak persona que nunca tuvo varicela. [id number] [address]. Todos los derechos reservados. Esta información no pretende sustituir la atención médica profesional. Sólo lo médico puede diagnosticar y tratar un problema de horace.

## 2021-08-19 NOTE — PROGRESS NOTES
HPI:    Patient ID: Michelle Aviles is a 80year old female.     Michelle Aviles is a 80year old female who presents for a complete physical exam.   HPI:   Patient presents with:  Wellness Visit: medicare annual  Refill Request: irish tablet 0   • insulin glargine (LANTUS SOLOSTAR) 100 UNIT/ML Subcutaneous Solution Pen-injector INJECT 42 UNITS IN THE MORNING AND  5-10 UNITS IN THE EVENING, if accucheck > 120 >> 10 units.  10 each 1   • famoTIDine 20 MG Oral Tab Take 1 tablet (20 mg total times daily as needed for Pain. 30 tablet 0   • Alcohol Swabs Does not apply Pads Code: E11.9. Checks twice daily.  100 each 11   • ACCU-CHEK FASTCLIX LANCETS Does not apply Misc CHECK EVERY MORNING AND EVENING 204 each 11   • Secukinumab, 300 MG Dose, (CO mg total) by mouth every 8 (eight) hours as needed for Nausea.  (Patient not taking: Reported on 8/5/2021 ) 30 tablet 0      Past Medical History:   Diagnosis Date   • Appendicitis    • Cholelithiasis    • Deep vein thrombosis (HCC)     L leg    • Essential       Spouse name: Not on file      Number of children: 4      Years of education: Not on file      Highest education level: Not on file    Tobacco Use      Smoking status: Never Smoker      Smokeless tobacco: Never Used    Vaping Use      Vaping Us home:   Same. Checks different times. Knows machine is accurate. Has been compliant with medications. Exercise level: somewhat active and has been following low salt diet. Weight has been stable.   Wt Readings from Last 3 Encounters:  08/19/21 : 235 lb loss, mouth sores, nosebleeds, postnasal drip, rhinorrhea, sinus pressure, sinus pain, sneezing, sore throat, tinnitus, trouble swallowing and voice change. Eyes: Negative for photophobia, pain, discharge, redness, itching and visual disturbance.    Resp 32G X 4 MM Does not apply Misc Injects qhs. 100 each 6   • valsartan 160 MG Oral Tab Take 1 tablet (160 mg total) by mouth daily.  180 tablet 1   • Albuterol Sulfate  (90 Base) MCG/ACT Inhalation Aero Soln inhale 2 puff by inhalation route  every 4 - Instructions. Inject 300mg at week 0, 1, 2, 3, 4, then monthly thereafter. 10 pen 0   • Lancets Does not apply Misc Checks qam and PM. Dx. E11.9. 100 each 6   • Glucose Blood In Vitro [employer] q12hrs.  Dx. E11.65 200 strip 6   • Insulin Pen Needle (BD P Deep vein thrombosis (HCC)     L leg    • Essential hypertension    • Goiter     Multinodular goiter S/P thyroidectomy.     • Osteopenia    • Vertigo, aural       Past Surgical History:   Procedure Laterality Date   • APPENDECTOMY  1993   • CATARACT EXTRACT Size: large)   Pulse 69   Temp 98.4 °F (36.9 °C) (Tympanic)   Resp 17   Ht 5' 9\" (1.753 m)   Wt 235 lb 3.2 oz (106.7 kg)   BMI 34.73 kg/m²   BP Readings from Last 3 Encounters:  08/19/21 : 144/62  08/05/21 : 160/68  07/21/21 : 146/66    Wt Readings from L foreign body, discharge or hordeolum. Extraocular Movements: Extraocular movements intact. Right eye: Normal extraocular motion and no nystagmus. Left eye: Normal extraocular motion and no nystagmus.       Conjunctiva/sclera: Conjunctivae nor sounds are normal. There is no distension. Palpations: Abdomen is soft. Abdomen is not rigid. There is no fluid wave, hepatomegaly, splenomegaly or mass. Tenderness: There is no abdominal tenderness.  There is no right CVA tenderness, left CVA ten dysfunction (mgd), bilateral, both upper and lower lids  Long term (current) use of anticoagulants  Microscopic hematuria  Intracranial atherosclerosis  Hypothyroidism (acquired)  History of pterygium excision  Goiter  Essential hypertension with goal bloo a completely normal cognitive assessment- see flowsheet entries    Functional Ability/Status   Sujatha Cassidy has some abnormal functions as listed below:  She has Driving difficulties based on screening of functional status.    Driving: Does n Physician (OPHTHALMOLOGY)    Patient Active Problem List:     Controlled type 2 diabetes mellitus without complication, without long-term current use of insulin ([facility] [address]     Breast cancer screening     Primary osteoarthritis of right knee     Hypothyroidism ( Oral Tab, Take 2 tablets (200 mcg total) by mouth every morning before breakfast.  insulin glargine (LANTUS SOLOSTAR) 100 UNIT/ML Subcutaneous Solution Pen-injector, INJECT 42 UNITS IN THE MORNING AND  5-10 UNITS IN THE EVENING, if accucheck > 120 >> 10 un CHECK EVERY MORNING AND EVENING  Secukinumab, 300 MG Dose, (COSENTYX SENSOREADY, 300 MG,) 150 MG/ML Subcutaneous Solution Auto-injector, Inject 300 mg into the skin See Admin Instructions. Inject 300mg at week 0, 1, 2, 3, 4, then monthly thereafter.   [place] Diabetes in her brother, mother, paternal uncle, and another family member; Heart Disease in her father; Hypertension in her brother and mother; Lipids in her brother; No Known Problems in her daughter, maternal aunt, maternal cousin female, maternal cousi misunderstand what others are saying and make inappropriate responses: No I avoid social activities because I cannot hear well and fear I will reply improperly: No   Family members and friends have told me they think I may have hearing loss:  No with coumadin clinic. Microscopic hematuria  -     URINALYSIS, AUTO, W/O SCOPE    Intracranial atherosclerosis No Sx. Control risks. Hypothyroidism (acquired) Stable. History of pterygium excision Sp Sx. Goiter SP Sx.      Essential hyperten Abnormal computed tomography angiography (CTA)    Encounter for annual health examination Check urine. Other orders  -     Levothyroxine Sodium 100 MCG Oral Tab;  Take 2 tablets (200 mcg total) by mouth every morning before breakfast.     RTC 4 [address] 01/30/2021         Electrocardiogram (EKG)   Covered if needed at Welcome to Medicare, and non-screening if indicated for medical reasons 07/23/2020      Ultrasound Screening for Abdominal Aortic Aneurysm (AAA) Covered once in a lifetime for one of the metal); may be covered with your pharmacy prescription benefits -    Tetanus, Diptheria and Pertusis TD and TDaP Not covered by Medicare Part B -  No recommendations at this time    Zoster Not covered by Medicare Part B; may be covered with your pharmacy

## 2021-09-02 ENCOUNTER — MED REC SCAN ONLY (OUTPATIENT)
Dept: INTERNAL MEDICINE CLINIC | Facility: CLINIC | Age: 71
End: 2021-09-02

## 2021-09-02 ENCOUNTER — NURSE ONLY (OUTPATIENT)
Dept: INTERNAL MEDICINE CLINIC | Facility: CLINIC | Age: 71
End: 2021-09-02
Payer: MEDICARE

## 2021-09-02 ENCOUNTER — TELEPHONE (OUTPATIENT)
Dept: INTERNAL MEDICINE CLINIC | Facility: CLINIC | Age: 71
End: 2021-09-02

## 2021-09-02 VITALS — SYSTOLIC BLOOD PRESSURE: 145 MMHG | HEART RATE: 63 BPM | DIASTOLIC BLOOD PRESSURE: 70 MMHG

## 2021-09-02 DIAGNOSIS — Z01.30 BLOOD PRESSURE CHECK: Primary | ICD-10-CM

## 2021-09-02 PROCEDURE — 3078F DIAST BP <80 MM HG: CPT | Performed by: INTERNAL MEDICINE

## 2021-09-02 PROCEDURE — 3077F SYST BP >= 140 MM HG: CPT | Performed by: INTERNAL MEDICINE

## 2021-09-07 RX ORDER — SIMVASTATIN 20 MG
20 TABLET ORAL
Qty: 24 TABLET | Refills: 1 | Status: SHIPPED | OUTPATIENT
Start: 2021-09-09 | End: 2022-02-26

## 2021-09-07 NOTE — TELEPHONE ENCOUNTER
Refill passed per [place] [facility] protocol.     Requested Prescriptions   Pending Prescriptions Disp Refills    SIMVASTATIN 20 MG Oral Tab [Pharmacy Med Name: Simvastatin 20 Mg Tab Nort] 24 tablet 0     Sig: Take 1 tablet by mouth twice a week        Cholesterol Medication Protocol Passed - 9/7/2021 11:59 AM        Passed - ALT in past 12 months        Passed - LDL in past 12 months        Passed - Last ALT < 80       Lab Results   Component Value Date    ALT 26 06/28/2021             Passed - Last LDL < 130     Lab Results   Component Value Date    LDL 68 01/30/2021             Passed - Appointment in past 12 or next 3 months            Future Appointments         Provider Department Appt Notes    In 2 days [address], [address] [facility] BP     In 2 weeks [facility], [place]     In 2 weeks Payton Lewis MD [facility], [address]     In 2 months Jules Vernon MD [facility], [address], [place] 3 m[address]     In 3 months Priti Mendoza [facility], [address] [facility] f/u          Recent Outpatient Visits              5 days ago Blood pressure check    [facility], [address], [place]    Nurse Only    2 weeks ago Vaccine counseling    [facility], [address], Remberto Mijares MD    Office Visit    1 month ago Chronic deep vein thrombosis of left popliteal vein [facility])    [facility], [address] [place] [address], Cailin Busch MD    Office Visit    1 month ago Essential hypertension with goal blood pressure less than 130/80    [facility], [address], Star Chakraborty APRN    Virtual Phone E/M    1 month ago Psoriatic arthritis [facility])    [facility] for Catina Neal MD    Office Visit

## 2021-09-07 NOTE — TELEPHONE ENCOUNTER
Patient informed about message sury. She will come Sept 9 to check her blood pressure and she will bring her bp machine.

## 2021-09-09 ENCOUNTER — TELEPHONE (OUTPATIENT)
Dept: INTERNAL MEDICINE CLINIC | Facility: CLINIC | Age: 71
End: 2021-09-09

## 2021-09-09 ENCOUNTER — NURSE ONLY (OUTPATIENT)
Dept: INTERNAL MEDICINE CLINIC | Facility: CLINIC | Age: 71
End: 2021-09-09
Payer: MEDICARE

## 2021-09-09 VITALS — HEART RATE: 58 BPM | DIASTOLIC BLOOD PRESSURE: 60 MMHG | SYSTOLIC BLOOD PRESSURE: 136 MMHG

## 2021-09-09 PROCEDURE — 3078F DIAST BP <80 MM HG: CPT | Performed by: INTERNAL MEDICINE

## 2021-09-09 PROCEDURE — 3075F SYST BP GE 130 - 139MM HG: CPT | Performed by: INTERNAL MEDICINE

## 2021-09-09 NOTE — TELEPHONE ENCOUNTER
She had these symptoms even before the new medications. In the new medications has not changed her symptoms. So not sure why the concern with the new medications in order is at the fact that she does not want to take any other new medications.   Her blood

## 2021-09-10 NOTE — TELEPHONE ENCOUNTER
Spoke with patient about Dr Rut ga. Patient states that she has an appointment with Dr Aracelis Nunes on 9/23/2021 and we tell him about the medication.  Patient also said that she was taking Hydrochlorothiazide before the new medication and the Hyd

## 2021-09-10 NOTE — TELEPHONE ENCOUNTER
Spoke with patient per Dr Jennifer Monterroso May try once a day until she sees Dr Stephanie Angeles and to please check her blood pressure twice a day and write it in a piece of paper and take it to Dr Stephanie Angeles on her appointment  9/23/21.  If her blood pressure get high to please le

## 2021-09-10 NOTE — TELEPHONE ENCOUNTER
May be have her try once a day until and check her blood pressures twice a day bring those readings in different days different times to Dr. To Elder when she sees him on the 23rd.

## 2021-09-15 RX ORDER — SPIRONOLACTONE 25 MG/1
TABLET ORAL
Qty: 30 TABLET | Refills: 5 | Status: SHIPPED | OUTPATIENT
Start: 2021-09-15

## 2021-09-20 ENCOUNTER — TELEPHONE (OUTPATIENT)
Dept: NEPHROLOGY | Facility: CLINIC | Age: 71
End: 2021-09-20

## 2021-09-20 DIAGNOSIS — N18.30 STAGE 3 CHRONIC KIDNEY DISEASE, UNSPECIFIED WHETHER STAGE 3A OR 3B CKD (HCC): Primary | ICD-10-CM

## 2021-09-20 NOTE — TELEPHONE ENCOUNTER
Pts son Kassi Altman called with questions about lab orders to be done before pts next appt. Please call.

## 2021-09-20 NOTE — TELEPHONE ENCOUNTER
Spoke to Kapil allison, patient's son. No lab orders in chart. Last seen 7/2/21. Upcoming appointment is scheduled for 9/23/21. Asking if patient needs any lab work prior to appointment. Encounter routed to Dr. Jim Cordero.

## 2021-09-21 ENCOUNTER — ANTI-COAG VISIT (OUTPATIENT)
Dept: INTERNAL MEDICINE CLINIC | Facility: CLINIC | Age: 71
End: 2021-09-21
Payer: MEDICARE

## 2021-09-21 ENCOUNTER — LAB ENCOUNTER (OUTPATIENT)
Dept: LAB | Facility: HOSPITAL | Age: 71
End: 2021-09-21
Attending: INTERNAL MEDICINE
Payer: MEDICARE

## 2021-09-21 DIAGNOSIS — D68.61 ANTIPHOSPHOLIPID SYNDROME (HCC): ICD-10-CM

## 2021-09-21 DIAGNOSIS — N18.30 STAGE 3 CHRONIC KIDNEY DISEASE, UNSPECIFIED WHETHER STAGE 3A OR 3B CKD (HCC): ICD-10-CM

## 2021-09-21 DIAGNOSIS — Z79.01 LONG TERM (CURRENT) USE OF ANTICOAGULANTS: ICD-10-CM

## 2021-09-21 DIAGNOSIS — I82.532 CHRONIC DEEP VEIN THROMBOSIS OF LEFT POPLITEAL VEIN (HCC): ICD-10-CM

## 2021-09-21 DIAGNOSIS — Z51.81 ENCOUNTER FOR THERAPEUTIC DRUG MONITORING: ICD-10-CM

## 2021-09-21 LAB
ANION GAP SERPL CALC-SCNC: 7 MMOL/L (ref 0–18)
BASOPHILS # BLD AUTO: 0.06 X10(3) UL (ref 0–0.2)
BASOPHILS NFR BLD AUTO: 0.8 %
BUN BLD-MCNC: 31 MG/DL (ref 7–18)
BUN/CREAT SERPL: 27.7 (ref 10–20)
CALCIUM BLD-MCNC: 9.3 MG/DL (ref 8.5–10.1)
CHLORIDE SERPL-SCNC: 106 MMOL/L (ref 98–112)
CO2 SERPL-SCNC: 26 MMOL/L (ref 21–32)
CREAT BLD-MCNC: 1.12 MG/DL
DEPRECATED RDW RBC AUTO: 42.5 FL (ref 35.1–46.3)
EOSINOPHIL # BLD AUTO: 0.35 X10(3) UL (ref 0–0.7)
EOSINOPHIL NFR BLD AUTO: 4.4 %
ERYTHROCYTE [DISTWIDTH] IN BLOOD BY AUTOMATED COUNT: 12 % (ref 11–15)
GLUCOSE BLD-MCNC: 121 MG/DL (ref 70–99)
HCT VFR BLD AUTO: 38 %
HGB BLD-MCNC: 12.4 G/DL
IMM GRANULOCYTES # BLD AUTO: 0.02 X10(3) UL (ref 0–1)
IMM GRANULOCYTES NFR BLD: 0.3 %
INR: 2.3 (ref 0.8–1.2)
LYMPHOCYTES # BLD AUTO: 1.94 X10(3) UL (ref 1–4)
LYMPHOCYTES NFR BLD AUTO: 24.7 %
MCH RBC QN AUTO: 31.3 PG (ref 26–34)
MCHC RBC AUTO-ENTMCNC: 32.6 G/DL (ref 31–37)
MCV RBC AUTO: 96 FL
MONOCYTES # BLD AUTO: 0.91 X10(3) UL (ref 0.1–1)
MONOCYTES NFR BLD AUTO: 11.6 %
NEUTROPHILS # BLD AUTO: 4.59 X10 (3) UL (ref 1.5–7.7)
NEUTROPHILS # BLD AUTO: 4.59 X10(3) UL (ref 1.5–7.7)
NEUTROPHILS NFR BLD AUTO: 58.2 %
OSMOLALITY SERPL CALC.SUM OF ELEC: 296 MOSM/KG (ref 275–295)
PATIENT FASTING Y/N/NP: YES
PLATELET # BLD AUTO: 288 10(3)UL (ref 150–450)
POTASSIUM SERPL-SCNC: 4.1 MMOL/L (ref 3.5–5.1)
RBC # BLD AUTO: 3.96 X10(6)UL
SODIUM SERPL-SCNC: 139 MMOL/L (ref 136–145)
TEST STRIP EXPIRATION DATE: ABNORMAL DATE
WBC # BLD AUTO: 7.9 X10(3) UL (ref 4–11)

## 2021-09-21 PROCEDURE — 93793 ANTICOAG MGMT PT WARFARIN: CPT

## 2021-09-21 PROCEDURE — 36415 COLL VENOUS BLD VENIPUNCTURE: CPT

## 2021-09-21 PROCEDURE — 85610 PROTHROMBIN TIME: CPT

## 2021-09-21 PROCEDURE — 80048 BASIC METABOLIC PNL TOTAL CA: CPT

## 2021-09-21 PROCEDURE — 85025 COMPLETE CBC W/AUTO DIFF WBC: CPT

## 2021-09-21 NOTE — TELEPHONE ENCOUNTER
Per daughter of pt,  Pt needs an appt as a New pt for anticoagulation, Pls advise. [Routine Follow-Up] : routine follow-up visit for

## 2021-09-23 ENCOUNTER — OFFICE VISIT (OUTPATIENT)
Dept: NEPHROLOGY | Facility: CLINIC | Age: 71
End: 2021-09-23
Payer: MEDICARE

## 2021-09-23 ENCOUNTER — TELEPHONE (OUTPATIENT)
Dept: NEPHROLOGY | Facility: CLINIC | Age: 71
End: 2021-09-23

## 2021-09-23 VITALS
HEIGHT: 69 IN | SYSTOLIC BLOOD PRESSURE: 144 MMHG | WEIGHT: 236 LBS | HEART RATE: 59 BPM | TEMPERATURE: 98 F | BODY MASS INDEX: 34.96 KG/M2 | DIASTOLIC BLOOD PRESSURE: 57 MMHG

## 2021-09-23 DIAGNOSIS — E11.9 CONTROLLED TYPE 2 DIABETES MELLITUS WITHOUT COMPLICATION, WITHOUT LONG-TERM CURRENT USE OF INSULIN (HCC): Chronic | ICD-10-CM

## 2021-09-23 DIAGNOSIS — N18.32 STAGE 3B CHRONIC KIDNEY DISEASE (HCC): Primary | Chronic | ICD-10-CM

## 2021-09-23 PROCEDURE — 3078F DIAST BP <80 MM HG: CPT | Performed by: INTERNAL MEDICINE

## 2021-09-23 PROCEDURE — 3008F BODY MASS INDEX DOCD: CPT | Performed by: INTERNAL MEDICINE

## 2021-09-23 PROCEDURE — 3077F SYST BP >= 140 MM HG: CPT | Performed by: INTERNAL MEDICINE

## 2021-09-23 PROCEDURE — 99213 OFFICE O/P EST LOW 20 MIN: CPT | Performed by: INTERNAL MEDICINE

## 2021-09-23 NOTE — PROGRESS NOTES
Dear Ivy Boswell    Here with her son Isaura Burger besides her headaches which are actually getting better she is doing okay pressure today in the office was 144/57 at home she is running in the 130/80 range we compared 2 tablets (200 mcg total) by mouth every morning before breakfast. 180 tablet 0   • insulin glargine (LANTUS SOLOSTAR) 100 UNIT/ML Subcutaneous Solution Pen-injector INJECT 42 UNITS IN THE MORNING AND  5-10 UNITS IN THE EVENING, if accucheck > 120 >> 10 un inhale 2 puff by inhalation route  every 4 - 6 hours as needed (Patient not taking: Reported on 9/23/2021) 1 each 0   • benzonatate (TESSALON PERLES) 100 MG Oral Cap Take 1 capsule (100 mg total) by mouth 3 (three) times daily as needed for cough.  (Patient loss  Cardiovascular:  Negative for chest pain, sob  Respiratory:  Negative for cough, dyspnea and wheezing  Gastrointestinal:  Negative for abdominal pain, constipation  Genitourinary:  Negative for dysuria and hematuria  Endocrine:  Negative for abnormal last A1c 6.5%    Patient is a candidate for an SGLT drug but am very concerned she will have side effects from it and not sure that the benefits will outweigh any side effects she may have as she is so resistant to taking any new drugs    Would also consid

## 2021-09-23 NOTE — PATIENT INSTRUCTIONS
Continue same medications      See me back in January    Call for labs prior to appointment    Get shingles shot in December      Flu shot in the next few weeks    Covid booster in October    Good to see you both

## 2021-09-24 RX ORDER — ESCITALOPRAM OXALATE 5 MG/1
5 TABLET ORAL NIGHTLY
Qty: 30 TABLET | Refills: 3 | Status: SHIPPED | OUTPATIENT
Start: 2021-09-24 | End: 2022-01-12

## 2021-09-24 NOTE — TELEPHONE ENCOUNTER
Carmelo strattonmarline muniz  Is a stable as she can get right now    Doing better with indapamide I told her to take it just once a day however    I feel she benefit from an SSRI or tricyclic for her headaches and her depression anxiety if she has not already tri

## 2021-09-24 NOTE — TELEPHONE ENCOUNTER
Patient informed about message sury. She states she will try it, Told her It will help her with the stress and anxiety. She hope doesn't have to much side effect. She told me she got the Shingles vaccine. I updated in her chart.  She will bring the recor

## 2021-09-24 NOTE — TELEPHONE ENCOUNTER
We can talk to her about trying a medicine for anxiety. Anxiety may be making her blood pressure worse. If she is okay trying another medication that may help calm her blood pressure but not used for blood pressure.   I know she has been on multiple blood

## 2021-10-06 ENCOUNTER — TELEPHONE (OUTPATIENT)
Dept: INTERNAL MEDICINE CLINIC | Facility: CLINIC | Age: 71
End: 2021-10-06

## 2021-10-06 NOTE — TELEPHONE ENCOUNTER
Called patient regarding message below. Patient was prescribed escitalopram on 9/24/21 and states has been taking it for exactly a week today.  States symptoms of mild HA and mild dizziness Randolph Cinnamon been going on for a while and Dr Gene Joe is aware\" and state

## 2021-10-06 NOTE — TELEPHONE ENCOUNTER
Patient indicates she is not sure if medication rx escitalopram  is causing her to feel dizzy, headache, and feels like her blood pressure is off. Please call with Serbian call at [phone].

## 2021-10-07 NOTE — TELEPHONE ENCOUNTER
RN called patient. RN spoke with patient. Patient's date of birth and full name both confirmed. RN informed patient of provider's message below in detail. Patient has no other questions at this time. Language Line utilized.   Paulette Contreras In

## 2021-10-07 NOTE — TELEPHONE ENCOUNTER
to conitnure lexapro for 1 more week if possible. Don't think related. Her BP's are as high in the past. She has brought in readings. She sees Dr. Akshat Palencia for BP, since tried several medication s to help BP and she either had side effects or did not work.

## 2021-10-12 ENCOUNTER — TELEPHONE (OUTPATIENT)
Dept: INTERNAL MEDICINE CLINIC | Facility: CLINIC | Age: 71
End: 2021-10-12

## 2021-10-12 NOTE — TELEPHONE ENCOUNTER
Reginaldo Rice the [facility] [place] pharmacist is calling at phone number [phone]. Reginaldojeet Rice is asking for her last blood pressure reading. Stated last on is 144/57 on 9/23/21. She is also asking that she is still taking the Valsartan 160 mg daily. I stated yes.

## 2021-10-20 ENCOUNTER — TELEPHONE (OUTPATIENT)
Dept: INTERNAL MEDICINE CLINIC | Facility: CLINIC | Age: 71
End: 2021-10-20

## 2021-10-20 RX ORDER — GLIPIZIDE 10 MG/1
5 TABLET ORAL
Qty: 90 TABLET | Refills: 0 | Status: SHIPPED | OUTPATIENT
Start: 2021-10-20 | End: 2022-01-18

## 2021-10-20 NOTE — TELEPHONE ENCOUNTER
Please review; protocol failed.     Requested Prescriptions   Pending Prescriptions Disp Refills    GLIPIZIDE 10 MG Oral Tab [Pharmacy Med Name: Glipizide 10 Mg Tab Apot] 90 tablet 0     Sig: TAKE 1/2 TABLET BY MOUTH TWICE A DAY BEFORE MEALS        Diabetes Medication Protocol Failed - 10/20/2021  9:48 AM        Failed - Last A1C < 7.5 and within past 6 months     Lab Results   Component Value Date    A1C 6.5 (H) 03/27/2021               Passed - Appointment in past 6 or next 3 months        Passed - GFR Non- > 50     Lab Results   Component Value Date    GFRNAA 50 (L) 09/21/2021                 Passed - GFR in the past 12 months              Future Appointments         Provider Department Appt Notes    In 6 days [facility], [facility]     In 6 days [facility] [place], [address] [facility] [address] [facility] [address] flu inj,informed of policy    In 2 weeks [facility] DEXA RM1; [facility] JOHN [address] with pt's daughter(Taylor)    In 2 weeks [facility] RM[facility]; [address]     In 4 weeks Yamil Jean, [address] [facility], [address], [place] Intermittent Sore and stiffness of heel,ankle and wrists    In 1 month Crystal House MD [address]             Recent Outpatient Visits              3 weeks ago Stage 3b chronic kidney disease [facility])    [address] [facility], [address] [facility], Karyna Becker MD    Office Visit    1 month ago     [address] [facility], [address], [place]    Nurse Only    1 month ago Blood pressure check    [address] [facility], [address], [place]    Nurse Only    2 months ago Vaccine counseling    [address] [facility], [address], Dominic Bey MD    Office Visit    2 months ago Chronic deep vein thrombosis of left popliteal vein [facility])    [place], [address] [facility], Melony Garcia MD Office Visit

## 2021-10-21 NOTE — TELEPHONE ENCOUNTER
Called patient no answered, left a message letting her know she is due for her flu shot. She can call us to schedule a nurse visit appointment or she can get it in a pharmacy.

## 2021-10-26 ENCOUNTER — ANTI-COAG VISIT (OUTPATIENT)
Dept: INTERNAL MEDICINE CLINIC | Facility: CLINIC | Age: 71
End: 2021-10-26
Payer: MEDICARE

## 2021-10-26 ENCOUNTER — IMMUNIZATION (OUTPATIENT)
Dept: INTERNAL MEDICINE CLINIC | Facility: CLINIC | Age: 71
End: 2021-10-26
Payer: MEDICARE

## 2021-10-26 DIAGNOSIS — D68.61 ANTIPHOSPHOLIPID SYNDROME (HCC): ICD-10-CM

## 2021-10-26 DIAGNOSIS — I82.532 CHRONIC DEEP VEIN THROMBOSIS OF LEFT POPLITEAL VEIN (HCC): ICD-10-CM

## 2021-10-26 DIAGNOSIS — Z23 NEED FOR VACCINATION: Primary | ICD-10-CM

## 2021-10-26 DIAGNOSIS — Z79.01 LONG TERM (CURRENT) USE OF ANTICOAGULANTS: ICD-10-CM

## 2021-10-26 DIAGNOSIS — Z51.81 ENCOUNTER FOR THERAPEUTIC DRUG MONITORING: ICD-10-CM

## 2021-10-26 PROCEDURE — 90662 IIV NO PRSV INCREASED AG IM: CPT | Performed by: INTERNAL MEDICINE

## 2021-10-26 PROCEDURE — 93793 ANTICOAG MGMT PT WARFARIN: CPT

## 2021-10-26 PROCEDURE — G0008 ADMIN INFLUENZA VIRUS VAC: HCPCS | Performed by: INTERNAL MEDICINE

## 2021-10-26 PROCEDURE — 85610 PROTHROMBIN TIME: CPT

## 2021-11-06 ENCOUNTER — HOSPITAL ENCOUNTER (OUTPATIENT)
Dept: BONE DENSITY | Age: 71
Discharge: HOME | End: 2021-11-06
Attending: INTERNAL MEDICINE
Payer: MEDICARE

## 2021-11-06 ENCOUNTER — HOSPITAL ENCOUNTER (OUTPATIENT)
Dept: MAMMOGRAPHY | Age: 71
Discharge: HOME | End: 2021-11-06
Attending: INTERNAL MEDICINE
Payer: MEDICARE

## 2021-11-06 DIAGNOSIS — M85.80 OSTEOPENIA, UNSPECIFIED LOCATION: ICD-10-CM

## 2021-11-06 DIAGNOSIS — Z12.31 ENCOUNTER FOR SCREENING MAMMOGRAM FOR MALIGNANT NEOPLASM OF BREAST: ICD-10-CM

## 2021-11-06 PROCEDURE — 77067 SCR MAMMO BI INCL CAD: CPT | Performed by: INTERNAL MEDICINE

## 2021-11-06 PROCEDURE — 77080 DXA BONE DENSITY AXIAL: CPT | Performed by: INTERNAL MEDICINE

## 2021-11-06 PROCEDURE — 77063 BREAST TOMOSYNTHESIS BI: CPT | Performed by: INTERNAL MEDICINE

## 2021-11-14 ENCOUNTER — LAB ENCOUNTER (OUTPATIENT)
Dept: LAB | Facility: HOSPITAL | Age: 71
End: 2021-11-14
Attending: INTERNAL MEDICINE
Payer: MEDICARE

## 2021-11-14 DIAGNOSIS — L40.50 PSORIATIC ARTHRITIS (HCC): ICD-10-CM

## 2021-11-14 DIAGNOSIS — Z51.81 ENCOUNTER FOR THERAPEUTIC DRUG MONITORING: ICD-10-CM

## 2021-11-14 PROCEDURE — 82565 ASSAY OF CREATININE: CPT

## 2021-11-14 PROCEDURE — 84450 TRANSFERASE (AST) (SGOT): CPT

## 2021-11-14 PROCEDURE — 85652 RBC SED RATE AUTOMATED: CPT

## 2021-11-14 PROCEDURE — 82040 ASSAY OF SERUM ALBUMIN: CPT

## 2021-11-14 PROCEDURE — 85025 COMPLETE CBC W/AUTO DIFF WBC: CPT

## 2021-11-14 PROCEDURE — 36415 COLL VENOUS BLD VENIPUNCTURE: CPT

## 2021-11-14 PROCEDURE — 86140 C-REACTIVE PROTEIN: CPT

## 2021-11-14 PROCEDURE — 84460 ALANINE AMINO (ALT) (SGPT): CPT

## 2021-11-16 ENCOUNTER — ANTI-COAG VISIT (OUTPATIENT)
Dept: INTERNAL MEDICINE CLINIC | Facility: CLINIC | Age: 71
End: 2021-11-16
Payer: MEDICARE

## 2021-11-16 DIAGNOSIS — D68.61 ANTIPHOSPHOLIPID SYNDROME (HCC): ICD-10-CM

## 2021-11-16 DIAGNOSIS — Z51.81 ENCOUNTER FOR THERAPEUTIC DRUG MONITORING: ICD-10-CM

## 2021-11-16 DIAGNOSIS — I82.532 CHRONIC DEEP VEIN THROMBOSIS OF LEFT POPLITEAL VEIN (HCC): ICD-10-CM

## 2021-11-16 DIAGNOSIS — Z79.01 LONG TERM (CURRENT) USE OF ANTICOAGULANTS: ICD-10-CM

## 2021-11-16 PROCEDURE — 93793 ANTICOAG MGMT PT WARFARIN: CPT

## 2021-11-16 PROCEDURE — 85610 PROTHROMBIN TIME: CPT

## 2021-11-17 ENCOUNTER — OFFICE VISIT (OUTPATIENT)
Dept: RHEUMATOLOGY | Facility: CLINIC | Age: 71
End: 2021-11-17
Payer: MEDICARE

## 2021-11-17 VITALS
WEIGHT: 234 LBS | BODY MASS INDEX: 34.66 KG/M2 | HEIGHT: 69 IN | DIASTOLIC BLOOD PRESSURE: 70 MMHG | HEART RATE: 52 BPM | SYSTOLIC BLOOD PRESSURE: 122 MMHG

## 2021-11-17 DIAGNOSIS — Z51.81 ENCOUNTER FOR THERAPEUTIC DRUG MONITORING: ICD-10-CM

## 2021-11-17 DIAGNOSIS — L40.50 PSORIATIC ARTHRITIS (HCC): Primary | ICD-10-CM

## 2021-11-17 PROCEDURE — 99214 OFFICE O/P EST MOD 30 MIN: CPT | Performed by: INTERNAL MEDICINE

## 2021-11-17 PROCEDURE — 3078F DIAST BP <80 MM HG: CPT | Performed by: INTERNAL MEDICINE

## 2021-11-17 PROCEDURE — 3074F SYST BP LT 130 MM HG: CPT | Performed by: INTERNAL MEDICINE

## 2021-11-17 PROCEDURE — 3008F BODY MASS INDEX DOCD: CPT | Performed by: INTERNAL MEDICINE

## 2021-11-18 NOTE — PROGRESS NOTES
Enedina Montanez is a 80year old female. HPI:   Patient presents with:  Medication Follow-Up  Joint Pain  Stiffness      Nguyen Peacockald was seen today 11/17/2021 Psoriasis with psoriatic arthritis and Antiphospholipid syndrome.     Current Medicati It does not affect her ADLs. She has not had to take any other medication for her pain  Remains on Cosentyx monthly  Reviewed blood work with patient, normal ESR.   CRP very slightly elevated at 1.12      HISTORY:  Past Medical History:   Diagnosis Date Sodium 5 MG Oral Tab Take 1 tablet (5 mg total) by mouth daily.  take 1 tablet by mouth two times a day 180 tablet 3   • Glucose Blood (ACCU-CHEK GUIDE) In Vitro Strip 1 strip by In Vitro route Q12H. 200 strip 11   • Glucose Blood (ACCU-CHEK GUIDE) In Vitro .cmed  Allergies:    Amlodipine              SHORTNESS OF BREATH    Comment:Chest pressure  Metformin               DIARRHEA  Metoprolol              DIARRHEA      ROS:   All other ROS are negative.      PHYSICAL EXAM:   GEN: AAOx3, NAD  HEENT: EOMI, PE 5.0   Basophils %      % 0.9 0.3   Immature Granulocyte %      % 0.4 0.3   CREATININE      0.55 - 1.02 mg/dL 1.07 (H) 1.19 (H)   eGFR NON-AFR.  AMERICAN      >=60 53 (L) 47 (L)   eGFR       >=60 61 54 (L)   SED RATE      0 - 30 mm/Hr 28 37 ( (H)   BETA 2 GLYCOPROTEIN 1 AB, IgG      <=15.0 U/mL 6.3   Cardiolipin IgG Antibody      0.0 - 14.9 GPL 6.2   Cardiolipin IgM Antibody      0.0 - 12.4 MPL 82.2 (H)       Component      Latest Ref Rng & Units 12/12/2018   TOTAL PROTEIN      6.5 - 9.1 g/dL 7 Unremarkable right lower extremity study. No evidence of right-sided DVT. ASSESSMENT/PLAN:   80-year-old Sami-speaking female initially referred for a + ADRIEL and rash. She was seen by dermatology and rashes consistent with psoriasis.   She was prescri Coumadin  Kidney insuffiencey- resolved  - She follows with nephrology     Per pt can contact daughter Laina Tapia [phone]    Pt will f/u in 6 mos    Kailey Dickinson MD  11/17/2021  6:40 PM

## 2021-11-18 NOTE — PATIENT INSTRUCTIONS
You were seen today for psoriatic arthritis  Continue cosentyx monthly  Blood work in 6 mos, May  Follow up in 6 mos or sooner if joints are bothering her

## 2021-11-23 ENCOUNTER — TELEPHONE (OUTPATIENT)
Dept: INTERNAL MEDICINE CLINIC | Facility: CLINIC | Age: 71
End: 2021-11-23

## 2021-11-23 ENCOUNTER — OFFICE VISIT (OUTPATIENT)
Dept: INTERNAL MEDICINE CLINIC | Facility: CLINIC | Age: 71
End: 2021-11-23
Payer: MEDICARE

## 2021-11-23 VITALS
WEIGHT: 234.38 LBS | HEART RATE: 63 BPM | SYSTOLIC BLOOD PRESSURE: 146 MMHG | TEMPERATURE: 98 F | HEIGHT: 69 IN | BODY MASS INDEX: 34.71 KG/M2 | RESPIRATION RATE: 18 BRPM | DIASTOLIC BLOOD PRESSURE: 60 MMHG

## 2021-11-23 DIAGNOSIS — I10 ESSENTIAL HYPERTENSION WITH GOAL BLOOD PRESSURE LESS THAN 130/80: ICD-10-CM

## 2021-11-23 DIAGNOSIS — E04.9 GOITER: ICD-10-CM

## 2021-11-23 DIAGNOSIS — R10.31 RIGHT INGUINAL PAIN: ICD-10-CM

## 2021-11-23 DIAGNOSIS — E11.9 CONTROLLED TYPE 2 DIABETES MELLITUS WITHOUT COMPLICATION, WITHOUT LONG-TERM CURRENT USE OF INSULIN (HCC): Chronic | ICD-10-CM

## 2021-11-23 DIAGNOSIS — N39.41 URGE INCONTINENCE OF URINE: ICD-10-CM

## 2021-11-23 DIAGNOSIS — Z71.85 VACCINE COUNSELING: Primary | ICD-10-CM

## 2021-11-23 DIAGNOSIS — E03.9 HYPOTHYROIDISM (ACQUIRED): Chronic | ICD-10-CM

## 2021-11-23 PROBLEM — E11.22 TYPE 2 DIABETES MELLITUS WITH DIABETIC CHRONIC KIDNEY DISEASE (HCC): Status: ACTIVE | Noted: 2021-11-23

## 2021-11-23 PROCEDURE — 36415 COLL VENOUS BLD VENIPUNCTURE: CPT | Performed by: INTERNAL MEDICINE

## 2021-11-23 PROCEDURE — 3044F HG A1C LEVEL LT 7.0%: CPT | Performed by: INTERNAL MEDICINE

## 2021-11-23 PROCEDURE — 3078F DIAST BP <80 MM HG: CPT | Performed by: INTERNAL MEDICINE

## 2021-11-23 PROCEDURE — 99215 OFFICE O/P EST HI 40 MIN: CPT | Performed by: INTERNAL MEDICINE

## 2021-11-23 PROCEDURE — 3077F SYST BP >= 140 MM HG: CPT | Performed by: INTERNAL MEDICINE

## 2021-11-23 PROCEDURE — 3008F BODY MASS INDEX DOCD: CPT | Performed by: INTERNAL MEDICINE

## 2021-11-23 RX ORDER — LEVOTHYROXINE SODIUM 0.2 MG/1
200 TABLET ORAL
Qty: 90 TABLET | Refills: 1 | Status: SHIPPED | OUTPATIENT
Start: 2021-11-23

## 2021-11-23 NOTE — PATIENT INSTRUCTIONS
ASSESSMENT/PLAN:   Vaccine counseling  (primary encounter diagnosis) Up to date. Goiter  Hypothyroidism (acquired)    Controlled type 2 diabetes mellitus without complication, without long-term current use of insulin (hcc) Betetr. Check blood.  Careful before breakfast.       Imaging & Referrals:  PHYSICAL THERAPY - INTERNAL  US GROIN BILATERAL ([id number]=[id number])     RTC 3 months for FU. Cómo llevar un registro del nivel de azúcar en diogenes   Usted tiene diabetes.  Eso significa que lo cuerpo tiene dificultade proveedor de [employer] lo próxima visita. Brandon Legions lo proveedor de [employer] sugiera revisar lo nivel de azúcar por la mañana, por la noche cuando se Lakeisha Distance a dormir y antes y después de cada comida. Asegúrese de anotar todos los números.  As lo nivel de azúcar en la diogenes    La actividad física diaria puede ayudarle a controlar el nivel de azúcar en lo diogenes, ya que un estilo de sinan activo mejora la capacidad del organismo para utilizar la insulina. La actividad física diaria Gertrude ugarte Suhail Prows un podómetro para establecer nasima objetivos diarios. Por ejemplo, si camina 4,000 pasos diarios, intente añadir 200 pasos más por día. Trate de alcanzar el objetivo de 7,500 pasos.  Cada paso que da es kodak ayuda más para que lo cuerpo use la ins enfermo.     Note cómo la actividad influye en el nivel de azúcar en la diogenes  El ejercicio físico es importante cuando tiene diabetes. Vira es importante que vigile lo nivel de azúcar sanguíneo.  Revise a menudo si ha hecho [employer] un per que las tiras reactivas tomas las indicadas para lo medidor de glucosa. · Un anotador, kodak tabla o un cuaderno de registro, Abdiel Allen.   Cómo usar un medidor de glucosa en la diogenes   Puede medirse el nivel de azúcar en la diogenes en lo casa, en el tr fecha, la hora y lo nivel de azúcar en la diogenes. Kennedy le ayudará a identificar patrones, edwin la presencia de un nivel alto de azúcar en la diogenes después de ingerir determinadas comidas.  Lleve consigo lo cuaderno de registro cuando visite a lo proveedor el análisis. · Toque la gota de diogenes con la moe reactiva. · [address] medidor. Paso 4. Miriam y Melanie ''R'' Us. · Espere a que lo medidor [employer]PL [employer].   · Si aparece un mensaje de error, vuelva a medirse con [employer] melazas. Los azúcares agregados se encuentran en la mayoría de los postres, los alimentos procesados, los [place], los refrescos (gaseosas) regulares y las bebidas de fruta.  Son Joy Leonard para tratar niveles bajos de azúcar en la diogenes (hipoglucemia) ya q de isela  · 1/3 de taza de arroz  · 4 a 5 galletas saladas  · 1/2 English muffin  · 1/2 taza de frijoles negros  · 1/4 de kodak papa nolberto al horno (3 onzas)  · 2/3 de taza de yogur regular sin grasa  · 1 taza de sopa  · 1/2 taza de un molde (casserole)  · dosis fija de medicamento para la diabetes es importante también ser lupe con la cantidad y la hora de nasima comidas. Trabaje con lo proveedor de [employer] o lo dietista si necesita ayuda adicional. Ellos le pueden ayudar a llevar la cuenta de lo c en parte de lo rutina diaria.   · Camine con un amigo o en un dior para que le resulte interesante y divertido.   · Intente hacer varias caminatas cortas jacinta el día hasta completar lo objetivo diario.      Un podómetro lleva la cuenta de [address] paso  Un nael si deepak en bicicleta en exteriores), y use zapatos cerrados por juancarlos que tengan buen calce.   · Jany abundante agua antes y jacinta lo actividad.   · Mantenga a mano kodak tatiana de azúcar de acción rápida (edwin tabletas de glucosa) en zac de que de [employer]. Llevar un registro de los niveles de azúcar en diogenes le permitirá hacer un seguimiento. Es Waynetta Limber simple y fácil de venkata cuán sy usted logra mantener lo diabetes bajo control.      Cómo medir el nivel de azúcar en diogenes   Controle deshidratado. También se incluyen la melvina de ciertos medicamentos, la [place], el estrés o la omisión de alguna comida.    Qué aprenderá de nasima mediciones   Llevar un control de nasima lecturas le ayudará a identificar los patrones.  Phan Quick cómo atención médica. Puede que necesite medir lo nivel de azúcar en la diogenes en ciertos momentos todos los días. O puede que solo deba hacerlo unas pocas veces por semana.    Lo que necesita  Asegúrese de [employer] elementos enumerados a continuación para medi lo general, debería ser 7.0 % o menos. Roz análisis se realiza en un laboratorio. · Antes de kodak comida (glucosa preprandial). El valor normal es entre [de-identified] y 130 mg/dL. · Marikay Leaf y Canadian horas después de kodak comida (glucosa posprandrial).  El valor debe s usar para desechar los elementos cortantes. · Si no logra obtener suficiente diogenes, ponga la mano a un lado y sacúdala ligeramente.  Si esto le sucede habitualmente, consulte a lo proveedor de atención médica si puede usar otras partes del cuerpo para obt sobrepeso  · Medicamentos, si no ha ayudado el entrenamiento de la vejiga  · Ejercicios para los músculos del piso pélvico  Cambios en el estilo de sinan  · Pérdida de [place]. El exceso de peso somete a los músculos del piso pélvico a esfuerzos adicionales.  E la conducta, seguido de varios medicamentos. Si estos métodos no funcionan, kodak o más de las terapias que se mencionan a continuación pueden formar parte de lo plan de tratamiento.    Biorretroalimentación  Roz es un método que le enseñará un miembro del p horace.

## 2021-11-23 NOTE — PROGRESS NOTES
HPI:    Patient ID: Tiffanie Dasilva is a 80year old female. Patient presents with: Follow - Up: Diabetes  Care Gap Management: A1C  Refill Request: Levothyroxine patient also want it for 90 days. Patient here for follow up of Diabetes. Date/Time     (H) 09/21/2021 08:04 AM     09/21/2021 08:04 AM    K 4.1 09/21/2021 08:04 AM     09/21/2021 08:04 AM    CO2 26.0 09/21/2021 08:04 AM    CREATSERUM 1.19 (H) 11/14/2021 08:46 AM    CA 9.3 09/21/2021 08:04 AM    AST 22 11/14/2 bathrrom and has accidents. Runs to bathroom and cannot get underwear off in time. She has tried nothing for the symptoms. Abdominal Pain  This is a chronic problem. The current episode started more than 1 year ago.  The onset quality is gradual. The pro myalgias. Skin: Negative for rash. Neurological: Negative for dizziness, vertigo, tremors, seizures, syncope, facial asymmetry, speech difficulty, weakness, light-headedness, numbness and headaches.    Psychiatric/Behavioral: Negative for decreased conc • Glucose Blood (ACCU-CHEK GUIDE) In Vitro Strip 1 strip by In Vitro route Q12H. 200 strip 11   • Acetaminophen-Codeine (TYLENOL WITH CODEINE #3) 300-30 MG Oral Tab Take 1 tablet by mouth 3 (three) times daily as needed for Pain.  30 tablet 0   • Alcohol • Deep vein thrombosis (HCC)     L leg    • Essential hypertension    • Goiter     Multinodular goiter S/P thyroidectomy.     • Osteopenia    • Vertigo, aural       Past Surgical History:   Procedure Laterality Date   • APPENDECTOMY  1993   • CATARACT EXT Size: large)   Pulse 63   Temp 98.3 °F (36.8 °C) (Tympanic)   Resp 18   Ht 5' 9\" (1.753 m)   Wt 234 lb 6.4 oz (106.3 kg)   BMI 34.61 kg/m²   BP Readings from Last 3 Encounters:  11/23/21 : 146/60  11/17/21 : 122/70  09/23/21 : 144/57    Wt Readings from L Palpations: Abdomen is soft. Abdomen is not rigid. There is no mass. Tenderness: There is no abdominal tenderness. There is no right CVA tenderness, left CVA tenderness, guarding or rebound.        Musculoskeletal:      Right hip: No deformity, lacerat foot exam  -UTD with optho    Essential hypertension with goal blood pressure less than 130/80 Better. White coat. Careful with diet and excercise at least 30 minutes 3-4 times a week. Check blood pressures at different times on different days.  Can purchas

## 2021-11-23 NOTE — TELEPHONE ENCOUNTER
Spoke to Dr Abigail Donaldson ([phone]), stated Pt was seen yesterday for teeth cleaning  And premedicated per Orthopedics, Dr Latrell Veronica request

## 2021-11-23 NOTE — TELEPHONE ENCOUNTER
Can we check with the dentist Dr. Danya Hampton at [phone] acetylcholine why patient is getting antibiotics? Can we also check with Dr. Lydia barker.

## 2021-12-02 ENCOUNTER — TELEPHONE (OUTPATIENT)
Dept: RHEUMATOLOGY | Facility: CLINIC | Age: 71
End: 2021-12-02

## 2021-12-02 RX ORDER — SECUKINUMAB 150 MG/ML
300 INJECTION SUBCUTANEOUS SEE ADMIN INSTRUCTIONS
Qty: 2 EACH | Refills: 3 | Status: SHIPPED | OUTPATIENT
Start: 2021-12-02 | End: 2021-12-03 | Stop reason: ALTCHOICE

## 2021-12-02 NOTE — TELEPHONE ENCOUNTER
Refill request for Cosentyx.     LOV: 11/17/2021  Future Appointments   Date Time Provider Zac Steph   12/21/2021  1:15 PM [facility]   2/17/2022 10:45 AM Aliya Rascon, PT [facility] [address]   2/24/2022 10:15 AM Jose Sanchez, 3.3 (L)   ALT (SGPT)      13 - 56 U/L 20   AST (SGOT)      15 - 37 U/L 22   C-REACTIVE PROTEIN      <0.30 mg/dL 1.12 (H)   SED RATE      0 - 30 mm/Hr 26     ASSESSMENT/PLAN:   73-year-old Lao-speaking female initially referred for a + ADRIEL and rash.   Sh weeks apart confirm diagnosis of APL  - She is following with hematology, continue Coumadin  Kidney insuffiencey- resolved  - She follows with nephrology      Per pt can contact daughter Chetan Moya [phone]     Pt will f/u in 6 mos     Duncan Brian MD  11/

## 2021-12-03 RX ORDER — SECUKINUMAB 150 MG/ML
300 INJECTION SUBCUTANEOUS
Qty: 2 EACH | Refills: 3 | COMMUNITY
Start: 2021-12-03

## 2021-12-03 NOTE — TELEPHONE ENCOUNTER
Pharmacy needed prescription clarification. New loading dose was sent when she is currently on maintenance dose. Provided clarification that she should continue with Cosentyx every 4 weeks. Script updated in chart.      FYI - Dr. Monica Kerr      ASSESSMENT/PLAN: diagnosis is mostly consistent with antiphospholipid syndrome.  Repeat testing 12 weeks apart confirm diagnosis of APL  - She is following with hematology, continue Coumadin  Kidney insuffiencey- resolved  - She follows with nephrology      Per pt can cont

## 2021-12-15 RX ORDER — INDAPAMIDE 2.5 MG/1
2.5 TABLET ORAL 2 TIMES DAILY
Qty: 180 TABLET | Refills: 1 | Status: SHIPPED | OUTPATIENT
Start: 2021-12-15 | End: 2022-12-13

## 2021-12-21 ENCOUNTER — ANTI-COAG VISIT (OUTPATIENT)
Dept: INTERNAL MEDICINE CLINIC | Facility: CLINIC | Age: 71
End: 2021-12-21
Payer: MEDICARE

## 2021-12-21 DIAGNOSIS — Z51.81 ENCOUNTER FOR THERAPEUTIC DRUG MONITORING: ICD-10-CM

## 2021-12-21 DIAGNOSIS — Z79.01 LONG TERM (CURRENT) USE OF ANTICOAGULANTS: ICD-10-CM

## 2021-12-21 DIAGNOSIS — I82.532 CHRONIC DEEP VEIN THROMBOSIS OF LEFT POPLITEAL VEIN (HCC): ICD-10-CM

## 2021-12-21 DIAGNOSIS — D68.61 ANTIPHOSPHOLIPID SYNDROME (HCC): ICD-10-CM

## 2021-12-21 PROCEDURE — 93793 ANTICOAG MGMT PT WARFARIN: CPT

## 2021-12-21 PROCEDURE — 85610 PROTHROMBIN TIME: CPT

## 2022-01-04 NOTE — TELEPHONE ENCOUNTER
ESVIN Garcia   [place] [address]  [place]  [address]      PATIENT NAME: Ami Flores  : 1958  DATE: 22  MRN: [id number]      Billing Provider: ESVIN Garcia  Level of Service:   Patient PCP Information     Provider PCP Type    ESVIN Ocasio General          Reason for Visit / Chief Complaint: Cough (X 2 weeks. Has been taking sudafed and mucinex DM / Nyquil cold and flu, gets relief but it does not last. Mucus is now starting to change colors.), Generalized Body Aches (2 days ago, but this has resolved. ), and Nasal Congestion (X 2 weeks)       Update PCP  Update Chief Complaint         History of Present Illness / Problem Focused Workflow     Ami Flores presents to the clinic with Cough (X 2 weeks. Has been taking sudafed and mucinex DM / Nyquil cold and flu, gets relief but it does not last. Mucus is now starting to change colors.), Generalized Body Aches (2 days ago, but this has resolved. ), and Nasal Congestion (X 2 weeks)     Patient presents to clinic with c/o congestion, drainage, throat clearing x one week. Denies fever. Body aches and chills 2 days prior.       Review of Systems     @Review of Systems   HENT: Positive for nasal congestion, postnasal drip, rhinorrhea, sinus pressure/congestion and sore throat. Negative for ear pain.    Respiratory: Positive for cough.    Cardiovascular: Negative for chest pain.   Neurological: Positive for headaches.       Medical / Social / Family History     Past Medical History:   Diagnosis Date    Anxiety     Hyperlipidemia     Kidney stones        Past Surgical History:   Procedure Laterality Date    ADENOIDECTOMY      APPENDECTOMY       SECTION      CHOLECYSTECTOMY      ECTOPIC PREGNANCY SURGERY      TONSILLECTOMY         Social History  Ms.  reports that she has never smoked. She has never used smokeless tobacco. She reports that she does not drink alcohol and does not use  Was already changed 7-19-18 and sent to pharmacy. drugs.    Family History  Ms.'s family history is not on file.    Medications and Allergies     Medications  Outpatient Medications Marked as Taking for the 1/4/22 encounter (Office Visit) with ESVIN Garcia   Medication Sig Dispense Refill    clorazepate (TRANXENE) 3.75 MG Tab Take 3.75 mg by mouth 3 (three) times daily.      lansoprazole (PREVACID) 30 MG capsule TAKE ONE CAPSULE BY MOUTH EVERY DAY BEFORE a meal      lovastatin (MEVACOR) 10 MG tablet TAKE ONE TABLET BY MOUTH EVERY EVENING with meal 30 tablet 2    naproxen (NAPROSYN) 500 MG tablet Take 500 mg by mouth 2 (two) times daily.       Current Facility-Administered Medications for the 1/4/22 encounter (Office Visit) with ESVIN Garcia   Medication Dose Route Frequency Provider Last Rate Last Admin    lincomycin injection 600 mg  600 mg Intramuscular 1 time in Clinic/HOD ESVIN Garcia           Allergies  Review of patient's allergies indicates:   Allergen Reactions    Hydrocodone Nausea And Vomiting    Penicillins Nausea Only       Physical Examination     Vitals:    01/04/22 1102   BP: 122/74   Pulse: 78   Resp: 17   Temp: 96.4 °F (35.8 °C)     Physical Exam  Constitutional:       General: She is not in acute distress.     Appearance: Normal appearance.   HENT:      Mouth/Throat:      Pharynx: Posterior oropharyngeal erythema present.   Cardiovascular:      Rate and Rhythm: Normal rate and regular rhythm.   Pulmonary:      Effort: Pulmonary effort is normal. No respiratory distress.      Breath sounds: Examination of the left-upper field reveals wheezing. Examination of the left-middle field reveals wheezing. Wheezing present. No rhonchi or rales.   Skin:     General: Skin is warm and dry.   Neurological:      Mental Status: She is alert.               No results found for: WBC, HGB, HCT, MCV, PLT       Glucose   Date Value Ref Range Status   10/13/2020 98 74 - 106 mg/dL      Comment:     The above 1 analytes were performed by [facility] [id number] [place] [id number]      DXA Bone Density Spine And Hip  Narrative: BONE DENSITOMETRY    Indication: Encounter for screening for osteoporosis    Comparison: None    Findings: No significant imaging findings.    L1-L4 spine:  BMD: 0.862 g/cm^2  T-score: -1.7  Z-score: -0.2    Left femur/hip:  BMD: 0.653 g/cm^2  T-score: -1.8  Z-score: 0.4  Impression: Impression:     T score of -1.8 obtained from the left femoral neck which is considered  in the range of osteopenia.     Recommendations:  1. Encourage adequate intake of calcium and vitamin D if clinically  appropriate.  2. Consider regular weight-bearing and muscle strengthening exercises if  clinically appropriate.  3. Consider hormone replacement therapy if clinically appropriate.  4. Consider antiresorptive therapy if clinically appropriate.  5. Consider followup BMD every 1-2 years if clinically appropriate.  6. Advise patient to avoid tobacco smoking and to not over use alcohol.    Place of service: [facility].    This report has been electronically signed by Lee Mckeon     Procedures   Assessment and Plan (including Health Maintenance)      Problem List  Smart Sets  Document Outside HM   :    Plan:           Problem List Items Addressed This Visit        Psychiatric    Anxiety       Pulmonary    Bronchitis - Primary    Relevant Medications    promethazine-dextromethorphan (PROMETHAZINE-DM) 6.25-15 mg/5 mL Syrp    azithromycin (Z-DOROTA) 250 MG tablet    methylPREDNISolone (MEDROL DOSEPACK) 4 mg tablet    albuterol sulfate 2.5 mg/0.5 mL Nebu    lincomycin injection 600 mg      Other Visit Diagnoses     Exposure to COVID-19 virus        Relevant Orders    POCT SARS-COV2 (COVID) with Flu Antigen (Completed)          Health Maintenance Topics with due status: Not Due       Topic Last Completion Date    Lipid Panel 10/20/2021       Future Appointments   Date Time Provider Department Center   11/2/2022  9:15 AM [facility]  MAR[facility]U [facility]        Health Maintenance Due   Topic Date Due    Hepatitis C Screening  Never done    COVID-19 Vaccine (1) Never done    HIV Screening  Never done    TETANUS VACCINE  Never done    Mammogram  Never done    Cervical Cancer Screening  Never done    Colorectal Cancer Screening  Never done    Shingles Vaccine (1 of 2) Never done    Influenza Vaccine (1) Never done        Follow up if symptoms worsen or fail to improve.     Signature:  ESVIN Garcia  [facility]  [address] [address]  [address]    Date of encounter: 1/4/22

## 2022-01-12 RX ORDER — ESCITALOPRAM OXALATE 5 MG/1
5 TABLET ORAL NIGHTLY
Qty: 30 TABLET | Refills: 0 | Status: SHIPPED | OUTPATIENT
Start: 2022-01-12 | End: 2022-03-29

## 2022-01-14 RX ORDER — BLOOD SUGAR DIAGNOSTIC
STRIP MISCELLANEOUS
Qty: 200 STRIP | Refills: 11 | Status: SHIPPED | OUTPATIENT
Start: 2022-01-14 | End: 2023-03-13

## 2022-01-15 NOTE — TELEPHONE ENCOUNTER
Refill passed per [facility] protocol.      Requested Prescriptions   Pending Prescriptions Disp Refills    ACCU-CHEK GUIDE In Vitro Strip [Pharmacy Med Name: Phyllistine Brass   Strip] 200 strip 11     Sig: TEST BLOOD SUGAR EVERY 12 HOURS        Diabetic Supplies Protocol Passed - 1/14/2022  7:02 PM        Passed - Appointment in past 12 or next 3 months               Future Appointments         Provider Department Appt Notes    In 2 weeks  [address], [place]     In 1 month [place]ari, [place], [address] [facility]  Needs  [address]iley [address]    In 1 month [facility], [address] [facility] Needs  [address]    In 1 month [facility], [address] [facility] Needs  [address]    In 1 month [facility], [address] [facility] Needs  [address]    In 2 months [facility], [address] [facility] Needs  [address]    In 2 months [facility], [address] [place] Needs  [address]    In 2 months Helena Fajardo MD [facility], [address] [facility] [address] [facility] [address] [facility] [address], [place] 4 mth     In 2 months Deanna [facility], [address] [place] Needs  [address]    In 3 months [facility], [address] [facility] Needs  [address]    In 3 months [facility], [address] [facility] Needs  [address]    In 3 months [facility], [address] [facility] Needs  [address]             Recent Outpatient Visits              1 month ago Vaccine counseling    [facility], [address], [place] Emerson Holman MD    Office Visit    1 month ago Psoriatic arthritis [facility])    [facility], [address], Kristen Billings MD    Office Visit    3 months ago Stage 3b chronic kidney disease [facility])    [facility], [address] [facility], Calista Becker MD    Office Visit    4 months ago     [facility], [address], [place]    Nurse Only    4 months ago Blood pressure check    [facility], [address], [address] Only

## 2022-01-18 ENCOUNTER — PATIENT MESSAGE (OUTPATIENT)
Dept: RHEUMATOLOGY | Facility: CLINIC | Age: 72
End: 2022-01-18

## 2022-01-18 RX ORDER — GLIPIZIDE 10 MG/1
5 TABLET ORAL
Qty: 90 TABLET | Refills: 0 | Status: SHIPPED | OUTPATIENT
Start: 2022-01-18 | End: 2022-05-04

## 2022-01-18 NOTE — TELEPHONE ENCOUNTER
Protocol failed or has No Protocol, please review  Requested Prescriptions   Pending Prescriptions Disp Refills    GLIPIZIDE 10 MG Oral Tab [Pharmacy Med Name: Glipizide 10 Mg Tab Apot] 90 tablet 0     Sig: Take 0.5 tablets (5 mg total) by mouth 2 (two) times daily before meals.         Diabetes Medication Protocol Failed - 1/18/2022 11:00 AM        Failed - GFR Non- > 50     Lab Results   Component Value Date    GFRNAA 46 (L) 11/14/2021                 Passed - Last A1C < 7.5 and within past 6 months     Lab Results   Component Value Date    A1C 6.2 (H) 11/23/2021               Passed - Appointment in past 6 or next 3 months        Passed - GFR in the past 12 months            Future Appointments         Provider Department Appt Notes    In 2 weeks [address] [facility]     In 1 month Karli Pierce [facility], [address] [facility]  Needs  [address]    In 1 month Surya Mckenna, [address] [facility] [facility] Needs  [address]    In 1 month Surya Mckenna, [address] [facility] [address] Needs  [address]    In 1 month Surya Mckenna, [address] [place] Needs  [address]    In 1 month Surya Mckenna, [address] [place] Needs  [address]    In 2 months Surya Mckenna, [address] [place] Needs  [address]    In 2 months Roya Burnett MD [address] [facility], [address] [facility], [place] [facility]     In 2 months Surya Mckenna, [address] [place] Needs  [address]    In 2 months Surya Mckenna, [address] [facility] [address] Needs  [address]    In 3 months Surya Mckenna, [address] [facility] Needs  [address]iley [address]    In 3 months Orville Syed, [address] Needs  [address]iley [address]          Recent Outpatient Visits              1 month ago Vaccine counseling    [address] [facility], Brenden Gallardo MD    Office Visit    2 months ago Psoriatic arthritis [facility])    [facility], Nicholas Chambers Lue Matar, MD    Office Visit    3 months ago Stage 3b chronic kidney disease [facility])    [facility], [facility], [address] [facility], Jero Rivas MD    Office Visit    4 months ago     [facility], [facility], [place]    Nurse Only    4 months ago Blood pressure check    [facility], [facility], [place], [place]    Nurse Only

## 2022-01-18 NOTE — TELEPHONE ENCOUNTER
From: Marry Knowles  Sent: 1/18/2022 3:02 PM CST  To: Deisi Ha Clinical Staff  Subject: Dr David Davis office    Hello,    We urgently need the attached completed in order for [facility] to consider Marry Knowles for a patient assistanc

## 2022-01-18 NOTE — TELEPHONE ENCOUNTER
Forms completed and signed by provider. Faxed to [employer] Sent patient copy in [employer] message as requested in other message. Notified via [address]

## 2022-01-20 NOTE — TELEPHONE ENCOUNTER
Deborah/Taylor [phone] states that the form is missing the date next to the signature. Can it be dated and resent.

## 2022-01-21 ENCOUNTER — PATIENT MESSAGE (OUTPATIENT)
Dept: RHEUMATOLOGY | Facility: CLINIC | Age: 72
End: 2022-01-21

## 2022-01-27 ENCOUNTER — TELEPHONE (OUTPATIENT)
Dept: NEPHROLOGY | Facility: CLINIC | Age: 72
End: 2022-01-27

## 2022-01-27 NOTE — TELEPHONE ENCOUNTER
Patients son Danita Sims calling to request orders for labs prior to appointment scheduled 3/23. Please send message in [employer] to update, thanks.

## 2022-01-27 NOTE — TELEPHONE ENCOUNTER
Spoke with pt, instructed pt to call  to phil date to put lab orders in the system per Dr. Genesis Guevara protocol.

## 2022-02-01 ENCOUNTER — ANTI-COAG VISIT (OUTPATIENT)
Dept: INTERNAL MEDICINE CLINIC | Facility: CLINIC | Age: 72
End: 2022-02-01
Payer: MEDICARE

## 2022-02-01 DIAGNOSIS — Z79.01 LONG TERM (CURRENT) USE OF ANTICOAGULANTS: ICD-10-CM

## 2022-02-01 DIAGNOSIS — Z51.81 ENCOUNTER FOR THERAPEUTIC DRUG MONITORING: ICD-10-CM

## 2022-02-01 DIAGNOSIS — D68.61 ANTIPHOSPHOLIPID SYNDROME (HCC): ICD-10-CM

## 2022-02-01 DIAGNOSIS — I82.532 CHRONIC DEEP VEIN THROMBOSIS OF LEFT POPLITEAL VEIN (HCC): ICD-10-CM

## 2022-02-01 LAB — INR: 2.1 (ref 2–3)

## 2022-02-01 PROCEDURE — 93793 ANTICOAG MGMT PT WARFARIN: CPT

## 2022-02-01 PROCEDURE — 85610 PROTHROMBIN TIME: CPT

## 2022-02-16 ENCOUNTER — TELEPHONE (OUTPATIENT)
Dept: PHYSICAL THERAPY | Facility: HOSPITAL | Age: 72
End: 2022-02-16

## 2022-02-17 ENCOUNTER — OFFICE VISIT (OUTPATIENT)
Dept: PHYSICAL THERAPY | Age: 72
End: 2022-02-17
Attending: INTERNAL MEDICINE
Payer: MEDICARE

## 2022-02-17 DIAGNOSIS — N39.41 URGE INCONTINENCE OF URINE: ICD-10-CM

## 2022-02-17 PROCEDURE — 97530 THERAPEUTIC ACTIVITIES: CPT

## 2022-02-17 PROCEDURE — 97162 PT EVAL MOD COMPLEX 30 MIN: CPT

## 2022-02-17 PROCEDURE — 97112 NEUROMUSCULAR REEDUCATION: CPT

## 2022-02-24 ENCOUNTER — OFFICE VISIT (OUTPATIENT)
Dept: PHYSICAL THERAPY | Age: 72
End: 2022-02-24
Attending: INTERNAL MEDICINE
Payer: MEDICARE

## 2022-02-24 DIAGNOSIS — N39.41 URGE INCONTINENCE OF URINE: ICD-10-CM

## 2022-02-24 PROCEDURE — 97110 THERAPEUTIC EXERCISES: CPT

## 2022-02-24 PROCEDURE — 97112 NEUROMUSCULAR REEDUCATION: CPT

## 2022-02-26 NOTE — TELEPHONE ENCOUNTER
Please review; protocol failed/ no protocol. Requested Prescriptions   Pending Prescriptions Disp Refills    SIMVASTATIN 20 MG Oral Tab [Pharmacy Med Name: Simvastatin 20 Mg Tab Nort] 24 tablet 0     Sig: Take 1 tablet (20 mg total) by mouth twice a week.         Cholesterol Medication Protocol Failed - 2/26/2022  1:33 AM        Failed - LDL in past 12 months        Failed - Last LDL < 130     Lab Results   Component Value Date    LDL 68 01/30/2021               Passed - ALT in past 12 months        Passed - Last ALT < 80       Lab Results   Component Value Date    ALT 20 11/14/2021             Passed - Appointment in past 12 or next 3 months              Recent Outpatient Visits              2 days ago Urge incontinence of urine    [address] [place] [address]    Office Visit    1 week ago Urge incontinence of urine    [address] [place] [address]    Office Visit    3 months ago Vaccine counseling    [facility], [facility], [address], Steven Winn MD    Office Visit    3 months ago Psoriatic arthritis [facility])    [facility], [address], Jenni Billings MD    Office Visit    5 months ago Stage 3b chronic kidney disease [facility])    [facility], [facility], [facility], Pascale Becker MD    Office Visit            Future Appointments         Provider Department Appt Notes    In 5 days Fabiana Pope, [address]   4 visits 2/17 to 5/17  [employer]  $20 c/p    In 1 week Fabiana Pope, [address]   4 visits 2/17 to 5/17  [employer]  $20 c/p    In 2 weeks  [facility], Ashli     In 2 weeks Mariel Moreno 54 Needs  Tajik  [employer]  c/p $20    In 3 weeks Payton Lewis MD [facility], [facility], [address] follow up, informed son/Bebeto of arrival/policy    In 3 weeks Jassi Hu, [address] Needs  Lao  [facility]  c/p $20    In 1 month Jules Vernon MD [facility], [facility], [address], [address] 4 mth     In 1 month Jassi Hu, [address] Needs  Lao  [facility]  c/p $20    In 1 month Keene Raja, [address] Needs  [facility]  c/p $20    In 1 month Keene Raja, [address] Needs  Lao  [facility]  c/p $20    In 2 months Keene Raja, [address] Needs  Lao  [employer]  c/p $01

## 2022-02-27 RX ORDER — SIMVASTATIN 20 MG
20 TABLET ORAL
Qty: 24 TABLET | Refills: 1 | Status: SHIPPED | OUTPATIENT
Start: 2022-02-28 | End: 2022-08-27

## 2022-03-01 ENCOUNTER — PATIENT MESSAGE (OUTPATIENT)
Dept: NEPHROLOGY | Facility: CLINIC | Age: 72
End: 2022-03-01

## 2022-03-01 NOTE — TELEPHONE ENCOUNTER
From: Caroline West  To: Evelyn Gallo MD  Sent: 3/1/2022 12:49 PM CST  Subject: Blood Work [employer] Dr. Renetta Gallo,  We have a follow-up appointment with you on the 23rd. when I scheduled the appointment, I was instructed by your staff to send you a message to remind you to place the order for any necessary blood work that you want to see for the appointment.   Thanks,  evans

## 2022-03-03 ENCOUNTER — OFFICE VISIT (OUTPATIENT)
Dept: PHYSICAL THERAPY | Age: 72
End: 2022-03-03
Attending: INTERNAL MEDICINE
Payer: MEDICARE

## 2022-03-03 ENCOUNTER — TELEPHONE (OUTPATIENT)
Dept: INTERNAL MEDICINE CLINIC | Facility: CLINIC | Age: 72
End: 2022-03-03

## 2022-03-03 DIAGNOSIS — N39.41 URGE INCONTINENCE OF URINE: ICD-10-CM

## 2022-03-03 PROCEDURE — 97112 NEUROMUSCULAR REEDUCATION: CPT

## 2022-03-03 PROCEDURE — 97110 THERAPEUTIC EXERCISES: CPT

## 2022-03-03 RX ORDER — PEN NEEDLE, DIABETIC 29 G X1/2"
NEEDLE, DISPOSABLE MISCELLANEOUS
Qty: 100 EACH | Refills: 6 | Status: SHIPPED | OUTPATIENT
Start: 2022-03-03

## 2022-03-03 RX ORDER — INSULIN GLARGINE 100 [IU]/ML
INJECTION, SOLUTION SUBCUTANEOUS
Qty: 10 EACH | Refills: 1 | Status: SHIPPED | OUTPATIENT
Start: 2022-03-03 | End: 2022-03-29

## 2022-03-04 NOTE — TELEPHONE ENCOUNTER
Faroese speaking - pt said she always gets 2 boxes and now she picked up one box. Pt wanted to know why and said for us to please order her another box with a refill.  Please advise

## 2022-03-05 RX ORDER — INSULIN GLARGINE 100 [IU]/ML
INJECTION, SOLUTION SUBCUTANEOUS
Qty: 10 EACH | Refills: 1 | Status: CANCELLED | OUTPATIENT
Start: 2022-03-05

## 2022-03-07 NOTE — TELEPHONE ENCOUNTER
I called pharmacy and was informed that yes they did give pt 1 box which is for 24 day supply she can refill again in 20 days. Also for the pin needles they only gave her partial as they did not have the full amount but they should receive more today and they will get it ready for her. If she wants to  3 month supply she will have to let them know before the next refill so they can sent us a request.    I called daughter Carissa Gallagher and she will let the pt know.

## 2022-03-09 ENCOUNTER — OFFICE VISIT (OUTPATIENT)
Dept: PHYSICAL THERAPY | Age: 72
End: 2022-03-09
Attending: INTERNAL MEDICINE
Payer: MEDICARE

## 2022-03-09 DIAGNOSIS — N39.41 URGE INCONTINENCE OF URINE: ICD-10-CM

## 2022-03-09 PROCEDURE — 97110 THERAPEUTIC EXERCISES: CPT

## 2022-03-09 PROCEDURE — 97112 NEUROMUSCULAR REEDUCATION: CPT

## 2022-03-15 ENCOUNTER — ANTI-COAG VISIT (OUTPATIENT)
Dept: INTERNAL MEDICINE CLINIC | Facility: CLINIC | Age: 72
End: 2022-03-15
Payer: MEDICARE

## 2022-03-15 DIAGNOSIS — Z51.81 ENCOUNTER FOR THERAPEUTIC DRUG MONITORING: ICD-10-CM

## 2022-03-15 DIAGNOSIS — Z79.01 LONG TERM (CURRENT) USE OF ANTICOAGULANTS: ICD-10-CM

## 2022-03-15 DIAGNOSIS — I82.532 CHRONIC DEEP VEIN THROMBOSIS OF LEFT POPLITEAL VEIN (HCC): ICD-10-CM

## 2022-03-15 DIAGNOSIS — D68.61 ANTIPHOSPHOLIPID SYNDROME (HCC): ICD-10-CM

## 2022-03-15 LAB
INR: 2.8 (ref 0.8–1.2)
TEST STRIP EXPIRATION DATE: ABNORMAL DATE

## 2022-03-15 PROCEDURE — 93793 ANTICOAG MGMT PT WARFARIN: CPT

## 2022-03-15 PROCEDURE — 85610 PROTHROMBIN TIME: CPT

## 2022-03-15 RX ORDER — METOPROLOL TARTRATE 100 MG/1
TABLET ORAL
Qty: 60 TABLET | Refills: 0 | Status: SHIPPED | OUTPATIENT
Start: 2022-03-15

## 2022-03-17 ENCOUNTER — APPOINTMENT (OUTPATIENT)
Dept: PHYSICAL THERAPY | Age: 72
End: 2022-03-17
Attending: INTERNAL MEDICINE
Payer: MEDICARE

## 2022-03-20 ENCOUNTER — LAB ENCOUNTER (OUTPATIENT)
Dept: LAB | Facility: HOSPITAL | Age: 72
End: 2022-03-20
Attending: INTERNAL MEDICINE
Payer: MEDICARE

## 2022-03-20 DIAGNOSIS — E11.9 CONTROLLED TYPE 2 DIABETES MELLITUS WITHOUT COMPLICATION, WITHOUT LONG-TERM CURRENT USE OF INSULIN (HCC): ICD-10-CM

## 2022-03-20 DIAGNOSIS — N18.32 STAGE 3B CHRONIC KIDNEY DISEASE (HCC): ICD-10-CM

## 2022-03-20 LAB
ALBUMIN SERPL-MCNC: 3.5 G/DL (ref 3.4–5)
ALP LIVER SERPL-CCNC: 77 U/L
ALT SERPL-CCNC: 23 U/L
ANION GAP SERPL CALC-SCNC: 4 MMOL/L (ref 0–18)
AST SERPL-CCNC: 19 U/L (ref 15–37)
BASOPHILS # BLD AUTO: 0.04 X10(3) UL (ref 0–0.2)
BASOPHILS NFR BLD AUTO: 0.5 %
BILIRUB DIRECT SERPL-MCNC: 0.2 MG/DL (ref 0–0.2)
BILIRUB SERPL-MCNC: 0.5 MG/DL (ref 0.1–2)
BUN BLD-MCNC: 30 MG/DL (ref 7–18)
BUN/CREAT SERPL: 25.9 (ref 10–20)
CALCIUM BLD-MCNC: 8.8 MG/DL (ref 8.5–10.1)
CHLORIDE SERPL-SCNC: 102 MMOL/L (ref 98–112)
CO2 SERPL-SCNC: 31 MMOL/L (ref 21–32)
CREAT BLD-MCNC: 1.16 MG/DL
DEPRECATED RDW RBC AUTO: 45.3 FL (ref 35.1–46.3)
EOSINOPHIL # BLD AUTO: 0.28 X10(3) UL (ref 0–0.7)
EOSINOPHIL NFR BLD AUTO: 3.4 %
ERYTHROCYTE [DISTWIDTH] IN BLOOD BY AUTOMATED COUNT: 13.1 % (ref 11–15)
EST. AVERAGE GLUCOSE BLD GHB EST-MCNC: 148 MG/DL (ref 68–126)
FASTING STATUS PATIENT QL REPORTED: YES
GLUCOSE BLD-MCNC: 126 MG/DL (ref 70–99)
HBA1C MFR BLD: 6.8 % (ref ?–5.7)
HCT VFR BLD AUTO: 37.2 %
HGB BLD-MCNC: 12 G/DL
IMM GRANULOCYTES # BLD AUTO: 0.02 X10(3) UL (ref 0–1)
IMM GRANULOCYTES NFR BLD: 0.2 %
LYMPHOCYTES # BLD AUTO: 2.24 X10(3) UL (ref 1–4)
LYMPHOCYTES NFR BLD AUTO: 27.2 %
MCH RBC QN AUTO: 30.2 PG (ref 26–34)
MCHC RBC AUTO-ENTMCNC: 32.3 G/DL (ref 31–37)
MCV RBC AUTO: 93.7 FL
MONOCYTES # BLD AUTO: 0.88 X10(3) UL (ref 0.1–1)
MONOCYTES NFR BLD AUTO: 10.7 %
NEUTROPHILS # BLD AUTO: 4.79 X10 (3) UL (ref 1.5–7.7)
NEUTROPHILS # BLD AUTO: 4.79 X10(3) UL (ref 1.5–7.7)
NEUTROPHILS NFR BLD AUTO: 58 %
OSMOLALITY SERPL CALC.SUM OF ELEC: 292 MOSM/KG (ref 275–295)
PLATELET # BLD AUTO: 265 10(3)UL (ref 150–450)
POTASSIUM SERPL-SCNC: 4.6 MMOL/L (ref 3.5–5.1)
PROT SERPL-MCNC: 7.1 G/DL (ref 6.4–8.2)
RBC # BLD AUTO: 3.97 X10(6)UL
SODIUM SERPL-SCNC: 137 MMOL/L (ref 136–145)
WBC # BLD AUTO: 8.3 X10(3) UL (ref 4–11)

## 2022-03-20 PROCEDURE — 83036 HEMOGLOBIN GLYCOSYLATED A1C: CPT

## 2022-03-20 PROCEDURE — 85025 COMPLETE CBC W/AUTO DIFF WBC: CPT

## 2022-03-20 PROCEDURE — 80076 HEPATIC FUNCTION PANEL: CPT

## 2022-03-20 PROCEDURE — 3044F HG A1C LEVEL LT 7.0%: CPT | Performed by: INTERNAL MEDICINE

## 2022-03-20 PROCEDURE — 80048 BASIC METABOLIC PNL TOTAL CA: CPT

## 2022-03-20 PROCEDURE — 36415 COLL VENOUS BLD VENIPUNCTURE: CPT

## 2022-03-24 ENCOUNTER — APPOINTMENT (OUTPATIENT)
Dept: PHYSICAL THERAPY | Age: 72
End: 2022-03-24
Attending: INTERNAL MEDICINE
Payer: MEDICARE

## 2022-03-26 ENCOUNTER — NURSE TRIAGE (OUTPATIENT)
Dept: INTERNAL MEDICINE CLINIC | Facility: CLINIC | Age: 72
End: 2022-03-26

## 2022-03-28 ENCOUNTER — OFFICE VISIT (OUTPATIENT)
Dept: NEPHROLOGY | Facility: CLINIC | Age: 72
End: 2022-03-28
Payer: MEDICARE

## 2022-03-28 VITALS
SYSTOLIC BLOOD PRESSURE: 157 MMHG | DIASTOLIC BLOOD PRESSURE: 76 MMHG | HEART RATE: 66 BPM | WEIGHT: 242 LBS | BODY MASS INDEX: 36 KG/M2

## 2022-03-28 DIAGNOSIS — E11.9 CONTROLLED TYPE 2 DIABETES MELLITUS WITHOUT COMPLICATION, WITHOUT LONG-TERM CURRENT USE OF INSULIN (HCC): Chronic | ICD-10-CM

## 2022-03-28 DIAGNOSIS — N18.32 STAGE 3B CHRONIC KIDNEY DISEASE (HCC): Primary | Chronic | ICD-10-CM

## 2022-03-28 DIAGNOSIS — I10 ESSENTIAL HYPERTENSION WITH GOAL BLOOD PRESSURE LESS THAN 130/80: ICD-10-CM

## 2022-03-28 PROBLEM — E66.01 MORBID (SEVERE) OBESITY DUE TO EXCESS CALORIES (HCC): Status: ACTIVE | Noted: 2022-03-28

## 2022-03-28 PROCEDURE — 99213 OFFICE O/P EST LOW 20 MIN: CPT | Performed by: INTERNAL MEDICINE

## 2022-03-28 PROCEDURE — 3077F SYST BP >= 140 MM HG: CPT | Performed by: INTERNAL MEDICINE

## 2022-03-28 PROCEDURE — 3078F DIAST BP <80 MM HG: CPT | Performed by: INTERNAL MEDICINE

## 2022-03-28 RX ORDER — PROMETHAZINE HYDROCHLORIDE AND CODEINE PHOSPHATE 6.25; 1 MG/5ML; MG/5ML
2.5 SYRUP ORAL EVERY 6 HOURS PRN
Qty: 118 ML | Refills: 0 | Status: SHIPPED | OUTPATIENT
Start: 2022-03-28

## 2022-03-28 NOTE — PATIENT INSTRUCTIONS
Take cough medicine as needed keep in the refrigerator use as needed    Same medications      Please do labs in June and see me in July    Good to see you both

## 2022-03-29 RX ORDER — INSULIN GLARGINE 100 [IU]/ML
INJECTION, SOLUTION SUBCUTANEOUS
Qty: 30 ML | Refills: 0 | Status: SHIPPED | OUTPATIENT
Start: 2022-03-29 | End: 2022-03-31

## 2022-03-29 RX ORDER — ESCITALOPRAM OXALATE 5 MG/1
5 TABLET ORAL NIGHTLY
Qty: 30 TABLET | Refills: 0 | Status: SHIPPED | OUTPATIENT
Start: 2022-03-29 | End: 2022-03-31

## 2022-03-31 ENCOUNTER — TELEPHONE (OUTPATIENT)
Dept: INTERNAL MEDICINE CLINIC | Facility: CLINIC | Age: 72
End: 2022-03-31

## 2022-03-31 ENCOUNTER — MED REC SCAN ONLY (OUTPATIENT)
Dept: INTERNAL MEDICINE CLINIC | Facility: CLINIC | Age: 72
End: 2022-03-31

## 2022-03-31 ENCOUNTER — OFFICE VISIT (OUTPATIENT)
Dept: INTERNAL MEDICINE CLINIC | Facility: CLINIC | Age: 72
End: 2022-03-31
Payer: MEDICARE

## 2022-03-31 VITALS
TEMPERATURE: 98 F | BODY MASS INDEX: 35.55 KG/M2 | WEIGHT: 240 LBS | OXYGEN SATURATION: 96 % | HEART RATE: 61 BPM | RESPIRATION RATE: 17 BRPM | DIASTOLIC BLOOD PRESSURE: 62 MMHG | SYSTOLIC BLOOD PRESSURE: 142 MMHG | HEIGHT: 69 IN

## 2022-03-31 DIAGNOSIS — H25.13 AGE-RELATED NUCLEAR CATARACT OF BOTH EYES: ICD-10-CM

## 2022-03-31 DIAGNOSIS — I73.9 PVD (PERIPHERAL VASCULAR DISEASE) (HCC): ICD-10-CM

## 2022-03-31 DIAGNOSIS — R05.9 COUGH: ICD-10-CM

## 2022-03-31 DIAGNOSIS — I10 ESSENTIAL HYPERTENSION WITH GOAL BLOOD PRESSURE LESS THAN 130/80: ICD-10-CM

## 2022-03-31 DIAGNOSIS — E66.01 MORBID (SEVERE) OBESITY DUE TO EXCESS CALORIES (HCC): ICD-10-CM

## 2022-03-31 DIAGNOSIS — E04.9 GOITER: ICD-10-CM

## 2022-03-31 DIAGNOSIS — H02.88B MEIBOMIAN GLAND DYSFUNCTION (MGD), BILATERAL, BOTH UPPER AND LOWER LIDS: ICD-10-CM

## 2022-03-31 DIAGNOSIS — H25.013 CORTICAL AGE-RELATED CATARACT OF BOTH EYES: ICD-10-CM

## 2022-03-31 DIAGNOSIS — Z12.31 ENCOUNTER FOR SCREENING MAMMOGRAM FOR MALIGNANT NEOPLASM OF BREAST: ICD-10-CM

## 2022-03-31 DIAGNOSIS — E53.8 B12 DEFICIENCY: ICD-10-CM

## 2022-03-31 DIAGNOSIS — N18.32 STAGE 3B CHRONIC KIDNEY DISEASE (HCC): Chronic | ICD-10-CM

## 2022-03-31 DIAGNOSIS — E03.9 HYPOTHYROIDISM (ACQUIRED): Chronic | ICD-10-CM

## 2022-03-31 DIAGNOSIS — E11.9 CONTROLLED TYPE 2 DIABETES MELLITUS WITHOUT COMPLICATION, WITHOUT LONG-TERM CURRENT USE OF INSULIN (HCC): Primary | Chronic | ICD-10-CM

## 2022-03-31 DIAGNOSIS — L40.9 PSORIASIS: ICD-10-CM

## 2022-03-31 DIAGNOSIS — I67.2 INTRACRANIAL ATHEROSCLEROSIS: ICD-10-CM

## 2022-03-31 DIAGNOSIS — R31.29 MICROSCOPIC HEMATURIA: ICD-10-CM

## 2022-03-31 DIAGNOSIS — Z71.85 VACCINE COUNSELING: ICD-10-CM

## 2022-03-31 DIAGNOSIS — H17.9 CORNEAL SCAR: ICD-10-CM

## 2022-03-31 DIAGNOSIS — M85.80 OSTEOPENIA, UNSPECIFIED LOCATION: ICD-10-CM

## 2022-03-31 DIAGNOSIS — E87.5 SERUM POTASSIUM ELEVATED: ICD-10-CM

## 2022-03-31 DIAGNOSIS — R19.7 DIARRHEA, UNSPECIFIED TYPE: ICD-10-CM

## 2022-03-31 DIAGNOSIS — D68.61 ANTIPHOSPHOLIPID SYNDROME (HCC): ICD-10-CM

## 2022-03-31 DIAGNOSIS — Z98.890 HISTORY OF PTERYGIUM EXCISION: ICD-10-CM

## 2022-03-31 DIAGNOSIS — H04.123 DRY EYE SYNDROME OF BILATERAL LACRIMAL GLANDS: ICD-10-CM

## 2022-03-31 DIAGNOSIS — L40.50 PSORIATIC ARTHRITIS (HCC): ICD-10-CM

## 2022-03-31 DIAGNOSIS — D12.6 TUBULAR ADENOMA OF COLON: ICD-10-CM

## 2022-03-31 DIAGNOSIS — M17.11 PRIMARY OSTEOARTHRITIS OF RIGHT KNEE: Chronic | ICD-10-CM

## 2022-03-31 DIAGNOSIS — N39.41 URGE INCONTINENCE OF URINE: ICD-10-CM

## 2022-03-31 DIAGNOSIS — Z79.01 LONG TERM (CURRENT) USE OF ANTICOAGULANTS: ICD-10-CM

## 2022-03-31 DIAGNOSIS — H02.88A MEIBOMIAN GLAND DYSFUNCTION (MGD), BILATERAL, BOTH UPPER AND LOWER LIDS: ICD-10-CM

## 2022-03-31 PROCEDURE — 99215 OFFICE O/P EST HI 40 MIN: CPT | Performed by: INTERNAL MEDICINE

## 2022-03-31 PROCEDURE — 3008F BODY MASS INDEX DOCD: CPT | Performed by: INTERNAL MEDICINE

## 2022-03-31 PROCEDURE — 3078F DIAST BP <80 MM HG: CPT | Performed by: INTERNAL MEDICINE

## 2022-03-31 PROCEDURE — 3077F SYST BP >= 140 MM HG: CPT | Performed by: INTERNAL MEDICINE

## 2022-03-31 RX ORDER — BLOOD SUGAR DIAGNOSTIC
1 STRIP MISCELLANEOUS EVERY 12 HOURS
Qty: 200 STRIP | Refills: 11 | Status: SHIPPED | OUTPATIENT
Start: 2022-03-31

## 2022-03-31 RX ORDER — INSULIN GLARGINE 100 [IU]/ML
INJECTION, SOLUTION SUBCUTANEOUS
Qty: 40 EACH | Refills: 1 | Status: SHIPPED | OUTPATIENT
Start: 2022-03-31

## 2022-03-31 RX ORDER — PEN NEEDLE, DIABETIC 32GX 5/32"
NEEDLE, DISPOSABLE MISCELLANEOUS
Qty: 100 EACH | Refills: 6 | Status: SHIPPED | OUTPATIENT
Start: 2022-03-31

## 2022-03-31 RX ORDER — DICYCLOMINE HYDROCHLORIDE 10 MG/1
10 CAPSULE ORAL 2 TIMES DAILY PRN
Qty: 60 CAPSULE | Refills: 1 | Status: SHIPPED | OUTPATIENT
Start: 2022-03-31 | End: 2022-04-10

## 2022-03-31 RX ORDER — ESCITALOPRAM OXALATE 5 MG/1
5 TABLET ORAL NIGHTLY
Qty: 90 TABLET | Refills: 0 | Status: SHIPPED | OUTPATIENT
Start: 2022-03-31

## 2022-04-07 ENCOUNTER — APPOINTMENT (OUTPATIENT)
Dept: PHYSICAL THERAPY | Age: 72
End: 2022-04-07
Attending: INTERNAL MEDICINE
Payer: MEDICARE

## 2022-04-14 ENCOUNTER — APPOINTMENT (OUTPATIENT)
Dept: PHYSICAL THERAPY | Age: 72
End: 2022-04-14
Attending: INTERNAL MEDICINE
Payer: MEDICARE

## 2022-04-21 ENCOUNTER — APPOINTMENT (OUTPATIENT)
Dept: PHYSICAL THERAPY | Age: 72
End: 2022-04-21
Attending: INTERNAL MEDICINE
Payer: MEDICARE

## 2022-04-27 RX ORDER — METOPROLOL TARTRATE 100 MG/1
TABLET ORAL
Qty: 60 TABLET | Refills: 0 | Status: SHIPPED | OUTPATIENT
Start: 2022-04-27

## 2022-04-28 ENCOUNTER — APPOINTMENT (OUTPATIENT)
Dept: PHYSICAL THERAPY | Age: 72
End: 2022-04-28
Attending: INTERNAL MEDICINE
Payer: MEDICARE

## 2022-04-28 ENCOUNTER — ANTI-COAG VISIT (OUTPATIENT)
Dept: INTERNAL MEDICINE CLINIC | Facility: CLINIC | Age: 72
End: 2022-04-28
Payer: MEDICARE

## 2022-04-28 DIAGNOSIS — I82.532 CHRONIC DEEP VEIN THROMBOSIS OF LEFT POPLITEAL VEIN (HCC): ICD-10-CM

## 2022-04-28 DIAGNOSIS — D68.61 ANTIPHOSPHOLIPID SYNDROME (HCC): ICD-10-CM

## 2022-04-28 DIAGNOSIS — Z51.81 ENCOUNTER FOR THERAPEUTIC DRUG MONITORING: ICD-10-CM

## 2022-04-28 DIAGNOSIS — Z79.01 LONG TERM (CURRENT) USE OF ANTICOAGULANTS: ICD-10-CM

## 2022-04-28 LAB — INR: 2.2 (ref 2–3)

## 2022-04-28 PROCEDURE — 93793 ANTICOAG MGMT PT WARFARIN: CPT

## 2022-04-28 PROCEDURE — 85610 PROTHROMBIN TIME: CPT

## 2022-05-04 RX ORDER — GLIPIZIDE 10 MG/1
5 TABLET ORAL
Qty: 90 TABLET | Refills: 1 | Status: SHIPPED | OUTPATIENT
Start: 2022-05-04 | End: 2022-10-31

## 2022-05-04 NOTE — TELEPHONE ENCOUNTER
Please review. Protocol failed / No protocol.    Requested Prescriptions   Pending Prescriptions Disp Refills    GLIPIZIDE 10 MG Oral Tab [Pharmacy Med Name: Glipizide 10 Mg Tab Apot] 90 tablet 0     Sig: take 1/2 tablet by mouth twice a day before meals        Diabetes Medication Protocol Failed - 5/4/2022  2:30 PM        Failed - GFR Non- > 50     Lab Results   Component Value Date    GFRNAA 48 (L) 03/20/2022                 Passed - Last A1C < 7.5 and within past 6 months     Lab Results   Component Value Date    A1C 6.8 (H) 03/20/2022               Passed - Appointment in past 6 or next 3 months        Passed - GFR in the past 12 months             Future Appointments         Provider Department Appt Notes    In 1 month [facility], [facility]     In 3 months Mau Mosqueda MD [facility], [facility], [place] [address]     In 3 months Adithya Morales MD [facility], [facility], [address] [facility], [facility] Visits              1 month ago Controlled type 2 diabetes mellitus without complication, without long-term current use of insulin [facility])    [facility] Soledad Gallegos MD    Office Visit    1 month ago Stage 3b chronic kidney disease [facility])    [facility], [facility], [facility]    Office Visit    1 month ago Urge incontinence of urine    [address] Marca Schaumann, [place]    Office Visit    2 months ago Urge incontinence of urine    [address] Marca Scha[place], [place]    Office Visit    2 months ago Urge incontinence of urine    [address], [place]    Office Visit

## 2022-05-13 ENCOUNTER — HOSPITAL ENCOUNTER (OUTPATIENT)
Dept: ULTRASOUND IMAGING | Facility: HOSPITAL | Age: 72
Discharge: HOME | End: 2022-05-13
Attending: INTERNAL MEDICINE
Payer: MEDICARE

## 2022-05-13 DIAGNOSIS — I82.532 CHRONIC DEEP VEIN THROMBOSIS OF LEFT POPLITEAL VEIN (HCC): ICD-10-CM

## 2022-05-13 PROCEDURE — 93970 EXTREMITY STUDY: CPT | Performed by: INTERNAL MEDICINE

## 2022-05-18 RX ORDER — LEVOTHYROXINE SODIUM 0.2 MG/1
200 TABLET ORAL
Qty: 90 TABLET | Refills: 0 | Status: SHIPPED | OUTPATIENT
Start: 2022-05-18 | End: 2022-09-12 | Stop reason: DRUGHIGH

## 2022-05-18 RX ORDER — SPIRONOLACTONE 25 MG/1
TABLET ORAL
Qty: 90 TABLET | Refills: 0 | Status: SHIPPED | OUTPATIENT
Start: 2022-05-18

## 2022-05-18 NOTE — TELEPHONE ENCOUNTER
Refill passed per [address] [place], [place] [address] protocol.     Requested Prescriptions   Pending Prescriptions Disp Refills    LEVOTHYROXINE 200 MCG Oral Tab [Pharmacy Med Name: Levothyroxine Sodium 200 Mcg Tab Lupi] 90 tablet 0     Sig: Take 1 tablet  by mouth every morning before breakfast.        Thyroid Medication Protocol Failed - 5/18/2022  4:51 PM        Failed - TSH in past 12 months        Passed - Last TSH value is normal     Lab Results   Component Value Date    TSH 0.656 02/18/2021    [facility] 2.29 06/04/2016                 Passed - Appointment in past 12 or next 3 months              Future Appointments         Provider Department Appt Notes    In 3 weeks Joseph Tinoco [place]     In 2 months Calos Garcias MD [address] [place], [place] [address]     In 3 months Javier Arrington MD [address] [place] [address], [address]            Recent Outpatient Visits              1 month ago Controlled type 2 diabetes mellitus without complication, without long-term current use of insulin [facility])    [address] [place] [address], Sherrie Zarate MD    Office Visit    1 month ago Stage 3b chronic kidney disease [facility])    [facility], [facility], Kin Becker MD    Office Visit    2 months ago Urge incontinence of urine    [address] [place]ene [place], [place]    Office Visit    2 months ago Urge incontinence of urine    [address] [place], [place]    Office Visit    2 months ago Urge incontinence of urine    [address] [place], [place]    Office Visit

## 2022-06-06 RX ORDER — METOPROLOL TARTRATE 100 MG/1
TABLET ORAL
Qty: 60 TABLET | Refills: 0 | Status: SHIPPED | OUTPATIENT
Start: 2022-06-06

## 2022-06-09 ENCOUNTER — ANTI-COAG VISIT (OUTPATIENT)
Dept: ANTICOAGULATION | Facility: CLINIC | Age: 72
End: 2022-06-09
Payer: MEDICARE

## 2022-06-09 DIAGNOSIS — Z51.81 ENCOUNTER FOR THERAPEUTIC DRUG MONITORING: ICD-10-CM

## 2022-06-09 DIAGNOSIS — D68.61 ANTIPHOSPHOLIPID SYNDROME (HCC): ICD-10-CM

## 2022-06-09 DIAGNOSIS — I82.532 CHRONIC DEEP VEIN THROMBOSIS OF LEFT POPLITEAL VEIN (HCC): ICD-10-CM

## 2022-06-09 DIAGNOSIS — Z79.01 LONG TERM (CURRENT) USE OF ANTICOAGULANTS: ICD-10-CM

## 2022-06-09 LAB — INR: 2.2 (ref 2–3)

## 2022-06-09 PROCEDURE — 93793 ANTICOAG MGMT PT WARFARIN: CPT | Performed by: FAMILY MEDICINE

## 2022-06-09 PROCEDURE — 85610 PROTHROMBIN TIME: CPT | Performed by: FAMILY MEDICINE

## 2022-06-09 RX ORDER — MINOXIDIL 2.5 MG/1
5 TABLET ORAL 2 TIMES DAILY
Qty: 360 TABLET | Refills: 0 | Status: SHIPPED | OUTPATIENT
Start: 2022-06-09 | End: 2022-12-13

## 2022-07-14 ENCOUNTER — NURSE TRIAGE (OUTPATIENT)
Dept: INTERNAL MEDICINE CLINIC | Facility: CLINIC | Age: 72
End: 2022-07-14

## 2022-07-14 LAB — AMB EXT COVID-19 RESULT: DETECTED

## 2022-07-15 RX ORDER — METOPROLOL TARTRATE 100 MG/1
TABLET ORAL
Qty: 60 TABLET | Refills: 0 | Status: SHIPPED | OUTPATIENT
Start: 2022-07-15

## 2022-07-21 ENCOUNTER — ANTI-COAG VISIT (OUTPATIENT)
Dept: ANTICOAGULATION | Facility: CLINIC | Age: 72
End: 2022-07-21
Payer: MEDICARE

## 2022-07-21 DIAGNOSIS — I82.532 CHRONIC DEEP VEIN THROMBOSIS OF LEFT POPLITEAL VEIN (HCC): ICD-10-CM

## 2022-07-21 DIAGNOSIS — Z79.01 LONG TERM CURRENT USE OF ANTICOAGULANT THERAPY: ICD-10-CM

## 2022-07-21 DIAGNOSIS — D68.61 ANTIPHOSPHOLIPID SYNDROME (HCC): ICD-10-CM

## 2022-07-21 DIAGNOSIS — Z51.81 ENCOUNTER FOR THERAPEUTIC DRUG MONITORING: ICD-10-CM

## 2022-07-21 LAB — INR: 2.2 (ref 2–3)

## 2022-07-21 PROCEDURE — 93793 ANTICOAG MGMT PT WARFARIN: CPT | Performed by: INTERNAL MEDICINE

## 2022-07-21 PROCEDURE — 85610 PROTHROMBIN TIME: CPT | Performed by: INTERNAL MEDICINE

## 2022-07-28 ENCOUNTER — LAB ENCOUNTER (OUTPATIENT)
Dept: LAB | Facility: HOSPITAL | Age: 72
End: 2022-07-28
Attending: INTERNAL MEDICINE
Payer: MEDICARE

## 2022-07-28 DIAGNOSIS — N18.32 STAGE 3B CHRONIC KIDNEY DISEASE (HCC): ICD-10-CM

## 2022-07-28 DIAGNOSIS — I10 ESSENTIAL HYPERTENSION WITH GOAL BLOOD PRESSURE LESS THAN 130/80: ICD-10-CM

## 2022-07-28 DIAGNOSIS — E11.9 CONTROLLED TYPE 2 DIABETES MELLITUS WITHOUT COMPLICATION, WITHOUT LONG-TERM CURRENT USE OF INSULIN (HCC): ICD-10-CM

## 2022-07-28 DIAGNOSIS — R19.7 DIARRHEA, UNSPECIFIED TYPE: ICD-10-CM

## 2022-07-28 DIAGNOSIS — R05.9 COUGH: ICD-10-CM

## 2022-07-28 LAB
ALBUMIN SERPL-MCNC: 3.4 G/DL (ref 3.4–5)
ANION GAP SERPL CALC-SCNC: 9 MMOL/L (ref 0–18)
BASOPHILS # BLD AUTO: 0.04 X10(3) UL (ref 0–0.2)
BASOPHILS NFR BLD AUTO: 0.5 %
BILIRUB UR QL: NEGATIVE
BUN BLD-MCNC: 32 MG/DL (ref 7–18)
BUN/CREAT SERPL: 25 (ref 10–20)
CALCIUM BLD-MCNC: 9.2 MG/DL (ref 8.5–10.1)
CHLORIDE SERPL-SCNC: 104 MMOL/L (ref 98–112)
CHOLEST SERPL-MCNC: 149 MG/DL (ref ?–200)
CO2 SERPL-SCNC: 28 MMOL/L (ref 21–32)
COLOR UR: YELLOW
CREAT BLD-MCNC: 1.28 MG/DL
CREAT UR-SCNC: 257 MG/DL
DEPRECATED RDW RBC AUTO: 43.4 FL (ref 35.1–46.3)
EOSINOPHIL # BLD AUTO: 0.33 X10(3) UL (ref 0–0.7)
EOSINOPHIL NFR BLD AUTO: 4.4 %
ERYTHROCYTE [DISTWIDTH] IN BLOOD BY AUTOMATED COUNT: 12.5 % (ref 11–15)
FASTING PATIENT LIPID ANSWER: YES
GLUCOSE BLD-MCNC: 122 MG/DL (ref 70–99)
GLUCOSE UR-MCNC: NEGATIVE MG/DL
HCT VFR BLD AUTO: 39.6 %
HDLC SERPL-MCNC: 51 MG/DL (ref 40–59)
HGB BLD-MCNC: 12.5 G/DL
IGA SERPL-MCNC: 383 MG/DL (ref 70–312)
IMM GRANULOCYTES # BLD AUTO: 0.04 X10(3) UL (ref 0–1)
IMM GRANULOCYTES NFR BLD: 0.5 %
KETONES UR-MCNC: NEGATIVE MG/DL
LDLC SERPL CALC-MCNC: 80 MG/DL (ref ?–100)
LYMPHOCYTES # BLD AUTO: 2.14 X10(3) UL (ref 1–4)
LYMPHOCYTES NFR BLD AUTO: 28.4 %
MCH RBC QN AUTO: 30 PG (ref 26–34)
MCHC RBC AUTO-ENTMCNC: 31.6 G/DL (ref 31–37)
MCV RBC AUTO: 95 FL
MICROALBUMIN UR-MCNC: 3.38 MG/DL
MICROALBUMIN/CREAT 24H UR-RTO: 13.2 UG/MG (ref ?–30)
MONOCYTES # BLD AUTO: 0.82 X10(3) UL (ref 0.1–1)
MONOCYTES NFR BLD AUTO: 10.9 %
NEUTROPHILS # BLD AUTO: 4.16 X10 (3) UL (ref 1.5–7.7)
NEUTROPHILS # BLD AUTO: 4.16 X10(3) UL (ref 1.5–7.7)
NEUTROPHILS NFR BLD AUTO: 55.3 %
NITRITE UR QL STRIP.AUTO: NEGATIVE
NONHDLC SERPL-MCNC: 98 MG/DL (ref ?–130)
OSMOLALITY SERPL CALC.SUM OF ELEC: 300 MOSM/KG (ref 275–295)
PH UR: 5 [PH] (ref 5–8)
PHOSPHATE SERPL-MCNC: 3.7 MG/DL (ref 2.5–4.9)
PLATELET # BLD AUTO: 322 10(3)UL (ref 150–450)
POTASSIUM SERPL-SCNC: 4.2 MMOL/L (ref 3.5–5.1)
PROT UR-MCNC: NEGATIVE MG/DL
RBC # BLD AUTO: 4.17 X10(6)UL
SODIUM SERPL-SCNC: 141 MMOL/L (ref 136–145)
SP GR UR STRIP: 1.02 (ref 1–1.03)
TRIGL SERPL-MCNC: 95 MG/DL (ref 30–149)
UROBILINOGEN UR STRIP-ACNC: <2
VIT C UR-MCNC: NEGATIVE MG/DL
VLDLC SERPL CALC-MCNC: 15 MG/DL (ref 0–30)
WBC # BLD AUTO: 7.5 X10(3) UL (ref 4–11)

## 2022-07-28 PROCEDURE — 86003 ALLG SPEC IGE CRUDE XTRC EA: CPT

## 2022-07-28 PROCEDURE — 86003 ALLG SPEC IGE CRUDE XTRC EA: CPT | Performed by: INTERNAL MEDICINE

## 2022-07-28 PROCEDURE — 85025 COMPLETE CBC W/AUTO DIFF WBC: CPT

## 2022-07-28 PROCEDURE — 82570 ASSAY OF URINE CREATININE: CPT

## 2022-07-28 PROCEDURE — 86258 DGP ANTIBODY EACH IG CLASS: CPT

## 2022-07-28 PROCEDURE — 80069 RENAL FUNCTION PANEL: CPT

## 2022-07-28 PROCEDURE — 82043 UR ALBUMIN QUANTITATIVE: CPT

## 2022-07-28 PROCEDURE — 86256 FLUORESCENT ANTIBODY TITER: CPT

## 2022-07-28 PROCEDURE — 3061F NEG MICROALBUMINURIA REV: CPT | Performed by: INTERNAL MEDICINE

## 2022-07-28 PROCEDURE — 81001 URINALYSIS AUTO W/SCOPE: CPT

## 2022-07-28 PROCEDURE — 80061 LIPID PANEL: CPT

## 2022-07-28 PROCEDURE — 82784 ASSAY IGA/IGD/IGG/IGM EACH: CPT

## 2022-07-28 PROCEDURE — 86364 TISS TRNSGLTMNASE EA IG CLAS: CPT

## 2022-07-28 PROCEDURE — 36415 COLL VENOUS BLD VENIPUNCTURE: CPT | Performed by: INTERNAL MEDICINE

## 2022-07-28 PROCEDURE — 82785 ASSAY OF IGE: CPT | Performed by: INTERNAL MEDICINE

## 2022-07-29 RX ORDER — VALSARTAN 160 MG/1
160 TABLET ORAL DAILY
Qty: 180 TABLET | Refills: 0 | Status: SHIPPED | OUTPATIENT
Start: 2022-07-29 | End: 2023-02-01

## 2022-07-30 LAB
GLIADIN IGA SER-ACNC: 1 U/ML (ref ?–7)
GLIADIN IGG SER-ACNC: <0.4 U/ML (ref ?–7)
TTG IGA SER-ACNC: 0.9 U/ML (ref ?–7)

## 2022-08-01 LAB
A ALTERNATA IGE QN: <0.1 KUA/L (ref ?–0.1)
A FUMIGATUS IGE QN: <0.1 KUA/L (ref ?–0.1)
AMER SYCAMORE IGE QN: <0.1 KUA/L (ref ?–0.1)
BERMUDA GRASS IGE QN: <0.1 KUA/L (ref ?–0.1)
BOXELDER IGE QN: <0.1 KUA/L (ref ?–0.1)
C HERBARUM IGE QN: <0.1 KUA/L (ref ?–0.1)
CALIF WALNUT IGE QN: <0.1 KUA/L (ref ?–0.1)
CAT DANDER IGE QN: <0.1 KUA/L (ref ?–0.1)
CLAM IGE QN: <0.1 KUA/L (ref ?–0.1)
CMN PIGWEED IGE QN: <0.1 KUA/L (ref ?–0.1)
CODFISH IGE QN: <0.1 KUA/L (ref ?–0.1)
COMMON RAGWEED IGE QN: <0.1 KUA/L (ref ?–0.1)
CORN IGE QN: <0.1 KUA/L (ref ?–0.1)
COTTONWOOD IGE QN: <0.1 KUA/L (ref ?–0.1)
COW MILK IGE QN: <0.1 KUA/L (ref ?–0.1)
D FARINAE IGE QN: <0.1 KUA/L (ref ?–0.1)
D PTERONYSS IGE QN: <0.1 KUA/L (ref ?–0.1)
DOG DANDER IGE QN: <0.1 KUA/L (ref ?–0.1)
EGG WHITE IGE QN: 0.25 KUA/L (ref ?–0.1)
GOOSEFOOT IGE QN: <0.1 KUA/L (ref ?–0.1)
HOUSE DUST HS IGE QN: <0.1 KUA/L (ref ?–0.1)
IGE SERPL-ACNC: 88.3 KU/L (ref 2–214)
IGE SERPL-ACNC: 89.1 KU/L (ref 2–214)
KENT BLUE GRASS IGE QN: <0.1 KUA/L (ref ?–0.1)
M RACEMOSUS IGE QN: <0.1 KUA/L (ref ?–0.1)
MARSH ELDER IGE QN: <0.1 KUA/L (ref ?–0.1)
MOUSE EPITH IGE QN: <0.1 KUA/L (ref ?–0.1)
MT JUNIPER IGE QN: <0.1 KUA/L (ref ?–0.1)
P NOTATUM IGE QN: <0.1 KUA/L (ref ?–0.1)
PEANUT IGE QN: <0.1 KUA/L (ref ?–0.1)
PECAN/HICK TREE IGE QN: <0.1 KUA/L (ref ?–0.1)
PER RYE GRASS IGE QN: <0.1 KUA/L (ref ?–0.1)
ROACH IGE QN: <0.1 KUA/L (ref ?–0.1)
SALTWORT IGE QN: <0.1 KUA/L (ref ?–0.1)
SCALLOP IGE QN: <0.1 KUA/L (ref ?–0.1)
SESAME SEED IGE QN: <0.1 KUA/L (ref ?–0.1)
SHRIMP IGE QN: <0.1 KUA/L (ref ?–0.1)
SOYBEAN IGE QN: <0.1 KUA/L (ref ?–0.1)
TIMOTHY IGE QN: <0.1 KUA/L (ref ?–0.1)
WALNUT IGE QN: <0.1 KUA/L (ref ?–0.1)
WHEAT IGE QN: <0.1 KUA/L (ref ?–0.1)
WHITE ASH IGE QN: <0.1 KUA/L (ref ?–0.1)
WHITE ELM IGE QN: 0.11 KUA/L (ref ?–0.1)
WHITE MULBERRY IGE QN: <0.1 KUA/L (ref ?–0.1)
WHITE OAK IGE QN: <0.1 KUA/L (ref ?–0.1)

## 2022-08-04 ENCOUNTER — TELEPHONE (OUTPATIENT)
Dept: NEPHROLOGY | Facility: CLINIC | Age: 72
End: 2022-08-04

## 2022-08-04 ENCOUNTER — TELEPHONE (OUTPATIENT)
Dept: INTERNAL MEDICINE CLINIC | Facility: CLINIC | Age: 72
End: 2022-08-04

## 2022-08-04 ENCOUNTER — OFFICE VISIT (OUTPATIENT)
Dept: NEPHROLOGY | Facility: CLINIC | Age: 72
End: 2022-08-04
Payer: MEDICARE

## 2022-08-04 VITALS
BODY MASS INDEX: 35 KG/M2 | DIASTOLIC BLOOD PRESSURE: 70 MMHG | WEIGHT: 240 LBS | HEART RATE: 57 BPM | SYSTOLIC BLOOD PRESSURE: 138 MMHG

## 2022-08-04 DIAGNOSIS — E11.22 TYPE 2 DIABETES MELLITUS WITH STAGE 3B CHRONIC KIDNEY DISEASE, WITH LONG-TERM CURRENT USE OF INSULIN (HCC): ICD-10-CM

## 2022-08-04 DIAGNOSIS — E11.9 CONTROLLED TYPE 2 DIABETES MELLITUS WITHOUT COMPLICATION, WITHOUT LONG-TERM CURRENT USE OF INSULIN (HCC): Chronic | ICD-10-CM

## 2022-08-04 DIAGNOSIS — I10 ESSENTIAL HYPERTENSION WITH GOAL BLOOD PRESSURE LESS THAN 130/80: ICD-10-CM

## 2022-08-04 DIAGNOSIS — Z79.4 TYPE 2 DIABETES MELLITUS WITH STAGE 3B CHRONIC KIDNEY DISEASE, WITH LONG-TERM CURRENT USE OF INSULIN (HCC): ICD-10-CM

## 2022-08-04 DIAGNOSIS — E11.9 CONTROLLED TYPE 2 DIABETES MELLITUS WITHOUT COMPLICATION, WITHOUT LONG-TERM CURRENT USE OF INSULIN (HCC): Primary | ICD-10-CM

## 2022-08-04 DIAGNOSIS — N18.32 TYPE 2 DIABETES MELLITUS WITH STAGE 3B CHRONIC KIDNEY DISEASE, WITH LONG-TERM CURRENT USE OF INSULIN (HCC): ICD-10-CM

## 2022-08-04 DIAGNOSIS — N18.32 STAGE 3B CHRONIC KIDNEY DISEASE (HCC): Primary | ICD-10-CM

## 2022-08-04 PROCEDURE — 3078F DIAST BP <80 MM HG: CPT | Performed by: INTERNAL MEDICINE

## 2022-08-04 PROCEDURE — 1126F AMNT PAIN NOTED NONE PRSNT: CPT | Performed by: INTERNAL MEDICINE

## 2022-08-04 PROCEDURE — 3075F SYST BP GE 130 - 139MM HG: CPT | Performed by: INTERNAL MEDICINE

## 2022-08-04 PROCEDURE — 99214 OFFICE O/P EST MOD 30 MIN: CPT | Performed by: INTERNAL MEDICINE

## 2022-08-04 RX ORDER — EMPAGLIFLOZIN 25 MG/1
1 TABLET, FILM COATED ORAL
Qty: 30 TABLET | Refills: 3 | Status: SHIPPED | OUTPATIENT
Start: 2022-08-04

## 2022-08-04 NOTE — TELEPHONE ENCOUNTER
Patient has a follow-up appointment with Dr. Jamel Gorman today. Please provide a referral. Thank you.

## 2022-08-04 NOTE — PATIENT INSTRUCTIONS
Jardiance 10 mg each morning cut insulin to 35 units on the Lantus    Stay hydrated watch for any bladder infections or yeast infection    Do labs in 5 weeks      Great job on blood pressure and sugars    This should help the heart and the kidneys and the sugars see me back in 6 weeks please

## 2022-08-17 RX ORDER — METOPROLOL TARTRATE 100 MG/1
TABLET ORAL
Qty: 60 TABLET | Refills: 1 | Status: SHIPPED | OUTPATIENT
Start: 2022-08-17

## 2022-08-17 RX ORDER — DICYCLOMINE HYDROCHLORIDE 10 MG/1
10 CAPSULE ORAL 2 TIMES DAILY PRN
COMMUNITY
Start: 2022-07-15

## 2022-08-18 ENCOUNTER — OFFICE VISIT (OUTPATIENT)
Dept: INTERNAL MEDICINE CLINIC | Facility: CLINIC | Age: 72
End: 2022-08-18
Payer: MEDICARE

## 2022-08-18 VITALS
RESPIRATION RATE: 17 BRPM | TEMPERATURE: 99 F | HEART RATE: 56 BPM | BODY MASS INDEX: 35.13 KG/M2 | DIASTOLIC BLOOD PRESSURE: 80 MMHG | WEIGHT: 237.19 LBS | SYSTOLIC BLOOD PRESSURE: 132 MMHG | HEIGHT: 69 IN | OXYGEN SATURATION: 98 %

## 2022-08-18 DIAGNOSIS — L29.9 EAR ITCHING: ICD-10-CM

## 2022-08-18 DIAGNOSIS — H02.88A MEIBOMIAN GLAND DYSFUNCTION (MGD), BILATERAL, BOTH UPPER AND LOWER LIDS: ICD-10-CM

## 2022-08-18 DIAGNOSIS — N39.41 URGE INCONTINENCE OF URINE: ICD-10-CM

## 2022-08-18 DIAGNOSIS — Z12.31 ENCOUNTER FOR SCREENING MAMMOGRAM FOR MALIGNANT NEOPLASM OF BREAST: ICD-10-CM

## 2022-08-18 DIAGNOSIS — R31.29 MICROSCOPIC HEMATURIA: ICD-10-CM

## 2022-08-18 DIAGNOSIS — Z71.85 VACCINE COUNSELING: ICD-10-CM

## 2022-08-18 DIAGNOSIS — E87.5 SERUM POTASSIUM ELEVATED: ICD-10-CM

## 2022-08-18 DIAGNOSIS — D68.61 ANTIPHOSPHOLIPID SYNDROME (HCC): ICD-10-CM

## 2022-08-18 DIAGNOSIS — H25.13 AGE-RELATED NUCLEAR CATARACT OF BOTH EYES: ICD-10-CM

## 2022-08-18 DIAGNOSIS — Z00.00 ENCOUNTER FOR ANNUAL HEALTH EXAMINATION: ICD-10-CM

## 2022-08-18 DIAGNOSIS — E11.22 TYPE 2 DIABETES MELLITUS WITH STAGE 3B CHRONIC KIDNEY DISEASE, WITH LONG-TERM CURRENT USE OF INSULIN (HCC): ICD-10-CM

## 2022-08-18 DIAGNOSIS — E04.9 GOITER: ICD-10-CM

## 2022-08-18 DIAGNOSIS — N18.32 STAGE 3B CHRONIC KIDNEY DISEASE (HCC): Chronic | ICD-10-CM

## 2022-08-18 DIAGNOSIS — I67.2 INTRACRANIAL ATHEROSCLEROSIS: ICD-10-CM

## 2022-08-18 DIAGNOSIS — M17.11 PRIMARY OSTEOARTHRITIS OF RIGHT KNEE: Chronic | ICD-10-CM

## 2022-08-18 DIAGNOSIS — Z79.4 TYPE 2 DIABETES MELLITUS WITH STAGE 3B CHRONIC KIDNEY DISEASE, WITH LONG-TERM CURRENT USE OF INSULIN (HCC): ICD-10-CM

## 2022-08-18 DIAGNOSIS — H02.88B MEIBOMIAN GLAND DYSFUNCTION (MGD), BILATERAL, BOTH UPPER AND LOWER LIDS: ICD-10-CM

## 2022-08-18 DIAGNOSIS — L40.9 PSORIASIS: ICD-10-CM

## 2022-08-18 DIAGNOSIS — L40.50 PSORIATIC ARTHRITIS (HCC): ICD-10-CM

## 2022-08-18 DIAGNOSIS — E11.9 CONTROLLED TYPE 2 DIABETES MELLITUS WITHOUT COMPLICATION, WITHOUT LONG-TERM CURRENT USE OF INSULIN (HCC): Chronic | ICD-10-CM

## 2022-08-18 DIAGNOSIS — E53.8 B12 DEFICIENCY: ICD-10-CM

## 2022-08-18 DIAGNOSIS — R93.89 ABNORMAL COMPUTED TOMOGRAPHY ANGIOGRAPHY (CTA): Primary | ICD-10-CM

## 2022-08-18 DIAGNOSIS — N89.8 ITCHING IN THE VAGINAL AREA: ICD-10-CM

## 2022-08-18 DIAGNOSIS — I82.532 CHRONIC DEEP VEIN THROMBOSIS OF LEFT POPLITEAL VEIN (HCC): ICD-10-CM

## 2022-08-18 DIAGNOSIS — Z79.01 LONG TERM (CURRENT) USE OF ANTICOAGULANTS: ICD-10-CM

## 2022-08-18 DIAGNOSIS — E66.01 MORBID (SEVERE) OBESITY DUE TO EXCESS CALORIES (HCC): ICD-10-CM

## 2022-08-18 DIAGNOSIS — D12.6 TUBULAR ADENOMA OF COLON: ICD-10-CM

## 2022-08-18 DIAGNOSIS — H17.9 CORNEAL SCAR: ICD-10-CM

## 2022-08-18 DIAGNOSIS — Z98.890 HISTORY OF PTERYGIUM EXCISION: ICD-10-CM

## 2022-08-18 DIAGNOSIS — E03.9 HYPOTHYROIDISM (ACQUIRED): Chronic | ICD-10-CM

## 2022-08-18 DIAGNOSIS — E66.01 SEVERE OBESITY (BMI 35.0-39.9) WITH COMORBIDITY (HCC): Chronic | ICD-10-CM

## 2022-08-18 DIAGNOSIS — I10 ESSENTIAL HYPERTENSION WITH GOAL BLOOD PRESSURE LESS THAN 130/80: ICD-10-CM

## 2022-08-18 DIAGNOSIS — N18.32 TYPE 2 DIABETES MELLITUS WITH STAGE 3B CHRONIC KIDNEY DISEASE, WITH LONG-TERM CURRENT USE OF INSULIN (HCC): ICD-10-CM

## 2022-08-18 DIAGNOSIS — R31.29 OTHER MICROSCOPIC HEMATURIA: ICD-10-CM

## 2022-08-18 DIAGNOSIS — M85.80 OSTEOPENIA, UNSPECIFIED LOCATION: ICD-10-CM

## 2022-08-18 DIAGNOSIS — H04.123 DRY EYE SYNDROME OF BILATERAL LACRIMAL GLANDS: ICD-10-CM

## 2022-08-18 DIAGNOSIS — H25.013 CORTICAL AGE-RELATED CATARACT OF BOTH EYES: ICD-10-CM

## 2022-08-18 LAB
APPEARANCE: CLEAR
BILIRUBIN: NEGATIVE
GLUCOSE (URINE DIPSTICK): 500 MG/DL
KETONES (URINE DIPSTICK): NEGATIVE MG/DL
LEUKOCYTES: NEGATIVE
MULTISTIX LOT#: ABNORMAL NUMERIC
NITRITE, URINE: NEGATIVE
PH, URINE: 7.5 (ref 4.5–8)
PROTEIN (URINE DIPSTICK): NEGATIVE MG/DL
SPECIFIC GRAVITY: 1.01 (ref 1–1.03)
URINE-COLOR: YELLOW
UROBILINOGEN,SEMI-QN: 0.2 MG/DL (ref 0–1.9)

## 2022-08-18 PROCEDURE — 3008F BODY MASS INDEX DOCD: CPT | Performed by: INTERNAL MEDICINE

## 2022-08-18 PROCEDURE — 96160 PT-FOCUSED HLTH RISK ASSMT: CPT | Performed by: INTERNAL MEDICINE

## 2022-08-18 PROCEDURE — 3075F SYST BP GE 130 - 139MM HG: CPT | Performed by: INTERNAL MEDICINE

## 2022-08-18 PROCEDURE — 3079F DIAST BP 80-89 MM HG: CPT | Performed by: INTERNAL MEDICINE

## 2022-08-18 PROCEDURE — 81003 URINALYSIS AUTO W/O SCOPE: CPT | Performed by: INTERNAL MEDICINE

## 2022-08-18 PROCEDURE — 1126F AMNT PAIN NOTED NONE PRSNT: CPT | Performed by: INTERNAL MEDICINE

## 2022-08-18 PROCEDURE — 99397 PER PM REEVAL EST PAT 65+ YR: CPT | Performed by: INTERNAL MEDICINE

## 2022-08-18 PROCEDURE — G0439 PPPS, SUBSEQ VISIT: HCPCS | Performed by: INTERNAL MEDICINE

## 2022-08-18 RX ORDER — ALBUTEROL SULFATE 90 UG/1
2 AEROSOL, METERED RESPIRATORY (INHALATION) EVERY 4 HOURS PRN
Qty: 1 EACH | Refills: 1 | Status: SHIPPED | OUTPATIENT
Start: 2022-08-18

## 2022-08-18 RX ORDER — BETAMETHASONE DIPROPIONATE 0.5 MG/G
1 CREAM TOPICAL 2 TIMES DAILY
Qty: 50 EACH | Refills: 3 | Status: SHIPPED | OUTPATIENT
Start: 2022-08-18

## 2022-08-27 RX ORDER — SIMVASTATIN 20 MG
20 TABLET ORAL
Qty: 24 TABLET | Refills: 0 | Status: SHIPPED | OUTPATIENT
Start: 2022-08-27 | End: 2023-01-17

## 2022-08-27 NOTE — TELEPHONE ENCOUNTER
Refill passed per [place] [facility] protocol. Requested Prescriptions   Pending Prescriptions Disp Refills    SIMVASTATIN 20 MG Oral Tab [Pharmacy Med Name: Simvastatin 20 Mg Tab Nort] 24 tablet 0     Sig: Take 1 tablet by mouth twice a week.         Cholesterol Medication Protocol Passed - 8/27/2022  1:33 AM        Passed - ALT in past 12 months        Passed - LDL in past 12 months        Passed - Last ALT < 80       Lab Results   Component Value Date    ALT 23 03/20/2022             Passed - Last LDL < 130     Lab Results   Component Value Date    LDL 80 07/28/2022               Passed - In person appointment or virtual visit in the past 12 mos or appointment in next 3 mos       Recent Outpatient Visits              1 week ago Abnormal computed tomography angiography (CTA)    [facility], [address], Meagan Garcia MD    Office Visit    3 weeks ago Stage 3b chronic kidney disease [facility])    [facility], [address] [place] [address], Vianca Becker MD    Office Visit    4 months ago Controlled type 2 diabetes mellitus without complication, without long-term current use of insulin [facility])    [facility], [address], Meagan Garcia MD    Office Visit    5 months ago Stage 3b chronic kidney disease [facility])    [facility], [address] [place] [address], Vianca Becker MD    Office Visit    5 months ago Urge incontinence of urine    [address] [place] [address]    Office Visit     Future Appointments         Provider Department Appt Notes    In 5 days Vl[address] Tavera [address]., Ashli     In 2 weeks Tyler Lopez MD [facility], [address] 6 weeks    In 3 months Miki Rivera MD [facility], [address], [place] 4 month ok per dr landers                      Future Appointments         Provider Department Appt Notes    In 5 days Valdo Tavera 3., [address], [place]     In 2 weeks Andi Beltre MD [facility], [employer], [place] 6 weeks    In 3 months Nancie Whatley MD [facility], [address] [facility] [address] [facility] [address] 4 month ok per dr landers             Recent Outpatient Visits              1 week ago Abnormal computed tomography angiography (CTA)    [facility], [address], Brenden Gallardo MD    Office Visit    3 weeks ago Stage 3b chronic kidney disease [facility])    [facility], Phill Meyer Lucille Shark, MD    Office Visit    4 months ago Controlled type 2 diabetes mellitus without complication, without long-term current use of insulin [facility])    [facility], [address], Brenden Gallardo MD    Office Visit    5 months ago Stage 3b chronic kidney disease [facility])    [facility], Phill Meyer Lucille Shark, MD    Office Visit    5 months ago Urge incontinence of urine    [facility], [address] [place] [address]    Office Visit

## 2022-08-31 ENCOUNTER — TELEPHONE (OUTPATIENT)
Dept: ANTICOAGULATION | Facility: CLINIC | Age: 72
End: 2022-08-31

## 2022-08-31 DIAGNOSIS — I82.532 CHRONIC DEEP VEIN THROMBOSIS OF LEFT POPLITEAL VEIN (HCC): Primary | ICD-10-CM

## 2022-08-31 DIAGNOSIS — Z79.01 MONITORING FOR LONG-TERM ANTICOAGULANT USE: ICD-10-CM

## 2022-08-31 DIAGNOSIS — Z51.81 MONITORING FOR LONG-TERM ANTICOAGULANT USE: ICD-10-CM

## 2022-08-31 DIAGNOSIS — D68.61 ANTIPHOSPHOLIPID SYNDROME (HCC): ICD-10-CM

## 2022-08-31 NOTE — TELEPHONE ENCOUNTER
Anticoag referral expires soon. Order pended. Please sign, thank you.       Dx: Long term (current) use of anticoagulants (Z79.01)  Chronic deep vein thrombosis of left popliteal vein (HCC) (I82.532)  Antiphospholipid syndrome (HCC) (D68.61)

## 2022-09-01 ENCOUNTER — ANTI-COAG VISIT (OUTPATIENT)
Dept: ANTICOAGULATION | Facility: CLINIC | Age: 72
End: 2022-09-01
Payer: MEDICARE

## 2022-09-01 DIAGNOSIS — Z79.01 MONITORING FOR LONG-TERM ANTICOAGULANT USE: ICD-10-CM

## 2022-09-01 DIAGNOSIS — Z51.81 MONITORING FOR LONG-TERM ANTICOAGULANT USE: ICD-10-CM

## 2022-09-01 DIAGNOSIS — Z79.01 LONG TERM (CURRENT) USE OF ANTICOAGULANTS: ICD-10-CM

## 2022-09-01 DIAGNOSIS — I82.532 CHRONIC DEEP VEIN THROMBOSIS OF LEFT POPLITEAL VEIN (HCC): ICD-10-CM

## 2022-09-01 DIAGNOSIS — D68.61 ANTIPHOSPHOLIPID SYNDROME (HCC): ICD-10-CM

## 2022-09-01 LAB — INR: 2.1 (ref 2–3)

## 2022-09-01 PROCEDURE — 93793 ANTICOAG MGMT PT WARFARIN: CPT | Performed by: INTERNAL MEDICINE

## 2022-09-01 PROCEDURE — 85610 PROTHROMBIN TIME: CPT | Performed by: INTERNAL MEDICINE

## 2022-09-06 RX ORDER — INSULIN GLARGINE 100 [IU]/ML
INJECTION, SOLUTION SUBCUTANEOUS
Qty: 30 ML | Refills: 2 | Status: SHIPPED | OUTPATIENT
Start: 2022-09-06 | End: 2022-12-13

## 2022-09-10 PROCEDURE — 3044F HG A1C LEVEL LT 7.0%: CPT | Performed by: INTERNAL MEDICINE

## 2022-09-11 LAB
ALBUMIN: 4 G/DL (ref 3.6–5.1)
BUN/CREATININE RATIO: 23 (CALC) (ref 6–22)
BUN: 28 MG/DL (ref 7–25)
CALCIUM: 9.3 MG/DL (ref 8.6–10.4)
CARBON DIOXIDE: 35 MMOL/L (ref 20–32)
CHLORIDE: 100 MMOL/L (ref 98–110)
CREATININE: 1.2 MG/DL (ref 0.6–1)
EGFR: 48 ML/MIN/1.73M2
GLUCOSE: 124 MG/DL (ref 65–139)
HEMOGLOBIN A1C: 6.3 % OF TOTAL HGB
PHOSPHATE (AS PHOSPHORUS): 3.8 MG/DL (ref 2.1–4.3)
POTASSIUM: 4.9 MMOL/L (ref 3.5–5.3)
SODIUM: 140 MMOL/L (ref 135–146)
T4, FREE: 2.1 NG/DL (ref 0.8–1.8)
TSH: 0.08 MIU/L (ref 0.4–4.5)
VITAMIN B12: 951 PG/ML (ref 200–1100)

## 2022-09-15 ENCOUNTER — OFFICE VISIT (OUTPATIENT)
Dept: NEPHROLOGY | Facility: CLINIC | Age: 72
End: 2022-09-15
Payer: MEDICARE

## 2022-09-15 VITALS
DIASTOLIC BLOOD PRESSURE: 64 MMHG | SYSTOLIC BLOOD PRESSURE: 143 MMHG | RESPIRATION RATE: 17 BRPM | HEIGHT: 69 IN | HEART RATE: 58 BPM | BODY MASS INDEX: 35.25 KG/M2 | WEIGHT: 238 LBS

## 2022-09-15 DIAGNOSIS — N18.32 STAGE 3B CHRONIC KIDNEY DISEASE (HCC): ICD-10-CM

## 2022-09-15 DIAGNOSIS — E11.9 CONTROLLED TYPE 2 DIABETES MELLITUS WITHOUT COMPLICATION, WITHOUT LONG-TERM CURRENT USE OF INSULIN (HCC): Primary | ICD-10-CM

## 2022-09-15 PROCEDURE — 3008F BODY MASS INDEX DOCD: CPT | Performed by: INTERNAL MEDICINE

## 2022-09-15 PROCEDURE — 3077F SYST BP >= 140 MM HG: CPT | Performed by: INTERNAL MEDICINE

## 2022-09-15 PROCEDURE — 3078F DIAST BP <80 MM HG: CPT | Performed by: INTERNAL MEDICINE

## 2022-09-15 PROCEDURE — 99213 OFFICE O/P EST LOW 20 MIN: CPT | Performed by: INTERNAL MEDICINE

## 2022-09-16 NOTE — PATIENT INSTRUCTIONS
Great job Julia Paul medications      Please do labs in middle December    See me in January    Have a good rest of the year new COVID-vaccine in late October

## 2022-09-27 RX ORDER — WARFARIN SODIUM 5 MG/1
5 TABLET ORAL NIGHTLY
Qty: 102 TABLET | Refills: 0 | Status: SHIPPED | OUTPATIENT
Start: 2022-09-27 | End: 2022-12-27

## 2022-09-27 NOTE — TELEPHONE ENCOUNTER
Please review; refill failed per [facility] protocol. Last CMP was drawn 06/28/2021. Per protocol, order pended for 90 day supply, no refills. Requested Prescriptions   Pending Prescriptions Disp Refills    WARFARIN 5 MG Oral Tab [Pharmacy Med Name: Warfarin Sodium 5 Mg Tab Teva] 180 tablet 0     Sig: TAKE ONE TABLET BY MOUTH ONE TIME DAILY        Rx Em Warfarin Protocol Failed - 9/27/2022 12:22 PM        Failed - CMP within past 12 months     No results found for this or any previous visit (from the past 4380 hour(s)).                   Passed - Appointment in past 12 or next 3 months       Recent Outpatient Visits              1 week ago Controlled type 2 diabetes mellitus without complication, without long-term current use of insulin [facility])    [facility], [facility], Ramon Becker Chi, MD    Office Visit    1 month ago Abnormal computed tomography angiography (CTA)    [facility], [address], Steffany Cardoza MD    Office Visit    1 month ago Stage 3b chronic kidney disease [facility])    [facility], [address] [facility], Ramon Becker Chi, MD    Office Visit    6 months ago Controlled type 2 diabetes mellitus without complication, without long-term current use of insulin [facility])    [facility], [address], Steffany Cardoza MD    Office Visit    6 months ago Stage 3b chronic kidney disease [facility])    [facility], [address] [place] [address], Ramon Becker Chi, MD    Office Visit     Future Appointments         Provider Department Appt Notes    In 2 weeks Joseph Nicoals, Ashli     In 2 months Alberta Bernabe MD [facility], [address], [place] 4 month ok per dr leesa [employer] - INR 3.9 or less past 6 weeks     INR   Date Value Ref Range Status   09/01/2022 2.1 2 - 3 Final                     Passed - AST < 111 (less than 3 Xs the upper limit)     Lab Results   Component Value Date AST 19 03/20/2022                       Passed - ALT < 168 (less than 3Xs the upper limit)     Lab Results   Component Value Date    ALT 23 03/20/2022                       Passed - CBC within the past 12 months     Recent Results (from the past 8760 hour(s))   CBC W/ DIFFERENTIAL    Collection Time: 07/28/22  7:58 AM   Result Value Ref Range    WBC 7.5 4.0 - 11.0 x10(3) uL    RBC 4.17 3.80 - 5.30 x10(6)uL    HGB 12.5 12.0 - 16.0 g/dL    HCT 39.6 35.0 - 48.0 %    MCV 95.0 80.0 - 100.0 fL    MCH 30.0 26.0 - 34.0 pg    MCHC 31.6 31.0 - 37.0 g/dL    RDW-SD 43.4 35.1 - 46.3 fL    RDW 12.5 11.0 - 15.0 %    .0 150.0 - 450.0 10(3)uL    Neutrophil Absolute Prelim 4.16 1.50 - 7.70 x10 (3) uL    Neutrophil Absolute 4.16 1.50 - 7.70 x10(3) uL    Lymphocyte Absolute 2.14 1.00 - 4.00 x10(3) uL    Monocyte Absolute 0.82 0.10 - 1.00 x10(3) uL    Eosinophil Absolute 0.33 0.00 - 0.70 x10(3) uL    Basophil Absolute 0.04 0.00 - 0.20 x10(3) uL    Immature Granulocyte Absolute 0.04 0.00 - 1.00 x10(3) uL    Neutrophil % 55.3 %    Lymphocyte % 28.4 %    Monocyte % 10.9 %    Eosinophil % 4.4 %    Basophil % 0.5 %    Immature Granulocyte % 0.5 %     *Note: Due to a large number of results and/or encounters for the requested time period, some results have not been displayed. A complete set of results can be found in Results Review.                    Passed - Hgb >/= 10g/dl within past 12 months     HGB   Date Value Ref Range Status   07/28/2022 12.5 12.0 - 16.0 g/dL Final                     Passed - Platelets >/= 390 past 12 months     PLT   Date Value Ref Range Status   07/28/2022 322.0 150.0 - 450.0 10(3)uL Final

## 2022-09-28 RX ORDER — ESCITALOPRAM OXALATE 5 MG/1
5 TABLET ORAL NIGHTLY
Qty: 90 TABLET | Refills: 1 | Status: SHIPPED | OUTPATIENT
Start: 2022-09-28 | End: 2023-04-15

## 2022-09-28 NOTE — TELEPHONE ENCOUNTER
Refill passed per [facility] protocol. Requested Prescriptions   Pending Prescriptions Disp Refills    ESCITALOPRAM 5 MG Oral Tab [Pharmacy Med Name: Escitalopram Oxalate 5 Mg Tab Solc] 90 tablet 0     Sig: Take 1 tablet  by mouth nightly.         Psychiatric Non-Scheduled (Anti-Anxiety) Passed - 9/28/2022 10:38 AM        Passed - In person appointment or virtual visit in the past 6 mos or appointment in next 3 mos       Recent Outpatient Visits              1 week ago Controlled type 2 diabetes mellitus without complication, without long-term current use of insulin [facility])    [facility], [facility] [place], Lindsay Becker MD    Office Visit    1 month ago Abnormal computed tomography angiography (CTA)    [facility], [address], Genevieve Bermudez MD    Office Visit    1 month ago Stage 3b chronic kidney disease [facility])    [facility], [address] [place] [address], Lindsay Becker MD    Office Visit    6 months ago Controlled type 2 diabetes mellitus without complication, without long-term current use of insulin [facility])    [facility], [address], Genevieve Bermudez MD    Office Visit    6 months ago Stage 3b chronic kidney disease [facility])    [facility], [address] [facility], Lindsay Becker MD    Office Visit     Future Appointments         Provider Department Appt Notes    In 2 weeks [employer], Ashli     In 2 months Alex Benson MD [facility], [address] [facility] 4 month ok per dr landers                    Recent Outpatient Visits              1 week ago Controlled type 2 diabetes mellitus without complication, without long-term current use of insulin [facility])    Mando Magallon MD    Office Visit    1 month ago Abnormal computed tomography angiography (CTA)    [facility], [address], Genevieve Bermudez MD    Office Visit    1 month ago Stage 3b chronic kidney disease [facility])    [facility], [address] [place] [address], Anna Becker MD    Office Visit    6 months ago Controlled type 2 diabetes mellitus without complication, without long-term current use of insulin [facility])    [facility], [facility], [address] [place] [address],[address], Davi Cunha MD    Office Visit    6 months ago Stage 3b chronic kidney disease [facility])    [facility], [address] [place] [address], Anna Beckre MD    Office Visit          Future Appointments         Provider Department Appt Notes    In 2 weeks Elliot Barajas RN [facility], [facility], [place]     In 2 months Tavon Zheng MD [facility], [facility], [address] [facility] 4 month ok per dr landers

## 2022-10-14 ENCOUNTER — ANTI-COAG VISIT (OUTPATIENT)
Dept: ANTICOAGULATION | Facility: CLINIC | Age: 72
End: 2022-10-14
Payer: MEDICARE

## 2022-10-14 DIAGNOSIS — I82.532 CHRONIC DEEP VEIN THROMBOSIS OF LEFT POPLITEAL VEIN (HCC): ICD-10-CM

## 2022-10-14 DIAGNOSIS — Z79.01 MONITORING FOR LONG-TERM ANTICOAGULANT USE: ICD-10-CM

## 2022-10-14 DIAGNOSIS — Z79.01 LONG TERM (CURRENT) USE OF ANTICOAGULANTS: ICD-10-CM

## 2022-10-14 DIAGNOSIS — Z51.81 MONITORING FOR LONG-TERM ANTICOAGULANT USE: ICD-10-CM

## 2022-10-14 DIAGNOSIS — D68.61 ANTIPHOSPHOLIPID SYNDROME (HCC): ICD-10-CM

## 2022-10-14 LAB — INR: 2.2 (ref 0.8–1.2)

## 2022-10-14 PROCEDURE — 93793 ANTICOAG MGMT PT WARFARIN: CPT | Performed by: FAMILY MEDICINE

## 2022-10-14 PROCEDURE — 85610 PROTHROMBIN TIME: CPT | Performed by: FAMILY MEDICINE

## 2022-10-17 ENCOUNTER — OFFICE VISIT (OUTPATIENT)
Dept: INTERNAL MEDICINE CLINIC | Facility: CLINIC | Age: 72
End: 2022-10-17
Payer: MEDICARE

## 2022-10-17 VITALS
WEIGHT: 235.19 LBS | TEMPERATURE: 98 F | HEIGHT: 69 IN | DIASTOLIC BLOOD PRESSURE: 74 MMHG | HEART RATE: 74 BPM | BODY MASS INDEX: 34.83 KG/M2 | SYSTOLIC BLOOD PRESSURE: 132 MMHG | OXYGEN SATURATION: 99 %

## 2022-10-17 DIAGNOSIS — R05.1 ACUTE COUGH: ICD-10-CM

## 2022-10-17 DIAGNOSIS — N18.32 TYPE 2 DIABETES MELLITUS WITH STAGE 3B CHRONIC KIDNEY DISEASE, WITH LONG-TERM CURRENT USE OF INSULIN (HCC): ICD-10-CM

## 2022-10-17 DIAGNOSIS — I10 ESSENTIAL HYPERTENSION WITH GOAL BLOOD PRESSURE LESS THAN 130/80: Primary | ICD-10-CM

## 2022-10-17 DIAGNOSIS — Z79.4 TYPE 2 DIABETES MELLITUS WITH STAGE 3B CHRONIC KIDNEY DISEASE, WITH LONG-TERM CURRENT USE OF INSULIN (HCC): ICD-10-CM

## 2022-10-17 DIAGNOSIS — N18.32 STAGE 3B CHRONIC KIDNEY DISEASE (HCC): Chronic | ICD-10-CM

## 2022-10-17 DIAGNOSIS — E11.22 TYPE 2 DIABETES MELLITUS WITH STAGE 3B CHRONIC KIDNEY DISEASE, WITH LONG-TERM CURRENT USE OF INSULIN (HCC): ICD-10-CM

## 2022-10-17 PROCEDURE — 99214 OFFICE O/P EST MOD 30 MIN: CPT | Performed by: NURSE PRACTITIONER

## 2022-10-17 PROCEDURE — 3008F BODY MASS INDEX DOCD: CPT | Performed by: NURSE PRACTITIONER

## 2022-10-17 PROCEDURE — 3078F DIAST BP <80 MM HG: CPT | Performed by: NURSE PRACTITIONER

## 2022-10-17 PROCEDURE — 3075F SYST BP GE 130 - 139MM HG: CPT | Performed by: NURSE PRACTITIONER

## 2022-10-17 RX ORDER — ALBUTEROL SULFATE 90 UG/1
2 AEROSOL, METERED RESPIRATORY (INHALATION) EVERY 4 HOURS PRN
Qty: 1 EACH | Refills: 1 | Status: SHIPPED | OUTPATIENT
Start: 2022-10-17

## 2022-10-21 ENCOUNTER — TELEPHONE (OUTPATIENT)
Dept: INTERNAL MEDICINE CLINIC | Facility: CLINIC | Age: 72
End: 2022-10-21

## 2022-10-21 DIAGNOSIS — R05.9 COUGH, UNSPECIFIED TYPE: Primary | ICD-10-CM

## 2022-10-21 RX ORDER — CODEINE PHOSPHATE AND GUAIFENESIN 10; 100 MG/5ML; MG/5ML
5 SOLUTION ORAL EVERY 6 HOURS PRN
Qty: 180 ML | Refills: 0 | Status: SHIPPED | OUTPATIENT
Start: 2022-10-21

## 2022-10-28 ENCOUNTER — OFFICE VISIT (OUTPATIENT)
Dept: INTERNAL MEDICINE CLINIC | Facility: CLINIC | Age: 72
End: 2022-10-28
Payer: MEDICARE

## 2022-10-28 VITALS
BODY MASS INDEX: 34.31 KG/M2 | HEIGHT: 69 IN | WEIGHT: 231.63 LBS | HEART RATE: 77 BPM | SYSTOLIC BLOOD PRESSURE: 130 MMHG | DIASTOLIC BLOOD PRESSURE: 68 MMHG

## 2022-10-28 DIAGNOSIS — E11.22 TYPE 2 DIABETES MELLITUS WITH STAGE 3B CHRONIC KIDNEY DISEASE, WITH LONG-TERM CURRENT USE OF INSULIN (HCC): ICD-10-CM

## 2022-10-28 DIAGNOSIS — N18.32 TYPE 2 DIABETES MELLITUS WITH STAGE 3B CHRONIC KIDNEY DISEASE, WITH LONG-TERM CURRENT USE OF INSULIN (HCC): ICD-10-CM

## 2022-10-28 DIAGNOSIS — I10 ESSENTIAL HYPERTENSION WITH GOAL BLOOD PRESSURE LESS THAN 130/80: Primary | ICD-10-CM

## 2022-10-28 DIAGNOSIS — Z79.4 TYPE 2 DIABETES MELLITUS WITH STAGE 3B CHRONIC KIDNEY DISEASE, WITH LONG-TERM CURRENT USE OF INSULIN (HCC): ICD-10-CM

## 2022-10-28 DIAGNOSIS — N18.32 STAGE 3B CHRONIC KIDNEY DISEASE (HCC): Chronic | ICD-10-CM

## 2022-10-28 DIAGNOSIS — R05.1 ACUTE COUGH: ICD-10-CM

## 2022-10-28 PROCEDURE — 3008F BODY MASS INDEX DOCD: CPT | Performed by: NURSE PRACTITIONER

## 2022-10-28 PROCEDURE — 3078F DIAST BP <80 MM HG: CPT | Performed by: NURSE PRACTITIONER

## 2022-10-28 PROCEDURE — 99214 OFFICE O/P EST MOD 30 MIN: CPT | Performed by: NURSE PRACTITIONER

## 2022-10-28 PROCEDURE — 3075F SYST BP GE 130 - 139MM HG: CPT | Performed by: NURSE PRACTITIONER

## 2022-10-28 RX ORDER — AMOXICILLIN AND CLAVULANATE POTASSIUM 875; 125 MG/1; MG/1
1 TABLET, FILM COATED ORAL 2 TIMES DAILY
Qty: 20 TABLET | Refills: 0 | Status: SHIPPED
Start: 2022-10-28 | End: 2022-11-07

## 2022-10-28 NOTE — PATIENT INSTRUCTIONS
ASSESSMENT/PLAN:   Essential hypertension with goal blood pressure less than 130/80  (primary encounter diagnosis)  Careful with diet and excercise at least 30 minutes 3-4 times a week. Check blood pressures at different times on different days. Can purchase own blood pressure monitor. If not, check at local pharmacy. Bake foods more and grill occasionally. Avoid fried foods. No salt. Use other seasonings. Type 2 diabetes mellitus with stage 3b chronic kidney disease, with long-term current use of insulin ([facility])  Careful with diet and excercise at least 30 minutes 3-4 times a week. Check sugars at different times on different dates. Careful with low sugars. Carry something with you and check sugar if can. Can carry carolyne cracker, etc. Decrease carbohydrates. But also, careful with fruits and natural sugars. One serving a day and no more than 1 handful every day. Check feet  every AM and careful with sores and ulcers on feet bilaterally. Check eyes every year with dilated eye exam.  Check sugars. 2-hour postmeal should be less than 140s. Pre-meal should be 's. Both equally affected A1c. Discussed importance of glycemic control to prevent complications of diabetes  -Discussed complications of diabetes include retinopathy, neuropathy, nephropathy and cardiovascular disease  -Discussed ABCs of DM  -Discussed importance of SBGM  -Discussed importance of low CHO diet, recommend 45gm per meal or 135gm per day  -Normotensive  Due for diabetic eye exam    Stage 3b chronic kidney disease (hcc)  No motrin, ibuprofen, advil, alleve, naprosyn  with these medications.      Acute cough  Increase fluids  -Take otc allergy medications as directed (over the counter, generic Claritin, Zyrtec, Xyzal or Allegra)  Continue with guaifenesin cough syrup as prescribed  -complete course of antibiotics as prescribed-not completing course of antibiotics may result in infection not fully eradicated or may lead to antibiotic resistance  -eat probiotic yogurt (Activia)  or take probiotic capsules i.e Align or Florastor (sprinkles are available for children)  -take antibiotics on full stomach unless noted otherwise   -Call or return to office if symptoms are not markedly improved within 3-4 days or if symptoms are worsening    No orders of the defined types were placed in this encounter. Meds This Visit:  Requested Prescriptions     Signed Prescriptions Disp Refills    amoxicillin clavulanate 875-125 MG Oral Tab 20 tablet 0     Sig: Take 1 tablet by mouth in the morning and 1 tablet before bedtime. Do all this for 10 days.        Imaging & Referrals:  None

## 2022-11-07 RX ORDER — METOPROLOL TARTRATE 100 MG/1
TABLET ORAL
Qty: 60 TABLET | Refills: 5 | Status: SHIPPED | OUTPATIENT
Start: 2022-11-07

## 2022-11-10 RX ORDER — SPIRONOLACTONE 25 MG/1
TABLET ORAL
Qty: 90 TABLET | Refills: 0 | Status: SHIPPED | OUTPATIENT
Start: 2022-11-10

## 2022-11-13 LAB
T4, FREE: 1.6 NG/DL (ref 0.8–1.8)
TSH W/REFLEX TO FT4: 0.35 MIU/L (ref 0.4–4.5)

## 2022-11-17 ENCOUNTER — ANTI-COAG VISIT (OUTPATIENT)
Dept: ANTICOAGULATION | Facility: CLINIC | Age: 72
End: 2022-11-17
Payer: MEDICARE

## 2022-11-17 DIAGNOSIS — D68.61 ANTIPHOSPHOLIPID SYNDROME (HCC): ICD-10-CM

## 2022-11-17 DIAGNOSIS — I82.532 CHRONIC DEEP VEIN THROMBOSIS OF LEFT POPLITEAL VEIN (HCC): ICD-10-CM

## 2022-11-17 DIAGNOSIS — Z79.01 LONG TERM (CURRENT) USE OF ANTICOAGULANTS: ICD-10-CM

## 2022-11-17 DIAGNOSIS — Z51.81 MONITORING FOR LONG-TERM ANTICOAGULANT USE: ICD-10-CM

## 2022-11-17 DIAGNOSIS — Z79.01 MONITORING FOR LONG-TERM ANTICOAGULANT USE: ICD-10-CM

## 2022-11-17 LAB — INR: 2.1 (ref 0.8–1.2)

## 2022-11-17 PROCEDURE — 93793 ANTICOAG MGMT PT WARFARIN: CPT | Performed by: INTERNAL MEDICINE

## 2022-11-17 PROCEDURE — 85610 PROTHROMBIN TIME: CPT | Performed by: INTERNAL MEDICINE

## 2022-11-28 ENCOUNTER — PATIENT MESSAGE (OUTPATIENT)
Dept: INTERNAL MEDICINE CLINIC | Facility: CLINIC | Age: 72
End: 2022-11-28

## 2022-11-29 NOTE — TELEPHONE ENCOUNTER
From: Jackson Clock  To: Tracie Lockhart. Manasa Vega MD  Sent: 11/28/2022 9:20 PM CST  Subject: [employer] Patient Assistance    Hello,    Every year Ashley Lam applies for assistance to pay for her Consentyx 300mg medication via the [employer] Patient Assistance. The prescribing physician is Dr. Michele Knapp. Can you please have the attached completed urgently and faxed to [phone]? Please provide confirmation once this request has been completed.     Lc Urias

## 2022-12-05 ENCOUNTER — TELEPHONE (OUTPATIENT)
Dept: INTERNAL MEDICINE CLINIC | Facility: CLINIC | Age: 72
End: 2022-12-05

## 2022-12-07 ENCOUNTER — PATIENT MESSAGE (OUTPATIENT)
Dept: RHEUMATOLOGY | Facility: CLINIC | Age: 72
End: 2022-12-07

## 2022-12-07 ENCOUNTER — PATIENT MESSAGE (OUTPATIENT)
Dept: INTERNAL MEDICINE CLINIC | Facility: CLINIC | Age: 72
End: 2022-12-07

## 2022-12-08 ENCOUNTER — TELEPHONE (OUTPATIENT)
Dept: RHEUMATOLOGY | Facility: CLINIC | Age: 72
End: 2022-12-08

## 2022-12-08 RX ORDER — SECUKINUMAB 150 MG/ML
300 INJECTION SUBCUTANEOUS
Qty: 6 EACH | Refills: 3 | Status: SHIPPED
Start: 2022-12-08

## 2022-12-08 NOTE — TELEPHONE ENCOUNTER
From: Dulce Godinez  To: Abby Garcia MD  Sent: 12/7/2022 6:04 PM CST  Subject: [employer] - Cosentyx refill    Hello Dr. Macho Jauregui,    I'm reapplying for the [employer] Patient [facility] . Can you or a member of your staff help complete the attached and fax in as an urgent request? The form is required to be received by 12/15.  Please let me know if you need anything else to help complete this request.    Joseph Sol

## 2022-12-08 NOTE — TELEPHONE ENCOUNTER
Provider portion completed. Will have provider sign off at [facility] tomorrow and fax form to [facility]

## 2022-12-09 NOTE — TELEPHONE ENCOUNTER
From: Seth Baum  To: Warden Regalado. Jailene Ayon MD  Sent: 11/28/2022 9:20 PM CST  Subject: [employer] Patient Assistance    Jaronbeba,    Every year Ian Tabor applies for assistance to pay for her Consentyx 300mg medication via the [employer] Patient Assistance. The prescribing physician is Dr. Inocente Garibay. Can you please have the attached completed urgently and faxed to [phone]? Please provide confirmation once this request has been completed.     Jonathan Michelle

## 2022-12-13 ENCOUNTER — TELEPHONE (OUTPATIENT)
Dept: INTERNAL MEDICINE CLINIC | Facility: CLINIC | Age: 72
End: 2022-12-13

## 2022-12-13 ENCOUNTER — OFFICE VISIT (OUTPATIENT)
Dept: INTERNAL MEDICINE CLINIC | Facility: CLINIC | Age: 72
End: 2022-12-13
Payer: MEDICARE

## 2022-12-13 VITALS
TEMPERATURE: 97 F | OXYGEN SATURATION: 98 % | HEART RATE: 57 BPM | RESPIRATION RATE: 16 BRPM | WEIGHT: 235 LBS | HEIGHT: 69 IN | SYSTOLIC BLOOD PRESSURE: 128 MMHG | DIASTOLIC BLOOD PRESSURE: 62 MMHG | BODY MASS INDEX: 34.8 KG/M2

## 2022-12-13 DIAGNOSIS — E11.9 CONTROLLED TYPE 2 DIABETES MELLITUS WITHOUT COMPLICATION, WITHOUT LONG-TERM CURRENT USE OF INSULIN (HCC): Primary | Chronic | ICD-10-CM

## 2022-12-13 DIAGNOSIS — R31.29 OTHER MICROSCOPIC HEMATURIA: ICD-10-CM

## 2022-12-13 DIAGNOSIS — E11.22 TYPE 2 DIABETES MELLITUS WITH STAGE 3B CHRONIC KIDNEY DISEASE, WITH LONG-TERM CURRENT USE OF INSULIN (HCC): ICD-10-CM

## 2022-12-13 DIAGNOSIS — R10.12 LEFT UPPER QUADRANT ABDOMINAL PAIN: ICD-10-CM

## 2022-12-13 DIAGNOSIS — E53.8 B12 DEFICIENCY: ICD-10-CM

## 2022-12-13 DIAGNOSIS — M85.80 OSTEOPENIA, UNSPECIFIED LOCATION: ICD-10-CM

## 2022-12-13 DIAGNOSIS — N18.32 STAGE 3B CHRONIC KIDNEY DISEASE (HCC): Chronic | ICD-10-CM

## 2022-12-13 DIAGNOSIS — I10 ESSENTIAL HYPERTENSION WITH GOAL BLOOD PRESSURE LESS THAN 130/80: ICD-10-CM

## 2022-12-13 DIAGNOSIS — N18.32 TYPE 2 DIABETES MELLITUS WITH STAGE 3B CHRONIC KIDNEY DISEASE, WITH LONG-TERM CURRENT USE OF INSULIN (HCC): ICD-10-CM

## 2022-12-13 DIAGNOSIS — E03.9 HYPOTHYROIDISM (ACQUIRED): Chronic | ICD-10-CM

## 2022-12-13 DIAGNOSIS — Z12.31 ENCOUNTER FOR SCREENING MAMMOGRAM FOR MALIGNANT NEOPLASM OF BREAST: ICD-10-CM

## 2022-12-13 DIAGNOSIS — Z71.85 VACCINE COUNSELING: ICD-10-CM

## 2022-12-13 DIAGNOSIS — Z79.4 TYPE 2 DIABETES MELLITUS WITH STAGE 3B CHRONIC KIDNEY DISEASE, WITH LONG-TERM CURRENT USE OF INSULIN (HCC): ICD-10-CM

## 2022-12-13 LAB
APPEARANCE: CLEAR
BILIRUBIN: NEGATIVE
GLUCOSE (URINE DIPSTICK): 500 MG/DL
KETONES (URINE DIPSTICK): NEGATIVE MG/DL
LEUKOCYTES: NEGATIVE
MULTISTIX LOT#: ABNORMAL NUMERIC
NITRITE, URINE: NEGATIVE
PH, URINE: 5.5 (ref 4.5–8)
PROTEIN (URINE DIPSTICK): NEGATIVE MG/DL
SPECIFIC GRAVITY: 1.01 (ref 1–1.03)
URINE-COLOR: YELLOW
UROBILINOGEN,SEMI-QN: 0.2 MG/DL (ref 0–1.9)

## 2022-12-13 PROCEDURE — 3078F DIAST BP <80 MM HG: CPT | Performed by: INTERNAL MEDICINE

## 2022-12-13 PROCEDURE — 81003 URINALYSIS AUTO W/O SCOPE: CPT | Performed by: INTERNAL MEDICINE

## 2022-12-13 PROCEDURE — 99215 OFFICE O/P EST HI 40 MIN: CPT | Performed by: INTERNAL MEDICINE

## 2022-12-13 PROCEDURE — 3074F SYST BP LT 130 MM HG: CPT | Performed by: INTERNAL MEDICINE

## 2022-12-13 PROCEDURE — 3008F BODY MASS INDEX DOCD: CPT | Performed by: INTERNAL MEDICINE

## 2022-12-13 RX ORDER — MINOXIDIL 2.5 MG/1
5 TABLET ORAL 2 TIMES DAILY
Qty: 360 TABLET | Refills: 0 | Status: SHIPPED | OUTPATIENT
Start: 2022-12-13

## 2022-12-13 RX ORDER — INSULIN GLARGINE 100 [IU]/ML
INJECTION, SOLUTION SUBCUTANEOUS
Qty: 30 ML | Refills: 2 | Status: SHIPPED | OUTPATIENT
Start: 2022-12-13

## 2022-12-13 RX ORDER — EMPAGLIFLOZIN 25 MG/1
12.5 TABLET, FILM COATED ORAL
Qty: 30 TABLET | Refills: 3 | Status: SHIPPED | OUTPATIENT
Start: 2022-12-13

## 2022-12-13 RX ORDER — INDAPAMIDE 2.5 MG/1
2.5 TABLET, FILM COATED ORAL DAILY
Qty: 90 TABLET | Refills: 1 | Status: SHIPPED | OUTPATIENT
Start: 2022-12-13

## 2022-12-13 NOTE — PATIENT INSTRUCTIONS
ASSESSMENT/PLAN:   Controlled type 2 diabetes mellitus without complication, without long-term current use of insulin (hcc)  (primary encounter diagnosis) Better. Careful with diet and excercise at least 30 minutes 3-4 times a week. Check sugars at different times on different dates. Careful with low sugars. Carry something with you and check sugar if can. Can carry carolyne cracker, etc. Decrease carbohydrates. But also, careful with fruits and natural sugars. One serving a day and no more than 1 handful every day. Check feet  every AM and careful with sores and ulcers on feet bilaterally. Check eyes every year with dilated eye exam.  Check sugars. 2-hour postmeal should be less than 140s. Pre-meal should be 's. Both equally affected A1c. Discussed importance of glycemic control to prevent complications of diabetes  -Discussed complications of diabetes include retinopathy, neuropathy, nephropathy and cardiovascular disease  -Discussed ABCs of DM  -Discussed importance of SBGM  -Discussed importance of low CHO diet, recommend 45gm per meal or 135gm per day  -Normal foot exam  -Fu with optho  -Normotensive      Hypothyroidism (acquired) Check blood     Stage 3b chronic kidney disease (hcc) No motrin, ibuprofen, advil, alleve, naprosyn  with these medications. Essential hypertension with goal blood pressure less than 130/80 Better. Careful with diet and excercise at least 30 minutes 3-4 times a week. Check blood pressures at different times on different days. Can purchase own blood pressure monitor. If not, check at local pharmacy. Bake foods more and grill occasionally. Avoid fried foods. No salt. Use other seasonings. B12 deficiency Stable. Vaccine counseling Up to date. Abdominal pain. Check urine. Check Xray. Try simethicone 60 mg as needed. Discussed with patient of side effects and use of these medications. ER if worsening or change in symptoms.      Type 2 diabetes mellitus with stage 3b chronic kidney disease, with long-term current use of insulin (hcc) Slightly low. Decrease insulin. Call in 1 week. Osteopenia, unspecified location    Encounter for screening mammogram for malignant neoplasm of breast Check mammogram. Continue self breast exam every month. Orders Placed This Encounter      URINALYSIS, AUTO, W/O SCOPE      Meds This Visit:  Requested Prescriptions     Signed Prescriptions Disp Refills    minoxidil 2.5 MG Oral Tab 360 tablet 0     Sig: Take 2 tablets (5 mg total) by mouth 2 (two) times daily. indapamide 2.5 MG Oral Tab 90 tablet 1     Sig: Take 1 tablet (2.5 mg total) by mouth daily.     Empagliflozin (JARDIANCE) 25 MG Oral Tab 30 tablet 3     Sig: Take 12.5 tablets by mouth daily with breakfast.    insulin glargine (LANTUS SOLOSTAR) 100 UNIT/ML Subcutaneous Solution Pen-injector 30 mL 2     Sig: INJECT 45 UNITS INTHE MORNING AND 7 UNITS IN THE EVENING       Imaging & Referrals:  XR ABDOMEN (1 VIEW) (CPT=74018)     RTC 3 months FU and after 8-18-23 for physical.

## 2022-12-13 NOTE — TELEPHONE ENCOUNTER
Patient was advice to return in 3 months for an appointment.  Please advice when she can be added to your schedule

## 2022-12-19 ENCOUNTER — TELEPHONE (OUTPATIENT)
Dept: INTERNAL MEDICINE CLINIC | Facility: CLINIC | Age: 72
End: 2022-12-19

## 2022-12-19 NOTE — TELEPHONE ENCOUNTER
Patient contacted via Language Line  Kelly ID #[id number]    Relayed message that there has been no change to dosage per Dr. Carlos Kunz, and the chart states she has been on 5mg BID for the past year and a half. The pill strength may depend on what the pharmacy dispenses (2.5mg vs 5mg tabs). Patient states \"on the top of the bottle, it says 5mg, and on the bottom it says 2.5mg. I have been taking one tablet twice a day. I want to know why she is increasing it, because my blood pressure is fine\"    I again relayed the dose per records as of the past year and a half. Asked patient to please read the instructions on the bottle, as patient may have been taking less than precribed    Patient states she is at the store right now.   Advised to call office back when she has the rx bottle, verbalizes understanding

## 2022-12-20 ENCOUNTER — TELEPHONE (OUTPATIENT)
Dept: INTERNAL MEDICINE CLINIC | Facility: CLINIC | Age: 72
End: 2022-12-20

## 2022-12-20 NOTE — TELEPHONE ENCOUNTER
I put a note patient is taking 2.5 mg tab in the morning and 2.5mg tab before bed.  Per patient and Dr Chai Gonzalez request.

## 2023-01-13 ENCOUNTER — ANTI-COAG VISIT (OUTPATIENT)
Dept: ANTICOAGULATION | Facility: CLINIC | Age: 73
End: 2023-01-13

## 2023-01-13 DIAGNOSIS — I82.532 CHRONIC DEEP VEIN THROMBOSIS OF LEFT POPLITEAL VEIN (HCC): ICD-10-CM

## 2023-01-13 DIAGNOSIS — D68.61 ANTIPHOSPHOLIPID SYNDROME (HCC): ICD-10-CM

## 2023-01-13 DIAGNOSIS — Z51.81 MONITORING FOR LONG-TERM ANTICOAGULANT USE: ICD-10-CM

## 2023-01-13 DIAGNOSIS — Z79.01 LONG TERM (CURRENT) USE OF ANTICOAGULANTS: ICD-10-CM

## 2023-01-13 DIAGNOSIS — Z79.01 MONITORING FOR LONG-TERM ANTICOAGULANT USE: ICD-10-CM

## 2023-01-13 LAB
INR: 2.2 (ref 0.8–1.2)
TEST STRIP EXPIRATION DATE: ABNORMAL DATE

## 2023-01-13 NOTE — PROGRESS NOTES
Face to face. Here with her . Per protocol, patient to continue current dose and recheck INR in 4-6 weeks.

## 2023-01-21 ENCOUNTER — TELEPHONE (OUTPATIENT)
Dept: INTERNAL MEDICINE CLINIC | Facility: CLINIC | Age: 73
End: 2023-01-21

## 2023-01-21 PROCEDURE — 3044F HG A1C LEVEL LT 7.0%: CPT | Performed by: INTERNAL MEDICINE

## 2023-01-21 NOTE — TELEPHONE ENCOUNTER
Pt daughter called in and said she would like order for thyroid to be faxed to [facility] at # [phone]. Pt is currently at [address] [facility] in [place].  Please advise

## 2023-01-22 LAB
ABSOLUTE BASOPHILS: 53 CELLS/UL (ref 0–200)
ABSOLUTE EOSINOPHILS: 218 CELLS/UL (ref 15–500)
ABSOLUTE LYMPHOCYTES: 2093 CELLS/UL (ref 850–3900)
ABSOLUTE MONOCYTES: 788 CELLS/UL (ref 200–950)
ABSOLUTE NEUTROPHILS: 4350 CELLS/UL (ref 1500–7800)
BASOPHILS: 0.7 %
BUN/CREATININE RATIO: 26 (CALC) (ref 6–22)
BUN: 32 MG/DL (ref 7–25)
CALCIUM: 9.1 MG/DL (ref 8.6–10.4)
CARBON DIOXIDE: 31 MMOL/L (ref 20–32)
CHLORIDE: 99 MMOL/L (ref 98–110)
CHOL/HDLC RATIO: 3 (CALC)
CHOLESTEROL, TOTAL: 164 MG/DL
CREATININE: 1.23 MG/DL (ref 0.6–1)
EGFR: 47 ML/MIN/1.73M2
EOSINOPHILS: 2.9 %
GLUCOSE: 110 MG/DL (ref 65–99)
HDL CHOLESTEROL: 55 MG/DL
HEMATOCRIT: 42.8 % (ref 35–45)
HEMOGLOBIN A1C: 6.6 % OF TOTAL HGB
HEMOGLOBIN: 14.1 G/DL (ref 11.7–15.5)
LDL-CHOLESTEROL: 85 MG/DL (CALC)
LYMPHOCYTES: 27.9 %
MCH: 30.5 PG (ref 27–33)
MCHC: 32.9 G/DL (ref 32–36)
MCV: 92.4 FL (ref 80–100)
MONOCYTES: 10.5 %
MPV: 10.3 FL (ref 7.5–12.5)
NEUTROPHILS: 58 %
NON-HDL CHOLESTEROL: 109 MG/DL (CALC)
PLATELET COUNT: 280 THOUSAND/UL (ref 140–400)
POTASSIUM: 5 MMOL/L (ref 3.5–5.3)
RDW: 13 % (ref 11–15)
RED BLOOD CELL COUNT: 4.63 MILLION/UL (ref 3.8–5.1)
SODIUM: 137 MMOL/L (ref 135–146)
TRIGLYCERIDES: 143 MG/DL
TSH W/REFLEX TO FT4: 0.58 MIU/L (ref 0.4–4.5)
WHITE BLOOD CELL COUNT: 7.5 THOUSAND/UL (ref 3.8–10.8)

## 2023-01-25 ENCOUNTER — TELEPHONE (OUTPATIENT)
Dept: INTERNAL MEDICINE CLINIC | Facility: CLINIC | Age: 73
End: 2023-01-25

## 2023-01-26 ENCOUNTER — OFFICE VISIT (OUTPATIENT)
Dept: NEPHROLOGY | Facility: CLINIC | Age: 73
End: 2023-01-26

## 2023-01-26 VITALS
WEIGHT: 237 LBS | BODY MASS INDEX: 35.1 KG/M2 | SYSTOLIC BLOOD PRESSURE: 132 MMHG | HEART RATE: 80 BPM | DIASTOLIC BLOOD PRESSURE: 75 MMHG | HEIGHT: 69 IN

## 2023-01-26 DIAGNOSIS — Z79.4 TYPE 2 DIABETES MELLITUS WITH STAGE 3B CHRONIC KIDNEY DISEASE, WITH LONG-TERM CURRENT USE OF INSULIN (HCC): Primary | ICD-10-CM

## 2023-01-26 DIAGNOSIS — E11.9 CONTROLLED TYPE 2 DIABETES MELLITUS WITHOUT COMPLICATION, WITHOUT LONG-TERM CURRENT USE OF INSULIN (HCC): Chronic | ICD-10-CM

## 2023-01-26 DIAGNOSIS — N18.32 STAGE 3B CHRONIC KIDNEY DISEASE (HCC): Chronic | ICD-10-CM

## 2023-01-26 DIAGNOSIS — E11.22 TYPE 2 DIABETES MELLITUS WITH STAGE 3B CHRONIC KIDNEY DISEASE, WITH LONG-TERM CURRENT USE OF INSULIN (HCC): Primary | ICD-10-CM

## 2023-01-26 DIAGNOSIS — E03.9 HYPOTHYROIDISM (ACQUIRED): Chronic | ICD-10-CM

## 2023-01-26 DIAGNOSIS — N18.32 TYPE 2 DIABETES MELLITUS WITH STAGE 3B CHRONIC KIDNEY DISEASE, WITH LONG-TERM CURRENT USE OF INSULIN (HCC): Primary | ICD-10-CM

## 2023-01-26 PROCEDURE — 3075F SYST BP GE 130 - 139MM HG: CPT | Performed by: INTERNAL MEDICINE

## 2023-01-26 PROCEDURE — 3008F BODY MASS INDEX DOCD: CPT | Performed by: INTERNAL MEDICINE

## 2023-01-26 PROCEDURE — 99214 OFFICE O/P EST MOD 30 MIN: CPT | Performed by: INTERNAL MEDICINE

## 2023-01-26 PROCEDURE — 3078F DIAST BP <80 MM HG: CPT | Performed by: INTERNAL MEDICINE

## 2023-01-27 NOTE — PATIENT INSTRUCTIONS
Please do labs in May I will put them in the computer    See me back in June or July  Same medications    If Jardiance cost too much let me know    Happy and healthy new year to both

## 2023-01-30 ENCOUNTER — TELEPHONE (OUTPATIENT)
Dept: NEPHROLOGY | Facility: CLINIC | Age: 73
End: 2023-01-30

## 2023-02-07 RX ORDER — SPIRONOLACTONE 25 MG/1
TABLET ORAL
Qty: 90 TABLET | Refills: 0 | Status: SHIPPED | OUTPATIENT
Start: 2023-02-07

## 2023-02-24 ENCOUNTER — TELEPHONE (OUTPATIENT)
Dept: INTERNAL MEDICINE CLINIC | Facility: CLINIC | Age: 73
End: 2023-02-24

## 2023-02-24 ENCOUNTER — ANTI-COAG VISIT (OUTPATIENT)
Dept: ANTICOAGULATION | Facility: CLINIC | Age: 73
End: 2023-02-24

## 2023-02-24 DIAGNOSIS — Z79.01 LONG TERM (CURRENT) USE OF ANTICOAGULANTS: ICD-10-CM

## 2023-02-24 DIAGNOSIS — Z79.01 MONITORING FOR LONG-TERM ANTICOAGULANT USE: ICD-10-CM

## 2023-02-24 DIAGNOSIS — I82.532 CHRONIC DEEP VEIN THROMBOSIS OF LEFT POPLITEAL VEIN (HCC): ICD-10-CM

## 2023-02-24 DIAGNOSIS — Z51.81 MONITORING FOR LONG-TERM ANTICOAGULANT USE: ICD-10-CM

## 2023-02-24 DIAGNOSIS — D68.61 ANTIPHOSPHOLIPID SYNDROME (HCC): ICD-10-CM

## 2023-02-24 LAB
INR: 2.1 (ref 0.8–1.2)
TEST STRIP EXPIRATION DATE: ABNORMAL DATE

## 2023-02-24 PROCEDURE — 85610 PROTHROMBIN TIME: CPT | Performed by: INTERNAL MEDICINE

## 2023-02-24 PROCEDURE — 93793 ANTICOAG MGMT PT WARFARIN: CPT | Performed by: INTERNAL MEDICINE

## 2023-02-24 RX ORDER — INSULIN GLARGINE 100 [IU]/ML
INJECTION, SOLUTION SUBCUTANEOUS
Qty: 30 ML | Refills: 2 | Status: SHIPPED | OUTPATIENT
Start: 2023-02-24

## 2023-02-24 RX ORDER — EMPAGLIFLOZIN 25 MG/1
12.5 TABLET, FILM COATED ORAL
Qty: 90 TABLET | Refills: 1 | Status: SHIPPED | OUTPATIENT
Start: 2023-02-24

## 2023-02-24 NOTE — TELEPHONE ENCOUNTER
Patient requesting refill for med below to be sent to the [facility] (JARDIANCE) 25 MG Oral Tab, Take 12.5 tablets by mouth daily with breakfast., Disp: 30 tablet, Rfl: 3

## 2023-02-24 NOTE — TELEPHONE ENCOUNTER
Called [facility], spoke with Morgan Fortune clarified to be 12.5mg  ( half tablet )  po daily at breakfast , RX will be  corrected

## 2023-02-27 RX ORDER — LEVOTHYROXINE SODIUM 0.1 MG/1
TABLET ORAL
Qty: 90 TABLET | Refills: 0 | Status: SHIPPED | OUTPATIENT
Start: 2023-02-27

## 2023-02-27 RX ORDER — LEVOTHYROXINE SODIUM 88 UG/1
TABLET ORAL
Qty: 90 TABLET | Refills: 0 | Status: SHIPPED | OUTPATIENT
Start: 2023-02-27

## 2023-02-27 NOTE — TELEPHONE ENCOUNTER
Refill passed per [place] [employer] [place], [facility] protocol.   Requested Prescriptions   Pending Prescriptions Disp Refills    LEVOTHYROXINE 88 MCG Oral Tab [Pharmacy Med Name: Levothyroxine Sodium 88 Mcg Tab Lupi] 90 tablet 0     Sig: Take 1 tablet by mouth before breakfast.       Thyroid Medication Protocol Passed - 2/27/2023  4:38 PM        Passed - TSH in past 12 months        Passed - Last TSH value is normal     Lab Results   Component Value Date    TSH 0.31 (L) 10/08/2022    THYROIDFUNC 2.29 06/04/2016    TSHT4 0.58 01/21/2023                 Passed - In person appointment or virtual visit in the past 12 mos or appointment in next 3 mos     Recent Outpatient Visits              1 month ago Type 2 diabetes mellitus with stage 3b chronic kidney disease, with long-term current use of insulin ([address]    [facility], [address] [facility], oMnica Becker MD    Office Visit    2 months ago Controlled type 2 diabetes mellitus without complication, without long-term current use of insulin ([address]    [address], [address], Natalie Munoz MD    Office Visit    4 months ago Essential hypertension with goal blood pressure less than 130/80    [facility], [address], [place], [place], APRN    Office Visit    4 months ago Essential hypertension with goal blood pressure less than 130/80    [facility], [address], [place], [place], APRN    Office Visit    5 months ago Controlled type 2 diabetes mellitus without complication, without long-term current use of insulin ([address]    [address], [address] [facility], Monica Becker MD    Office Visit          Future Appointments         Provider Department Appt Notes    In 2 weeks [facility] OF THE [place] [id number] Any new findings compared to the last imaging    In 3 weeks Wendy Dalton MD [address], [address], [place] ok per dr landers     In 1 month Josefa Hawkins, [facility], [address], [employer]     In 5 months Erick Davis MD [facility], [address] [facility], [place] 6 months    In 5 months Dalila Lange MD [facility], [address], [place] px                   LEVOTHYROXINE 100 MCG Oral Tab [place] Med Name: Levothyroxine Sodium 100 Mcg Tab Lupi] 90 tablet 0     Sig: Take 1 tablet by mouth before breakfast.       Thyroid Medication Protocol Passed - 2/27/2023  4:38 PM        Passed - TSH in past 12 months        Passed - Last TSH value is normal     Lab Results   Component Value Date    TSH 0.31 (L) 10/08/2022    THYROIDFUNC 2.29 06/04/2016    TSHT4 0.58 01/21/2023                 Passed - In person appointment or virtual visit in the past 12 mos or appointment in next 3 mos     Recent Outpatient Visits              1 month ago Type 2 diabetes mellitus with stage 3b chronic kidney disease, with long-term current use of insulin ([facility]    [facility], [address] [facility], Helena Becker MD    Office Visit    2 months ago Controlled type 2 diabetes mellitus without complication, without long-term current use of insulin ([facility]    [facility], [address], Lida Borden MD    Office Visit    4 months ago Essential hypertension with goal blood pressure less than 130/80    [facility], [address], [place], [place], APRN    Office Visit    4 months ago Essential hypertension with goal blood pressure less than 130/80    [facility], [address], [place], [place], APRN    Office Visit    5 months ago Controlled type 2 diabetes mellitus without complication, without long-term current use of insulin [facility], [address] [facility], Helena Becker MD    Office Visit          Future Appointments         Provider Department Appt Notes    In 2 weeks [facility] [address] [place] [address] Any new findings compared to the last imaging    In 3 weeks MD Yamilet Amin, [address], [place] ok per dr landers     In 1 month Maury Arenas RN [facility], [address], [place]     In 5 months MD Yamilet Chris, [address] [facility], [place] 6 months    In 5 months MD Yamilet Amin, [address], [place] px                    Recent Outpatient Visits              1 month ago Type 2 diabetes mellitus with stage 3b chronic kidney disease, with long-term current use of insulin [facility])    [facility], [address] [facility], Farhad Becker MD    Office Visit    2 months ago Controlled type 2 diabetes mellitus without complication, without long-term current use of insulin [facility])    Yamilet Diaz, [address], Wellington Sampson MD    Office Visit    4 months ago Essential hypertension with goal blood pressure less than 130/80    [facility], [address], [place], [place], APRN    Office Visit    4 months ago Essential hypertension with goal blood pressure less than 130/80    [facility], [address], [place], [place], APRN    Office Visit    5 months ago Controlled type 2 diabetes mellitus without complication, without long-term current use of insulin [facility])    [facility], [address] [facility], Farhad Becker MD    Office Visit          Future Appointments         Provider Department Appt Notes    In 2 weeks [facility] OF THE [facility] [address] [place] [address] Any new findings compared to the last imaging    In 3 weeks MD Yamilet Amin, [address], [place] ok per dr landers     In 1 month Maury Arenas RN [facility], [facility]     In 5 months Ghulam Sanchez MD [facility], [address] [facility], [place] 6 months    In 5 months Jose Eduardo Velasco MD [facility], [address] [place] [address],[address], [facility] px

## 2023-03-13 ENCOUNTER — HOSPITAL ENCOUNTER (OUTPATIENT)
Dept: MAMMOGRAPHY | Facility: HOSPITAL | Age: 73
Discharge: HOME | End: 2023-03-13
Attending: INTERNAL MEDICINE
Payer: MEDICARE

## 2023-03-13 DIAGNOSIS — Z12.31 ENCOUNTER FOR SCREENING MAMMOGRAM FOR MALIGNANT NEOPLASM OF BREAST: ICD-10-CM

## 2023-03-13 PROCEDURE — 77063 BREAST TOMOSYNTHESIS BI: CPT | Performed by: INTERNAL MEDICINE

## 2023-03-13 PROCEDURE — 77067 SCR MAMMO BI INCL CAD: CPT | Performed by: INTERNAL MEDICINE

## 2023-03-13 RX ORDER — BLOOD SUGAR DIAGNOSTIC
STRIP MISCELLANEOUS
Qty: 200 STRIP | Refills: 3 | Status: SHIPPED | OUTPATIENT
Start: 2023-03-13

## 2023-03-13 NOTE — TELEPHONE ENCOUNTER
Refill passed per [place] [employer], [facility] protocol.     Requested Prescriptions   Pending Prescriptions Disp Refills    ACCU-CHEK GUIDE In Vitro Strip [Pharmacy Med Name: ACCU-CHEK GUIDE   Strip] 200 strip 11     Sig: CHECK TWICE DAILY       Diabetic Supplies Protocol Passed - 3/12/2023  8:11 PM        Passed - In person appointment or virtual visit in the past 12 mos or appointment in next 3 mos     Recent Outpatient Visits              1 month ago Type 2 diabetes mellitus with stage 3b chronic kidney disease, with long-term current use of insulin ([facility]    [facility], [address] [facility], [place] Sirius, Verlean Snellen, MD    Office Visit    3 months ago Controlled type 2 diabetes mellitus without complication, without long-term current use of insulin ([facility]    [facility], [address], Lyly Palumbo MD    Office Visit    4 months ago Essential hypertension with goal blood pressure less than 130/80    [facility], [address], [place], [place], APRN    Office Visit    4 months ago Essential hypertension with goal blood pressure less than 130/80    [facility], [address], [facility], [place], APRN    Office Visit    5 months ago Controlled type 2 diabetes mellitus without complication, without long-term current use of insulin [facility])    Julia Saels, [address] [place] [address], [place] Sirius, Verlean Snellen, MD    Office Visit          Future Appointments         Provider Department Appt Notes    In 1 week Haleigh Naidu, MD Julia Seals, [address], [place] ok per dr landers     In 3 weeks [facility]     In 4 months MD Julia Dudley, [address] [place] [address], [place] 6 months    In 5 months Haleigh Oats., MD Julia Seals, [address], [facility]                      Recent Outpatient Visits 1 month ago Type 2 diabetes mellitus with stage 3b chronic kidney disease, with long-term current use of insulin [facility])    [facility], [address] [facility], Pascale Becker MD    Office Visit    3 months ago Controlled type 2 diabetes mellitus without complication, without long-term current use of insulin ([address]    [facility], [address], Steven Wnin MD    Office Visit    4 months ago Essential hypertension with goal blood pressure less than 130/80    [facility], [address], [place], [place], APRN    Office Visit    4 months ago Essential hypertension with goal blood pressure less than 130/80    [facility], [address], [place], [place], APRN    Office Visit    5 months ago Controlled type 2 diabetes mellitus without complication, without long-term current use of insulin [facility])    Ankush Sheffield, [address] [place] [address], Pascale Becker MD    Office Visit            Future Appointments         Provider Department Appt Notes    In 1 week MD Ankush Garvin, [address], [place] per dr landers     In 3 weeks Ozzy Victoria, [facility], [facility]     In 4 months MD Ankush Cardenas, [address] [place] [address], [place] 6 months    In 5 months MD Ankush Garvin, [address], [place] celi yeh

## 2023-03-22 PROBLEM — Z12.11 COLON CANCER SCREENING: Status: ACTIVE | Noted: 2023-03-22

## 2023-03-23 ENCOUNTER — OFFICE VISIT (OUTPATIENT)
Dept: INTERNAL MEDICINE CLINIC | Facility: CLINIC | Age: 73
End: 2023-03-23

## 2023-03-23 VITALS
HEART RATE: 52 BPM | BODY MASS INDEX: 35.25 KG/M2 | DIASTOLIC BLOOD PRESSURE: 70 MMHG | RESPIRATION RATE: 16 BRPM | SYSTOLIC BLOOD PRESSURE: 164 MMHG | TEMPERATURE: 98 F | HEIGHT: 69 IN | OXYGEN SATURATION: 97 % | WEIGHT: 238 LBS

## 2023-03-23 DIAGNOSIS — E03.9 HYPOTHYROIDISM (ACQUIRED): Chronic | ICD-10-CM

## 2023-03-23 DIAGNOSIS — Z71.85 VACCINE COUNSELING: ICD-10-CM

## 2023-03-23 DIAGNOSIS — M85.80 OSTEOPENIA, UNSPECIFIED LOCATION: ICD-10-CM

## 2023-03-23 DIAGNOSIS — N18.32 TYPE 2 DIABETES MELLITUS WITH STAGE 3B CHRONIC KIDNEY DISEASE, WITH LONG-TERM CURRENT USE OF INSULIN (HCC): ICD-10-CM

## 2023-03-23 DIAGNOSIS — I10 ESSENTIAL HYPERTENSION WITH GOAL BLOOD PRESSURE LESS THAN 130/80: ICD-10-CM

## 2023-03-23 DIAGNOSIS — Z79.4 TYPE 2 DIABETES MELLITUS WITH STAGE 3B CHRONIC KIDNEY DISEASE, WITH LONG-TERM CURRENT USE OF INSULIN (HCC): ICD-10-CM

## 2023-03-23 DIAGNOSIS — E53.8 B12 DEFICIENCY: ICD-10-CM

## 2023-03-23 DIAGNOSIS — H92.03 OTALGIA OF BOTH EARS: ICD-10-CM

## 2023-03-23 DIAGNOSIS — E11.9 CONTROLLED TYPE 2 DIABETES MELLITUS WITHOUT COMPLICATION, WITHOUT LONG-TERM CURRENT USE OF INSULIN (HCC): Primary | Chronic | ICD-10-CM

## 2023-03-23 DIAGNOSIS — Z12.11 COLON CANCER SCREENING: ICD-10-CM

## 2023-03-23 DIAGNOSIS — E11.22 TYPE 2 DIABETES MELLITUS WITH STAGE 3B CHRONIC KIDNEY DISEASE, WITH LONG-TERM CURRENT USE OF INSULIN (HCC): ICD-10-CM

## 2023-03-23 DIAGNOSIS — R21 RASH: ICD-10-CM

## 2023-03-23 PROCEDURE — 99215 OFFICE O/P EST HI 40 MIN: CPT | Performed by: INTERNAL MEDICINE

## 2023-03-23 PROCEDURE — 90715 TDAP VACCINE 7 YRS/> IM: CPT | Performed by: INTERNAL MEDICINE

## 2023-03-23 PROCEDURE — 3077F SYST BP >= 140 MM HG: CPT | Performed by: INTERNAL MEDICINE

## 2023-03-23 PROCEDURE — 3008F BODY MASS INDEX DOCD: CPT | Performed by: INTERNAL MEDICINE

## 2023-03-23 PROCEDURE — 1126F AMNT PAIN NOTED NONE PRSNT: CPT | Performed by: INTERNAL MEDICINE

## 2023-03-23 PROCEDURE — 90471 IMMUNIZATION ADMIN: CPT | Performed by: INTERNAL MEDICINE

## 2023-03-23 PROCEDURE — 3078F DIAST BP <80 MM HG: CPT | Performed by: INTERNAL MEDICINE

## 2023-03-23 RX ORDER — VALSARTAN 160 MG/1
TABLET ORAL
Qty: 180 TABLET | Refills: 1 | Status: SHIPPED | OUTPATIENT
Start: 2023-03-23

## 2023-04-05 ENCOUNTER — NURSE ONLY (OUTPATIENT)
Dept: INTERNAL MEDICINE CLINIC | Facility: CLINIC | Age: 73
End: 2023-04-05

## 2023-04-05 ENCOUNTER — HOSPITAL ENCOUNTER (OUTPATIENT)
Dept: ULTRASOUND IMAGING | Facility: HOSPITAL | Age: 73
Discharge: HOME | End: 2023-04-05
Attending: INTERNAL MEDICINE
Payer: MEDICARE

## 2023-04-05 ENCOUNTER — TELEPHONE (OUTPATIENT)
Dept: INTERNAL MEDICINE CLINIC | Facility: CLINIC | Age: 73
End: 2023-04-05

## 2023-04-05 ENCOUNTER — HOSPITAL ENCOUNTER (OUTPATIENT)
Dept: MAMMOGRAPHY | Facility: HOSPITAL | Age: 73
Discharge: HOME | End: 2023-04-05
Attending: INTERNAL MEDICINE
Payer: MEDICARE

## 2023-04-05 VITALS — SYSTOLIC BLOOD PRESSURE: 145 MMHG | DIASTOLIC BLOOD PRESSURE: 72 MMHG | HEART RATE: 59 BPM

## 2023-04-05 DIAGNOSIS — Z01.30 BLOOD PRESSURE CHECK: Primary | ICD-10-CM

## 2023-04-05 DIAGNOSIS — R92.8 ABNORMAL MAMMOGRAM: ICD-10-CM

## 2023-04-05 PROCEDURE — 77066 DX MAMMO INCL CAD BI: CPT | Performed by: INTERNAL MEDICINE

## 2023-04-05 PROCEDURE — 77062 BREAST TOMOSYNTHESIS BI: CPT | Performed by: INTERNAL MEDICINE

## 2023-04-05 PROCEDURE — 76642 ULTRASOUND BREAST LIMITED: CPT | Performed by: INTERNAL MEDICINE

## 2023-04-05 PROCEDURE — 3078F DIAST BP <80 MM HG: CPT | Performed by: INTERNAL MEDICINE

## 2023-04-05 PROCEDURE — 3077F SYST BP >= 140 MM HG: CPT | Performed by: INTERNAL MEDICINE

## 2023-04-05 RX ORDER — VALSARTAN 160 MG/1
TABLET ORAL
Qty: 180 TABLET | Refills: 1 | Status: SHIPPED | OUTPATIENT
Start: 2023-04-05

## 2023-04-05 NOTE — TELEPHONE ENCOUNTER
So blood pressures are still little bit on the high side. Goal for her should be 120/70. We will try to increase the valsartan to 1 and 1 and otherwise 1 tablet twice a day. Have her schedule nurse visit in 10 days if she is possible for up to 14 days to check blood pressure again.

## 2023-04-06 ENCOUNTER — MED REC SCAN ONLY (OUTPATIENT)
Dept: INTERNAL MEDICINE CLINIC | Facility: CLINIC | Age: 73
End: 2023-04-06

## 2023-04-06 ENCOUNTER — PATIENT MESSAGE (OUTPATIENT)
Dept: NEPHROLOGY | Facility: CLINIC | Age: 73
End: 2023-04-06

## 2023-04-06 NOTE — TELEPHONE ENCOUNTER
Using language line : Alejandra Delacruz ID # [id number]     Left message to call back. Left VM on patient phone and on daughters, Sreedhar Rumpf phone. 2nd attempt    Mychart message sent.

## 2023-04-07 ENCOUNTER — ANTI-COAG VISIT (OUTPATIENT)
Dept: ANTICOAGULATION | Facility: CLINIC | Age: 73
End: 2023-04-07

## 2023-04-07 DIAGNOSIS — Z79.01 MONITORING FOR LONG-TERM ANTICOAGULANT USE: ICD-10-CM

## 2023-04-07 DIAGNOSIS — Z79.01 LONG TERM (CURRENT) USE OF ANTICOAGULANTS: ICD-10-CM

## 2023-04-07 DIAGNOSIS — I82.532 CHRONIC DEEP VEIN THROMBOSIS OF LEFT POPLITEAL VEIN (HCC): ICD-10-CM

## 2023-04-07 DIAGNOSIS — D68.61 ANTIPHOSPHOLIPID SYNDROME (HCC): ICD-10-CM

## 2023-04-07 DIAGNOSIS — Z51.81 MONITORING FOR LONG-TERM ANTICOAGULANT USE: ICD-10-CM

## 2023-04-07 LAB
INR: 1.6 (ref 0.8–1.2)
TEST STRIP EXPIRATION DATE: ABNORMAL DATE

## 2023-04-07 PROCEDURE — 93793 ANTICOAG MGMT PT WARFARIN: CPT | Performed by: INTERNAL MEDICINE

## 2023-04-07 PROCEDURE — 85610 PROTHROMBIN TIME: CPT | Performed by: INTERNAL MEDICINE

## 2023-04-07 NOTE — TELEPHONE ENCOUNTER
[address], [address] [facility],[address], Citizen of the [place]. Left message to call back. [phone] (Home Phone)  Got message, not in service. [phone] (Work Phone) Daughter Sri Renee. Left message to call back. No response letter mailed to patient as this was 3rd attempt. Punch Size In Mm: 5

## 2023-04-10 NOTE — TELEPHONE ENCOUNTER
From: Floyd Vivar  To: Raquel Montoya MD  Sent: 4/6/2023 6:09 PM CDT  Subject: Veto Montoya,  Is there any Patient Assistance Program available to help cover the cost of the Jardiance medication? The out-of-pocket cost was reasonable at first, but it is starting to escalate. I have a relative who is also on Jardiance and is getting assistance based on income and was wondering if that is on option worth pursuing for us as well. Any information you can provide is appreciated.   Thanks,  Sola Carpenter  [phone]

## 2023-04-20 ENCOUNTER — TELEPHONE (OUTPATIENT)
Dept: INTERNAL MEDICINE CLINIC | Facility: CLINIC | Age: 73
End: 2023-04-20

## 2023-04-20 RX ORDER — SIMVASTATIN 20 MG
20 TABLET ORAL
Qty: 24 TABLET | Refills: 2 | Status: SHIPPED | OUTPATIENT
Start: 2023-04-20

## 2023-04-20 NOTE — TELEPHONE ENCOUNTER
Not sure why this was routed to me it does not have any information about it what she needs what this is regarding.

## 2023-05-05 ENCOUNTER — ANTI-COAG VISIT (OUTPATIENT)
Dept: ANTICOAGULATION | Facility: CLINIC | Age: 73
End: 2023-05-05

## 2023-05-05 DIAGNOSIS — I82.532 CHRONIC DEEP VEIN THROMBOSIS OF LEFT POPLITEAL VEIN (HCC): ICD-10-CM

## 2023-05-05 DIAGNOSIS — Z51.81 MONITORING FOR LONG-TERM ANTICOAGULANT USE: ICD-10-CM

## 2023-05-05 DIAGNOSIS — Z79.01 MONITORING FOR LONG-TERM ANTICOAGULANT USE: ICD-10-CM

## 2023-05-05 DIAGNOSIS — D68.61 ANTIPHOSPHOLIPID SYNDROME (HCC): ICD-10-CM

## 2023-05-05 DIAGNOSIS — Z79.01 LONG TERM (CURRENT) USE OF ANTICOAGULANTS: ICD-10-CM

## 2023-05-05 LAB
INR: 2 (ref 0.8–1.2)
TEST STRIP EXPIRATION DATE: ABNORMAL DATE

## 2023-05-05 PROCEDURE — 85610 PROTHROMBIN TIME: CPT | Performed by: INTERNAL MEDICINE

## 2023-05-05 PROCEDURE — 93793 ANTICOAG MGMT PT WARFARIN: CPT | Performed by: INTERNAL MEDICINE

## 2023-05-08 ENCOUNTER — TELEPHONE (OUTPATIENT)
Dept: INTERNAL MEDICINE CLINIC | Facility: CLINIC | Age: 73
End: 2023-05-08

## 2023-05-08 ENCOUNTER — NURSE ONLY (OUTPATIENT)
Dept: INTERNAL MEDICINE CLINIC | Facility: CLINIC | Age: 73
End: 2023-05-08

## 2023-05-08 VITALS — DIASTOLIC BLOOD PRESSURE: 68 MMHG | HEART RATE: 52 BPM | SYSTOLIC BLOOD PRESSURE: 146 MMHG

## 2023-05-08 DIAGNOSIS — Z01.30 BLOOD PRESSURE CHECK: Primary | ICD-10-CM

## 2023-05-08 PROCEDURE — 3078F DIAST BP <80 MM HG: CPT | Performed by: INTERNAL MEDICINE

## 2023-05-08 PROCEDURE — 3077F SYST BP >= 140 MM HG: CPT | Performed by: INTERNAL MEDICINE

## 2023-05-08 RX ORDER — INDAPAMIDE 2.5 MG/1
TABLET, FILM COATED ORAL
Qty: 180 TABLET | Refills: 1 | Status: SHIPPED | OUTPATIENT
Start: 2023-05-08

## 2023-05-08 NOTE — TELEPHONE ENCOUNTER
For the stomach pain and cough she needs to come in and see anyone. For the blood pressure we can try to increase it to a maximum of the indapamide 5 mg a day. So have her do 1 in the morning and 1 in the afternoon have her set up a nurse visit in a week to recheck blood pressure.

## 2023-05-08 NOTE — TELEPHONE ENCOUNTER
With assist of [place]- stated number invalid  Called back-only ringing   Called Pt's daughter -stated 1- instead of [phone], chart changed   [place] assist Aren/[id number]  Relayed part of message, called disconnected   Called back with [place] YMARY/[id number]    Relayed message, verbalized understanding, wants to call back later for appt for stomach pain

## 2023-05-08 NOTE — TELEPHONE ENCOUNTER
Patient was here for blood pressure check. Patient brought some blood pressure reading. Patient stated she has stomach pain and cough.     Spot monitor:    Left arm: 158/68 p 54  Right arm: 149/65 p 52    Manual:  Right arm: 146/68  Left arm: 146/60

## 2023-05-11 ENCOUNTER — TELEPHONE (OUTPATIENT)
Facility: CLINIC | Age: 73
End: 2023-05-11

## 2023-05-11 RX ORDER — SPIRONOLACTONE 25 MG/1
TABLET ORAL
Qty: 90 TABLET | Refills: 0 | Status: SHIPPED | OUTPATIENT
Start: 2023-05-11

## 2023-05-11 RX ORDER — SPIRONOLACTONE 25 MG/1
TABLET ORAL
Qty: 90 TABLET | Refills: 0 | OUTPATIENT
Start: 2023-05-11

## 2023-05-11 NOTE — TELEPHONE ENCOUNTER
Patient outreach message received:    Entered into Epic: Recall colonoscopy in 3 years per Dr. Livan Bernstein. Last done on 8/12/2020. Next due 8/12/2023. Recall reminder letter mailed out to patient.

## 2023-05-22 ENCOUNTER — NURSE ONLY (OUTPATIENT)
Dept: INTERNAL MEDICINE CLINIC | Facility: CLINIC | Age: 73
End: 2023-05-22

## 2023-05-22 VITALS — HEART RATE: 56 BPM | SYSTOLIC BLOOD PRESSURE: 132 MMHG | OXYGEN SATURATION: 99 % | DIASTOLIC BLOOD PRESSURE: 68 MMHG

## 2023-05-22 DIAGNOSIS — Z01.30 BLOOD PRESSURE CHECK: Primary | ICD-10-CM

## 2023-05-22 PROCEDURE — 3078F DIAST BP <80 MM HG: CPT | Performed by: INTERNAL MEDICINE

## 2023-05-22 PROCEDURE — 3075F SYST BP GE 130 - 139MM HG: CPT | Performed by: INTERNAL MEDICINE

## 2023-05-22 NOTE — PROGRESS NOTES
Patient came in for bp check up(waited in the room for 10mins before BP was  Checked. )BP checked on her  left Arm 132/68. BP logged placed in 's Fax bin for review.

## 2023-05-23 ENCOUNTER — TELEPHONE (OUTPATIENT)
Dept: INTERNAL MEDICINE CLINIC | Facility: CLINIC | Age: 73
End: 2023-05-23

## 2023-05-23 NOTE — TELEPHONE ENCOUNTER
Patient has been informed per Dr Rowe Service blood pressure readings and heart rates look very stable. Monitor periodically but no further changes. Keep the same medication regimen. Patient verbalized understanding.

## 2023-05-23 NOTE — TELEPHONE ENCOUNTER
So blood pressure readings and heart rates look very stable. Monitor periodically but no further changes. Keep the same medication regimen.

## 2023-06-07 ENCOUNTER — TELEPHONE (OUTPATIENT)
Dept: INTERNAL MEDICINE CLINIC | Facility: CLINIC | Age: 73
End: 2023-06-07

## 2023-06-07 NOTE — TELEPHONE ENCOUNTER
Gloria Jacobsen calling from [facility] ,needing to clarify RX for Valsartan     Reviewed RX and it faustino be processed   rovider Information    Authorizing Provider Encounter Provider   MD Abdirashid Zurita MD     Medication Detail    Medication Quantity Refills Start End   valsartan 160 MG Oral Tab 180 tablet 1 2023    Si po qam and 1 tab at bedtime.      Route:   (none)

## 2023-06-19 ENCOUNTER — ANTI-COAG VISIT (OUTPATIENT)
Dept: ANTICOAGULATION | Facility: CLINIC | Age: 73
End: 2023-06-19

## 2023-06-19 DIAGNOSIS — Z79.01 LONG TERM (CURRENT) USE OF ANTICOAGULANTS: ICD-10-CM

## 2023-06-19 DIAGNOSIS — Z51.81 MONITORING FOR LONG-TERM ANTICOAGULANT USE: ICD-10-CM

## 2023-06-19 DIAGNOSIS — D68.61 ANTIPHOSPHOLIPID SYNDROME (HCC): ICD-10-CM

## 2023-06-19 DIAGNOSIS — I82.532 CHRONIC DEEP VEIN THROMBOSIS OF LEFT POPLITEAL VEIN (HCC): ICD-10-CM

## 2023-06-19 DIAGNOSIS — Z79.01 MONITORING FOR LONG-TERM ANTICOAGULANT USE: ICD-10-CM

## 2023-06-19 LAB
INR: 1.7 (ref 0.8–1.2)
TEST STRIP EXPIRATION DATE: ABNORMAL DATE

## 2023-06-19 RX ORDER — WARFARIN SODIUM 5 MG/1
TABLET ORAL
Refills: 0 | COMMUNITY
Start: 2023-06-19

## 2023-07-03 RX ORDER — EMPAGLIFLOZIN 25 MG/1
12.5 TABLET, FILM COATED ORAL
Qty: 90 TABLET | Refills: 1 | Status: SHIPPED | OUTPATIENT
Start: 2023-07-03

## 2023-07-03 RX ORDER — METOPROLOL TARTRATE 100 MG/1
TABLET ORAL
Qty: 90 TABLET | Refills: 1 | Status: SHIPPED | OUTPATIENT
Start: 2023-07-03

## 2023-07-13 NOTE — PROGRESS NOTES
Your Prothrombin time/INR  is in therapeutic range. Continue coumadin dosing as directed by the coumadin clinic. Private [employer] Walk in

## 2023-07-17 ENCOUNTER — ANTI-COAG VISIT (OUTPATIENT)
Dept: ANTICOAGULATION | Facility: CLINIC | Age: 73
End: 2023-07-17

## 2023-07-17 DIAGNOSIS — Z79.01 LONG TERM (CURRENT) USE OF ANTICOAGULANTS: ICD-10-CM

## 2023-07-17 DIAGNOSIS — Z51.81 MONITORING FOR LONG-TERM ANTICOAGULANT USE: ICD-10-CM

## 2023-07-17 DIAGNOSIS — D68.61 ANTIPHOSPHOLIPID SYNDROME (HCC): Primary | ICD-10-CM

## 2023-07-17 DIAGNOSIS — Z79.01 MONITORING FOR LONG-TERM ANTICOAGULANT USE: ICD-10-CM

## 2023-07-17 DIAGNOSIS — I82.532 CHRONIC DEEP VEIN THROMBOSIS OF LEFT POPLITEAL VEIN (HCC): ICD-10-CM

## 2023-07-17 LAB
INR: 2.7 (ref 0.8–1.2)
TEST STRIP EXPIRATION DATE: ABNORMAL DATE

## 2023-07-17 PROCEDURE — 85610 PROTHROMBIN TIME: CPT | Performed by: INTERNAL MEDICINE

## 2023-07-17 PROCEDURE — 93793 ANTICOAG MGMT PT WARFARIN: CPT | Performed by: INTERNAL MEDICINE

## 2023-07-17 NOTE — PROGRESS NOTES
Face to face. Here with her . Per protocol, continue current dose and recheck INR in 4-6 weeks.      7.5 mg every Tue, Thu; 5 mg all other days

## 2023-07-22 PROCEDURE — 3061F NEG MICROALBUMINURIA REV: CPT | Performed by: INTERNAL MEDICINE

## 2023-07-22 PROCEDURE — 3044F HG A1C LEVEL LT 7.0%: CPT | Performed by: INTERNAL MEDICINE

## 2023-07-23 LAB
CHOL/HDLC RATIO: 2.8 (CALC)
CHOLESTEROL, TOTAL: 147 MG/DL
HDL CHOLESTEROL: 53 MG/DL
HEMOGLOBIN A1C: 6.4 % OF TOTAL HGB
LDL-CHOLESTEROL: 78 MG/DL (CALC)
NON-HDL CHOLESTEROL: 94 MG/DL (CALC)
TRIGLYCERIDES: 78 MG/DL
VITAMIN B12: 712 PG/ML (ref 200–1100)

## 2023-07-24 LAB
ABSOLUTE BASOPHILS: 52 CELLS/UL (ref 0–200)
ABSOLUTE EOSINOPHILS: 310 CELLS/UL (ref 15–500)
ABSOLUTE LYMPHOCYTES: 1806 CELLS/UL (ref 850–3900)
ABSOLUTE MONOCYTES: 826 CELLS/UL (ref 200–950)
ABSOLUTE NEUTROPHILS: 5607 CELLS/UL (ref 1500–7800)
APPEARANCE: CLEAR
BASOPHILS: 0.6 %
BILIRUBIN: NEGATIVE
COLOR: YELLOW
CREATININE, RANDOM URINE: 98 MG/DL (ref 20–275)
EOSINOPHILS: 3.6 %
GLUCOSE: NEGATIVE
HEMATOCRIT: 35.2 % (ref 35–45)
HEMOGLOBIN A1C: 6.4 % OF TOTAL HGB
HEMOGLOBIN: 11.5 G/DL (ref 11.7–15.5)
KETONES: NEGATIVE
LEUKOCYTE ESTERASE: NEGATIVE
LYMPHOCYTES: 21 %
MCH: 30.7 PG (ref 27–33)
MCHC: 32.7 G/DL (ref 32–36)
MCV: 93.9 FL (ref 80–100)
MICROALBUMIN/CREATININE RATIO, RANDOM URINE: 5 MCG/MG CREAT
MICROALBUMIN: 0.5 MG/DL
MONOCYTES: 9.6 %
MPV: 10.8 FL (ref 7.5–12.5)
NEUTROPHILS: 65.2 %
NITRITE: NEGATIVE
OCCULT BLOOD: NEGATIVE
PARATHYROID HORMONE,$INTACT: 51 PG/ML (ref 16–77)
PH: 6 (ref 5–8)
PLATELET COUNT: 260 THOUSAND/UL (ref 140–400)
PROTEIN: NEGATIVE
RDW: 12.5 % (ref 11–15)
RED BLOOD CELL COUNT: 3.75 MILLION/UL (ref 3.8–5.1)
SPECIFIC GRAVITY: 1.01 (ref 1–1.03)
WHITE BLOOD CELL COUNT: 8.6 THOUSAND/UL (ref 3.8–10.8)

## 2023-07-24 NOTE — PROGRESS NOTES
Lipid (choilesterol) is good,   Hemoglobin A1c which is a 3-month average of sugars is much better than prior. Good job! B12 level looks good.

## 2023-07-27 ENCOUNTER — OFFICE VISIT (OUTPATIENT)
Dept: NEPHROLOGY | Facility: CLINIC | Age: 73
End: 2023-07-27

## 2023-07-27 VITALS
HEIGHT: 69 IN | WEIGHT: 239 LBS | DIASTOLIC BLOOD PRESSURE: 73 MMHG | BODY MASS INDEX: 35.4 KG/M2 | SYSTOLIC BLOOD PRESSURE: 137 MMHG | HEART RATE: 61 BPM

## 2023-07-27 DIAGNOSIS — N18.32 STAGE 3B CHRONIC KIDNEY DISEASE (HCC): Primary | ICD-10-CM

## 2023-07-27 DIAGNOSIS — E11.9 CONTROLLED TYPE 2 DIABETES MELLITUS WITHOUT COMPLICATION, WITHOUT LONG-TERM CURRENT USE OF INSULIN (HCC): Chronic | ICD-10-CM

## 2023-07-27 PROCEDURE — 99214 OFFICE O/P EST MOD 30 MIN: CPT | Performed by: INTERNAL MEDICINE

## 2023-07-27 PROCEDURE — 1159F MED LIST DOCD IN RCRD: CPT | Performed by: INTERNAL MEDICINE

## 2023-07-27 PROCEDURE — 3075F SYST BP GE 130 - 139MM HG: CPT | Performed by: INTERNAL MEDICINE

## 2023-07-27 PROCEDURE — 3078F DIAST BP <80 MM HG: CPT | Performed by: INTERNAL MEDICINE

## 2023-07-27 PROCEDURE — 3008F BODY MASS INDEX DOCD: CPT | Performed by: INTERNAL MEDICINE

## 2023-07-27 NOTE — PATIENT INSTRUCTIONS
Great job Julia    Please do blood test sometime in August no need to fast    See me back in 6 months      You look great have a great summer

## 2023-07-28 NOTE — PROGRESS NOTES
Progress Note     Stuart Dupont    Is here with her son Destiny Hooper she is doing great she looks impressive senior in about 4 years pressures 137/73 pulse 61 recently had labs everything looks good A1c 6.4 she is off Jardiance that she cannot afford. Otherwise on Toprol minoxidil Synthroid indapamide Aldactone glipizide and Lexapro 5 mg/day overall feels good no chest pain shortness of breath no edema she unfortunately did not get a renal panel recently and I did order it for the next few weeks      HISTORY:  Past Medical History:   Diagnosis Date    Appendicitis     Cholelithiasis     Deep vein thrombosis (HCC)     L leg     Essential hypertension     Goiter     Multinodular goiter S/P thyroidectomy. Osteopenia     Vertigo, aural       Past Surgical History:   Procedure Laterality Date    APPENDECTOMY  1993    CATARACT EXTRACTION W/  INTRAOCULAR LENS IMPLANT Right 1988    in [address] [place] [address]  2006    COLONOSCOPY N/A 8/12/2020    Procedure: COLONOSCOPY;  Surgeon: Rosamaria Benites MD;  Location: [address] [place] [address] ENDOSCOPY    ELECTROCARDIOGRAM, COMPLETE  01/03/2014    SCANNED TO MEDIA TAB - 01/03/2014    FRACTURE SURGERY Left     LEFT FOOT SURGERY WITH HARDWARE    KNEE SCOPE,ABRASN ARTHROPLASTY Right 10-4-16    THYROIDECTOMY  08/17/15    TOTAL    TUBAL LIGATION        Social History    Tobacco Use      Smoking status: Never      Smokeless tobacco: Never    Alcohol use: No        Medications (Active prior to today's visit):  Current Outpatient Medications   Medication Sig Dispense Refill    metoprolol tartrate 100 MG Oral Tab Take 1 tablet by mouth in the morning and 1/2 tablet in the afternoon 90 tablet 1    Empagliflozin (JARDIANCE) 25 MG Oral Tab Take 12.5 tablets by mouth daily with breakfast. 90 tablet 1    minoxidil 2.5 MG Oral Tab Take 2 tablets (5 mg total) by mouth 2 (two) times daily. 360 tablet 3    dicyclomine 10 MG Oral Cap Take 1 capsule (10 mg total) by mouth 2 (two) times daily as needed. 60 capsule 1    indapamide 2.5 MG Oral Tab Take 1 tablet (2.5 mg total) by mouth daily. 90 tablet 3    LEVOTHYROXINE 88 MCG Oral Tab Take 1 tablet by mouth before breakfast. 90 tablet 3    SPIRONOLACTONE 25 MG Oral Tab TAKE ONE TABLET BY MOUTH ONE TIME DAILY 90 tablet 0    glipiZIDE 10 MG Oral Tab Take 1 tablet (10 mg total) by mouth 2 (two) times daily before meals. 180 tablet 1    simvastatin 20 MG Oral Tab Take 1 tablet (20 mg total) by mouth twice a week. 24 tablet 2    escitalopram 5 MG Oral Tab Take 1 tablet (5 mg total) by mouth nightly. 90 tablet 3    Glucose Blood (ACCU-CHEK GUIDE) In Vitro Strip CHECK TWICE DAILY 200 strip 3    insulin glargine (LANTUS SOLOSTAR) 100 UNIT/ML Subcutaneous Solution Pen-injector INJECT 45 UNITS INTHE MORNING AND 7 UNITS IN THE EVENING 30 mL 2    Secukinumab, 300 MG Dose, (COSENTYX SENSOREADY, 300 MG,) 150 MG/ML Subcutaneous Solution Auto-injector Inject 300 mg into the skin every 28 days. 6 each 3    Glucose Blood (ACCU-CHEK GUIDE) In Vitro Strip 1 strip by In Vitro route Q12H. 200 strip 11    Insulin Pen Needle (CAREFINE PEN NEEDLES) 32G X 4 MM Does not apply Misc Injects qhs. 100 each 6    Insulin Pen Needle (BD PEN NEEDLE ORIGINAL U/F) 29G X 12.7MM Does not apply Misc Dx. E11.9. Checks q12hrs. BD ultrafine MINI. 100 each 6    Alcohol Swabs Does not apply Pads Code: E11.9. Checks twice daily. (Patient taking differently: Code: E11.9. Checks twice daily. ) 100 each 11    ACCU-CHEK FASTCLIX LANCETS Does not apply Misc CHECK EVERY MORNING AND EVENING 204 each 11    Lancets Does not apply Misc Checks qam and PM. Dx. E11.9. 100 each 6    Glucose Blood In Vitro [employer] q12hrs.  Dx. E11.65 200 strip 6    Glucose Blood (BENNY CONTOUR NEXT TEST) In Vitro Strip TEST 2 times  A DAY Dx: Uncontrolled type 2 diabetes mellitus without complication, without long-term current use of insulin (HCC)  E11.65 200 strip 1    BENNY MICROLET LANCETS Does not apply Misc Testing 3 times a day Diagnosis E11.65 300 each 2    Blood Glucose Monitoring Suppl (ANDalyze CONTOUR NEXT MONITOR) w/Device Does not apply Kit 1 kit by Does not apply route daily. Testing 3 times a day,  Diagnosis  E11.65 1 kit 0    Omega-3-acid Ethyl Esters (LOVAZA) 1 G Oral Cap Take 1 capsule (1 g total) by mouth 2 (two) times daily with meals. warfarin 5 MG Oral Tab 7.5 mg every Tue, Thu; 5 mg all other days  0    valsartan 160 MG Oral Tab 1 po qam and 1 tab at bedtime.  180 tablet 1       Allergies:    Amlodipine              SHORTNESS OF BREATH    Comment:Chest pressure  Metformin               DIARRHEA  Metoprolol              DIARRHEA      ROS:     Constitutional:  Negative for decreased activity, fever, irritability and lethargy  ENMT:  Negative for ear drainage, hearing loss and nasal drainage  Eyes:  Negative for eye discharge and vision loss  Cardiovascular:  Negative for chest pain, sob  Respiratory:  Negative for cough, dyspnea and wheezing  Gastrointestinal:  Negative for abdominal pain, constipation  Genitourinary:  Negative for dysuria and hematuria  Endocrine:  Negative for abnormal sleep patterns  Hema/Lymph:  Negative for easy bleeding and easy bruising  Integumentary:  Negative for pruritus and rash  Musculoskeletal:  Negative for bone/joint symptoms  Neurological:  Negative for gait disturbance  Psychiatric:  Negative for inappropriate interaction and psychiatric symptoms       07/27/23  1740   BP: 137/73   Pulse: 61       PHYSICAL EXAM:   Constitutional: appears well hydrated alert and responsive   Head/Face: normocephalic  Eyes/Vision: normal extraocular motion is intact  Nose/Mouth/Throat:mucous membranes are moist   Neck/Thyroid: neck is supple without adenopathy  Lymphatic: no abnormal cervical, supraclavicular adenopathy is noted  Respiratory:  lungs are clear to auscultation bilaterally  Cardiovascular: regular rate and rhythm   Abdomen: soft, non-tender, non-distended, BS normal  Skin/Hair: no unusual rashes present, no abnormal bruising noted  Back/Spine: no abnormalities noted  Musculoskeletal: no deformities  Extremities: no edema  Neurological:  Grossly normal       ASSESSMENT/PLAN:   Assessment   Stage 3b chronic kidney disease (hcc)  (primary encounter diagnosis)  Controlled type 2 diabetes mellitus without complication, without long-term current use of insulin (hcc)    1 CKD 3 stable  Last creatinine 1.23 GFR 47 await repeat labs    2  diabetes stable does not want to take any SGLT   #3 hypertension controlled with valsartan no urine microalbumin  #4 depression controlled  Do a renal panel see her back in 6 months  Orders This Visit:  Orders Placed This Encounter      Basic Metabolic Panel (8)      Meds This Visit:  Requested Prescriptions      No prescriptions requested or ordered in this encounter       Imaging & Referrals:  None     7/27/2023  Juan Weinstein.  Preston Oreilly MD

## 2023-07-31 RX ORDER — SPIRONOLACTONE 25 MG/1
TABLET ORAL
Qty: 90 TABLET | Refills: 0 | Status: SHIPPED | OUTPATIENT
Start: 2023-07-31

## 2023-08-03 ENCOUNTER — LAB ENCOUNTER (OUTPATIENT)
Dept: LAB | Facility: HOSPITAL | Age: 73
End: 2023-08-03
Attending: INTERNAL MEDICINE
Payer: MEDICARE

## 2023-08-03 LAB
ANION GAP SERPL CALC-SCNC: 5 MMOL/L (ref 0–18)
BUN BLD-MCNC: 26 MG/DL (ref 7–18)
BUN/CREAT SERPL: 18.8 (ref 10–20)
CALCIUM BLD-MCNC: 9 MG/DL (ref 8.5–10.1)
CHLORIDE SERPL-SCNC: 107 MMOL/L (ref 98–112)
CO2 SERPL-SCNC: 29 MMOL/L (ref 21–32)
CREAT BLD-MCNC: 1.38 MG/DL
EGFRCR SERPLBLD CKD-EPI 2021: 41 ML/MIN/1.73M2 (ref 60–?)
FASTING STATUS PATIENT QL REPORTED: YES
GLUCOSE BLD-MCNC: 102 MG/DL (ref 70–99)
OSMOLALITY SERPL CALC.SUM OF ELEC: 297 MOSM/KG (ref 275–295)
POTASSIUM SERPL-SCNC: 5 MMOL/L (ref 3.5–5.1)
SODIUM SERPL-SCNC: 141 MMOL/L (ref 136–145)

## 2023-08-03 PROCEDURE — 80048 BASIC METABOLIC PNL TOTAL CA: CPT | Performed by: INTERNAL MEDICINE

## 2023-08-03 PROCEDURE — 36415 COLL VENOUS BLD VENIPUNCTURE: CPT | Performed by: INTERNAL MEDICINE

## 2023-08-10 NOTE — TELEPHONE ENCOUNTER
Spoke with daughter Maira Treviño, informed that BP are higher, wants patient to check BP at different times on the new medication and to make sure to write time and call in 1-2 weeks with new medication.  Daughter verbalized understanding, denies further question Rifampin Counseling: I discussed with the patient the risks of rifampin including but not limited to liver damage, kidney damage, red-orange body fluids, nausea/vomiting and severe allergy.

## 2023-08-14 ENCOUNTER — OFFICE VISIT (OUTPATIENT)
Dept: INTERNAL MEDICINE CLINIC | Facility: CLINIC | Age: 73
End: 2023-08-14

## 2023-08-14 VITALS
TEMPERATURE: 97 F | HEIGHT: 69 IN | WEIGHT: 236 LBS | RESPIRATION RATE: 19 BRPM | BODY MASS INDEX: 34.96 KG/M2 | HEART RATE: 61 BPM | SYSTOLIC BLOOD PRESSURE: 131 MMHG | DIASTOLIC BLOOD PRESSURE: 72 MMHG

## 2023-08-14 DIAGNOSIS — Z12.11 COLON CANCER SCREENING: ICD-10-CM

## 2023-08-14 DIAGNOSIS — I67.2 INTRACRANIAL ATHEROSCLEROSIS: ICD-10-CM

## 2023-08-14 DIAGNOSIS — L40.9 PSORIASIS: ICD-10-CM

## 2023-08-14 DIAGNOSIS — Z71.85 VACCINE COUNSELING: ICD-10-CM

## 2023-08-14 DIAGNOSIS — H93.13 TINNITUS OF BOTH EARS: ICD-10-CM

## 2023-08-14 DIAGNOSIS — R31.29 MICROSCOPIC HEMATURIA: ICD-10-CM

## 2023-08-14 DIAGNOSIS — E11.22 TYPE 2 DIABETES MELLITUS WITH STAGE 3 CHRONIC KIDNEY DISEASE, WITHOUT LONG-TERM CURRENT USE OF INSULIN, UNSPECIFIED WHETHER STAGE 3A OR 3B CKD (HCC): ICD-10-CM

## 2023-08-14 DIAGNOSIS — Z98.890 HISTORY OF PTERYGIUM EXCISION: ICD-10-CM

## 2023-08-14 DIAGNOSIS — N18.30 TYPE 2 DIABETES MELLITUS WITH STAGE 3 CHRONIC KIDNEY DISEASE, WITHOUT LONG-TERM CURRENT USE OF INSULIN, UNSPECIFIED WHETHER STAGE 3A OR 3B CKD (HCC): ICD-10-CM

## 2023-08-14 DIAGNOSIS — D68.61 ANTIPHOSPHOLIPID SYNDROME (HCC): ICD-10-CM

## 2023-08-14 DIAGNOSIS — Z00.00 ENCOUNTER FOR ANNUAL HEALTH EXAMINATION: ICD-10-CM

## 2023-08-14 DIAGNOSIS — E66.01 SEVERE OBESITY (BMI 35.0-39.9) WITH COMORBIDITY (HCC): Chronic | ICD-10-CM

## 2023-08-14 DIAGNOSIS — M17.11 PRIMARY OSTEOARTHRITIS OF RIGHT KNEE: Chronic | ICD-10-CM

## 2023-08-14 DIAGNOSIS — H25.13 AGE-RELATED NUCLEAR CATARACT OF BOTH EYES: ICD-10-CM

## 2023-08-14 DIAGNOSIS — E11.9 CONTROLLED TYPE 2 DIABETES MELLITUS WITHOUT COMPLICATION, WITHOUT LONG-TERM CURRENT USE OF INSULIN (HCC): Primary | Chronic | ICD-10-CM

## 2023-08-14 DIAGNOSIS — E03.9 HYPOTHYROIDISM (ACQUIRED): Chronic | ICD-10-CM

## 2023-08-14 DIAGNOSIS — L40.50 PSORIATIC ARTHRITIS (HCC): ICD-10-CM

## 2023-08-14 DIAGNOSIS — N18.31 STAGE 3A CHRONIC KIDNEY DISEASE (HCC): Chronic | ICD-10-CM

## 2023-08-14 DIAGNOSIS — H17.9 CORNEAL SCAR: ICD-10-CM

## 2023-08-14 DIAGNOSIS — Z79.01 LONG TERM (CURRENT) USE OF ANTICOAGULANTS: ICD-10-CM

## 2023-08-14 DIAGNOSIS — R19.7 DIARRHEA, UNSPECIFIED TYPE: ICD-10-CM

## 2023-08-14 DIAGNOSIS — I82.532 CHRONIC DEEP VEIN THROMBOSIS OF LEFT POPLITEAL VEIN (HCC): ICD-10-CM

## 2023-08-14 DIAGNOSIS — N39.41 URGE INCONTINENCE OF URINE: ICD-10-CM

## 2023-08-14 DIAGNOSIS — Z12.31 ENCOUNTER FOR SCREENING MAMMOGRAM FOR MALIGNANT NEOPLASM OF BREAST: ICD-10-CM

## 2023-08-14 DIAGNOSIS — E87.5 SERUM POTASSIUM ELEVATED: ICD-10-CM

## 2023-08-14 DIAGNOSIS — E53.8 B12 DEFICIENCY: ICD-10-CM

## 2023-08-14 DIAGNOSIS — R93.89 ABNORMAL COMPUTED TOMOGRAPHY ANGIOGRAPHY (CTA): ICD-10-CM

## 2023-08-14 DIAGNOSIS — H04.123 DRY EYE SYNDROME OF BILATERAL LACRIMAL GLANDS: ICD-10-CM

## 2023-08-14 DIAGNOSIS — H02.88A MEIBOMIAN GLAND DYSFUNCTION (MGD), BILATERAL, BOTH UPPER AND LOWER LIDS: ICD-10-CM

## 2023-08-14 DIAGNOSIS — E66.01 MORBID (SEVERE) OBESITY DUE TO EXCESS CALORIES (HCC): ICD-10-CM

## 2023-08-14 DIAGNOSIS — H02.88B MEIBOMIAN GLAND DYSFUNCTION (MGD), BILATERAL, BOTH UPPER AND LOWER LIDS: ICD-10-CM

## 2023-08-14 DIAGNOSIS — M85.80 OSTEOPENIA, UNSPECIFIED LOCATION: ICD-10-CM

## 2023-08-14 DIAGNOSIS — H25.013 CORTICAL AGE-RELATED CATARACT OF BOTH EYES: ICD-10-CM

## 2023-08-14 DIAGNOSIS — I10 ESSENTIAL HYPERTENSION WITH GOAL BLOOD PRESSURE LESS THAN 130/80: ICD-10-CM

## 2023-08-14 DIAGNOSIS — L29.9 ITCHING OF EAR: ICD-10-CM

## 2023-08-14 PROBLEM — D12.6 TUBULAR ADENOMA OF COLON: Status: RESOLVED | Noted: 2020-08-16 | Resolved: 2023-08-14

## 2023-08-14 LAB — VIT D+METAB SERPL-MCNC: 48.8 NG/ML (ref 30–100)

## 2023-08-14 RX ORDER — INDAPAMIDE 2.5 MG/1
TABLET, FILM COATED ORAL
Qty: 180 TABLET | Refills: 2 | Status: SHIPPED | OUTPATIENT
Start: 2023-08-14

## 2023-08-14 RX ORDER — OMEPRAZOLE 40 MG/1
40 CAPSULE, DELAYED RELEASE ORAL DAILY
Qty: 7 CAPSULE | Refills: 0 | Status: SHIPPED | OUTPATIENT
Start: 2023-08-14 | End: 2023-08-21

## 2023-08-15 LAB
CLAM IGE QN: <0.1 KUA/L (ref ?–0.1)
CODFISH IGE QN: <0.1 KUA/L (ref ?–0.1)
CORN IGE QN: <0.1 KUA/L (ref ?–0.1)
COW MILK IGE QN: <0.1 KUA/L (ref ?–0.1)
EGG WHITE IGE QN: 0.44 KUA/L (ref ?–0.1)
IGE SERPL-ACNC: 125 KU/L (ref 2–214)
PEANUT IGE QN: <0.1 KUA/L (ref ?–0.1)
SCALLOP IGE QN: <0.1 KUA/L (ref ?–0.1)
SESAME SEED IGE QN: <0.1 KUA/L (ref ?–0.1)
SHRIMP IGE QN: <0.1 KUA/L (ref ?–0.1)
SOYBEAN IGE QN: <0.1 KUA/L (ref ?–0.1)
WALNUT IGE QN: <0.1 KUA/L (ref ?–0.1)
WHEAT IGE QN: <0.1 KUA/L (ref ?–0.1)

## 2023-08-21 ENCOUNTER — TELEPHONE (OUTPATIENT)
Dept: ANTICOAGULATION | Facility: CLINIC | Age: 73
End: 2023-08-21

## 2023-08-21 DIAGNOSIS — Z79.01 MONITORING FOR LONG-TERM ANTICOAGULANT USE: ICD-10-CM

## 2023-08-21 DIAGNOSIS — I82.532 CHRONIC DEEP VEIN THROMBOSIS OF LEFT POPLITEAL VEIN (HCC): Primary | ICD-10-CM

## 2023-08-21 DIAGNOSIS — D68.61 ANTIPHOSPHOLIPID SYNDROME (HCC): ICD-10-CM

## 2023-08-21 DIAGNOSIS — Z51.81 MONITORING FOR LONG-TERM ANTICOAGULANT USE: ICD-10-CM

## 2023-08-21 NOTE — TELEPHONE ENCOUNTER
Anticoag referral expires soon. Order pended. Please sign, thank you.       Dx: Chronic deep vein thrombosis of left popliteal vein (HCC) (I82.532)  Antiphospholipid syndrome (HCC) (D68.61)  Monitoring for long-term anticoagulant use (Z51.81,Z79.01

## 2023-08-28 ENCOUNTER — ANTI-COAG VISIT (OUTPATIENT)
Dept: ANTICOAGULATION | Facility: CLINIC | Age: 73
End: 2023-08-28

## 2023-08-28 DIAGNOSIS — D68.61 ANTIPHOSPHOLIPID SYNDROME (HCC): Primary | ICD-10-CM

## 2023-08-28 DIAGNOSIS — Z79.01 MONITORING FOR LONG-TERM ANTICOAGULANT USE: ICD-10-CM

## 2023-08-28 DIAGNOSIS — Z79.01 LONG TERM (CURRENT) USE OF ANTICOAGULANTS: ICD-10-CM

## 2023-08-28 DIAGNOSIS — I82.532 CHRONIC DEEP VEIN THROMBOSIS OF LEFT POPLITEAL VEIN (HCC): ICD-10-CM

## 2023-08-28 DIAGNOSIS — Z51.81 MONITORING FOR LONG-TERM ANTICOAGULANT USE: ICD-10-CM

## 2023-08-28 LAB
INR: 2.3 (ref 0.8–1.2)
TEST STRIP EXPIRATION DATE: ABNORMAL DATE

## 2023-08-28 NOTE — PROGRESS NOTES
Face to face. Per protocol, continue current dose and recheck INR in 4-6 weeks.      7.5 mg every Tue, Thu; 5 mg all other days

## 2023-09-08 ENCOUNTER — TELEPHONE (OUTPATIENT)
Dept: INTERNAL MEDICINE CLINIC | Facility: CLINIC | Age: 73
End: 2023-09-08

## 2023-09-08 RX ORDER — INSULIN GLARGINE 100 [IU]/ML
INJECTION, SOLUTION SUBCUTANEOUS
Qty: 30 ML | Refills: 2 | Status: SHIPPED | OUTPATIENT
Start: 2023-09-08

## 2023-09-08 NOTE — TELEPHONE ENCOUNTER
[employer] calling and states Minoxidil 2.5 mg is on back order. Pharmacist asking ok to use Minoxidil 5 mg and pt take half. Please advise.

## 2023-09-08 NOTE — TELEPHONE ENCOUNTER
Please review; protocol failed.    Requested Prescriptions   Pending Prescriptions Disp Refills    LANTUS SOLOSTAR 100 UNIT/ML Subcutaneous Solution Pen-injector [Pharmacy Med Name: Lantus Solostar 100 Unit/Ml Inj [place]] 30 mL 0     Sig: INJECT 39 UNITS INTHE MORNING AND 7 UNITS IN THE EVENING       Diabetes Medication Protocol Failed - 9/8/2023  8:40 AM        Failed - EGFRCR or GFRNAA > 50     GFR Evaluation  EGFRCR: 41 , resulted on 8/3/2023          Passed - Last A1C < 7.5 and within past 6 months     Lab Results   Component Value Date    A1C 6.4 (H) 07/22/2023             Passed - In person appointment or virtual visit in the past 6 mos or appointment in next 3 mos     Recent Outpatient Visits              3 weeks ago Controlled type 2 diabetes mellitus without complication, without long-term current use of insulin [facility]    [facility], [address], Soren Nielsen MD    Office Visit    1 month ago Stage 3b chronic kidney disease [facility])    [facility], [address] [place] [address], Melvin Becker MD    Office Visit    3 months ago Blood pressure check    [facility], [address], [place]    Nurse Only    4 months ago Blood pressure check    [facility], [address], [address] Only    5 months ago Blood pressure check    [facility], [address], [address]    Nurse Only          Future Appointments         Provider Department Appt Notes    In 1 month Jud Saxena RN [facility], [place]     In 4 months MD [facility], [address] [place] [address], [place] 6 months    In 5 months MD Jailene Crowder [place] 6 month fu               Passed - GFR in the past 12 months           Recent Outpatient Visits              3 weeks ago Controlled type 2 diabetes mellitus without complication, without long-term current use of insulin ([facility] [address]    [address] [facility], Steffany Cardoza MD    Office Visit    1 month ago Stage 3b chronic kidney disease [facility], [address] [facility], Ramon Smith Chi, MD    Office Visit    3 months ago Blood pressure check    [address], [address], [place]    Nurse Only    4 months ago Blood pressure check    [address], [address], [place]    Nurse Only    5 months ago Blood pressure check    [address], [address], [place]    Nurse Only          Future Appointments         Provider Department Appt Notes    In 1 month Cynthia Hawk RN [address], [place], [address]     In 4 months Brandt Patricio MD [address], [address] [facility], [place] 6 months    In 5 months Alberta Bernabe MD [address] [facility], [place] 6 month fu

## 2023-09-18 ENCOUNTER — TELEPHONE (OUTPATIENT)
Facility: CLINIC | Age: 73
End: 2023-09-18

## 2023-09-18 NOTE — TELEPHONE ENCOUNTER
Tried calling patient using  Heriberto Sims [id number]  1st try she answered, the  introduced us then the patient ended the call. 2nd attempt, patient didn't answer and left message to call back.

## 2023-09-18 NOTE — TELEPHONE ENCOUNTER
Patient calling to schedule 3 yr recall CLN, please call. Luxem[place]ish speaker. See TE from 8/14/2020.

## 2023-09-18 NOTE — TELEPHONE ENCOUNTER
[facility] Endoscopy Report        Preoperative Diagnosis:  - colon cancer screening  - family history of colon cancer        Postoperative Diagnosis:  - colon polyps x 4  - small internal hemorrhoids        Procedure:    Colonoscopy         Surgeon:  Briana Oreilly M.D. Anesthesia:  MAC sedation, see anesthesia records      Technique:  After informed consent, the patient was placed in the left lateral recumbent position. Digital rectal examination revealed no palpable intraluminal abnormalities. An Olympus variable stiffness 190 series HD colonoscope was inserted into the rectum and advanced under direct vision by following the lumen to the cecum. The colon was examined upon withdrawal in the left lateral position. The procedures were well tolerated without immediate complication. Findings:  The preparation of the colon was good. The terminal ileum was examined for 3 cm and visually normal.  The ileocecal valve was well preserved. The visualized colonic mucosa from the cecum to the anal verge was normal with an intact vascular pattern. Colon polyps x4 removed as follows;  -Transverse x2, these were both diminutive removed by cold forceps technique.  -Descending x1, diminutive polyp removed by cold forceps technique.  -Sigmoid x1, sessile approximately 4 to 5 mm in size removed by cold snare technique. All polypectomy sites inspected and found to be free of bleeding and specimens retrieved and sent for analysis. Small internal hemorrhoids noted on retroflexed view. Impression:  - colon polyps x 4  - small internal hemorrhoids     Recommendations:  - Post polypectomy instructions given  - Repeat colonoscopy in 3- 5 years  - Symptomatic treatment of hemorrhoids              Jordis Ends. Prudence MD N[place]  8/12/2020  4:21 PM     Inga Garcia MD  8/13/2020  7:56 PM CDT       I wanted to get back to you with your colonoscopy results.   You had 4 colon polyps removed which were benign. I would advise a repeat colonoscopy in 3 years to make sure no new polyps are forming. You also have internal hemorrhoids. Please stay on a high fiber diet and call with any questions. Specimen to Pathology, Tissue: [id number]  Order: [id number]  Collected 8/12/2020  3:59 PM       Status: Final result       Visible to patient: Yes (seen)       Dx: Family history of colon cancer    2 Result Notes       1 Patient Communication       1 Follow-up Encounter        Component  Ref Range & Units      Case Report     Surgical Pathology                                Case: [id number]                                     Authorizing Provider:  Darcy Barber MD       Collected:           08/12/2020 03:59 PM             Ordering Location[facility]     [facility]          Received:            08/13/2020 06:23 AM                                    Endoscopy Lab Suites                                                           Pathologist:           Mleida Thompson MD                                                           Specimens:   A) - Colon descending, Polyp x 1                                                                      B) - Colon transverse, Polyps x 2                                                                      C) - Sigmoid colon, Polyp x 1                                                                  Final Diagnosis:        A. Descending colon polyp:   Tubular adenoma. B. Transverse colon polyp:   Tubular adenoma. C. Sigmoid colon polyp:   Tubular adenoma. Electronically signed by Melida Thompson MD on 8/13/2020 at  1:01 PM        Clinical Information      Z80.0 Family History Of Colon Cancer. Gross Description      Specimen A is labeled \"Taylor [place], descending colon polyp x1\" received in formalin. The specimen consists of one fragment of pink-tan soft tissue measuring 0.3 cm in greatest dimension. Submitted entirely in cassette A1. Specimen B is labeled \"Taylor Patel, transverse colon polyps x2\" received in formalin. The specimen consists of multiple fragments of tan soft tissue admixed with debris measuring in aggregate 1.7 x 0.7 x 0.2 cm. Submitted entirely in cassette B1. Specimen C is labeled \"Taylor Alex Patel, sigmoid colon polyp x1\" received in formalin. The specimen consists of one fragment of pink-tan soft tissue measuring 0.6 cm in greatest dimension.  Submitted entirely in cassette C1. (jq)      Hosea Morales M.D./ghanshyam             Interpretation     Benign     Electronically signed by Joyce Varela MD on 8/13/2020 at  1:01 PM     [address] [facility])                Specimen Collected: 08/12/20  3:59 PM Last Resulted: 08/13/20  1:01 PM

## 2023-09-21 NOTE — TELEPHONE ENCOUNTER
Left message to call back using  Sa[facility] ID # [id number]    CSS:  Please transfer to RN if patient calls back. Thank you.

## 2023-09-22 NOTE — TELEPHONE ENCOUNTER
Left message to call back using  [facility] ID # [id number]. Our office tried to contact patient on three separate occasions with no answer or call back. No response letter mailed to home address. Encounter closed per protocol.

## 2023-10-02 ENCOUNTER — OFFICE VISIT (OUTPATIENT)
Dept: INTERNAL MEDICINE CLINIC | Facility: CLINIC | Age: 73
End: 2023-10-02

## 2023-10-02 ENCOUNTER — NURSE TRIAGE (OUTPATIENT)
Dept: INTERNAL MEDICINE CLINIC | Facility: CLINIC | Age: 73
End: 2023-10-02

## 2023-10-02 ENCOUNTER — HOSPITAL ENCOUNTER (OUTPATIENT)
Dept: GENERAL RADIOLOGY | Age: 73
Discharge: HOME | End: 2023-10-02
Attending: INTERNAL MEDICINE
Payer: MEDICARE

## 2023-10-02 VITALS
HEART RATE: 73 BPM | WEIGHT: 236 LBS | SYSTOLIC BLOOD PRESSURE: 161 MMHG | RESPIRATION RATE: 19 BRPM | TEMPERATURE: 100 F | DIASTOLIC BLOOD PRESSURE: 53 MMHG | OXYGEN SATURATION: 98 % | BODY MASS INDEX: 34.96 KG/M2 | HEIGHT: 69 IN

## 2023-10-02 DIAGNOSIS — R50.9 FEVER, UNSPECIFIED FEVER CAUSE: ICD-10-CM

## 2023-10-02 DIAGNOSIS — R05.1 ACUTE COUGH: Primary | ICD-10-CM

## 2023-10-02 DIAGNOSIS — R06.2 WHEEZING: ICD-10-CM

## 2023-10-02 DIAGNOSIS — R06.02 SOB (SHORTNESS OF BREATH): ICD-10-CM

## 2023-10-02 DIAGNOSIS — R05.1 ACUTE COUGH: ICD-10-CM

## 2023-10-02 LAB
COVID19 BINAX NOW ANTIGEN: NOT DETECTED
OPERATOR ID: NORMAL

## 2023-10-02 PROCEDURE — 3008F BODY MASS INDEX DOCD: CPT | Performed by: INTERNAL MEDICINE

## 2023-10-02 PROCEDURE — 3077F SYST BP >= 140 MM HG: CPT | Performed by: INTERNAL MEDICINE

## 2023-10-02 PROCEDURE — 71046 X-RAY EXAM CHEST 2 VIEWS: CPT | Performed by: INTERNAL MEDICINE

## 2023-10-02 PROCEDURE — 3078F DIAST BP <80 MM HG: CPT | Performed by: INTERNAL MEDICINE

## 2023-10-02 PROCEDURE — 99214 OFFICE O/P EST MOD 30 MIN: CPT | Performed by: INTERNAL MEDICINE

## 2023-10-02 RX ORDER — PREDNISONE 5 MG/1
5 TABLET ORAL 2 TIMES DAILY
Qty: 6 TABLET | Refills: 0 | Status: SHIPPED | OUTPATIENT
Start: 2023-10-02 | End: 2023-10-05

## 2023-10-02 RX ORDER — BENZONATATE 100 MG/1
100 CAPSULE ORAL 3 TIMES DAILY PRN
Qty: 20 CAPSULE | Refills: 0 | Status: SHIPPED | OUTPATIENT
Start: 2023-10-02 | End: 2023-10-09

## 2023-10-02 RX ORDER — AZITHROMYCIN 250 MG/1
TABLET, FILM COATED ORAL
Qty: 6 TABLET | Refills: 0 | Status: SHIPPED | OUTPATIENT
Start: 2023-10-02 | End: 2023-10-06

## 2023-10-02 NOTE — TELEPHONE ENCOUNTER
Action Requested: Summary for Provider     []  Critical Lab, Recommendations Needed  [] Need Additional Advice  []   FYI    []   Need Orders  [] Need Medications Sent to Pharmacy  [x]  Other     SUMMARY: With Macho Cullen ID# [id number]    Verified name and . Patient reports cough with mucous production and intermittent short of breath started 2 days ago. Patient able to speak full sentences with no issues- no audible dyspnea heard on call. Patient denies any chest pain at this time. Per protocol, advised office visit.     Appointment scheduled:  Future Appointments   Date Time Provider Zac Choi   10/2/2023 11:45 AM Hector Christian MD [facility]   10/9/2023  9:45 AM Marisol Swanson RN [facility]   2024  5:20 PM Andi Beltre MD [id number]   2/15/2024  1:00 PM Nancie Whatley MD [id number] [id number]     Information provided:  [address]  [address]   [facility] [place]    Reason for call: Acute  Onset: Data Unavailable        Reason for Disposition   MILD difficulty breathing (e.g., minimal/no SOB at rest, SOB with walking, pulse <100) and still present when not coughing    Protocols used: Cough-A-OH

## 2023-10-02 NOTE — PROGRESS NOTES
Subjective:     Patient ID: Salome Camacho is a 68year old female. HPI    Pt comes in with complaint of cough, chest congestion, mild fever, some sob with cough, wheezing for 2 days,  has also cough. No sore throat. History/Other:   Review of Systems   Constitutional:  Positive for fever. HENT: Negative. Eyes: Negative. Respiratory:  Positive for cough, shortness of breath and wheezing. Cardiovascular: Negative. Gastrointestinal: Negative. Genitourinary: Negative. Musculoskeletal: Negative. Skin: Negative. Neurological: Negative. Psychiatric/Behavioral: Negative. Current Outpatient Medications   Medication Sig Dispense Refill    azithromycin (ZITHROMAX Z-THOR) 250 MG Oral Tab Take 2 tablets (500 mg total) by mouth daily for 1 day, THEN 1 tablet (250 mg total) daily for 4 days. 6 tablet 0    benzonatate 100 MG Oral Cap Take 1 capsule (100 mg total) by mouth 3 (three) times daily as needed for cough. 20 capsule 0    predniSONE 5 MG Oral Tab Take 1 tablet (5 mg total) by mouth 2 (two) times daily for 3 days. 6 tablet 0    insulin glargine (LANTUS SOLOSTAR) 100 UNIT/ML Subcutaneous Solution Pen-injector INJECT 45 UNITS INTHE MORNING AND 7 UNITS IN THE EVENING 30 mL 2    indapamide 2.5 MG Oral Tab Take 1 tablet (2.5 mg total) by mouth daily and qpm. 180 tablet 2    SPIRONOLACTONE 25 MG Oral Tab TAKE ONE TABLET BY MOUTH ONE TIME DAILY 90 tablet 0    metoprolol tartrate 100 MG Oral Tab Take 1 tablet by mouth in the morning and 1/2 tablet in the afternoon 90 tablet 1    Empagliflozin (JARDIANCE) 25 MG Oral Tab Take 12.5 tablets by mouth daily with breakfast. 90 tablet 1    warfarin 5 MG Oral Tab 7.5 mg every Tue, Thu; 5 mg all other days  0    minoxidil 2.5 MG Oral Tab Take 2 tablets (5 mg total) by mouth 2 (two) times daily. 360 tablet 3    dicyclomine 10 MG Oral Cap Take 1 capsule (10 mg total) by mouth 2 (two) times daily as needed.  60 capsule 1 LEVOTHYROXINE 88 MCG Oral Tab Take 1 tablet by mouth before breakfast. 90 tablet 3    glipiZIDE 10 MG Oral Tab Take 1 tablet (10 mg total) by mouth 2 (two) times daily before meals. 180 tablet 1    simvastatin 20 MG Oral Tab Take 1 tablet (20 mg total) by mouth twice a week. 24 tablet 2    escitalopram 5 MG Oral Tab Take 1 tablet (5 mg total) by mouth nightly. 90 tablet 3    valsartan 160 MG Oral Tab 1 po qam and 1 tab at bedtime. 180 tablet 1    Glucose Blood (ACCU-CHEK GUIDE) In Vitro Strip CHECK TWICE DAILY 200 strip 3    Secukinumab, 300 MG Dose, (COSENTYX SENSOREADY, 300 MG,) 150 MG/ML Subcutaneous Solution Auto-injector Inject 300 mg into the skin every 28 days. 6 each 3    Glucose Blood (ACCU-CHEK GUIDE) In Vitro Strip 1 strip by In Vitro route Q12H. 200 strip 11    Insulin Pen Needle (CAREFINE PEN NEEDLES) 32G X 4 MM Does not apply Misc Injects qhs. 100 each 6    Insulin Pen Needle (BD PEN NEEDLE ORIGINAL U/F) 29G X 12.7MM Does not apply Misc Dx. E11.9. Checks q12hrs. BD ultrafine MINI. 100 each 6    Alcohol Swabs Does not apply Pads Code: E11.9. Checks twice daily. (Patient taking differently: Code: E11.9. Checks twice daily. ) 100 each 11    ACCU-CHEK FASTCLIX LANCETS Does not apply Misc CHECK EVERY MORNING AND EVENING 204 each 11    Lancets Does not apply Misc Checks qam and PM. Dx. E11.9. 100 each 6    Glucose Blood In Vitro [employer] q12hrs. Dx. E11.65 200 strip 6    Glucose Blood (BENNY CONTOUR NEXT TEST) In Vitro Strip TEST 2 times  A DAY Dx: Uncontrolled type 2 diabetes mellitus without complication, without long-term current use of insulin (HCC)  E11.65 200 strip 1    BENNY MICROLET LANCETS Does not apply Misc Testing 3 times a day  Diagnosis E11.65 300 each 2    Blood Glucose Monitoring Suppl (Innography CONTOUR NEXT MONITOR) w/Device Does not apply Kit 1 kit by Does not apply route daily.  Testing 3 times a day,  Diagnosis  E11.65 1 kit 0    Omega-3-acid Ethyl Esters (LOVAZA) 1 G Oral Cap Take 1 capsule (1 g total) by mouth 2 (two) times daily with meals. Allergies:  Amlodipine              SHORTNESS OF BREATH    Comment:Chest pressure  Metformin               DIARRHEA  Metoprolol              DIARRHEA    Past Medical History:   Diagnosis Date    Appendicitis     Cholelithiasis     Deep vein thrombosis (HCC)     L leg     Essential hypertension     Goiter     Multinodular goiter S/P thyroidectomy.      Osteopenia     Vertigo, aural       Past Surgical History:   Procedure Laterality Date    APPENDECTOMY  1993    CATARACT EXTRACTION W/  INTRAOCULAR LENS IMPLANT Right 1988    in [address] [facility]  2006    COLONOSCOPY N/A 8/12/2020    Procedure: COLONOSCOPY;  Surgeon: Dianna Dior MD;  Location: [facility] ENDOSCOPY    ELECTROCARDIOGRAM, COMPLETE  01/03/2014    SCANNED TO MEDIA TAB - 01/03/2014    FRACTURE SURGERY Left     LEFT FOOT SURGERY WITH HARDWARE    KNEE SCOPE,ABRASN ARTHROPLASTY Right 10-4-16    THYROIDECTOMY  08/17/15    TOTAL    TUBAL LIGATION        Family History   Problem Relation Age of Onset    Diabetes Mother     Hypertension Mother     Dementia Mother         Alzheimer's Disease    Diabetes Brother     Lipids Brother     Hypertension Brother     Diabetes Paternal Uncle     Heart Disease Father         CAD    Diabetes Other     Dementia Other     No Known Problems Self     No Known Problems Sister     No Known Problems Daughter     No Known Problems Maternal Grandmother     No Known Problems Paternal Grandmother     No Known Problems Maternal Aunt     No Known Problems Paternal Aunt     No Known Problems Maternal Cousin Female     No Known Problems Maternal Cousin Male     No Known Problems Paternal Cousin Female     No Known Problems Paternal Cousin Male     Colon Cancer Son 37    Glaucoma Neg     Macular degeneration Neg     Breast Cancer Neg     Ovarian Cancer Neg     DCIS Neg     LCIS Neg     BRCA gene + Neg     Ashkenazi Muslim Descent Neg       Social History:   Social History Socioeconomic History    Marital status:     Number of children: 4   Tobacco Use    Smoking status: Never    Smokeless tobacco: Never   Vaping Use    Vaping Use: Never used   Substance and Sexual Activity    Alcohol use: No    Drug use: No    Sexual activity: Yes     Partners: Male   Other Topics Concern    Caffeine Concern Yes     Comment: Coffee, tea - 2 cups per day     Reaction to local anesthetic No        Objective:   Physical Exam  Vitals and nursing note reviewed. Constitutional:       Appearance: She is well-developed. HENT:      Head: Normocephalic and atraumatic. Right Ear: External ear normal.      Left Ear: External ear normal.      Nose: Nose normal.   Eyes:      Conjunctiva/sclera: Conjunctivae normal.      Pupils: Pupils are equal, round, and reactive to light. Cardiovascular:      Rate and Rhythm: Normal rate and regular rhythm. Heart sounds: Normal heart sounds. Pulmonary:      Effort: Pulmonary effort is normal.      Breath sounds: Wheezing present. Comments: Some scattered wheezing,   Abdominal:      General: Bowel sounds are normal.      Palpations: Abdomen is soft. Genitourinary:     Vagina: Normal.   Musculoskeletal:         General: Normal range of motion. Cervical back: Normal range of motion and neck supple. Skin:     General: Skin is warm and dry. Neurological:      Mental Status: She is alert and oriented to person, place, and time. Deep Tendon Reflexes: Reflexes are normal and symmetric. Psychiatric:         Behavior: Behavior normal.         Thought Content:  Thought content normal.         Judgment: Judgment normal.         Assessment & Plan:   Acute cough  (primary encounter diagnosis)rapid test for covid is negative, will get cxr and give cough med   Fever, unspecified fever cause- as need tylenol  SOB (shortness of breath)- with cough mostly, will cover with ab and get cxr  Wheezing- as above will add short course steroid, will follow cxr  Any worsening  symptoms or not feeling better will  need to go to er    Orders Placed This Encounter      POC COVID19 BinaxNOW Antigen      Meds This Visit:  Requested Prescriptions     Signed Prescriptions Disp Refills    azithromycin (ZITHROMAX Z-THOR) 250 MG Oral Tab 6 tablet 0     Sig: Take 2 tablets (500 mg total) by mouth daily for 1 day, THEN 1 tablet (250 mg total) daily for 4 days. benzonatate 100 MG Oral Cap 20 capsule 0     Sig: Take 1 capsule (100 mg total) by mouth 3 (three) times daily as needed for cough. predniSONE 5 MG Oral Tab 6 tablet 0     Sig: Take 1 tablet (5 mg total) by mouth 2 (two) times daily for 3 days.        Imaging & Referrals:  None

## 2023-10-09 ENCOUNTER — ANTI-COAG VISIT (OUTPATIENT)
Dept: ANTICOAGULATION | Facility: CLINIC | Age: 73
End: 2023-10-09

## 2023-10-09 DIAGNOSIS — Z51.81 MONITORING FOR LONG-TERM ANTICOAGULANT USE: ICD-10-CM

## 2023-10-09 DIAGNOSIS — I82.532 CHRONIC DEEP VEIN THROMBOSIS OF LEFT POPLITEAL VEIN (HCC): ICD-10-CM

## 2023-10-09 DIAGNOSIS — D68.61 ANTIPHOSPHOLIPID SYNDROME (HCC): Primary | ICD-10-CM

## 2023-10-09 DIAGNOSIS — Z79.01 LONG TERM (CURRENT) USE OF ANTICOAGULANTS: ICD-10-CM

## 2023-10-09 DIAGNOSIS — Z79.01 MONITORING FOR LONG-TERM ANTICOAGULANT USE: ICD-10-CM

## 2023-10-09 LAB
INR: 2.3 (ref 0.8–1.2)
TEST STRIP EXPIRATION DATE: ABNORMAL DATE

## 2023-10-09 PROCEDURE — 85610 PROTHROMBIN TIME: CPT | Performed by: FAMILY MEDICINE

## 2023-10-09 PROCEDURE — 93793 ANTICOAG MGMT PT WARFARIN: CPT | Performed by: FAMILY MEDICINE

## 2023-10-09 NOTE — PROGRESS NOTES
Face-to-Face  / INR 2.3,  therapeutic. (Goal 2.5 )    Etiology: Language line offered. Pt left before contacting. No changes have been made. Recheck INR 5 weeks. Pt reports: Any missed doses: No   Medications changes: No    Spoke to: Raghavendra Ramirez, who verbalized understanding and agreement.     Plan per protocol: 7.5 mg every Tue, Thu; 5 mg all other days

## 2023-11-01 RX ORDER — METOPROLOL TARTRATE 100 MG/1
TABLET ORAL
Qty: 90 TABLET | Refills: 0 | Status: SHIPPED | OUTPATIENT
Start: 2023-11-01

## 2023-11-01 RX ORDER — SPIRONOLACTONE 25 MG/1
TABLET ORAL
Qty: 90 TABLET | Refills: 0 | Status: SHIPPED | OUTPATIENT
Start: 2023-11-01

## 2023-11-03 ENCOUNTER — TELEPHONE (OUTPATIENT)
Facility: CLINIC | Age: 73
End: 2023-11-03

## 2023-11-03 DIAGNOSIS — Z86.010 HISTORY OF COLON POLYPS: ICD-10-CM

## 2023-11-03 DIAGNOSIS — Z12.11 COLON CANCER SCREENING: Primary | ICD-10-CM

## 2023-11-03 NOTE — TELEPHONE ENCOUNTER
[facility] Endoscopy Report        Preoperative Diagnosis:  - colon cancer screening  - family history of colon cancer        Postoperative Diagnosis:  - colon polyps x 4  - small internal hemorrhoids        Procedure:    Colonoscopy         Surgeon:  Sanju Best M.D. Anesthesia:  MAC sedation, see anesthesia records      Technique:  After informed consent, the patient was placed in the left lateral recumbent position. Digital rectal examination revealed no palpable intraluminal abnormalities. An Olympus variable stiffness 190 series HD colonoscope was inserted into the rectum and advanced under direct vision by following the lumen to the cecum. The colon was examined upon withdrawal in the left lateral position. The procedures were well tolerated without immediate complication. Findings:  The preparation of the colon was good. The terminal ileum was examined for 3 cm and visually normal.  The ileocecal valve was well preserved. The visualized colonic mucosa from the cecum to the anal verge was normal with an intact vascular pattern. Colon polyps x4 removed as follows;  -Transverse x2, these were both diminutive removed by cold forceps technique.  -Descending x1, diminutive polyp removed by cold forceps technique.  -Sigmoid x1, sessile approximately 4 to 5 mm in size removed by cold snare technique. All polypectomy sites inspected and found to be free of bleeding and specimens retrieved and sent for analysis. Small internal hemorrhoids noted on retroflexed view. Impression:  - colon polyps x 4  - small internal hemorrhoids     Recommendations:  - Post polypectomy instructions given  - Repeat colonoscopy in 3- 5 years  - Symptomatic treatment of hemorrhoids              Sam lFoyd. Emma Senior MD  8/12/2020  4:21 PM    Broadus Ahumada, MD  8/13/2020  7:56 PM CDT       I wanted to get back to you with your colonoscopy results.   You had 4 colon polyps removed which were benign. I would advise a repeat colonoscopy in 3 years to make sure no new polyps are forming. You also have internal hemorrhoids. Please stay on a high fiber diet and call with any questions. Specimen to Pathology, Tissue: [id number]  Order: [id number]  Collected 8/12/2020  3:59 PM       Status: Final result       Visible to patient: Yes (seen)       Dx: Family history of colon cancer    2 Result Notes       1 Patient Communication       1 Follow-up Encounter        Component  Ref Range & Units      Case Report     Surgical Pathology                                Case: [id number]                                     Authorizing Provider:  Robin Lester MD       Collected:           08/12/2020 03:59 PM             Ordering Location[facility]     [facility]          Received:            08/13/2020 06:23 AM                                    Endoscopy Lab Suites                                                           Pathologist:           Oanh Silva MD                                                           Specimens:   A) - Colon descending, Polyp x 1                                                                      B) - Colon transverse, Polyps x 2                                                                      C) - Sigmoid colon, Polyp x 1                                                                  Final Diagnosis:        A. Descending colon polyp:   Tubular adenoma. B. Transverse colon polyp:   Tubular adenoma. C. Sigmoid colon polyp:   Tubular adenoma. Electronically signed by Oanh Silva MD on 8/13/2020 at  1:01 PM        Clinical Information      Z80.0 Family History Of Colon Cancer. Gross Description      Specimen A is labeled \"Taylor Means, descending colon polyp x1\" received in formalin. The specimen consists of one fragment of pink-tan soft tissue measuring 0.3 cm in greatest dimension. Submitted entirely in cassette A1. Specimen B is labeled \"Taylor Debbie Kathy, transverse colon polyps x2\" received in formalin. The specimen consists of multiple fragments of tan soft tissue admixed with debris measuring in aggregate 1.7 x 0.7 x 0.2 cm. Submitted entirely in cassette B1. Specimen C is labeled \"Taylor Debbie Kathy, sigmoid colon polyp x1\" received in formalin. The specimen consists of one fragment of pink-tan soft tissue measuring 0.6 cm in greatest dimension.  Submitted entirely in cassette C1. (jq)      Rigo Diaz M.D./mtt             Interpretation     Benign     Electronically signed by Amador Bonds MD on 8/13/2020 at  1:01 PM     [address] [facility])

## 2023-11-03 NOTE — TELEPHONE ENCOUNTER
Aida    Patient called to schedule 3 year recall colonoscopy. Please provide orders if ok to schedule directly. Thank you    Last Procedure, Date, MD:  Colonoscopy Dr. Pascale Davison 8/12/2020  Last Diagnosis:  colon polyps x4, small internal hemorrhoids  Recalled (mth/yrs): 3 years  Sedation Used Previously:  MAC  Last Prep Used (if known):  Colyte  Quality Of Prep (if known): good  Anticoagulants:warfarin  Diabetic Med's (PO/Injectables): glipizide, lantus  Weight loss Med's: no  Iron/Herbal/Multivitamin Supplements (RX/OTC): Omega-3  Marijuana/Vaping/CBD: no  Height & Weight: 5'9\" 34.84  BMI: 34.84  Hx of Cardiac/CVA Issues/(MI/Stroke): no  Devices Pacemaker/Defibrillator/Stents: no  Respiratory Issues/Oxygen Use/KENYON/COPD: no (when has the flu has a lot of phlegm and needs antibiotics)  Issues w/ Anesthesia: no    Symptoms (Y/N): no  Symptoms Details: n/a    Special Comments/Notes:  Gilberto Murrieta ID # [id number] used for call    Please advise on orders and prep. Thank you!

## 2023-11-06 RX ORDER — POLYETHYLENE GLYCOL 3350, SODIUM CHLORIDE, SODIUM BICARBONATE, POTASSIUM CHLORIDE 420; 11.2; 5.72; 1.48 G/4L; G/4L; G/4L; G/4L
POWDER, FOR SOLUTION ORAL
Qty: 1 EACH | Refills: 0 | Status: SHIPPED | OUTPATIENT
Start: 2023-11-06

## 2023-11-06 NOTE — TELEPHONE ENCOUNTER
Schedulers:  Please contact patient to schedule procedure per below orders from [facility].     Please route back to RN pool once scheduled so we may obtain medication adjustment orders     Thank you

## 2023-11-06 NOTE — TELEPHONE ENCOUNTER
Please schedule with Dr. Chinedu Benson or Dr. Pooja Vaca  Reason: crc screening, hx of polyps  Prep: trilyte  Sedation: MAC  Medications:     Nursing: Please contact Dr. Johnny Johnson for recommendations on insulin. Hold jardiance 4 days prior. Hold glipizide day before. Please contact Dr. Johnny Johnson for recommendations on warfarin prior to procedure.      Cee Marcos PA-C

## 2023-11-13 ENCOUNTER — ANTI-COAG VISIT (OUTPATIENT)
Dept: ANTICOAGULATION | Facility: CLINIC | Age: 73
End: 2023-11-13

## 2023-11-13 DIAGNOSIS — Z79.01 MONITORING FOR LONG-TERM ANTICOAGULANT USE: ICD-10-CM

## 2023-11-13 DIAGNOSIS — Z51.81 MONITORING FOR LONG-TERM ANTICOAGULANT USE: ICD-10-CM

## 2023-11-13 DIAGNOSIS — D68.61 ANTIPHOSPHOLIPID SYNDROME (HCC): Primary | ICD-10-CM

## 2023-11-13 DIAGNOSIS — I82.532 CHRONIC DEEP VEIN THROMBOSIS OF LEFT POPLITEAL VEIN (HCC): ICD-10-CM

## 2023-11-13 DIAGNOSIS — Z79.01 LONG TERM (CURRENT) USE OF ANTICOAGULANTS: ICD-10-CM

## 2023-11-13 LAB
INR: 2.7 (ref 2–3)
TEST STRIP EXPIRATION DATE: NORMAL DATE

## 2023-11-13 PROCEDURE — 93793 ANTICOAG MGMT PT WARFARIN: CPT | Performed by: FAMILY MEDICINE

## 2023-11-13 NOTE — PROGRESS NOTES
Face-to-Face  / INR 2.7,  therapeutic. (Goal 2.5 )    Etiology: stable > 6 months on this dose. Recheck INR 5 weeks due to holiday. Pt reports: Any missed doses: No   Medications changes: No    Hessie Parody & friend verbalized understanding and agreement.     WARFARIN Plan per protocol: 7.5 mg every Tue, Thu; 5 mg all other days

## 2023-11-15 NOTE — TELEPHONE ENCOUNTER
[facility]       Per  Please contact Dr. Manasa Vega for recommendations on insulin. Hold jardiance 4 days prior. Hold glipizide day before. Please contact Dr. Manasa Vega for recommendations on warfarin prior to procedure.

## 2023-11-15 NOTE — TELEPHONE ENCOUNTER
Scheduled for:  Colonoscopy [address]   Provider Name:    Date:  Friday, 06/14/2024  Location:  [facility] [place]  Sedation: Mac   Time:  815am (pt is aware to arrive at 715am      Prep:  trilyte  Meds/Allergies Reconciled?:  yes    Diagnosis with codes:  crc screening,Z12.11 hx of polyps Z86.010  Was patient informed to call insurance with codes (Y/N): yes      Referral sent?:  Referral was sent at the time of electronic surgical scheduling. [facility] or [facility]  notified?:  I sent an electronic request to Endo Scheduling and received a confirmation today. Medication Orders:   Please contact Dr. Glenn Valenzuela for recommendations on insulin. Hold jardiance 4 days prior. Hold glipizide day before. Please contact Dr. Glenn Valenzuela for recommendations on warfarin prior to procedure. Misc Orders:  none     Further instructions given by staff:  I discussed the prep instructions with the patient which patient son Caesar Brunner verbally understood and is aware that I will send the instructions today. via [employer]

## 2023-11-15 NOTE — TELEPHONE ENCOUNTER
Encounter flagged for follow up closer to date of procedure since she is not scheduled until 6/14/23.

## 2023-12-01 RX ORDER — MINOXIDIL 2.5 MG/1
5 TABLET ORAL 2 TIMES DAILY
Qty: 360 TABLET | Refills: 3 | Status: SHIPPED | OUTPATIENT
Start: 2023-12-01

## 2023-12-01 NOTE — TELEPHONE ENCOUNTER
BP and GFR out of range for protocol. Please advise on refill request.    Requested Prescriptions     Pending Prescriptions Disp Refills    MINOXIDIL 2.5 MG Oral Tab [Pharmacy Med Name: Minoxidil 2.5 Mg Tab Parp] 360 tablet 0     Sig: Take 2 tablets  by mouth 2  times daily. Recent Visits  Date Type Provider Dept   10/02/23 Office Visit Mason Barnett MD Echnd-Internal Med   08/14/23 Office Visit Perfecto Goldmann., MD [id number] Med   03/23/23 Office Visit Perfecto Goldmann., MD [id number] Med   12/13/22 Office Visit Perfecto Goldmann., MD [id number] Med   08/18/22 Office Visit Perfecto Goldmann., MD [id number] Med   Showing recent visits within past 540 days with a meds authorizing provider and meeting all other requirements  Future Appointments  Date Type Provider Dept   02/15/24 Appointment Perfecto Goldmann., MD [id number] Med   Showing future appointments within next 150 days with a meds authorizing provider and meeting all other requirements     Requested Prescriptions   Pending Prescriptions Disp Refills    MINOXIDIL 2.5 MG Oral Tab [Pharmacy Med Name: Minoxidil 2.5 Mg Tab Parp] 360 tablet 0     Sig: Take 2 tablets  by mouth 2  times daily.        Hypertensive Medications Protocol Failed - 11/30/2023  4:47 PM        Failed - Last BP reading less than 140/90     BP Readings from Last 1 Encounters:   10/02/23 (!) 161/53               Failed - EGFRCR or GFRNAA > 50     GFR Evaluation  EGFRCR: 41 , resulted on 8/3/2023          Passed - In person appointment in the past 12 or next 3 months     Recent Outpatient Visits              2 months ago Acute cough    [address], Marli Stinson MD    Office Visit    3 months ago Controlled type 2 diabetes mellitus without complication, without long-term current use of insulin [facility])    [address], [address] [facility],[address], Edna Calix MD    Office Visit    4 months ago Stage 3b chronic kidney disease [facility])    [facility], [address] [facility], Radames Becker MD    Office Visit    6 months ago Blood pressure check    [facility], [address], [place]    Nurse Only    6 months ago Blood pressure check    [address] [place],[address], [address], [place]    Nurse Only          Future Appointments         Provider Department Appt Notes    In 2 weeks Ananda Albert RN [facility], [place]     In 2 months Eddi Bonilla MD [facility], [place] [address] [facility] 6 months    In 2 months Garth Becerra MD [facility], [address], [place] 6 month fu    In 6 months [facility], PROCEDURE [facility], [address], [place] colon w/mac @[facility]               Passed - CMP or BMP in past 6 months     Recent Results (from the past 4392 hour(s))   Basic Metabolic Panel (8)    Collection Time: 08/03/23  8:16 AM   Result Value Ref Range    Glucose 102 (H) 70 - 99 mg/dL    Sodium 141 136 - 145 mmol/L    Potassium 5.0 3.5 - 5.1 mmol/L    Chloride 107 98 - 112 mmol/L    CO2 29.0 21.0 - 32.0 mmol/L    Anion Gap 5 0 - 18 mmol/L    BUN 26 (H) 7 - 18 mg/dL    Creatinine 1.38 (H) 0.55 - 1.02 mg/dL    BUN/CREA Ratio 18.8 10.0 - 20.0    Calcium, Total 9.0 8.5 - 10.1 mg/dL    Calculated Osmolality 297 (H) 275 - 295 mOsm/kg    eGFR-Cr 41 (L) >=60 mL/min/1.73m2    Patient Fasting for BMP? Yes      *Note: Due to a large number of results and/or encounters for the requested time period, some results have not been displayed. A complete set of results can be found in Results Review.                Passed - In person appointment or virtual visit in the past 6 months     Recent Outpatient Visits              2 months ago Acute cough    [address], Nitin Beltre MD    Office Visit    3 months ago Controlled type 2 diabetes mellitus without complication, without long-term current use of insulin ([facility] [address]    Chinedu Madrid MD    Office Visit    4 months ago Stage 3b chronic kidney disease [facility])    [facility], [address] [facility], Chema Becker MD    Office Visit    6 months ago Blood pressure check    [facility], [address], [place]    Nurse Only    6 months ago Blood pressure check    [facility], [address], [place]    Nurse Only          Future Appointments         Provider Department Appt Notes    In 2 weeks Derryl Schaumann, RN [facility], [address] [facility]     In 2 months MD Kenia Santiago, [address] [facility], [place] 6 months    In 2 months MD Rajesh Cruz [place] 6 month fu    In 6 months [facility], PROCEDURE [facility], [address], [place] colon w/mac @[facility]                   Future Appointments         Provider Department Appt Notes    In 2 weeks Derryl Schaumann, RN [facility], [facility]     In 2 months MD Kenia Santiago, [address] [facility], [place] 6 months    In 2 months MD Rajesh Cruz [place] 6 month fu    In 6 months [address], [address], [place] colon w/mac @[facility]           Recent Outpatient Visits              2 months ago Acute cough    Amari Arias MD    Office Visit    3 months ago Controlled type 2 diabetes mellitus without complication, without long-term current use of insulin [facility])    [facility] Arias, [address], Chinedu Dave MD    Office Visit    4 months ago Stage 3b chronic kidney disease [facility])    [facility], [place] Keisha Dey MD    Office Visit    6 months ago Blood pressure check    [address] [facility], [place]    Nurse Only    6 months ago Blood pressure check    [address], [address] [facility],[address], [place] [id number] Only

## 2023-12-04 ENCOUNTER — PATIENT MESSAGE (OUTPATIENT)
Dept: INTERNAL MEDICINE CLINIC | Facility: CLINIC | Age: 73
End: 2023-12-04

## 2023-12-05 RX ORDER — VALSARTAN 160 MG/1
TABLET ORAL
Qty: 180 TABLET | Refills: 1 | Status: SHIPPED | OUTPATIENT
Start: 2023-12-05

## 2023-12-05 NOTE — TELEPHONE ENCOUNTER
Please review. Protocol failed / Has no protocol.      Requested Prescriptions   Pending Prescriptions Disp Refills    VALSARTAN 160 MG Oral Tab [Pharmacy Med Name: Valsartan 160 Mg Tab [facility]] 180 tablet 0     Sig: TAKE ONE TABLET BY MOUTH TWICE DAILY       Hypertensive Medications Protocol Failed - 12/4/2023  1:33 AM        Failed - Last BP reading less than 140/90     BP Readings from Last 1 Encounters:   10/02/23 (!) 161/53               Failed - EGFRCR or GFRNAA > 50     GFR Evaluation  EGFRCR: 41 , resulted on 8/3/2023          Passed - In person appointment in the past 12 or next 3 months     Recent Outpatient Visits              2 months ago Acute cough    [address], Matias Rowley MD    Office Visit    3 months ago Controlled type 2 diabetes mellitus without complication, without long-term current use of insulin ([facility]    [address], [address] [facility],[address], Davi Cunha MD    Office Visit    4 months ago Stage 3b chronic kidney disease [facility], [address] [place] [address], Anna Smith MD    Office Visit    6 months ago Blood pressure check    [address], [address], [place]    Nurse Only    7 months ago Blood pressure check    [address], [address] [facility],[address], [address] Only          Future Appointments         Provider Department Appt Notes    In 1 week Hilario Reyna, BYRON [address], Ash[place]     In 1 month Jenny Tomlinson MD [address], [address] [facility] 6 months    In 2 months MD Domitila Bradley [place] 6 month fu    In 6 months [address] [facility] colon w/mac @[facility]               Passed - CMP or BMP in past 6 months     Recent Results (from the past 4392 hour(s))   Basic Metabolic Panel (8)    Collection Time: 08/03/23  8:16 AM   Result Value Ref Range    Glucose 102 (H) 70 - 99 mg/dL    Sodium 141 136 - 145 mmol/L    Potassium 5.0 3.5 - 5.1 mmol/L    Chloride 107 98 - 112 mmol/L    CO2 29.0 21.0 - 32.0 mmol/L    Anion Gap 5 0 - 18 mmol/L    BUN 26 (H) 7 - 18 mg/dL    Creatinine 1.38 (H) 0.55 - 1.02 mg/dL    BUN/CREA Ratio 18.8 10.0 - 20.0    Calcium, Total 9.0 8.5 - 10.1 mg/dL    Calculated Osmolality 297 (H) 275 - 295 mOsm/kg    eGFR-Cr 41 (L) >=60 mL/min/1.73m2    Patient Fasting for BMP? Yes      *Note: Due to a large number of results and/or encounters for the requested time period, some results have not been displayed. A complete set of results can be found in Results Review.                Passed - In person appointment or virtual visit in the past 6 months     Recent Outpatient Visits              2 months ago Acute cough    [facility], Ruddy Dubin, MD    Office Visit    3 months ago Controlled type 2 diabetes mellitus without complication, without long-term current use of insulin ([facility]    [employer], [address], Brenden Gallardo MD    Office Visit    4 months ago Stage 3b chronic kidney disease [facility])    [facility], [address] [place] [address], Nohemy Becker MD    Office Visit    6 months ago Blood pressure check    [employer], [address], [place]    Nurse Only    7 months ago Blood pressure check    [employer], [address] [facility] [address], [address] Only          Future Appointments         Provider Department Appt Notes    In 1 week Karina Roberts RN [facility]     In 1 month MD [facility], [address] [place] [address], [place] 6 months    In 2 months MD Celine Haney, [place] 6 month fu    In 6 months [address] [place] [address] [address] Conor Lopez [facility] w/mac @[facility]                  Future Appointments         Provider Department Appt Notes    In [facility] week [facility]     In 1 month [facility], [address] [facility] 6 months    In 2 months Pepito Hameed MD [address] [place] [address] 6 month fu    In 6 months [address], [address] [facility],[address], [facility] w/mac @[facility]           Recent Outpatient Visits              2 months ago Acute cough    [address], Michaela Colby MD    Office Visit    3 months ago Controlled type 2 diabetes mellitus without complication, without long-term current use of insulin [facility])    [address] [facility], Gilbert Barnett MD    Office Visit    4 months ago Stage 3b chronic kidney disease [facility])    [address], Emil Pereira MD    Office Visit    6 months ago Blood pressure check    [address], [address], [place]    Nurse Only    7 months ago Blood pressure check    [address], [address], [facility] 14 Only

## 2023-12-06 NOTE — TELEPHONE ENCOUNTER
From: Caroline West  To: Joanna Lai  Sent: 12/4/2023 8:40 PM CST  Subject: [facility] Patient Assistance Program    Dr. Kristy Lai,    We are applying for my mom to receive the [employer] Patient Assistance Program for the Cosentyx medication. Attached is the application that is required. We have a 12/15 deadline.     Rachael Chilel  Daughter  [phone]

## 2023-12-14 ENCOUNTER — PATIENT MESSAGE (OUTPATIENT)
Facility: CLINIC | Age: 73
End: 2023-12-14

## 2023-12-15 ENCOUNTER — TELEPHONE (OUTPATIENT)
Dept: RHEUMATOLOGY | Facility: CLINIC | Age: 73
End: 2023-12-15

## 2023-12-15 NOTE — TELEPHONE ENCOUNTER
Anival Carry; spoke with her . Informed him Magali not on ABIG[employer] list so I will reach out to Ian. Phoned Ian. Reviewed that we received call from [facility] regarding assisting with [employer] paperwork for Cosentyx injections for patient. Informed her that after reviewing chart and consulting with Dr Raghav Pina, paperwork cannot be completed at this time by Dr Raghav Pina because she has not seen patient since 11/17/21. Ian said I can call patient with  assistance to schedule an appointment.

## 2023-12-15 NOTE — TELEPHONE ENCOUNTER
Phoned pt; verified name and . Reviewed with pt that she has not seen Dr Shaka Winters since 21. At this time, Dr Shaka Winters cannot complete [employer] paperwork for Cosentyx until patient comes in for office visit. Pt acknowledged it has been a long time since she has seen Dr Shaka Winters. Assisted to schedule follow-up for 24 at 3pm. Offered wait list which patient excepted.

## 2023-12-15 NOTE — TELEPHONE ENCOUNTER
See telephone encounter dated 12/15/23 regarding content of this [employer] message and resolution.

## 2023-12-15 NOTE — TELEPHONE ENCOUNTER
Jono Reid called said she needs to submit an application for patient assistance. Original message sent to [address] [place] [address] 107 below. Will be faxing over today. \"We are applying for my mom to receive the [employer] Patient Assistance Program for the Cosentyx medication. \"

## 2023-12-18 ENCOUNTER — ANTI-COAG VISIT (OUTPATIENT)
Dept: ANTICOAGULATION | Facility: CLINIC | Age: 73
End: 2023-12-18

## 2023-12-18 DIAGNOSIS — D68.61 ANTIPHOSPHOLIPID SYNDROME (HCC): Primary | ICD-10-CM

## 2023-12-18 DIAGNOSIS — Z79.01 LONG TERM (CURRENT) USE OF ANTICOAGULANTS: ICD-10-CM

## 2023-12-18 DIAGNOSIS — Z51.81 MONITORING FOR LONG-TERM ANTICOAGULANT USE: ICD-10-CM

## 2023-12-18 DIAGNOSIS — I82.532 CHRONIC DEEP VEIN THROMBOSIS OF LEFT POPLITEAL VEIN (HCC): ICD-10-CM

## 2023-12-18 DIAGNOSIS — Z79.01 MONITORING FOR LONG-TERM ANTICOAGULANT USE: ICD-10-CM

## 2023-12-18 LAB
INR: 2.1 (ref 2–3)
TEST STRIP EXPIRATION DATE: ABNORMAL DATE

## 2023-12-18 PROCEDURE — 85610 PROTHROMBIN TIME: CPT | Performed by: FAMILY MEDICINE

## 2023-12-18 PROCEDURE — 93793 ANTICOAG MGMT PT WARFARIN: CPT | Performed by: FAMILY MEDICINE

## 2023-12-18 NOTE — PROGRESS NOTES
Face-to-Face  / INR 2.1,  therapeutic. (Goal 2.5 ) TTR 71.5 %     Etiology: very stable. PLAN: recheck in 6 weeks is okay. No changes. Recheck INR 6 weeks. Pt reports: Any missed doses: No   Medications changes: No    Julia verbalized understanding and agreement.     WARFARIN Plan per protocol: 7.5 mg every Tue, Thu; 5 mg all other days

## 2023-12-19 ENCOUNTER — TELEPHONE (OUTPATIENT)
Dept: RHEUMATOLOGY | Facility: CLINIC | Age: 73
End: 2023-12-19

## 2023-12-19 NOTE — TELEPHONE ENCOUNTER
Call placed to [facility] in response to a one page fax the office received on 12/18/23 which listed Magali's phone number as the contact. The fax asks that the attached forms be completed for patient assistance application; however, only cover page received. Also wanted to be sure Magali aware Dr Georgie Espinoza cannot complete form at this time; see encounter dated 12/15/23 titled Forms Completion.

## 2023-12-20 ENCOUNTER — TELEPHONE (OUTPATIENT)
Dept: INTERNAL MEDICINE CLINIC | Facility: CLINIC | Age: 73
End: 2023-12-20

## 2023-12-20 RX ORDER — SECUKINUMAB 150 MG/ML
300 INJECTION SUBCUTANEOUS
Qty: 6 EACH | Refills: 3 | Status: CANCELLED | OUTPATIENT
Start: 2023-12-20

## 2023-12-20 NOTE — TELEPHONE ENCOUNTER
Patients daughter is requesting to get a new prescription sent to the  - patient assistance program through [employer]. Daughter states that the patient has run out of the Cosentyx medication. Daughter states that the Patient assistance form is in the [address] [place] [address] [address], and patient Rheumatologist is not able to complete it at this time, until the patient has been seen for follow up, since she missed her last appointment and patient next scheduled appointment with Dr Cordell Gonsalez is in April 2024, which is first available for follow up.

## 2023-12-20 NOTE — TELEPHONE ENCOUNTER
Phoned Yesica Pantoja. Confirmed that we received a fax but only one page; no application was attached. Went on to explain to Yesica Pantoja what I informed pt of on 12/15/23 that Dr Smitha Sage cannot complete forms due to pt not being seen since 11/17/21. Informed Yesica Pantoja pt is on a wait list for appt with Dr Smitha Sage sooner than the scheduled date of 4/2/2024. Suggested pt or family periodically check in MyChart as well to search for sooner appt. Yesica Pantoja asked if Dr Lexie Pineda would prescribe Cosentyx; I informed her she would have to ask Dr Lexie Pineda that question.

## 2023-12-20 NOTE — TELEPHONE ENCOUNTER
She has an upcoming appointment she seen Dr. Yordan Lopez in the past.  Those medicines have to be ordered by the rheumatologist.  She can call Dr. Cheryl Smith office and ask for refill.   Those forms also have to be refilled

## 2023-12-20 NOTE — TELEPHONE ENCOUNTER
[place] Language Екатерина López ID #[id number]. Spoke to patient (verified Name and ) and relayed Dr. Brianna Maria message below. She will follow up with Rheumatology. Patient verbalized understanding and had no further questions at this time.      Rheumatology Staff kindly assist.

## 2023-12-20 NOTE — TELEPHONE ENCOUNTER
Spoke to daughter Moreno Jeffries. She states Dr. Maribell Hoffman refuses to refill prescription since it has been over a year. Appointment is not until April with Dr. Maribell Hoffman. Patient's daughter requesting that Dr. Evangelista Sale refill until patient can see Dr. Maribell Hoffman. See Iunika message from 12/4/23 with form to [employer]. Form needs to be completed with prescription and [facility] will send medication to her home. Medication Detail    Medication Quantity Refills Start End   Secukinumab, 300 MG Dose, (COSENTYX SENSOREADY, 300 MG,) 150 MG/ML Subcutaneous Solution Auto-injector 6 each 3 12/8/2022 --   Sig:   Inject 300 mg into the skin every 28 days. Route:   Subcutaneous     Class:    Fax

## 2023-12-20 NOTE — TELEPHONE ENCOUNTER
Patient is asking if PCP may refill the following prescription COSENTYX as the provider who refills it is not available until appointment scheduled 4/2024 with Dr. Michaela Hillman, Rheumatologisst and on the wait list.    Patient mentions these are injections that she get through a mail order pharmacy but cannot confirm which pharmacy. Patient mentions she missed her injection of 12/15/2023 and was advised she needed a new refill from Dr. Ozzy Vu, the prescriber. Please advise.

## 2024-01-02 RX ORDER — SIMVASTATIN 20 MG
20 TABLET ORAL
Qty: 24 TABLET | Refills: 3 | Status: SHIPPED | OUTPATIENT
Start: 2024-01-02

## 2024-01-02 NOTE — TELEPHONE ENCOUNTER
Protocol Failed/ No Protocol    Requested Prescriptions   Pending Prescriptions Disp Refills    SIMVASTATIN 20 MG Oral Tab [Pharmacy Med Name: Simvastatin 20 Mg Tab [facility]] 24 tablet 0     Sig: Take 1 tablet by mouth twice a week.       Cholesterol Medication Protocol Failed - 12/31/2023 11:11 AM        Failed - ALT in past 12 months        Failed - Last ALT < 80     Lab Results   Component Value Date    ALT 23 03/20/2022             Passed - LDL in past 12 months        Passed - Last LDL < 130     Lab Results   Component Value Date    LDL 78 07/22/2023             Passed - In person appointment or virtual visit in the past 12 mos or appointment in next 3 mos     Recent Outpatient Visits              3 months ago [facility]    Office Visit    4 months ago Controlled type 2 diabetes mellitus without complication, without long-term current use of insulin ([facility])    [facility] Sara Valenzuela MD    Office Visit    5 months ago Stage 3b chronic kidney disease (HCC)    [facility] Rajat Hurd MD    Office Visit    7 months ago Blood pressure check    [facility]    Nurse Only    7 months ago Blood pressure check    [facility]    Nurse Only          Future Appointments         Provider Department Appt Notes    In 4 weeks Halle Nassar RN [facility]     In 1 month Rajat Hurd MD [facility] 6 months    In 1 month Sara Valenzuela MD [facility] 6 month fu    In 3 months Kelsey Lopez MD [facility] Last office visit 11/17/21    In 5 months MALIHA ARAIZA [facility]  [facility] colon w/mac @[facility]                    Future Appointments         Provider Department Appt Notes    In 4 weeks Halle Nassar, BYRON [facility]     In 1 month Rajat Hurd MD [facility] 6 months    In 1 month Sara Valenzuela MD [facility] 6 month fu    In 3 months Kelsey Lopez MD [facility] Last office visit 11/17/21    In 5 months MALIHA ARAIZA [facility] colon w/mac @[facility]          Recent Outpatient Visits              3 months ago Acute cough    [facility] [place] Bernadine Sales Agron B, MD    Office Visit    4 months ago Controlled type 2 diabetes mellitus without complication, without long-term current use of insulin (HCC)    [facility] Sara Valenzuela MD    Office Visit    5 months ago Stage 3b chronic kidney disease (HCC)    [facility] Rajat Hurd MD    Office Visit    7 months ago Blood pressure check    [facility]    Nurse Only    7 months ago Blood pressure check    [facility]    Nurse Only

## 2024-01-03 RX ORDER — METOPROLOL TARTRATE 100 MG/1
TABLET ORAL
Qty: 90 TABLET | Refills: 0 | Status: SHIPPED | OUTPATIENT
Start: 2024-01-03

## 2024-01-22 ENCOUNTER — PATIENT MESSAGE (OUTPATIENT)
Dept: INTERNAL MEDICINE CLINIC | Facility: CLINIC | Age: 74
End: 2024-01-22

## 2024-01-23 ENCOUNTER — PATIENT MESSAGE (OUTPATIENT)
Dept: NEPHROLOGY | Facility: CLINIC | Age: 74
End: 2024-01-23

## 2024-01-23 DIAGNOSIS — E11.9 CONTROLLED TYPE 2 DIABETES MELLITUS WITHOUT COMPLICATION, WITHOUT LONG-TERM CURRENT USE OF INSULIN (HCC): Primary | ICD-10-CM

## 2024-01-23 DIAGNOSIS — N18.32 STAGE 3B CHRONIC KIDNEY DISEASE (HCC): ICD-10-CM

## 2024-01-23 NOTE — TELEPHONE ENCOUNTER
From: Julia Hernandez  To: Rajat Hurd  Sent: 1/23/2024 11:02 AM CST  Subject: Confirming blood work orders     Julia son has a follow up appointment with Dr Quiroz on 02/01/24. Just wanted to confirm that the blood work order has been submitted to the [facility].    Thank you,  Bebeto

## 2024-01-24 RX ORDER — BLOOD-GLUCOSE METER
1 EACH MISCELLANEOUS 2 TIMES DAILY
Qty: 1 KIT | Refills: 0 | Status: SHIPPED | OUTPATIENT
Start: 2024-01-24

## 2024-01-24 RX ORDER — BLOOD SUGAR DIAGNOSTIC
1 STRIP MISCELLANEOUS 2 TIMES DAILY
Qty: 200 STRIP | Refills: 3 | Status: SHIPPED | OUTPATIENT
Start: 2024-01-24

## 2024-01-24 RX ORDER — LANCETS 30 GAUGE
1 EACH MISCELLANEOUS 2 TIMES DAILY
Qty: 200 EACH | Refills: 3 | Status: SHIPPED | OUTPATIENT
Start: 2024-01-24

## 2024-01-24 NOTE — TELEPHONE ENCOUNTER
Refill passed per [facility] protocol.  Please see message below for upcoming appointment.    Future Appointments   Date Time Provider Department Center                 2/15/2024  1:00 PM Sara Valenzuela MD [id number] [place] [address]                   Please see patients MyChart Message   Julia Vazquez Ross Montelongo  P Em Triage Support (supporting Sara Valenzuela MD)2 days ago       Dr Valenzuela,     Mom’s glucose monitor broke over the weekend. Is it possible to request a replacement? Additionally, we’d like to inquire on how we can get her the arm instant glucose reader. Do you have any recommendations on next steps to explore this for mom?     Regards,  Magali   Requested Prescriptions   Pending Prescriptions Disp Refills    Blood Glucose Monitoring Suppl (ONETOUCH VERIO) w/Device Does not apply Kit 1 kit 0     Si kit  in the morning and 1 kit before bedtime.       Diabetic Supplies Protocol Passed - 2024  6:18 PM        Passed - In person appointment or virtual visit in the past 12 mos or appointment in next 3 mos     Recent Outpatient Visits              3 months ago Acute cough    [facility] Jovan Craft MD    Office Visit    5 months ago Controlled type 2 diabetes mellitus without complication, without long-term current use of insulin ([facility])    [facility] Sara Valenzuela MD    Office Visit    6 months ago Stage 3b chronic kidney disease (HCC)    [facility] Rajat Hurd MD    Office Visit    8 months ago Blood pressure check    [facility]    Nurse Only    8 months ago Blood pressure check    [facility]    Nurse Only          Future Appointments         Provider Department Appt Notes    In 5 days Halle [facility]ar, [facility]      In 1 week Rajat Hurd MD [facility] 6 months    In 3 weeks Sara Valenzuela MD [facility] 6 month fu ** DUE FOR AHA PX**    In 2 months Kelsey Lopez MD [facility] Last office visit 21    In 4 months MALIHA ARAIZA [facility] colon w/mac @[facility]                 Glucose Blood (ONETOUCH VERIO) In Vitro Strip 200 strip 3     Si strip by Other route in the morning and 1 strip before bedtime.       Diabetic Supplies Protocol Passed - 2024  6:18 PM        Passed - In person appointment or virtual visit in the past 12 mos or appointment in next 3 mos     Recent Outpatient Visits              3 months ago [facility]    [facility]    5 months ago Controlled type 2 diabetes mellitus without complication, without long-term current use of insulin ([facility])    [facility] Sara Valenzuela MD    Office Visit    6 months ago Stage 3b chronic kidney disease ([facility])    [facility] Rajat Hurd MD    Office Visit    8 months ago Blood pressure check    [facility]    Nurse Only    8 months ago Blood pressure check    [facility]    Nurse Only          Future Appointments         Provider Department Appt Notes    In 5 days Halle Nassar RN [facility]     In 1 week Rajat Hurd MD [facility] 6 months    In 3 weeks Sara Valenzuela MD [facility] 6 month fu ** DUE FOR AHA PX**    In 2 months Kelsey Lopez MD [facility]  [place] Last office visit 21    In 4 months MALIHA ARAIZA [facility] colon w/mac @[facility]                 OneTouch Delica Lancets 30G Does not apply Misc 200 each 3     Si Lancet by Finger stick route in the morning and 1 Lancet before bedtime.       Diabetic Supplies Protocol Passed - 2024  6:18 PM        Passed - In person appointment or virtual visit in the past 12 mos or appointment in next 3 mos     Recent Outpatient Visits              3 months ago Acute cough    [facility] [place] [facility] Agron B, MD    Office Visit    5 months ago Controlled type 2 diabetes mellitus without complication, without long-term current use of insulin ([facility])    [facility] Sara Valenzuela MD    Office Visit    6 months ago Stage 3b chronic kidney disease ([facility])    [facility] Rajat Hurd MD    Office Visit    8 months ago Blood pressure check    [facility]    Nurse Only    8 months ago Blood pressure check    [facility]    Nurse Only          Future Appointments         Provider Department Appt Notes    In 5 days Halle [facility]ar RN [facility]     In 1 week Rajat Hurd MD [facility] 6 months    In 3 weeks Sara Valenzuela MD [facility] 6 month fu ** DUE FOR AHA PX**    In 2 months Kelsey Lopez MD [facility] Last office visit 21    In 4 months MALIHA ARAIZA [facility] colon w/mac @[facility]                  Recent Outpatient Visits              3 months ago Acute cough    [facility] [place] Hen[facility] Agron  B, MD    Office Visit    5 months ago Controlled type 2 diabetes mellitus without complication, without long-term current use of insulin ([facility])    [facility] Sara Valenzuela MD    Office Visit    6 months ago Stage 3b chronic kidney disease ([facility])    [facility] Rajat Hurd MD    Office Visit    8 months ago Blood pressure check    [facility]    Nurse Only    8 months ago Blood pressure check    [facility]    Nurse Only          Future Appointments         Provider Department Appt Notes    In 5 days Halle Nassar RN [facility]     In 1 week Rajat Hurd MD [facility] 6 months    In 3 weeks Sara Valenzuela MD [facility] 6 month fu ** DUE FOR AHA PX**    In 2 months Kelsey Lopez MD [facility] Last office visit 11/17/21    In 4 months MALIHA ARAIZA [facility] colon w/mac @[facility]

## 2024-01-24 NOTE — TELEPHONE ENCOUNTER
Pended prescriptions.    LAST VISIT 8/14/23;  RTC 4 months for FU.        Future Appointments   Date Time Provider Department Center   1/29/2024  9:30 AM Halle Nassar, BYRON ECSCHCOUM [facility] [place]   2/1/2024  5:20 PM Rajat Hurd MD [id number] [facility]   2/15/2024  1:00 PM Sara Valenzuela MD [id number] [facility] [place] [address]   4/2/2024  3:00 PM Kelsey Lopez MD ECCFHRHEUM [facility]   6/14/2024  8:15 AM TEODORA, PROCEDURE [id number] None         From: Julia Hernandez  To: Sara Valenzuela  Sent: 1/22/2024  6:16 PM CST  Subject: Glucose Monitor    Dr Valenzuela,    Mom’s glucose monitor broke over the weekend. Is it possible to request a replacement? Additionally, we’d like to inquire on how we can get her the arm instant glucose reader. Do you have any recommendations on next steps to explore this for mom?    Regards,  Magali

## 2024-01-26 ENCOUNTER — LAB ENCOUNTER (OUTPATIENT)
Dept: LAB | Facility: HOSPITAL | Age: 74
End: 2024-01-26
Attending: INTERNAL MEDICINE
Payer: MEDICARE

## 2024-01-26 LAB
ALBUMIN SERPL-MCNC: 4.2 G/DL (ref 3.2–4.8)
ALBUMIN/GLOB SERPL: 1.2 {RATIO} (ref 1–2)
ALP LIVER SERPL-CCNC: 71 U/L
ALT SERPL-CCNC: 15 U/L
ANION GAP SERPL CALC-SCNC: 7 MMOL/L (ref 0–18)
AST SERPL-CCNC: 23 U/L (ref ?–34)
BASOPHILS # BLD AUTO: 0.06 X10(3) UL (ref 0–0.2)
BASOPHILS NFR BLD AUTO: 0.8 %
BILIRUB SERPL-MCNC: 0.5 MG/DL (ref 0.2–1.1)
BILIRUB UR QL: NEGATIVE
BUN BLD-MCNC: 30 MG/DL (ref 9–23)
BUN/CREAT SERPL: 22.9 (ref 10–20)
CALCIUM BLD-MCNC: 9.3 MG/DL (ref 8.7–10.4)
CHLORIDE SERPL-SCNC: 103 MMOL/L (ref 98–112)
CLARITY UR: CLEAR
CO2 SERPL-SCNC: 29 MMOL/L (ref 21–32)
CREAT BLD-MCNC: 1.31 MG/DL
CREAT UR-SCNC: 151.2 MG/DL
DEPRECATED RDW RBC AUTO: 43.8 FL (ref 35.1–46.3)
EGFRCR SERPLBLD CKD-EPI 2021: 43 ML/MIN/1.73M2 (ref 60–?)
EOSINOPHIL # BLD AUTO: 0.51 X10(3) UL (ref 0–0.7)
EOSINOPHIL NFR BLD AUTO: 6.6 %
ERYTHROCYTE [DISTWIDTH] IN BLOOD BY AUTOMATED COUNT: 12.8 % (ref 11–15)
EST. AVERAGE GLUCOSE BLD GHB EST-MCNC: 137 MG/DL (ref 68–126)
FASTING STATUS PATIENT QL REPORTED: NO
GLOBULIN PLAS-MCNC: 3.4 G/DL (ref 2.8–4.4)
GLUCOSE BLD-MCNC: 97 MG/DL (ref 70–99)
GLUCOSE UR-MCNC: NORMAL MG/DL
HBA1C MFR BLD: 6.4 % (ref ?–5.7)
HCT VFR BLD AUTO: 38.6 %
HGB BLD-MCNC: 12.7 G/DL
IMM GRANULOCYTES # BLD AUTO: 0.02 X10(3) UL (ref 0–1)
IMM GRANULOCYTES NFR BLD: 0.3 %
KETONES UR-MCNC: NEGATIVE MG/DL
LEUKOCYTE ESTERASE UR QL STRIP.AUTO: 25
LYMPHOCYTES # BLD AUTO: 2.54 X10(3) UL (ref 1–4)
LYMPHOCYTES NFR BLD AUTO: 32.9 %
MCH RBC QN AUTO: 30.7 PG (ref 26–34)
MCHC RBC AUTO-ENTMCNC: 32.9 G/DL (ref 31–37)
MCV RBC AUTO: 93.2 FL
MICROALBUMIN UR-MCNC: 1.2 MG/DL
MICROALBUMIN/CREAT 24H UR-RTO: 7.9 UG/MG (ref ?–30)
MONOCYTES # BLD AUTO: 0.69 X10(3) UL (ref 0.1–1)
MONOCYTES NFR BLD AUTO: 8.9 %
NEUTROPHILS # BLD AUTO: 3.9 X10 (3) UL (ref 1.5–7.7)
NEUTROPHILS # BLD AUTO: 3.9 X10(3) UL (ref 1.5–7.7)
NEUTROPHILS NFR BLD AUTO: 50.5 %
NITRITE UR QL STRIP.AUTO: NEGATIVE
OSMOLALITY SERPL CALC.SUM OF ELEC: 294 MOSM/KG (ref 275–295)
PH UR: 5.5 [PH] (ref 5–8)
PLATELET # BLD AUTO: 277 10(3)UL (ref 150–450)
POTASSIUM SERPL-SCNC: 4.5 MMOL/L (ref 3.5–5.1)
PROT SERPL-MCNC: 7.6 G/DL (ref 5.7–8.2)
PROT UR-MCNC: NEGATIVE MG/DL
RBC # BLD AUTO: 4.14 X10(6)UL
SODIUM SERPL-SCNC: 139 MMOL/L (ref 136–145)
SP GR UR STRIP: 1.02 (ref 1–1.03)
UROBILINOGEN UR STRIP-ACNC: NORMAL
WBC # BLD AUTO: 7.7 X10(3) UL (ref 4–11)

## 2024-01-26 PROCEDURE — 85025 COMPLETE CBC W/AUTO DIFF WBC: CPT | Performed by: INTERNAL MEDICINE

## 2024-01-26 PROCEDURE — 3044F HG A1C LEVEL LT 7.0%: CPT | Performed by: INTERNAL MEDICINE

## 2024-01-26 PROCEDURE — 36415 COLL VENOUS BLD VENIPUNCTURE: CPT | Performed by: INTERNAL MEDICINE

## 2024-01-26 PROCEDURE — 83036 HEMOGLOBIN GLYCOSYLATED A1C: CPT | Performed by: INTERNAL MEDICINE

## 2024-01-26 PROCEDURE — 3061F NEG MICROALBUMINURIA REV: CPT | Performed by: INTERNAL MEDICINE

## 2024-01-26 PROCEDURE — 81001 URINALYSIS AUTO W/SCOPE: CPT | Performed by: INTERNAL MEDICINE

## 2024-01-26 PROCEDURE — 82043 UR ALBUMIN QUANTITATIVE: CPT | Performed by: INTERNAL MEDICINE

## 2024-01-26 PROCEDURE — 80053 COMPREHEN METABOLIC PANEL: CPT | Performed by: INTERNAL MEDICINE

## 2024-01-26 PROCEDURE — 82570 ASSAY OF URINE CREATININE: CPT | Performed by: INTERNAL MEDICINE

## 2024-01-29 ENCOUNTER — ANTI-COAG VISIT (OUTPATIENT)
Dept: ANTICOAGULATION | Facility: CLINIC | Age: 74
End: 2024-01-29

## 2024-01-29 DIAGNOSIS — Z79.01 MONITORING FOR LONG-TERM ANTICOAGULANT USE: ICD-10-CM

## 2024-01-29 DIAGNOSIS — Z51.81 MONITORING FOR LONG-TERM ANTICOAGULANT USE: ICD-10-CM

## 2024-01-29 DIAGNOSIS — Z79.01 LONG TERM (CURRENT) USE OF ANTICOAGULANTS: ICD-10-CM

## 2024-01-29 DIAGNOSIS — I82.532 CHRONIC DEEP VEIN THROMBOSIS OF LEFT POPLITEAL VEIN (HCC): ICD-10-CM

## 2024-01-29 DIAGNOSIS — D68.61 ANTIPHOSPHOLIPID SYNDROME (HCC): Primary | ICD-10-CM

## 2024-01-29 LAB
INR: 2.1 (ref 2–3)
TEST STRIP EXPIRATION DATE: ABNORMAL DATE

## 2024-01-29 PROCEDURE — 85610 PROTHROMBIN TIME: CPT | Performed by: FAMILY MEDICINE

## 2024-01-29 PROCEDURE — 93793 ANTICOAG MGMT PT WARFARIN: CPT | Performed by: FAMILY MEDICINE

## 2024-01-29 RX ORDER — SPIRONOLACTONE 25 MG/1
TABLET ORAL
Qty: 90 TABLET | Refills: 0 | Status: SHIPPED | OUTPATIENT
Start: 2024-01-29

## 2024-01-29 NOTE — PROGRESS NOTES
Face-to-Face  / INR 2.1,  therapeutic. (Goal 2.5 ) TTR 72.1 %     Etiology: stable, no changes.    PLAN: continue the confirmed dose. As listed.    Recheck INR 4 weeks.    Pt reports:    No sign of unusual bruising or bleeding.  Any missed doses: No   Medications changes: No     Julia & spouse  verbalized understanding and agreement.    WARFARIN Plan per protocol: 7.5 mg every Tue, Thu; 5 mg all other days

## 2024-02-01 ENCOUNTER — OFFICE VISIT (OUTPATIENT)
Dept: NEPHROLOGY | Facility: CLINIC | Age: 74
End: 2024-02-01

## 2024-02-01 ENCOUNTER — TELEPHONE (OUTPATIENT)
Dept: INTERNAL MEDICINE CLINIC | Facility: CLINIC | Age: 74
End: 2024-02-01

## 2024-02-01 VITALS
HEIGHT: 69 IN | HEART RATE: 58 BPM | DIASTOLIC BLOOD PRESSURE: 58 MMHG | BODY MASS INDEX: 35.4 KG/M2 | SYSTOLIC BLOOD PRESSURE: 131 MMHG | WEIGHT: 239 LBS

## 2024-02-01 DIAGNOSIS — I10 ESSENTIAL HYPERTENSION WITH GOAL BLOOD PRESSURE LESS THAN 130/80: ICD-10-CM

## 2024-02-01 DIAGNOSIS — E11.9 CONTROLLED TYPE 2 DIABETES MELLITUS WITHOUT COMPLICATION, WITHOUT LONG-TERM CURRENT USE OF INSULIN (HCC): Primary | ICD-10-CM

## 2024-02-01 DIAGNOSIS — E11.9 CONTROLLED TYPE 2 DIABETES MELLITUS WITHOUT COMPLICATION, WITHOUT LONG-TERM CURRENT USE OF INSULIN (HCC): Chronic | ICD-10-CM

## 2024-02-01 DIAGNOSIS — E66.01 MORBID (SEVERE) OBESITY DUE TO EXCESS CALORIES (HCC): Primary | ICD-10-CM

## 2024-02-01 DIAGNOSIS — N18.32 TYPE 2 DIABETES MELLITUS WITH STAGE 3B CHRONIC KIDNEY DISEASE, WITH LONG-TERM CURRENT USE OF INSULIN (HCC): ICD-10-CM

## 2024-02-01 DIAGNOSIS — N18.31 STAGE 3A CHRONIC KIDNEY DISEASE (HCC): ICD-10-CM

## 2024-02-01 DIAGNOSIS — D68.61 ANTIPHOSPHOLIPID SYNDROME (HCC): ICD-10-CM

## 2024-02-01 DIAGNOSIS — N18.32 STAGE 3B CHRONIC KIDNEY DISEASE (HCC): Chronic | ICD-10-CM

## 2024-02-01 DIAGNOSIS — I82.532 CHRONIC DEEP VEIN THROMBOSIS OF LEFT POPLITEAL VEIN (HCC): ICD-10-CM

## 2024-02-01 DIAGNOSIS — J40 BRONCHITIS: ICD-10-CM

## 2024-02-01 DIAGNOSIS — E11.22 TYPE 2 DIABETES MELLITUS WITH STAGE 3B CHRONIC KIDNEY DISEASE, WITH LONG-TERM CURRENT USE OF INSULIN (HCC): ICD-10-CM

## 2024-02-01 DIAGNOSIS — Z79.4 TYPE 2 DIABETES MELLITUS WITH STAGE 3B CHRONIC KIDNEY DISEASE, WITH LONG-TERM CURRENT USE OF INSULIN (HCC): ICD-10-CM

## 2024-02-01 PROCEDURE — 3075F SYST BP GE 130 - 139MM HG: CPT | Performed by: INTERNAL MEDICINE

## 2024-02-01 PROCEDURE — 1159F MED LIST DOCD IN RCRD: CPT | Performed by: INTERNAL MEDICINE

## 2024-02-01 PROCEDURE — 3078F DIAST BP <80 MM HG: CPT | Performed by: INTERNAL MEDICINE

## 2024-02-01 PROCEDURE — 99214 OFFICE O/P EST MOD 30 MIN: CPT | Performed by: INTERNAL MEDICINE

## 2024-02-01 PROCEDURE — 3008F BODY MASS INDEX DOCD: CPT | Performed by: INTERNAL MEDICINE

## 2024-02-01 RX ORDER — LEVOTHYROXINE SODIUM 88 UG/1
88 TABLET ORAL
COMMUNITY

## 2024-02-01 RX ORDER — LEVOTHYROXINE SODIUM 0.1 MG/1
100 TABLET ORAL
COMMUNITY
Start: 2023-11-30

## 2024-02-01 RX ORDER — PREDNISONE 20 MG/1
20 TABLET ORAL 2 TIMES DAILY
Qty: 10 TABLET | Refills: 0 | Status: SHIPPED | OUTPATIENT
Start: 2024-02-01 | End: 2024-02-06

## 2024-02-02 NOTE — PROGRESS NOTES
Progress Note     Julia Hernandez    Is here with her son having some wheezing denies any wet sputum to take antibiotics about 3 months ago history of antiphospholipid syndrome hypothyroid hypertension diabetes currently on Toprol 100 morning 50 at night Aldactone Synthroid Coumadin indapamide minoxidil valsartan glipizide insulin Lexapro cannot take SGLT due to cost      HISTORY:  Past Medical History:   Diagnosis Date    Appendicitis     Cholelithiasis     Deep vein thrombosis (HCC)     L leg     Essential hypertension     Goiter     Multinodular goiter S/P thyroidectomy.     Osteopenia     Vertigo, aural       Past Surgical History:   Procedure Laterality Date    APPENDECTOMY  1993    CATARACT EXTRACTION W/  INTRAOCULAR LENS IMPLANT Right 1988    in [place]    CHOLECYSTECTOMY  2006    COLONOSCOPY N/A 8/12/2020    Procedure: COLONOSCOPY;  Surgeon: José Antonio Fletcher MD;  Location: [facility] ENDOSCOPY    ELECTROCARDIOGRAM, COMPLETE  01/03/2014    SCANNED TO MEDIA TAB - 01/03/2014    FRACTURE SURGERY Left     LEFT FOOT SURGERY WITH HARDWARE    KNEE SCOPE,ABRASN ARTHROPLASTY Right 10-4-16    THYROIDECTOMY  08/17/15    TOTAL    TUBAL LIGATION        Social History     Tobacco Use    Smoking status: Never    Smokeless tobacco: Never   Substance Use Topics    Alcohol use: No         Medications (Active prior to today's visit):  Current Outpatient Medications   Medication Sig Dispense Refill    levothyroxine 100 MCG Oral Tab Take 1 tablet (100 mcg total) by mouth before breakfast.      levothyroxine 88 MCG Oral Tab Take 1 tablet (88 mcg total) by mouth before breakfast. Dr. pringle      predniSONE 20 MG Oral Tab Take 1 tablet (20 mg total) by mouth 2 (two) times daily for 5 days. 10 tablet 0    SPIRONOLACTONE 25 MG Oral Tab TAKE ONE TABLET BY MOUTH ONE TIME DAILY 90 tablet 0    metoprolol tartrate 100 MG Oral Tab Take 1 tablet by mouth in the morning and 1/2 tablet in the afternoon 90 tablet 0    simvastatin 20 MG  Oral Tab Take 1 tablet (20 mg total) by mouth twice a week. 24 tablet 3    valsartan 160 MG Oral Tab TAKE ONE TABLET BY MOUTH TWICE DAILY 180 tablet 1    minoxidil 2.5 MG Oral Tab Take 2 tablets (5 mg total) by mouth 2 (two) times daily. 360 tablet 3    albuterol (2.5 MG/3ML) 0.083% Inhalation Nebu Soln Take 3 mL (2.5 mg total) by nebulization every 6 (six) hours as needed for Wheezing. 50 each 0    insulin glargine (LANTUS SOLOSTAR) 100 UNIT/ML Subcutaneous Solution Pen-injector INJECT 45 UNITS INTHE MORNING AND 7 UNITS IN THE EVENING 30 mL 2    indapamide 2.5 MG Oral Tab Take 1 tablet (2.5 mg total) by mouth daily and qpm. 180 tablet 2    warfarin 5 MG Oral Tab 7.5 mg every Tue, Thu; 5 mg all other days  0    dicyclomine 10 MG Oral Cap Take 1 capsule (10 mg total) by mouth 2 (two) times daily as needed. 60 capsule 1    glipiZIDE 10 MG Oral Tab Take 1 tablet (10 mg total) by mouth 2 (two) times daily before meals. 180 tablet 1    escitalopram 5 MG Oral Tab Take 1 tablet (5 mg total) by mouth nightly. 90 tablet 3    Glucose Blood (ACCU-CHEK GUIDE) In Vitro Strip CHECK TWICE DAILY 200 strip 3    Insulin Pen Needle (CAREFINE PEN NEEDLES) 32G X 4 MM Does not apply Misc Injects qhs. 100 each 6    Insulin Pen Needle (BD PEN NEEDLE ORIGINAL U/F) 29G X 12.7MM Does not apply Misc Dx. E11.9. Checks q12hrs. BD ultrafine MINI. 100 each 6    Alcohol Swabs Does not apply Pads Code: E11.9.  Checks twice daily. (Patient taking differently: Code: E11.9.  Checks twice daily.) 100 each 11    ACCU-CHEK FASTCLIX LANCETS Does not apply Misc CHECK EVERY MORNING AND EVENING 204 each 11    Omega-3-acid Ethyl Esters (LOVAZA) 1 G Oral Cap Take 1 capsule (1 g total) by mouth 2 (two) times daily with meals.      PEG 3350-KCl-Na Bicarb-NaCl (TRILYTE) 420 g Oral Recon Soln Take prep as directed by gastro office. May substitute with Trilyte/generic equivalent if needed. (Patient not taking: Reported on 2/1/2024) 1 each 0    Secukinumab, 300 MG  Dose, (COSENTYX SENSOREADY, 300 MG,) 150 MG/ML Subcutaneous Solution Auto-injector Inject 300 mg into the skin every 28 days. (Patient not taking: Reported on 2/1/2024) 6 each 3       Allergies:  Allergies   Allergen Reactions    Amlodipine SHORTNESS OF BREATH     Chest pressure.  Patient does not recall having any allergies    Metformin DIARRHEA     Patient told me she has no allergies    Metoprolol DIARRHEA     Patient told me that she has no allergies, better now-diarrhea was from eggs per patient.         ROS:     Constitutional:  Negative for decreased activity, fever, irritability and lethargy  ENMT:  Negative for ear drainage, hearing loss and nasal drainage  Eyes:  Negative for eye discharge and vision loss  Cardiovascular:  Negative for chest pain, sob  Respiratory: Positive for wheezing  Gastrointestinal:  Negative for abdominal pain, constipation  Genitourinary:  Negative for dysuria and hematuria  Endocrine:  Negative for abnormal sleep patterns  Hema/Lymph:  Negative for easy bleeding and easy bruising  Integumentary:  Negative for pruritus and rash  Musculoskeletal:  Negative for bone/joint symptoms  Neurological:  Negative for gait disturbance  Psychiatric:  Negative for inappropriate interaction and psychiatric symptoms      Vitals:    02/01/24 1716   BP: 131/58   Pulse: 58       PHYSICAL EXAM:   Constitutional: appears well hydrated alert and responsive   Head/Face: normocephalic  Eyes/Vision: normal extraocular motion is intact  Nose/Mouth/Throat:mucous membranes are moist   Neck/Thyroid: neck is supple without adenopathy  Lymphatic: no abnormal cervical, supraclavicular adenopathy is noted  Respiratory: Bilateral wheezing expiratory  Cardiovascular: regular rate and rhythm   Abdomen: soft, non-tender, non-distended, BS normal  Skin/Hair: no unusual rashes present, no abnormal bruising noted  Back/Spine: no abnormalities noted  Musculoskeletal: no deformities  Extremities: no edema  Neurological:   Grossly normal       ASSESSMENT/PLAN:   Assessment   Encounter Diagnoses   Name Primary?    Morbid (severe) obesity due to excess calories (HCC) Yes    Chronic deep vein thrombosis of left popliteal vein (HCC)     Antiphospholipid syndrome (HCC)     Stage 3b chronic kidney disease (HCC)     Controlled type 2 diabetes mellitus without complication, without long-term current use of insulin (HCC)     Essential hypertension with goal blood pressure less than 130/80     Type 2 diabetes mellitus with stage 3b chronic kidney disease, with long-term current use of insulin (HCC)     Bronchitis        1 diabetes on insulin doing well no proteinuria has A1c of 6.4  #2 antiphospholipid syndrome on Coumadin INR appropriate  #3 hyperlipidemia on statin  #4 hypertension controlled  #5 CKD 3 stable creatinine 1.3 no urinary protein   #6 bronchitis prednisone 20 twice daily for 5 days may benefit from a steroid inhaler possibly defer to Dr. Valenzuela continue her albuterol inhaler reviewed all this with her son see me back in 6 months call for labs prior  Orders This Visit:  No orders of the defined types were placed in this encounter.      Meds This Visit:  Requested Prescriptions     Signed Prescriptions Disp Refills    predniSONE 20 MG Oral Tab 10 tablet 0     Sig: Take 1 tablet (20 mg total) by mouth 2 (two) times daily for 5 days.       Imaging & Referrals:  None     2/1/2024  Rajat Hurd MD

## 2024-02-02 NOTE — PATIENT INSTRUCTIONS
Good job on your labs Julia    Take prednisone 20 mg twice a day for 5 days watch sugars you may need to increase your insulin for a few days    Send me a message in [employer] in about a week to let me know how you are doing I will be gone from February 5 until February 11    See me back in 6 months please call for labs before visit    Good to see you both

## 2024-02-12 ENCOUNTER — LAB ENCOUNTER (OUTPATIENT)
Dept: LAB | Facility: HOSPITAL | Age: 74
End: 2024-02-12
Attending: INTERNAL MEDICINE
Payer: MEDICARE

## 2024-02-12 DIAGNOSIS — R19.7 DIARRHEA, UNSPECIFIED TYPE: ICD-10-CM

## 2024-02-12 LAB
CRYPTOSP AG STL QL IA: NEGATIVE
G LAMBLIA AG STL QL IA: NEGATIVE

## 2024-02-12 PROCEDURE — 87329 GIARDIA AG IA: CPT

## 2024-02-12 PROCEDURE — 89055 LEUKOCYTE ASSESSMENT FECAL: CPT | Performed by: INTERNAL MEDICINE

## 2024-02-12 PROCEDURE — 87272 CRYPTOSPORIDIUM AG IF: CPT

## 2024-02-14 PROBLEM — I82.532: Status: RESOLVED | Noted: 2020-07-26 | Resolved: 2024-02-14

## 2024-02-14 PROBLEM — J40 BRONCHITIS: Status: RESOLVED | Noted: 2024-02-01 | Resolved: 2024-02-14

## 2024-02-14 PROBLEM — M85.89 OSTEOPENIA OF MULTIPLE SITES: Status: ACTIVE | Noted: 2017-06-29

## 2024-02-14 PROBLEM — E87.5 SERUM POTASSIUM ELEVATED: Status: RESOLVED | Noted: 2021-02-24 | Resolved: 2024-02-14

## 2024-02-15 ENCOUNTER — OFFICE VISIT (OUTPATIENT)
Dept: INTERNAL MEDICINE CLINIC | Facility: CLINIC | Age: 74
End: 2024-02-15

## 2024-02-15 VITALS
DIASTOLIC BLOOD PRESSURE: 64 MMHG | BODY MASS INDEX: 35.75 KG/M2 | SYSTOLIC BLOOD PRESSURE: 126 MMHG | OXYGEN SATURATION: 100 % | TEMPERATURE: 99 F | HEART RATE: 67 BPM | HEIGHT: 69 IN | WEIGHT: 241.38 LBS

## 2024-02-15 DIAGNOSIS — Z00.00 ENCOUNTER FOR ANNUAL HEALTH EXAMINATION: ICD-10-CM

## 2024-02-15 DIAGNOSIS — Z71.85 VACCINE COUNSELING: ICD-10-CM

## 2024-02-15 DIAGNOSIS — R31.21 ASYMPTOMATIC MICROSCOPIC HEMATURIA: ICD-10-CM

## 2024-02-15 DIAGNOSIS — Z98.890 HISTORY OF PTERYGIUM EXCISION: ICD-10-CM

## 2024-02-15 DIAGNOSIS — I10 ESSENTIAL HYPERTENSION WITH GOAL BLOOD PRESSURE LESS THAN 130/80: ICD-10-CM

## 2024-02-15 DIAGNOSIS — I67.2 INTRACRANIAL ATHEROSCLEROSIS: ICD-10-CM

## 2024-02-15 DIAGNOSIS — R31.29 MICROSCOPIC HEMATURIA: ICD-10-CM

## 2024-02-15 DIAGNOSIS — E03.9 HYPOTHYROIDISM (ACQUIRED): Chronic | ICD-10-CM

## 2024-02-15 DIAGNOSIS — H17.9 CORNEAL SCAR: ICD-10-CM

## 2024-02-15 DIAGNOSIS — H04.123 DRY EYE SYNDROME OF BILATERAL LACRIMAL GLANDS: ICD-10-CM

## 2024-02-15 DIAGNOSIS — M17.11 PRIMARY OSTEOARTHRITIS OF RIGHT KNEE: Chronic | ICD-10-CM

## 2024-02-15 DIAGNOSIS — D68.61 ANTIPHOSPHOLIPID SYNDROME (HCC): Primary | ICD-10-CM

## 2024-02-15 DIAGNOSIS — R05.2 SUBACUTE COUGH: ICD-10-CM

## 2024-02-15 DIAGNOSIS — H25.13 AGE-RELATED NUCLEAR CATARACT OF BOTH EYES: ICD-10-CM

## 2024-02-15 DIAGNOSIS — Z12.31 ENCOUNTER FOR SCREENING MAMMOGRAM FOR MALIGNANT NEOPLASM OF BREAST: ICD-10-CM

## 2024-02-15 DIAGNOSIS — H02.88B MEIBOMIAN GLAND DYSFUNCTION (MGD), BILATERAL, BOTH UPPER AND LOWER LIDS: ICD-10-CM

## 2024-02-15 DIAGNOSIS — E66.01 MORBID (SEVERE) OBESITY DUE TO EXCESS CALORIES (HCC): ICD-10-CM

## 2024-02-15 DIAGNOSIS — H02.88A MEIBOMIAN GLAND DYSFUNCTION (MGD), BILATERAL, BOTH UPPER AND LOWER LIDS: ICD-10-CM

## 2024-02-15 DIAGNOSIS — M85.80 OSTEOPENIA, UNSPECIFIED LOCATION: ICD-10-CM

## 2024-02-15 DIAGNOSIS — I83.90 VARICOSE VEINS: ICD-10-CM

## 2024-02-15 DIAGNOSIS — E04.9 GOITER: ICD-10-CM

## 2024-02-15 DIAGNOSIS — L40.9 PSORIASIS: ICD-10-CM

## 2024-02-15 DIAGNOSIS — L40.50 PSORIATIC ARTHRITIS (HCC): ICD-10-CM

## 2024-02-15 DIAGNOSIS — H25.013 CORTICAL AGE-RELATED CATARACT OF BOTH EYES: ICD-10-CM

## 2024-02-15 DIAGNOSIS — N18.31 STAGE 3A CHRONIC KIDNEY DISEASE (HCC): Chronic | ICD-10-CM

## 2024-02-15 DIAGNOSIS — H43.812 PVD (POSTERIOR VITREOUS DETACHMENT), LEFT: ICD-10-CM

## 2024-02-15 DIAGNOSIS — G62.9 NEUROPATHY: ICD-10-CM

## 2024-02-15 DIAGNOSIS — E53.8 B12 DEFICIENCY: ICD-10-CM

## 2024-02-15 DIAGNOSIS — Z12.11 COLON CANCER SCREENING: ICD-10-CM

## 2024-02-15 DIAGNOSIS — N39.41 URGE INCONTINENCE OF URINE: ICD-10-CM

## 2024-02-15 DIAGNOSIS — E11.22 TYPE 2 DIABETES MELLITUS WITH CHRONIC KIDNEY DISEASE, WITHOUT LONG-TERM CURRENT USE OF INSULIN, UNSPECIFIED CKD STAGE (HCC): ICD-10-CM

## 2024-02-15 DIAGNOSIS — R93.89 ABNORMAL COMPUTED TOMOGRAPHY ANGIOGRAPHY (CTA): ICD-10-CM

## 2024-02-15 LAB
APPEARANCE: CLEAR
BILIRUBIN: NEGATIVE
GLUCOSE (URINE DIPSTICK): NEGATIVE MG/DL
KETONES (URINE DIPSTICK): NEGATIVE MG/DL
MULTISTIX LOT#: ABNORMAL NUMERIC
NITRITE, URINE: NEGATIVE
PH, URINE: 6.5 (ref 4.5–8)
PROTEIN (URINE DIPSTICK): NEGATIVE MG/DL
SPECIFIC GRAVITY: 1.01 (ref 1–1.03)
URINE-COLOR: YELLOW
UROBILINOGEN,SEMI-QN: 0.2 MG/DL (ref 0–1.9)

## 2024-02-15 PROCEDURE — 84439 ASSAY OF FREE THYROXINE: CPT | Performed by: INTERNAL MEDICINE

## 2024-02-15 PROCEDURE — 84443 ASSAY THYROID STIM HORMONE: CPT | Performed by: INTERNAL MEDICINE

## 2024-02-15 RX ORDER — FLUTICASONE PROPIONATE AND SALMETEROL 250; 50 UG/1; UG/1
1 POWDER RESPIRATORY (INHALATION) EVERY 12 HOURS SCHEDULED
Qty: 1 EACH | Refills: 1 | Status: SHIPPED | OUTPATIENT
Start: 2024-02-15

## 2024-02-15 NOTE — PATIENT INSTRUCTIONS
ASSESSMENT/PLAN:     Encounter Diagnoses   Name Primary?    Antiphospholipid syndrome (HCC) Stable.    Yes    Age-related nuclear cataract of both eyes Stable.        Abnormal computed tomography angiography (CTA)       B12 deficiency Stable.        Encounter for screening mammogram for malignant neoplasm of breast Check mammogram. Continue self breast exam every month.         Stage 3a chronic kidney disease (HCC) No motrin, ibuprofen, advil, alleve, naprosyn  with these medications.         Colon cancer screening  Has scheduled.        Corneal scar Stable.        Cortical age-related cataract of both eyes Stable.        Dry eye syndrome of bilateral lacrimal glands Stable.        Essential hypertension with goal blood pressure less than 130/80 HIgher. Careful with diet and excercise at least 30 minutes 3-4 times a week. Check blood pressures at different times on different days. Can purchase own blood pressure monitor. If not, check at local pharmacy. Bake foods more and grill occasionally. Avoid fried foods. No salt. Use other seasonings.         Goiter Check blood.        History of pterygium excision Stable.        Hypothyroidism (acquired) Check blood.        Intracranial atherosclerosis No sx.        Psoriatic arthritis (HCC)     Psoriasis FU rheum. Slight worsening.        Primary osteoarthritis of right knee Stable.        Osteopenia, unspecified location Check dexa.        Morbid (severe) obesity due to excess calories (HCC)       Microscopic hematuria Check urine.        Meibomian gland dysfunction (MGD), bilateral, both upper and lower lids Stable.        Type 2 diabetes mellitus with chronic kidney disease, without long-term current use of insulin, unspecified CKD stage (HCC) Stable. Careful with diet and excercise at least 30 minutes 3-4 times a week. Check sugars at different times on different dates. Careful with low sugars. Carry something with you and check sugar if can. Can carry carolyne cracker,  etc. Decrease carbohydrates. But also, careful with fruits and natural sugars.One serving a day and no more than 1 handful every day. Check feet  every AM and careful with sores and ulcers on feet bilaterally. Check eyes every year with dilated eye exam.  Check sugars.  2-hour postmeal should be less than 140s.  Pre-meal should be 's.  Both equally affected A1c.  Discussed importance of glycemic control to prevent complications of diabetes  -Discussed complications of diabetes include retinopathy, neuropathy, nephropathy and cardiovascular disease  -Discussed ABCs of DM  -Discussed importance of SBGM  -Discussed importance of low CHO diet, recommend 45gm per meal or 135gm per day  -Normal foot exam execpt  -UTD with optho       Urge incontinence of urine Stable.        Vaccine counseling PCV 20 today.        Encounter for annual health examination Check urine.        Subacute cough Maybe RAD from viral cold. Finished steroids. Use albuterol every 3 hrs. For 2 days and as needed and then, as needed. If using advair, take 1 puff very 12 hrs. And rinse mouth with water and spit out.  Discussed with patient of side effects and use of these medications.  FU pulm. Check CXR,       PVD (posterior vitreous detachment), left   Stable.        Neuropathy Hold meds. til see rhuem.        Varicose veins Try compression stockings.         Orders Placed This Encounter   Procedures    TSH W Reflex To Free T4    Free T4, (Free Thyroxine)    URINALYSIS, AUTO, W/O SCOPE    Prevnar 20 (PCV20) [77838]       Meds This Visit:  Requested Prescriptions     Signed Prescriptions Disp Refills    fluticasone-salmeterol 250-50 MCG/ACT Inhalation Aerosol Powder, Breath Activated 1 each 1     Sig: Inhale 1 puff into the lungs every 12 (twelve) hours.       Imaging & Referrals:  GASTRO - INTERNAL  PULMONARY - INTERNAL  DME - EXTERNAL   PCV20 VACCINE FOR INTRAMUSCULAR USE  XR DEXA BONE DENSITOMETRY (CPT=77080)  [facility] KIRSTIE 2D+3D SCREENING BILAT  (CPT=77067/03493)  US ANKLE BRACHIAL INDEX (CPT=93922)  XR CHEST PA + LAT CHEST (CPT=71046)      RTC in 6 months extended.     Suspensión de los tratamientos de soporte vital  Hay ciertos tratamientos que ayudan a mantener la sinan de personas con enfermedades graves. Sin embargo, a medida que la enfermedad avanza, quizás llegue el momento en que los tratamientos dejen de ser beneficiosos. Entonces debe tomarse la decisión de continuar o suspender estos tratamientos. Esta puede ser kodak decisión difícil de delilah para usted y nasima seres queridos. Sin embargo, sepa que usted no está solo. Lo proveedor de atención médica y el equipo de atención médica lo orientarán en la melvina de decisiones sobre sutratamiento. Además le contestarán todas las preguntas que tenga.     ¿En qué consisten los tratamientos de soporte vital?  Los tratamientos de soporte vital preservan la sinan de kodak persona a quien le ha fallado kodak función vital del cuerpo. Algunos de estos tratamientos son la reanimación cardiopulmonar (RCP) y el uso de máquinas que realizan las funciones del corazón, de los pulmones o de los riñones. También se incluye el uso de sondas para administrar alimentos, líquidos, diogenes y medicamentos al cuerpo. Las transfusiones de diogenes y losantibióticos también son tipos de tratamientos de soporte vital.   ¿Qué sucede si se continúan los tratamientos de soporte vital?  Estos tratamientos pueden ayudar a prolongar la sinan. Sin embargo, no curarán la enfermedad. Si usted está cerca del final de lo sinan, quizás le cueste tolerar los efectos secundarios y los problemas que pueden surgir con estos tratamientos. En gagandeep zac, los tratamientos podrían representar kodak carga demasiado excesiva para lo cuerpo. Pueden causar más daño que beneficios.Además, pueden alterar el proceso natural de morir y prolongar el sufrimiento.   ¿Qué sucede si se suspenden los tratamientos de soporte vital?  Si se suspende el soporte vital, los  tratamientos se centrarán en proporcionar cuidados paliativos. [place] incluye medidas para controlar el dolor y otros síntomas. Estas medidas no tienen la finalidad de curar lo enfermedad ni hacerlo vivir más tiempo. Por el contrario, tienen la finalidad de mejorar lo calidad de sinan jacinta el tiempo que le queda. Si usted está en la etapa final de lo sinan, lo proveedor de atención médica podría derivarlo a un programa de cuidados paliativos. Roz programa vidal cuidados al final de la sinan. Estos cuidados comprenden apoyo emocional, espiritual y social para usted y nasima seresqueridos.   ¿Cómo day la decisión de suspender o no los tratamientos de soporte vital?  Lo proveedor de atención médica y lo equipo de atención médica hablarán con usted sobre los tratamientos específicos que cathy parte de lo plan de cuidados. Si lo desea, puede incluir a los familiares y amigos en estas reuniones. Aquí tiene algunas preguntas que puede hacerse o dirigir a lo equipode atención médica:   ¿Existe alguna probabilidad de que mi enfermedad mejore? ¿O seguirá empeorando?  ¿Cuáles son mis objetivos respecto de la atención médica que me brinden? ¿Quiero prolongar el tiempo que me queda? ¿O quiero limitarme a recibir cuidados que aumenten mi comodidad y controlen los síntomas?  ¿Qué efectos tendrá en mi horace suspender o continuar con estos tratamientos? ¿Mejorarán mi comodidad y calidad de sinan, o causarán más problemas?  Considere nasima propios valores o creencias religiosas. También es convenienteque pida consejos a las personas que comparten nasima valores.   ¿Cómo expreso mi decisión sobre los tratamientos de soporte vital?  Mandy vez que haya tomado lo decisión de continuar o suspender tratamientos específicos, puede informar directamente a lo proveedor de atención médica. Lo ideal es que indique nasima deseos de tratamiento por escrito en instrucciones anticipadas. Se trata de unos formularios legales que hacen referencia a las  decisiones de atención médica. Las leyes sobre instrucciones anticipadas varían de un estado a otro. Consulte a lo proveedor de atención médica sobre los formularios necesarios para garantizar que se cumplan nasima deseos. Algunosformularios comunes son los siguientes:   Un poder notarial para atención médica o formulario de apoderado para decisiones médicas. Roz formulario le permite designar a kodak persona para que tome decisiones de tratamiento en lo nombre cuando usted no pueda hacerlo. Esta persona suele llamarse apoderado para decisiones médicas, apoderado con poder notarial para atención médica, apoderado con poder legal para cuidados de horace o representante.  Un testamento vital. Roz formulario avisa a los demás sobre los tipos de tratamiento que usted desea recibir o rechazar si se enferma o lesiona gravemente hasta el punto de no poder expresar lo voluntad.  Órdenes del médico para tratamientos de soporte vital. Son, en realidad, órdenes del proveedor de atención médica que otros profesionales médicos deben cumplir. El formulario le pertenece a usted, no al proveedor de atención médica ni al hospital. Se trata de formularios legales que puede solicitarle a lo proveedor de atención médica o al hospital para dejar asentados nasima deseos. Estos formularios se conocen por diferentes nombres, según cada estado. Entre los nombres más comunes se encuentran los siguientes:  Órdenes médicas para el tratamiento de soporte vital (MOLST, por nasima siglas en inglés)  Órdenes del médico para el tratamiento de soporte vital (POLST, por nasima siglas en inglés)  Órdenes médicas con respecto al alcance del tratamiento (MOST, por nasima siglas en inglés)  Órdenes del médico con respecto al alcance del tratamiento (POST, por nasima siglas en inglés)  Órdenes médicas transportables sobre preferencias del paciente (TPOPP, por nasima siglas en inglés)     Tenga presente que puede cambiar o revocar las instrucciones anticipadas en cualquier  momento. Acostúmbrese a revisar nasima decisiones cada vez que haya un cambio en lo horace o en nasima objetivos de atención médica. Además, asegúrese de informar a lo apoderado y a nasima seres queridos de cualquier cambio en susdecisiones médicas.   Cómo decidir si suspender o no los tratamientos de soporte vital para un ser querido  Lo ideal es que la decisión de detener el tratamiento se tome con el consentimiento del paciente. Si la persona no es capaz de delilah decisiones y no tiene directivas anticipadas, la decisión recae en el apoderado del paciente u otro adulto. Si usted tiene que delilah la decisión de detener el tratamiento de un ser querido, comience por hablar con el proveedor de atención médica del paciente. [place] los objetivos de los cuidados, así edwin los beneficios y las repercusiones del tratamiento en la horace de lo ser querido. Piense también en la voluntad y los valores de ol ser querido. Si es necesario, pídale consejos a otro miembro del equipo de atención médica, edwin un trabajador social o asesorespiritual.      © 7087-6231 [employer]. Todos los derechos reservados. Esta información no pretende sustituir la atención médica profesional. Sólo sumédico puede diagnosticar y tratar un problema de horace.   Julia Hernandez's SCREENING SCHEDULE   Tests on this list are recommended by your physician but may not be covered, or covered at this frequency, by your insurer.   Please check with your insurance carrier before scheduling to verify coverage.   PREVENTATIVE SERVICES FREQUENCY &  COVERAGE DETAILS LAST COMPLETION DATE   Diabetes Screening    Fasting Blood Sugar /  Glucose    One screening every 12 months if never tested or if previously tested but not diagnosed with pre-diabetes   One screening every 6 months if diagnosed with pre-diabetes Lab Results   Component Value Date    GLU 97 01/26/2024        Cardiovascular Disease Screening    Lipid Panel  Cholesterol  Lipoprotein  (HDL)  Triglycerides Covered every 5 years for all Medicare beneficiaries without apparent signs or symptoms of cardiovascular disease Lab Results   Component Value Date    CHOLEST 147 07/22/2023    HDL 53 07/22/2023    LDL 78 07/22/2023    TRIG 78 07/22/2023         Electrocardiogram (EKG)   Covered if needed at Welcome to Medicare, and non-screening if indicated for medical reasons 07/23/2020      Ultrasound Screening for Abdominal Aortic Aneurysm (AAA) Covered once in a lifetime for one of the following risk factors    Men who are 65-75 years old and have ever smoked    Anyone with a family history -     Colorectal Cancer Screening  Covered for ages 50-85; only need ONE of the following:    Colonoscopy   Covered every 10 years    Covered every 2 years if patient is at high risk or previous colonoscopy was abnormal 08/12/2020    Health Maintenance   Topic Date Due    Colorectal Cancer Screening  08/12/2023       Flexible Sigmoidoscopy   Covered every 4 years -    Fecal Occult Blood Test Covered annually -   Bone Density Screening    Bone density screening    Covered every 2 years after age 65 if diagnosed with risk of osteoporosis or estrogen deficiency.    Covered yearly for long-term glucocorticoid medication use (Steroids) Last Dexa Scan:    XR DEXA BONE DENSITOMETRY (CPT=77080) 11/06/2021      No recommendations at this time   Pap and Pelvic    Pap   Covered every 2 years for women at normal risk; Annually if at high risk -  No recommendations at this time    Chlamydia Annually if high risk -  No recommendations at this time   Screening Mammogram    Mammogram     Recommend annually for all female patients aged 40 and older    One baseline mammogram covered for patients aged 35-39 04/05/2023    Health Maintenance   Topic Date Due    Mammogram  04/05/2024       Immunizations    Influenza Covered once per flu season  Please get every year -  Influenza Vaccine(1) due on 10/01/2023    Pneumococcal Each vaccine  ([id number] & [id number]) covered once after 65 Prevnar 13: 11/08/2016    [id number]: 10/02/2017     No recommendations at this time    Hepatitis B One screening covered for patients with certain risk factors   -  No recommendations at this time    Tetanus Toxoid Not covered by Medicare Part B unless medically necessary (cut with metal); may be covered with your pharmacy prescription benefits -    Tetanus, Diptheria and Pertusis TD and TDaP Not covered by Medicare Part B -  No recommendations at this time    Zoster Not covered by Medicare Part B; may be covered with your pharmacy  prescription benefits 10/04/2014  No recommendations at this time     Diabetes      Hemoglobin A1C Annually; if last result is elevated, may be asked to retest more frequently.    Medicare covers every 3 months Lab Results   Component Value Date     (H) 01/26/2024    A1C 6.4 (H) 01/26/2024       No recommendations at this time    Creat/alb ratio Annually Lab Results   Component Value Date    MICROALBCREA 7.9 01/26/2024       LDL Annually Lab Results   Component Value Date    LDL 78 07/22/2023       Dilated Eye Exam Annually Last Diabetic Eye Exam:  Last Dilated Eye Exam: 05/22/23  Eye Exam shows Diabetic Retinopathy?: No       Annual Monitoring of Persistent Medications (ACE/ARB, digoxin diuretics, anticonvulsants)    Potassium Annually Lab Results   Component Value Date    K 4.5 01/26/2024         Creatinine   Annually Lab Results   Component Value Date    CREATSERUM 1.31 (H) 01/26/2024         BUN Annually Lab Results   Component Value Date    BUN 30 (H) 01/26/2024       Drug Serum Conc Annually No results found for: \"DIGOXIN\", \"DIG\", \"VALP\"

## 2024-02-15 NOTE — PROGRESS NOTES
HPI:    Patient ID: Juila Hernandez is a 73 year old female.    Julia Hernandez is a 73 year old female who presents for a complete physical exam.   Julia Hernandez is a 73 year old female who presents for a Medicare Assessment.     Chief Complaint   Patient presents with    Wellness Visit     Patient states she is here for Medicare Wellness Visit.         Patient here for follow up of Diabetes.  Has been taking medications regularly.    Checks sugars 0 times daily.  Fasting sugars average Not check. No Sx.   Watches diabetic diet, low salt.  Diabetic Flow sheet      2/16/2024     2:18 PM 2/15/2024     2:31 PM 2/15/2024     1:13 PM 2/15/2024     1:04 PM   DIABETIC FLOWSHEET ([facility])   BMI    35.65 kg/m2   Valsartan TAKE ONE TABLET BY MOUTH TWICE DAILY (160 MG TABS) TAKE ONE TABLET BY MOUTH TWICE DAILY (160 MG TABS)  TAKE ONE TABLET BY MOUTH TWICE DAILY (160 MG TABS)  TAKE ONE TABLET BY MOUTH TWICE DAILY (160 MG TABS)    Weight (enc vitals)    241 lb 6.4 oz   BP (enc vitals)  126/64 144/84 142/52      Hypertension  Patient is here for follow up of hypertension. BP at home:   Has been compliant with medications.  Exercise level: somewhat active and has been following low salt diet.  Weight has been up.  Wt Readings from Last 3 Encounters:   02/15/24 241 lb 6.4 oz (109.5 kg)   02/01/24 239 lb (108.4 kg)   10/02/23 236 lb (107 kg)     BP Readings from Last 3 Encounters:   02/15/24 126/64   02/01/24 131/58   10/02/23 (!) 161/53     Labs:   Lab Results   Component Value Date/Time    GLU 97 01/26/2024 08:48 AM     01/26/2024 08:48 AM    K 4.5 01/26/2024 08:48 AM     01/26/2024 08:48 AM    CO2 29.0 01/26/2024 08:48 AM    CREATSERUM 1.31 (H) 01/26/2024 08:48 AM    CA 9.3 01/26/2024 08:48 AM    AST 23 01/26/2024 08:48 AM    ALT 15 01/26/2024 08:48 AM    TSH 0.678 02/15/2024 02:35 PM    T4F 1.6 02/15/2024 02:35 PM        Lab Results   Component Value Date/Time    CHOLEST 147  07/22/2023 10:05 AM    HDL 53 07/22/2023 10:05 AM    TRIG 78 07/22/2023 10:05 AM    LDL 78 07/22/2023 10:05 AM    NONHDLC 94 07/22/2023 10:05 AM          Labs:   Lab Results   Component Value Date/Time    WBC 7.7 01/26/2024 08:48 AM    HGB 12.7 01/26/2024 08:48 AM    .0 01/26/2024 08:48 AM      Lab Results   Component Value Date/Time    GLU 97 01/26/2024 08:48 AM     01/26/2024 08:48 AM    K 4.5 01/26/2024 08:48 AM     01/26/2024 08:48 AM    CO2 29.0 01/26/2024 08:48 AM    CREATSERUM 1.31 (H) 01/26/2024 08:48 AM    CA 9.3 01/26/2024 08:48 AM    ALB 4.2 01/26/2024 08:48 AM    TP 7.6 01/26/2024 08:48 AM    ALKPHO 71 01/26/2024 08:48 AM    AST 23 01/26/2024 08:48 AM    ALT 15 01/26/2024 08:48 AM    BILT 0.5 01/26/2024 08:48 AM    TSH 0.678 02/15/2024 02:35 PM    T4F 1.6 02/15/2024 02:35 PM        Lab Results   Component Value Date/Time    CHOLEST 147 07/22/2023 10:05 AM    HDL 53 07/22/2023 10:05 AM    TRIG 78 07/22/2023 10:05 AM    LDL 78 07/22/2023 10:05 AM    NONHDLC 94 07/22/2023 10:05 AM       Lab Results   Component Value Date/Time    A1C 6.4 (H) 01/26/2024 08:48 AM      Lab Results   Component Value Date    VITD 48.8 08/14/2023         Health Maintenance   Topic Date Due    Mammogram  04/05/2024       Allergies:  Allergies   Allergen Reactions    Amlodipine SHORTNESS OF BREATH     Chest pressure.  Patient does not recall having any allergies    Metformin DIARRHEA     Patient told me she has no allergies    Metoprolol DIARRHEA     Patient told me that she has no allergies, better now-diarrhea was from eggs per patient.     Current Outpatient Medications   Medication Sig Dispense Refill    fluticasone-salmeterol 250-50 MCG/ACT Inhalation Aerosol Powder, Breath Activated Inhale 1 puff into the lungs every 12 (twelve) hours. 1 each 1    levothyroxine 100 MCG Oral Tab Take 1 tablet (100 mcg total) by mouth before breakfast.      levothyroxine 88 MCG Oral Tab Take 1 tablet (88 mcg total) by mouth  before breakfast. Dr. pringle      SPIRONOLACTONE 25 MG Oral Tab TAKE ONE TABLET BY MOUTH ONE TIME DAILY 90 tablet 0    metoprolol tartrate 100 MG Oral Tab Take 1 tablet by mouth in the morning and 1/2 tablet in the afternoon 90 tablet 0    simvastatin 20 MG Oral Tab Take 1 tablet (20 mg total) by mouth twice a week. 24 tablet 3    valsartan 160 MG Oral Tab TAKE ONE TABLET BY MOUTH TWICE DAILY 180 tablet 1    minoxidil 2.5 MG Oral Tab Take 2 tablets (5 mg total) by mouth 2 (two) times daily. 360 tablet 3    albuterol (2.5 MG/3ML) 0.083% Inhalation Nebu Soln Take 3 mL (2.5 mg total) by nebulization every 6 (six) hours as needed for Wheezing. 50 each 0    insulin glargine (LANTUS SOLOSTAR) 100 UNIT/ML Subcutaneous Solution Pen-injector INJECT 45 UNITS INTHE MORNING AND 7 UNITS IN THE EVENING 30 mL 2    indapamide 2.5 MG Oral Tab Take 1 tablet (2.5 mg total) by mouth daily and qpm. 180 tablet 2    warfarin 5 MG Oral Tab 7.5 mg every Tue, Thu; 5 mg all other days  0    dicyclomine 10 MG Oral Cap Take 1 capsule (10 mg total) by mouth 2 (two) times daily as needed. 60 capsule 1    glipiZIDE 10 MG Oral Tab Take 1 tablet (10 mg total) by mouth 2 (two) times daily before meals. 180 tablet 1    escitalopram 5 MG Oral Tab Take 1 tablet (5 mg total) by mouth nightly. 90 tablet 3    Glucose Blood (ACCU-CHEK GUIDE) In Vitro Strip CHECK TWICE DAILY 200 strip 3    Insulin Pen Needle (CAREFINE PEN NEEDLES) 32G X 4 MM Does not apply Misc Injects qhs. 100 each 6    Insulin Pen Needle (BD PEN NEEDLE ORIGINAL U/F) 29G X 12.7MM Does not apply Misc Dx. E11.9. Checks q12hrs. BD ultrafine MINI. 100 each 6    Alcohol Swabs Does not apply Pads Code: E11.9.  Checks twice daily. (Patient taking differently: Code: E11.9.  Checks twice daily.) 100 each 11    ACCU-CHEK FASTCLIX LANCETS Does not apply Misc CHECK EVERY MORNING AND EVENING 204 each 11    Omega-3-acid Ethyl Esters (LOVAZA) 1 G Oral Cap Take 1 capsule (1 g total) by mouth 2 (two) times  daily with meals.      PEG 3350-KCl-Na Bicarb-NaCl (TRILYTE) 420 g Oral Recon Soln Take prep as directed by gastro office. May substitute with Trilyte/generic equivalent if needed. (Patient not taking: Reported on 2/1/2024) 1 each 0    Secukinumab, 300 MG Dose, (COSENTYX SENSOREADY, 300 MG,) 150 MG/ML Subcutaneous Solution Auto-injector Inject 300 mg into the skin every 28 days. (Patient not taking: Reported on 2/1/2024) 6 each 3      Past Medical History:   Diagnosis Date    Appendicitis     Cholelithiasis     Deep vein thrombosis (HCC)     L leg     Essential hypertension     Goiter     Multinodular goiter S/P thyroidectomy.     Osteopenia     Vertigo, aural       Past Surgical History:   Procedure Laterality Date    APPENDECTOMY  1993    CATARACT EXTRACTION W/  INTRAOCULAR LENS IMPLANT Right 1988    in [place]    CHOLECYSTECTOMY  2006    COLONOSCOPY N/A 8/12/2020    Procedure: COLONOSCOPY;  Surgeon: José Antonio Fletcher MD;  Location: [facility] ENDOSCOPY    ELECTROCARDIOGRAM, COMPLETE  01/03/2014    SCANNED TO MEDIA TAB - 01/03/2014    FRACTURE SURGERY Left     LEFT FOOT SURGERY WITH HARDWARE    KNEE SCOPE,ABRASN ARTHROPLASTY Right 10-4-16    THYROIDECTOMY  08/17/15    TOTAL    TUBAL LIGATION        Family History   Problem Relation Age of Onset    Diabetes Mother     Hypertension Mother     Dementia Mother         Alzheimer's Disease    Diabetes Brother     Lipids Brother     Hypertension Brother     Diabetes Paternal Uncle     Heart Disease Father         CAD    Diabetes Other     Dementia Other     No Known Problems Self     No Known Problems Sister     No Known Problems Daughter     No Known Problems Maternal Grandmother     No Known Problems Paternal Grandmother     No Known Problems Maternal Aunt     No Known Problems Paternal Aunt     No Known Problems Maternal Cousin Female     No Known Problems Maternal Cousin Male     No Known Problems Paternal Cousin Female     No Known Problems Paternal Cousin Male     Colon  Cancer Son 43    Glaucoma Neg     Macular degeneration Neg     Breast Cancer Neg     Ovarian Cancer Neg     DCIS Neg     LCIS Neg     BRCA gene + Neg     Ashkenazi Sikh Descent Neg       Social History:  Social History     Socioeconomic History    Marital status:     Number of children: 4   Tobacco Use    Smoking status: Never    Smokeless tobacco: Never   Vaping Use    Vaping Use: Never used   Substance and Sexual Activity    Alcohol use: No    Drug use: No    Sexual activity: Yes     Partners: Male   Other Topics Concern    Caffeine Concern Yes     Comment: Coffee, tea - 2 cups per day     Reaction to local anesthetic No       History/Other:     Patient Active Problem List   Diagnosis    Breast cancer screening    Primary osteoarthritis of right knee    Hypothyroidism (acquired)    Age-related nuclear cataract of both eyes    Meibomian gland dysfunction (MGD), bilateral, both upper and lower lids    History of pterygium excision    Essential hypertension with goal blood pressure less than 130/80    B12 deficiency    Abnormal computed tomography angiography (CTA)    Severe obesity (BMI 35.0-39.9) with comorbidity (HCC)    Osteopenia of multiple sites    Goiter    Psoriasis    CKD (chronic kidney disease) stage 3, GFR 30-59 ml/min (HCC)    Psoriatic arthritis (HCC)    Antiphospholipid syndrome (HCC)    Intracranial atherosclerosis    Microscopic hematuria    Vaccine counseling    Long term (current) use of anticoagulants    Subacute cough    Dry eye syndrome of bilateral lacrimal glands    Cortical age-related cataract of both eyes    Corneal scar    Type 2 diabetes mellitus with diabetic chronic kidney disease (HCC)    Urge incontinence of urine    Morbid (severe) obesity due to excess calories (HCC)    Colon cancer screening     OB History    Para Term  AB Living   4 4 0 0 0 0   SAB IAB Ectopic Multiple Live Births   0 0 0 0 0        No LMP recorded. Patient is  postmenopausal.    Tobacco:  She has never smoked tobacco.    CAGE Alcohol Screen:   CAGE screening score of 0 on 2/15/2024, showing low risk of alcohol abuse.        Patient Care Team:  Sara Valenzuela MD as PCP - General (Internal Medicine)  Jose Juan Price PT as Physical Therapist (Physical Therapy)  Jessica Bingham PTA as Physical Therapist (Physical Therapy)  Kelsey Lopez MD as Referring Physician (RHEUMATOLOGY)  Della Dickson MD as Referring Physician (Hematology and Oncology)  Flavia Charles MD (NEUROLOGY)  David Graham MD as Referring Physician (UROLOGY)  Johnnie Meza MD as Consulting Physician (SURGERY, ORTHOPEDIC)  Renuka Lutz as Physical Therapist (Physical Therapy)  Olga Syed MD as Consulting Physician (OPHTHALMOLOGY)  Stopped eggs and no more diarrhea.     Feet pain. Patient is off Cosentyx.  Burning pain in the legs also.  Had some issues with insurance coverage and close intake and doctor would not refill after have not seen doctor for a while.  Has a follow-up.  Had testing done through insurance and here for follow-up regarding that.    Cough  This is a new problem. The current episode started 1 to 4 weeks ago (Saw md and Rx, steroids. But they elevated sugars.). The problem has been gradually improving. The problem occurs constantly. The cough is Non-productive. Associated symptoms include shortness of breath and wheezing. Pertinent negatives include no chest pain, chills, ear congestion, ear pain, eye redness, fever, headaches, heartburn, hemoptysis, nasal congestion, postnasal drip, rash, rhinorrhea, sore throat, sweats or weight loss. Nothing aggravates the symptoms. She has tried oral steroids for the symptoms. The treatment provided moderate relief. Her past medical history is significant for bronchitis.       Review of Systems   Constitutional:  Negative for activity change, appetite change, chills, diaphoresis, fatigue, fever, unexpected weight change and  weight loss.   HENT:  Negative for congestion, dental problem, drooling, ear discharge, ear pain, facial swelling, hearing loss, mouth sores, nosebleeds, postnasal drip, rhinorrhea, sinus pressure, sinus pain, sneezing, sore throat, tinnitus, trouble swallowing and voice change.    Eyes:  Negative for photophobia, pain, discharge, redness, itching and visual disturbance.   Respiratory:  Positive for cough, shortness of breath and wheezing. Negative for apnea, hemoptysis, choking, chest tightness and stridor.    Cardiovascular:  Negative for chest pain, palpitations and leg swelling.   Gastrointestinal:  Negative for abdominal distention, abdominal pain, anal bleeding, blood in stool, constipation, diarrhea, heartburn, nausea, rectal pain and vomiting.   Endocrine: Negative for cold intolerance, heat intolerance, polydipsia, polyphagia and polyuria.   Genitourinary:  Negative for decreased urine volume, difficulty urinating, dysuria, flank pain, frequency, hematuria, menstrual problem, pelvic pain, urgency, vaginal bleeding, vaginal discharge and vaginal pain.   Musculoskeletal:  Positive for arthralgias.   Skin:  Negative for rash.   Neurological:  Negative for dizziness, tremors, seizures, syncope, facial asymmetry, speech difficulty, weakness, light-headedness, numbness and headaches.   Psychiatric/Behavioral:  Negative for agitation, behavioral problems, confusion, decreased concentration, dysphoric mood, hallucinations, self-injury, sleep disturbance and suicidal ideas. The patient is not nervous/anxious and is not hyperactive.    All other systems reviewed and are negative.          Functional Ability/Status:   Julia Hernandez has a completely normal functional assessment. See flowsheet for details.        Fall Risk Assessment:   She has been screened for Falls and is low risk.        Medicare Hearing Assessment:   Hearing Screening    Screening Method: Questionnaire  I have a problem hearing over  the telephone: No I have trouble following the conversations when two or more people are talking at the same time: No   I have trouble understanding things on the TV: No I have to strain to understand conversations: No   I have to worry about missing the telephone ring or doorbell: No I have trouble hearing conversations in a noisy background such as a crowded room or restaurant: No   I get confused about where sounds come from: No I misunderstand some words in a sentence and need to ask people to repeat themselves: No   I especially have trouble understanding the speech of women and children: No I have trouble understanding the speaker in a large room such as at a meeting or place of [employer]: No   Many people I talk to seem to mumble (or don't speak clearly): No People get annoyed because I misunderstand what they say: No   I misunderstand what others are saying and make inappropriate responses: No I avoid social activities because I cannot hear well and fear I will reply improperly: No   Family members and friends have told me they think I may have hearing loss: No             Depression Screening (PHQ-2/PHQ-9): PHQ-2 SCORE: 0  , done 2/15/2024   If you checked off any problems, how difficult have these problems made it for you to do your work, take care of things at home, or get along with other people?: Not difficult at all    Last [place] Suicide Screening on 2/15/2024 was No Risk.       Cognitive Assessment:   She had a completely normal cognitive assessment - see flowsheet entries         Supplementary Documentation:     In the past six months, have you lost more than 10 pounds without trying?: 2 - No  Has your appetite been poor?: No  Type of Diet: Balanced  How does the patient maintain a good energy level?: Daily Walks  How would you describe your daily physical activity?: Moderate  How would you describe your current health state?: Fair  How do you maintain positive mental well-being?: Visiting  Family;Visiting Friends;Social Interaction  On a scale of 0 to 10, with 0 being no pain and 10 being severe pain, what is your pain level?: 0 - (None)  In the past six months, have you experienced urine leakage?: 0-No  At any time do you feel concerned for the safety/well-being of yourself and/or your children, in your home or elsewhere?: No  Have you had any immunizations at another office such as Influenza, Hepatitis B, Tetanus, or Pneumococcal?: No    Visual Acuity:   Right Eye Visual Acuity: Corrected Right Eye Chart Acuity: 20/25   Left Eye Visual Acuity: Corrected Left Eye Chart Acuity: 20/20   Both Eyes Visual Acuity: Corrected Both Eyes Chart Acuity: 20/20   Able To Tolerate Visual Acuity: Yes        PHYSICAL EXAM:   /64 (BP Location: Left arm, Patient Position: Sitting, Cuff Size: large)   Pulse 67   Temp 98.5 °F (36.9 °C) (Oral)   Ht 5' 9\" (1.753 m)   Wt 241 lb 6.4 oz (109.5 kg)   SpO2 100%   BMI 35.65 kg/m²   BP Readings from Last 3 Encounters:   02/15/24 126/64   02/01/24 131/58   10/02/23 (!) 161/53     Wt Readings from Last 3 Encounters:   02/15/24 241 lb 6.4 oz (109.5 kg)   02/01/24 239 lb (108.4 kg)   10/02/23 236 lb (107 kg)      Body mass index is 35.65 kg/m².   Physical Exam  Vitals and nursing note reviewed.   Constitutional:       General: She is not in acute distress.     Appearance: Normal appearance. She is well-developed and well-groomed. She is not ill-appearing, toxic-appearing or diaphoretic.      Interventions: She is not intubated.  HENT:      Head: Normocephalic and atraumatic.      Right Ear: Tympanic membrane, ear canal and external ear normal. No decreased hearing noted. No laceration, drainage, swelling or tenderness. No middle ear effusion. There is no impacted cerumen. No foreign body. No mastoid tenderness. No PE tube. No hemotympanum. Tympanic membrane is not injected, scarred, perforated, erythematous, retracted or bulging. Tympanic membrane has normal mobility.       Left Ear: Tympanic membrane, ear canal and external ear normal. No decreased hearing noted. No laceration, drainage, swelling or tenderness.  No middle ear effusion. There is no impacted cerumen. No foreign body. No mastoid tenderness. No PE tube. No hemotympanum. Tympanic membrane is not injected, scarred, perforated, erythematous, retracted or bulging. Tympanic membrane has normal mobility.      Nose:      Right Sinus: No maxillary sinus tenderness or frontal sinus tenderness.      Left Sinus: No maxillary sinus tenderness or frontal sinus tenderness.      Mouth/Throat:      Lips: Pink. No lesions.      Mouth: Mucous membranes are moist. No injury, lacerations, oral lesions or angioedema.      Dentition: No dental tenderness, gingival swelling or gum lesions.      Tongue: No lesions. Tongue does not deviate from midline.      Palate: No mass and lesions.      Pharynx: Oropharynx is clear. Uvula midline. No pharyngeal swelling, oropharyngeal exudate, posterior oropharyngeal erythema or uvula swelling.      Tonsils: No tonsillar exudate or tonsillar abscesses.   Eyes:      General: Lids are normal. No scleral icterus.        Right eye: No foreign body, discharge or hordeolum.         Left eye: No foreign body, discharge or hordeolum.      Extraocular Movements: Extraocular movements intact.      Right eye: Normal extraocular motion and no nystagmus.      Left eye: Normal extraocular motion and no nystagmus.      Conjunctiva/sclera: Conjunctivae normal.      Right eye: Right conjunctiva is not injected. No chemosis, exudate or hemorrhage.     Left eye: Left conjunctiva is not injected. No chemosis, exudate or hemorrhage.     Pupils: Pupils are equal, round, and reactive to light.   Neck:      Thyroid: No thyroid mass, thyromegaly or thyroid tenderness.      Vascular: Normal carotid pulses. No carotid bruit, hepatojugular reflux or JVD.      Trachea: Trachea and phonation normal. No tracheal tenderness,  tracheostomy, abnormal tracheal secretions or tracheal deviation.   Cardiovascular:      Rate and Rhythm: Normal rate and regular rhythm.      Pulses: Normal pulses.           Carotid pulses are 2+ on the right side and 2+ on the left side.       Radial pulses are 2+ on the right side and 2+ on the left side.        Dorsalis pedis pulses are 2+ on the right side and 2+ on the left side.        Posterior tibial pulses are 2+ on the right side and 2+ on the left side.      Heart sounds: Normal heart sounds, S1 normal and S2 normal.   Pulmonary:      Effort: Pulmonary effort is normal. No tachypnea, bradypnea, accessory muscle usage, prolonged expiration, respiratory distress or retractions. She is not intubated.      Breath sounds: Normal air entry. No stridor, decreased air movement or transmitted upper airway sounds. Decreased breath sounds and wheezing present. No rhonchi or rales.   Chest:      Chest wall: No tenderness.   Breasts:     Breasts are symmetrical.      Right: No swelling, bleeding, inverted nipple, mass, nipple discharge, skin change or tenderness.      Left: No swelling, bleeding, inverted nipple, mass, nipple discharge, skin change or tenderness.   Abdominal:      General: Bowel sounds are normal. There is no distension.      Palpations: Abdomen is soft. Abdomen is not rigid. There is no fluid wave, hepatomegaly, splenomegaly or mass.      Tenderness: There is no abdominal tenderness. There is no right CVA tenderness, left CVA tenderness, guarding or rebound.   Genitourinary:     Exam position: Supine.   Musculoskeletal:      Cervical back: Neck supple. No tenderness.      Right lower leg: No edema.      Left lower leg: No edema.        Legs:       Comments: Numerous varicose veins right greater than left.   Lymphadenopathy:      Head:      Right side of head: No submental, submandibular, preauricular, posterior auricular or occipital adenopathy.      Left side of head: No submental, submandibular,  preauricular, posterior auricular or occipital adenopathy.      Cervical: No cervical adenopathy.      Right cervical: No superficial, deep or posterior cervical adenopathy.     Left cervical: No superficial, deep or posterior cervical adenopathy.      Upper Body:      Right upper body: No supraclavicular, axillary or pectoral adenopathy.      Left upper body: No supraclavicular, axillary or pectoral adenopathy.   Skin:     General: Skin is warm and dry.      Coloration: Skin is not pale.      Findings: No erythema or rash.   Neurological:      Mental Status: She is alert and oriented to person, place, and time.   Psychiatric:         Mood and Affect: Mood normal.         Speech: Speech normal.         Behavior: Behavior normal. Behavior is cooperative.     Bilateral barefoot skin diabetic exam is abnormal with diabetic monofilament/sensation testing abnormal          ASSESSMENT/PLAN:     Encounter Diagnoses   Name Primary?    Antiphospholipid syndrome (HCC) Stable.    Yes    Age-related nuclear cataract of both eyes Stable.        Abnormal computed tomography angiography (CTA)       B12 deficiency Stable.        Encounter for screening mammogram for malignant neoplasm of breast Check mammogram. Continue self breast exam every month.         Stage 3a chronic kidney disease (HCC) No motrin, ibuprofen, advil, alleve, naprosyn  with these medications.         Colon cancer screening  Has scheduled.        Corneal scar Stable.        Cortical age-related cataract of both eyes Stable.        Dry eye syndrome of bilateral lacrimal glands Stable.        Essential hypertension with goal blood pressure less than 130/80 HIgher. Careful with diet and excercise at least 30 minutes 3-4 times a week. Check blood pressures at different times on different days. Can purchase own blood pressure monitor. If not, check at local pharmacy. Bake foods more and grill occasionally. Avoid fried foods. No salt. Use other seasonings.          Goiter Check blood.        History of pterygium excision Stable.        Hypothyroidism (acquired) Check blood.        Intracranial atherosclerosis No sx.        Psoriatic arthritis (HCC)     Psoriasis FU rheum. Slight worsening.        Primary osteoarthritis of right knee Stable.        Osteopenia, unspecified location Check dexa. Take calcium 600 every 12 hrs. With vitamin D 400 IU every  12 hrs. Excerise at least 30 minutes 3-4 times a week. May use calcium citrate as opposed to calcium carbonate which may be better absorbed in the setting of PPI use.   lifelong physical activity at all ages is strongly endorsed by the [employer]opor[employer]. Exercise recommendations generally should include weight-bearing, muscle-strengthening, and balance training exercises for 30 minutes 5 days per week or 75 minutes twice weekly, often consistent with other general health recommendations.   Weight Bearing  There are two types of osteoporosis exercises that are important for building and maintaining bone density: weight-bearing and muscle-strengthening exercises.  Weight-bearing Exercises  These exercises include activities that make you move against gravity while staying upright. Weight-bearing exercises can be high-impact or low-impact.  High-impact weight-bearing exercises help build bones and keep them strong. If you have broken a bone due to osteoporosis or are at risk of breaking a bone, you may need to avoid high-impact exercises. If you’re not sure, you should check with your healthcare provider.  Examples of high-impact weight-bearing exercises are:  Dancing   Doing high-impact aerobics   Hiking   Jogging/running   Jumping Rope   Stair climbing   Tennis  Low-impact weight-bearing exercises can also help keep bones strong and are a safe alternative if you cannot do high-impact exercises. Examples of low-impact weight-bearing exercises are:  Using elliptical training machines   Doing low-impact aerobics   Using  stair-step machines   Fast walking on a treadmill or outside            Morbid (severe) obesity due to excess calories (HCC)       Microscopic hematuria Check urine.        Meibomian gland dysfunction (MGD), bilateral, both upper and lower lids Stable.        Type 2 diabetes mellitus with chronic kidney disease, without long-term current use of insulin, unspecified CKD stage (HCC) Stable. Careful with diet and excercise at least 30 minutes 3-4 times a week. Check sugars at different times on different dates. Careful with low sugars. Carry something with you and check sugar if can. Can carry carolyne cracker, etc. Decrease carbohydrates. But also, careful with fruits and natural sugars.One serving a day and no more than 1 handful every day. Check feet  every AM and careful with sores and ulcers on feet bilaterally. Check eyes every year with dilated eye exam.  Check sugars.  2-hour postmeal should be less than 140s.  Pre-meal should be 's.  Both equally affected A1c.  Discussed importance of glycemic control to prevent complications of diabetes  -Discussed complications of diabetes include retinopathy, neuropathy, nephropathy and cardiovascular disease  -Discussed ABCs of DM  -Discussed importance of SBGM  -Discussed importance of low CHO diet, recommend 45gm per meal or 135gm per day  -Normal foot exam execpt  -UTD with optho       Urge incontinence of urine Stable.        Vaccine counseling PCV 20 today.        Encounter for annual health examination Check urine.        Subacute cough Maybe RAD from viral cold. Finished steroids. Use albuterol every 3 hrs. For 2 days and as needed and then, as needed. If using advair, take 1 puff very 12 hrs. And rinse mouth with water and spit out.  Discussed with patient of side effects and use of these medications.  FU pulm. Check CXR,       PVD (posterior vitreous detachment), left Stable.        Neuropathy Hold meds. til see rhuem.        Varicose veins Try compression  stockings.         Orders Placed This Encounter   Procedures    TSH W Reflex To Free T4    Free T4, (Free Thyroxine)    URINALYSIS, AUTO, W/O SCOPE    Urinalysis with Culture Reflex    Prevnar 20 (PCV20) [52457]    Urine Culture, Routine       Meds This Visit:  Requested Prescriptions     Signed Prescriptions Disp Refills    fluticasone-salmeterol 250-50 MCG/ACT Inhalation Aerosol Powder, Breath Activated 1 each 1     Sig: Inhale 1 puff into the lungs every 12 (twelve) hours.       Imaging & Referrals:  GASTRO - INTERNAL  PULMONARY - INTERNAL  DME - EXTERNAL   PCV20 VACCINE FOR INTRAMUSCULAR USE  XR DEXA BONE DENSITOMETRY (CPT=77080)  [facility] KIRSTIE 2D+3D SCREENING BILAT (CPT=77067/79647)  US ANKLE BRACHIAL INDEX (CPT=93922)  XR CHEST PA + LAT CHEST (CPT=71046)      RTC in 6 months extended.     1. Antiphospholipid syndrome (HCC) (Primary)  2. Age-related nuclear cataract of both eyes  3. Abnormal computed tomography angiography (CTA)  4. B12 deficiency  5. Encounter for screening mammogram for malignant neoplasm of breast  -     [facility] KIRSTIE 2D+3D SCREENING BILAT (CPT=77067/00801); Future; Expected date: 02/15/2024  6. Stage 3a chronic kidney disease (HCC)  -     URINALYSIS, AUTO, W/O SCOPE  7. Colon cancer screening  -     Gastro Referral - In Network  8. Corneal scar  9. Cortical age-related cataract of both eyes  10. Dry eye syndrome of bilateral lacrimal glands  11. Essential hypertension with goal blood pressure less than 130/80  12. Goiter  Overview:  Multinodular goiter S/P thyroidectomy.   13. History of pterygium excision  14. Hypothyroidism (acquired)  -     TSH W Reflex To Free T4; Future; Expected date: 02/17/2024  -     Free T4, (Free Thyroxine); Future; Expected date: 02/17/2024  -     TSH W Reflex To Free T4  -     Free T4, (Free Thyroxine)  15. Intracranial atherosclerosis  Overview:  CT Head- 9/20/19   16. Psoriatic arthritis (HCC)  17. Psoriasis  18. Primary osteoarthritis of right knee  19. Osteopenia,  unspecified location  -     XR DEXA BONE DENSITOMETRY (CPT=77080); Future; Expected date: 02/15/2024  20. Morbid (severe) obesity due to excess calories (HCC)  21. Microscopic hematuria  Overview:  UA 10/6/19  Normal cystourethroscopy.  Negative microscopic hematuria work-up including CT urogram and flexible cystourethroscopy.  Cystocele identified on pelvic exam. No further urological work-up indicated at this time for patient's microhematuria. Recommend repeat urinalysis with microscopy annually (may be performed by PCP). If negative for 2 consecutive years, then no further UA for the purpose of evaluation of asymptomatic microhematuria is necessary. Persistent microscopic hematuria would trigger a repeat evaluation in 3-5 years.   22. Meibomian gland dysfunction (MGD), bilateral, both upper and lower lids  23. Type 2 diabetes mellitus with chronic kidney disease, without long-term current use of insulin, unspecified CKD stage (HCC)  24. Urge incontinence of urine  25. Vaccine counseling  26. Encounter for annual health examination  27. Subacute cough  -     Pulmonary Referral - In Network  -     XR CHEST PA + LAT CHEST (CPT=71046); Future; Expected date: 02/15/2024  28. PVD (posterior vitreous detachment), left  -     US ANKLE BRACHIAL INDEX (CPT=93922); Future; Expected date: 02/15/2024  29. Neuropathy  30. Varicose veins  -     DME - EXTERNAL   31. Asymptomatic microscopic hematuria  -     Urine Culture, Routine; Future; Expected date: 02/29/2024  -     Urinalysis with Culture Reflex; Future; Expected date: 02/29/2024  Other orders  -     Fluticasone-Salmeterol; Inhale 1 puff into the lungs every 12 (twelve) hours.  Dispense: 1 each; Refill: 1  -     Prevnar 20 (PCV20) [40458]      The patient indicates understanding of these issues and agrees to the plan.  Further testing ordered.  Imaging studies ordered.  Lab work ordered.  Reinforced healthy diet, lifestyle, and exercise.      Return in about 6 months  (around 8/15/2024), or if symptoms worsen or fail to improve, for extended follow up.    Advanced Directives:   She does have a Living Will but we do NOT have it on file in Epic.    She does have a POA but we do NOT have it on file in Epic.    Discussed Advance Care Planning with patient (and family/surrogate if present). Standard forms made available to patient in After Visit Summary.  16+ minutes was spent with all Advanced Care Planning elements today (up to 30 minutes for CPT 55417)    MD Julia Schultz's SCREENING SCHEDULE   Tests on this list are recommended by your physician but may not be covered, or covered at this frequency, by your insurer.   Please check with your insurance carrier before scheduling to verify coverage.   PREVENTATIVE SERVICES FREQUENCY &  COVERAGE DETAILS LAST COMPLETION DATE   Diabetes Screening    Fasting Blood Sugar /  Glucose    One screening every 12 months if never tested or if previously tested but not diagnosed with pre-diabetes   One screening every 6 months if diagnosed with pre-diabetes Lab Results   Component Value Date    GLU 97 01/26/2024        Cardiovascular Disease Screening    Lipid Panel  Cholesterol  Lipoprotein (HDL)  Triglycerides Covered every 5 years for all Medicare beneficiaries without apparent signs or symptoms of cardiovascular disease Lab Results   Component Value Date    CHOLEST 147 07/22/2023    HDL 53 07/22/2023    LDL 78 07/22/2023    TRIG 78 07/22/2023         Electrocardiogram (EKG)   Covered if needed at Welcome to Medicare, and non-screening if indicated for medical reasons 07/23/2020      Ultrasound Screening for Abdominal Aortic Aneurysm (AAA) Covered once in a lifetime for one of the following risk factors    Men who are 65-75 years old and have ever smoked    Anyone with a family history -     Colorectal Cancer Screening  Covered for ages 50-85; only need ONE of the following:    Colonoscopy   Covered every 10  years    Covered every 2 years if patient is at high risk or previous colonoscopy was abnormal 08/12/2020    Health Maintenance   Topic Date Due    Colorectal Cancer Screening  08/12/2023       Flexible Sigmoidoscopy   Covered every 4 years -    Fecal Occult Blood Test Covered annually -   Bone Density Screening    Bone density screening    Covered every 2 years after age 65 if diagnosed with risk of osteoporosis or estrogen deficiency.    Covered yearly for long-term glucocorticoid medication use (Steroids) Last Dexa Scan:    XR DEXA BONE DENSITOMETRY (CPT=77080) 11/06/2021      No recommendations at this time   Pap and Pelvic    Pap   Covered every 2 years for women at normal risk; Annually if at high risk -  No recommendations at this time    Chlamydia Annually if high risk -  No recommendations at this time   Screening Mammogram    Mammogram     Recommend annually for all female patients aged 40 and older    One baseline mammogram covered for patients aged 35-39 04/05/2023    Health Maintenance   Topic Date Due    Mammogram  04/05/2024       Immunizations    Influenza Covered once per flu season  Please get every year -  Influenza Vaccine(1) due on 10/01/2023    Pneumococcal Each vaccine (Rndjjxb29 & Xbtbodkcb24) covered once after 65 Prevnar 13: 11/08/2016    Vcxbrjkyu19: 10/02/2017     No recommendations at this time    Hepatitis B One screening covered for patients with certain risk factors   -  No recommendations at this time    Tetanus Toxoid Not covered by Medicare Part B unless medically necessary (cut with metal); may be covered with your pharmacy prescription benefits -    Tetanus, Diptheria and Pertusis TD and TDaP Not covered by Medicare Part B -  No recommendations at this time    Zoster Not covered by Medicare Part B; may be covered with your pharmacy  prescription benefits 10/04/2014  No recommendations at this time     Diabetes      Hemoglobin A1C Annually; if last result is elevated, may be asked  to retest more frequently.    Medicare covers every 3 months Lab Results   Component Value Date     (H) 01/26/2024    A1C 6.4 (H) 01/26/2024       No recommendations at this time    Creat/alb ratio Annually Lab Results   Component Value Date    MICROALBCREA 7.9 01/26/2024       LDL Annually Lab Results   Component Value Date    LDL 78 07/22/2023       Dilated Eye Exam Annually Last Diabetic Eye Exam:  Last Dilated Eye Exam: 05/22/23  Eye Exam shows Diabetic Retinopathy?: No       Annual Monitoring of Persistent Medications (ACE/ARB, digoxin diuretics, anticonvulsants)    Potassium Annually Lab Results   Component Value Date    K 4.5 01/26/2024         Creatinine   Annually Lab Results   Component Value Date    CREATSERUM 1.31 (H) 01/26/2024         BUN Annually Lab Results   Component Value Date    BUN 30 (H) 01/26/2024       Drug Serum Conc Annually No results found for: \"DIGOXIN\", \"DIG\", \"VALP\"                     Understanding Advance Care Planning  Advance care planning is the process of deciding one’s own future medical care. It helps assure that if you can’t speak for yourself, your wishes can still be carried out. The plan is a series of legal documents that note a person’s wishes. The documents vary by state. Advance care planning should be discussed at a regular office visit with your primary care provider before an acute illness. Advance care planning is encouraged when a person has a serious illness that is expected to get worse. It may also be done before major surgery. And it can help you and your family be prepared in case of a major illness or injury. Advance care planning helps with making decisions at these times.     Who will speak on your behalf?  A healthcare proxy is a person who acts as the voice of a patient when the patient can’t speak for himself or herself. The name of this role varies by state. It may be called a Durable Medical Power of  or Durable Power of  for  Healthcare. It may be called an agent, surrogate, or advocate. Or it may be called a representative or decision maker. It's an official duty that is identified by a legal document. The document also varies by state.   Why is advance care planning important?   If a person communicates his or her healthcare wishes:   He or she will be given medical care that matches his or her values and goals.  Family members will not be forced to make decisions in a crisis with no guidance.  Creating a plan  Making an advance care plan is often done in 3 steps:   Thinking about one’s wishes. To create an advance care plan, think about what kind of medical treatment you would want if you lose the ability to communicate. Are there any situations in which you would refuse or stop treatment? Are there therapies you would want or not want? And whom do you want to make decisions for you? There are many places to learn more about how to plan for your care. Ask your healthcare provider or  for resources.  Picking a healthcare proxy. This means choosing a trusted person to speak for you only when you can’t speak for yourself. When you can't make medical decisions, your proxy makes sure the instructions in your advance care plan are followed. A proxy doesn't make decisions based on his or her own opinions. They must put aside those opinions and values if needed, and carry out your wishes.  Filling out the legal documents. There are several kinds of legal documents for advance care planning. Each one tells healthcare providers your wishes. The documents may vary by state. They must be signed and may need to be witnessed or notarized. You can cancel or change them whenever you wish. Depending on your state, the documents may include a Healthcare Proxy form, Living Will, Durable Medical Power of , and Advance Directive.  The family’s role  The best help a family can give is to support their loved one’s wishes. Open and  honest communication is vital. Family should express any concerns they have about the patient’s choices while the patient can still make decisions in the event that his or her illness prevents communicating those wishes at a later time.    Cherri last reviewed this educational content on 12/1/2019 © 2000-2021 [employer]. All rights reserved. This information is not intended as a substitute for professional medical care. Always follow your healthcare professional's instructions.          Advance Medical Directive  An advance medical directive is a form that lets you plan ahead for the care you’d want if you could no longer express your wishes. This statement outlines the medical treatment you’d want or names the person you’d wish to make healthcare decisions for you. Be aware that laws vary from state to state, and it may be worthwhile to talk with an .     Writing down your wishes  An advance directive is important whether you’re young or old. Injury or illness can strike at any age.  Decide what is important to you and the kind of treatment you’d want, or not want to have.  Some states allow only one kind of advance directive. Some let you do both a durable power of  for healthcare and a living will. Some states put both kinds on the same form.    A durable power of  (POA) for healthcare   This form lets you name someone else that you have chosen and trust to speak and make decisions on your behalf.  This person can decide on treatment for you only when you can’t speak for yourself.  You don't need to be at the end of your life. They could speak for you if you were in a coma but were likely to recover.    A living will  This form lets you list the care you want at the end of your life.  A living will applies only if you won’t live without medical treatment. It would apply if you had advanced cancer, a massive stroke, or other serious illness from which you will not  recover.  It takes effect only when you can no longer express your wishes yourself.    [email or web address] last reviewed this educational content on 2/1/2021 © 2000-2021 [employer]. All rights reserved. This information is not intended as a substitute for professional medical care. Always follow your healthcare professional's instructions.          Choosing an Agent  A durable power of  for healthcare is only as good as the person you name to be your agent. Your agent is the person you have chosen to speak and make decisions on your behalf. If this person knows your treatment wishes and is willing to carry them out, you’ll probably be well represented. Be sure to tell your agent what’s important to you.     Who to choose  Here are suggestions for choosing an agent:  You can name a family member, close friend, , , or rabbi.  You should name one person as your agent. Then name one or two alternates. You need a backup person in case your first choice can’t be reached when needed.  Talk with each person you're thinking of naming as your agent or alternate. Do this before you decide who should carry out your wishes.  Your agent should be ...  A competent adult, age 18 or older  Someone you trust and can talk to about the care you want and what's important to you  Someone who supports your treatment choices    In many states, your agent can’t be ...   Your healthcare provider  An employee of your provider or of a hospital, nursing home, or hospice program where you receive care  Some states have other restrictions on who can be named as an agent for an advance directive.   Be prepared  Tip: It's a good idea to write down your wishes and give a copy to your agent and all others who are involved with your healthcare.   [email or web address] last reviewed this educational content on 8/1/2021    © 4382-7735 [employer]. All rights reserved. This information is not intended as a substitute for  professional medical care. Always follow your healthcare professional's instructions.          Understanding DNR Orders  Do not resuscitate (DNR) orders tell hospital staff not to do potentially life-restoring measures, such as CPR, if you or your loved one's heart and lungs stop working. It allows a natural death, and prevents healthcare staff from artificially reviving a person and placing them on life support. In many states, a DNR order also applies to staff outside the hospital such as in nursing homes and emergency medical services. A DNR order must be written by a healthcare provider. Or in some cases, certain other healthcare workers write it. This can only be done with the person’s or family’s consent. If a person has not written an advance directive, their family will decide on a DNR with the help of the healthcare team.   The person can cancel a DNR order at any time. The healthcare team can answer questions about the DNR form. Have copies of a DNR form are readily available so that your wishes can be faithfully followed.   Writing a DNR order  When might a DNR order be written? When the person’s health condition is such that, in the case of cardiac arrest, CPR and other resuscitation methods are not desired. This could be because the chance of successful resuscitation is very low. Or it could be because the care plan now focuses on comfort measures instead of life-sustaining measures. Coma and terminal illness are instances when a DNR order might be used.   Irreversible coma  In a coma, a person does not respond to sight, sound, or touch. The heart and lungs could be working, but brain function is damaged due to trauma or disease.   Terminal illness  In the last stages of heart disease, AIDS, cancer, and other illnesses, some people don’t want to prolong their suffering. If recovery isn’t likely and quality of life is poor or getting worse, a person or their family may agree to a DNR order.   DNR orders  and hospice care  A hospice program can offer care during the final weeks of life. Hospice programs provide dignity, pain control and comfort care in the home or at special facilities. Hospice does not provide aggressive treatment. In fact, a DNR order will likely be discussed before a person is admitted to hospice. A  or  may be able to help you arrange for hospice support.   Documenting end-of-life wishes  In addition to DNR orders, a person with a serious, life-limiting illness may wish to document their treatment wishes. This is called a POST form or by different names, depending on the state. It's meant to provide a portable medical order to help guide healthcare providers on the specific medical treatments a person wants during a medical emergency. It's meant to complement a DNR order, not replace it. Your healthcare provider can tell you more and help you complete the forms. The form may be called one of these:   MOLST (medical orders for life-sustaining treatment)  POLST (physician orders for life-sustaining treatment)  MOST (medical orders for scope of treatment)  POST (physician orders for scope of treatment)  TPOPP (transportable physician orders for patient preferences)  Cherri last reviewed this educational content on 7/1/2021    © 9952-0105 [employer]. All rights reserved. This information is not intended as a substitute for professional medical care. Always follow your healthcare professional's instructions.          An Agent’s Role for Durable Power of  for Health Care   It’s impossible to know which medical treatment choices you might face in the future. What if you aren't able to make these decisions for yourself? A durable power of  for healthcare lets you name an agent to speak and carry out your wishes on your behalf. This happens only if you can’t express your wishes yourself.     An agent’s duty  Your agent respects your wishes in the  following ways:    Your agent’s duty is to see that your wishes are followed.  If your wishes aren’t known, your agent should try to decide what you want.  Your agent’s choices come before anyone else’s wishes for you.  A durable power of  for healthcare doesn't not give your agent control over your money . Your agent also can’t be made to pay your bills.  Find out what your agent can do  Restrictions on what an agent can and can’t do vary by state. Check your state laws. In most states your agent can:   Choose or refuse life-sustaining and other medical treatment on your behalf  Consent to treatment, and then stop treatment if your condition doesn’t improve  Access and release your medical records  Request an autopsy and donate your organs, unless you’ve stated otherwise in your advance directive  Find out whether your state allows your agent to do the following:   Refuse or withdraw life-enhancing care  Refuse or stop tube feeding or other life-sustaining care--even if you haven’t stated on your advance directive that you don’t want these treatments  Order sterilization or   Cherri last reviewed this educational content on 2021 [employer]. All rights reserved. This information is not intended as a substitute for professional medical care. Always follow your healthcare professional's instructions.          Being a Healthcare Proxy  A healthcare proxy is someone who represents a person who can’t speak for themself. The name of this role varies by state. It may be called a Durable Medical Power of . It may be called a Durable Power of  for Healthcare. It may be called an agent, surrogate, or advocate. Or it may be called a representative or decision maker. It's an official duty that is noted by a legal document. The document also varies by state. The person must name you as his or her proxy on the document.      A healthcare proxy speaks for another  person when he or she is not able to do so. The proxy helps make sure the person’s healthcare wishes are known and followed.     What it means to be a healthcare proxy   Your role as healthcare proxy starts when the person can’t make medical decisions. This assessment can only be made by a licensed doctor. You then make the healthcare decisions as needed. You do this by carrying out the person’s wishes. These wishes are noted in his or her advance care planning documents. These declare what kind of treatment the person wishes to have or not have. You may need to put aside your own values and opinions to carry out the person’s wishes. This may include refusing or stopping life-sustaining treatments.   Documenting end-of-life wishes   As a healthcare proxy, encourage the person to discuss his or her wishes, while they are able. They can do this with their healthcare provider and then document the wishes as a medical order. The provider can help the person complete the form. The forms are known by different names depending on the state. The form may be called one of these:     MOLST (medical orders for life-sustaining treatment)  POLST (physician orders for life-sustaining treatment)  MOST (medical orders for scope of treatment)  POST (physician orders for scope of treatment)  TPOPP (transportable physician orders for patient preferences)    The form documents the person’s wishes at the end of life. It's not tied to a certain healthcare provider or facility. It's different than a living will. The form is an order written according to state regulations by a healthcare provider. To complete one, the person must express his or her wishes to an advanced healthcare provider. If the person can’t make his or her own decisions, then this is done by the person’s healthcare proxy.   Carrying out your role  Your duties depend on what the person’s advance care planning documents say. Your duties may also depend on state law. In  general:   Before accepting a role as a proxy, talk with the person. Be sure you know his or her wishes. Ask questions. This will help you be his or her voice if and when it is needed.  Be sure that the person’s healthcare team knows that you are his or her proxy. Carry a copy of the document and proof of your identity.  Make sure the healthcare team has a copy of the advance care planning documents.  Talk to the healthcare team. Ask questions as often as you need. Stay informed about the person’s condition.  Ask for any help you need to understand the medical situation. Ask about the person’s condition and prognosis. Ask about risks and benefits of tests and treatments. Find out all the facts and options.  Speak on the person’s behalf with the healthcare team when needed.  Talk with family members and keep them informed.  Know your rights. You have the right to ask for information. You can ask for consultations and second opinions. You have the right to request or refuse treatment for the person. You may be able to review his or her medical chart. You can authorize the person’s transfer to another facility. You can also request a new healthcare provider for him or her. If you are not sure what your rights are at any time, ask a .  When it’s time to make decisions  If the person’s wishes are clear in the advance care plan documents, ask for them to be carried out as noted. If they are not clear, talk with the healthcare team. Listen to the team’s recommendations. Talk with a  or counselor. It may be hard for you to make a decision at times. You may feel sad or upset about a decision. Being a healthcare proxy is not an easy role. But it's an important one. Remember that the person trusts you to carry out his or her wishes.   If you need help  Ask the healthcare team if you have trouble with a decision. The healthcare team will help you.  Encourage the person you are helping to have a  conversation with their provider about their end-of-life wishes. The provider can help them fill out the form.  You may need help in resolving family conflicts. Ask the hospital or clinic , ethics consultant, or a  for help.  If you are having trouble talking with the healthcare team while the person is in the hospital, reach out to the patient relations department. Or ask to speak to the hospital ombudsman or ethics committee.    Cherri last reviewed this educational content on 9/1/2019 © 2000-2021 [employer]. All rights reserved. This information is not intended as a substitute for professional medical care. Always follow your healthcare professional's instructions.          Life Support  If you understand how specific treatments may affect your quality of life, you can decide which ones you’d choose or refuse. You may want to talk to your healthcare provider about the possible benefits and risks of treatments. The chance of good results from these therapies varies based on each individual clinical situation and can be very hard to predict. Medical treatment, if your life is in danger, falls into 3 main categories.     Life supporting  This care keeps your heart and lungs going when they can no longer work on their own.  CPR restarts your heart and lungs if they stop working.  A respirator (or ventilator) keeps you breathing. Air is pumped into your lungs through a tube that’s put into your windpipe.    Life sustaining  This care keeps you alive longer when you have an illness that can’t be cured.  Tube feeding or TPN (total parenteral nutrition) provides food and fluids through a tube or IV (intravenous). It is given if you can’t chew or swallow on your own.  Dialysis is a kidney machine that cleans your blood when your kidneys can no longer work on their own.    Life enhancing  This care controls pain and discomfort, such as nausea or trouble breathing. This type  of care is not designed to prolong your life, but to enhance comfort and quality of life. Nothing is done to keep you alive longer.  Hospice care is comfort care. It might provide food and fluids by mouth or help with bathing. Hospice care is given during the last stages of a terminal illness.  Strong pain medicine can be given to help keep you comfortable.    Do Not Resuscitate (DNR)  Would you want CPR if your heart stops while you’re a patient in a hospital or nursing home? If not, talk to your healthcare provider about issuing a DNR (Do-Not-Resuscitate) order.   DNRs and advance directives may not apply during anesthesia, in emergency rooms, or when emergency medical teams respond to a 911 call. Ask your healthcare provider how you can make sure your wishes will be followed. Also, a DNR will not prevent you from getting other kinds of needed medical care such as treatment for pain, or bleeding.   Cherri last reviewed this educational content on 8/1/2021 © 2000-2021 [employer]. All rights reserved. This information is not intended as a substitute for professional medical care. Always follow your healthcare professional's instructions.          Stopping Life-Sustaining Treatments  Certain treatments can help sustain life when you have a serious illness. But as your illness progresses, there may come a time when these treatments are no longer a benefit. Decisions must then be made whether to continue or stop these treatments. This can be a hard task for you and your loved ones, but know that you’re not alone. Your healthcare provider and healthcare team will guide you through these treatment decisions. They will also help answer any questions you may have.     What are life-sustaining treatments?  Life-sustaining treatments help keep you alive if a vital body function fails. These treatments can include CPR and the use of machines to help with heart, lung, or kidney function. They can also  include the use of tubes to deliver food, fluids, blood, and medicines to the body. Blood transfusions and antibiotics are also types of life-sustaining treatments.   What happens if life-sustaining treatments are continued?  These treatments can help extend your life. But they will not cure your illness. If you are near the end of your life, you may find it hard to handle the side effects and problems that can occur with these treatments. In this case, the treatments may be too much of a burden on your body. They may cause more harm than good. They may also disrupt the natural dying process and prolong suffering.   What happens if life-sustaining treatments are stopped?  If these treatments are stopped, the focus of treatment will shift to comfort care. This involves measures to control pain and other symptoms you may have. These measures are not meant to cure your illness or help you live longer. Instead, they are meant to improve your quality of life during the time you have left. If you are in the end stage of your illness, you may be referred to hospice by your healthcare provider. Hospice provides end-of-life care. This includes emotional, spiritual, and social support for you and your loved ones.   How do I decide whether to stop life-sustaining treatments?  Your healthcare provider and healthcare team will talk with you about the specific treatments that are part of your care plan. If you want, you may include family and friends in these meetings. Here are some questions to think about or ask your healthcare team:   Is there is a chance that my illness will improve? Or will it continue to get worse?  What are my goals of care? Do I want to extend the time I have left? Or do I want to focus my care on comfort and managing symptoms?  How will stopping or continuing these treatments affect my health? Will they enhance my comfort and quality of life? Or will they cause more problems?  Consider your own values or  christal. Also ask for advice from those who share your values.  How do I state my decisions about life-sustaining treatments?  Once you’ve made your decision to continue or stop specific treatments, you can tell your healthcare provider directly. It's best to also put your treatment wishes in writing with advance directives. These are legal forms related to healthcare decisions. Laws about advance directives vary from state to state. Ask your healthcare provider about what forms are needed to make sure your wishes will be followed. Some common forms include:   A durable power of  for healthcare or a healthcare proxy form.  This form lets you name a person to make treatment decisions for you when you can’t. This person is often called a healthcare proxy, medical or healthcare power of , or agent.  A living will.  This form tells others the kinds of treatment you want or don’t want if you become too ill or injured to speak for yourself.  Orders for life-sustaining treatments.  These are actual healthcare provider's orders that must be followed by other medical providers. The form belongs to you, not to the healthcare provider or hospital.). These are legal forms obtained from your healthcare provider or hospital that document your wishes. The forms are known by different names depending on the state. Common names include:  MOLST (medical orders for life-sustaining treatment)  POLST (physician orders for life-sustaining treatment)  MOST (medical orders for scope of treatment)  POST (physician orders for scope of treatment)  TPOPP (transportable physician orders for patient preferences)     Keep in mind that you can change or cancel an advance directive at any time. Make it a practice to review your decisions each time there is a change in your health or goals of care. Also be sure to tell your healthcare proxy and loved ones of any changes in your decisions.   Deciding whether to stop life-sustaining  treatments for a loved one   The decision to stop treatment ideally is made with the person’s consent. If the person is not capable of making decisions and has no advance directive, the decision falls to the person’s healthcare proxy or other adult. If you need to make a decision about stopping treatment for a loved one, start by talking to his or her healthcare provider. Review the goals of care and the benefits and burdens of specific treatments on your loved one’s health. Also think about your loved one’s wishes and values. If needed, seek advice from other healthcare team members, like a  or .   Cherri last reviewed this educational content on 12/1/2019    © 0291-8523 [employer]. All rights reserved. This information is not intended as a substitute for professional medical care. Always follow your healthcare professional's instructions.

## 2024-02-16 LAB
T4 FREE SERPL-MCNC: 1.6 NG/DL (ref 0.8–1.7)
TSI SER-ACNC: 0.68 MIU/ML (ref 0.55–4.78)

## 2024-02-16 NOTE — PROGRESS NOTES
Thyroid is good.   We did not have enough urine she needs a urine and the lab fresh clean-catch specimen early morning with at least 8 ounces of water prior to testing.  Shows blood but it not sure if it is true blood or if is just due to dehydration.

## 2024-02-19 ENCOUNTER — HOSPITAL ENCOUNTER (OUTPATIENT)
Dept: GENERAL RADIOLOGY | Facility: HOSPITAL | Age: 74
Discharge: HOME | End: 2024-02-19
Attending: INTERNAL MEDICINE
Payer: MEDICARE

## 2024-02-19 ENCOUNTER — LAB ENCOUNTER (OUTPATIENT)
Dept: LAB | Facility: HOSPITAL | Age: 74
End: 2024-02-19
Attending: INTERNAL MEDICINE
Payer: MEDICARE

## 2024-02-19 DIAGNOSIS — R31.21 ASYMPTOMATIC MICROSCOPIC HEMATURIA: ICD-10-CM

## 2024-02-19 DIAGNOSIS — R05.2 SUBACUTE COUGH: ICD-10-CM

## 2024-02-19 DIAGNOSIS — R31.9 HEMATURIA, UNSPECIFIED TYPE: Primary | ICD-10-CM

## 2024-02-19 LAB
BILIRUB UR QL: NEGATIVE
CLARITY UR: CLEAR
COLOR UR: COLORLESS
GLUCOSE UR-MCNC: NORMAL MG/DL
KETONES UR-MCNC: NEGATIVE MG/DL
LEUKOCYTE ESTERASE UR QL STRIP.AUTO: NEGATIVE
NITRITE UR QL STRIP.AUTO: NEGATIVE
PH UR: 5.5 [PH] (ref 5–8)
PROT UR-MCNC: NEGATIVE MG/DL
SP GR UR STRIP: 1.01 (ref 1–1.03)
UROBILINOGEN UR STRIP-ACNC: NORMAL

## 2024-02-19 PROCEDURE — 71046 X-RAY EXAM CHEST 2 VIEWS: CPT | Performed by: INTERNAL MEDICINE

## 2024-02-19 PROCEDURE — 87086 URINE CULTURE/COLONY COUNT: CPT

## 2024-02-19 PROCEDURE — 81001 URINALYSIS AUTO W/SCOPE: CPT

## 2024-02-20 ENCOUNTER — LAB ENCOUNTER (OUTPATIENT)
Dept: LAB | Facility: HOSPITAL | Age: 74
End: 2024-02-20
Attending: INTERNAL MEDICINE
Payer: MEDICARE

## 2024-02-20 DIAGNOSIS — R31.9 HEMATURIA, UNSPECIFIED TYPE: ICD-10-CM

## 2024-02-20 DIAGNOSIS — R31.21 ASYMPTOMATIC MICROSCOPIC HEMATURIA: ICD-10-CM

## 2024-02-20 LAB
BILIRUB UR QL: NEGATIVE
CLARITY UR: CLEAR
COLOR UR: YELLOW
GLUCOSE UR-MCNC: NEGATIVE MG/DL
KETONES UR-MCNC: NEGATIVE MG/DL
LEUKOCYTE ESTERASE UR QL STRIP.AUTO: NEGATIVE
NITRITE UR QL STRIP.AUTO: NEGATIVE
PH UR: 5.5 [PH] (ref 5–8)
PROT UR-MCNC: NEGATIVE MG/DL
SP GR UR STRIP: 1.02 (ref 1–1.03)
UROBILINOGEN UR STRIP-ACNC: 0.2

## 2024-02-20 PROCEDURE — 81001 URINALYSIS AUTO W/SCOPE: CPT

## 2024-02-20 PROCEDURE — 81015 MICROSCOPIC EXAM OF URINE: CPT

## 2024-02-20 RX ORDER — WARFARIN SODIUM 5 MG/1
TABLET ORAL
Qty: 110 TABLET | Refills: 1 | Status: SHIPPED | OUTPATIENT
Start: 2024-02-20

## 2024-02-20 NOTE — TELEPHONE ENCOUNTER
WARFARIN protocol passed.  WARFARIN Plan per protocol: 7.5 mg every Tue, Thu; 5 mg all other days

## 2024-02-26 ENCOUNTER — ANTI-COAG VISIT (OUTPATIENT)
Dept: ANTICOAGULATION | Facility: CLINIC | Age: 74
End: 2024-02-26

## 2024-02-26 DIAGNOSIS — I82.532 CHRONIC DEEP VEIN THROMBOSIS OF LEFT POPLITEAL VEIN (HCC): ICD-10-CM

## 2024-02-26 DIAGNOSIS — Z51.81 MONITORING FOR LONG-TERM ANTICOAGULANT USE: ICD-10-CM

## 2024-02-26 DIAGNOSIS — D68.61 ANTIPHOSPHOLIPID SYNDROME (HCC): Primary | ICD-10-CM

## 2024-02-26 DIAGNOSIS — Z79.01 MONITORING FOR LONG-TERM ANTICOAGULANT USE: ICD-10-CM

## 2024-02-26 DIAGNOSIS — Z79.01 LONG TERM (CURRENT) USE OF ANTICOAGULANTS: ICD-10-CM

## 2024-02-26 LAB
INR: 2.2 (ref 2–3)
TEST STRIP EXPIRATION DATE: ABNORMAL DATE

## 2024-02-26 NOTE — PROGRESS NOTES
Face-to-Face  / INR 2.2,  therapeutic. (Goal 2.5 ) TTR 72.6 %     Etiology: stable. No changes.    PLAN: continue the current dose.    Recheck INR 4 weeks.    Pt reports:    No sign of unusual bruising or bleeding.  Any missed doses: No   Medications changes: No    Julia verbalized understanding and agreement. AVS provided.    WARFARIN Plan per protocol: 7.5 mg every Tue, Thu; 5 mg all other days

## 2024-03-04 RX ORDER — METOPROLOL TARTRATE 100 MG/1
TABLET ORAL
Qty: 90 TABLET | Refills: 1 | Status: SHIPPED | OUTPATIENT
Start: 2024-03-04

## 2024-03-04 NOTE — TELEPHONE ENCOUNTER
Eze Dunne was seen in endocrine clinic for a diabetes follow up. Today we made the following changes:  - rotate pump sites off of belly  - bolus prior to meals  - meet with dietician     Reminders for patients with diabetes:    1. If you are ill, or have high blood glucose (over 300), you should check ketones. If ketones are moderate or higher, or, if more than one episode of vomiting, please call immediately. 2. If your blood sugar is less than 70 mg/dL, eat or drink 15 g of carbohydrate (4 oz fruit juice or 3-4 glucose tablets). Check your blood sugar every 15 minutes and eat or drink another 15 g of carbohydrate if your blood glucose is still less than 70 mg/dL. 3. If you are on a pump, please have a written copy of your pump settings in the event of a pump failure. If you pump fails, please call the insulin pump company for a replacement and call us to discuss injection management. Please have both long and short acting insulin. Call our office for a long acting insulin prescription if you do not have long acting insulin. 4. Please upload all diabetes data the day prior to your appointments. Uploading data can take time and we recommend having that data available to your provider so your appointment can run more smoothly. We will contact you within 1-2 weeks after any laboratory or imaging studies performed. Some labs take longer than 1-2 weeks to result. Please call if you do not hear from us within 2 weeks. You may reach us at: [phone]  Please contact us during normal business hours and reserve the after hours line for medical emergencies. All refills will be handled during normal business hours. Thank you. Patient contacted

## 2024-03-14 RX ORDER — INSULIN GLARGINE 100 [IU]/ML
INJECTION, SOLUTION SUBCUTANEOUS
Qty: 45 ML | Refills: 3 | Status: SHIPPED | OUTPATIENT
Start: 2024-03-14

## 2024-03-14 NOTE — TELEPHONE ENCOUNTER
Please review; Protocol Failed/ no protocol.  Rx Pended, authorize if appropriate    Requested Prescriptions   Pending Prescriptions Disp Refills    insulin glargine (LANTUS SOLOSTAR) 100 UNIT/ML Subcutaneous Solution Pen-injector [Pharmacy Med Name: Lantus Solostar 100 Unit/Ml Inj [place]] 45 mL 3     Sig: INJECT 45 UNITS INTHE MORNING AND 7 UNITS IN THE EVENING       Diabetes Medication Protocol Failed - 3/13/2024 10:51 AM        Failed - EGFRCR or GFRNAA > 50     GFR Evaluation  EGFRCR: 43 , resulted on 1/26/2024          Passed - Last A1C < 7.5 and within past 6 months     Lab Results   Component Value Date    A1C 6.4 (H) 01/26/2024             Passed - In person appointment or virtual visit in the past 6 mos or appointment in next 3 mos     Recent Outpatient Visits              4 weeks ago Antiphospholipid syndrome ([facility])    [facility] Sara Valenzuela MD    Office Visit    1 month ago Morbid (severe) obesity due to excess calories ([facility])    [facility] Rajat Hurd MD    Office Visit    5 months ago [facility]    [facility] Visit    7 months ago Controlled type 2 diabetes mellitus without complication, without long-term current use of insulin ([facility]    Office Visit    7 months ago Stage 3b chronic kidney disease ([facility])    [facility] Rajat Hurd MD    Office Visit          Future Appointments         Provider Department Appt Notes    In 1 week Halle Nassar RN [facility]     In 2 weeks Kelsey Lopez MD [facility] Last office visit 11/17/21    In 1 month Bharathi Quinonez MD [facility] -  Subacute cough  (Policy informed)    In 3 months MALIHA ARAIZA [facility] colon w/mac @[facility]    In 5 months Sara Valenzuela MD [facility]     In 5 months Rajat Hurd MD [facility] Blood work results discussion.               Passed - Microalbumin procedure in past 12 months or taking ACE/ARB        Passed - GFR in the past 12 months

## 2024-03-25 ENCOUNTER — ANTI-COAG VISIT (OUTPATIENT)
Dept: ANTICOAGULATION | Facility: CLINIC | Age: 74
End: 2024-03-25

## 2024-03-25 DIAGNOSIS — Z79.01 MONITORING FOR LONG-TERM ANTICOAGULANT USE: ICD-10-CM

## 2024-03-25 DIAGNOSIS — Z51.81 MONITORING FOR LONG-TERM ANTICOAGULANT USE: ICD-10-CM

## 2024-03-25 DIAGNOSIS — I82.532 CHRONIC DEEP VEIN THROMBOSIS OF LEFT POPLITEAL VEIN (HCC): ICD-10-CM

## 2024-03-25 DIAGNOSIS — D68.61 ANTIPHOSPHOLIPID SYNDROME (HCC): Primary | ICD-10-CM

## 2024-03-25 DIAGNOSIS — Z79.01 LONG TERM (CURRENT) USE OF ANTICOAGULANTS: ICD-10-CM

## 2024-03-25 LAB
INR: 2.5 (ref 2–3)
TEST STRIP EXPIRATION DATE: NORMAL DATE

## 2024-03-25 PROCEDURE — 85610 PROTHROMBIN TIME: CPT | Performed by: FAMILY MEDICINE

## 2024-03-25 PROCEDURE — 93793 ANTICOAG MGMT PT WARFARIN: CPT | Performed by: FAMILY MEDICINE

## 2024-03-25 NOTE — PROGRESS NOTES
Face-to-Face  / INR 2.5,  therapeutic. (Goal 2.5 ) TTR 73.0 %     Etiology: very stable. No  changes.    PLAN: continue the current dose.    Recheck INR 4 weeks.    Pt reports:    No sign of unusual bruising or bleeding.  Any missed doses: No   Medications changes: No  Julia & spouse verbalized understanding and agreement. [place] AVS provided.    WARFARIN Plan per protocol: 7.5 mg every Tue, Thu; 5 mg all other days

## 2024-03-26 ENCOUNTER — TELEPHONE (OUTPATIENT)
Dept: RHEUMATOLOGY | Facility: CLINIC | Age: 74
End: 2024-03-26

## 2024-03-26 DIAGNOSIS — L40.50 PSORIATIC ARTHRITIS (HCC): Primary | ICD-10-CM

## 2024-03-26 NOTE — TELEPHONE ENCOUNTER
Per patient she has an appointment on 04/02/2024 and would like to know if she needs to have blood work prior to her appointment.  Please advise.

## 2024-03-26 NOTE — TELEPHONE ENCOUNTER
LOV was 11/17/2021. Will see you on 4/2/24. Pt asking if she should have labs before seeing you; please advise.

## 2024-03-26 NOTE — TELEPHONE ENCOUNTER
Phoned pt; verified name and . Informed pt lab orders are in the system to be drawn before appt on 24.

## 2024-03-28 ENCOUNTER — LAB ENCOUNTER (OUTPATIENT)
Dept: LAB | Facility: HOSPITAL | Age: 74
End: 2024-03-28
Attending: INTERNAL MEDICINE
Payer: MEDICARE

## 2024-03-28 DIAGNOSIS — L40.50 PSORIATIC ARTHRITIS (HCC): ICD-10-CM

## 2024-03-28 LAB
ALBUMIN SERPL-MCNC: 4.4 G/DL (ref 3.2–4.8)
ALT SERPL-CCNC: 18 U/L
AST SERPL-CCNC: 21 U/L (ref ?–34)
BASOPHILS # BLD AUTO: 0.04 X10(3) UL (ref 0–0.2)
BASOPHILS NFR BLD AUTO: 0.5 %
CREAT BLD-MCNC: 1.3 MG/DL
CRP SERPL-MCNC: <0.4 MG/DL (ref ?–1)
DEPRECATED RDW RBC AUTO: 43.5 FL (ref 35.1–46.3)
EGFRCR SERPLBLD CKD-EPI 2021: 44 ML/MIN/1.73M2 (ref 60–?)
EOSINOPHIL # BLD AUTO: 0.41 X10(3) UL (ref 0–0.7)
EOSINOPHIL NFR BLD AUTO: 5.6 %
ERYTHROCYTE [DISTWIDTH] IN BLOOD BY AUTOMATED COUNT: 12.7 % (ref 11–15)
ERYTHROCYTE [SEDIMENTATION RATE] IN BLOOD: 33 MM/HR
HCT VFR BLD AUTO: 37.3 %
HGB BLD-MCNC: 12.2 G/DL
IMM GRANULOCYTES # BLD AUTO: 0.02 X10(3) UL (ref 0–1)
IMM GRANULOCYTES NFR BLD: 0.3 %
LYMPHOCYTES # BLD AUTO: 2.13 X10(3) UL (ref 1–4)
LYMPHOCYTES NFR BLD AUTO: 28.9 %
MCH RBC QN AUTO: 30.3 PG (ref 26–34)
MCHC RBC AUTO-ENTMCNC: 32.7 G/DL (ref 31–37)
MCV RBC AUTO: 92.8 FL
MONOCYTES # BLD AUTO: 0.84 X10(3) UL (ref 0.1–1)
MONOCYTES NFR BLD AUTO: 11.4 %
NEUTROPHILS # BLD AUTO: 3.93 X10 (3) UL (ref 1.5–7.7)
NEUTROPHILS # BLD AUTO: 3.93 X10(3) UL (ref 1.5–7.7)
NEUTROPHILS NFR BLD AUTO: 53.3 %
PLATELET # BLD AUTO: 259 10(3)UL (ref 150–450)
RBC # BLD AUTO: 4.02 X10(6)UL
WBC # BLD AUTO: 7.4 X10(3) UL (ref 4–11)

## 2024-03-28 PROCEDURE — 36415 COLL VENOUS BLD VENIPUNCTURE: CPT

## 2024-03-28 PROCEDURE — 82565 ASSAY OF CREATININE: CPT

## 2024-03-28 PROCEDURE — 82040 ASSAY OF SERUM ALBUMIN: CPT

## 2024-03-28 PROCEDURE — 86140 C-REACTIVE PROTEIN: CPT

## 2024-03-28 PROCEDURE — 85025 COMPLETE CBC W/AUTO DIFF WBC: CPT

## 2024-03-28 PROCEDURE — 85652 RBC SED RATE AUTOMATED: CPT

## 2024-03-28 PROCEDURE — 84450 TRANSFERASE (AST) (SGOT): CPT

## 2024-03-28 PROCEDURE — 84460 ALANINE AMINO (ALT) (SGPT): CPT

## 2024-04-02 ENCOUNTER — TELEPHONE (OUTPATIENT)
Dept: RHEUMATOLOGY | Facility: CLINIC | Age: 74
End: 2024-04-02

## 2024-04-02 ENCOUNTER — OFFICE VISIT (OUTPATIENT)
Dept: RHEUMATOLOGY | Facility: CLINIC | Age: 74
End: 2024-04-02

## 2024-04-02 ENCOUNTER — LAB ENCOUNTER (OUTPATIENT)
Dept: LAB | Facility: HOSPITAL | Age: 74
End: 2024-04-02
Attending: INTERNAL MEDICINE
Payer: MEDICARE

## 2024-04-02 VITALS
HEART RATE: 67 BPM | HEIGHT: 69 IN | SYSTOLIC BLOOD PRESSURE: 156 MMHG | DIASTOLIC BLOOD PRESSURE: 77 MMHG | BODY MASS INDEX: 35.7 KG/M2 | WEIGHT: 241 LBS

## 2024-04-02 DIAGNOSIS — L40.50 PSORIATIC ARTHRITIS (HCC): Primary | ICD-10-CM

## 2024-04-02 DIAGNOSIS — Z51.81 MEDICATION MONITORING ENCOUNTER: ICD-10-CM

## 2024-04-02 DIAGNOSIS — L40.50 PSORIATIC ARTHRITIS (HCC): ICD-10-CM

## 2024-04-02 PROCEDURE — 3008F BODY MASS INDEX DOCD: CPT | Performed by: INTERNAL MEDICINE

## 2024-04-02 PROCEDURE — 1159F MED LIST DOCD IN RCRD: CPT | Performed by: INTERNAL MEDICINE

## 2024-04-02 PROCEDURE — 86480 TB TEST CELL IMMUN MEASURE: CPT

## 2024-04-02 PROCEDURE — 1126F AMNT PAIN NOTED NONE PRSNT: CPT | Performed by: INTERNAL MEDICINE

## 2024-04-02 PROCEDURE — 3077F SYST BP >= 140 MM HG: CPT | Performed by: INTERNAL MEDICINE

## 2024-04-02 PROCEDURE — 1160F RVW MEDS BY RX/DR IN RCRD: CPT | Performed by: INTERNAL MEDICINE

## 2024-04-02 PROCEDURE — 3078F DIAST BP <80 MM HG: CPT | Performed by: INTERNAL MEDICINE

## 2024-04-02 PROCEDURE — 36415 COLL VENOUS BLD VENIPUNCTURE: CPT

## 2024-04-02 PROCEDURE — 99214 OFFICE O/P EST MOD 30 MIN: CPT | Performed by: INTERNAL MEDICINE

## 2024-04-02 RX ORDER — SECUKINUMAB 150 MG/ML
300 INJECTION SUBCUTANEOUS
Qty: 6 EACH | Refills: 3 | Status: SHIPPED | OUTPATIENT
Start: 2024-04-02

## 2024-04-02 RX ORDER — CLOBETASOL PROPIONATE 0.46 MG/ML
1 SOLUTION TOPICAL 2 TIMES DAILY
Qty: 1 EACH | Refills: 0 | Status: SHIPPED | OUTPATIENT
Start: 2024-04-02

## 2024-04-02 RX ORDER — SECUKINUMAB 150 MG/ML
300 INJECTION SUBCUTANEOUS
Qty: 6 EACH | Refills: 3 | Status: SHIPPED | OUTPATIENT
Start: 2024-04-02 | End: 2024-04-02

## 2024-04-02 NOTE — PATIENT INSTRUCTIONS
You were seen today for psoriasis and psoriatic arthritis  You are having some joint pain off the Cosentyx  Plan to get you approved for Cosentyx again  Get the TB blood work today  Blood work every 6 months  You can see me in 6 months

## 2024-04-02 NOTE — PROGRESS NOTES
Julia Hernandez is a 73 year old female.    HPI:     Chief Complaint   Patient presents with    Follow - Up     Psoriatic arthritis and Lab results F/u        Julia was seen today 4/2/2024 Psoriasis with psoriatic arthritis and Antiphospholipid syndrome.    Current Medications:  Cosentyx 300 mg- started Dec 2019  Clobetasol cream  Triamcinolone cream  Previous medications:  MTX 6 pills weekly, FA daily- stopped in July 2021  Blood work:  ADRIEL 1: 640  ESR 46-->57-->43-->72-->normal and CRP 1.17-->normal  + APL (+ DRVVT, beta-2 glycoprotein IgM 66, cardiolipin IgM 82)  low C4 of 13, Normal C3  Slightly elevated Cr 1.3  Normal dsDNA, SSA, SSB, Scl-70, centromere, smith, RNP, Negative RF and CCP    Interval History:  This is a 70 yo F with hx of HTN, HLD, DM-2, Hypothyroidism (thyroidectomy), Osteopenia, R TKR about 2-3 years ago, L DVT dx in 12/2019 (extensive, mostly occlusive thrombus involving L peroneal veins not on anticoagulation) unprovoked but has been more sedentary presents for f/u for PsA (initially started in L wrist with swelling and pain).      7/21/2021:  Presents for Psoriasis and PsA  She remains on Cosentyx 300 mg monthly and methotrexate 6 pills weekly  Most recent blood work shows normal ESR and CRP  She has been having a chronic cough for the past couple months.  It starts off with a dry cough and then gets congested and loose.  She has been on multiple antibiotics.  Chest x-ray shows prominent pulmonary markings, they are concerned of methotrexate could be causing her symptoms.  She will be seeing the pulmonologist on August 5  Reports very minimal joint pain.  No active psoriasis    11/17/2021  Presents for Psoriasis and PsA  During her last visit in July she was having a lot of dry cough and persistent cough for couple of months.  We discontinued her methotrexate and that has resolved  Reports minimal joint pain involving her hands, wrist or ankles.  At times will swell but only  last 1 day.  Pain is very short-lived.  It does not affect her ADLs.  She has not had to take any other medication for her pain  Remains on Cosentyx monthly  Reviewed blood work with patient, normal ESR.  CRP very slightly elevated at 1.12    4/2/2024:  Presents for follow-up of psoriasis and psoriatic arthritis  Last seen November 2021  Recent blood work showing elevated ESR of 33  She was on Cosentyx but stopped November 2023 as she ran out and needed a follow up visit  Has stiffness in the ankles in the morning  Some swelling wrists and some stiffness  She feels better on the injections  No rash or psoriasis  Some dryness in the scalp      HISTORY:  Past Medical History:   Diagnosis Date    Appendicitis     Cholelithiasis     Deep vein thrombosis (HCC)     L leg     Essential hypertension     Goiter     Multinodular goiter S/P thyroidectomy.     Osteopenia     Vertigo, aural       Social Hx Reviewed   Family Hx Reviewed     Medications (Active prior to today's visit):  Current Outpatient Medications   Medication Sig Dispense Refill    insulin glargine (LANTUS SOLOSTAR) 100 UNIT/ML Subcutaneous Solution Pen-injector INJECT 45 UNITS INTHE MORNING AND 7 UNITS IN THE EVENING 45 mL 3    METOPROLOL TARTRATE 100 MG Oral Tab Take 1 tablet by mouth in the morning and 1/2 tablet in the afternoon 90 tablet 1    levothyroxine 100 MCG Oral Tab Take 1 tablet (100 mcg total) by mouth before breakfast.      levothyroxine 88 MCG Oral Tab Take 1 tablet (88 mcg total) by mouth before breakfast. Dr. pringle      SPIRONOLACTONE 25 MG Oral Tab TAKE ONE TABLET BY MOUTH ONE TIME DAILY 90 tablet 0    valsartan 160 MG Oral Tab TAKE ONE TABLET BY MOUTH TWICE DAILY 180 tablet 1    minoxidil 2.5 MG Oral Tab Take 2 tablets (5 mg total) by mouth 2 (two) times daily. (Patient taking differently: Take 1 tablet (2.5 mg total) by mouth 2 (two) times daily.) 360 tablet 3    albuterol (2.5 MG/3ML) 0.083% Inhalation Nebu Soln Take 3 mL (2.5 mg total)  by nebulization every 6 (six) hours as needed for Wheezing. 50 each 0    indapamide 2.5 MG Oral Tab Take 1 tablet (2.5 mg total) by mouth daily and qpm. 180 tablet 2    glipiZIDE 10 MG Oral Tab Take 1 tablet (10 mg total) by mouth 2 (two) times daily before meals. 180 tablet 1    escitalopram 5 MG Oral Tab Take 1 tablet (5 mg total) by mouth nightly. 90 tablet 3    Glucose Blood (ACCU-CHEK GUIDE) In Vitro Strip CHECK TWICE DAILY 200 strip 3    Insulin Pen Needle (CAREFINE PEN NEEDLES) 32G X 4 MM Does not apply Misc Injects qhs. 100 each 6    Insulin Pen Needle (BD PEN NEEDLE ORIGINAL U/F) 29G X 12.7MM Does not apply Misc Dx. E11.9. Checks q12hrs. BD ultrafine MINI. 100 each 6    ACCU-CHEK FASTCLIX LANCETS Does not apply Misc CHECK EVERY MORNING AND EVENING 204 each 11    warfarin 5 MG Oral Tab Take as directed by INR clinic or take 7.5mg Tuesdays & Thursdays, take 5mg all other days 110 tablet 1    fluticasone-salmeterol 250-50 MCG/ACT Inhalation Aerosol Powder, Breath Activated Inhale 1 puff into the lungs every 12 (twelve) hours. 1 each 1    simvastatin 20 MG Oral Tab Take 1 tablet (20 mg total) by mouth twice a week. 24 tablet 3    PEG 3350-KCl-Na Bicarb-NaCl (TRILYTE) 420 g Oral Recon Soln Take prep as directed by gastro office. May substitute with Trilyte/generic equivalent if needed. (Patient not taking: Reported on 2/1/2024) 1 each 0    dicyclomine 10 MG Oral Cap Take 1 capsule (10 mg total) by mouth 2 (two) times daily as needed. (Patient not taking: Reported on 4/2/2024) 60 capsule 1    Secukinumab, 300 MG Dose, (COSENTYX SENSOREADY, 300 MG,) 150 MG/ML Subcutaneous Solution Auto-injector Inject 300 mg into the skin every 28 days. (Patient not taking: Reported on 2/1/2024) 6 each 3    Alcohol Swabs Does not apply Pads Code: E11.9.  Checks twice daily. (Patient not taking: Reported on 4/2/2024) 100 each 11    Omega-3-acid Ethyl Esters (LOVAZA) 1 G Oral Cap Take 1 capsule (1 g total) by mouth 2 (two) times  daily with meals.       .cmed  Allergies:  Allergies   Allergen Reactions    Amlodipine SHORTNESS OF BREATH     Chest pressure.  Patient does not recall having any allergies    Metformin DIARRHEA     Patient told me she has no allergies    Metoprolol DIARRHEA     Patient told me that she has no allergies, better now-diarrhea was from eggs per patient.         ROS:   All other ROS are negative.     PHYSICAL EXAM:   GEN: AAOx3, NAD  HEENT: EOMI, PERRLA, no injection or icterus, oral mucosa moist, no oral lesions. No lymphadenopathy. No facial rash  CVS: RRR, no murmurs rubs or gallops. Equal 2+ distal pulses.   LUNGS: CTAB, no increased work of breathing  ABDOMEN:  soft NT/ND, +BS, no HSM  SKIN: No rashes or skin lesions. No nail findings  MSK:  Wrist with chronic synovitis but no active swelling, nontender to palpation  Right shoulder abduction limited to 90 degrees, has a history of right shoulder fracture.  NEURO: Cranial nerves II-XII intact grossly. 5/5 strength throughout in both upper and lower extremities, sensation intact.  PSYCH: normal mood        LABS:     Component      Latest Ref Rng & Units 4/26/2020 1/24/2020   WBC      4.0 - 11.0 x10(3) uL 5.5 6.1   RBC      3.80 - 5.30 x10(6)uL 3.45 (L) 3.59 (L)   Hemoglobin      12.0 - 16.0 g/dL 11.1 (L) 11.3 (L)   Hematocrit      35.0 - 48.0 % 34.3 (L) 34.5 (L)   MCV      80.0 - 100.0 fL 99.4 96.1   MCH      26.0 - 34.0 pg 32.2 31.5   MCHC      31.0 - 37.0 g/dL 32.4 32.8   RDW-SD      35.1 - 46.3 fL 54.3 (H) 50.5 (H)   RDW      11.0 - 15.0 % 15.1 (H) 14.6   Platelet Count      150.0 - 450.0 10(3)uL 257.0 351.0   Prelim Neutrophil Abs      1.50 - 7.70 x10 (3) uL 3.24 4.16   Neutrophils Absolute      1.50 - 7.70 x10(3) uL 3.24 4.16   Lymphocytes Absolute      1.00 - 4.00 x10(3) uL 0.98 (L) 1.06   Monocytes Absolute      0.10 - 1.00 x10(3) uL 0.78 0.50   Eosinophils Absolute      0.00 - 0.70 x10(3) uL 0.39 0.30   Basophils Absolute      0.00 - 0.20 x10(3) uL 0.05 0.02    Immature Granulocyte Absolute      0.00 - 1.00 x10(3) uL 0.02 0.02   Neutrophils %      % 59.4 68.6   Lymphocytes %      % 17.9 17.5   Monocytes %      % 14.3 8.3   Eosinophils %      % 7.1 5.0   Basophils %      % 0.9 0.3   Immature Granulocyte %      % 0.4 0.3   CREATININE      0.55 - 1.02 mg/dL 1.07 (H) 1.19 (H)   eGFR NON-AFR. AMERICAN      >=60 53 (L) 47 (L)   eGFR       >=60 61 54 (L)   SED RATE      0 - 30 mm/Hr 28 37 (H)   C-REACTIVE PROTEIN      <0.30 mg/dL <0.29 <0.29   AST (SGOT)      15 - 37 U/L 19 49 (H)   ALT (SGPT)      13 - 56 U/L 25 82 (H)   Albumin      3.4 - 5.0 g/dL 3.4 3.5       Component      Latest Ref Rng & Units 1/16/2019   Quantiferon TB NIL      IU/mL 0.140   QUANTIFERON TB MINUS NIL      IU/mL 0.070   Quantiferon TB Mitogen minus NIL      IU/mL 8.977   QTB.RESULT      Negative Negative   HEPATITIS B SURF AB QUANT      <10.00 mIU/mL <3.10   HEPATITIS B SURFACE AB QUAL      Nonreactive  Nonreactive   GLUCOSE-6-PHOSPHATE DEHYDROGEN      9.9 - 16.6 U/g Hb 11.7   HEPATITIS B CORE ANTIBODY(S)      Nonreactive  Nonreactive   HCV AB      Nonreactive Nonreactive     Component      Latest Ref Rng & Units 3/16/2019 3/6/2019   Lupus Anticoag Screen Interp       Conclusion: Positive . . .    PT      11.8 - 14.5 seconds 13.0    APTT      23.2 - 35.3 seconds 30.3    Hexagonal Phase Staclot LA      Negative Positive (A)    DRVVT Ratio      0.0 - 1.1 1.4 (H)    DRVV Mix Ratio      0.0 - 1.1 1.4 (H)    DRVV Confirm      0.0 - 1.1 1.4 (H)    BETA 2 GLYCOPROTEIN 1 AB, IgM      <=15.0 U/mL 112.3 (H) 152.8 (H)   BETA 2 GLYCOPROTEIN 1 AB, IgG      <=15.0 U/mL 5.0 9.9   Cardiolipin IgG Antibody      0.0 - 14.9 GPL  3.9   Cardiolipin IgM Antibody      0.0 - 12.4 MPL  88.7 (H)     Component      Latest Ref Rng & Units 12/12/2018   Lupus Anticoag Screen Interp       Conclusion: Positive . . .   PT      11.8 - 14.5 seconds 12.9   APTT      23.2 - 35.3 seconds 29.2   Hexagonal Phase Staclot LA       Negative Positive (A)   DRVVT Ratio      0.0 - 1.1 1.2 (H)   DRVV Mix Ratio      0.0 - 1.1 1.3 (H)   DRVV Confirm      0.0 - 1.1 1.2 (H)   BETA 2 GLYCOPROTEIN 1 AB, IgM      <=15.0 U/mL 66.8 (H)   BETA 2 GLYCOPROTEIN 1 AB, IgG      <=15.0 U/mL 6.3   Cardiolipin IgG Antibody      0.0 - 14.9 GPL 6.2   Cardiolipin IgM Antibody      0.0 - 12.4 MPL 82.2 (H)       Component      Latest Ref Rng & Units 12/12/2018   TOTAL PROTEIN      6.5 - 9.1 g/dL 7.5   Albumin      3.75 - 5.21 g/dL 3.74 (L)   ALPHA-1-GLOBULINS      0.19 - 0.46 g/dL 0.41   ALPHA-2-GLOBULINS      0.48 - 1.05 g/dL 0.88   BETA GLOBULINS      0.68 - 1.23 g/dL 1.00   GAMMA GLOBULINS      0.62 - 1.70 g/dL 1.49   ALBUMIN/GLOBULIN RATIO      1.00 - 2.00 0.99 (L)   SPE INTERPRETATION       Minimally decreased albumin is present. . . .   Reviewed By:       Alexi Recio M.D.   C-Citrullinated Peptide IgG AB      0.0 - 6.9 U/mL 1.4   RHEUMATOID FACTOR      <11 IU/mL <5   COMPLEMENT C4      18 - 55 mg/dL 13 (L)   COMPLEMENT C3      88 - 201 mg/dL 98   Centromere Ab, IgG      0 - 40 AU/mL 6   Anti-Crespo/RNP Antibody      Negative Negative   Anti-Smith Antibody      Negative Negative   Anti-Sjogren's A      Negative Negative   Anti-Sjogren's B      Negative Negative   Scleroderma (Scl-70) (JAH) Antibody, IgG      0 - 40 AU/mL 3   Anti Double Strand DNA      <10 <10     Component      Latest Ref Rng & Units 12/12/2018 10/25/2018   ADRIEL Titer/Pattern      <80  640 (A)   Reviewed By:        Mich Martinez M.D.   ADRIEL SCREEN      Negative  Positive (A)   SED RATE      0 - 30 mm/Hr  46 (H)   RHEUMATOID FACTOR      <11 IU/mL  <5   C-REACTIVE PROTEIN      0.0 - 0.9 mg/dL 1.2 (H)        Imaging:     XR B/L hand 12/2018:  There is demineralization of the bony structures. Minimal osteoarthritic change is seen about the triscaphe joint as well as multiple interphalangeal joints. There is narrowing of the fifth MCP joint    L Venous US 12/4/18:  1. Extensive, mostly occlusive  thrombus involving the more anterior of the 2 paired left peroneal veins with mild partial flow at the mid aspect. Otherwise, no evidence of left lower extremity deep venous thrombosis.  2. Unremarkable right lower extremity study. No evidence of right-sided DVT.    ASSESSMENT/PLAN:   73-year-old Icelandic-speaking female initially referred for a + ADRIEL and rash.  She was seen by dermatology and rashes consistent with psoriasis.  She was prescribed clobetasol cream and shampoo her scalp and her rash is improved.  She also reports joint pain and swelling in her wrists, MCPs and PIPs with morning stiffness.  On examination she initially had synovitis in her wrists and decreased range of motion.  Symptoms consistent with psoriatic arthritis.  She also was found to have an incidental L LE DVT diagnosed in December 2018.  Further workup was done and she was found to have + antiphospholipid antibodies.       Psoriasis with PsA- improved  - She was seen by dermatology and they were confident that she has psoriasis.  She was placed on clobetasol cream and shampoo her scalp and her lesions have improved significantly.  She reports her pruritus has improved also  - X-rays the wrist showed carpal narrowing and on examination she does have chronic synovitis in her wrists.  - She stopped Cosentyx in Nov 2023 as she ran out  - She is having some stiffness in her ankles, hands and wrists.  She felt better on Cosentyx  - Plan restart Cosentyx 3 mg every 4 weeks  - Continue Cosentyx every 4 weeks  - Blood work reviewed, repeat in 6 mos   - TNFi CI due to dx of APL and +ADRIEL  - Xeljanz CI due to DVT    Osteopenia  - Bone density was done 11/2021, showing osteopenia of the left femoral neck and left total hip.  - Major osteoporotic fracture was 11% and hip fracture 2.4%.  - Advised patient that she can take calcium 1200 mg daily and vitamin D supplements.  Also advised weightbearing exercises    R shoulder fracture s/p fall  APL  - She  was found to have an incidental left DVT, ultrasound was done for screening of varicose veins  - APL Abs were positive (tripple +).  She is also found to have low C4  - She has no other features of lupus therefore diagnosis is mostly consistent with antiphospholipid syndrome.  Repeat testing 12 weeks apart confirm diagnosis of APL  - She is following with hematology, continue Coumadin  Kidney insuffiencey- resolved  - She follows with nephrology     Per pt can contact daughter Taylor [phone]    Pt will f/u in 6 mos    Kelsey Lopez MD  4/2/2024  3:11 PM

## 2024-04-03 ENCOUNTER — TELEPHONE (OUTPATIENT)
Dept: RHEUMATOLOGY | Facility: CLINIC | Age: 74
End: 2024-04-03

## 2024-04-03 NOTE — TELEPHONE ENCOUNTER
Pt gave provider letter from [employer] dated 12/19/2023 that Cosentyx approved at no cost until 12/31/2024.

## 2024-04-04 LAB
M TB IFN-G CD4+ T-CELLS BLD-ACNC: 0.22 IU/ML
M TB TUBERC IFN-G BLD QL: NEGATIVE
M TB TUBERC IGNF/MITOGEN IGNF CONTROL: >10 IU/ML
QFT TB1 AG MINUS NIL: -0.04 IU/ML
QFT TB2 AG MINUS NIL: -0.12 IU/ML

## 2024-04-08 ENCOUNTER — TELEPHONE (OUTPATIENT)
Dept: INTERNAL MEDICINE CLINIC | Facility: CLINIC | Age: 74
End: 2024-04-08

## 2024-04-08 RX ORDER — INSULIN GLARGINE 100 [IU]/ML
INJECTION, SOLUTION SUBCUTANEOUS
Qty: 45 ML | Refills: 3 | Status: SHIPPED | OUTPATIENT
Start: 2024-04-08

## 2024-04-08 NOTE — TELEPHONE ENCOUNTER
Using language line : Greg ID number [id number]    Per patient pharmacy stated they have been out of [facility] [place]. Patient has 4-5 days left. RN contacted [place] and per pharmacy border may take up to 1 month.     Using language line : Mia ID number [id number]    Patient notified, verbalized understanding. Requesting script be resent to [facility]. This was completed.

## 2024-04-09 ENCOUNTER — TELEPHONE (OUTPATIENT)
Dept: RHEUMATOLOGY | Facility: CLINIC | Age: 74
End: 2024-04-09

## 2024-04-09 RX ORDER — SECUKINUMAB 150 MG/ML
300 INJECTION SUBCUTANEOUS
Qty: 6 EACH | Refills: 3 | Status: SHIPPED | OUTPATIENT
Start: 2024-04-09 | End: 2024-04-10

## 2024-04-09 NOTE — TELEPHONE ENCOUNTER
Called pharmacy no answer. Left a vm to call office back to confirm if receive recent rx of 4/2/24 by Dr. Lopez.    Outpatient Medication Detail     Disp Refills Start End    Secukinumab, 300 MG Dose, (COSENTYX SENSOREADY, 300 MG,) 150 MG/ML Subcutaneous Solution Auto-injector 6 each 3 4/2/2024 --    Sig - Route: Inject 300 mg into the skin every 28 days. - Subcutaneous    Sent to pharmacy as: Cosentyx Sensoready (300 MG) 150 MG/ML Subcutaneous Solution Auto-injector (Secukinumab (300 MG Dose))    E-Prescribing Status: Receipt confirmed by pharmacy (4/2/2024  3:33 PM CDT)      Pharmacy    [facility]Y [facility] AT [facility] [phone], [phone]

## 2024-04-09 NOTE — TELEPHONE ENCOUNTER
Per daughter, pharmacy services at [facility] has not received patient's cosentyx prescription. Daughter is requesting to have prescription sent again. Patient is out of medication.     PHARMACY SERVICES AT [place], [place] - [facility]        Disp Refills Start End    Secukinumab, 300 MG Dose, (COSENTYX SENSOREADY, 300 MG,) 150 MG/ML Subcutaneous Solution Auto-injector 6 each 3 4/2/2024 --   Sig - Route: Inject 300 mg into the skin every 28 days. - Subcutaneous   Sent to pharmacy as: Cosentyx Sensoready (300 MG) 150 MG/ML Subcutaneous Solution Auto-injector (Secukinumab (300 MG Dose))   E-Prescribing Status: Receipt confirmed by pharmacy (4/2/2024  3:33 PM CDT)

## 2024-04-10 ENCOUNTER — TELEPHONE (OUTPATIENT)
Dept: RHEUMATOLOGY | Facility: CLINIC | Age: 74
End: 2024-04-10

## 2024-04-10 RX ORDER — SECUKINUMAB 150 MG/ML
300 INJECTION SUBCUTANEOUS
Qty: 6 EACH | Refills: 3 | Status: SHIPPED | OUTPATIENT
Start: 2024-04-10 | End: 2024-04-15

## 2024-04-10 NOTE — TELEPHONE ENCOUNTER
[facility] pharmacy calling to confirm that office has received the fax requesting prior authorization for Cosentyx. Please complete and send to:  Fax# [phone]

## 2024-04-12 NOTE — TELEPHONE ENCOUNTER
PA start    Prior authorization for: Cosentyx 300 mg    Medication form: 150 mg pen     Submission method: Cover My Meds    Spoke with (if by phone):     Date submitted: 4/12/204    Tracking #:  Key: [id number]    QF-TB result:   Component      Latest Ref Rng 4/2/2024   Quantiferon TB NIL      IU/mL 0.22    Quantiferon-TB1 Minus NIL      IU/mL -0.04    Quantiferon-TB2 Minus NIL      IU/mL -0.12    Quantiferon TB Mitogen minus NIL      IU/mL >10.00    Quantiferon TB Result      Negative  Negative

## 2024-04-15 RX ORDER — SECUKINUMAB 150 MG/ML
300 INJECTION SUBCUTANEOUS
Qty: 2 EACH | Refills: 2 | Status: SHIPPED | OUTPATIENT
Start: 2024-04-15

## 2024-04-15 NOTE — TELEPHONE ENCOUNTER
PA Approved    Prior authorization for: Cosentyx    Medication form: 150 mg pen (2)    Date received: 4/12/204    Approval #:    Approved dates: good until 12/31/2024    Pharmacy for medication: [facility]     QF-TB results:   Component      Latest Ref Rng 4/2/2024   Quantiferon TB NIL      IU/mL 0.22    Quantiferon-TB1 Minus NIL      IU/mL -0.04    Quantiferon-TB2 Minus NIL      IU/mL -0.12    Quantiferon TB Mitogen minus NIL      IU/mL >10.00    Quantiferon TB Result      Negative  Negative

## 2024-04-19 RX ORDER — ESCITALOPRAM OXALATE 5 MG/1
5 TABLET ORAL NIGHTLY
Qty: 90 TABLET | Refills: 3 | Status: SHIPPED | OUTPATIENT
Start: 2024-04-19

## 2024-04-19 NOTE — TELEPHONE ENCOUNTER
Refill passed per [facility] protocol.    Requested Prescriptions   Pending Prescriptions Disp Refills    escitalopram 5 MG Oral Tab [Pharmacy Med Name: Escitalopram Oxalate 5 Mg Tab Auro] 90 tablet 3     Sig: Take 1 tablet (5 mg total) by mouth nightly.       Psychiatric Non-Scheduled (Anti-Anxiety) Passed - 4/19/2024  1:31 AM        Passed - In person appointment or virtual visit in the past 6 mos or appointment in next 3 mos     Recent Outpatient Visits              2 weeks ago Psoriatic arthritis ([facility])    [facility] Kelsey Lopez MD    Office Visit    2 months ago Antiphospholipid syndrome ([facility])    [facility] Sara Valenzuela MD    Office Visit    2 months ago Morbid (severe) obesity due to excess calories ([facility])    [facility] Rajat Hurd MD    Office Visit    6 months ago [facility]    [facility] Visit    8 months ago Controlled type 2 diabetes mellitus without complication, without long-term current use of insulin ([facility])    [facility] Sara Valenzuela MD    Office Visit          Future Appointments         Provider Department Appt Notes    In 3 days Halle Nassar RN [facility]     In 2 weeks Bharathi Quinonez MD [facility] - Subacute cough  (Policy informed)    In 1 month MALIHA ARAIZA [facility] colon w/mac @[facility]    In 4 months Sara Valenzuela MD [facility]     In 4 months Rajat Hurd MD [facility] Blood work results discussion.    In 5 months Kelsey Lopez MD [facility] 6 [facility]/u                     Passed - Depression Screening completed within the past 12 months              Recent Outpatient Visits              2 weeks ago Psoriatic arthritis ([facility])    [facility] Kelsey Lopez MD    Office Visit    2 months ago Antiphospholipid syndrome ([facility])    [facility] Sara Valenzuela MD    Office Visit    2 months ago Morbid (severe) obesity due to excess calories ([facility])    [facility] Rajat Hurd MD    Office Visit    6 months ago Acute cough    [facility] Jovan Craft MD    Office Visit    8 months ago Controlled type 2 diabetes mellitus without complication, without long-term current use of insulin ([facility])    [facility] Sara Valenzuela MD    Office Visit            Future Appointments         Provider Department Appt Notes    In 3 days Halle Nassar RN [facility]     In 2 weeks Bharathi Quinonez MD [facility] - Subacute cough  (Policy informed)    In 1 month MALIHA ARAIZA [facility] colon w/mac @[facility]    In 4 months Sara Valenzuela MD [facility]     In 4 months Rajat Hurd MD [facility] Blood work results discussion.    In 5 months Kelsey Lopez MD [facility] 6 mof/u

## 2024-04-22 ENCOUNTER — ANTI-COAG VISIT (OUTPATIENT)
Dept: ANTICOAGULATION | Facility: CLINIC | Age: 74
End: 2024-04-22

## 2024-04-22 DIAGNOSIS — D68.61 ANTIPHOSPHOLIPID SYNDROME (HCC): Primary | ICD-10-CM

## 2024-04-22 DIAGNOSIS — Z79.01 LONG TERM (CURRENT) USE OF ANTICOAGULANTS: ICD-10-CM

## 2024-04-22 DIAGNOSIS — Z51.81 MONITORING FOR LONG-TERM ANTICOAGULANT USE: ICD-10-CM

## 2024-04-22 DIAGNOSIS — I82.532 CHRONIC DEEP VEIN THROMBOSIS OF LEFT POPLITEAL VEIN (HCC): ICD-10-CM

## 2024-04-22 DIAGNOSIS — Z79.01 MONITORING FOR LONG-TERM ANTICOAGULANT USE: ICD-10-CM

## 2024-04-22 LAB
INR: 2.8 (ref 2–3)
TEST STRIP EXPIRATION DATE: NORMAL DATE

## 2024-04-22 PROCEDURE — 85610 PROTHROMBIN TIME: CPT | Performed by: FAMILY MEDICINE

## 2024-04-22 PROCEDURE — 93793 ANTICOAG MGMT PT WARFARIN: CPT | Performed by: FAMILY MEDICINE

## 2024-04-22 NOTE — PROGRESS NOTES
Face-to-Face  / INR 2.8,  therapeutic. (Goal 2.5 ) TTR 73.4 %     Etiology: stable.     PLAN: continue the current dose.     Recheck INR 4 weeks.    Pt reports:    No sign of unusual bruising or bleeding.  Any missed doses: No   Medications changes: No    Contacted  Julia verbalized understanding and agreement. AVS issued.    WARFARIN Plan per protocol: 7.5 mg every Tue, Thu; 5 mg all other days

## 2024-05-06 ENCOUNTER — OFFICE VISIT (OUTPATIENT)
Dept: PULMONOLOGY | Facility: CLINIC | Age: 74
End: 2024-05-06
Payer: MEDICARE

## 2024-05-06 ENCOUNTER — TELEPHONE (OUTPATIENT)
Dept: PULMONOLOGY | Facility: CLINIC | Age: 74
End: 2024-05-06

## 2024-05-06 VITALS
HEART RATE: 60 BPM | WEIGHT: 238 LBS | OXYGEN SATURATION: 98 % | SYSTOLIC BLOOD PRESSURE: 150 MMHG | HEIGHT: 69 IN | DIASTOLIC BLOOD PRESSURE: 70 MMHG | RESPIRATION RATE: 16 BRPM | BODY MASS INDEX: 35.25 KG/M2

## 2024-05-06 DIAGNOSIS — R05.2 SUBACUTE COUGH: Primary | ICD-10-CM

## 2024-05-06 PROCEDURE — 1126F AMNT PAIN NOTED NONE PRSNT: CPT | Performed by: INTERNAL MEDICINE

## 2024-05-06 PROCEDURE — 99213 OFFICE O/P EST LOW 20 MIN: CPT | Performed by: INTERNAL MEDICINE

## 2024-05-06 PROCEDURE — 3077F SYST BP >= 140 MM HG: CPT | Performed by: INTERNAL MEDICINE

## 2024-05-06 PROCEDURE — 3078F DIAST BP <80 MM HG: CPT | Performed by: INTERNAL MEDICINE

## 2024-05-06 PROCEDURE — 1159F MED LIST DOCD IN RCRD: CPT | Performed by: INTERNAL MEDICINE

## 2024-05-06 PROCEDURE — 3008F BODY MASS INDEX DOCD: CPT | Performed by: INTERNAL MEDICINE

## 2024-05-06 RX ORDER — SPIRONOLACTONE 25 MG/1
TABLET ORAL
Qty: 90 TABLET | Refills: 1 | Status: SHIPPED | OUTPATIENT
Start: 2024-05-06

## 2024-05-06 RX ORDER — PREDNISONE 20 MG/1
TABLET ORAL
Qty: 10 TABLET | Refills: 0 | Status: SHIPPED | OUTPATIENT
Start: 2024-05-06

## 2024-05-06 RX ORDER — FLUTICASONE FUROATE AND VILANTEROL 100; 25 UG/1; UG/1
1 POWDER RESPIRATORY (INHALATION) DAILY
Qty: 1 EACH | Refills: 5 | Status: SHIPPED | OUTPATIENT
Start: 2024-05-06

## 2024-05-06 NOTE — PROGRESS NOTES
The patient is a 74-year-old female who I first met in 2021 when she had severe cough and dyspnea.  She has prior history of antiphospholipid syndrome with DVT and she remains on warfarin.  She has ADRIEL positivity and osteoarthritis.  She had done really well over the past 3 years but now has a recurrent cough with chest congestion and white phlegm for the past 3 weeks with some dyspnea on exertion.    Review of Systems:  Vision normal. Ear nose and throat normal. Bowel normal. Bladder function normal. No depression. No thyroid disease. No lymphatic system concerns.  No rash. Muscles and joints unremarkable. No weight loss no weight gain.    Physical Examination:  Vital signs normal. HEENT examination is unremarkable with pupils equal round and reactive to light and accommodation. Neck without adenopathy, thyromegaly, JVD nor bruit. Lungs notable for very subtle central expiratory wheeze with the first couple breaths to auscultation and percussion. Cardiac regular rate and rhythm no murmur. Abdomen nontender, without hepatosplenomegaly and no mass appreciable. Extremities and Musculoskeletal without clubbing cyanosis nor edema, and mobility acceptable. Neurologic grossly intact with symmetric tone and strength and reflex.    Assessment and plan:  1.  Dyspnea on exertion with cough-subacute.  I suspect the patient has very mild asthma which may have been exacerbated by a viral syndrome; however, there is diagnostic uncertainty.  There is prior history of DVT with antiphospholipid syndrome.  In the past she had done really well with a short course prednisone.  Chest x-ray a couple months ago was unremarkable.    Recommendations: Breo to follow with gargle rinse and spit, short course prednisone, call me in a week, see me again in 6 months or sooner if needed and contact me promptly if new trouble.  If this syndrome lingers, will see sooner.

## 2024-05-07 NOTE — TELEPHONE ENCOUNTER
Spoke with patient's son, Renee (ABIGAIL on file) states insurance will cover Breo, Symbicort, Advair HFA, Wixela and fluticasone -salmeterol.  Spoke with [facility] states patient picked up Breo inhaler today for $47, spoke with son, Bebeto, informed him of prescription pick-up.

## 2024-05-20 ENCOUNTER — ANTI-COAG VISIT (OUTPATIENT)
Dept: ANTICOAGULATION | Facility: CLINIC | Age: 74
End: 2024-05-20

## 2024-05-20 DIAGNOSIS — D68.61 ANTIPHOSPHOLIPID SYNDROME (HCC): Primary | ICD-10-CM

## 2024-05-20 DIAGNOSIS — I82.532 CHRONIC DEEP VEIN THROMBOSIS OF LEFT POPLITEAL VEIN (HCC): ICD-10-CM

## 2024-05-20 DIAGNOSIS — Z79.01 MONITORING FOR LONG-TERM ANTICOAGULANT USE: ICD-10-CM

## 2024-05-20 DIAGNOSIS — Z79.01 LONG TERM (CURRENT) USE OF ANTICOAGULANTS: ICD-10-CM

## 2024-05-20 DIAGNOSIS — Z51.81 MONITORING FOR LONG-TERM ANTICOAGULANT USE: ICD-10-CM

## 2024-05-20 LAB
INR: 2.8 (ref 2–3)
TEST STRIP EXPIRATION DATE: ABNORMAL DATE

## 2024-05-20 PROCEDURE — 93793 ANTICOAG MGMT PT WARFARIN: CPT | Performed by: FAMILY MEDICINE

## 2024-05-20 PROCEDURE — 85610 PROTHROMBIN TIME: CPT | Performed by: FAMILY MEDICINE

## 2024-05-20 NOTE — PROGRESS NOTES
Face-to-Face  / INR 2.8,  therapeutic. (Goal 2.5 ) TTR 73.8 %     Etiology: stable. No changes.    PLAN: continue the current dose.    Recheck INR 4 weeks.    Pt reports:    No sign of unusual bruising or bleeding.  Any missed doses: No   Medications changes: No    Contacted  Julia  verbalized understanding and agreement. Mongolian AVS provided.    WARFARIN Plan per protocol: 7.5 mg every Tue, Thu; 5 mg all other days

## 2024-05-29 RX ORDER — BLOOD SUGAR DIAGNOSTIC
1 STRIP MISCELLANEOUS 2 TIMES DAILY
Qty: 200 STRIP | Refills: 3 | Status: SHIPPED | OUTPATIENT
Start: 2024-05-29

## 2024-05-29 NOTE — TELEPHONE ENCOUNTER
Refill passed per [facility] protocol.  Requested Prescriptions   Pending Prescriptions Disp Refills    ACCU-CHEK GUIDE In Vitro Strip [Pharmacy Med Name: ACCU-CHEK GUIDE   Strip] 200 strip 3     Sig: CHECK TWICE DAILY       Diabetic Supplies Protocol Passed - 5/26/2024 12:52 PM        Passed - In person appointment or virtual visit in the past 12 mos or appointment in next 3 mos     Recent Outpatient Visits              3 weeks ago Subacute cough    [facility] Bharathi Quinonez MD    Office Visit    1 month ago Psoriatic arthritis (HCC)    [facility] Kelsey Lopez MD    Office Visit    3 months ago Antiphospholipid syndrome ([facility])    [facility] Sara Valenzuela MD    Office Visit    3 months ago Morbid (severe) obesity due to excess calories ([facility])    [facility] Rajat Hurd MD    Office Visit    8 months ago Acute cough    [facility] Bernadine Sales Agron B, MD    Office Visit          Future Appointments         Provider Department Appt Notes    In 2 weeks MALIHA ARAIZA [facility] colon w/mac @[facility]    In 2 weeks Halle Nassar RN [facility]     In 2 months Sara Valenzuela MD [facility]     In 3 months Rajat Hurd MD [facility] Blood work results discussion.    In 4 months Kelsey Lopez MD [facility] 6 mof/u    In 5 months Bharathi Quinonez MD [facility] - Subacute cough  (Policy informed)                       Recent Outpatient Visits              3 weeks ago Subacute cough    [facility]  [facility] Bharathi Quinonez MD    Office Visit    1 month ago Psoriatic arthritis (HCC)    [facility] Kelsey Lopez MD    Office Visit    3 months ago Antiphospholipid syndrome ([facility])    [facility] Sara Valenzuela MD    Office Visit    3 months ago Morbid (severe) obesity due to excess calories ([facility])    [facility] Rajat Hurd MD    Office Visit    8 months ago Acute cough    [facility] Jovan Craft MD    Office Visit          Future Appointments         Provider Department Appt Notes    In 2 weeks TEODORA, MALIHA [facility] colon w/mac @[facility]    In 2 weeks Halle Nassar, RN [facility]     In 2 months Sara Valenzuela MD [facility]     In 3 months Rajat Hurd MD [facility] Blood work results discussion.    In 4 months Kelsey Lopez MD [facility] 6 mof/u    In 5 months Bharathi Quinonez MD [facility] - Subacute cough  (Policy informed)

## 2024-05-30 RX ORDER — VALSARTAN 160 MG/1
TABLET ORAL
Qty: 180 TABLET | Refills: 3 | Status: SHIPPED | OUTPATIENT
Start: 2024-05-30

## 2024-05-30 RX ORDER — LEVOTHYROXINE SODIUM 88 UG/1
88 TABLET ORAL
Qty: 90 TABLET | Refills: 0 | OUTPATIENT
Start: 2024-05-30

## 2024-05-30 RX ORDER — LEVOTHYROXINE SODIUM 0.1 MG/1
100 TABLET ORAL
Qty: 90 TABLET | Refills: 3 | Status: SHIPPED | OUTPATIENT
Start: 2024-05-30

## 2024-05-30 NOTE — TELEPHONE ENCOUNTER
Please review; protocol failed/ has no protocol      Levothyroxine 100 mcg passes protocol listed as patient reported        Requested Prescriptions   Pending Prescriptions Disp Refills    LEVOTHYROXINE 100 MCG Oral Tab [Pharmacy Med Name: Levothyroxine Sodium 100 Mcg Tab Lupi] 90 tablet 0     Sig: Take 1 tablet by mouth before breakfast.       Thyroid Medication Protocol Passed - 5/29/2024 11:24 AM        Passed - TSH in past 12 months        Passed - Last TSH value is normal     Lab Results   Component Value Date    TSH 0.678 02/15/2024    THYROIDFUNC 2.29 06/04/2016    TSHT4 0.58 01/21/2023                 Passed - In person appointment or virtual visit in the past 12 mos or appointment in next 3 mos     Recent Outpatient Visits              3 weeks ago Subacute cough    [facility] Bharathi Quinonez MD    Office Visit    1 month ago Psoriatic arthritis (HCC)    [facility] Kelsey Lopez MD    Office Visit    3 months ago Antiphospholipid syndrome ([facility])    [facility] Sara Valenzuela MD    Office Visit    3 months ago Morbid (severe) obesity due to excess calories ([facility])    [facility] Rajat Hurd MD    Office Visit    8 months ago Acute cough    [facility] Bernadine Sales Agron B, MD    Office Visit          Future Appointments         Provider Department Appt Notes    In 2 weeks MALIHA ARAIZA [facility] colon w/mac @[facility]    In 2 weeks Halle Nassar RN [facility]     In 2 months Sara Valenzuela MD [facility]     In 3 months Rajat Hurd MD [facility] Blood work results discussion.    In 4 months Kelsey Lopez MD  [facility] 6 mof/u    In 5 months Bharathi Quinonez MD [facility] - Subacute cough  (Policy informed)                      VALSARTAN 160 MG Oral Tab [Pharmacy Med Name: Valsartan 160 Mg Tab [facility]] 180 tablet 0     Sig: TAKE ONE TABLET BY MOUTH TWICE DAILY       Hypertension Medications Protocol Failed - 5/30/2024 10:43 AM        Failed - Last BP reading less than 140/90     BP Readings from Last 1 Encounters:   05/06/24 150/70               Failed - EGFRCR or GFRNAA > 50     GFR Evaluation  EGFRCR: 44 , resulted on 3/28/2024          Passed - CMP or BMP in past 12 months        Passed - In person appointment or virtual visit in the past 12 mos or appointment in next 3 mos     Recent Outpatient Visits              3 weeks ago Subacute cough    [facility] Bharathi Quinonez MD    Office Visit    1 month ago Psoriatic arthritis (HCC)    [facility] Kelsey Lopez MD    Office Visit    3 months ago Antiphospholipid syndrome (HCC)    [facility] Sara Valenzuela MD    Office Visit    3 months ago Morbid (severe) obesity due to excess calories (HCC)    [facility] Rajat Hurd MD    Office Visit    8 months ago Acute cough    [place] [place]    Office Visit          Future Appointments         Provider Department Appt Notes    In 2 weeks MALIHA ARAIZA [facility] colon w/mac @[facility]    In 2 weeks Halle Nassar RN [facility]     In 2 months Sara Valenzuela MD [facility]     In 3 months Rajat Hurd MD [facility] Blood  work results discussion.    In 4 months [facility] MD [facility] 6 mof/u    In 5 months Bharathi Quinonez MD [facility] - Subacute cough  (Policy informed)                       Recent Outpatient Visits              3 weeks ago Subacute cough    [facility] Bharathi Quinonez MD    Office Visit    1 month ago Psoriatic arthritis ([facility])    [facility] Kelsey Lopez MD    Office Visit    3 months ago Antiphospholipid syndrome ([facility])    [facility] Sara Valenzuela MD    Office Visit    3 months ago Morbid (severe) obesity due to excess calories ([facility])    [facility] Rajat Hurd MD    Office Visit    8 months ago Acute cough    [facility]    Office Visit          Future Appointments         Provider Department Appt Notes    In 2 weeks MALIHA ARAIZA [facility] colon w/mac @[facility]    In 2 weeks Halle Nassar, RN [facility]     In 2 months Sara Valenzuela MD [facility]     In 3 months Rajat Hurd MD [facility] Blood work results discussion.    In 4 months Kelsey Lopez MD [facility] 6 mof/u    In 5 months Bharathi Quinonez MD [facility] - Subacute cough  (Policy informed)

## 2024-05-31 RX ORDER — LEVOTHYROXINE SODIUM 88 UG/1
88 TABLET ORAL
Qty: 90 TABLET | Refills: 3 | Status: SHIPPED | OUTPATIENT
Start: 2024-05-31

## 2024-05-31 NOTE — TELEPHONE ENCOUNTER
Patient reported to ED yesterday for a migraine.  Treated and released with much improvement.    Spoke with patient via  Sb ID # [id number], Date of Birth verified.   Patient is aware levothyroxine 100 mcg was already sent to the pharmacy.   She is looking for rx refill for levothyroxine 88 mcg, she stated she's been taking 188 mcg every day for long time.   pls advise, thanks in advance.       Requested Prescriptions     Pending Prescriptions Disp Refills    levothyroxine 88 MCG Oral Tab 90 tablet 3     Sig: Take 1 tablet (88 mcg total) by mouth before breakfast.       Last Office Visit with PCP: 2/15/2024

## 2024-06-06 ENCOUNTER — TELEPHONE (OUTPATIENT)
Facility: CLINIC | Age: 74
End: 2024-06-06

## 2024-06-06 NOTE — TELEPHONE ENCOUNTER
Dr. Valenzuela    I was going to reach out to Dr. Malik for the warfarin instructions, but I don't see that she ever saw Dr. Malik.  The last warfarin order that I see is from you.  Orders for this prior to her 2020 procedure came from your office.    Please advise.    Thank you

## 2024-06-06 NOTE — TELEPHONE ENCOUNTER
Dr. Valenzuela    Patient is scheduled for a colonoscopy with Dr. Fletcher on 6/14/2024.     Please advise if patient may hold coumadin prior to procedure.  If so, how many days may she hold (we usually do a 2 day hold).  Please advise on insulin adjustment orders based on the following diet modifications prior to procedure:  Day before the procedure, patient will be on clear liquid diet only after breakfast.    (Patient already instructed to hold jardiance 4 days prior and glipizide day before due to anesthesia requirements)    Thank you    P em gi clinical staff

## 2024-06-06 NOTE — PAT NURSING NOTE
Instructed patient to follow up with Dr. Fletcher's office regarding hold times for warfarin. Patient verbalized understanding.

## 2024-06-06 NOTE — TELEPHONE ENCOUNTER
Current Outpatient Medications   Medication Sig Instructions Comments    levothyroxine 88 MCG Oral Tab Take 1 tablet (88 mcg total) by mouth before breakfast. Take as usual     levothyroxine 100 MCG Oral Tab Take 1 tablet (100 mcg total) by mouth before breakfast. Take as usual     valsartan 160 MG Oral Tab TAKE ONE TABLET BY MOUTH TWICE DAILY Hold morning of     Glucose Blood (ACCU-CHEK GUIDE) In Vitro Strip 1 strip by In Vitro route 2 (two) times daily. Take as usual     SPIRONOLACTONE 25 MG Oral Tab TAKE ONE TABLET BY MOUTH ONE TIME DAILY Take as usual     fluticasone furoate-vilanterol (BREO ELLIPTA) 100-25 MCG/ACT Inhalation Aerosol Powder, Breath Activated Inhale 1 puff into the lungs daily. Rinse your mouth with water without swallowing after using BREO to help reduce your chance of getting thrush. Take as usual     predniSONE 20 MG Oral Tab 2 tabs daily for 3 days then one tab daily until completed (Patient not taking: Reported on 6/6/2024) Take as usual     escitalopram 5 MG Oral Tab Take 1 tablet (5 mg total) by mouth nightly. Take as usual     Secukinumab, 300 MG Dose, (COSENTYX SENSOREADY, 300 MG,) 150 MG/ML Subcutaneous Solution Auto-injector Inject 300 mg into the skin every 28 days. Hold morning of     insulin glargine (LANTUS SOLOSTAR) 100 UNIT/ML Subcutaneous Solution Pen-injector INJECT 45 UNITS INTHE MORNING AND 7 UNITS IN THE EVENING Hold AM of and take 1/2 dose PM prior.     clobetasol 0.05 % External Solution Apply 1 Application topically 2 (two) times daily. Take as usual     METOPROLOL TARTRATE 100 MG Oral Tab Take 1 tablet by mouth in the morning and 1/2 tablet in the afternoon Take as usual     warfarin 5 MG Oral Tab Take as directed by INR clinic or take 7.5mg Tuesdays & Thursdays, take 5mg all other days Will need to check with Dr. Malik whether to hold for 3 days prior only or to hold for 3 days prior and bridge with Lovenox which will be stopped 12 hours prior to colonoscopy.      fluticasone-salmeterol 250-50 MCG/ACT Inhalation Aerosol Powder, Breath Activated Inhale 1 puff into the lungs every 12 (twelve) hours. (Patient not taking: Reported on 5/6/2024) Take as usual     simvastatin 20 MG Oral Tab Take 1 tablet (20 mg total) by mouth twice a week. Take as usual     minoxidil 2.5 MG Oral Tab Take 2 tablets (5 mg total) by mouth 2 (two) times daily. (Patient taking differently: Take 1 tablet (2.5 mg total) by mouth 2 (two) times daily.) Take as usual     PEG 3350-KCl-Na Bicarb-NaCl (TRILYTE) 420 g Oral Recon Soln Take prep as directed by gastro office. May substitute with Trilyte/generic equivalent if needed. Take as usual     albuterol (2.5 MG/3ML) 0.083% Inhalation Nebu Soln Take 3 mL (2.5 mg total) by nebulization every 6 (six) hours as needed for Wheezing. Take as usual     indapamide 2.5 MG Oral Tab Take 1 tablet (2.5 mg total) by mouth daily and qpm. Take as usual     dicyclomine 10 MG Oral Cap Take 1 capsule (10 mg total) by mouth 2 (two) times daily as needed. Hold morning of     glipiZIDE 10 MG Oral Tab Take 1 tablet (10 mg total) by mouth 2 (two) times daily before meals. Hold morning of     Insulin Pen Needle (CAREFINE PEN NEEDLES) 32G X 4 MM Does not apply Misc Injects qhs. Take as usual     Insulin Pen Needle (BD PEN NEEDLE ORIGINAL U/F) 29G X 12.7MM Does not apply Misc Dx. E11.9. Checks q12hrs. BD ultrafine MINI. Take as usual     Alcohol Swabs Does not apply Pads Code: E11.9.  Checks twice daily. Take as usual     ACCU-CHEK FASTCLIX LANCETS Does not apply Misc CHECK EVERY MORNING AND EVENING Take as usual     Omega-3-acid Ethyl Esters (LOVAZA) 1 G Oral Cap Take 1 capsule (1 g total) by mouth 2 (two) times daily with meals. Hold 1 week prior.        No motrin, ibuprofen, advil, alleve, naprosyn at least 10 days prior to surgery. Take all other medications with small sips of water on AM of surgery, unless otherwise directed. Don't eat breakfest that AM. All other medications  take with a sip of water even on the morning of surgery.  Hold all over-the-counter natural herbal supplements and vitamins at least a week before surgery.  Anticoagulants: hold plavix for 5 days prior to procedure with cardiology approval, discussed with patient the increased risk of stroke/MI when alterations in therapy occur - patient verbalizes understanding and wishes to proceed. Aspirin 81 mg patient instructed to continue this.

## 2024-06-10 ENCOUNTER — TELEPHONE (OUTPATIENT)
Facility: CLINIC | Age: 74
End: 2024-06-10

## 2024-06-10 RX ORDER — ENOXAPARIN SODIUM 150 MG/ML
1 INJECTION SUBCUTANEOUS EVERY 12 HOURS
Qty: 10 EACH | Refills: 0 | Status: SHIPPED | OUTPATIENT
Start: 2024-06-10

## 2024-06-10 NOTE — TELEPHONE ENCOUNTER
Verified name and  of patient..    BYRON Martinez from Dr. José Antonio Fletcher's office calling to follow up on message below.    She states that patient is scheduled for a colonoscopy on 24.    She is asking Dr. Valenzuela if patient can hold Warfarin for 2 days prior to the procedure- if so, patient would need to start holding the medication on 24.    Patient has not been seen by Dr. Dickson since 2019 and Dr. Valenzuela has been prescribing this medication since then.    Please advise and thank you.    BYRON Martinez is requesting a phone call back with update.

## 2024-06-10 NOTE — TELEPHONE ENCOUNTER
[place] speaking patient calling for Colonoscopy instructions.  Procedure is scheduled for 6/14/24.  Please call

## 2024-06-10 NOTE — TELEPHONE ENCOUNTER
Schedulers:  Patient calling for prep instructions.    I am waiting for Dr. Valenzuela to respond to coumadin hold instructions-I'll let her know when she responds she may want to reach out to Dr. Valenzuela as well because I sent high priority message on Friday and called myself today.

## 2024-06-10 NOTE — TELEPHONE ENCOUNTER
Called patient - went over bowel prep times and how to follow her clear liquid diet. Also, resent prep instructions via [employer]. Patient is aware nurse will be calling with coumadin orders.

## 2024-06-10 NOTE — TELEPHONE ENCOUNTER
Note  Please clarify if we are providing patient with the following instructions as noted per GI.      Tues.  6-:  Do Not take Coumadin  Begin taking lovenox as directed     Wed.  6-12-24:  Do NOT take coumadin  Continue Lovenox as directed     Thurs. 6-13-24:  Do NOT take coumadin  D NOT take lovenox  Begin prep as directed at 5PM  Take only HALF of your evening dose of Lantus (5 units)  Do NOT take your evening valsartan     Wednesday 6-14-24:  Prior to procedure-  Finish prep as directed 3 hours prior to procedure time  Take all medications 3 hours prior to procedure with small sip of water EXCEPT:  Coumadin, lovenox, glipizide, lantus, valsartan, spirolactone, methotrexate        ACC to watch for INR on the day of the procedure and call in the afternoon with dosing instructions.      Please call patient using the language line service for a .

## 2024-06-10 NOTE — TELEPHONE ENCOUNTER
Dr. Valenzuela,    Request was to hold coumadin 2 days prior so she would hold Wednesday, Thursday, and Friday morning (day of her procedure) and resume after procedure.    Thank you

## 2024-06-10 NOTE — TELEPHONE ENCOUNTER
I know that sometimes gastroenterologist are okay with not taking the patient completely off Coumadin for 3 days so they do not have to bridge.  If they are okay to do that I just do not want her off the Coumadin for too long because her risk of potentially developing a clot.  If they are okay to resume then I think we should be okay.

## 2024-06-11 ENCOUNTER — TELEPHONE (OUTPATIENT)
Dept: GASTROENTEROLOGY | Facility: CLINIC | Age: 74
End: 2024-06-11

## 2024-06-11 ENCOUNTER — OFFICE VISIT (OUTPATIENT)
Dept: INTERNAL MEDICINE CLINIC | Facility: CLINIC | Age: 74
End: 2024-06-11

## 2024-06-11 ENCOUNTER — TELEPHONE (OUTPATIENT)
Dept: INTERNAL MEDICINE CLINIC | Facility: CLINIC | Age: 74
End: 2024-06-11

## 2024-06-11 VITALS
DIASTOLIC BLOOD PRESSURE: 76 MMHG | BODY MASS INDEX: 35.84 KG/M2 | HEART RATE: 78 BPM | TEMPERATURE: 98 F | OXYGEN SATURATION: 98 % | SYSTOLIC BLOOD PRESSURE: 128 MMHG | RESPIRATION RATE: 17 BRPM | WEIGHT: 242 LBS | HEIGHT: 69 IN

## 2024-06-11 DIAGNOSIS — R21 RASH: Primary | ICD-10-CM

## 2024-06-11 PROCEDURE — 1126F AMNT PAIN NOTED NONE PRSNT: CPT | Performed by: INTERNAL MEDICINE

## 2024-06-11 PROCEDURE — 3008F BODY MASS INDEX DOCD: CPT | Performed by: INTERNAL MEDICINE

## 2024-06-11 PROCEDURE — 1159F MED LIST DOCD IN RCRD: CPT | Performed by: INTERNAL MEDICINE

## 2024-06-11 PROCEDURE — 1160F RVW MEDS BY RX/DR IN RCRD: CPT | Performed by: INTERNAL MEDICINE

## 2024-06-11 PROCEDURE — 3074F SYST BP LT 130 MM HG: CPT | Performed by: INTERNAL MEDICINE

## 2024-06-11 PROCEDURE — 3078F DIAST BP <80 MM HG: CPT | Performed by: INTERNAL MEDICINE

## 2024-06-11 PROCEDURE — 99213 OFFICE O/P EST LOW 20 MIN: CPT | Performed by: INTERNAL MEDICINE

## 2024-06-11 RX ORDER — CLOBETASOL PROPIONATE 0.46 MG/ML
1 SOLUTION TOPICAL 2 TIMES DAILY
Qty: 1 EACH | Refills: 0 | Status: SHIPPED | OUTPATIENT
Start: 2024-06-11

## 2024-06-11 NOTE — TELEPHONE ENCOUNTER
#[id number]     Patient contacted, verified and message from Dr. Valenzuela given.  Patient voiced understanding.

## 2024-06-11 NOTE — TELEPHONE ENCOUNTER
Pharmacist  The Lovenox difficult to give 107mg out of the 150mg  prefilled syringe dosage  suggested  to give patient 100 mg instead     Please advise

## 2024-06-12 NOTE — TELEPHONE ENCOUNTER
See 6/10/2024 telephone encounter routed to RN Triage.  Gi Clinical staff not allowed to review Lovenox bridging so asked that their office reach out to patient.

## 2024-06-12 NOTE — TELEPHONE ENCOUNTER
RN Triage    Gi Clinical Staff are not allowed to review lovenox bridging, can you please review with the patient?    Thank you

## 2024-06-12 NOTE — TELEPHONE ENCOUNTER
Pt called to clarify the plan re: the lovenox injections while she is holding coumadin for her colonoscopy on Friday.      GI staff - please advise. It looks like orders were sent in by Dr. Valenzuela.  Thanks, SS

## 2024-06-12 NOTE — TELEPHONE ENCOUNTER
Patient contacted using language line.  She has not yet started the lovenox.    RN contacted Dr. Valenzuela for updated instructions.  Per Dr. Valenzuela's direction, these instructions were relayed with understanding to the patient's daughter Kelli (ABIGAIL confirmed).  She agreed to contact the patient.  Instructions were also sent via [employer].    Wed  Do not take coumadin  Take injection of lovenox tonight    Thursday  Do not take coumadin  Take injection of lovenox 2 times (12 hours apart).  The 2nd injection should be around the same time as it was taken on Wednesday evening.    Friday morning  Do not take the following medications prior to the colonoscopy:    Coumadin, lovenox, glipizide, lantus, valsartan, spirolactone, methotrexate     Friday evening take all of the medications above and an injection of lovenox    Saturday  Regular coumadin dose and lovenox injection 2 times (12 hours apart).    Sunday  Regular coumadin dose and lovenox injection 2 times (12 hours apart).    Monday  Keep 8:45 am appointment with the Anticoagulation Clinic Nurse.  The INR will be testing and instructions about coumadin and the lovenox injections will be given at the appointment.

## 2024-06-12 NOTE — TELEPHONE ENCOUNTER
Dr. Valenzuela,    Patient called to clarify Lovenox bridge instructions.  Gi Clinical staff are unable to provide bridging instructions per protocol so would need someone to review with her:  I just wanted to clarify-if she is off of the coumadin 2 days prior and resumes once procedure she does or does not need to bridge?     Thank you    Lavinia Colbert MD routed conversation to Em Gi Clinical Staff; José Antonio Fletcher MD10 hours ago (9:06 PM)     Lavinia Colbert MD10 hours ago (9:05 PM)       Pt called to clarify the plan re: the lovenox injections while she is holding coumadin for her colonoscopy on Friday.       GI staff - please advise. It looks like orders were sent in by Dr. Valenzuela.  Thanks, SS

## 2024-06-13 NOTE — TELEPHONE ENCOUNTER
Please see other telephone encounter.  Patient received instructions from Dr. Valenzuela's office.

## 2024-06-14 ENCOUNTER — ANESTHESIA EVENT (OUTPATIENT)
Dept: ENDOSCOPY | Facility: HOSPITAL | Age: 74
End: 2024-06-14
Payer: MEDICARE

## 2024-06-14 ENCOUNTER — HOSPITAL ENCOUNTER (OUTPATIENT)
Facility: HOSPITAL | Age: 74
Setting detail: HOSPITAL OUTPATIENT SURGERY
Discharge: HOME | End: 2024-06-14
Attending: INTERNAL MEDICINE | Admitting: INTERNAL MEDICINE

## 2024-06-14 ENCOUNTER — ANESTHESIA (OUTPATIENT)
Dept: ENDOSCOPY | Facility: HOSPITAL | Age: 74
End: 2024-06-14
Payer: MEDICARE

## 2024-06-14 VITALS
WEIGHT: 235 LBS | HEART RATE: 53 BPM | SYSTOLIC BLOOD PRESSURE: 134 MMHG | DIASTOLIC BLOOD PRESSURE: 58 MMHG | OXYGEN SATURATION: 100 % | BODY MASS INDEX: 34.8 KG/M2 | HEIGHT: 69 IN | RESPIRATION RATE: 15 BRPM

## 2024-06-14 DIAGNOSIS — Z12.11 COLON CANCER SCREENING: ICD-10-CM

## 2024-06-14 DIAGNOSIS — Z86.010 HISTORY OF COLON POLYPS: ICD-10-CM

## 2024-06-14 LAB
GLUCOSE BLDC GLUCOMTR-MCNC: 94 MG/DL (ref 70–99)
INR BLD: 1.54 (ref 0.8–1.2)
PROTHROMBIN TIME: 19.4 SECONDS (ref 11.6–14.8)

## 2024-06-14 PROCEDURE — 45385 COLONOSCOPY W/LESION REMOVAL: CPT | Performed by: INTERNAL MEDICINE

## 2024-06-14 PROCEDURE — 0DBM8ZX EXCISION OF DESCENDING COLON, VIA NATURAL OR ARTIFICIAL OPENING ENDOSCOPIC, DIAGNOSTIC: ICD-10-PCS | Performed by: INTERNAL MEDICINE

## 2024-06-14 PROCEDURE — 0DBL8ZX EXCISION OF TRANSVERSE COLON, VIA NATURAL OR ARTIFICIAL OPENING ENDOSCOPIC, DIAGNOSTIC: ICD-10-PCS | Performed by: INTERNAL MEDICINE

## 2024-06-14 PROCEDURE — 45380 COLONOSCOPY AND BIOPSY: CPT | Performed by: INTERNAL MEDICINE

## 2024-06-14 RX ORDER — SODIUM CHLORIDE, SODIUM LACTATE, POTASSIUM CHLORIDE, CALCIUM CHLORIDE 600; 310; 30; 20 MG/100ML; MG/100ML; MG/100ML; MG/100ML
INJECTION, SOLUTION INTRAVENOUS CONTINUOUS
Status: DISCONTINUED | OUTPATIENT
Start: 2024-06-14 | End: 2024-06-14

## 2024-06-14 RX ORDER — LIDOCAINE HYDROCHLORIDE 10 MG/ML
INJECTION, SOLUTION EPIDURAL; INFILTRATION; INTRACAUDAL; PERINEURAL AS NEEDED
Status: DISCONTINUED | OUTPATIENT
Start: 2024-06-14 | End: 2024-06-14 | Stop reason: SURG

## 2024-06-14 RX ORDER — NALOXONE HYDROCHLORIDE 0.4 MG/ML
0.08 INJECTION, SOLUTION INTRAMUSCULAR; INTRAVENOUS; SUBCUTANEOUS ONCE AS NEEDED
Status: DISCONTINUED | OUTPATIENT
Start: 2024-06-14 | End: 2024-06-14

## 2024-06-14 RX ADMIN — SODIUM CHLORIDE, SODIUM LACTATE, POTASSIUM CHLORIDE, CALCIUM CHLORIDE: 600; 310; 30; 20 INJECTION, SOLUTION INTRAVENOUS at 08:22:00

## 2024-06-14 RX ADMIN — SODIUM CHLORIDE, SODIUM LACTATE, POTASSIUM CHLORIDE, CALCIUM CHLORIDE: 600; 310; 30; 20 INJECTION, SOLUTION INTRAVENOUS at 09:06:00

## 2024-06-14 RX ADMIN — LIDOCAINE HYDROCHLORIDE 50 MG: 10 INJECTION, SOLUTION EPIDURAL; INFILTRATION; INTRACAUDAL; PERINEURAL at 08:26:00

## 2024-06-14 NOTE — DISCHARGE INSTRUCTIONS
Home Care Instructions for Colonoscopy with Sedation    Diet:  - Resume your regular diet as tolerated unless otherwise instructed.  - Start with light meals to minimize bloating.  - Do not drink alcohol today.    Medication:        -       Resume Coumadin/ Warfarin today.        -       Ok to resume all other medications including Valsartan and diabetes medications  - If you have questions about resuming your normal medications, please contact your Primary Care Physician.    Activities:  - Take it easy today. Do not return to work today.  - Do not drive today.  - Do not operate any machinery today (including kitchen equipment).    Colonoscopy:  - You may notice some rectal \"spotting\" (a little blood on the toilet tissue) for a day or two after the exam. This is normal.  - If you experience any rectal bleeding (not spotting), persistent tenderness or sharp severe abdominal pains, oral temperature over 100 degrees Fahrenheit, light-headedness or dizziness, or any other problems, contact your doctor.      **If unable to reach your doctor, please go to the [facility] Emergency Room**    - Your referring physician will receive a full report of your examination.  - If you do not hear from your doctor's office within two weeks of your biopsy, please call them for your results.    You may be able to see your laboratory results in [employer] between 4 and 7 business days.  In some cases, your physician may not have viewed the results before they are released to [employer].  If you have questions regarding your results contact the physician who ordered the test/exam by phone or via [employer] by choosing \"Ask a Medical Question.\"

## 2024-06-14 NOTE — ANESTHESIA PREPROCEDURE EVALUATION
Anesthesia PreOp Note    HPI:     Julia Hernandez is a 74 year old female who presents for preoperative consultation requested by: José Antonio Fletcher MD    Date of Surgery: 6/14/2024    Procedure(s):  COLONOSCOPY  Indication: Colon cancer screening /History of colon polyps    Relevant Problems   No relevant active problems       NPO:  Last Liquid Consumption Date: 06/14/24  Last Liquid Consumption Time: 0515  Last Solid Consumption Date: 06/13/24  Last Solid Consumption Time: 0900  Last Liquid Consumption Date: 06/14/24          History Review:  Patient Active Problem List    Diagnosis Date Noted    Colon cancer screening 03/22/2023    Morbid (severe) obesity due to excess calories (HCC) 03/28/2022    Type 2 diabetes mellitus with diabetic chronic kidney disease (HCC) 11/23/2021    Urge incontinence of urine 11/23/2021    Dry eye syndrome of bilateral lacrimal glands 08/19/2021    Cortical age-related cataract of both eyes 08/19/2021    Corneal scar 08/19/2021    Subacute cough 07/21/2021    Long term (current) use of anticoagulants 07/26/2020    Vaccine counseling 07/19/2020    Intracranial atherosclerosis 11/14/2019    Microscopic hematuria 11/14/2019    CKD (chronic kidney disease) stage 3, GFR 30-59 ml/min ([facility]) 01/16/2019    Psoriatic arthritis (HCC) 01/16/2019    Antiphospholipid syndrome (HCC) 01/16/2019    Psoriasis 12/12/2018    Goiter 07/28/2018    Osteopenia of multiple sites 06/29/2017    Severe obesity (BMI 35.0-39.9) with comorbidity (HCC) 04/10/2017    Essential hypertension with goal blood pressure less than 130/80 03/23/2017    B12 deficiency 03/23/2017    Abnormal computed tomography angiography (CTA) 03/23/2017    Age-related nuclear cataract of both eyes 02/14/2017    Meibomian gland dysfunction (MGD), bilateral, both upper and lower lids 02/14/2017    History of pterygium excision 02/14/2017    Primary osteoarthritis of right knee 10/04/2016    Hypothyroidism (acquired) 10/04/2016     Breast cancer screening 02/10/2015       Past Medical History:    Appendicitis    Cholelithiasis    Deep vein thrombosis (HCC)    L leg     Diabetes (HCC)    Disorder of thyroid    Essential hypertension    Goiter    Multinodular goiter S/P thyroidectomy.     High blood pressure    High cholesterol    Osteopenia    Vertigo, aural       Past Surgical History:   Procedure Laterality Date    Appendectomy  1993    Cataract extraction w/  intraocular lens implant Right 1988    in [place]    Cholecystectomy  2006    Colonoscopy N/A 8/12/2020    Procedure: COLONOSCOPY;  Surgeon: José Antonio Fletcher MD;  Location: [facility] ENDOSCOPY    Electrocardiogram, complete  01/03/2014    SCANNED TO MEDIA TAB - 01/03/2014    Fracture surgery Left     LEFT FOOT SURGERY WITH HARDWARE    Knee scope,abrasn arthroplasty Right 10-4-16    Thyroidectomy  08/17/15    TOTAL    Tubal ligation         Medications Prior to Admission   Medication Sig Dispense Refill Last Dose    levothyroxine 88 MCG Oral Tab Take 1 tablet (88 mcg total) by mouth before breakfast. 90 tablet 3 6/14/2024    levothyroxine 100 MCG Oral Tab Take 1 tablet (100 mcg total) by mouth before breakfast. 90 tablet 3 6/14/2024    valsartan 160 MG Oral Tab TAKE ONE TABLET BY MOUTH TWICE DAILY 180 tablet 3 6/13/2024    SPIRONOLACTONE 25 MG Oral Tab TAKE ONE TABLET BY MOUTH ONE TIME DAILY 90 tablet 1 6/12/2024    escitalopram 5 MG Oral Tab Take 1 tablet (5 mg total) by mouth nightly. 90 tablet 3 6/12/2024    insulin glargine (LANTUS SOLOSTAR) 100 UNIT/ML Subcutaneous Solution Pen-injector INJECT 45 UNITS INTHE MORNING AND 7 UNITS IN THE EVENING 45 mL 3 6/13/2024    METOPROLOL TARTRATE 100 MG Oral Tab Take 1 tablet by mouth in the morning and 1/2 tablet in the afternoon 90 tablet 1 6/13/2024    warfarin 5 MG Oral Tab Take as directed by INR clinic or take 7.5mg Tuesdays & Thursdays, take 5mg all other days 110 tablet 1 6/12/2024    simvastatin 20 MG Oral Tab Take 1 tablet (20 mg total) by  mouth twice a week. 24 tablet 3 6/12/2024    minoxidil 2.5 MG Oral Tab Take 2 tablets (5 mg total) by mouth 2 (two) times daily. (Patient taking differently: Take 1 tablet (2.5 mg total) by mouth 2 (two) times daily.) 360 tablet 3 6/12/2024    indapamide 2.5 MG Oral Tab Take 1 tablet (2.5 mg total) by mouth daily and qpm. 180 tablet 2 6/13/2024    glipiZIDE 10 MG Oral Tab Take 1 tablet (10 mg total) by mouth 2 (two) times daily before meals. 180 tablet 1 6/11/2024    Omega-3-acid Ethyl Esters (LOVAZA) 1 G Oral Cap Take 1 capsule (1 g total) by mouth 2 (two) times daily with meals.   6/7/2024    clobetasol 0.05 % External Solution Apply 1 Application topically 2 (two) times daily. 1 each 0     enoxaparin 150 MG/ML Injection Solution Prefilled Syringe Inject 0.71 mL (107 mg total) into the skin every 12 (twelve) hours. 10 each 0 6/13/2024    Glucose Blood (ACCU-CHEK GUIDE) In Vitro Strip 1 strip by In Vitro route 2 (two) times daily. 200 strip 3     fluticasone furoate-vilanterol (BREO ELLIPTA) 100-25 MCG/ACT Inhalation Aerosol Powder, Breath Activated Inhale 1 puff into the lungs daily. Rinse your mouth with water without swallowing after using BREO to help reduce your chance of getting thrush. 1 each 5     Secukinumab, 300 MG Dose, (COSENTYX SENSOREADY, 300 MG,) 150 MG/ML Subcutaneous Solution Auto-injector Inject 300 mg into the skin every 28 days. 2 each 2     PEG 3350-KCl-Na Bicarb-NaCl (TRILYTE) 420 g Oral Recon Soln Take prep as directed by gastro office. May substitute with Trilyte/generic equivalent if needed. 1 each 0     albuterol (2.5 MG/3ML) 0.083% Inhalation Nebu Soln Take 3 mL (2.5 mg total) by nebulization every 6 (six) hours as needed for Wheezing. 50 each 0 6/13/2024    dicyclomine 10 MG Oral Cap Take 1 capsule (10 mg total) by mouth 2 (two) times daily as needed. 60 capsule 1 prn    Insulin Pen Needle (CAREFINE PEN NEEDLES) 32G X 4 MM Does not apply Misc Injects qhs. 100 each 6     Insulin Pen  Needle (BD PEN NEEDLE ORIGINAL U/F) 29G X 12.7MM Does not apply Misc Dx. E11.9. Checks q12hrs. BD ultrafine MINI. 100 each 6     Alcohol Swabs Does not apply Pads Code: E11.9.  Checks twice daily. 100 each 11     ACCU-CHEK FASTCLIX LANCETS Does not apply Misc CHECK EVERY MORNING AND EVENING 204 each 11      Current Facility-Administered Medications Ordered in Epic   Medication Dose Route Frequency Provider Last Rate Last Admin    lactated ringers infusion   Intravenous Continuous José Antonio Fletcher MD 20 mL/hr at 06/14/24 0738 New Bag at 06/14/24 0738     No current [facility]-ordered outpatient medications on file.       Allergies   Allergen Reactions    Amlodipine SHORTNESS OF BREATH     Chest pressure.  Patient does not recall having any allergies       Family History   Problem Relation Age of Onset    Diabetes Mother     Hypertension Mother     Dementia Mother         Alzheimer's Disease    Diabetes Brother     Lipids Brother     Hypertension Brother     Diabetes Paternal Uncle     Heart Disease Father         CAD    Diabetes Other     Dementia Other     No Known Problems Self     No Known Problems Sister     No Known Problems Daughter     No Known Problems Maternal Grandmother     No Known Problems Paternal Grandmother     No Known Problems Maternal Aunt     No Known Problems Paternal Aunt     No Known Problems Maternal Cousin Female     No Known Problems Maternal Cousin Male     No Known Problems Paternal Cousin Female     No Known Problems Paternal Cousin Male     Colon Cancer Son 43    Glaucoma Neg     Macular degeneration Neg     Breast Cancer Neg     Ovarian Cancer Neg     DCIS Neg     LCIS Neg     BRCA gene + Neg     Ashkenazi Anabaptist Descent Neg      Social History     Socioeconomic History    Marital status:     Number of children: 4   Tobacco Use    Smoking status: Never     Passive exposure: Never    Smokeless tobacco: Never   Vaping Use    Vaping status: Never Used   Substance and Sexual Activity     Alcohol use: No    Drug use: No    Sexual activity: Yes     Partners: Male   Other Topics Concern    Caffeine Concern Yes     Comment: Coffee, tea - 2 cups per day     Reaction to local anesthetic No       Available pre-op labs reviewed.  Lab Results   Component Value Date    WBC 7.4 03/28/2024    RBC 4.02 03/28/2024    HGB 12.2 03/28/2024    HCT 37.3 03/28/2024    MCV 92.8 03/28/2024    MCH 30.3 03/28/2024    MCHC 32.7 03/28/2024    RDW 12.7 03/28/2024    .0 03/28/2024     Lab Results   Component Value Date    CREATSERUM 1.30 (H) 03/28/2024    PGLU 94 06/14/2024          Vital Signs:  Body mass index is 34.7 kg/m².   height is 1.753 m (5' 9\") and weight is 106.6 kg (235 lb). Her blood pressure is 126/56 and her pulse is 52. Her respiration is 17 and oxygen saturation is 97%.   Vitals:    06/06/24 1324 06/14/24 0727 06/14/24 0730   BP:  135/48 126/56   Pulse:  52 52   Resp:  13 17   SpO2:  98% 97%   Weight: 106.6 kg (235 lb)     Height: 1.753 m (5' 9\")          Anesthesia Evaluation     Patient summary reviewed and Nursing notes reviewed    No history of anesthetic complications   Airway   Mallampati: II  TM distance: >3 FB  Neck ROM: full  Dental          Pulmonary - negative ROS and normal exam     ROS comment: SOB with exertion, doesn't get much exercise    Cardiovascular - normal exam  Exercise tolerance: poor  (+) hypertension well controlled  (-) past MI    ROS comment: L LE DVT, on coumadin, bridged with lovenox      Neuro/Psych - negative ROS     GI/Hepatic/Renal - negative ROS   (+) bowel prep    Endo/Other    (+) diabetes mellitus type 2 well controlled, hypothyroidism  Abdominal   (+) obese                 Anesthesia Plan:   ASA:  3  Plan:   General  Informed Consent Plan and Risks Discussed With:  Patient and child/children  Consent Comment: Daughter at bedside to help translate        I have informed Julia Taylor Hernandez and/or legal guardian or family member of the nature of the  anesthetic plan, benefits, risks including possible dental damage if relevant, major complications, and any alternative forms of anesthetic management.   All of the patient's questions were answered to the best of my ability. The patient desires the anesthetic management as planned.  Sarah Irene CRNA  6/14/2024 8:02 AM  Present on Admission:  **None**

## 2024-06-14 NOTE — H&P
History & Physical Examination    Patient Name: Julia Hernandez  MRN: [id number]  [facility]: [id number]  YOB: 1950    Diagnosis:   CRC screening  Hx colon polyps       Medications Prior to Admission   Medication Sig Dispense Refill Last Dose    levothyroxine 88 MCG Oral Tab Take 1 tablet (88 mcg total) by mouth before breakfast. 90 tablet 3 6/14/2024    levothyroxine 100 MCG Oral Tab Take 1 tablet (100 mcg total) by mouth before breakfast. 90 tablet 3 6/14/2024    valsartan 160 MG Oral Tab TAKE ONE TABLET BY MOUTH TWICE DAILY 180 tablet 3 6/13/2024    SPIRONOLACTONE 25 MG Oral Tab TAKE ONE TABLET BY MOUTH ONE TIME DAILY 90 tablet 1 6/12/2024    escitalopram 5 MG Oral Tab Take 1 tablet (5 mg total) by mouth nightly. 90 tablet 3 6/12/2024    insulin glargine (LANTUS SOLOSTAR) 100 UNIT/ML Subcutaneous Solution Pen-injector INJECT 45 UNITS INTHE MORNING AND 7 UNITS IN THE EVENING 45 mL 3 6/13/2024    METOPROLOL TARTRATE 100 MG Oral Tab Take 1 tablet by mouth in the morning and 1/2 tablet in the afternoon 90 tablet 1 6/13/2024    warfarin 5 MG Oral Tab Take as directed by INR clinic or take 7.5mg Tuesdays & Thursdays, take 5mg all other days 110 tablet 1 6/12/2024    simvastatin 20 MG Oral Tab Take 1 tablet (20 mg total) by mouth twice a week. 24 tablet 3 6/12/2024    minoxidil 2.5 MG Oral Tab Take 2 tablets (5 mg total) by mouth 2 (two) times daily. (Patient taking differently: Take 1 tablet (2.5 mg total) by mouth 2 (two) times daily.) 360 tablet 3 6/12/2024    indapamide 2.5 MG Oral Tab Take 1 tablet (2.5 mg total) by mouth daily and qpm. 180 tablet 2 6/13/2024    glipiZIDE 10 MG Oral Tab Take 1 tablet (10 mg total) by mouth 2 (two) times daily before meals. 180 tablet 1 6/11/2024    Omega-3-acid Ethyl Esters (LOVAZA) 1 G Oral Cap Take 1 capsule (1 g total) by mouth 2 (two) times daily with meals.   6/7/2024    clobetasol 0.05 % External Solution Apply 1 Application topically 2 (two) times daily. 1  each 0     enoxaparin 150 MG/ML Injection Solution Prefilled Syringe Inject 0.71 mL (107 mg total) into the skin every 12 (twelve) hours. 10 each 0 6/13/2024    Glucose Blood (ACCU-CHEK GUIDE) In Vitro Strip 1 strip by In Vitro route 2 (two) times daily. 200 strip 3     fluticasone furoate-vilanterol (BREO ELLIPTA) 100-25 MCG/ACT Inhalation Aerosol Powder, Breath Activated Inhale 1 puff into the lungs daily. Rinse your mouth with water without swallowing after using BREO to help reduce your chance of getting thrush. 1 each 5     Secukinumab, 300 MG Dose, (COSENTYX SENSOREADY, 300 MG,) 150 MG/ML Subcutaneous Solution Auto-injector Inject 300 mg into the skin every 28 days. 2 each 2     PEG 3350-KCl-Na Bicarb-NaCl (TRILYTE) 420 g Oral Recon Soln Take prep as directed by gastro office. May substitute with Trilyte/generic equivalent if needed. 1 each 0     albuterol (2.5 MG/3ML) 0.083% Inhalation Nebu Soln Take 3 mL (2.5 mg total) by nebulization every 6 (six) hours as needed for Wheezing. 50 each 0 6/13/2024    dicyclomine 10 MG Oral Cap Take 1 capsule (10 mg total) by mouth 2 (two) times daily as needed. 60 capsule 1 prn    Insulin Pen Needle (CAREFINE PEN NEEDLES) 32G X 4 MM Does not apply Misc Injects qhs. 100 each 6     Insulin Pen Needle (BD PEN NEEDLE ORIGINAL U/F) 29G X 12.7MM Does not apply Misc Dx. E11.9. Checks q12hrs. BD ultrafine MINI. 100 each 6     Alcohol Swabs Does not apply Pads Code: E11.9.  Checks twice daily. 100 each 11     ACCU-CHEK FASTCLIX LANCETS Does not apply Misc CHECK EVERY MORNING AND EVENING 204 each 11      Current Facility-Administered Medications   Medication Dose Route Frequency    lactated ringers infusion   Intravenous Continuous       Allergies:   Allergies   Allergen Reactions    Amlodipine SHORTNESS OF BREATH     Chest pressure.  Patient does not recall having any allergies       Past Medical History:    Appendicitis    Cholelithiasis    Deep vein thrombosis (HCC)    L leg      Diabetes (HCC)    Disorder of thyroid    Essential hypertension    Goiter    Multinodular goiter S/P thyroidectomy.     High blood pressure    High cholesterol    Osteopenia    Vertigo, aural     Past Surgical History:   Procedure Laterality Date    Appendectomy  1993    Cataract extraction w/  intraocular lens implant Right 1988    in [place]    Cholecystectomy  2006    Colonoscopy N/A 8/12/2020    Procedure: COLONOSCOPY;  Surgeon: José Antonio Fletcher MD;  Location: [facility] ENDOSCOPY    Electrocardiogram, complete  01/03/2014    SCANNED TO MEDIA TAB - 01/03/2014    Fracture surgery Left     LEFT FOOT SURGERY WITH HARDWARE    Knee scope,abrasn arthroplasty Right 10-4-16    Thyroidectomy  08/17/15    TOTAL    Tubal ligation       Family History   Problem Relation Age of Onset    Diabetes Mother     Hypertension Mother     Dementia Mother         Alzheimer's Disease    Diabetes Brother     Lipids Brother     Hypertension Brother     Diabetes Paternal Uncle     Heart Disease Father         CAD    Diabetes Other     Dementia Other     No Known Problems Self     No Known Problems Sister     No Known Problems Daughter     No Known Problems Maternal Grandmother     No Known Problems Paternal Grandmother     No Known Problems Maternal Aunt     No Known Problems Paternal Aunt     No Known Problems Maternal Cousin Female     No Known Problems Maternal Cousin Male     No Known Problems Paternal Cousin Female     No Known Problems Paternal Cousin Male     Colon Cancer Son 43    Glaucoma Neg     Macular degeneration Neg     Breast Cancer Neg     Ovarian Cancer Neg     DCIS Neg     LCIS Neg     BRCA gene + Neg     Ashkenazi Gnosticism Descent Neg      Social History     Tobacco Use    Smoking status: Never     Passive exposure: Never    Smokeless tobacco: Never   Substance Use Topics    Alcohol use: No       SYSTEM Check if Review is Normal Check if Physical Exam is Normal If not normal, please explain:   HEENT [x ] [ x]    NECK & BACK [x ]  [x ]    HEART [x ] [ x]    LUNGS [x ] [ x]    ABDOMEN [x ] [x ]    UROGENITAL [ ] [ ]    EXTREMITIES [x ] [x ]    OTHER        [ x ] I have discussed the risks and benefits and alternatives with the patient/family.  They understand and agree to proceed with plan of care.  [ x ] I have reviewed the History and Physical done within the last 30 days.  Any changes noted above.    José Antonio Fletcher MD  6/14/2024  8:11 AM

## 2024-06-14 NOTE — ANESTHESIA POSTPROCEDURE EVALUATION
Patient: Julia Hernandez    Procedure Summary       Date: 06/14/24 Room / Location: [facility] ENDOSCOPY 01 / [facility] ENDOSCOPY    Anesthesia Start: 0822 Anesthesia Stop:     Procedure: COLONOSCOPY Diagnosis:       Colon cancer screening      History of colon polyps      (colon polyps,hemorrhoids)    Surgeons: José Antonio Fletcher MD Anesthesiologist: Sarah Irene CRNA    Anesthesia Type: general ASA Status: 3            Anesthesia Type: general    Vitals Value Taken Time   /52 06/14/24 0907   Temp N/a 06/14/24 0907   Pulse 59 06/14/24 0907   Resp 17 06/14/24 0907   SpO2 97 % 06/14/24 0907       [facility] AN Post Evaluation:   Patient Evaluated in PACU  Patient Participation: complete - patient participated  Level of Consciousness: sleepy but conscious  Pain Management: adequate  Airway Patency:patent  Yes    Cardiovascular Status: acceptable  Respiratory Status: acceptable and room air  Postoperative Hydration acceptable      Sarah Irene CRNA  6/14/2024 9:07 AM

## 2024-06-14 NOTE — OPERATIVE REPORT
[facility] Endoscopy Report  Date of procedure-June 14, 2024    Preoperative Diagnosis:  -Colorectal screening  -History colon polyps      Postoperative Diagnosis:  -Colon polyps x 6  -Small internal hemorrhoids       Procedure:    Colonoscopy       Surgeon:  José Antonio Fletcher M.D.    Anesthesia:  MAC     Technique:  After informed consent, the patient was placed in the left lateral recumbent position.  Digital rectal examination revealed no palpable intraluminal abnormalities.  An Olympus variable stiffness 190 series HD colonoscope was inserted into the rectum and advanced under direct vision by following the lumen to the cecum.  The colon was examined upon withdrawal in the left lateral position.    The procedure was well tolerated without immediate complication.      Findings:  The preparation of the colon was good.  The terminal ileum was examined for 4 cm and visually normal.  The ileocecal valve was well preserved. The visualized colonic mucosa from the cecum to the anal verge was normal with an intact vascular pattern.    Colon polyps x 6 removed as follows;  -Transverse x 4, each polyp was sessile approximately 3 to 4 mm in size and cold snare removed.  -Descending x 2, both polyps were diminutive removed by cold forceps technique.  All polypectomy sites inspected and found to be free of bleeding and specimens retrieved and sent for analysis.    Small internal hemorrhoids noted on retroflexed view.    Estimated blood loss-insignificant  Specimens-see above      Impression:  -Colon polyps x 6  -Small internal hemorrhoids     Recommendations:  - Post polypectomy instructions given  - Repeat colonoscopy in 3- 5 years  - Symptomatic treatment of hemorrhoids          José Antonio Fletcher MD  6/14/2024  9:04 AM

## 2024-06-16 ENCOUNTER — TELEPHONE (OUTPATIENT)
Facility: CLINIC | Age: 74
End: 2024-06-16

## 2024-06-17 ENCOUNTER — ANTI-COAG VISIT (OUTPATIENT)
Dept: ANTICOAGULATION | Facility: CLINIC | Age: 74
End: 2024-06-17

## 2024-06-17 DIAGNOSIS — Z51.81 MONITORING FOR LONG-TERM ANTICOAGULANT USE: ICD-10-CM

## 2024-06-17 DIAGNOSIS — I82.532 CHRONIC DEEP VEIN THROMBOSIS OF LEFT POPLITEAL VEIN (HCC): ICD-10-CM

## 2024-06-17 DIAGNOSIS — Z79.01 MONITORING FOR LONG-TERM ANTICOAGULANT USE: ICD-10-CM

## 2024-06-17 DIAGNOSIS — D68.61 ANTIPHOSPHOLIPID SYNDROME (HCC): Primary | ICD-10-CM

## 2024-06-17 DIAGNOSIS — Z79.01 LONG TERM (CURRENT) USE OF ANTICOAGULANTS: ICD-10-CM

## 2024-06-17 LAB
INR: 1.4 (ref 2–3)
TEST STRIP EXPIRATION DATE: ABNORMAL DATE

## 2024-06-17 PROCEDURE — 85610 PROTHROMBIN TIME: CPT | Performed by: FAMILY MEDICINE

## 2024-06-17 PROCEDURE — 93793 ANTICOAG MGMT PT WARFARIN: CPT | Performed by: FAMILY MEDICINE

## 2024-06-17 RX ORDER — ENOXAPARIN SODIUM 100 MG/ML
100 INJECTION SUBCUTANEOUS 2 TIMES DAILY
Qty: 4 EACH | Refills: 0 | Status: SHIPPED | OUTPATIENT
Start: 2024-06-17

## 2024-06-17 RX ORDER — ENOXAPARIN SODIUM 100 MG/ML
INJECTION SUBCUTANEOUS
COMMUNITY
Start: 2024-06-11 | End: 2024-06-17

## 2024-06-17 NOTE — PROGRESS NOTES
Subjective:   Patient ID: Julia Hernandez is a 74 year old female.    HPI    Patient comes in today with complaint of itchiness and burning rash to the lower back patient does have history of psoriasis also wondering if she is okay to have the colonoscopy in few days due to the rash being to the lower back.    History/Other:   Review of Systems   Constitutional: Negative.    HENT: Negative.     Eyes: Negative.    Respiratory: Negative.     Cardiovascular: Negative.    Gastrointestinal: Negative.    Genitourinary: Negative.    Musculoskeletal: Negative.    Skin:  Positive for rash.        Lower back   Neurological: Negative.    Psychiatric/Behavioral: Negative.       Current Outpatient Medications   Medication Sig Dispense Refill    clobetasol 0.05 % External Solution Apply 1 Application topically 2 (two) times daily. 1 each 0    enoxaparin 150 MG/ML Injection Solution Prefilled Syringe Inject 0.71 mL (107 mg total) into the skin every 12 (twelve) hours. 10 each 0    levothyroxine 88 MCG Oral Tab Take 1 tablet (88 mcg total) by mouth before breakfast. 90 tablet 3    levothyroxine 100 MCG Oral Tab Take 1 tablet (100 mcg total) by mouth before breakfast. 90 tablet 3    valsartan 160 MG Oral Tab TAKE ONE TABLET BY MOUTH TWICE DAILY 180 tablet 3    Glucose Blood (ACCU-CHEK GUIDE) In Vitro Strip 1 strip by In Vitro route 2 (two) times daily. 200 strip 3    SPIRONOLACTONE 25 MG Oral Tab TAKE ONE TABLET BY MOUTH ONE TIME DAILY 90 tablet 1    fluticasone furoate-vilanterol (BREO ELLIPTA) 100-25 MCG/ACT Inhalation Aerosol Powder, Breath Activated Inhale 1 puff into the lungs daily. Rinse your mouth with water without swallowing after using BREO to help reduce your chance of getting thrush. 1 each 5    escitalopram 5 MG Oral Tab Take 1 tablet (5 mg total) by mouth nightly. 90 tablet 3    Secukinumab, 300 MG Dose, (COSENTYX SENSOREADY, 300 MG,) 150 MG/ML Subcutaneous Solution Auto-injector Inject 300 mg into the  skin every 28 days. 2 each 2    insulin glargine (LANTUS SOLOSTAR) 100 UNIT/ML Subcutaneous Solution Pen-injector INJECT 45 UNITS INTHE MORNING AND 7 UNITS IN THE EVENING 45 mL 3    METOPROLOL TARTRATE 100 MG Oral Tab Take 1 tablet by mouth in the morning and 1/2 tablet in the afternoon 90 tablet 1    warfarin 5 MG Oral Tab Take as directed by INR clinic or take 7.5mg Tuesdays & Thursdays, take 5mg all other days 110 tablet 1    simvastatin 20 MG Oral Tab Take 1 tablet (20 mg total) by mouth twice a week. 24 tablet 3    minoxidil 2.5 MG Oral Tab Take 2 tablets (5 mg total) by mouth 2 (two) times daily. (Patient taking differently: Take 1 tablet (2.5 mg total) by mouth 2 (two) times daily.) 360 tablet 3    PEG 3350-KCl-Na Bicarb-NaCl (TRILYTE) 420 g Oral Recon Soln Take prep as directed by gastro office. May substitute with Trilyte/generic equivalent if needed. 1 each 0    albuterol (2.5 MG/3ML) 0.083% Inhalation Nebu Soln Take 3 mL (2.5 mg total) by nebulization every 6 (six) hours as needed for Wheezing. 50 each 0    indapamide 2.5 MG Oral Tab Take 1 tablet (2.5 mg total) by mouth daily and qpm. 180 tablet 2    dicyclomine 10 MG Oral Cap Take 1 capsule (10 mg total) by mouth 2 (two) times daily as needed. 60 capsule 1    glipiZIDE 10 MG Oral Tab Take 1 tablet (10 mg total) by mouth 2 (two) times daily before meals. 180 tablet 1    Insulin Pen Needle (CAREFINE PEN NEEDLES) 32G X 4 MM Does not apply Misc Injects qhs. 100 each 6    Insulin Pen Needle (BD PEN NEEDLE ORIGINAL U/F) 29G X 12.7MM Does not apply Misc Dx. E11.9. Checks q12hrs. BD ultrafine MINI. 100 each 6    Alcohol Swabs Does not apply Pads Code: E11.9.  Checks twice daily. 100 each 11    ACCU-CHEK FASTCLIX LANCETS Does not apply Misc CHECK EVERY MORNING AND EVENING 204 each 11    Omega-3-acid Ethyl Esters (LOVAZA) 1 G Oral Cap Take 1 capsule (1 g total) by mouth 2 (two) times daily with meals.      enoxaparin 100 MG/ML Injection Solution Prefilled Syringe  Inject 1 mL (100 mg total) into the skin 2 (two) times daily. 4 each 0     Allergies:  Allergies   Allergen Reactions    Amlodipine SHORTNESS OF BREATH     Chest pressure.  Patient does not recall having any allergies       Objective:   Physical Exam  Vitals and nursing note reviewed.   Constitutional:       Appearance: She is well-developed.   HENT:      Head: Normocephalic and atraumatic.      Right Ear: External ear normal.      Left Ear: External ear normal.      Nose: Nose normal.   Eyes:      Conjunctiva/sclera: Conjunctivae normal.      Pupils: Pupils are equal, round, and reactive to light.   Cardiovascular:      Rate and Rhythm: Normal rate and regular rhythm.      Heart sounds: Normal heart sounds.   Pulmonary:      Effort: Pulmonary effort is normal.      Breath sounds: Normal breath sounds.   Abdominal:      General: Bowel sounds are normal.      Palpations: Abdomen is soft.   Genitourinary:     Vagina: Normal.   Musculoskeletal:         General: Normal range of motion.      Cervical back: Normal range of motion and neck supple.   Skin:     General: Skin is warm and dry.      Findings: Rash present.      Comments: Likely p   Neurological:      Mental Status: She is alert and oriented to person, place, and time.      Deep Tendon Reflexes: Reflexes are normal and symmetric.   Psychiatric:         Behavior: Behavior normal.         Thought Content: Thought content normal.         Judgment: Judgment normal.         Assessment & Plan:   1. Rash like a psoriatic rash will give for steroid cream follow-up with Derm       No orders of the defined types were placed in this encounter.      Meds This Visit:  Requested Prescriptions     Signed Prescriptions Disp Refills    clobetasol 0.05 % External Solution 1 each 0     Sig: Apply 1 Application topically 2 (two) times daily.       Imaging & Referrals:  [facility] - INTERNAL

## 2024-06-17 NOTE — PROGRESS NOTES
Face-to-Face  / INR 1.4, sub therapeutic. (Goal 2.5 ) TTR 73.6 %     Etiology: INR is too low so continue WARFARIN & Enoxaparin. Discussed in detail with spouse and son.    PLAN: continue Enoxaparin & take 5mg extra warfarin today.    Recheck INR Wednesday.     Pt reports:    No sign of unusual bruising or bleeding.  Any missed doses: Yes. She said missed 3 days only.   Medications changes: on Enoxaparin.    Contacted  Julia & Bebeto  verbalized understanding and agreement. AVS provided    WARFARIN Plan per protocol: 6/17: 10 mg; Otherwise 7.5 mg every Tue, Thu; 5 mg all other days

## 2024-06-17 NOTE — TELEPHONE ENCOUNTER
----- Message from José Antonio Fletcher sent at 6/14/2024  5:21 PM CDT -----  I wanted to get back to you with your colonoscopy results.  You had 6 colon polyps removed which were benign.  I would advise a repeat colonoscopy in 3 years to make sure no new polyps are forming.      You also have internal hemorrhoids.  Please call with any questions.

## 2024-06-17 NOTE — TELEPHONE ENCOUNTER
Recall colon in 3 years per Dr Fletcher. Colon done 06/14/2024    Health maintenance updated and message sent to pt outreach to repeat colonoscopy in 3 years

## 2024-06-19 ENCOUNTER — ANTI-COAG VISIT (OUTPATIENT)
Dept: ANTICOAGULATION | Facility: CLINIC | Age: 74
End: 2024-06-19

## 2024-06-19 DIAGNOSIS — Z79.01 LONG TERM (CURRENT) USE OF ANTICOAGULANTS: ICD-10-CM

## 2024-06-19 DIAGNOSIS — Z51.81 MONITORING FOR LONG-TERM ANTICOAGULANT USE: ICD-10-CM

## 2024-06-19 DIAGNOSIS — Z79.01 MONITORING FOR LONG-TERM ANTICOAGULANT USE: ICD-10-CM

## 2024-06-19 DIAGNOSIS — D68.61 ANTIPHOSPHOLIPID SYNDROME (HCC): Primary | ICD-10-CM

## 2024-06-19 DIAGNOSIS — I82.532 CHRONIC DEEP VEIN THROMBOSIS OF LEFT POPLITEAL VEIN (HCC): ICD-10-CM

## 2024-06-19 LAB
INR: 2.1 (ref 2–3)
TEST STRIP EXPIRATION DATE: ABNORMAL DATE

## 2024-06-19 PROCEDURE — 93793 ANTICOAG MGMT PT WARFARIN: CPT | Performed by: FAMILY MEDICINE

## 2024-06-19 PROCEDURE — 85610 PROTHROMBIN TIME: CPT | Performed by: FAMILY MEDICINE

## 2024-06-19 NOTE — PROGRESS NOTES
Face-to-Face  / INR 2.1,  therapeutic. (Goal 2.5 ) TTR 73.5 %     Etiology: INR is therapeutic. Okay to STOP ENOXAPARIN. Continue warfarin on the same current dose.    PLAN: continue the same dose warfarin. Put left over shots in dark cool storage- like a sock drawer.    Recheck INR 4 weeks.    Pt reports:    No sign of unusual bruising or bleeding.  Any missed doses: No   Medications changes: No    Contacted  Julia verbalized understanding and agreement. AVS    WARFARIN Plan per protocol: 7.5 mg every Tue, Thu; 5 mg all other days

## 2024-06-25 RX ORDER — METOPROLOL TARTRATE 100 MG/1
TABLET ORAL
Qty: 90 TABLET | Refills: 0 | Status: SHIPPED | OUTPATIENT
Start: 2024-06-25

## 2024-06-25 NOTE — TELEPHONE ENCOUNTER
Last  office visit  2/1/2024    Return to clinic   6 months    Follow up  appointment   8/30/2024

## 2024-07-15 ENCOUNTER — ANTI-COAG VISIT (OUTPATIENT)
Dept: ANTICOAGULATION | Facility: CLINIC | Age: 74
End: 2024-07-15

## 2024-07-15 ENCOUNTER — HOSPITAL ENCOUNTER (OUTPATIENT)
Dept: BONE DENSITY | Facility: HOSPITAL | Age: 74
Discharge: HOME | End: 2024-07-15
Attending: INTERNAL MEDICINE
Payer: MEDICARE

## 2024-07-15 DIAGNOSIS — Z51.81 MONITORING FOR LONG-TERM ANTICOAGULANT USE: ICD-10-CM

## 2024-07-15 DIAGNOSIS — I82.532 CHRONIC DEEP VEIN THROMBOSIS OF LEFT POPLITEAL VEIN (HCC): ICD-10-CM

## 2024-07-15 DIAGNOSIS — Z79.01 LONG TERM (CURRENT) USE OF ANTICOAGULANTS: ICD-10-CM

## 2024-07-15 DIAGNOSIS — Z79.01 MONITORING FOR LONG-TERM ANTICOAGULANT USE: ICD-10-CM

## 2024-07-15 DIAGNOSIS — D68.61 ANTIPHOSPHOLIPID SYNDROME (HCC): Primary | ICD-10-CM

## 2024-07-15 DIAGNOSIS — M85.80 OSTEOPENIA, UNSPECIFIED LOCATION: ICD-10-CM

## 2024-07-15 LAB
INR: 2.2 (ref 2–3)
TEST STRIP EXPIRATION DATE: NORMAL DATE

## 2024-07-15 PROCEDURE — 93793 ANTICOAG MGMT PT WARFARIN: CPT | Performed by: FAMILY MEDICINE

## 2024-07-15 PROCEDURE — 77080 DXA BONE DENSITY AXIAL: CPT | Performed by: INTERNAL MEDICINE

## 2024-07-15 PROCEDURE — 85610 PROTHROMBIN TIME: CPT | Performed by: FAMILY MEDICINE

## 2024-07-15 NOTE — PROGRESS NOTES
Face-to-Face  / INR 2.2,  therapeutic. (Goal 2.5 ) TTR 73.8 %     Etiology: Has been using [employer] brand of glucosamin/chondro. Okay to continue; stay with same brand and dose.     PLAN: continue the current dose.    Recheck INR 4 weeks.    Pt reports:    No sign of unusual bruising or bleeding.  Any missed doses: No   Medications changes: No    Contacted  Julia verbalized understanding and agreement.    WARFARIN Plan per protocol: 7.5 mg every Tue, Thu; 5 mg all other days

## 2024-07-23 ENCOUNTER — LAB ENCOUNTER (OUTPATIENT)
Dept: LAB | Facility: HOSPITAL | Age: 74
End: 2024-07-23
Attending: INTERNAL MEDICINE
Payer: MEDICARE

## 2024-07-23 DIAGNOSIS — M81.0 AGE-RELATED OSTEOPOROSIS WITHOUT CURRENT PATHOLOGICAL FRACTURE: ICD-10-CM

## 2024-07-23 LAB
PHOSPHATE SERPL-MCNC: 4.2 MG/DL (ref 2.4–5.1)
PTH-INTACT SERPL-MCNC: 64.7 PG/ML (ref 18.5–88)
VIT D+METAB SERPL-MCNC: 52 NG/ML (ref 30–100)

## 2024-07-23 PROCEDURE — 84100 ASSAY OF PHOSPHORUS: CPT

## 2024-07-23 PROCEDURE — 80053 COMPREHEN METABOLIC PANEL: CPT | Performed by: INTERNAL MEDICINE

## 2024-07-23 PROCEDURE — 82306 VITAMIN D 25 HYDROXY: CPT

## 2024-07-23 PROCEDURE — 83970 ASSAY OF PARATHORMONE: CPT

## 2024-07-23 PROCEDURE — 36415 COLL VENOUS BLD VENIPUNCTURE: CPT

## 2024-07-24 ENCOUNTER — APPOINTMENT (OUTPATIENT)
Dept: LAB | Facility: HOSPITAL | Age: 74
End: 2024-07-24
Attending: INTERNAL MEDICINE
Payer: MEDICARE

## 2024-07-24 PROCEDURE — 82340 ASSAY OF CALCIUM IN URINE: CPT

## 2024-07-24 PROCEDURE — 82530 CORTISOL FREE: CPT

## 2024-07-25 ENCOUNTER — LAB ENCOUNTER (OUTPATIENT)
Dept: LAB | Facility: HOSPITAL | Age: 74
End: 2024-07-25
Attending: INTERNAL MEDICINE
Payer: MEDICARE

## 2024-07-25 DIAGNOSIS — M81.0 AGE-RELATED OSTEOPOROSIS WITHOUT CURRENT PATHOLOGICAL FRACTURE: ICD-10-CM

## 2024-07-25 LAB
CALCIUM 24H UR-SRATE: 38 MG/24HR (ref 100–300)
CORTIS SERPL-MCNC: 28.3 UG/DL
SPECIMEN VOL UR: 1900 ML

## 2024-07-25 PROCEDURE — 36415 COLL VENOUS BLD VENIPUNCTURE: CPT

## 2024-07-25 PROCEDURE — 82533 TOTAL CORTISOL: CPT

## 2024-07-26 RX ORDER — WARFARIN SODIUM 5 MG/1
TABLET ORAL
Qty: 110 TABLET | Refills: 1 | Status: SHIPPED | OUTPATIENT
Start: 2024-07-26

## 2024-07-26 NOTE — TELEPHONE ENCOUNTER
Passed. WARFARIN refill Protocol.     Most Current dose:  WARFARIN Plan per protocol: 7.5 mg every Tue, Thu; 5 mg all other days

## 2024-07-29 LAB
CORTISOL FREE 24H UR: 11 UG/24 HR
CORTISOL FREE UR: 6 UG/L
CREAT 24H UR: 1132 MG/24 HR
CREAT UR: 59.6 MG/DL

## 2024-08-04 RX ORDER — PEN NEEDLE, DIABETIC 32GX 5/32"
NEEDLE, DISPOSABLE MISCELLANEOUS
Qty: 90 EACH | Refills: 3 | Status: SHIPPED | OUTPATIENT
Start: 2024-08-04

## 2024-08-04 NOTE — TELEPHONE ENCOUNTER
Refill passed per [facility] protocol.    Requested Prescriptions   Pending Prescriptions Disp Refills    TRUEPLUS 5-BEVEL PEN NEEDLES 32G X 4 MM Does not apply Misc [Pharmacy Med Name: Trueplus 5-Bevel Pen Needles [id number] Mis Triv]  0     Sig: Injects at bedtime       Diabetic Supplies Protocol Passed - 7/31/2024  1:39 PM        Passed - In person appointment or virtual visit in the past 12 mos or appointment in next 3 mos     Recent Outpatient Visits              [facility]    [facility]    3 months ago Subacute cough    [facility] Bharathi Quinonez MD    Office Visit    4 months ago Psoriatic arthritis ([facility])    [facility] Kelsey Lopez MD    Office Visit    5 months ago Antiphospholipid syndrome ([facility])    [facility] Sara Valenzuela MD    Office Visit    6 months ago Morbid (severe) obesity due to excess calories ([facility])    [facility] Rajat Hurd MD    Office Visit          Future Appointments         Provider Department Appt Notes    In 1 week Halle Nassar RN [facility]     In 2 weeks Sara Valenzuela MD [facility]     In 3 weeks Rajat Hurd MD [facility] Blood work results discussion.    In 2 months Kelsey Lopez MD [facility] 6 mof/u    In 3 months Bharathi Quinonez MD [facility] - Subacute cough  (Policy informed)                         Recent Outpatient Visits              1 month ago Argenis    [facility] Jovan Johnson MD    Office Visit    3 months ago Subacute cough     [facility] Bharathi Quinonez MD    Office Visit    4 months ago Psoriatic arthritis ([facility])    [facility] Kelsey Lopez MD    Office Visit    5 months ago Antiphospholipid syndrome ([facility])    [facility] Sara Valenzuela MD    Office Visit    6 months ago Morbid (severe) obesity due to excess calories ([facility])    [facility] Rajat Hurd MD    Office Visit            Future Appointments         Provider Department Appt Notes    In 1 week Halle Nassar RN [facility]     In 2 weeks Sara Valenzuela MD [facility]     In 3 weeks Rajat Hurd MD [facility] Blood work results discussion.    In 2 months Kelsey Lopez MD [facility] 6 mof/u    In 3 months Bharathi Quinonez MD [facility] - Subacute cough  (Policy informed)

## 2024-08-07 NOTE — TELEPHONE ENCOUNTER
Contacted [employer] to f/u. Per representative Erika Napoles, all information was recived and application is currently in \"benefits investigation\" phase. How Severe Is Your Acne?: moderate Is This A New Presentation, Or A Follow-Up?: Acne What Type Of Note Output Would You Prefer (Optional)?: Standard Output

## 2024-08-08 RX ORDER — INDAPAMIDE 2.5 MG/1
TABLET ORAL
Qty: 180 TABLET | Refills: 3 | Status: SHIPPED | OUTPATIENT
Start: 2024-08-08

## 2024-08-08 NOTE — TELEPHONE ENCOUNTER
Please review; protocol failed/ has no protocol    Requested Prescriptions   Pending Prescriptions Disp Refills    INDAPAMIDE 2.5 MG Oral Tab [Pharmacy Med Name: Indapamide 2.5 Mg Tab Acta] 180 tablet 0     Sig: Take 1 tablet (2.5 mg total) by mouth daily and every night.       Hypertension Medications Protocol Failed - 8/5/2024  3:15 PM        Failed - EGFRCR or GFRNAA > 50     GFR Evaluation  EGFRCR: 42 , resulted on 7/23/2024          Passed - CMP or BMP in past 12 months        Passed - Last BP reading less than 140/90     BP Readings from Last 1 Encounters:   06/14/24 134/58               Passed - In person appointment or virtual visit in the past 12 mos or appointment in next 3 mos     Recent Outpatient Visits              1 month ago Rash    [facility] Jovan Johnson MD    Office Visit    3 months ago Subacute cough    [facility] Bharathi Quinonez MD    Office Visit    4 months ago Psoriatic arthritis (H[facility])    [facility] Kelsey Lopez MD    Office Visit    5 months ago Antiphospholipid syndrome ([facility])    [facility] Sara Valenzuela MD    Office Visit    6 months ago Morbid (severe) obesity due to excess calories ([facility])    [facility] Rajat Hurd MD    Office Visit          Future Appointments         Provider Department Appt Notes    In 4 days Halle Nassar RN [facility]     In 1 week Sara Valenzuela MD [facility]     In 3 weeks Rajat Hurd MD [facility] Blood work results discussion.    In 2 months Kelsey Lopez MD [facility] 6 mof/u    In 3 months Bharathi Quinonez MD [facility] - Subacute cough  (Policy informed)                       Recent Outpatient Visits              1 month ago Rash    [facility] Jovan Johnson MD    Office Visit    3 months ago Subacute cough    [facility] Bharathi Quinonez MD    Office Visit    4 months ago Psoriatic arthritis ([facility])    [facility] Kelsey Lopez MD    Office Visit    5 months ago Antiphospholipid syndrome ([facility])    [facility] Sara Valenzuela MD    Office Visit    6 months ago Morbid (severe) obesity due to excess calories ([facility])    [facility] Rajat Hurd MD    Office Visit          Future Appointments         Provider Department Appt Notes    In 4 days Halle Nassar RN [facility]     In 1 week Sara Valenzuela MD [facility]     In 3 weeks Rajat Hurd MD [facility] Blood work results discussion.    In 2 months Kelsey Lopez MD [facility] 6 mof/u    In 3 months Bharathi Quinonez MD [facility] - Subacute cough  (Policy informed)

## 2024-08-12 ENCOUNTER — ANTI-COAG VISIT (OUTPATIENT)
Dept: ANTICOAGULATION | Facility: CLINIC | Age: 74
End: 2024-08-12

## 2024-08-12 DIAGNOSIS — Z79.01 LONG TERM (CURRENT) USE OF ANTICOAGULANTS: ICD-10-CM

## 2024-08-12 DIAGNOSIS — I82.532 CHRONIC DEEP VEIN THROMBOSIS OF LEFT POPLITEAL VEIN (HCC): ICD-10-CM

## 2024-08-12 DIAGNOSIS — D68.61 ANTIPHOSPHOLIPID SYNDROME (HCC): Primary | ICD-10-CM

## 2024-08-12 DIAGNOSIS — Z51.81 MONITORING FOR LONG-TERM ANTICOAGULANT USE: ICD-10-CM

## 2024-08-12 DIAGNOSIS — Z79.01 MONITORING FOR LONG-TERM ANTICOAGULANT USE: ICD-10-CM

## 2024-08-12 LAB
INR: 2.4 (ref 2–3)
TEST STRIP EXPIRATION DATE: ABNORMAL DATE

## 2024-08-12 PROCEDURE — 93793 ANTICOAG MGMT PT WARFARIN: CPT | Performed by: FAMILY MEDICINE

## 2024-08-12 PROCEDURE — 85610 PROTHROMBIN TIME: CPT | Performed by: FAMILY MEDICINE

## 2024-08-12 NOTE — PROGRESS NOTES
Face-to-Face  / INR 2.4,  therapeutic. (Goal 2.5 ) TTR 74.2 %     Etiology: stable    PLAN: continue the current dose.    Recheck INR 4 weeks.    Pt reports:    No sign of unusual bruising or bleeding.  Any missed doses: No   Medications changes: No    Contacted  Julia verbalized understanding and agreement.    WARFARIN Plan per protocol: 7.5 mg every Tue, Thu; 5 mg all other days

## 2024-08-13 ENCOUNTER — TELEPHONE (OUTPATIENT)
Dept: NEPHROLOGY | Facility: CLINIC | Age: 74
End: 2024-08-13

## 2024-08-13 NOTE — TELEPHONE ENCOUNTER
Please call patient I gave her another time to come in then the last Friday of August but I forget what I told her can we please adjust the schedule thank you

## 2024-08-14 NOTE — TELEPHONE ENCOUNTER
spoke with patient next  appointment on August/30/2024 at 4:00 P.M  she is in schedule.  Patient ask if she needs do lab work.  Thank you

## 2024-08-19 ENCOUNTER — TELEPHONE (OUTPATIENT)
Dept: INTERNAL MEDICINE CLINIC | Facility: CLINIC | Age: 74
End: 2024-08-19

## 2024-08-21 ENCOUNTER — OFFICE VISIT (OUTPATIENT)
Dept: INTERNAL MEDICINE CLINIC | Facility: CLINIC | Age: 74
End: 2024-08-21

## 2024-08-21 VITALS
HEART RATE: 63 BPM | SYSTOLIC BLOOD PRESSURE: 130 MMHG | DIASTOLIC BLOOD PRESSURE: 72 MMHG | WEIGHT: 245 LBS | BODY MASS INDEX: 36.29 KG/M2 | OXYGEN SATURATION: 99 % | HEIGHT: 69 IN | RESPIRATION RATE: 18 BRPM

## 2024-08-21 DIAGNOSIS — G89.29 CHRONIC PAIN OF BOTH KNEES: ICD-10-CM

## 2024-08-21 DIAGNOSIS — Z12.31 ENCOUNTER FOR SCREENING MAMMOGRAM FOR MALIGNANT NEOPLASM OF BREAST: ICD-10-CM

## 2024-08-21 DIAGNOSIS — M25.562 CHRONIC PAIN OF BOTH KNEES: ICD-10-CM

## 2024-08-21 DIAGNOSIS — M54.32 SCIATICA OF LEFT SIDE: ICD-10-CM

## 2024-08-21 DIAGNOSIS — N18.31 STAGE 3A CHRONIC KIDNEY DISEASE (HCC): ICD-10-CM

## 2024-08-21 DIAGNOSIS — Z71.85 VACCINE COUNSELING: ICD-10-CM

## 2024-08-21 DIAGNOSIS — E53.8 B12 DEFICIENCY: Primary | ICD-10-CM

## 2024-08-21 DIAGNOSIS — I10 ESSENTIAL HYPERTENSION WITH GOAL BLOOD PRESSURE LESS THAN 130/80: ICD-10-CM

## 2024-08-21 DIAGNOSIS — E11.22 TYPE 2 DIABETES MELLITUS WITH CHRONIC KIDNEY DISEASE, WITHOUT LONG-TERM CURRENT USE OF INSULIN, UNSPECIFIED CKD STAGE (HCC): ICD-10-CM

## 2024-08-21 DIAGNOSIS — M25.561 CHRONIC PAIN OF BOTH KNEES: ICD-10-CM

## 2024-08-21 DIAGNOSIS — M81.0 AGE-RELATED OSTEOPOROSIS WITHOUT CURRENT PATHOLOGICAL FRACTURE: ICD-10-CM

## 2024-08-21 DIAGNOSIS — E03.9 HYPOTHYROIDISM (ACQUIRED): Chronic | ICD-10-CM

## 2024-08-21 DIAGNOSIS — M85.89 OSTEOPENIA OF MULTIPLE SITES: ICD-10-CM

## 2024-08-21 PROBLEM — R05.2 SUBACUTE COUGH: Status: RESOLVED | Noted: 2021-07-21 | Resolved: 2024-08-21

## 2024-08-21 PROCEDURE — 3008F BODY MASS INDEX DOCD: CPT | Performed by: INTERNAL MEDICINE

## 2024-08-21 PROCEDURE — 1160F RVW MEDS BY RX/DR IN RCRD: CPT | Performed by: INTERNAL MEDICINE

## 2024-08-21 PROCEDURE — 3075F SYST BP GE 130 - 139MM HG: CPT | Performed by: INTERNAL MEDICINE

## 2024-08-21 PROCEDURE — 3078F DIAST BP <80 MM HG: CPT | Performed by: INTERNAL MEDICINE

## 2024-08-21 PROCEDURE — 99499 UNLISTED E&M SERVICE: CPT | Performed by: INTERNAL MEDICINE

## 2024-08-21 PROCEDURE — 1159F MED LIST DOCD IN RCRD: CPT | Performed by: INTERNAL MEDICINE

## 2024-08-21 PROCEDURE — 99215 OFFICE O/P EST HI 40 MIN: CPT | Performed by: INTERNAL MEDICINE

## 2024-08-21 PROCEDURE — 1126F AMNT PAIN NOTED NONE PRSNT: CPT | Performed by: INTERNAL MEDICINE

## 2024-08-21 PROCEDURE — G2211 COMPLEX E/M VISIT ADD ON: HCPCS | Performed by: INTERNAL MEDICINE

## 2024-08-21 RX ORDER — BETAMETHASONE DIPROPIONATE 0.5 MG/G
30 CREAM TOPICAL 2 TIMES DAILY
Qty: 30 G | Refills: 2 | Status: SHIPPED | OUTPATIENT
Start: 2024-08-21

## 2024-08-21 RX ORDER — INSULIN GLARGINE 100 [IU]/ML
INJECTION, SOLUTION SUBCUTANEOUS
Qty: 45 ML | Refills: 3 | Status: SHIPPED | OUTPATIENT
Start: 2024-08-21

## 2024-08-21 RX ORDER — WARFARIN SODIUM 5 MG/1
TABLET ORAL
Qty: 110 TABLET | Refills: 11 | Status: SHIPPED | OUTPATIENT
Start: 2024-08-21

## 2024-08-21 RX ORDER — ALBUTEROL SULFATE 90 UG/1
1 AEROSOL, METERED RESPIRATORY (INHALATION) EVERY 6 HOURS PRN
Qty: 1 EACH | Refills: 1 | Status: SHIPPED | OUTPATIENT
Start: 2024-08-21

## 2024-08-21 RX ORDER — WARFARIN SODIUM 5 MG/1
TABLET ORAL
Qty: 110 TABLET | Refills: 1 | Status: SHIPPED | OUTPATIENT
Start: 2024-08-21 | End: 2024-08-21

## 2024-08-21 NOTE — PATIENT INSTRUCTIONS
ASSESSMENT/PLAN:     Encounter Diagnoses   Name Primary?    B12 deficiency Check blood.    Yes    Hypothyroidism (acquired) Stable.        Essential hypertension with goal blood pressure less than 130/80 Stable. Careful with diet and excercise at least 30 minutes 3-4 times a week. Check blood pressures at different times on different days. Can purchase own blood pressure monitor. If not, check at local pharmacy. Bake foods more and grill occasionally. Avoid fried foods. No salt. Use other seasonings.         Type 2 diabetes mellitus with chronic kidney disease, without long-term current use of insulin, unspecified CKD stage (HCC) Lower in 2-3 AM. Decrease insulin. Call with sugars in 1-2 weeks. Careful with diet and excercise at least 30 minutes 3-4 times a week. Check sugars at different times on different dates. Careful with low sugars. Carry something with you and check sugar if can. Can carry carolyne cracker, etc. Decrease carbohydrates. But also, careful with fruits and natural sugars.One serving a day and no more than 1 handful every day. Check feet  every AM and careful with sores and ulcers on feet bilaterally. Check eyes every year with dilated eye exam.  Check sugars.  2-hour postmeal should be less than 140s.  Pre-meal should be 's.  Both equally affected A1c.  Discussed importance of glycemic control to prevent complications of diabetes  -Discussed complications of diabetes include retinopathy, neuropathy, nephropathy and cardiovascular disease  -Discussed ABCs of DM  -Discussed importance of SBGM  -Discussed importance of low CHO diet, recommend 45gm per meal or 135gm per day  -UTD with optho  -Normotensive        Vaccine counseling Up to date.         Osteopenia of multiple sites FU endoc. Does not want Rx. With bisphosphonate due to bad GERD. Take calcium 600 every 12 hrs. With vitamin D 400 IU every  12 hrs. Excerise at least 30 minutes 3-4 times a week. May use calcium citrate as opposed  to calcium carbonate which may be better absorbed in the setting of PPI use.  The preferred calcium supplement for people at risk of stone formation is calcium citrate because it helps to increase urinary citrate excretion. We recommend a dose of 200-400 mg if dietary calcium cannot be increased.   lifelong physical activity at all ages is strongly endorsed by the National Osteoporosis Foundation. Exercise recommendations generally should include weight-bearing, muscle-strengthening, and balance training exercises for 30 minutes 5 days per week or 75 minutes twice weekly, often consistent with other general health recommendations.   Weight Bearing  There are two types of osteoporosis exercises that are important for building and maintaining bone density: weight-bearing and muscle-strengthening exercises.  Weight-bearing Exercises  These exercises include activities that make you move against gravity while staying upright. Weight-bearing exercises can be high-impact or low-impact.  High-impact weight-bearing exercises help build bones and keep them strong. If you have broken a bone due to osteoporosis or are at risk of breaking a bone, you may need to avoid high-impact exercises. If you’re not sure, you should check with your healthcare provider.  Examples of high-impact weight-bearing exercises are:  Dancing   Doing high-impact aerobics   Hiking   Jogging/running   Jumping Rope   Stair climbing   Tennis  Low-impact weight-bearing exercises can also help keep bones strong and are a safe alternative if you cannot do high-impact exercises. Examples of low-impact weight-bearing exercises are:  Using elliptical training machines   Doing low-impact aerobics   Using stair-step machines   Fast walking on a treadmill or outside              Encounter for screening mammogram for malignant neoplasm of breast  Checkmammogram. Continue self breast exam every month.         Sciatica of left side Check XRays. Hold meds. Tylenol as  needed. Ejercicio de estirar y mantener       Apoye nasima shilpa y nasima rodillas en el suelo para hacer gagandeep ejercicio. Mantenga las rodillas debajo de las caderas y las shilpa debajo de los hombros. Mantenga la columna en posición neutral (sin arquearla hacia arriba o hacia abajo). Mantenga los hombros alineados con las orejas. Sostenga esta posición por unos segundos antes de empezar el ejercicio.  Contraiga los músculos del abdomen y levante un brazo directamente frente a usted, con la saavedra de la mano hacia abajo. Sostenga la posición por imelda segundos, luego baje el brazo. Repita esto entre 10 y 20 veces.  Repita el ejercicio, esta vez levantando el brazo hacia un lado. Repita esto entre 10 y 20 veces.  Repita el ejercicio, esta vez levantando el brazo hacia atrás y con la saavedra de la mano hacia arriba. Repita esto entre 10 y 20 veces.  Cambie de lado y repita cada ejercicio con el otro brazo.  Date Last Reviewed: 11/1/2017  © 5699-3320 [employer]. [address] [place] [address]. Todos los derechos reservados. Esta información no pretende sustituir la atención médica profesional. Sólo lo médico puede diagnosticar y tratar un problema de horace.        Estiramiento del hombro y la parte superior de la espalda  Para comenzar, párese sy erguido, con los oídos, los hombros y las caderas alineados. Debe tener los pies ligeramente separados, colocados cody debajo de las caderas. Enfoque la vista directamente hacia adelante. Párese en esta posición jacinta unos segundos antes de comenzar el ejercicio; esto le ayudará a ser más consciente de la postura correcta.       Extienda los brazos por encima de la ghanshyam y ligeramente hacia atrás. Deje los hombros y el luz alineados, y los codos detrás de los hombros.  Con las azra orientadas hacia el techo, gire los dedos hacia adentro.  Respire hondo. Expulse el aire y baje los codos hacia las nalgas. Sostenga la posición por 5 segundos, luego  vuelva a la posición inicial.  Repita esto 3 veces.  Date Last Reviewed: 11/1/2017 © 2000-2019 [employer]. [address] [place] [address]. Todos los derechos reservados. Esta información no pretende sustituir la atención médica profesional. Sólo lo médico puede diagnosticar y tratar un problema de horace.        Ejercicio de rotación de hombros    Para comenzar, párese sy erguido, con los oídos, los hombros y las caderas alineados. Debe tener los pies ligeramente separados, colocados cody debajo de las caderas. Enfoque la vista directamente hacia adelante. Párese en esta posición jacinta unos segundos antes de comenzar el ejercicio. [place] le ayudará a ser más consciente de la postura correcta.  Imagínese que lo hombro derecho es el centro de un reloj. Con la parte más externa del hombro, trace lentamente el borde exterior del reloj.  Muévase rosalee en el sentido de las agujas del reloj, luego en sentido contrario.  Repita esto de aura a imelda veces. Cambie de hombro.   Date Last Reviewed: 3/1/2018  © 0810-0040 [employer]. [address] [place] [address]. Todos los derechos reservados. Esta información no pretende sustituir la atención médica profesional. Sólo lo médico puede diagnosticar y tratar un problema de horace.        Ejercicios para los hombros:       Para empezar, siéntese en kodak silla, apoyando las plantas de los pies en el suelo. Lo peso debe estar desplazado ligeramente hacia adelante, de modo que lo cuerpo esté sy balanceado sobre los glúteos. Relaje los hombros y mantenga la ghanshyam sy nivelada. No encorve la espalda ni alce los hombros. Sentarse en kodak silla con brazos puede ayudarlo a mantener el equilibrio.  Con los codos flexionados, levante los brazos hasta llevarlos al nivel de los hombros.  Aproxime lentamente nasima antebrazos hasta que se junten. Sostenga la posición jacinta imelda segundos.  Vuelva a la posición inicial. Repita esto imelda  veces.  Date Last Reviewed: 3/1/2018  © 0957-8078 [employer]. [address] [place] [id number]. Todos los derechos reservados. Esta información no pretende sustituir la atención médica profesional. Sólo lo médico puede diagnosticar y tratar un problema de horace.        Encogimiento de hombros    Para empezar, siéntese en kodak silla, apoyando las plantas de los pies en el suelo. Desplace lo peso ligeramente hacia adelante para no arquear la espalda. Relájese. Mantenga los oídos, los hombros y las caderas alineados entre sí.  Eleve los dos hombros hasta donde pueda, edwin si estuviera intentando tocarlos con los oídos. Mantenga la ghanshyam y el luz inmóviles y relajados.  Sostenga esta posición mientras cuenta hasta 10. Relájese.  Repita esto 5 veces.  Para lo propia seguridad, consulte con lo proveedor de atención médica antes de iniciar cualquier programa de ejercicios.   Date Last Reviewed: 11/1/2017 © 2000-2019 [employer]. [address] [place] [id number]. Todos los derechos reservados. Esta información no pretende sustituir la atención médica profesional. Sólo lo médico puede diagnosticar y tratar un problema de horace.        Aproximación de hombros    Para empezar, siéntese en kodak silla, apoyando las plantas de los pies en el suelo. Desplace lo peso ligeramente hacia adelante para no arquear la espalda. Relájese. Mantenga los oídos, los hombros y las caderas alineados entre sí.  Eleve los brazos a la altura de los hombros, con los codos doblados y las azra de las shilpa hacia adelante.  Mueva los brazos hacia atrás, aproximando los omóplatos entre sí.  Sostenga la posición jacinta 10 segundos. Vuelva a la posición inicial.  Repita esto 5 veces.   Para lo propia seguridad, consulte con lo proveedor de atención médica antes de iniciar cualquier programa de ejercicios.   Date Last Reviewed: 11/1/2017  © 4730-7497 [employer]. [address] [place] [address],  [place] [address]. Todos los derechos reservados. Esta información no pretende sustituir la atención médica profesional. Sólo lo médico puede diagnosticar y tratar un problema de horace.        Ejercicios para el luz: Rotación activa del luz     Para comenzar, acuéstese boca arriba con las rodillas flexionadas y las plantas de los pies apoyadas en el suelo. Mantenga alineadas las orejas, los hombros y las caderas, evi no empuje la parte inferior de la espalda contra el piso. Apóyese las shilpa en la pelvis. Respire hondo y relájese.  Use los músculos del luz para girar la ghanshyam hacia un lado hasta sentir el estiramiento en los músculos.  Sostenga la posición jacinta 5 segundos. A continuación gire la ghanshyam hacia el otro lado.  Repita el ejercicio 5 veces en cada lado.  Nota: Mantenga los hombros apoyados contra el piso. No levante o repliegue la barbilla mientras gira la ghanshyam.  Date Last Reviewed: 11/1/2017  © 2765-6251 [employer]. [address] [place] [address] [place] [address]. Todos los derechos reservados. Esta información no pretende sustituir la atención médica profesional. Sólo lo médico puede diagnosticar y tratar un problema de horace.        Ejercicios para el luz: Levantamientos de brazos            Para comenzar, acuéstese boca arriba con las rodillas flexionadas y las plantas de los pies apoyadas en el suelo. Mantenga alineadas las orejas, los hombros y las caderas, evi no empuje la parte inferior de la espalda contra el piso. Apoye nasima shilpa en la pelvis. Respire hondo y relájese. Contraiga los músculos abdominales para evitar que la espalda se arquee.  Levante un brazo por encima de la ghanshyam, luego bájelo. A medida que va bajando anselmo brazo, levante el otro brazo.  Siga moviendo los dos brazos, formando michelle con un movimiento lento y continuo. Mantenga los brazos rectos, y la ghanshyam y el luz relajados.  Repita el ejercicio 10 veces con cada brazo.  Para lo propia seguridad,  consulte con lo proveedor de atención médica antes de iniciar cualquier programa de ejercicios.   Date Last Reviewed: 11/1/2017 © 2000-2019 [employer]. [address] [place] [address]. Todos los derechos reservados. Esta información no pretende sustituir la atención médica profesional. Sólo lo médico puede diagnosticar y tratar un problema de horace.        Ejercicios para el luz: Rotación pasiva del luz    Para comenzar, acuéstese boca arriba con las rodillas flexionadas y las plantas de los pies apoyadas en el suelo. Mantenga alineadas las orejas, los hombros y las caderas, evi no empuje la parte inferior de la espalda contra el piso. Apóyese las shilpa en la pelvis. Respire hondo y relájese.  Con el luz relajado, colóquese la saavedra de kodak mano en la frente. Use la mano para hacer girar la ghanshyam hacia un lado hasta sentir el estiramiento en los músculos. No melia gagandeep ejercicio si siente dolor.  Sostenga la posición jacinta 5 segundos. A continuación gire la ghanshyam hacia el otro lado.  Repita el ejercicio 5 veces en cada lado.  Nota: Mantenga los hombros apoyados contra el piso. No levante la barbilla mientras gira la ghanshyam.  Date Last Reviewed: 11/1/2017 © 2000-2019 [employer]. [address] [place] [address]. Todos los derechos reservados. Esta información no pretende sustituir la atención médica profesional. Sólo lo médico puede diagnosticar y tratar un problema de horace.        Rotación externa, ejercicio isométrico (fuerza)    Flexione lo brazo derecho frente a lo cuerpo con la saavedra de la mano hacia arriba. Sujete lo anthony derecha con lo mano izquierda.  Empuje lo brazo derecho hacia afuera mientras trata de jalar hacia atrás con lo brazo clarita. Intente igualar las fuerzas de modo que ninguno de los brazos se mueva. Empuje y jale con los dos brazos firmemente en sentidos opuestos.  Mantenga jacinta imelda segundos. Luego relaje.  Repita imelda  veces.  Moses gagandeep ejercicio aura veces por día o según le hayan indicado.  Date Last Reviewed: 3/10/2016  © 3455-2488 [employer]. [address] [place] [address]. All rights reserved. This information is not intended as a substitute for professional medical care. Always follow your healthcare professional's instructions.        Inclinación de la ghanshyam / estiramiento del trapecio superior (flexibilidad)    Siéntese derecho en kodak silla con lo ghanshyam y luz en kodak posición neutral, alineando las orejas con los hombros. Tómese del borde del asiento de lo silla con lo mano derecha. [place] levemente lo barbilla.  Incline lo ghanshyam hacia la izquierda mirando hacia adelante.  Coloque lo mano izquierda sobre el lado derecho de lo ghanshyam. Empuje lo ghanshyam suavemente hacia la izquierda. Sostenga por 30 a 60 segundos. Aplique kodak presión suave para aumentar el estiramiento. No fuerce lo ghanshyam a la posición.  Regrese lo ghanshyam y luz a la posición neutral.  Repita gagandeep ejercicio dos veces o según las instrucciones que haya recibido.  Cambie de lado y repita dos veces o según las instrucciones que haya recibido.  Desafíese  [place] un extremo de kodak toalla debajo de lo brazo clarita. Luego lleve el otro extremo hasta cubrir lo hombro derecho y también meta la toalla debajo de gagandeep. Jale de la toalla hacia abajo de lo lado derecho usando nasima dos shilpa a medida que gira lo ghanshyam hacia la izquierda. Repita del otro lado.   Date Last Reviewed: 3/10/2016  © 4200-6149 [employer]. [address] [place] [address]. All rights reserved. This information is not intended as a substitute for professional medical care. Always follow your healthcare professional's instructions.        Ejercicios para el luz: Ejercicios isométricos del luz   Para empezar, siéntese en kodak silla, apoyando las plantas de los pies en el suelo. Lo peso debe estar desplazado ligeramente hacia adelante, de  modo que lo cuerpo esté sy balanceado sobre los glúteos. Relaje los hombros y mantenga la ghanshyam sy nivelada. Sentarse en kodak silla con brazos puede ayudarlo a mantener el equilibrio.       Presione la saavedra de la mano contra la frente. [place] resistencia con los músculos del ijeoma. Siga empujando con la mano jacinta 10 segundos y luego relájese. Repita 5 veces.  Vuelva a hacer el ejercicio, empujando con la mano la parte lateral de la ghanshyam. Repita esto 5 veces. Cambie de lado.  Vuelva a hacer el ejercicio, empujando con la mano la parte posterior de la ghanshyam. Repita esto 5 veces.  Para lo propia seguridad, consulte con lo proveedor de atención médica antes de iniciar cualquier programa de ejercicios.   Date Last Reviewed: 11/1/2017  © 3204-6935 [employer]. [address] [place] [address]. Todos los derechos reservados. Esta información no pretende sustituir la atención médica profesional. Sólo lo médico puede diagnosticar y tratar un problema de horace.        Dolor En El Ijeoma [Neck Pain, No Trauma]  Hay varias causas posibles por las que el ijeoma puede doler sin que haya lesión:  Un pequeño movimiento repentino del ijeoma puede provocarle un esguince (distención [sprain]) suyapa de los ligamentos o de los músculos. Dormir con el ijeoma apoyado en kodak posición incómoda también puede causarle eso.  Ante el estrés emocional, algunas personas tensan los músculos del ijeoma, de los hombros y de la parte superior de la espalda . El espasmo crónico (chronic spasm) de esos músculos puede causar dolor en el ijeoma y, en ocasiones, dolor de ghanshyam (headache).  El “desgaste” gradual de las articulaciones de la columna pueden causar kodak “artritis degenerativa” (degenerative arthritis). Puede ocasionarle dolor en el ijeoma en forma crónica u ocasional.  Con la edad, o con pequeñas lesiones reiteradas en el ijeoma, los discos vertebrales (los separadores acolchados que se encuentran entre los  huesos de la columna) pueden agrandarse y ejercer presión contra algún nervio cercano de la columna. Eso provoca hormigueo (tingling), dolor o adormecimiento (numbness) que se extiende desde el luz al hombro, el brazo o la mano de un lado.  El dolor carolina del luz suele mejorar en kodak a dos semanas. El dolor de luz relacionado con alguna afección de los discos (disk disease), artritis (arthritis) en las articulaciones vertebrales o estenosis vertebral (spinal stenosis) [reducción del canal anderson] puede volverse crónico y durar meses o años.  A menos que usted haya tenido kodak lesión física por impacto (por ejemplo, kodak caída o un accidente de automóvil), no suelen pedirse radiografías (X-rays) para la evaluación inicial del dolor de luz. Si el dolor persiste y no responde al tratamiento médico, es posible que le soliciten radiografías (X-rays) y otras pruebas más adelante.  Cuidados En [place]:  Descanse y relaje los músculos. Emplee kodak almohada cómoda para apoyar la ghanshyam y mantener la columna en kodak posición neutra. La ghanshyam no debería quedar inclinada hacia adelante ni hacia atrás. Es posible que kodak toalla enrollada le brinde un buen apoyo.  Algunas personas encuentran alivio al aplicarse calor (kodak ducha caliente, un baño caliente o kodak almohadilla térmica) y recibir un masaje , mientras que otras personas prefieren compresas frías (hielo en cubos o warren colocado en kodak bolsa plástica, envuelta en kodak toalla). Pruebe ambos métodos y use el que mejor resultado le dé jacinta 20 minutos varias veces al día.  Puede usar acetaminofén [acetaminophen] (Tylenol) o ibuprofeno [ibuprofen] (Motrin o Advil) para controlar el dolor, a menos que le hayan recetado otro medicamento. [ NOTA : Si tiene kodak enfermedad hepática o renal crónica (chronic liver or kidney disease), o ha tenido alguna vez kodak úlcera estomacal (stomach ulcer) o sangrado gastrointestinal (GI bleeding), consulte con lo médico antes de  delilah estos medicamentos.]  Programe kodak VISITA DE CONTROL con lo médico o gagandeep centro si los síntomas no empiezan a mejorar después de kodak semana. Quizás necesite recibir fisioterapia (physical therapy) o hacerse otras pruebas.  [NOTA: Si le tay hecho radiografías (X-rays) o kodak tomografía computarizda (CT scan), éstas serán evaluadas por un especialista. Le informaremos de los nuevos hallazgos que puedan afectar la atención médica que necesita.]  [place]que Prontamente Atención Médica  si algo de lo siguiente ocurre:  El dolor empeora o se esparce a suma o ambos brazos.  Siente debilidad o entumecimiento en suma o ambos brazos.  Dolor de ghanshyam que va en aumento.  Hinchazón del luz, dificultad o dolor al tragar.  Fiebre de 100.4°F (38°C) o más cody, o edwin le haya indicado lo proveedor de atención médica.  Date Last Reviewed: 7/1/2016  © 6688-4975 [employer]. [address] [place] [address]. Todos los derechos reservados. Esta información no pretende sustituir la atención médica profesional. Sólo lo médico puede diagnosticar y tratar un problema de horace.        Los trastornos del luz        Si usted tiene dolor de luz, no es el único: muchas personas tienen dolor de luz en algún momento de lo sinan. Ciertos problemas edwin las malas posturas, algunas lesiones y el desgaste natural del cuerpo pueden ocasionar dolor de luz. Lo proveedor de atención médica colaborará con usted para encontrar el tratamiento más adecuado para lo luz.  Tipos de problemas del luz    El dolor y las lesiones en el luz pueden ser consecuencia de los siguientes problemas:  Distensiones y esguinces: Las distensiones (estiramientos o desgarros de los músculos) y los esguinces (estiramientos o desgarros de los ligamentos) pueden ocasionar karen de luz. Las distensiones y los esguinces pueden producirse jacinta un accidente o cuando el luz se somete a sobreuso por movimientos repetitivos; también  pueden provocar la inflamación (hinchazón y dolor) de los músculos y ligamentos.  Latigazo cervical y otras lesiones: Pueden producirse latigazos cervicales cuando un impacto sacude la ghanshyam de un modo que obliga al luz a doblarse demasiado hacia adelante (hiperflexión) y luego a retroceder excesivamente (hiperextensión). Estos dos movimientos, al combinarse, pueden causar lesiones dolorosas en distintas partes del luz, edwin los músculos, los ligamentos o las articulaciones. La causa más común de los latigazos cervicales son los accidentes de automóvil, aunque también pueden ocurrir jacinta kodak caída o kodak lesión al practicar un deporte.  Discos debilitados: Un acto simple edwin un estornudo o kodak tos puede causar la protuberancia (hernia) de un disco. Un disco herniado puede ejercer presión sobre el nervio y producir dolor. Con el tiempo, los discos también pueden sufrir degeneración (pérdida de espesor). Los discos aplanados no amortiguan sy las vértebras y pueden causar que éstas se froten entre sí, comprimiendo los nervios y causando dolor.  Articulaciones debilitadas: El envejecimiento y las lesiones pueden producir degeneración gradual de las articulaciones. La degeneración de las articulaciones también puede producir fricción entre las vértebras. [place] puede causar la formación de masas anormales de hueso (llamadas “espolones óseos”) en las vértebras. Los espolones óseos también puede presionar los nervios y provocar dolor.  Síntomas comunes  Si tiene algún problema en el luz, es posible que tenga suma o más de los siguientes síntomas:  Tensión muscular y espasmos: Es posible que no pueda  el luz, los brazos o los hombros cómodamente si tiene tensión muscular o rigidez en el luz. Si nasima síntomas no se alivian, es posible que sienta espasmos musculares o masas de tejido contraído (puntos gatillo) en ciertas zonas del luz y de los hombros.  Dolor  El dolor sordo en la ghanshyam o el luz,  los karen agudos y la hinchazón del tejido blando en el luz y los hombros son síntomas comunes. Si tiene presión en los nervios del luz, es posible que sienta dolor en los brazos o en las shilpa (dolor referido).  Entumecimiento o debilidad: Si los nervios de lo luz están lesionados, es posible que sienta entumecimiento, hormigueo o debilidad en los hombros, los brazos o las shilpa. Estos síntomas surgen cuando los discos o los espolones óseos comprimen los nervios del luz.  Date Last Reviewed: 10/1/2017  © 6137-0993 [employer]. [address] [place] [address]. Todos los derechos reservados. Esta información no pretende sustituir la atención médica profesional. Sólo lo médico puede diagnosticar y tratar un problema de horace.        Protección del luz: Postura y mecánica corporal    Proteger el luz de las lesiones y del dolor requiere practicar el mantenimiento de kodak buena postura y mecánica corporal. [place] puede requerir la corrección de malos hábitos relacionados con la manera en que usted mueve y sostiene lo cuerpo. Los siguientes consejos pueden ayudarle a mejorar lo postura y mecánica corporal.  ¿Qué es la postura y por qué es importante?  La postura es el modo en que está puesto el cuerpo. Para muchos de nosotros, esto significa a veces encorvarse con la barbilla inclinada hacia adelante y con los hombros caídos. Vira gagandeep tipo de postura impide que los músculos le den un apoyo adecuado al luz, y tensiona los músculos, discos, ligamentos y articulaciones del luz. [place] puede traer edwin resultado lesiones y dolor.  ¿Cómo es lo postura?  Para revisar lo postura, use un taylor de cuerpo entero. Para comenzar, póngase de pie de manera normal. A continuación, retroceda lentamente hasta ponerse con la espalda contra la pared. ¿Hay espacio entre lo ghanshyam y la pared? ¿Sandra caer los hombros? ¿Tiene la barbilla orientada hacia arriba o hacia abajo? Todos estos factores  pueden provocar dolor y lesiones en el luz.  Cómo mejorar lo postura  Para mejorar lo postura, siga estos pasos:  Empuje los hombros hacia atrás.  Piense en las orejas, los hombros y las caderas edwin si fueran kodak serie de puntos. Luego, ajuste el cuerpo para tratar de conectar los puntos en kodak línea recta.  Mantenga nivelada la barbilla.  ¿Qué es la mecánica corporal y por qué es importante?  El modo en que usted se mueve y coloca el cuerpo jacinta nasima actividades diarias recibe el nombre de mecánica corporal. La buena mecánica corporal ayuda a proteger el luz. [place] implica aprender las posiciones correctas para estar de pie, sentarse e incluso dormir. Por lo tanto, moses lo que es mejor para lo luz y practique kodak buena mecánica corporal.  Al estar de pie   Para protegerse el luz al estar de pie:  Transporte los objetos cerca del cuerpo.  Mantenga los oídos y los hombros alineados mientras está de pie o camina.  Para agacharse, doble las piernas por la rodilla manteniendo la espalda recta. [place] esto en lugar de mirar hacia abajo y estirarse para alcanzar los objetos.  Trabaje al nivel de los ojos. No estire los brazos por encima de la ghanshyam ni incline la ghanshyam hacia atrás.  Al estar sentado   Para protegerse el luz al estar sentado:  Disponga lo estación de trabajo de modo que la pantalla de lo computadora le quede al nivel de los ojos. Además, use un soporte para documentos a la hora de leer papeles o libros.  Mantenga las rodillas al nivel de las caderas o levemente más abajo.  Siéntese derecho, con las plantas de los pies apoyadas en el piso. Si los pies no le llegan al piso, use un reposapiés.  No permanezca sentado ni manejando jacinta largos períodos. Rembert descansos frecuentes.  Al dormir   Para protegerse el luz al dormir:  Duerma boca arriba con kodak almohada debajo de las rodillas, o sobre un costado con kodak almohada entre las rodillas. [place] le ayudará a alinear la columna  vertebral.  No use almohadas que tomas demasiado altas ni demasiado bajas. En vez de ello, use un cojín cilíndrico o kodak almohada debajo del luz mientras duerme para mantener el luz recto.  Duerma sobre un colchón que le dé buen apoyo.  Date Last Reviewed: 10/1/2017  © [id number] [employer]. [address] [place] [address], [place] [address]. Todos los derechos reservados. Esta información no pretende sustituir la atención médica profesional. Sólo lo médico puede diagnosticar y tratar un problema de horace.        Chronic pain of both knees Check Xrays. SP TKR R. Tylenol as needed. Check blood.        Stage 3a chronic kidney disease (HCC) No motrin, ibuprofen, advil, alleve, naprosyn  with these medications.  Check blood.         Orders Placed This Encounter   Procedures    Lipid Panel    Vitamin B12    Hemoglobin A1C    Comp Metabolic Panel (14)    Sed Rate, [place] (Automated)    CBC With Differential With Platelet    Ferritin    Iron And Tibc       Meds This Visit:  Requested Prescriptions     Signed Prescriptions Disp Refills    insulin glargine (LANTUS SOLOSTAR) 100 UNIT/ML Subcutaneous Solution Pen-injector 45 mL 3     Sig: INJECT 45 UNITS INTHE MORNING AND 5 UNITS IN THE EVENING    Betamethasone Dipropionate Aug 0.05 % External Cream 30 g 2     Sig: Apply 30 g topically 2 (two) times daily.    warfarin 5 MG Oral Tab 110 tablet 11     Sig: Take as directed by INR clinic or take 1 AND 1/2 TABLETS ON  Tuesdays & Thursdays, take 1 TABLET all other days       Imaging & Referrals:  ENDOCRINOLOGY - INTERNAL  XR LUMBAR SPINE (MIN 2 VIEWS) (CPT=72100)  XR KNEE (1 OR 2 VIEWS), RIGHT (CPT=73560)  XR KNEE (1 OR 2 VIEWS), LEFT (CPT=73560)        RTC  after 2-7-25 for physical.

## 2024-08-21 NOTE — PROGRESS NOTES
HPI:    Patient ID: Julia Hernandez is a 74 year old female.    Chief Complaint   Patient presents with   • Checkup     On Diabetes        Patient here for follow up of Diabetes.  Has been taking medications regularly.    Checks sugars 2 times qodaily.  Fasting sugars average 's. No lows.   Watches diabetic diet, low salt.  Diabetic Flow sheet      8/21/2024    11:40 AM 8/21/2024    10:33 AM 8/21/2024    10:22 AM 6/14/2024     9:40 AM 6/14/2024     9:25 AM 5/30/2024    12:00 AM   DIABETIC FLOWSHEET (EE)   BMI   36.18 kg/m2      Valsartan TAKE ONE TABLET BY MOUTH TWICE DAILY (160 MG TABS) TAKE ONE TABLET BY MOUTH TWICE DAILY (160 MG TABS)  TAKE ONE TABLET BY MOUTH TWICE DAILY (160 MG TABS)  Admitted Admitted TAKE ONE TABLET BY MOUTH TWICE DAILY (160 MG TABS)    Weight (enc vitals)   245 lb      BP (enc vitals)  130/72 162/82  134/58         HISTORY OF DIABETES COMPLICATIONS: :  History of Retinopathy: No.   History of Neuropathy: No.   History of Nephropathy: Yes.      ASSOCIATED COMPLICATIONS:   HTN: Yes.   Hyperlipidemia: Yes.   Coronary Artery Disease:  CAD,  HF, PAD  Cerebrovascular Disease: No.      MEALS:  2 meals a day  Cooks at home    Hypertension  Patient is here for follow up of hypertension. BP at home: Same. Different times.   Has been compliant with medications.  Exercise level: somewhat active (back pain) and has been following low salt diet.  Weight has been up. Cooks for all family and uses salt sea.   Wt Readings from Last 3 Encounters:   08/21/24 245 lb (111.1 kg)   06/06/24 235 lb (106.6 kg)   06/11/24 242 lb (109.8 kg)     BP Readings from Last 3 Encounters:   08/21/24 130/72   06/14/24 134/58   06/11/24 128/76     Labs:   Lab Results   Component Value Date/Time    GLU 92 07/23/2024 07:56 AM     07/23/2024 07:56 AM    K 5.1 07/23/2024 07:56 AM     07/23/2024 07:56 AM    CO2 27.0 07/23/2024 07:56 AM    CREATSERUM 1.33 (H) 07/23/2024 07:56 AM    CA 9.3 07/23/2024  07:56 AM    AST 33 07/23/2024 07:56 AM    ALT 20 07/23/2024 07:56 AM    TSH 0.678 02/15/2024 02:35 PM    T4F 1.6 02/15/2024 02:35 PM        Lab Results   Component Value Date/Time    CHOLEST 147 07/22/2023 10:05 AM    HDL 53 07/22/2023 10:05 AM    TRIG 78 07/22/2023 10:05 AM    LDL 78 07/22/2023 10:05 AM    NONHDLC 94 07/22/2023 10:05 AM            Wt Readings from Last 3 Encounters:   08/21/24 245 lb (111.1 kg)   06/06/24 235 lb (106.6 kg)   06/11/24 242 lb (109.8 kg)     BP Readings from Last 3 Encounters:   08/21/24 130/72   06/14/24 134/58   06/11/24 128/76     Labs:   Lab Results   Component Value Date/Time    GLU 92 07/23/2024 07:56 AM     07/23/2024 07:56 AM    K 5.1 07/23/2024 07:56 AM     07/23/2024 07:56 AM    CO2 27.0 07/23/2024 07:56 AM    CREATSERUM 1.33 (H) 07/23/2024 07:56 AM    CA 9.3 07/23/2024 07:56 AM    AST 33 07/23/2024 07:56 AM    ALT 20 07/23/2024 07:56 AM    TSH 0.678 02/15/2024 02:35 PM    T4F 1.6 02/15/2024 02:35 PM        Lab Results   Component Value Date/Time    CHOLEST 147 07/22/2023 10:05 AM    HDL 53 07/22/2023 10:05 AM    TRIG 78 07/22/2023 10:05 AM    LDL 78 07/22/2023 10:05 AM    NONHDLC 94 07/22/2023 10:05 AM          Low Back Pain  This is a chronic problem. The current episode started more than 1 month ago. The problem occurs constantly. The problem has been gradually worsening since onset. The pain is present in the gluteal and lumbar spine. The quality of the pain is described as aching. The pain does not radiate. The pain is at a severity of 6/10. The symptoms are aggravated by bending, twisting and standing. Associated symptoms include leg pain. Pertinent negatives include no abdominal pain, bladder incontinence, bowel incontinence, chest pain, dysuria, fever, headaches, numbness, paresis, paresthesias, pelvic pain, perianal numbness, tingling or weakness. She has tried nothing for the symptoms.   Knee Pain   The pain is present in the right knee and left knee.  This is a chronic problem. The current episode started more than 1 year ago. There has been no history of extremity trauma. The quality of the pain is described as aching. The pain is at a severity of 6/10. Associated symptoms include an inability to bear weight, joint swelling, a limited range of motion and stiffness. Pertinent negatives include no fever, itching, joint locking, numbness or tingling. The symptoms are aggravated by activity and standing. She has tried acetaminophen for the symptoms. The treatment provided mild relief. Family history does not include gout or rheumatoid arthritis. Her past medical history is significant for diabetes and osteoarthritis. There is no history of gout or rheumatoid arthritis.         Review of Systems   Constitutional:  Negative for activity change, appetite change, chills, diaphoresis, fatigue, fever and unexpected weight change.   Respiratory:  Negative for apnea, cough, choking, chest tightness, shortness of breath, wheezing and stridor.    Cardiovascular:  Negative for chest pain, palpitations and leg swelling.   Gastrointestinal:  Negative for abdominal distention, abdominal pain and bowel incontinence.   Endocrine: Negative for cold intolerance, heat intolerance, polydipsia, polyphagia and polyuria.   Genitourinary:  Negative for bladder incontinence, dysuria and pelvic pain.   Musculoskeletal:  Positive for back pain and stiffness. Negative for gout.   Skin:  Negative for itching.   Neurological:  Negative for dizziness, tingling, tremors, seizures, syncope, facial asymmetry, speech difficulty, weakness, light-headedness, numbness, headaches and paresthesias.   All other systems reviewed and are negative.        Current Outpatient Medications   Medication Sig Dispense Refill   • insulin glargine (LANTUS SOLOSTAR) 100 UNIT/ML Subcutaneous Solution Pen-injector INJECT 45 UNITS INTHE MORNING AND 5 UNITS IN THE EVENING 45 mL 3   • Betamethasone Dipropionate Aug 0.05 %  External Cream Apply 30 g topically 2 (two) times daily. 30 g 2   • warfarin 5 MG Oral Tab Take as directed by INR clinic or take 1 AND 1/2 TABLETS ON  Tuesdays & Thursdays, take 1 TABLET all other days 110 tablet 11   • indapamide 2.5 MG Oral Tab Take 1 tablet (2.5 mg total) by mouth daily and every night. 180 tablet 3   • Insulin Pen Needle (TRUEPLUS 5-BEVEL PEN NEEDLES) 32G X 4 MM Does not apply Misc Injects at bedtime 90 each 3   • METOPROLOL TARTRATE 100 MG Oral Tab Take 1 tablet by mouth in the morning and 1/2 tablet in the afternoon 90 tablet 0   • enoxaparin 100 MG/ML Injection Solution Prefilled Syringe Inject 1 mL (100 mg total) into the skin 2 (two) times daily. 4 each 0   • clobetasol 0.05 % External Solution Apply 1 Application topically 2 (two) times daily. 1 each 0   • enoxaparin 150 MG/ML Injection Solution Prefilled Syringe Inject 0.71 mL (107 mg total) into the skin every 12 (twelve) hours. 10 each 0   • levothyroxine 88 MCG Oral Tab Take 1 tablet (88 mcg total) by mouth before breakfast. 90 tablet 3   • levothyroxine 100 MCG Oral Tab Take 1 tablet (100 mcg total) by mouth before breakfast. 90 tablet 3   • valsartan 160 MG Oral Tab TAKE ONE TABLET BY MOUTH TWICE DAILY 180 tablet 3   • Glucose Blood (ACCU-CHEK GUIDE) In Vitro Strip 1 strip by In Vitro route 2 (two) times daily. 200 strip 3   • SPIRONOLACTONE 25 MG Oral Tab TAKE ONE TABLET BY MOUTH ONE TIME DAILY 90 tablet 1   • fluticasone furoate-vilanterol (BREO ELLIPTA) 100-25 MCG/ACT Inhalation Aerosol Powder, Breath Activated Inhale 1 puff into the lungs daily. Rinse your mouth with water without swallowing after using BREO to help reduce your chance of getting thrush. 1 each 5   • escitalopram 5 MG Oral Tab Take 1 tablet (5 mg total) by mouth nightly. 90 tablet 3   • Secukinumab, 300 MG Dose, (COSENTYX SENSOREADY, 300 MG,) 150 MG/ML Subcutaneous Solution Auto-injector Inject 300 mg into the skin every 28 days. 2 each 2   • simvastatin 20 MG  Oral Tab Take 1 tablet (20 mg total) by mouth twice a week. 24 tablet 3   • minoxidil 2.5 MG Oral Tab Take 2 tablets (5 mg total) by mouth 2 (two) times daily. (Patient taking differently: Take 1 tablet (2.5 mg total) by mouth 2 (two) times daily.) 360 tablet 3   • PEG 3350-KCl-Na Bicarb-NaCl (TRILYTE) 420 g Oral Recon Soln Take prep as directed by gastro office. May substitute with Trilyte/generic equivalent if needed. 1 each 0   • albuterol (2.5 MG/3ML) 0.083% Inhalation Nebu Soln Take 3 mL (2.5 mg total) by nebulization every 6 (six) hours as needed for Wheezing. 50 each 0   • dicyclomine 10 MG Oral Cap Take 1 capsule (10 mg total) by mouth 2 (two) times daily as needed. 60 capsule 1   • glipiZIDE 10 MG Oral Tab Take 1 tablet (10 mg total) by mouth 2 (two) times daily before meals. 180 tablet 1   • Insulin Pen Needle (BD PEN NEEDLE ORIGINAL U/F) 29G X 12.7MM Does not apply Misc Dx. E11.9. Checks q12hrs. BD ultrafine MINI. 100 each 6   • ACCU-CHEK FASTCLIX LANCETS Does not apply Misc CHECK EVERY MORNING AND EVENING 204 each 11   • Omega-3-acid Ethyl Esters (LOVAZA) 1 G Oral Cap Take 1 capsule (1 g total) by mouth 2 (two) times daily with meals.     • Alcohol Swabs Does not apply Pads Code: E11.9.  Checks twice daily. 100 each 11     Allergies:  Allergies   Allergen Reactions   • Amlodipine SHORTNESS OF BREATH     Chest pressure.  Patient does not recall having any allergies       HISTORY:  Past Medical History:   • Appendicitis   • Cholelithiasis   • Deep vein thrombosis (HCC)    L leg    • Diabetes (HCC)   • Disorder of thyroid   • Essential hypertension   • Goiter    Multinodular goiter S/P thyroidectomy.    • High blood pressure   • High cholesterol   • Osteopenia   • Vertigo, aural      Past Surgical History:   Procedure Laterality Date   • Appendectomy  1993   • Cataract extraction w/  intraocular lens implant Right 1988    in [place]   • Cholecystectomy  2006   • Colonoscopy N/A 8/12/2020    Procedure:  COLONOSCOPY;  Surgeon: José Antonio Fletcher MD;  Location: [facility] ENDOSCOPY   • Colonoscopy N/A 6/14/2024    Procedure: COLONOSCOPY;  Surgeon: José Antonio Fletcher MD;  Location: [facility] ENDOSCOPY   • Electrocardiogram, complete  01/03/2014    SCANNED TO MEDIA TAB - 01/03/2014   • Fracture surgery Left     LEFT FOOT SURGERY WITH HARDWARE   • Knee scope,abrasn arthroplasty Right 10-4-16   • Thyroidectomy  08/17/15    TOTAL   • Tubal ligation        Family History   Problem Relation Age of Onset   • Diabetes Mother    • Hypertension Mother    • Dementia Mother         Alzheimer's Disease   • Diabetes Brother    • Lipids Brother    • Hypertension Brother    • Diabetes Paternal Uncle    • Heart Disease Father         CAD   • Diabetes Other    • Dementia Other    • No Known Problems Self    • No Known Problems Sister    • No Known Problems Daughter    • No Known Problems Maternal Grandmother    • No Known Problems Paternal Grandmother    • No Known Problems Maternal Aunt    • No Known Problems Paternal Aunt    • No Known Problems Maternal Cousin Female    • No Known Problems Maternal Cousin Male    • No Known Problems Paternal Cousin Female    • No Known Problems Paternal Cousin Male    • Colon Cancer Son 43   • Glaucoma Neg    • Macular degeneration Neg    • Breast Cancer Neg    • Ovarian Cancer Neg    • DCIS Neg    • LCIS Neg    • BRCA gene + Neg    • Ashkenazi L[employer] Descent Neg       Social History:   Social History     Socioeconomic History   • Marital status:    • Number of children: 4   Tobacco Use   • Smoking status: Never     Passive exposure: Never   • Smokeless tobacco: Never   Vaping Use   • Vaping status: Never Used   Substance and Sexual Activity   • Alcohol use: No   • Drug use: No   • Sexual activity: Yes     Partners: Male   Other Topics Concern   • Caffeine Concern Yes     Comment: Coffee, tea - 2 cups per day    • Reaction to local anesthetic No        PHYSICAL EXAM:   /72 (BP Location: Right arm,  Patient Position: Sitting, Cuff Size: large)   Pulse 63   Resp 18   Ht 5' 9\" (1.753 m)   Wt 245 lb (111.1 kg)   SpO2 99%   BMI 36.18 kg/m²   BP Readings from Last 3 Encounters:   08/21/24 130/72   06/14/24 134/58   06/11/24 128/76     Wt Readings from Last 3 Encounters:   08/21/24 245 lb (111.1 kg)   06/06/24 235 lb (106.6 kg)   06/11/24 242 lb (109.8 kg)       Physical Exam  Vitals and nursing note reviewed.   Constitutional:       General: She is not in acute distress.     Appearance: Normal appearance. She is well-developed. She is not ill-appearing, toxic-appearing or diaphoretic.      Interventions: She is not intubated.  Neck:      Thyroid: No thyroid mass or thyromegaly.      Trachea: Trachea and phonation normal.   Cardiovascular:      Rate and Rhythm: Normal rate and regular rhythm.      Pulses: Normal pulses. No decreased pulses.           Carotid pulses are 2+ on the right side and 2+ on the left side.       Radial pulses are 2+ on the right side and 2+ on the left side.        Dorsalis pedis pulses are 2+ on the right side and 2+ on the left side.        Posterior tibial pulses are 2+ on the right side and 2+ on the left side.      Heart sounds: Normal heart sounds, S1 normal and S2 normal.   Pulmonary:      Effort: Pulmonary effort is normal. No tachypnea, bradypnea, accessory muscle usage, prolonged expiration, respiratory distress or retractions. She is not intubated.      Breath sounds: Normal breath sounds and air entry. No stridor, decreased air movement or transmitted upper airway sounds. No decreased breath sounds, wheezing, rhonchi or rales.   Chest:      Chest wall: No tenderness.   Abdominal:      General: There is no distension.      Palpations: Abdomen is soft.      Tenderness: There is no abdominal tenderness.   Musculoskeletal:      Lumbar back: No swelling, edema, deformity, signs of trauma, lacerations, spasms, tenderness or bony tenderness. Decreased range of motion. Positive left  straight leg raise test. Negative right straight leg raise test. No scoliosis.      Right hip: No deformity, lacerations, tenderness, bony tenderness or crepitus. Normal range of motion. Normal strength.      Left hip: No deformity, lacerations, tenderness, bony tenderness or crepitus. Normal range of motion. Normal strength.      Right knee: Bony tenderness and crepitus present. No swelling, deformity, effusion, erythema, ecchymosis or lacerations. Decreased range of motion. No tenderness. Normal alignment and normal patellar mobility.      Instability Tests: Anterior drawer test negative. Posterior drawer test negative.      Left knee: Swelling, deformity, bony tenderness and crepitus present. No effusion, erythema, ecchymosis or lacerations. Decreased range of motion. No tenderness. Abnormal alignment. Normal patellar mobility.      Instability Tests: Anterior drawer test negative. Posterior drawer test negative.      Right lower leg: No deformity, lacerations, tenderness or bony tenderness. No edema.      Left lower leg: No deformity, lacerations, tenderness or bony tenderness. No edema.      Right ankle: No swelling, deformity, ecchymosis or lacerations. No tenderness. Normal range of motion. Anterior drawer test negative. Normal pulse.      Right Achilles Tendon: No tenderness or defects. Enriquez's test negative.      Left ankle: No swelling, deformity, ecchymosis or lacerations. No tenderness. Normal range of motion. Anterior drawer test negative. Normal pulse.      Left Achilles Tendon: No tenderness or defects. Enriquez's test negative.        Legs:    Skin:     General: Skin is warm and dry.   Neurological:      Mental Status: She is alert and oriented to person, place, and time.      Motor: No tremor or seizure activity.   Psychiatric:         Behavior: Behavior normal. Behavior is cooperative.         Thought Content: Thought content normal.            ASSESSMENT/PLAN:     Encounter Diagnoses   Name  Primary?   • B12 deficiency Check blood.    Yes   • Hypothyroidism (acquired) Stable.       • Essential hypertension with goal blood pressure less than 130/80 Stable. Careful with diet and excercise at least 30 minutes 3-4 times a week. Check blood pressures at different times on different days. Can purchase own blood pressure monitor. If not, check at local pharmacy. Bake foods more and grill occasionally. Avoid fried foods. No salt. Use other seasonings.        • Type 2 diabetes mellitus with chronic kidney disease, without long-term current use of insulin, unspecified CKD stage (HCC) Lower in 2-3 AM. Decrease insulin. Call with sugars in 1-2 weeks. Careful with diet and excercise at least 30 minutes 3-4 times a week. Check sugars at different times on different dates. Careful with low sugars. Carry something with you and check sugar if can. Can carry carolyne cracker, etc. Decrease carbohydrates. But also, careful with fruits and natural sugars.One serving a day and no more than 1 handful every day. Check feet  every AM and careful with sores and ulcers on feet bilaterally. Check eyes every year with dilated eye exam.  Check sugars.  2-hour postmeal should be less than 140s.  Pre-meal should be 's.  Both equally affected A1c.  Discussed importance of glycemic control to prevent complications of diabetes  -Discussed complications of diabetes include retinopathy, neuropathy, nephropathy and cardiovascular disease  -Discussed ABCs of DM  -Discussed importance of SBGM  -Discussed importance of low CHO diet, recommend 45gm per meal or 135gm per day  -UTD with optho  -Normotensive       • Vaccine counseling Up to date.        • Osteopenia of multiple sites FU endoc. Does not want Rx. with bisphosphonate due to bad GERD. Take calcium 600 every 12 hrs. With vitamin D 400 IU every  12 hrs. Excerise at least 30 minutes 3-4 times a week. May use calcium citrate as opposed to calcium carbonate which may be better  absorbed in the setting of PPI use.  The preferred calcium supplement for people at risk of stone formation is calcium citrate because it helps to increase urinary citrate excretion. We recommend a dose of 200-400 mg if dietary calcium cannot be increased.   lifelong physical activity at all ages is strongly endorsed by the National Osteoporosis Foundation. Exercise recommendations generally should include weight-bearing, muscle-strengthening, and balance training exercises for 30 minutes 5 days per week or 75 minutes twice weekly, often consistent with other general health recommendations.   Weight Bearing  There are two types of osteoporosis exercises that are important for building and maintaining bone density: weight-bearing and muscle-strengthening exercises.  Weight-bearing Exercises  These exercises include activities that make you move against gravity while staying upright. Weight-bearing exercises can be high-impact or low-impact.  High-impact weight-bearing exercises help build bones and keep them strong. If you have broken a bone due to osteoporosis or are at risk of breaking a bone, you may need to avoid high-impact exercises. If you’re not sure, you should check with your healthcare provider.  Examples of high-impact weight-bearing exercises are:  Dancing   Doing high-impact aerobics   Hiking   Jogging/running   Jumping Rope   Stair climbing   Tennis  Low-impact weight-bearing exercises can also help keep bones strong and are a safe alternative if you cannot do high-impact exercises. Examples of low-impact weight-bearing exercises are:  Using elliptical training machines   Doing low-impact aerobics   Using stair-step machines   Fast walking on a treadmill or outside           • Encounter for screening mammogram for malignant neoplasm of breast  Checkmammogram. Continue self breast exam every month.        • Sciatica of left side Check XRays. Hold meds. Tylenol as needed.       • Chronic pain of both  knees Check Xrays. SP TKR R. Tylenol as needed. Check blood.       • Stage 3a chronic kidney disease (HCC) No motrin, ibuprofen, advil, alleve, naprosyn  with these medications.  Check blood.         Orders Placed This Encounter   Procedures   • Lipid Panel   • Vitamin B12   • Hemoglobin A1C   • Comp Metabolic Panel (14)   • Sed Rate, Westergren (Automated)   • CBC With Differential With Platelet   • Ferritin   • Iron And Tibc       Meds This Visit:  Requested Prescriptions     Signed Prescriptions Disp Refills   • insulin glargine (LANTUS SOLOSTAR) 100 UNIT/ML Subcutaneous Solution Pen-injector 45 mL 3     Sig: INJECT 45 UNITS INTHE MORNING AND 5 UNITS IN THE EVENING   • Betamethasone Dipropionate Aug 0.05 % External Cream 30 g 2     Sig: Apply 30 g topically 2 (two) times daily.   • warfarin 5 MG Oral Tab 110 tablet 11     Sig: Take as directed by INR clinic or take 1 AND 1/2 TABLETS ON  Tuesdays & Thursdays, take 1 TABLET all other days       Imaging & Referrals:  ENDOCRINOLOGY - INTERNAL  XR LUMBAR SPINE (MIN 2 VIEWS) (CPT=72100)  XR KNEE (1 OR 2 VIEWS), RIGHT (CPT=73560)  XR KNEE (1 OR 2 VIEWS), LEFT (CPT=73560)        RTC  after 2-7-25 for physical.

## 2024-08-22 ENCOUNTER — MED REC SCAN ONLY (OUTPATIENT)
Dept: INTERNAL MEDICINE CLINIC | Facility: CLINIC | Age: 74
End: 2024-08-22

## 2024-08-23 RX ORDER — METOPROLOL TARTRATE 100 MG
TABLET ORAL
Qty: 90 TABLET | Refills: 1 | Status: SHIPPED | OUTPATIENT
Start: 2024-08-23

## 2024-08-23 NOTE — TELEPHONE ENCOUNTER
Last  office  visit  2/1/2024      Return  to  clinic  6  months        Follow up  appointment   8/29/2024

## 2024-08-27 ENCOUNTER — HOSPITAL ENCOUNTER (OUTPATIENT)
Dept: GENERAL RADIOLOGY | Facility: HOSPITAL | Age: 74
Discharge: HOME | End: 2024-08-27
Attending: INTERNAL MEDICINE
Payer: MEDICARE

## 2024-08-27 ENCOUNTER — LAB ENCOUNTER (OUTPATIENT)
Dept: LAB | Facility: HOSPITAL | Age: 74
End: 2024-08-27
Attending: INTERNAL MEDICINE
Payer: MEDICARE

## 2024-08-27 DIAGNOSIS — G89.29 CHRONIC PAIN OF BOTH KNEES: ICD-10-CM

## 2024-08-27 DIAGNOSIS — N18.31 STAGE 3A CHRONIC KIDNEY DISEASE (HCC): ICD-10-CM

## 2024-08-27 DIAGNOSIS — M25.561 CHRONIC PAIN OF BOTH KNEES: ICD-10-CM

## 2024-08-27 DIAGNOSIS — M54.32 SCIATICA OF LEFT SIDE: ICD-10-CM

## 2024-08-27 DIAGNOSIS — E11.22 TYPE 2 DIABETES MELLITUS WITH CHRONIC KIDNEY DISEASE, WITHOUT LONG-TERM CURRENT USE OF INSULIN, UNSPECIFIED CKD STAGE (HCC): ICD-10-CM

## 2024-08-27 DIAGNOSIS — E53.8 B12 DEFICIENCY: ICD-10-CM

## 2024-08-27 DIAGNOSIS — M25.562 CHRONIC PAIN OF BOTH KNEES: ICD-10-CM

## 2024-08-27 LAB
ALBUMIN SERPL-MCNC: 4.4 G/DL (ref 3.2–4.8)
ALBUMIN/GLOB SERPL: 1.5 {RATIO} (ref 1–2)
ALP LIVER SERPL-CCNC: 61 U/L
ALT SERPL-CCNC: 15 U/L
ANION GAP SERPL CALC-SCNC: 6 MMOL/L (ref 0–18)
AST SERPL-CCNC: 23 U/L (ref ?–34)
BASOPHILS # BLD AUTO: 0.06 X10(3) UL (ref 0–0.2)
BASOPHILS NFR BLD AUTO: 0.7 %
BILIRUB SERPL-MCNC: 0.5 MG/DL (ref 0.2–1.1)
BUN BLD-MCNC: 30 MG/DL (ref 9–23)
BUN/CREAT SERPL: 22.9 (ref 10–20)
CALCIUM BLD-MCNC: 9.6 MG/DL (ref 8.7–10.4)
CHLORIDE SERPL-SCNC: 104 MMOL/L (ref 98–112)
CHOLEST SERPL-MCNC: 162 MG/DL (ref ?–200)
CO2 SERPL-SCNC: 31 MMOL/L (ref 21–32)
CREAT BLD-MCNC: 1.31 MG/DL
DEPRECATED HBV CORE AB SER IA-ACNC: 75.3 NG/ML
DEPRECATED RDW RBC AUTO: 43.4 FL (ref 35.1–46.3)
EGFRCR SERPLBLD CKD-EPI 2021: 43 ML/MIN/1.73M2 (ref 60–?)
EOSINOPHIL # BLD AUTO: 0.53 X10(3) UL (ref 0–0.7)
EOSINOPHIL NFR BLD AUTO: 6.2 %
ERYTHROCYTE [DISTWIDTH] IN BLOOD BY AUTOMATED COUNT: 12.8 % (ref 11–15)
ERYTHROCYTE [SEDIMENTATION RATE] IN BLOOD: 48 MM/HR
EST. AVERAGE GLUCOSE BLD GHB EST-MCNC: 131 MG/DL (ref 68–126)
FASTING PATIENT LIPID ANSWER: YES
FASTING STATUS PATIENT QL REPORTED: YES
GLOBULIN PLAS-MCNC: 3 G/DL (ref 2–3.5)
GLUCOSE BLD-MCNC: 79 MG/DL (ref 70–99)
HBA1C MFR BLD: 6.2 % (ref ?–5.7)
HCT VFR BLD AUTO: 35.1 %
HDLC SERPL-MCNC: 56 MG/DL (ref 40–59)
HGB BLD-MCNC: 12 G/DL
IMM GRANULOCYTES # BLD AUTO: 0.04 X10(3) UL (ref 0–1)
IMM GRANULOCYTES NFR BLD: 0.5 %
IRON SATN MFR SERPL: 23 %
IRON SERPL-MCNC: 94 UG/DL
LDLC SERPL CALC-MCNC: 91 MG/DL (ref ?–100)
LYMPHOCYTES # BLD AUTO: 2.74 X10(3) UL (ref 1–4)
LYMPHOCYTES NFR BLD AUTO: 32.3 %
MCH RBC QN AUTO: 31.7 PG (ref 26–34)
MCHC RBC AUTO-ENTMCNC: 34.2 G/DL (ref 31–37)
MCV RBC AUTO: 92.9 FL
MONOCYTES # BLD AUTO: 0.88 X10(3) UL (ref 0.1–1)
MONOCYTES NFR BLD AUTO: 10.4 %
NEUTROPHILS # BLD AUTO: 4.24 X10 (3) UL (ref 1.5–7.7)
NEUTROPHILS # BLD AUTO: 4.24 X10(3) UL (ref 1.5–7.7)
NEUTROPHILS NFR BLD AUTO: 49.9 %
NONHDLC SERPL-MCNC: 106 MG/DL (ref ?–130)
OSMOLALITY SERPL CALC.SUM OF ELEC: 297 MOSM/KG (ref 275–295)
PLATELET # BLD AUTO: 258 10(3)UL (ref 150–450)
POTASSIUM SERPL-SCNC: 4.5 MMOL/L (ref 3.5–5.1)
PROT SERPL-MCNC: 7.4 G/DL (ref 5.7–8.2)
RBC # BLD AUTO: 3.78 X10(6)UL
SODIUM SERPL-SCNC: 141 MMOL/L (ref 136–145)
TIBC SERPL-MCNC: 413 UG/DL (ref 250–425)
TRANSFERRIN SERPL-MCNC: 277 MG/DL (ref 250–380)
TRIGL SERPL-MCNC: 82 MG/DL (ref 30–149)
VIT B12 SERPL-MCNC: 925 PG/ML (ref 211–911)
VLDLC SERPL CALC-MCNC: 13 MG/DL (ref 0–30)
WBC # BLD AUTO: 8.5 X10(3) UL (ref 4–11)

## 2024-08-27 PROCEDURE — 82607 VITAMIN B-12: CPT

## 2024-08-27 PROCEDURE — 82728 ASSAY OF FERRITIN: CPT

## 2024-08-27 PROCEDURE — 80061 LIPID PANEL: CPT

## 2024-08-27 PROCEDURE — 36415 COLL VENOUS BLD VENIPUNCTURE: CPT

## 2024-08-27 PROCEDURE — 84466 ASSAY OF TRANSFERRIN: CPT

## 2024-08-27 PROCEDURE — 83036 HEMOGLOBIN GLYCOSYLATED A1C: CPT

## 2024-08-27 PROCEDURE — 80053 COMPREHEN METABOLIC PANEL: CPT

## 2024-08-27 PROCEDURE — 73560 X-RAY EXAM OF KNEE 1 OR 2: CPT | Performed by: INTERNAL MEDICINE

## 2024-08-27 PROCEDURE — 85025 COMPLETE CBC W/AUTO DIFF WBC: CPT

## 2024-08-27 PROCEDURE — 72100 X-RAY EXAM L-S SPINE 2/3 VWS: CPT | Performed by: INTERNAL MEDICINE

## 2024-08-27 PROCEDURE — 85652 RBC SED RATE AUTOMATED: CPT

## 2024-08-27 PROCEDURE — 83540 ASSAY OF IRON: CPT

## 2024-08-27 NOTE — PROGRESS NOTES
CBC Normal (white blood cells and red blood cells and platelets), iron storage looks okay.  Iron levels look okay.  CMP Normal (electrolytes, and liver functions), decline in kidney function is stable but still declined. No motrin, ibuprofen, advil, alleve, naprosyn  with these medications.    Lipid (choilesterol) is elevated slightly.  Goal LDL should be less than 70.Careful with diet.  Avoid red meat, shellfish, pork.  Try not to cheatham foods.  Lower carb diet.  Exercise is most part at least 30 minutes 3-4 times a week sustained cardiovascular aerobic exercise getting that heart rate going and keeping it going for 30 minutes at a time.  If cannot do 30 will start with 10 minutes of brisk walking is good at each week build up 5 minutes more.  Recheck lipid in 3 months.  Orders written.  Would recommend a low-dose statin i.e. atorvastatin 20 mg at night #30 with 3 refills if okay can send to the pharmacy?  B12 level looks okay.  Hemoglobin A1c which is a 3-month average of sugars is much better than prior.  Good job!  Goal is less than 6.5.  Sed rate which is a nonspecific marker of inflammation is more elevated than prior.  Would recommend following up with rheumatology Dr. Henderson.

## 2024-08-29 ENCOUNTER — OFFICE VISIT (OUTPATIENT)
Dept: NEPHROLOGY | Facility: CLINIC | Age: 74
End: 2024-08-29

## 2024-08-29 VITALS
DIASTOLIC BLOOD PRESSURE: 53 MMHG | BODY MASS INDEX: 36.43 KG/M2 | HEART RATE: 57 BPM | HEIGHT: 69 IN | WEIGHT: 246 LBS | SYSTOLIC BLOOD PRESSURE: 131 MMHG

## 2024-08-29 DIAGNOSIS — Z79.4 TYPE 2 DIABETES MELLITUS WITH STAGE 3B CHRONIC KIDNEY DISEASE, WITH LONG-TERM CURRENT USE OF INSULIN (HCC): ICD-10-CM

## 2024-08-29 DIAGNOSIS — L40.50 PSORIATIC ARTHRITIS (HCC): ICD-10-CM

## 2024-08-29 DIAGNOSIS — N18.32 TYPE 2 DIABETES MELLITUS WITH STAGE 3B CHRONIC KIDNEY DISEASE, WITH LONG-TERM CURRENT USE OF INSULIN (HCC): ICD-10-CM

## 2024-08-29 DIAGNOSIS — E66.01 SEVERE OBESITY (BMI 35.0-39.9) WITH COMORBIDITY (HCC): Chronic | ICD-10-CM

## 2024-08-29 DIAGNOSIS — N18.32 STAGE 3B CHRONIC KIDNEY DISEASE (HCC): Chronic | ICD-10-CM

## 2024-08-29 DIAGNOSIS — D68.61 ANTIPHOSPHOLIPID SYNDROME (HCC): Primary | ICD-10-CM

## 2024-08-29 DIAGNOSIS — E11.22 TYPE 2 DIABETES MELLITUS WITH STAGE 3B CHRONIC KIDNEY DISEASE, WITH LONG-TERM CURRENT USE OF INSULIN (HCC): ICD-10-CM

## 2024-08-29 PROCEDURE — 3008F BODY MASS INDEX DOCD: CPT | Performed by: INTERNAL MEDICINE

## 2024-08-29 PROCEDURE — 3078F DIAST BP <80 MM HG: CPT | Performed by: INTERNAL MEDICINE

## 2024-08-29 PROCEDURE — 3044F HG A1C LEVEL LT 7.0%: CPT | Performed by: INTERNAL MEDICINE

## 2024-08-29 PROCEDURE — 99214 OFFICE O/P EST MOD 30 MIN: CPT | Performed by: INTERNAL MEDICINE

## 2024-08-29 PROCEDURE — 3075F SYST BP GE 130 - 139MM HG: CPT | Performed by: INTERNAL MEDICINE

## 2024-08-29 PROCEDURE — 1126F AMNT PAIN NOTED NONE PRSNT: CPT | Performed by: INTERNAL MEDICINE

## 2024-08-29 PROCEDURE — 1159F MED LIST DOCD IN RCRD: CPT | Performed by: INTERNAL MEDICINE

## 2024-08-29 NOTE — PROGRESS NOTES
Progress Note     Julia Hernandez    Is here with her daughter Marly  Feels good pressures 131/53 type 2 diabetes psoriatic arthritis hypertension CKD 3 creatinine stable 1.3    Most recently in last summer no microalbumin in her urine currently on Coumadin for antiphospholipid she is on insulin metoprolol indapamide Synthroid Aldactone valsartan.  No problems with her potassium she is on glipizide.  Overall feels good Dr. Valenzuela is monitoring her diabetes she is also on minoxidil and Zocor and Cosentyx for her psoriatic arthritis    Urination without problem no swelling in her feet  HISTORY:  Past Medical History:    Appendicitis    Cholelithiasis    Deep vein thrombosis (HCC)    L leg     Diabetes (HCC)    Disorder of thyroid    Essential hypertension    Goiter    Multinodular goiter S/P thyroidectomy.     High blood pressure    High cholesterol    Osteopenia    Vertigo, aural      Past Surgical History:   Procedure Laterality Date    Appendectomy  1993    Cataract extraction w/  intraocular lens implant Right 1988    in [place]    Cholecystectomy  2006    Colonoscopy N/A 8/12/2020    Procedure: COLONOSCOPY;  Surgeon: José Antonio Fletcher MD;  Location: [facility] ENDOSCOPY    Colonoscopy N/A 6/14/2024    Procedure: COLONOSCOPY;  Surgeon: José Antonio Fletcher MD;  Location: [facility] ENDOSCOPY    Electrocardiogram, complete  01/03/2014    SCANNED TO MEDIA TAB - 01/03/2014    Fracture surgery Left     LEFT FOOT SURGERY WITH HARDWARE    Knee scope,abrasn arthroplasty Right 10-4-16    Thyroidectomy  08/17/15    TOTAL    Tubal ligation        Social History     Tobacco Use    Smoking status: Never     Passive exposure: Never    Smokeless tobacco: Never   Substance Use Topics    Alcohol use: No         Medications (Active prior to today's visit):  Current Outpatient Medications   Medication Sig Dispense Refill    METOPROLOL TARTRATE 100 MG Oral Tab Take 1 tablet by mouth in the morning and 1/2 tablet in the afternoon 90  tablet 1    insulin glargine (LANTUS SOLOSTAR) 100 UNIT/ML Subcutaneous Solution Pen-injector INJECT 45 UNITS INTHE MORNING AND 5 UNITS IN THE EVENING 45 mL 3    Betamethasone Dipropionate Aug 0.05 % External Cream Apply 30 g topically 2 (two) times daily. 30 g 2    warfarin 5 MG Oral Tab Take as directed by INR clinic or take 1 AND 1/2 TABLETS ON  Tuesdays & Thursdays, take 1 TABLET all other days 110 tablet 11    albuterol 108 (90 Base) MCG/ACT Inhalation Aero Soln Inhale 1 puff into the lungs every 6 (six) hours as needed for Wheezing. 1 each 1    indapamide 2.5 MG Oral Tab Take 1 tablet (2.5 mg total) by mouth daily and every night. 180 tablet 3    clobetasol 0.05 % External Solution Apply 1 Application topically 2 (two) times daily. 1 each 0    levothyroxine 88 MCG Oral Tab Take 1 tablet (88 mcg total) by mouth before breakfast. 90 tablet 3    levothyroxine 100 MCG Oral Tab Take 1 tablet (100 mcg total) by mouth before breakfast. 90 tablet 3    valsartan 160 MG Oral Tab TAKE ONE TABLET BY MOUTH TWICE DAILY 180 tablet 3    SPIRONOLACTONE 25 MG Oral Tab TAKE ONE TABLET BY MOUTH ONE TIME DAILY 90 tablet 1    fluticasone furoate-vilanterol (BREO ELLIPTA) 100-25 MCG/ACT Inhalation Aerosol Powder, Breath Activated Inhale 1 puff into the lungs daily. Rinse your mouth with water without swallowing after using BREO to help reduce your chance of getting thrush. 1 each 5    escitalopram 5 MG Oral Tab Take 1 tablet (5 mg total) by mouth nightly. 90 tablet 3    Secukinumab, 300 MG Dose, (COSENTYX SENSOREADY, 300 MG,) 150 MG/ML Subcutaneous Solution Auto-injector Inject 300 mg into the skin every 28 days. 2 each 2    simvastatin 20 MG Oral Tab Take 1 tablet (20 mg total) by mouth twice a week. 24 tablet 3    minoxidil 2.5 MG Oral Tab Take 2 tablets (5 mg total) by mouth 2 (two) times daily. (Patient taking differently: Take 1 tablet (2.5 mg total) by mouth 2 (two) times daily.) 360 tablet 3    albuterol (2.5 MG/3ML) 0.083%  Inhalation Nebu Soln Take 3 mL (2.5 mg total) by nebulization every 6 (six) hours as needed for Wheezing. 50 each 0    dicyclomine 10 MG Oral Cap Take 1 capsule (10 mg total) by mouth 2 (two) times daily as needed. 60 capsule 1    glipiZIDE 10 MG Oral Tab Take 1 tablet (10 mg total) by mouth 2 (two) times daily before meals. (Patient taking differently: Take 0.5 tablets (5 mg total) by mouth 2 (two) times daily before meals.) 180 tablet 1    Omega-3-acid Ethyl Esters (LOVAZA) 1 G Oral Cap Take 1 capsule (1 g total) by mouth 2 (two) times daily with meals.      Insulin Pen Needle (TRUEPLUS 5-BEVEL PEN NEEDLES) 32G X 4 MM Does not apply Misc Injects at bedtime 90 each 3    enoxaparin 100 MG/ML Injection Solution Prefilled Syringe Inject 1 mL (100 mg total) into the skin 2 (two) times daily. (Patient not taking: Reported on 8/29/2024) 4 each 0    Glucose Blood (ACCU-CHEK GUIDE) In Vitro Strip 1 strip by In Vitro route 2 (two) times daily. 200 strip 3    PEG 3350-KCl-Na Bicarb-NaCl (TRILYTE) 420 g Oral Recon Soln Take prep as directed by gastro office. May substitute with Trilyte/generic equivalent if needed. 1 each 0    Insulin Pen Needle (BD PEN NEEDLE ORIGINAL U/F) 29G X 12.7MM Does not apply Misc Dx. E11.9. Checks q12hrs. BD ultrafine MINI. 100 each 6    Alcohol Swabs Does not apply Pads Code: E11.9.  Checks twice daily. 100 each 11    ACCU-CHEK FASTCLIX LANCETS Does not apply Misc CHECK EVERY MORNING AND EVENING 204 each 11       Allergies:  Allergies   Allergen Reactions    Amlodipine SHORTNESS OF BREATH     Chest pressure.  Patient does not recall having any allergies         ROS:     Constitutional:  Negative for decreased activity, fever, irritability and lethargy  ENMT:  Negative for ear drainage, hearing loss and nasal drainage  Eyes:  Negative for eye discharge and vision loss  Cardiovascular:  Negative for chest pain, sob  Respiratory:  Negative for cough, dyspnea and wheezing  Gastrointestinal:  Negative  for abdominal pain, constipation  Genitourinary:  Negative for dysuria and hematuria  Endocrine:  Negative for abnormal sleep patterns  Hema/Lymph:  Negative for easy bleeding and easy bruising  Integumentary:  Negative for pruritus and rash  Musculoskeletal: Has some chronic pain from arthritis  Neurological:  Negative for gait disturbance  Psychiatric:  Negative for inappropriate interaction and psychiatric symptoms      Vitals:    08/29/24 1632   BP: 131/53   Pulse: 57       PHYSICAL EXAM:   Constitutional: appears well hydrated alert and responsive overweight BMI of 36  Head/Face: normocephalic  Eyes/Vision: normal extraocular motion is intact  Nose/Mouth/Throat:mucous membranes are moist   Neck/Thyroid: neck is supple without adenopathy  Lymphatic: no abnormal cervical, supraclavicular adenopathy is noted  Respiratory:  lungs are clear to auscultation bilaterally  Cardiovascular: regular rate and rhythm   Abdomen: soft, non-tender, non-distended, BS normal  Skin/Hair: no unusual rashes present, no abnormal bruising noted  Back/Spine: no abnormalities noted  Musculoskeletal: no deformities  Extremities: no edema  Neurological:  Grossly normal       ASSESSMENT/PLAN:   Assessment   Encounter Diagnoses   Name Primary?    Antiphospholipid syndrome (HCC) Yes    Psoriatic arthritis (HCC)     Stage 3b chronic kidney disease (HCC)     Severe obesity (BMI 35.0-39.9) with comorbidity (HCC)     Type 2 diabetes mellitus with stage 3b chronic kidney disease, with long-term current use of insulin (HCC)        1 CKD 3 creatinine stable 1.3  Potassium and calcium okay  Continue present plan no proteinuria    #2 psoriatic arthritis per Dr. Henderson #3 hypertension controlled  #4 depression per Dr. Valenzuela  #5 anxiety per Dr. Valenzuela  Continue Lexapro for anxiety see me back in 6 months we will put urinalysis and urine protein levels in the computer spent 25 minutes explaining everything to her and her daughter       Orders This  Visit:  No orders of the defined types were placed in this encounter.      Meds This Visit:  Requested Prescriptions      No prescriptions requested or ordered in this encounter       Imaging & Referrals:  None     8/29/2024  Rajat Hurd MD

## 2024-08-29 NOTE — PATIENT INSTRUCTIONS
Kidneys are stable A1c is excellent    Please see me again in 6 months no changes    Have a great rest of the summer

## 2024-08-30 ENCOUNTER — TELEPHONE (OUTPATIENT)
Dept: ANTICOAGULATION | Facility: CLINIC | Age: 74
End: 2024-08-30

## 2024-08-30 DIAGNOSIS — D68.61 ANTIPHOSPHOLIPID SYNDROME (HCC): ICD-10-CM

## 2024-08-30 DIAGNOSIS — I82.532 CHRONIC DEEP VEIN THROMBOSIS OF LEFT POPLITEAL VEIN (HCC): Primary | ICD-10-CM

## 2024-08-30 DIAGNOSIS — Z51.81 MONITORING FOR LONG-TERM ANTICOAGULANT USE: ICD-10-CM

## 2024-08-30 DIAGNOSIS — Z79.01 MONITORING FOR LONG-TERM ANTICOAGULANT USE: ICD-10-CM

## 2024-09-03 PROBLEM — I82.532: Status: ACTIVE | Noted: 2024-09-03

## 2024-09-03 PROBLEM — Z51.81 MONITORING FOR LONG-TERM ANTICOAGULANT USE: Status: ACTIVE | Noted: 2024-09-03

## 2024-09-03 PROBLEM — Z79.01 MONITORING FOR LONG-TERM ANTICOAGULANT USE: Status: ACTIVE | Noted: 2024-09-03

## 2024-09-09 ENCOUNTER — ANTI-COAG VISIT (OUTPATIENT)
Dept: ANTICOAGULATION | Facility: CLINIC | Age: 74
End: 2024-09-09

## 2024-09-09 DIAGNOSIS — Z51.81 MONITORING FOR LONG-TERM ANTICOAGULANT USE: ICD-10-CM

## 2024-09-09 DIAGNOSIS — Z79.01 MONITORING FOR LONG-TERM ANTICOAGULANT USE: ICD-10-CM

## 2024-09-09 DIAGNOSIS — Z79.01 LONG TERM (CURRENT) USE OF ANTICOAGULANTS: ICD-10-CM

## 2024-09-09 DIAGNOSIS — I82.532 CHRONIC DEEP VEIN THROMBOSIS OF LEFT POPLITEAL VEIN (HCC): ICD-10-CM

## 2024-09-09 DIAGNOSIS — D68.61 ANTIPHOSPHOLIPID SYNDROME (HCC): Primary | ICD-10-CM

## 2024-09-09 LAB
INR: 2.4 (ref 2–3)
TEST STRIP EXPIRATION DATE: NORMAL DATE

## 2024-09-09 PROCEDURE — 85610 PROTHROMBIN TIME: CPT | Performed by: FAMILY MEDICINE

## 2024-09-09 PROCEDURE — 93793 ANTICOAG MGMT PT WARFARIN: CPT | Performed by: FAMILY MEDICINE

## 2024-09-09 NOTE — PROGRESS NOTES
Face-to-Face  / INR 2.4,  therapeutic. (Goal 2.5 ) TTR 74.6 %     Etiology: stable.    PLAN: continue the current dose.    Recheck INR 4 weeks.    Pt reports:    No sign of unusual bruising or bleeding.  Any missed doses: No   Medications changes: No    Contacted  Julia verbalized understanding and agreement.    WARFARIN Plan per protocol: 7.5 mg every Tue, Thu; 5 mg all other days

## 2024-09-11 RX ORDER — SIMVASTATIN 20 MG
20 TABLET ORAL
COMMUNITY
Start: 2024-09-11

## 2024-10-03 ENCOUNTER — LAB ENCOUNTER (OUTPATIENT)
Dept: LAB | Facility: HOSPITAL | Age: 74
End: 2024-10-03
Attending: INTERNAL MEDICINE
Payer: MEDICARE

## 2024-10-03 DIAGNOSIS — L40.50 PSORIATIC ARTHRITIS (HCC): ICD-10-CM

## 2024-10-03 LAB
ALBUMIN SERPL-MCNC: 4.2 G/DL (ref 3.2–4.8)
ALT SERPL-CCNC: 13 U/L
AST SERPL-CCNC: 20 U/L (ref ?–34)
BASOPHILS # BLD AUTO: 0.05 X10(3) UL (ref 0–0.2)
BASOPHILS NFR BLD AUTO: 0.8 %
CREAT BLD-MCNC: 1.29 MG/DL
CRP SERPL-MCNC: 1.4 MG/DL (ref ?–1)
DEPRECATED RDW RBC AUTO: 46.1 FL (ref 35.1–46.3)
EGFRCR SERPLBLD CKD-EPI 2021: 44 ML/MIN/1.73M2 (ref 60–?)
EOSINOPHIL # BLD AUTO: 0.43 X10(3) UL (ref 0–0.7)
EOSINOPHIL NFR BLD AUTO: 6.8 %
ERYTHROCYTE [DISTWIDTH] IN BLOOD BY AUTOMATED COUNT: 13.2 % (ref 11–15)
ERYTHROCYTE [SEDIMENTATION RATE] IN BLOOD: 48 MM/HR
HCT VFR BLD AUTO: 36.6 %
HGB BLD-MCNC: 11.9 G/DL
IMM GRANULOCYTES # BLD AUTO: 0.01 X10(3) UL (ref 0–1)
IMM GRANULOCYTES NFR BLD: 0.2 %
LYMPHOCYTES # BLD AUTO: 1.78 X10(3) UL (ref 1–4)
LYMPHOCYTES NFR BLD AUTO: 28 %
MCH RBC QN AUTO: 30.7 PG (ref 26–34)
MCHC RBC AUTO-ENTMCNC: 32.5 G/DL (ref 31–37)
MCV RBC AUTO: 94.3 FL
MONOCYTES # BLD AUTO: 1.24 X10(3) UL (ref 0.1–1)
MONOCYTES NFR BLD AUTO: 19.5 %
NEUTROPHILS # BLD AUTO: 2.84 X10 (3) UL (ref 1.5–7.7)
NEUTROPHILS # BLD AUTO: 2.84 X10(3) UL (ref 1.5–7.7)
NEUTROPHILS NFR BLD AUTO: 44.7 %
PLATELET # BLD AUTO: 239 10(3)UL (ref 150–450)
RBC # BLD AUTO: 3.88 X10(6)UL
WBC # BLD AUTO: 6.4 X10(3) UL (ref 4–11)

## 2024-10-03 PROCEDURE — 82565 ASSAY OF CREATININE: CPT

## 2024-10-03 PROCEDURE — 84460 ALANINE AMINO (ALT) (SGPT): CPT

## 2024-10-03 PROCEDURE — 85652 RBC SED RATE AUTOMATED: CPT

## 2024-10-03 PROCEDURE — 85025 COMPLETE CBC W/AUTO DIFF WBC: CPT

## 2024-10-03 PROCEDURE — 36415 COLL VENOUS BLD VENIPUNCTURE: CPT

## 2024-10-03 PROCEDURE — 82040 ASSAY OF SERUM ALBUMIN: CPT

## 2024-10-03 PROCEDURE — 84450 TRANSFERASE (AST) (SGOT): CPT

## 2024-10-03 PROCEDURE — 86140 C-REACTIVE PROTEIN: CPT

## 2024-10-07 ENCOUNTER — ANTI-COAG VISIT (OUTPATIENT)
Dept: ANTICOAGULATION | Facility: CLINIC | Age: 74
End: 2024-10-07

## 2024-10-07 DIAGNOSIS — Z79.01 MONITORING FOR LONG-TERM ANTICOAGULANT USE: ICD-10-CM

## 2024-10-07 DIAGNOSIS — Z79.01 LONG TERM (CURRENT) USE OF ANTICOAGULANTS: ICD-10-CM

## 2024-10-07 DIAGNOSIS — Z51.81 MONITORING FOR LONG-TERM ANTICOAGULANT USE: ICD-10-CM

## 2024-10-07 DIAGNOSIS — I82.532 CHRONIC DEEP VEIN THROMBOSIS OF LEFT POPLITEAL VEIN (HCC): ICD-10-CM

## 2024-10-07 DIAGNOSIS — D68.61 ANTIPHOSPHOLIPID SYNDROME (HCC): Primary | ICD-10-CM

## 2024-10-07 LAB
INR: 2.5 (ref 0.8–1.2)
TEST STRIP EXPIRATION DATE: ABNORMAL DATE

## 2024-10-07 PROCEDURE — 85610 PROTHROMBIN TIME: CPT | Performed by: FAMILY MEDICINE

## 2024-10-07 PROCEDURE — 93793 ANTICOAG MGMT PT WARFARIN: CPT | Performed by: FAMILY MEDICINE

## 2024-10-07 NOTE — PROGRESS NOTES
TTR 74.9%    Face to face.     Per protocol, continue current dose and recheck INR in 4 weeks.     7.5 mg every Tue, Thu; 5 mg all other days

## 2024-10-08 ENCOUNTER — OFFICE VISIT (OUTPATIENT)
Dept: RHEUMATOLOGY | Facility: CLINIC | Age: 74
End: 2024-10-08

## 2024-10-08 VITALS
SYSTOLIC BLOOD PRESSURE: 154 MMHG | HEART RATE: 54 BPM | HEIGHT: 69 IN | DIASTOLIC BLOOD PRESSURE: 52 MMHG | BODY MASS INDEX: 36.58 KG/M2 | WEIGHT: 247 LBS

## 2024-10-08 DIAGNOSIS — Z51.81 MEDICATION MONITORING ENCOUNTER: ICD-10-CM

## 2024-10-08 DIAGNOSIS — L40.50 PSORIATIC ARTHRITIS (HCC): Primary | ICD-10-CM

## 2024-10-08 PROCEDURE — G2211 COMPLEX E/M VISIT ADD ON: HCPCS | Performed by: INTERNAL MEDICINE

## 2024-10-08 PROCEDURE — 1160F RVW MEDS BY RX/DR IN RCRD: CPT | Performed by: INTERNAL MEDICINE

## 2024-10-08 PROCEDURE — 3008F BODY MASS INDEX DOCD: CPT | Performed by: INTERNAL MEDICINE

## 2024-10-08 PROCEDURE — 99215 OFFICE O/P EST HI 40 MIN: CPT | Performed by: INTERNAL MEDICINE

## 2024-10-08 PROCEDURE — 1159F MED LIST DOCD IN RCRD: CPT | Performed by: INTERNAL MEDICINE

## 2024-10-08 PROCEDURE — 3077F SYST BP >= 140 MM HG: CPT | Performed by: INTERNAL MEDICINE

## 2024-10-08 PROCEDURE — 1126F AMNT PAIN NOTED NONE PRSNT: CPT | Performed by: INTERNAL MEDICINE

## 2024-10-08 PROCEDURE — 3078F DIAST BP <80 MM HG: CPT | Performed by: INTERNAL MEDICINE

## 2024-10-08 RX ORDER — CLOBETASOL PROPIONATE 0.5 MG/ML
1 SOLUTION TOPICAL 2 TIMES DAILY
Qty: 1 EACH | Refills: 0 | Status: SHIPPED | OUTPATIENT
Start: 2024-10-08

## 2024-10-08 NOTE — PROGRESS NOTES
Julia Hernandez is a 74 year old female.    HPI:     Chief Complaint   Patient presents with    Psoriatic Arthritis    Hair/Scalp Problem     Feeling very itchy        Julia was seen today 10/8/2024 Psoriasis with psoriatic arthritis and Antiphospholipid syndrome.    Current Medications:  Cosentyx 300 mg- started Dec 2019  Clobetasol cream  Triamcinolone cream  Previous medications:  MTX 6 pills weekly, FA daily- stopped in July 2021  Blood work:  ADRIEL 1: 640  ESR 46-->57-->43-->72-->normal and CRP 1.17-->normal  + APL (+ DRVVT, beta-2 glycoprotein IgM 66, cardiolipin IgM 82)  low C4 of 13, Normal C3  Slightly elevated Cr 1.3  Normal dsDNA, SSA, SSB, Scl-70, centromere, smith, RNP, Negative RF and CCP    Interval History:  This is a 68 yo F with hx of HTN, HLD, DM-2, Hypothyroidism (thyroidectomy), Osteopenia, R TKR about 2-3 years ago, L DVT dx in 12/2019 (extensive, mostly occlusive thrombus involving L peroneal veins not on anticoagulation) unprovoked but has been more sedentary presents for f/u for PsA (initially started in L wrist with swelling and pain).      7/21/2021:  Presents for Psoriasis and PsA  She remains on Cosentyx 300 mg monthly and methotrexate 6 pills weekly  Most recent blood work shows normal ESR and CRP  She has been having a chronic cough for the past couple months.  It starts off with a dry cough and then gets congested and loose.  She has been on multiple antibiotics.  Chest x-ray shows prominent pulmonary markings, they are concerned of methotrexate could be causing her symptoms.  She will be seeing the pulmonologist on August 5  Reports very minimal joint pain.  No active psoriasis    11/17/2021  Presents for Psoriasis and PsA  During her last visit in July she was having a lot of dry cough and persistent cough for couple of months.  We discontinued her methotrexate and that has resolved  Reports minimal joint pain involving her hands, wrist or ankles.  At times will swell  but only last 1 day.  Pain is very short-lived.  It does not affect her ADLs.  She has not had to take any other medication for her pain  Remains on Cosentyx monthly  Reviewed blood work with patient, normal ESR.  CRP very slightly elevated at 1.12    4/2/2024:  Presents for follow-up of psoriasis and psoriatic arthritis  Last seen November 2021  Recent blood work showing elevated ESR of 33  She was on Cosentyx but stopped November 2023 as she ran out and needed a follow up visit  Has stiffness in the ankles in the morning  Some swelling wrists and some stiffness  She feels better on the injections  No rash or psoriasis  Some dryness in the scalp    10/8/2024:  Presents for follow-up of psoriasis and psoriatic arthritis  On Cosentyx 300 mg monthly  Recent blood work showing mildly elevated inflammation markers ESR 48 and CRP 1.4  Has been having a rash in the back and very itchy  Has been having an itchy scalp, has been using clobetasol  Also using betamethasone for the skin  No active psoriasis lesions   Minimal pain in the hands and wrists, no swelling   Having some pain in the feet  Having some weakness in lower back pain and at times if feels tired, minimal pain in lower back   Recent x-ray of the lower spine showing moderate to advanced multilevel degenerative disc disease  X-ray of the left knee also showed mild to moderate tricompartmental OA with small suprapatellar knee effusion          HISTORY:  Past Medical History:    Appendicitis    Cholelithiasis    Deep vein thrombosis (HCC)    L leg     Diabetes (HCC)    Disorder of thyroid    Essential hypertension    Goiter    Multinodular goiter S/P thyroidectomy.     High blood pressure    High cholesterol    Osteopenia    Vertigo, aural      Social Hx Reviewed   Family Hx Reviewed     Medications (Active prior to today's visit):  Current Outpatient Medications   Medication Sig Dispense Refill    simvastatin 20 MG Oral Tab Take 1 tablet (20 mg total) by mouth 3  (three) times a week.      METOPROLOL TARTRATE 100 MG Oral Tab Take 1 tablet by mouth in the morning and 1/2 tablet in the afternoon 90 tablet 1    insulin glargine (LANTUS SOLOSTAR) 100 UNIT/ML Subcutaneous Solution Pen-injector INJECT 45 UNITS INTHE MORNING AND 5 UNITS IN THE EVENING 45 mL 3    Betamethasone Dipropionate Aug 0.05 % External Cream Apply 30 g topically 2 (two) times daily. 30 g 2    warfarin 5 MG Oral Tab Take as directed by INR clinic or take 1 AND 1/2 TABLETS ON  Tuesdays & Thursdays, take 1 TABLET all other days 110 tablet 11    albuterol 108 (90 Base) MCG/ACT Inhalation Aero Soln Inhale 1 puff into the lungs every 6 (six) hours as needed for Wheezing. 1 each 1    indapamide 2.5 MG Oral Tab Take 1 tablet (2.5 mg total) by mouth daily and every night. 180 tablet 3    Insulin Pen Needle (TRUEPLUS 5-BEVEL PEN NEEDLES) 32G X 4 MM Does not apply Misc Injects at bedtime 90 each 3    clobetasol 0.05 % External Solution Apply 1 Application topically 2 (two) times daily. 1 each 0    levothyroxine 88 MCG Oral Tab Take 1 tablet (88 mcg total) by mouth before breakfast. 90 tablet 3    levothyroxine 100 MCG Oral Tab Take 1 tablet (100 mcg total) by mouth before breakfast. 90 tablet 3    valsartan 160 MG Oral Tab TAKE ONE TABLET BY MOUTH TWICE DAILY 180 tablet 3    Glucose Blood (ACCU-CHEK GUIDE) In Vitro Strip 1 strip by In Vitro route 2 (two) times daily. 200 strip 3    SPIRONOLACTONE 25 MG Oral Tab TAKE ONE TABLET BY MOUTH ONE TIME DAILY 90 tablet 1    fluticasone furoate-vilanterol (BREO ELLIPTA) 100-25 MCG/ACT Inhalation Aerosol Powder, Breath Activated Inhale 1 puff into the lungs daily. Rinse your mouth with water without swallowing after using BREO to help reduce your chance of getting thrush. 1 each 5    escitalopram 5 MG Oral Tab Take 1 tablet (5 mg total) by mouth nightly. 90 tablet 3    Secukinumab, 300 MG Dose, (COSENTYX SENSOREADY, 300 MG,) 150 MG/ML Subcutaneous Solution Auto-injector Inject 300  mg into the skin every 28 days. 2 each 2    minoxidil 2.5 MG Oral Tab Take 2 tablets (5 mg total) by mouth 2 (two) times daily. (Patient taking differently: Take 1 tablet (2.5 mg total) by mouth 2 (two) times daily.) 360 tablet 3    albuterol (2.5 MG/3ML) 0.083% Inhalation Nebu Soln Take 3 mL (2.5 mg total) by nebulization every 6 (six) hours as needed for Wheezing. 50 each 0    glipiZIDE 10 MG Oral Tab Take 1 tablet (10 mg total) by mouth 2 (two) times daily before meals. (Patient taking differently: Take 0.5 tablets (5 mg total) by mouth 2 (two) times daily before meals.) 180 tablet 1    Insulin Pen Needle (BD PEN NEEDLE ORIGINAL U/F) 29G X 12.7MM Does not apply Misc Dx. E11.9. Checks q12hrs. BD ultrafine MINI. 100 each 6    ACCU-CHEK FASTCLIX LANCETS Does not apply Misc CHECK EVERY MORNING AND EVENING 204 each 11    Omega-3-acid Ethyl Esters (LOVAZA) 1 G Oral Cap Take 1 capsule (1 g total) by mouth 2 (two) times daily with meals.      enoxaparin 100 MG/ML Injection Solution Prefilled Syringe Inject 1 mL (100 mg total) into the skin 2 (two) times daily. (Patient not taking: Reported on 8/29/2024) 4 each 0    PEG 3350-KCl-Na Bicarb-NaCl (TRILYTE) 420 g Oral Recon Soln Take prep as directed by gastro office. May substitute with Trilyte/generic equivalent if needed. (Patient not taking: Reported on 10/8/2024) 1 each 0    dicyclomine 10 MG Oral Cap Take 1 capsule (10 mg total) by mouth 2 (two) times daily as needed. (Patient not taking: Reported on 10/8/2024) 60 capsule 1    Alcohol Swabs Does not apply Pads Code: E11.9.  Checks twice daily. (Patient not taking: Reported on 10/8/2024) 100 each 11     .cmed  Allergies:  Allergies   Allergen Reactions    Amlodipine SHORTNESS OF BREATH     Chest pressure.  Patient does not recall having any allergies         ROS:   All other ROS are negative.     PHYSICAL EXAM:   GEN: AAOx3, NAD  HEENT: EOMI, PERRLA, no injection or icterus, oral mucosa moist, no oral lesions. No  lymphadenopathy. No facial rash  CVS: RRR, no murmurs rubs or gallops. Equal 2+ distal pulses.   LUNGS: CTAB, no increased work of breathing  ABDOMEN:  soft NT/ND, +BS, no HSM  SKIN: No rashes or skin lesions. No nail findings  MSK:  Wrist with chronic synovitis but no active swelling, nontender to palpation  Right shoulder abduction limited to 90 degrees, has a history of right shoulder fracture.  NEURO: Cranial nerves II-XII intact grossly. 5/5 strength throughout in both upper and lower extremities, sensation intact.  PSYCH: normal mood        LABS:     Component      Latest Ref Rng & Units 4/26/2020 1/24/2020   WBC      4.0 - 11.0 x10(3) uL 5.5 6.1   RBC      3.80 - 5.30 x10(6)uL 3.45 (L) 3.59 (L)   Hemoglobin      12.0 - 16.0 g/dL 11.1 (L) 11.3 (L)   Hematocrit      35.0 - 48.0 % 34.3 (L) 34.5 (L)   MCV      80.0 - 100.0 fL 99.4 96.1   MCH      26.0 - 34.0 pg 32.2 31.5   MCHC      31.0 - 37.0 g/dL 32.4 32.8   RDW-SD      35.1 - 46.3 fL 54.3 (H) 50.5 (H)   RDW      11.0 - 15.0 % 15.1 (H) 14.6   Platelet Count      150.0 - 450.0 10(3)uL 257.0 351.0   Prelim Neutrophil Abs      1.50 - 7.70 x10 (3) uL 3.24 4.16   Neutrophils Absolute      1.50 - 7.70 x10(3) uL 3.24 4.16   Lymphocytes Absolute      1.00 - 4.00 x10(3) uL 0.98 (L) 1.06   Monocytes Absolute      0.10 - 1.00 x10(3) uL 0.78 0.50   Eosinophils Absolute      0.00 - 0.70 x10(3) uL 0.39 0.30   Basophils Absolute      0.00 - 0.20 x10(3) uL 0.05 0.02   Immature Granulocyte Absolute      0.00 - 1.00 x10(3) uL 0.02 0.02   Neutrophils %      % 59.4 68.6   Lymphocytes %      % 17.9 17.5   Monocytes %      % 14.3 8.3   Eosinophils %      % 7.1 5.0   Basophils %      % 0.9 0.3   Immature Granulocyte %      % 0.4 0.3   CREATININE      0.55 - 1.02 mg/dL 1.07 (H) 1.19 (H)   eGFR NON-AFR. AMERICAN      >=60 53 (L) 47 (L)   eGFR       >=60 61 54 (L)   SED RATE      0 - 30 mm/Hr 28 37 (H)   C-REACTIVE PROTEIN      <0.30 mg/dL <0.29 <0.29   AST (SGOT)       15 - 37 U/L 19 49 (H)   ALT (SGPT)      13 - 56 U/L 25 82 (H)   Albumin      3.4 - 5.0 g/dL 3.4 3.5       Component      Latest Ref Rng & Units 1/16/2019   Quantiferon TB NIL      IU/mL 0.140   QUANTIFERON TB MINUS NIL      IU/mL 0.070   Quantiferon TB Mitogen minus NIL      IU/mL 8.977   QTB.RESULT      Negative Negative   HEPATITIS B SURF AB QUANT      <10.00 mIU/mL <3.10   HEPATITIS B SURFACE AB QUAL      Nonreactive  Nonreactive   GLUCOSE-6-PHOSPHATE DEHYDROGEN      9.9 - 16.6 U/g Hb 11.7   HEPATITIS B CORE ANTIBODY(S)      Nonreactive  Nonreactive   HCV AB      Nonreactive Nonreactive     Component      Latest Ref Rng & Units 3/16/2019 3/6/2019   Lupus Anticoag Screen Interp       Conclusion: Positive . . .    PT      11.8 - 14.5 seconds 13.0    APTT      23.2 - 35.3 seconds 30.3    Hexagonal Phase Staclot LA      Negative Positive (A)    DRVVT Ratio      0.0 - 1.1 1.4 (H)    DRVV Mix Ratio      0.0 - 1.1 1.4 (H)    DRVV Confirm      0.0 - 1.1 1.4 (H)    BETA 2 GLYCOPROTEIN 1 AB, IgM      <=15.0 U/mL 112.3 (H) 152.8 (H)   BETA 2 GLYCOPROTEIN 1 AB, IgG      <=15.0 U/mL 5.0 9.9   Cardiolipin IgG Antibody      0.0 - 14.9 GPL  3.9   Cardiolipin IgM Antibody      0.0 - 12.4 MPL  88.7 (H)     Component      Latest Ref Rng & Units 12/12/2018   Lupus Anticoag Screen Interp       Conclusion: Positive . . .   PT      11.8 - 14.5 seconds 12.9   APTT      23.2 - 35.3 seconds 29.2   Hexagonal Phase Staclot LA      Negative Positive (A)   DRVVT Ratio      0.0 - 1.1 1.2 (H)   DRVV Mix Ratio      0.0 - 1.1 1.3 (H)   DRVV Confirm      0.0 - 1.1 1.2 (H)   BETA 2 GLYCOPROTEIN 1 AB, IgM      <=15.0 U/mL 66.8 (H)   BETA 2 GLYCOPROTEIN 1 AB, IgG      <=15.0 U/mL 6.3   Cardiolipin IgG Antibody      0.0 - 14.9 GPL 6.2   Cardiolipin IgM Antibody      0.0 - 12.4 MPL 82.2 (H)       Component      Latest Ref Rng & Units 12/12/2018   TOTAL PROTEIN      6.5 - 9.1 g/dL 7.5   Albumin      3.75 - 5.21 g/dL 3.74 (L)   ALPHA-1-GLOBULINS       0.19 - 0.46 g/dL 0.41   ALPHA-2-GLOBULINS      0.48 - 1.05 g/dL 0.88   BETA GLOBULINS      0.68 - 1.23 g/dL 1.00   GAMMA GLOBULINS      0.62 - 1.70 g/dL 1.49   ALBUMIN/GLOBULIN RATIO      1.00 - 2.00 0.99 (L)   SPE INTERPRETATION       Minimally decreased albumin is present. . . .   Reviewed By:       Alexi Recio M.D.   C-Citrullinated Peptide IgG AB      0.0 - 6.9 U/mL 1.4   RHEUMATOID FACTOR      <11 IU/mL <5   COMPLEMENT C4      18 - 55 mg/dL 13 (L)   COMPLEMENT C3      88 - 201 mg/dL 98   Centromere Ab, IgG      0 - 40 AU/mL 6   Anti-Crespo/RNP Antibody      Negative Negative   Anti-Smith Antibody      Negative Negative   Anti-Sjogren's A      Negative Negative   Anti-Sjogren's B      Negative Negative   Scleroderma (Scl-70) (JAH) Antibody, IgG      0 - 40 AU/mL 3   Anti Double Strand DNA      <10 <10     Component      Latest Ref Rng & Units 12/12/2018 10/25/2018   ADRIEL Titer/Pattern      <80  640 (A)   Reviewed By:        Mich Martinez M.D.   ADRIEL SCREEN      Negative  Positive (A)   SED RATE      0 - 30 mm/Hr  46 (H)   RHEUMATOID FACTOR      <11 IU/mL  <5   C-REACTIVE PROTEIN      0.0 - 0.9 mg/dL 1.2 (H)        Imaging:     XR B/L hand 12/2018:  There is demineralization of the bony structures. Minimal osteoarthritic change is seen about the triscaphe joint as well as multiple interphalangeal joints. There is narrowing of the fifth MCP joint    L Venous US 12/4/18:  1. Extensive, mostly occlusive thrombus involving the more anterior of the 2 paired left peroneal veins with mild partial flow at the mid aspect. Otherwise, no evidence of left lower extremity deep venous thrombosis.  2. Unremarkable right lower extremity study. No evidence of right-sided DVT.    ASSESSMENT/PLAN:   74-year-old Luxembourger-speaking female initially referred for a + ADRIEL and rash.  She was seen by dermatology and rashes consistent with psoriasis.  She was prescribed clobetasol cream and shampoo her scalp and her rash is improved.  She  also reports joint pain and swelling in her wrists, MCPs and PIPs with morning stiffness.  On examination she initially had synovitis in her wrists and decreased range of motion.  Symptoms consistent with psoriatic arthritis.  She also was found to have an incidental L LE DVT diagnosed in December 2018.  Further workup was done and she was found to have + antiphospholipid antibodies.       Psoriasis with PsA- improved  - She was seen by dermatology and they were confident that she has psoriasis.  She was placed on clobetasol cream and shampoo her scalp and her lesions have improved significantly.  She reports her pruritus has improved also  - X-rays the wrist showed carpal narrowing and on examination she does have chronic synovitis in her wrists.  - Continue Cosentyx every 4 weeks  - Blood work reviewed, repeat in 6 mos   - TNFi CI due to dx of APL and +ADRIEL  - Xeljanz CI due to DVT    Osteopenia  - Bone density was done 7/2024, showing osteoporosis in left femoral neck and osteopenia in left total hip   - she has a hx of right shoulder fracture  - discussed starting Prolia with patient, she will discuss with her daughter   - Advised patient that she can take calcium 1200 mg daily and vitamin D supplements.  Also advised weightbearing exercises    R shoulder fracture s/p fall  APL  - She was found to have an incidental left DVT, ultrasound was done for screening of varicose veins  - APL Abs were positive (tripple +).  She is also found to have low C4  - She has no other features of lupus therefore diagnosis is mostly consistent with antiphospholipid syndrome.  Repeat testing 12 weeks apart confirm diagnosis of APL  - She is following with hematology, continue Coumadin  Kidney insuffiencey- resolved  - She follows with nephrology     Per pt can contact daughter Taylor [phone]    Spent 40 minutes obtaining history, evaluating patient, reviewing labs, discussing treatment options and completing documentation    Pt  will f/u in 6 mos    There is a longitudinal care relationship with me, the care plan reflects the ongoing nature of the continuous relationship of care, and the medical record indicates that there is ongoing treatment of a serious/complex medical condition which I am currently managing.  is Applicable.     Kelsey Lopez MD  10/8/2024  5:49 PM

## 2024-10-08 NOTE — PATIENT INSTRUCTIONS
You were seen today for psoriatic arthritis  Continue cosentyx every month  Consider prolia injections every 6 months for the osteoporosis  Blood work showed mild inflammation  Blood work in 6 mos  Follow up in 6 mos

## 2024-10-16 ENCOUNTER — HOSPITAL ENCOUNTER (OUTPATIENT)
Dept: ULTRASOUND IMAGING | Facility: HOSPITAL | Age: 74
Discharge: HOME | End: 2024-10-16
Attending: INTERNAL MEDICINE
Payer: MEDICARE

## 2024-10-16 DIAGNOSIS — H43.812 PVD (POSTERIOR VITREOUS DETACHMENT), LEFT: ICD-10-CM

## 2024-10-16 PROCEDURE — 93922 UPR/L XTREMITY ART 2 LEVELS: CPT | Performed by: INTERNAL MEDICINE

## 2024-10-17 ENCOUNTER — TELEPHONE (OUTPATIENT)
Dept: RHEUMATOLOGY | Facility: CLINIC | Age: 74
End: 2024-10-17

## 2024-10-17 NOTE — TELEPHONE ENCOUNTER
What visit is pt asking about? She just has blood work completed on 10/03/2024. If is for 04/2025, yes she will need to complete labs work prior to appointment. Is she asking for the correct provider?

## 2024-11-01 RX ORDER — SPIRONOLACTONE 25 MG/1
25 TABLET ORAL DAILY
Qty: 90 TABLET | Refills: 1 | Status: SHIPPED | OUTPATIENT
Start: 2024-11-01

## 2024-11-01 NOTE — TELEPHONE ENCOUNTER
Last office visit:   8/29/2024    Return to office:   6 months     Follow up appointment:   2/28/2025

## 2024-11-04 ENCOUNTER — ANTI-COAG VISIT (OUTPATIENT)
Dept: ANTICOAGULATION | Facility: CLINIC | Age: 74
End: 2024-11-04

## 2024-11-04 DIAGNOSIS — Z79.01 MONITORING FOR LONG-TERM ANTICOAGULANT USE: ICD-10-CM

## 2024-11-04 DIAGNOSIS — Z79.01 LONG TERM (CURRENT) USE OF ANTICOAGULANTS: ICD-10-CM

## 2024-11-04 DIAGNOSIS — Z51.81 MONITORING FOR LONG-TERM ANTICOAGULANT USE: ICD-10-CM

## 2024-11-04 DIAGNOSIS — D68.61 ANTIPHOSPHOLIPID SYNDROME (HCC): Primary | ICD-10-CM

## 2024-11-04 DIAGNOSIS — I82.532 CHRONIC DEEP VEIN THROMBOSIS OF LEFT POPLITEAL VEIN (HCC): ICD-10-CM

## 2024-11-04 LAB
INR: 2.8 (ref 2–3)
TEST STRIP EXPIRATION DATE: ABNORMAL DATE

## 2024-11-04 PROCEDURE — 85610 PROTHROMBIN TIME: CPT | Performed by: FAMILY MEDICINE

## 2024-11-04 PROCEDURE — 93793 ANTICOAG MGMT PT WARFARIN: CPT | Performed by: FAMILY MEDICINE

## 2024-11-04 NOTE — PROGRESS NOTES
Face-to-Face  / INR 2.8,  therapeutic. (Goal 2.5 ) TTR 75.3 %     Etiology: stable. No changes    PLAN: continue the current dose.    Recheck INR 4 weeks.    Pt reports:    No sign of unusual bruising or bleeding.  Any missed doses: No   Medications changes: No    Contacted  Julia & verbalized understanding and agreement.    WARFARIN Plan per protocol: 7.5 mg every Tue, Thu; 5 mg all other days

## 2024-11-05 RX ORDER — SIMVASTATIN 20 MG
20 TABLET ORAL
Qty: 24 TABLET | Refills: 3 | Status: SHIPPED | OUTPATIENT
Start: 2024-11-05

## 2024-11-05 NOTE — TELEPHONE ENCOUNTER
Refill passed per [facility] protocol.  Requested Prescriptions   Pending Prescriptions Disp Refills    SIMVASTATIN 20 MG Oral Tab [Pharmacy Med Name: Simvastatin 20 Mg Tab Nort] 24 tablet 0     Sig: TAKE 1 TABLET BY MOUTH TWICE A WEEK.       Cholesterol Medication Protocol Passed - 11/5/2024 11:52 AM        Passed - ALT < 80     Lab Results   Component Value Date    ALT 13 10/03/2024             Passed - ALT resulted within past year        Passed - Lipid panel within past 12 months     Lab Results   Component Value Date    CHOLEST 162 08/27/2024    TRIG 82 08/27/2024    HDL 56 08/27/2024    LDL 91 08/27/2024    VLDL 13 08/27/2024    TCHDLRATIO 2.8 07/22/2023    NONHDLC 106 08/27/2024             Passed - In person appointment or virtual visit in the past 12 mos or appointment in next 3 mos     Recent Outpatient Visits              4 weeks ago Psoriatic arthritis (HCC)    [facility] Kelsey Lopez MD    Office Visit    2 months ago Antiphospholipid syndrome (HCC)    [facility] Rajat Hurd MD    Office Visit    2 months ago B12 deficiency    [facility] Sara Valenzuela MD    Office Visit    4 months ago Rash    [facility] Jovan Johnson MD    Office Visit    6 months ago Subacute cough    [facility] Bharathi Quinonez MD    Office Visit          Future Appointments         Provider Department Appt Notes    In 6 days Bharathi Quinonez MD [facility] - Subacute cough  (Policy informed)    In 2 weeks [address] [facility]     In 3 weeks Halle Nassar, RN [facility]     In 3 months Sara Valenzuela MD [facility]     In  3 months Rajat Hurd MD [facility] 6m    In 5 months Kelsey Loepz MD [facility] 6 month f/u, policy informed                       Recent Outpatient Visits              4 weeks ago Psoriatic arthritis (HCC)    [facility] Kelsey Lopez MD    Office Visit    2 months ago Antiphospholipid syndrome (HCC)    [facility] Rajat Hurd MD    Office Visit    2 months ago B12 deficiency    [facility] Sara Valenzuela MD    Office Visit    4 months ago Rash    [facility] Jovan Johnson MD    Office Visit    6 months ago Subacute cough    [facility] Bharathi Quinonez MD    Office Visit          Future Appointments         Provider Department Appt Notes    In 6 days Bharathi Quinonez MD [facility] - Subacute cough  (Policy informed)    In 2 weeks [address]     In 3 weeks Halle Nassar, RN [facility]     In 3 months Sara Valenzuela MD [facility]     In 3 months Rajat Hurd MD [facility] 6m    In 5 months Kelsey Lopez MD [facility] 6 month f/u, policy informed

## 2024-11-11 ENCOUNTER — OFFICE VISIT (OUTPATIENT)
Dept: PULMONOLOGY | Facility: CLINIC | Age: 74
End: 2024-11-11

## 2024-11-11 VITALS
WEIGHT: 247 LBS | SYSTOLIC BLOOD PRESSURE: 136 MMHG | OXYGEN SATURATION: 97 % | HEIGHT: 69 IN | RESPIRATION RATE: 14 BRPM | DIASTOLIC BLOOD PRESSURE: 67 MMHG | HEART RATE: 70 BPM | BODY MASS INDEX: 36.58 KG/M2

## 2024-11-11 DIAGNOSIS — I82.532 CHRONIC DEEP VEIN THROMBOSIS OF LEFT POPLITEAL VEIN (HCC): Primary | ICD-10-CM

## 2024-11-11 PROCEDURE — 99213 OFFICE O/P EST LOW 20 MIN: CPT | Performed by: INTERNAL MEDICINE

## 2024-11-11 PROCEDURE — 1126F AMNT PAIN NOTED NONE PRSNT: CPT | Performed by: INTERNAL MEDICINE

## 2024-11-11 PROCEDURE — 3075F SYST BP GE 130 - 139MM HG: CPT | Performed by: INTERNAL MEDICINE

## 2024-11-11 PROCEDURE — 3078F DIAST BP <80 MM HG: CPT | Performed by: INTERNAL MEDICINE

## 2024-11-11 PROCEDURE — 3008F BODY MASS INDEX DOCD: CPT | Performed by: INTERNAL MEDICINE

## 2024-11-11 PROCEDURE — 1159F MED LIST DOCD IN RCRD: CPT | Performed by: INTERNAL MEDICINE

## 2024-11-11 RX ORDER — FLUTICASONE FUROATE AND VILANTEROL 100; 25 UG/1; UG/1
1 POWDER RESPIRATORY (INHALATION) DAILY
Qty: 1 EACH | Refills: 5 | Status: SHIPPED | OUTPATIENT
Start: 2024-11-11

## 2024-11-11 NOTE — PROGRESS NOTES
Date the patient is a 74-year-old female who I know well from prior evaluation comes in now for follow-up.  In general, she is doing well.  No new complaints.  She does get occasional cough and it responds well to Breo and albuterol.    Review of Systems:  Vision normal. Ear nose and throat normal. Bowel normal. Bladder function normal. No depression. No thyroid disease. No lymphatic system concerns.  No rash. Muscles and joints unremarkable. No weight loss no weight gain.    Physical Examination:  Vital signs normal. HEENT examination is unremarkable with pupils equal round and reactive to light and accommodation. Neck without adenopathy, thyromegaly, JVD nor bruit. Lungs clear to auscultation and percussion. Cardiac regular rate and rhythm no murmur. Abdomen nontender, without hepatosplenomegaly and no mass appreciable. Extremities and Musculoskeletal without clubbing cyanosis nor edema, and mobility acceptable. Neurologic grossly intact with symmetric tone and strength and reflex.    Assessment and plan:  1.  Dyspnea with subacute cough-likely has an asthmatic component.    Recommendations: Breo, albuterol, see me again in 1 year, contact me promptly if new troubles, annual flu shot, COVID booster, RSV vaccine.    2.  History of DVT with antiphospholipid syndrome-patient remains on warfarin.

## 2024-11-12 ENCOUNTER — TELEPHONE (OUTPATIENT)
Dept: PULMONOLOGY | Facility: CLINIC | Age: 74
End: 2024-11-12

## 2024-11-12 NOTE — TELEPHONE ENCOUNTER
Dr Quinonez- patient's insurance rejected flutic/vilan 100-25 mcg/act inh. Covered alternatives are symbicort, advair hfa, wixela inhub, fluticasone propion-salmeterol.     Which alternative would you like to prescribe instead?    LAST OFFICE VISIT: 11/11/24 Last refill: 5/6/24

## 2024-11-15 RX ORDER — FLUTICASONE PROPIONATE AND SALMETEROL 250; 50 UG/1; UG/1
1 POWDER RESPIRATORY (INHALATION) 2 TIMES DAILY
Qty: 1 EACH | Refills: 5 | Status: SHIPPED | OUTPATIENT
Start: 2024-11-15

## 2024-11-15 NOTE — TELEPHONE ENCOUNTER
Called patient who did not . Left message with phone number to call back with questions. Called patient's daughter and informed that covered alternative has been sent to [facility] in [place]

## 2024-11-18 ENCOUNTER — TELEPHONE (OUTPATIENT)
Dept: INTERNAL MEDICINE CLINIC | Facility: CLINIC | Age: 74
End: 2024-11-18

## 2024-11-18 DIAGNOSIS — R92.1 MAMMOGRAPHIC CALCIFICATION FOUND ON DIAGNOSTIC IMAGING OF BREAST: ICD-10-CM

## 2024-11-18 DIAGNOSIS — Z12.31 ENCOUNTER FOR SCREENING MAMMOGRAM FOR MALIGNANT NEOPLASM OF BREAST: Primary | ICD-10-CM

## 2024-11-18 DIAGNOSIS — N60.09 SOLITARY CYST OF BREAST, UNSPECIFIED LATERALITY: ICD-10-CM

## 2024-11-18 NOTE — TELEPHONE ENCOUNTER
Received call from mammogram dept asking for order to be changed from screening to diagnostic bilateral mammogram.  Patient is scheduled for tomorrow.   Order pended, please review and advise.

## 2024-12-01 ENCOUNTER — PATIENT MESSAGE (OUTPATIENT)
Dept: RHEUMATOLOGY | Facility: CLINIC | Age: 74
End: 2024-12-01

## 2024-12-02 ENCOUNTER — ANTI-COAG VISIT (OUTPATIENT)
Dept: ANTICOAGULATION | Facility: CLINIC | Age: 74
End: 2024-12-02

## 2024-12-02 DIAGNOSIS — D68.61 ANTIPHOSPHOLIPID SYNDROME (HCC): Primary | ICD-10-CM

## 2024-12-02 DIAGNOSIS — I82.532 CHRONIC DEEP VEIN THROMBOSIS OF LEFT POPLITEAL VEIN (HCC): ICD-10-CM

## 2024-12-02 DIAGNOSIS — Z51.81 MONITORING FOR LONG-TERM ANTICOAGULANT USE: ICD-10-CM

## 2024-12-02 DIAGNOSIS — Z79.01 MONITORING FOR LONG-TERM ANTICOAGULANT USE: ICD-10-CM

## 2024-12-02 DIAGNOSIS — Z79.01 LONG TERM (CURRENT) USE OF ANTICOAGULANTS: ICD-10-CM

## 2024-12-02 LAB
INR: 2.5 (ref 2–3)
TEST STRIP EXPIRATION DATE: ABNORMAL DATE

## 2024-12-02 PROCEDURE — 85610 PROTHROMBIN TIME: CPT | Performed by: FAMILY MEDICINE

## 2024-12-02 PROCEDURE — 93793 ANTICOAG MGMT PT WARFARIN: CPT | Performed by: FAMILY MEDICINE

## 2024-12-02 NOTE — PROGRESS NOTES
Face-to-Face  / INR 2.5,  therapeutic. (Goal 2.5 ) TTR 75.6 %     Etiology: stable. She does have a new Breo Elipta inhaler from Dr Quinonez.     PLAN: continue the current dose.    Recheck INR 4 weeks.    Pt reports:    No sign of unusual bruising or bleeding.  Any missed doses: No   Medications changes: Yes.    Contacted Julia & her spouse who verbalized understanding and agreement.    WARFARIN Plan per protocol: 7.5 mg every Tue, Thu; 5 mg all other days

## 2024-12-05 NOTE — TELEPHONE ENCOUNTER
Please advise. Forms printed and placed on your desk.    LOV: 10/8/24  Future Appointments   Date Time Provider Department Center   12/30/2024  8:45 AM Halle Nassar, [place] ECSCHCOUM [facility] [place]   2/10/2025  9:30 AM Sara Valenzuela MD [id number] [facility] [place] [address]   2/28/2025  4:00 PM Rajat Hurd MD [id number]  [facility]   4/7/2025 11:40 AM Kelsey Lopez MD [id number]  [facility]   11/13/2025 11:15 AM Bharathi Quinonez MD [id number] [facility]     Labs:   Component      Latest Ref Rng 10/3/2024   WBC      4.0 - 11.0 x10(3) uL 6.4    RBC      3.80 - 5.30 x10(6)uL 3.88    Hemoglobin      12.0 - 16.0 g/dL 11.9 (L)    Hematocrit      35.0 - 48.0 % 36.6    MCV      80.0 - 100.0 fL 94.3    MCH      26.0 - 34.0 pg 30.7    MCHC      31.0 - 37.0 g/dL 32.5    RDW-SD      35.1 - 46.3 fL 46.1    RDW      11.0 - 15.0 % 13.2    Platelet Count      150.0 - 450.0 10(3)uL 239.0    Prelim Neutrophil Abs      1.50 - 7.70 x10 (3) uL 2.84    Neutrophils Absolute      1.50 - 7.70 x10(3) uL 2.84    Lymphocytes Absolute      1.00 - 4.00 x10(3) uL 1.78    Monocytes Absolute      0.10 - 1.00 x10(3) uL 1.24 (H)    Eosinophils Absolute      0.00 - 0.70 x10(3) uL 0.43    Basophils Absolute      0.00 - 0.20 x10(3) uL 0.05    Immature Granulocyte Absolute      0.00 - 1.00 x10(3) uL 0.01    Neutrophils %      % 44.7    Lymphocytes %      % 28.0    Monocytes %      % 19.5    Eosinophils %      % 6.8    Basophils %      % 0.8    Immature Granulocyte %      % 0.2    CREATININE      0.55 - 1.02 mg/dL 1.29 (H)    EGFR      >=60 mL/min/1.73m2 44 (L)    Albumin      3.2 - 4.8 g/dL 4.2    ALT (SGPT)      10 - 49 U/L 13    AST (SGOT)      <34 U/L 20    C-REACTIVE PROTEIN      <1.00 mg/dL 1.40 (H)    SED RATE      0 - 30 mm/Hr 48 (H)       Legend:  (L) Low  (H) High      Instructions    You were seen today for psoriatic arthritis  Continue cosentyx every month  Consider prolia injections every 6 months for the osteoporosis  Blood work showed mild  inflammation  Blood work in 6 mos  Follow up in 6 mos

## 2024-12-09 NOTE — TELEPHONE ENCOUNTER
[employer] PAP form completed and reviewed and signed by provider. Was signed and faxed this morning.

## 2024-12-17 RX ORDER — METOPROLOL TARTRATE 100 MG/1
TABLET ORAL
Qty: 90 TABLET | Refills: 0 | Status: SHIPPED | OUTPATIENT
Start: 2024-12-17

## 2024-12-17 NOTE — TELEPHONE ENCOUNTER
Last office visit:  8/29/2024    Return to office: 6 months     Follow up appointment:  2/28/2025

## 2024-12-23 RX ORDER — METOPROLOL TARTRATE 100 MG/1
TABLET ORAL
Qty: 90 TABLET | Refills: 0 | OUTPATIENT
Start: 2024-12-23

## 2024-12-26 ENCOUNTER — NURSE TRIAGE (OUTPATIENT)
Dept: INTERNAL MEDICINE CLINIC | Facility: CLINIC | Age: 74
End: 2024-12-26

## 2024-12-26 NOTE — TELEPHONE ENCOUNTER
Action Requested: Summary for Provider     []  Critical Lab, Recommendations Needed  [] Need Additional Advice  []   FYI    []   Need Orders  [] Need Medications Sent to Pharmacy  []  Other     SUMMARY: Patient was scheduled for an appointment with  at [address] [place] [address] on 1/2/25. Patient was offered several earlier appointments but only wants this location. Patient was also advised immediate care.     With  ID# [id number]    Patient states she fell 1 month ago and hurt her right foot .There is pain when she walks on her foot , no pain at rest. Patient states there is mild swelling at top of her foot. No redness or fever or calf pain.     Reason for call: Foot Pain  Onset: 1 month ago  Reason for Disposition   Swollen foot  (Exceptions: Localized bump from bunions, calluses, insect bite, sting.)    Protocols used: Foot Pain-A-[place]

## 2024-12-30 ENCOUNTER — ANTI-COAG VISIT (OUTPATIENT)
Dept: ANTICOAGULATION | Facility: CLINIC | Age: 74
End: 2024-12-30

## 2024-12-30 DIAGNOSIS — I82.532 CHRONIC DEEP VEIN THROMBOSIS OF LEFT POPLITEAL VEIN (HCC): ICD-10-CM

## 2024-12-30 DIAGNOSIS — D68.61 ANTIPHOSPHOLIPID SYNDROME (HCC): Primary | ICD-10-CM

## 2024-12-30 DIAGNOSIS — Z51.81 MONITORING FOR LONG-TERM ANTICOAGULANT USE: ICD-10-CM

## 2024-12-30 DIAGNOSIS — Z79.01 MONITORING FOR LONG-TERM ANTICOAGULANT USE: ICD-10-CM

## 2024-12-30 DIAGNOSIS — Z79.01 LONG TERM (CURRENT) USE OF ANTICOAGULANTS: ICD-10-CM

## 2024-12-30 LAB
INR: 2.1 (ref 2–3)
TEST STRIP EXPIRATION DATE: NORMAL DATE

## 2024-12-30 RX ORDER — GLIPIZIDE 10 MG/1
10 TABLET ORAL
Qty: 180 TABLET | Refills: 3 | Status: SHIPPED | OUTPATIENT
Start: 2024-12-30

## 2024-12-30 NOTE — PROGRESS NOTES
Face-to-Face  / INR 2.1,  therapeutic. (Goal 2.5 ) TTR 75.9 %     Etiology: stable.     PLAN: continue the current dose.    Recheck INR 4 weeks.     Pt reports:    No sign of unusual bruising or bleeding.  Any missed doses: No   Medications changes: No  Diet changes:  No    Contacted  Julia verbalized understanding and agreement.    WARFARIN Plan per protocol: 7.5 mg every Tue, Thu; 5 mg all other days

## 2024-12-30 NOTE — TELEPHONE ENCOUNTER
Please review. Protocol Failed; No Protocol    Requested Prescriptions   Pending Prescriptions Disp Refills    GLIPIZIDE 10 MG Oral Tab [Pharmacy Med Name: Glipizide 10 Mg Tab Acta] 180 tablet 0     Sig: Take 1 tablet  by mouth 2  times daily before meals.       Diabetes Medication Protocol Failed - 12/30/2024 10:56 AM        Failed - EGFRCR or GFRNAA > 50     GFR Evaluation  EGFRCR: 44 , resulted on 10/3/2024          Passed - Last A1C < 7.5 and within past 6 months     Lab Results   Component Value Date    A1C 6.2 (H) 08/27/2024             Passed - In person appointment or virtual visit in the past 6 mos or appointment in next 3 mos     Recent Outpatient Visits              1 month ago Chronic deep vein thrombosis of left popliteal vein ([facility])    [facility] Bharathi Quinonez MD    Office Visit    2 months ago Psoriatic arthritis ([facility])    [facility] Kelsey Lopez MD    Office Visit    4 months ago Antiphospholipid syndrome ([facility])    [facility] Rajat Hurd MD    Office Visit    4 months ago B12 deficiency    [facility] Sara Valenzuela MD    Office Visit    6 months ago Rash    [facility] Jovan Johnson MD    Office Visit          Future Appointments         Provider Department Appt Notes    In 3 days Jovan Johnson MD [facility]     In 4 weeks Halle Nassar RN [facility]     In 1 month Sara Valenzuela MD [facility]     In 2 months Rajat Hurd MD [facility] 6m    In 3 months Kelsey Lopez MD [facility] 6 month f/u, policy informed    In 10 months Bharathi Quinonez MD  [facility] yearly                    Passed - Microalbumin procedure in past 12 months or taking ACE/ARB        Passed - GFR in the past 12 months               Future Appointments         Provider Department Appt Notes    In 3 days Jovan Johnson MD [facility]     In 4 weeks Halle Nassar, RN [facility]     In 1 month Sara Valenzuela MD [facility]     In 2 months Rajat Hurd MD [facility] 6m    In 3 months Kelsey Lopez MD [facility] 6 month f/u, policy informed    In 10 months Bharathi Quinonez MD [facility] yearly          Recent Outpatient Visits              1 month ago Chronic deep vein thrombosis of left popliteal vein ([facility])    [facility] Bharathi Quinonez MD    Office Visit    2 months ago Psoriatic arthritis ([facility])    [facility] Kelsey Lopez MD    Office Visit    4 months ago Antiphospholipid syndrome ([facility])    [facility] Rajat Hurd MD    Office Visit    4 months ago B12 deficiency    [facility] Sara Valenzuela MD    Office Visit    6 months ago Rash    [facility] Jovan Johnson MD    Office Visit

## 2025-01-02 ENCOUNTER — OFFICE VISIT (OUTPATIENT)
Dept: INTERNAL MEDICINE CLINIC | Facility: CLINIC | Age: 75
End: 2025-01-02

## 2025-01-02 VITALS
RESPIRATION RATE: 17 BRPM | SYSTOLIC BLOOD PRESSURE: 132 MMHG | WEIGHT: 246 LBS | HEIGHT: 69 IN | HEART RATE: 87 BPM | TEMPERATURE: 98 F | DIASTOLIC BLOOD PRESSURE: 78 MMHG | BODY MASS INDEX: 36.43 KG/M2 | OXYGEN SATURATION: 99 %

## 2025-01-02 DIAGNOSIS — L40.9 PSORIASIS: ICD-10-CM

## 2025-01-02 DIAGNOSIS — M79.671 FOOT PAIN, RIGHT: Primary | ICD-10-CM

## 2025-01-02 PROCEDURE — 1126F AMNT PAIN NOTED NONE PRSNT: CPT | Performed by: INTERNAL MEDICINE

## 2025-01-02 PROCEDURE — 99214 OFFICE O/P EST MOD 30 MIN: CPT | Performed by: INTERNAL MEDICINE

## 2025-01-02 PROCEDURE — 1159F MED LIST DOCD IN RCRD: CPT | Performed by: INTERNAL MEDICINE

## 2025-01-02 PROCEDURE — 1160F RVW MEDS BY RX/DR IN RCRD: CPT | Performed by: INTERNAL MEDICINE

## 2025-01-02 PROCEDURE — 3008F BODY MASS INDEX DOCD: CPT | Performed by: INTERNAL MEDICINE

## 2025-01-02 PROCEDURE — 3078F DIAST BP <80 MM HG: CPT | Performed by: INTERNAL MEDICINE

## 2025-01-02 PROCEDURE — 3075F SYST BP GE 130 - 139MM HG: CPT | Performed by: INTERNAL MEDICINE

## 2025-01-02 RX ORDER — CLOBETASOL PROPIONATE 0.5 MG/ML
1 SOLUTION TOPICAL 2 TIMES DAILY
Qty: 1 EACH | Refills: 0 | Status: SHIPPED | OUTPATIENT
Start: 2025-01-02

## 2025-01-02 NOTE — PROGRESS NOTES
Subjective:   Patient ID: Julia Hernandez is a 74 year old female.    HPI  Patient comes in today with complaint of pain on R-foot, when walking.  The symptoms started about 2wks ago due to a fall. Her toe and the top of her foot both were hurting and had some swelling.   Today, pain overall is better, bust still having some pain on the top of her foot.  Patient also needs refill on her clobetasol external solution for her rash and itchiness due to psoriasis  History/Other:   Review of Systems   Constitutional: Negative.    HENT: Negative.     Eyes: Negative.    Respiratory: Negative.     Cardiovascular: Negative.    Gastrointestinal: Negative.    Genitourinary: Negative.    Musculoskeletal:         Rt foot pain   Skin: Negative.    Neurological: Negative.    Psychiatric/Behavioral: Negative.       Current Outpatient Medications   Medication Sig Dispense Refill    clobetasol 0.05 % External Solution Apply 1 Application topically 2 (two) times daily. 1 each 0    glipiZIDE 10 MG Oral Tab Take 1 tablet (10 mg total) by mouth 2 (two) times daily before meals. 180 tablet 3    metoprolol tartrate 100 MG Oral Tab Take one tablet by mouth in the morning and a half  tablet in the afternoon 90 tablet 0    fluticasone-salmeterol (WIXELA INHUB) 250-50 MCG/ACT Inhalation Aerosol Powder, Breath Activated Inhale 1 puff into the lungs 2 (two) times daily. Follow with gargle rinse and spit 1 each 5    fluticasone furoate-vilanterol (BREO ELLIPTA) 100-25 MCG/ACT Inhalation Aerosol Powder, Breath Activated Inhale 1 puff into the lungs daily. Rinse your mouth with water without swallowing after using BREO to help reduce your chance of getting thrush. 1 each 5    simvastatin 20 MG Oral Tab Take 1 tablet (20 mg total) by mouth twice a week. 24 tablet 3    SPIRONOLACTONE 25 MG Oral Tab TOME 1 TABLETA DIARIAMENTE 90 tablet 1    insulin glargine (LANTUS SOLOSTAR) 100 UNIT/ML Subcutaneous Solution Pen-injector INJECT 45 UNITS  INTHE MORNING AND 5 UNITS IN THE EVENING 45 mL 3    Betamethasone Dipropionate Aug 0.05 % External Cream Apply 30 g topically 2 (two) times daily. 30 g 2    warfarin 5 MG Oral Tab Take as directed by INR clinic or take 1 AND 1/2 TABLETS ON  Tuesdays & Thursdays, take 1 TABLET all other days 110 tablet 11    albuterol 108 (90 Base) MCG/ACT Inhalation Aero Soln Inhale 1 puff into the lungs every 6 (six) hours as needed for Wheezing. 1 each 1    indapamide 2.5 MG Oral Tab Take 1 tablet (2.5 mg total) by mouth daily and every night. 180 tablet 3    Insulin Pen Needle (TRUEPLUS 5-BEVEL PEN NEEDLES) 32G X 4 MM Does not apply Misc Injects at bedtime 90 each 3    enoxaparin 100 MG/ML Injection Solution Prefilled Syringe Inject 1 mL (100 mg total) into the skin 2 (two) times daily. 4 each 0    levothyroxine 88 MCG Oral Tab Take 1 tablet (88 mcg total) by mouth before breakfast. 90 tablet 3    levothyroxine 100 MCG Oral Tab Take 1 tablet (100 mcg total) by mouth before breakfast. 90 tablet 3    valsartan 160 MG Oral Tab TAKE ONE TABLET BY MOUTH TWICE DAILY 180 tablet 3    Glucose Blood (ACCU-CHEK GUIDE) In Vitro Strip 1 strip by In Vitro route 2 (two) times daily. 200 strip 3    fluticasone furoate-vilanterol (BREO ELLIPTA) 100-25 MCG/ACT Inhalation Aerosol Powder, Breath Activated Inhale 1 puff into the lungs daily. Rinse your mouth with water without swallowing after using BREO to help reduce your chance of getting thrush. 1 each 5    escitalopram 5 MG Oral Tab Take 1 tablet (5 mg total) by mouth nightly. 90 tablet 3    Secukinumab, 300 MG Dose, (COSENTYX SENSOREADY, 300 MG,) 150 MG/ML Subcutaneous Solution Auto-injector Inject 300 mg into the skin every 28 days. 2 each 2    minoxidil 2.5 MG Oral Tab Take 2 tablets (5 mg total) by mouth 2 (two) times daily. (Patient taking differently: Take 1 tablet (2.5 mg total) by mouth 2 (two) times daily.) 360 tablet 3    PEG 3350-KCl-Na Bicarb-NaCl (TRILYTE) 420 g Oral Recon Soln Take  prep as directed by gastro office. May substitute with Trilyte/generic equivalent if needed. 1 each 0    albuterol (2.5 MG/3ML) 0.083% Inhalation Nebu Soln Take 3 mL (2.5 mg total) by nebulization every 6 (six) hours as needed for Wheezing. 50 each 0    dicyclomine 10 MG Oral Cap Take 1 capsule (10 mg total) by mouth 2 (two) times daily as needed. 60 capsule 1    Insulin Pen Needle (BD PEN NEEDLE ORIGINAL U/F) 29G X 12.7MM Does not apply Misc Dx. E11.9. Checks q12hrs. BD ultrafine MINI. 100 each 6    Alcohol Swabs Does not apply Pads Code: E11.9.  Checks twice daily. 100 each 11    ACCU-CHEK FASTCLIX LANCETS Does not apply Misc CHECK EVERY MORNING AND EVENING 204 each 11    Omega-3-acid Ethyl Esters (LOVAZA) 1 G Oral Cap Take 1 capsule (1 g total) by mouth 2 (two) times daily with meals.       Allergies:Allergies[1]    Objective:   Physical Exam  Vitals and nursing note reviewed.   Constitutional:       Appearance: She is well-developed.   HENT:      Head: Normocephalic and atraumatic.      Right Ear: External ear normal.      Left Ear: External ear normal.      Nose: Nose normal.   Eyes:      Conjunctiva/sclera: Conjunctivae normal.      Pupils: Pupils are equal, round, and reactive to light.   Cardiovascular:      Rate and Rhythm: Normal rate and regular rhythm.      Heart sounds: Normal heart sounds.   Pulmonary:      Effort: Pulmonary effort is normal.      Breath sounds: Normal breath sounds.   Abdominal:      General: Bowel sounds are normal.      Palpations: Abdomen is soft.   Genitourinary:     Vagina: Normal.   Musculoskeletal:         General: Tenderness present. Normal range of motion.      Cervical back: Normal range of motion and neck supple.      Comments: Rt foot pain on top of foot   Skin:     General: Skin is warm and dry.   Neurological:      Mental Status: She is alert and oriented to person, place, and time.      Deep Tendon Reflexes: Reflexes are normal and symmetric.   Psychiatric:          Behavior: Behavior normal.         Thought Content: Thought content normal.         Judgment: Judgment normal.         Assessment & Plan:   1. Foot pain, right -will order an x-ray to make sure no fracture in the meantime take medication as needed for pain should get better with time if not better may need to follow-up with podiatry   2. Psoriasis will refill the steroid solution       No orders of the defined types were placed in this encounter.      Meds This Visit:  Requested Prescriptions     Signed Prescriptions Disp Refills    clobetasol 0.05 % External Solution 1 each 0     Sig: Apply 1 Application topically 2 (two) times daily.       Imaging & Referrals:  XR FOOT (2 VIEW), RIGHT (CPT=73620)         [1]   Allergies  Allergen Reactions    Amlodipine SHORTNESS OF BREATH     Chest pressure.  Patient does not recall having any allergies

## 2025-01-08 ENCOUNTER — TELEPHONE (OUTPATIENT)
Dept: INTERNAL MEDICINE CLINIC | Facility: CLINIC | Age: 75
End: 2025-01-08

## 2025-01-08 DIAGNOSIS — M79.671 PAIN IN RIGHT FOOT: Primary | ICD-10-CM

## 2025-01-08 NOTE — TELEPHONE ENCOUNTER
Patient is requesting referral.     Name of specialist and specialty department : Podiatrist   Reason for visit with the specialist: consult - right foot pain   Address of the specialist office:n/a   Appointment date n/a           CSS informed patient the turnaround time for referral is 5-7 business days.  Patient was informed to check their [employer] account for referral status.

## 2025-01-08 NOTE — TELEPHONE ENCOUNTER
Dr. Valenzuela,     Patient called requesting referral to an in  for foot pain.     Pended referral please review diagnosis and sign off if you agree.    Thank you.  Leanne Loya  Managed [facility]

## 2025-01-17 ENCOUNTER — TELEPHONE (OUTPATIENT)
Dept: PULMONOLOGY | Facility: CLINIC | Age: 75
End: 2025-01-17

## 2025-01-17 NOTE — TELEPHONE ENCOUNTER
Patient called to speak with a nurse in regards to a cough that she's been having. Patient would like to know if there is anything she can take or if she can make an appointment to see Dr. Quinonez, Please call.

## 2025-01-21 ENCOUNTER — OFFICE VISIT (OUTPATIENT)
Dept: PULMONOLOGY | Facility: CLINIC | Age: 75
End: 2025-01-21

## 2025-01-21 ENCOUNTER — HOSPITAL ENCOUNTER (OUTPATIENT)
Dept: GENERAL RADIOLOGY | Facility: HOSPITAL | Age: 75
Discharge: HOME | End: 2025-01-21
Attending: INTERNAL MEDICINE
Payer: MEDICARE

## 2025-01-21 VITALS
WEIGHT: 243 LBS | HEIGHT: 69 IN | BODY MASS INDEX: 35.99 KG/M2 | SYSTOLIC BLOOD PRESSURE: 121 MMHG | OXYGEN SATURATION: 99 % | DIASTOLIC BLOOD PRESSURE: 60 MMHG | HEART RATE: 76 BPM

## 2025-01-21 DIAGNOSIS — R05.3 CHRONIC COUGH: ICD-10-CM

## 2025-01-21 DIAGNOSIS — I82.532 CHRONIC DEEP VEIN THROMBOSIS OF LEFT POPLITEAL VEIN (HCC): Primary | ICD-10-CM

## 2025-01-21 PROCEDURE — 71046 X-RAY EXAM CHEST 2 VIEWS: CPT | Performed by: INTERNAL MEDICINE

## 2025-01-21 PROCEDURE — 99213 OFFICE O/P EST LOW 20 MIN: CPT | Performed by: INTERNAL MEDICINE

## 2025-01-21 PROCEDURE — 3078F DIAST BP <80 MM HG: CPT | Performed by: INTERNAL MEDICINE

## 2025-01-21 PROCEDURE — 3074F SYST BP LT 130 MM HG: CPT | Performed by: INTERNAL MEDICINE

## 2025-01-21 PROCEDURE — 3008F BODY MASS INDEX DOCD: CPT | Performed by: INTERNAL MEDICINE

## 2025-01-21 PROCEDURE — 1159F MED LIST DOCD IN RCRD: CPT | Performed by: INTERNAL MEDICINE

## 2025-01-21 NOTE — PROGRESS NOTES
The patient is a 74-year-old female who I know well from prior evaluation comes in now for follow-up.  She notes that she is doing better.  She has responded positively to Advair.  She still has some lingering cough.    Review of Systems:  Vision normal. Ear nose and throat normal. Bowel normal. Bladder function normal. No depression. No thyroid disease. No lymphatic system concerns.  No rash. Muscles and joints unremarkable. No weight loss no weight gain.    Physical Examination:  Vital signs normal. HEENT examination is unremarkable with pupils equal round and reactive to light and accommodation. Neck without adenopathy, thyromegaly, JVD nor bruit. Lungs clear to auscultation and percussion. Cardiac regular rate and rhythm no murmur. Abdomen nontender, without hepatosplenomegaly and no mass appreciable. Extremities and Musculoskeletal without clubbing cyanosis nor edema, and mobility acceptable. Neurologic grossly intact with symmetric tone and strength and reflex.    Assessment and plan:  1.  Dyspnea with subacute cough-asthmatic component which is now improved.  Still with some lingering cough.    Recommendations: Continue with Advair, will get a chest x-ray.  See me in the office with her scheduled appointment in November.    2.  History of DVT with antiphospholipid syndrome-remain on warfarin.

## 2025-01-27 ENCOUNTER — ANTI-COAG VISIT (OUTPATIENT)
Dept: ANTICOAGULATION | Facility: CLINIC | Age: 75
End: 2025-01-27

## 2025-01-27 DIAGNOSIS — Z79.01 MONITORING FOR LONG-TERM ANTICOAGULANT USE: ICD-10-CM

## 2025-01-27 DIAGNOSIS — Z79.01 LONG TERM (CURRENT) USE OF ANTICOAGULANTS: ICD-10-CM

## 2025-01-27 DIAGNOSIS — D68.61 ANTIPHOSPHOLIPID SYNDROME (HCC): Primary | ICD-10-CM

## 2025-01-27 DIAGNOSIS — Z51.81 MONITORING FOR LONG-TERM ANTICOAGULANT USE: ICD-10-CM

## 2025-01-27 DIAGNOSIS — I82.532 CHRONIC DEEP VEIN THROMBOSIS OF LEFT POPLITEAL VEIN (HCC): ICD-10-CM

## 2025-01-27 LAB
INR: 2.5 (ref 2–3)
TEST STRIP EXPIRATION DATE: ABNORMAL DATE

## 2025-01-27 PROCEDURE — 85610 PROTHROMBIN TIME: CPT | Performed by: FAMILY MEDICINE

## 2025-01-27 PROCEDURE — 93793 ANTICOAG MGMT PT WARFARIN: CPT | Performed by: FAMILY MEDICINE

## 2025-01-27 NOTE — PROGRESS NOTES
Face-to-Face  / INR 2.5,  therapeutic. (Goal 2.5 ) TTR 76.2 %     Etiology: stable.    PLAN: continue the current dose.    Recheck INR 4 weeks.    Pt reports:    No sign of unusual bruising or bleeding.  Any missed doses: No   Medications changes: No  Diet changes:  No    Contacted  Julia verbalized understanding and agreement.    WARFARIN Plan per protocol: 7.5 mg every Tue, Thu; 5 mg all other days

## 2025-02-03 ENCOUNTER — OFFICE VISIT (OUTPATIENT)
Dept: INTERNAL MEDICINE CLINIC | Facility: CLINIC | Age: 75
End: 2025-02-03

## 2025-02-03 VITALS
BODY MASS INDEX: 36.29 KG/M2 | TEMPERATURE: 98 F | SYSTOLIC BLOOD PRESSURE: 143 MMHG | HEIGHT: 69 IN | HEART RATE: 62 BPM | WEIGHT: 245 LBS | OXYGEN SATURATION: 100 % | DIASTOLIC BLOOD PRESSURE: 73 MMHG

## 2025-02-03 DIAGNOSIS — Z79.4 TYPE 2 DIABETES MELLITUS WITH STAGE 3 CHRONIC KIDNEY DISEASE, WITH LONG-TERM CURRENT USE OF INSULIN, UNSPECIFIED WHETHER STAGE 3A OR 3B CKD (HCC): ICD-10-CM

## 2025-02-03 DIAGNOSIS — M85.89 OSTEOPENIA OF MULTIPLE SITES: ICD-10-CM

## 2025-02-03 DIAGNOSIS — Z71.85 VACCINE COUNSELING: ICD-10-CM

## 2025-02-03 DIAGNOSIS — G62.9 NEUROPATHY: ICD-10-CM

## 2025-02-03 DIAGNOSIS — E53.8 B12 DEFICIENCY: Primary | ICD-10-CM

## 2025-02-03 DIAGNOSIS — N18.30 TYPE 2 DIABETES MELLITUS WITH STAGE 3 CHRONIC KIDNEY DISEASE, WITH LONG-TERM CURRENT USE OF INSULIN, UNSPECIFIED WHETHER STAGE 3A OR 3B CKD (HCC): ICD-10-CM

## 2025-02-03 DIAGNOSIS — H61.23 BILATERAL IMPACTED CERUMEN: ICD-10-CM

## 2025-02-03 DIAGNOSIS — Z12.31 ENCOUNTER FOR SCREENING MAMMOGRAM FOR MALIGNANT NEOPLASM OF BREAST: ICD-10-CM

## 2025-02-03 DIAGNOSIS — E11.22 TYPE 2 DIABETES MELLITUS WITH STAGE 3 CHRONIC KIDNEY DISEASE, WITH LONG-TERM CURRENT USE OF INSULIN, UNSPECIFIED WHETHER STAGE 3A OR 3B CKD (HCC): ICD-10-CM

## 2025-02-03 DIAGNOSIS — I10 ESSENTIAL HYPERTENSION WITH GOAL BLOOD PRESSURE LESS THAN 130/80: ICD-10-CM

## 2025-02-03 DIAGNOSIS — E11.22 TYPE 2 DIABETES MELLITUS WITH CHRONIC KIDNEY DISEASE, WITHOUT LONG-TERM CURRENT USE OF INSULIN, UNSPECIFIED CKD STAGE (HCC): ICD-10-CM

## 2025-02-03 DIAGNOSIS — E03.9 HYPOTHYROIDISM (ACQUIRED): Chronic | ICD-10-CM

## 2025-02-03 DIAGNOSIS — N18.31 STAGE 3A CHRONIC KIDNEY DISEASE (HCC): Chronic | ICD-10-CM

## 2025-02-03 LAB
ALBUMIN SERPL-MCNC: 4.6 G/DL (ref 3.2–4.8)
ALBUMIN/GLOB SERPL: 1.4 {RATIO} (ref 1–2)
ALP LIVER SERPL-CCNC: 82 U/L
ALT SERPL-CCNC: 18 U/L
ANION GAP SERPL CALC-SCNC: 7 MMOL/L (ref 0–18)
AST SERPL-CCNC: 25 U/L (ref ?–34)
BASOPHILS # BLD AUTO: 0.07 X10(3) UL (ref 0–0.2)
BASOPHILS NFR BLD AUTO: 0.7 %
BILIRUB SERPL-MCNC: 0.2 MG/DL (ref 0.2–1.1)
BUN BLD-MCNC: 34 MG/DL (ref 9–23)
BUN/CREAT SERPL: 23.4 (ref 10–20)
CALCIUM BLD-MCNC: 9.2 MG/DL (ref 8.7–10.4)
CHLORIDE SERPL-SCNC: 102 MMOL/L (ref 98–112)
CHOLEST SERPL-MCNC: 167 MG/DL (ref ?–200)
CO2 SERPL-SCNC: 30 MMOL/L (ref 21–32)
CREAT BLD-MCNC: 1.45 MG/DL
CREAT UR-SCNC: 74.9 MG/DL
DEPRECATED HBV CORE AB SER IA-ACNC: 91 NG/ML
DEPRECATED RDW RBC AUTO: 44.2 FL (ref 35.1–46.3)
EGFRCR SERPLBLD CKD-EPI 2021: 38 ML/MIN/1.73M2 (ref 60–?)
EOSINOPHIL # BLD AUTO: 0.23 X10(3) UL (ref 0–0.7)
EOSINOPHIL NFR BLD AUTO: 2.3 %
ERYTHROCYTE [DISTWIDTH] IN BLOOD BY AUTOMATED COUNT: 12.7 % (ref 11–15)
FASTING PATIENT LIPID ANSWER: YES
FASTING STATUS PATIENT QL REPORTED: YES
GLOBULIN PLAS-MCNC: 3.2 G/DL (ref 2–3.5)
GLUCOSE BLD-MCNC: 78 MG/DL (ref 70–99)
HCT VFR BLD AUTO: 36.5 %
HDLC SERPL-MCNC: 55 MG/DL (ref 40–59)
HGB BLD-MCNC: 12.2 G/DL
IMM GRANULOCYTES # BLD AUTO: 0.03 X10(3) UL (ref 0–1)
IMM GRANULOCYTES NFR BLD: 0.3 %
IRON SATN MFR SERPL: 20 %
IRON SERPL-MCNC: 74 UG/DL
LDLC SERPL CALC-MCNC: 91 MG/DL (ref ?–100)
LYMPHOCYTES # BLD AUTO: 1.87 X10(3) UL (ref 1–4)
LYMPHOCYTES NFR BLD AUTO: 19 %
MCH RBC QN AUTO: 31.9 PG (ref 26–34)
MCHC RBC AUTO-ENTMCNC: 33.4 G/DL (ref 31–37)
MCV RBC AUTO: 95.3 FL
MICROALBUMIN UR-MCNC: 0.3 MG/DL
MICROALBUMIN/CREAT 24H UR-RTO: 4 UG/MG (ref ?–30)
MONOCYTES # BLD AUTO: 1.03 X10(3) UL (ref 0.1–1)
MONOCYTES NFR BLD AUTO: 10.5 %
NEUTROPHILS # BLD AUTO: 6.59 X10 (3) UL (ref 1.5–7.7)
NEUTROPHILS # BLD AUTO: 6.59 X10(3) UL (ref 1.5–7.7)
NEUTROPHILS NFR BLD AUTO: 67.2 %
NONHDLC SERPL-MCNC: 112 MG/DL (ref ?–130)
OSMOLALITY SERPL CALC.SUM OF ELEC: 294 MOSM/KG (ref 275–295)
PLATELET # BLD AUTO: 269 10(3)UL (ref 150–450)
POTASSIUM SERPL-SCNC: 4.8 MMOL/L (ref 3.5–5.1)
PROT SERPL-MCNC: 7.8 G/DL (ref 5.7–8.2)
RBC # BLD AUTO: 3.83 X10(6)UL
SODIUM SERPL-SCNC: 139 MMOL/L (ref 136–145)
TOTAL IRON BINDING CAPACITY: 370 UG/DL (ref 250–425)
TRANSFERRIN SERPL-MCNC: 276 MG/DL (ref 250–380)
TRIGL SERPL-MCNC: 121 MG/DL (ref 30–149)
TSI SER-ACNC: 1.51 UIU/ML (ref 0.55–4.78)
VIT B12 SERPL-MCNC: 586 PG/ML (ref 211–911)
VLDLC SERPL CALC-MCNC: 20 MG/DL (ref 0–30)
WBC # BLD AUTO: 9.8 X10(3) UL (ref 4–11)

## 2025-02-03 RX ORDER — ALENDRONATE SODIUM 70 MG/1
70 TABLET ORAL
Qty: 12 TABLET | Refills: 3 | Status: SHIPPED | OUTPATIENT
Start: 2025-02-03

## 2025-02-03 NOTE — PATIENT INSTRUCTIONS
ASSESSMENT/PLAN:     Encounter Diagnoses   Name Primary?    B12 deficiency Check blood.    Yes    Stage 3a chronic kidney disease (HCC)No motrin, ibuprofen, advil, alleve, naprosyn  with these medications.         Hypothyroidism (acquired) Check blood.        Type 2 diabetes mellitus with stage 3 chronic kidney disease, with long-term current use of insulin, unspecified whether stage 3a or 3b CKD (HCC) Stable. Check blood and urine.        Vaccine counseling Recommend RSV vaccine.  Would check on insurance coverage at local pharmacy.  RSV is a one-time vaccine as of now.  Potential side effects with RSV vaccine are low-grade fevers body aches etc.  Also would recommend flu shot.  Separate from RSV vaccine by 2 weeks.  Also would recommend new COVID-vaccine.  Separate from other 2 vaccines by 2 weeks. Respiratory Syncytial Virus (RSV)  What is respiratory syncytial virus?   Respiratory syncytial virus, or RSV, is a common virus that affects the lungs and breathing passages.  What are the symptoms?   Symptoms of RSV typically include mild cold-like symptoms including runny nose, sore throat, cough, and headache. Sometimes RSV can lead to serious complications including:   Pneumonia (infection of the lungs)  More severe symptoms for people with asthma  More severe symptoms for people with chronic obstructive pulmonary disease (COPD) (a chronic disease of the lungs that makes it hard to breathe)  Congestive heart failure (when the heart can't pump blood and oxygen to the body's tissues)  Older adults who get very sick from RSV may need to be hospitalized. Some may even die. Older adults are at greater risk than young adults for serious complications from RSV because the immune system weakens as one gets older.  Who is most at risk for complications?  RSV infections can be dangerous for infants and certain adults. Those at highest risk for severe RSV infection include:  Infants under 1 year of age  Older adults,  especially those 65 years and older  Adults with chronic heart or lung disease  Adults with weakened immune systems  How is it treated?  There is no specific treatment for RSV infection, though researchers are working to develop vaccines and antivirals (medicines that fight viruses).  How can I protect others if I have RSV?  RSV season occurs each year in most regions of the [place] during fall, winter, and spring. If you are at high risk for severe RSV infection, or if you interact with an infant or older adult, you should take extra care to stay healthy:   Wash your hands often - Wash your hands often with soap and water for 20 seconds. If soap and water are not available, use an alcohol-based hand . Washing your hands will help protect you from germs.  Keep your hands off your face - Avoid touching your eyes, nose, and mouth with unwashed hands. Germs spread this way.   Avoid close contact with sick people - Avoid close contact, such as kissing, and sharing cups or eating utensils with people who have cold-like symptoms.  Cover your coughs and sneezes - Cover your mouth and nose with a tissue when coughing or sneezing. Throw the tissue in the trash afterward.  Clean and disinfect surfaces - Clean and disinfect surfaces that people frequently touch, such as doorknobs. When people infected with RSV touch surfaces and objects, they can leave behind germs. Also, when they cough or sneeze, droplets containing germs can land on surfaces and objects.   Stay home when you are sick - If possible, stay home from work, school, and public areas when you are sick. This will help protect others from catching your illness.    Centers for Disease Control & Prevention (CDC)  RSV Symptoms & Care, https://www.cdc.gov/rsv/factsheet-older-adults.pdf           Osteopenia of multiple sites DW pt. of options. Try fosfamx 70 mg a week if OK with dentist. Discussed with patient of side effects and use of these medications.  Check  dexa 7-15-25.  lifelong physical activity at all ages is strongly endorsed by the National Osteoporosis [employer]. Exercise recommendations generally should include weight-bearing, muscle-strengthening, and balance training exercises for 30 minutes 5 days per week or 75 minutes twice weekly, often consistent with other general health recommendations.   Weight Bearing  There are two types of osteoporosis exercises that are important for building and maintaining bone density: weight-bearing and muscle-strengthening exercises.  Weight-bearing Exercises  These exercises include activities that make you move against gravity while staying upright. Weight-bearing exercises can be high-impact or low-impact.  High-impact weight-bearing exercises help build bones and keep them strong. If you have broken a bone due to osteoporosis or are at risk of breaking a bone, you may need to avoid high-impact exercises. If you’re not sure, you should check with your healthcare provider.  Examples of high-impact weight-bearing exercises are:  Dancing   Doing high-impact aerobics   Hiking   Jogging/running   Jumping Rope   Stair climbing   Tennis  Low-impact weight-bearing exercises can also help keep bones strong and are a safe alternative if you cannot do high-impact exercises. Examples of low-impact weight-bearing exercises are:  Using elliptical training machines   Doing low-impact aerobics   Using stair-step machines   Fast walking on a treadmill or outside            Neuropathy Check blood. Maybe from diabetes.       Take calcium 600 every 12 hrs. With vitamin D 400 IU every  12 hrs. Excerise at least 30 minutes 3-4 times a week. May use calcium citrate as opposed to calcium carbonate which may be better absorbed in the setting of PPI use.  The preferred calcium supplement for people at risk of stone formation is calcium citrate because it helps to increase urinary citrate excretion. We recommend a dose of 200-400 mg if dietary  calcium cannot be increased.     Screening. Check mammogram. Continue self breast exam every month.      Anxiety. [place]ou Rules for Coping with Panic      1) Remember that although your feelings and symptoms are frightening, they are neither dangerous nor harmful.  2) Understand that what you are experiencing is merely an exaggeration of your normal reactions to stress.  3) Do not fight your feelings or try to wish them away. The more willing you are to face them, the less intense they will become.  4) Don't add to your panic by thinking about what might happen. If you finding yourself asking, 'What if?' tell yourself, 'So what!'  5) Stay in the present. Be aware of what is happening to you rather than concern yourself with  how much worse it might get.  6) Label your fear level from zero to 10 and watch it go up and down. Notice that it doesn't stay at a very high level for more than a few seconds.  7) When you find yourself thinking about fear, change your what if thinking. Focus on and perform some simple, manageable task.  8) Notice that when you stop thinking frightening thoughts, your anxiety fades.  9) When fear comes, accept it, don't fight it.  Wait and give it some time to pass. Don't try to escape from it.  10)  Be proud of the progress you've made. Think about how good you will feel when the anxiety has passed and you are in total control and at peace. Decrease lexapro 5 mg every other night til OV.     Htn. White. Careful with diet and excercise at least 30 minutes 3-4 times a week. Check blood pressures at different times on different days. Can purchase own blood pressure monitor. If not, check at local pharmacy. Bake foods more and grill occasionally. Avoid fried foods. No salt. Use other seasonings.      Cerumen impaction. Bilat ears irrigated with warm H20 and H2O2. Large amt of wax removed from both ears.  Ear canals clear bilat and TMs intact without erythema.  Pt denies dizziness or vertigo. Pt  tolerate procedure well. Aftercare instructions given. SP removal with irrirgation/lavage L ear. No comps. After procedure era checked.      Patient explained reason for procedure and consented    Prepped and draped area.     Removed significant ear wax.     Follow up appointment PRN.      Orders Placed This Encounter   Procedures    Hemoglobin A1C    Microalb/Creat Ratio, Random Urine    Vitamin B12    TSH W Reflex To Free T4    Ferritin    CBC With Differential With Platelet    Iron And Tibc    Remove Impacted Cerumen - Bilateral    Removal Impacted Cerumen Using Irrigation/Lavage, Bilateral       Meds This Visit:  Requested Prescriptions     Signed Prescriptions Disp Refills    alendronate 70 MG Oral Tab 12 tablet 3     Sig: Take 1 tablet (70 mg total) by mouth every 7 days.       Imaging & Referrals:  XR DEXA BONE DENSITOMETRY (CPT=77080)      Has set phil't.

## 2025-02-03 NOTE — PROGRESS NOTES
HPI:    Patient ID: Julia Hernandez is a 74 year old female.    Chief Complaint   Patient presents with   • Follow - Up     Patient states she is here for a follow up on foot        Patient here for follow up of Diabetes.  Has been taking medications regularly.    Checks sugars 1 times daily -2 days pre bed. Pre bed: 130's.   Fasting sugars average 110's -130's. No lows.   Watches diabetic diet, low salt.  Diabetic Flow sheet      2/3/2025     6:34 PM 2/3/2025     1:29 PM 2/3/2025     1:13 PM 1/21/2025     3:23 PM   DIABETIC FLOWSHEET (EE)   BMI  36.18 kg/m2  35.88 kg/m2   Valsartan TAKE ONE TABLET BY MOUTH TWICE DAILY (160 MG TABS) TAKE ONE TABLET BY MOUTH TWICE DAILY (160 MG TABS)  TAKE ONE TABLET BY MOUTH TWICE DAILY (160 MG TABS)  TAKE ONE TABLET BY MOUTH TWICE DAILY (160 MG TABS)    Weight (enc vitals)  245 lb  243 lb   BP (enc vitals)  143/73  121/60   PHQ2 Score   0       HISTORY OF DIABETES COMPLICATIONS: :  History of Retinopathy: No.   History of Neuropathy: No.   History of Nephropathy: Yes.      ASSOCIATED COMPLICATIONS:   HTN: Yes.   Hyperlipidemia: Yes.   Coronary Artery Disease:  CAD,  HF, PAD  Cerebrovascular Disease: No.      MEALS:  2 meals a day  Cooks at home    Hypertension  Patient is here for follow up of hypertension. BP at home: 120/60's. Different times.   Has been compliant with medications.  Exercise level: not active (not during winter)  and has been following low salt diet.  Weight has been stable. Cooks with occ. HImalayan sea salt.   Wt Readings from Last 3 Encounters:   02/03/25 245 lb (111.1 kg)   01/21/25 243 lb (110.2 kg)   01/02/25 246 lb (111.6 kg)     BP Readings from Last 3 Encounters:   02/03/25 143/73   01/21/25 121/60   01/02/25 132/78     Labs:   Lab Results   Component Value Date/Time    GLU 79 08/27/2024 07:44 AM     08/27/2024 07:44 AM    K 4.5 08/27/2024 07:44 AM     08/27/2024 07:44 AM    CO2 31.0 08/27/2024 07:44 AM    CREATSERUM 1.29 (H)  10/03/2024 08:34 AM    CA 9.6 08/27/2024 07:44 AM    AST 20 10/03/2024 08:34 AM    ALT 13 10/03/2024 08:34 AM    TSH 0.678 02/15/2024 02:35 PM    T4F 1.6 02/15/2024 02:35 PM        Lab Results   Component Value Date/Time    CHOLEST 162 08/27/2024 07:44 AM    HDL 56 08/27/2024 07:44 AM    TRIG 82 08/27/2024 07:44 AM    LDL 91 08/27/2024 07:44 AM    NONHDLC 106 08/27/2024 07:44 AM            Wt Readings from Last 3 Encounters:   02/03/25 245 lb (111.1 kg)   01/21/25 243 lb (110.2 kg)   01/02/25 246 lb (111.6 kg)     BP Readings from Last 3 Encounters:   02/03/25 143/73   01/21/25 121/60   01/02/25 132/78     Labs:   Lab Results   Component Value Date/Time    GLU 79 08/27/2024 07:44 AM     08/27/2024 07:44 AM    K 4.5 08/27/2024 07:44 AM     08/27/2024 07:44 AM    CO2 31.0 08/27/2024 07:44 AM    CREATSERUM 1.29 (H) 10/03/2024 08:34 AM    CA 9.6 08/27/2024 07:44 AM    AST 20 10/03/2024 08:34 AM    ALT 13 10/03/2024 08:34 AM    TSH 0.678 02/15/2024 02:35 PM    T4F 1.6 02/15/2024 02:35 PM        Lab Results   Component Value Date/Time    CHOLEST 162 08/27/2024 07:44 AM    HDL 56 08/27/2024 07:44 AM    TRIG 82 08/27/2024 07:44 AM    LDL 91 08/27/2024 07:44 AM    NONHDLC 106 08/27/2024 07:44 AM          Feels in a good place.  Would like to decrease the antianxiety medicines.  Is just concerned about some of the medications.  Denies side effect seems to be helping.    Foot Pain   The pain is present in the left foot and right foot. This is a new problem. The current episode started more than 1 month ago (More burning BL feet). There has been no history of extremity trauma. The problem has been unchanged. The quality of the pain is described as burning. The pain is at a severity of 6/10. The pain is moderate. Associated symptoms include itching. Pertinent negatives include no fever, inability to bear weight, joint locking, joint swelling, limited range of motion, numbness, stiffness or tingling. She has tried  nothing for the symptoms. Family history does not include gout or rheumatoid arthritis. Her past medical history is significant for diabetes. There is no history of gout, osteoarthritis or rheumatoid arthritis.   Itching  This is a recurrent problem. The current episode started in the past 7 days. The problem occurs constantly. The problem has been unchanged. Pertinent negatives include no abdominal pain, chest pain, chills, congestion, coughing, diaphoresis, fatigue, fever, headaches, numbness, rash, sore throat or weakness. Nothing aggravates the symptoms. She has tried nothing for the symptoms.         Review of Systems   Constitutional:  Negative for activity change, appetite change, chills, diaphoresis, fatigue, fever and unexpected weight change.   HENT:  Negative for congestion, dental problem, drooling, ear discharge, ear pain, facial swelling, hearing loss, mouth sores, nosebleeds, postnasal drip, rhinorrhea, sinus pressure, sinus pain, sneezing, sore throat, tinnitus, trouble swallowing and voice change.    Eyes:  Negative for photophobia, pain, discharge, redness, itching and visual disturbance.   Respiratory:  Negative for apnea, cough, choking, chest tightness, shortness of breath, wheezing and stridor.    Cardiovascular:  Negative for chest pain, palpitations and leg swelling.   Gastrointestinal:  Negative for abdominal distention and abdominal pain.   Endocrine: Negative for cold intolerance, heat intolerance, polydipsia, polyphagia and polyuria.   Genitourinary:  Negative for dysuria.   Musculoskeletal:  Negative for gout and stiffness.   Skin:  Positive for itching. Negative for rash.   Allergic/Immunologic: Negative for environmental allergies.   Neurological:  Negative for dizziness, tingling, tremors, seizures, syncope, facial asymmetry, speech difficulty, weakness, light-headedness, numbness and headaches.   Psychiatric/Behavioral:  Negative for agitation, behavioral problems, confusion,  decreased concentration, dysphoric mood, hallucinations, self-injury, sleep disturbance and suicidal ideas. The patient is not nervous/anxious and is not hyperactive.    All other systems reviewed and are negative.        Current Outpatient Medications   Medication Sig Dispense Refill   • alendronate 70 MG Oral Tab Take 1 tablet (70 mg total) by mouth every 7 days. 12 tablet 3   • Dextromethorphan-guaiFENesin (TUSSIN COUGH DM OR) Take by mouth.     • clobetasol 0.05 % External Solution Apply 1 Application topically 2 (two) times daily. 1 each 0   • glipiZIDE 10 MG Oral Tab Take 1 tablet (10 mg total) by mouth 2 (two) times daily before meals. 180 tablet 3   • metoprolol tartrate 100 MG Oral Tab Take one tablet by mouth in the morning and a half  tablet in the afternoon 90 tablet 0   • fluticasone-salmeterol (WIXELA INHUB) 250-50 MCG/ACT Inhalation Aerosol Powder, Breath Activated Inhale 1 puff into the lungs 2 (two) times daily. Follow with gargle rinse and spit 1 each 5   • fluticasone furoate-vilanterol (BREO ELLIPTA) 100-25 MCG/ACT Inhalation Aerosol Powder, Breath Activated Inhale 1 puff into the lungs daily. Rinse your mouth with water without swallowing after using BREO to help reduce your chance of getting thrush. 1 each 5   • simvastatin 20 MG Oral Tab Take 1 tablet (20 mg total) by mouth twice a week. 24 tablet 3   • SPIRONOLACTONE 25 MG Oral Tab TOME 1 TABLETA DIARIAMENTE 90 tablet 1   • insulin glargine (LANTUS SOLOSTAR) 100 UNIT/ML Subcutaneous Solution Pen-injector INJECT 45 UNITS INTHE MORNING AND 5 UNITS IN THE EVENING 45 mL 3   • Betamethasone Dipropionate Aug 0.05 % External Cream Apply 30 g topically 2 (two) times daily. 30 g 2   • warfarin 5 MG Oral Tab Take as directed by INR clinic or take 1 AND 1/2 TABLETS ON  Tuesdays & Thursdays, take 1 TABLET all other days 110 tablet 11   • albuterol 108 (90 Base) MCG/ACT Inhalation Aero Soln Inhale 1 puff into the lungs every 6 (six) hours as needed for  Wheezing. 1 each 1   • indapamide 2.5 MG Oral Tab Take 1 tablet (2.5 mg total) by mouth daily and every night. 180 tablet 3   • Insulin Pen Needle (TRUEPLUS 5-BEVEL PEN NEEDLES) 32G X 4 MM Does not apply Misc Injects at bedtime 90 each 3   • enoxaparin 100 MG/ML Injection Solution Prefilled Syringe Inject 1 mL (100 mg total) into the skin 2 (two) times daily. 4 each 0   • levothyroxine 88 MCG Oral Tab Take 1 tablet (88 mcg total) by mouth before breakfast. 90 tablet 3   • levothyroxine 100 MCG Oral Tab Take 1 tablet (100 mcg total) by mouth before breakfast. 90 tablet 3   • valsartan 160 MG Oral Tab TAKE ONE TABLET BY MOUTH TWICE DAILY 180 tablet 3   • Glucose Blood (ACCU-CHEK GUIDE) In Vitro Strip 1 strip by In Vitro route 2 (two) times daily. 200 strip 3   • fluticasone furoate-vilanterol (BREO ELLIPTA) 100-25 MCG/ACT Inhalation Aerosol Powder, Breath Activated Inhale 1 puff into the lungs daily. Rinse your mouth with water without swallowing after using BREO to help reduce your chance of getting thrush. 1 each 5   • escitalopram 5 MG Oral Tab Take 1 tablet (5 mg total) by mouth nightly. 90 tablet 3   • Secukinumab, 300 MG Dose, (COSENTYX SENSOREADY, 300 MG,) 150 MG/ML Subcutaneous Solution Auto-injector Inject 300 mg into the skin every 28 days. 2 each 2   • minoxidil 2.5 MG Oral Tab Take 2 tablets (5 mg total) by mouth 2 (two) times daily. (Patient taking differently: Take 1 tablet (2.5 mg total) by mouth 2 (two) times daily.) 360 tablet 3   • PEG 3350-KCl-Na Bicarb-NaCl (TRILYTE) 420 g Oral Recon Soln Take prep as directed by gastro office. May substitute with Trilyte/generic equivalent if needed. 1 each 0   • albuterol (2.5 MG/3ML) 0.083% Inhalation Nebu Soln Take 3 mL (2.5 mg total) by nebulization every 6 (six) hours as needed for Wheezing. 50 each 0   • dicyclomine 10 MG Oral Cap Take 1 capsule (10 mg total) by mouth 2 (two) times daily as needed. 60 capsule 1   • Insulin Pen Needle (BD PEN NEEDLE ORIGINAL  U/F) 29G X 12.7MM Does not apply Misc Dx. E11.9. Checks q12hrs. BD ultrafine MINI. 100 each 6   • Alcohol Swabs Does not apply Pads Code: E11.9.  Checks twice daily. 100 each 11   • ACCU-CHEK FASTCLIX LANCETS Does not apply Misc CHECK EVERY MORNING AND EVENING 204 each 11   • Omega-3-acid Ethyl Esters (LOVAZA) 1 G Oral Cap Take 1 capsule (1 g total) by mouth 2 (two) times daily with meals.       Allergies:Allergies[1]    HISTORY:  Past Medical History:   • Appendicitis   • Cholelithiasis   • Deep vein thrombosis (HCC)    L leg    • Diabetes (HCC)   • Disorder of thyroid   • Essential hypertension   • Goiter    Multinodular goiter S/P thyroidectomy.    • High blood pressure   • High cholesterol   • Osteopenia   • Vertigo, aural      Past Surgical History:   Procedure Laterality Date   • Appendectomy  1993   • Cataract extraction w/  intraocular lens implant Right 1988    in [place]   • Cholecystectomy  2006   • Colonoscopy N/A 8/12/2020    Procedure: COLONOSCOPY;  Surgeon: José Antonio Fletcher MD;  Location: [facility] ENDOSCOPY   • Colonoscopy N/A 6/14/2024    Procedure: COLONOSCOPY;  Surgeon: José Antonio Fletcher MD;  Location: [facility] ENDOSCOPY   • Electrocardiogram, complete  01/03/2014    SCANNED TO MEDIA TAB - 01/03/2014   • Fracture surgery Left     LEFT FOOT SURGERY WITH HARDWARE   • Knee scope,abrasn arthroplasty Right 10-4-16   • Thyroidectomy  08/17/15    TOTAL   • Tubal ligation        Family History   Problem Relation Age of Onset   • Diabetes Mother    • Hypertension Mother    • Dementia Mother         Alzheimer's Disease   • Diabetes Brother    • Lipids Brother    • Hypertension Brother    • Diabetes Paternal Uncle    • Heart Disease Father         CAD   • Diabetes Other    • Dementia Other    • No Known Problems Self    • No Known Problems Sister    • No Known Problems Daughter    • No Known Problems Maternal Grandmother    • No Known Problems Paternal Grandmother    • No Known Problems Maternal Aunt    • No Known  Problems Paternal Aunt    • No Known Problems Maternal Cousin Female    • No Known Problems Maternal Cousin Male    • No Known Problems Paternal Cousin Female    • No Known Problems Paternal Cousin Male    • Colon Cancer Son 43   • Glaucoma Neg    • Macular degeneration Neg    • Breast Cancer Neg    • Ovarian Cancer Neg    • DCIS Neg    • LCIS Neg    • BRCA gene + Neg    • [employer] Descent Neg       Social History:   Social History     Socioeconomic History   • Marital status:    • Number of children: 4   Tobacco Use   • Smoking status: Never     Passive exposure: Never   • Smokeless tobacco: Never   Vaping Use   • Vaping status: Never Used   Substance and Sexual Activity   • Alcohol use: No   • Drug use: No   • Sexual activity: Yes     Partners: Male   Other Topics Concern   • Caffeine Concern Yes     Comment: Coffee, tea - 2 cups per day    • Reaction to local anesthetic No        PHYSICAL EXAM:   /73 (BP Location: Right arm, Patient Position: Sitting, Cuff Size: large)   Pulse 62   Temp 98 °F (36.7 °C) (Oral)   Ht 5' 9\" (1.753 m)   Wt 245 lb (111.1 kg)   SpO2 100%   BMI 36.18 kg/m²   BP Readings from Last 3 Encounters:   02/03/25 143/73   01/21/25 121/60   01/02/25 132/78     Wt Readings from Last 3 Encounters:   02/03/25 245 lb (111.1 kg)   01/21/25 243 lb (110.2 kg)   01/02/25 246 lb (111.6 kg)       Physical Exam  Vitals and nursing note reviewed.   Constitutional:       General: She is not in acute distress.     Appearance: Normal appearance. She is well-developed. She is not ill-appearing, toxic-appearing or diaphoretic.      Interventions: She is not intubated.  HENT:      Head:      Jaw: No trismus.      Right Ear: Tympanic membrane, ear canal and external ear normal. No decreased hearing noted. No laceration, drainage, swelling or tenderness. No middle ear effusion. There is impacted cerumen. No foreign body. No mastoid tenderness. No PE tube. No hemotympanum. Tympanic membrane  is not injected, scarred, perforated, erythematous, retracted or bulging. Tympanic membrane has normal mobility.      Left Ear: Tympanic membrane, ear canal and external ear normal. No decreased hearing noted. No laceration, drainage, swelling or tenderness.  No middle ear effusion. There is impacted cerumen. No foreign body. No mastoid tenderness. No PE tube. No hemotympanum. Tympanic membrane is not injected, scarred, perforated, erythematous, retracted or bulging. Tympanic membrane has normal mobility.      Nose:      Right Sinus: No maxillary sinus tenderness or frontal sinus tenderness.      Left Sinus: No maxillary sinus tenderness or frontal sinus tenderness.      Mouth/Throat:      Lips: Pink. No lesions.      Mouth: Mucous membranes are moist. Mucous membranes are not pale, not dry and not cyanotic. No injury, lacerations, oral lesions or angioedema.      Dentition: Does not have dentures. No dental tenderness, gingival swelling, dental abscesses or gum lesions.      Tongue: No lesions. Tongue does not deviate from midline.      Palate: No mass and lesions.      Pharynx: Oropharynx is clear. Uvula midline. No pharyngeal swelling, oropharyngeal exudate, posterior oropharyngeal erythema, uvula swelling or postnasal drip.      Tonsils: No tonsillar exudate or tonsillar abscesses.   Neck:      Thyroid: No thyroid mass or thyromegaly.      Trachea: Trachea and phonation normal.   Cardiovascular:      Rate and Rhythm: Normal rate and regular rhythm.      Pulses: Normal pulses. No decreased pulses.           Carotid pulses are 2+ on the right side and 2+ on the left side.       Radial pulses are 2+ on the right side and 2+ on the left side.        Dorsalis pedis pulses are 2+ on the right side and 2+ on the left side.        Posterior tibial pulses are 2+ on the right side and 2+ on the left side.      Heart sounds: Normal heart sounds, S1 normal and S2 normal.   Pulmonary:      Effort: Pulmonary effort is normal.  No tachypnea, bradypnea, accessory muscle usage, prolonged expiration, respiratory distress or retractions. She is not intubated.      Breath sounds: Normal breath sounds and air entry. No stridor, decreased air movement or transmitted upper airway sounds. No decreased breath sounds, wheezing, rhonchi or rales.   Chest:      Chest wall: No tenderness.   Abdominal:      General: There is no distension.      Palpations: Abdomen is soft.      Tenderness: There is no abdominal tenderness.   Musculoskeletal:      Cervical back: Neck supple.      Right lower leg: No edema.      Left lower leg: No edema.      Comments: 5 out of 5 muscle strength bilateral lower extremities.   Lymphadenopathy:      Head:      Right side of head: No submental, submandibular, preauricular, posterior auricular or occipital adenopathy.      Left side of head: No submental, submandibular, preauricular, posterior auricular or occipital adenopathy.      Cervical: No cervical adenopathy.      Right cervical: No superficial, deep or posterior cervical adenopathy.     Left cervical: No superficial, deep or posterior cervical adenopathy.      Upper Body:      Right upper body: No supraclavicular adenopathy.      Left upper body: No supraclavicular adenopathy.   Skin:     General: Skin is warm and dry.   Neurological:      Mental Status: She is alert and oriented to person, place, and time.   Psychiatric:         Attention and Perception: She is attentive. She does not perceive auditory or visual hallucinations.         Mood and Affect: Mood is not anxious, depressed or elated. Affect is not labile, blunt, flat, angry, tearful or inappropriate.         Speech: She is communicative. Speech is rapid and pressured. Speech is not delayed, slurred or tangential.         Behavior: Behavior normal. Behavior is not agitated, slowed, aggressive, withdrawn, hyperactive or combative. Behavior is cooperative.         Thought Content: Thought content normal.  Thought content is not paranoid or delusional. Thought content does not include homicidal or suicidal ideation. Thought content does not include homicidal or suicidal plan.   Bilateral barefoot skin diabetic exam is normal, visualized feet and the appearance is normal.  Bilateral monofilament sensation of both feet is abnormal.  Pulsation pedal pulse exam of both lower legs/feet is normal as well.            ASSESSMENT/PLAN:     Encounter Diagnoses   Name Primary?   • B12 deficiency Check blood.    Yes   • Stage 3a chronic kidney disease (HCC)No motrin, ibuprofen, advil, alleve, naprosyn  with these medications.        • Hypothyroidism (acquired) Check blood.       • Type 2 diabetes mellitus with stage 3 chronic kidney disease, with long-term current use of insulin, unspecified whether stage 3a or 3b CKD (HCC) Stable. Check blood and urine.       • Vaccine counseling Recommend RSV vaccine.  Would check on insurance coverage at local pharmacy.  RSV is a one-time vaccine as of now.  Potential side effects with RSV vaccine are low-grade fevers body aches etc.  Also would recommend flu shot.  Separate from RSV vaccine by 2 weeks.  Also would recommend new COVID-vaccine.  Separate from other 2 vaccines by 2 weeks. Respiratory Syncytial Virus (RSV)  What is respiratory syncytial virus?   Respiratory syncytial virus, or RSV, is a common virus that affects the lungs and breathing passages.  What are the symptoms?   Symptoms of RSV typically include mild cold-like symptoms including runny nose, sore throat, cough, and headache. Sometimes RSV can lead to serious complications including:   Pneumonia (infection of the lungs)  More severe symptoms for people with asthma  More severe symptoms for people with chronic obstructive pulmonary disease (COPD) (a chronic disease of the lungs that makes it hard to breathe)  Congestive heart failure (when the heart can't pump blood and oxygen to the body's tissues)  Older adults who get  very sick from RSV may need to be hospitalized. Some may even die. Older adults are at greater risk than young adults for serious complications from RSV because the immune system weakens as one gets older.  Who is most at risk for complications?  RSV infections can be dangerous for infants and certain adults. Those at highest risk for severe RSV infection include:  Infants under 1 year of age  Older adults, especially those 65 years and older  Adults with chronic heart or lung disease  Adults with weakened immune systems  How is it treated?  There is no specific treatment for RSV infection, though researchers are working to develop vaccines and antivirals (medicines that fight viruses).  How can I protect others if I have RSV?  RSV season occurs each year in most regions of the [place] during fall, winter, and spring. If you are at high risk for severe RSV infection, or if you interact with an infant or older adult, you should take extra care to stay healthy:   Wash your hands often - Wash your hands often with soap and water for 20 seconds. If soap and water are not available, use an alcohol-based hand . Washing your hands will help protect you from germs.  Keep your hands off your face - Avoid touching your eyes, nose, and mouth with unwashed hands. Germs spread this way.   Avoid close contact with sick people - Avoid close contact, such as kissing, and sharing cups or eating utensils with people who have cold-like symptoms.  Cover your coughs and sneezes - Cover your mouth and nose with a tissue when coughing or sneezing. Throw the tissue in the trash afterward.  Clean and disinfect surfaces - Clean and disinfect surfaces that people frequently touch, such as doorknobs. When people infected with RSV touch surfaces and objects, they can leave behind germs. Also, when they cough or sneeze, droplets containing germs can land on surfaces and objects.   Stay home when you are sick - If possible, stay home from  work, school, and public areas when you are sick. This will help protect others from catching your illness.    Centers for Disease Control & Prevention (CDC)  RSV Symptoms & Care, https://www.cdc.gov/rsv/factsheet-older-adults.pdf          • Osteopenia of multiple sites DW pt. of options. Try fosfamx 70 mg a week if OK with dentist. Discussed with patient of side effects and use of these medications.  Check dexa 7-15-25.  lifelong physical activity at all ages is strongly endorsed by the National Osteoporosis Foundation. Exercise recommendations generally should include weight-bearing, muscle-strengthening, and balance training exercises for 30 minutes 5 days per week or 75 minutes twice weekly, often consistent with other general health recommendations.   Weight Bearing  There are two types of osteoporosis exercises that are important for building and maintaining bone density: weight-bearing and muscle-strengthening exercises.  Weight-bearing Exercises  These exercises include activities that make you move against gravity while staying upright. Weight-bearing exercises can be high-impact or low-impact.  High-impact weight-bearing exercises help build bones and keep them strong. If you have broken a bone due to osteoporosis or are at risk of breaking a bone, you may need to avoid high-impact exercises. If you’re not sure, you should check with your healthcare provider.  Examples of high-impact weight-bearing exercises are:  Dancing   Doing high-impact aerobics   Hiking   Jogging/running   Jumping Rope   Stair climbing   Tennis  Low-impact weight-bearing exercises can also help keep bones strong and are a safe alternative if you cannot do high-impact exercises. Examples of low-impact weight-bearing exercises are:  Using elliptical training machines   Doing low-impact aerobics   Using stair-step machines   Fast walking on a treadmill or outside           • Neuropathy Check blood. Maybe from diabetes.       Take  calcium 600 every 12 hrs. With vitamin D 400 IU every  12 hrs. Excerise at least 30 minutes 3-4 times a week. May use calcium citrate as opposed to calcium carbonate which may be better absorbed in the setting of PPI use.  The preferred calcium supplement for people at risk of stone formation is calcium citrate because it helps to increase urinary citrate excretion. We recommend a dose of 200-400 mg if dietary calcium cannot be increased.     Screening. Check mammogram. Continue self breast exam every month.      Anxiety. Chou Rules for Coping with Panic      1) Remember that although your feelings and symptoms are frightening, they are neither dangerous nor harmful.  2) Understand that what you are experiencing is merely an exaggeration of your normal reactions to stress.  3) Do not fight your feelings or try to wish them away. The more willing you are to face them, the less intense they will become.  4) Don't add to your panic by thinking about what might happen. If you finding yourself asking, 'What if?' tell yourself, 'So what!'  5) Stay in the present. Be aware of what is happening to you rather than concern yourself with  how much worse it might get.  6) Label your fear level from zero to 10 and watch it go up and down. Notice that it doesn't stay at a very high level for more than a few seconds.  7) When you find yourself thinking about fear, change your what if thinking. Focus on and perform some simple, manageable task.  8) Notice that when you stop thinking frightening thoughts, your anxiety fades.  9) When fear comes, accept it, don't fight it.  Wait and give it some time to pass. Don't try to escape from it.  10)  Be proud of the progress you've made. Think about how good you will feel when the anxiety has passed and you are in total control and at peace. Decrease lexapro 5 mg every other night til OV.     Htn. White. Careful with diet and excercise at least 30 minutes 3-4 times a week. Check blood  pressures at different times on different days. Can purchase own blood pressure monitor. If not, check at local pharmacy. Bake foods more and grill occasionally. Avoid fried foods. No salt. Use other seasonings.      Cerumen impaction.Bilat ears irrigated with warm H20 and H2O2. Large amt of wax removed from both ears.  Ear canals clear bilat and TMs intact without erythema.  Pt denies dizziness or vertigo. Pt tolerate procedure well. Aftercare instructions given. SP removal with irrirgation/lavage L ear. No comps. After procedure era checked.      Patient explained reason for procedure and consented    Prepped and draped area.     Removed significant ear wax.     Follow up appointment PRN.      Orders Placed This Encounter   Procedures   • Hemoglobin A1C   • Microalb/Creat Ratio, Random Urine   • Vitamin B12   • TSH W Reflex To Free T4   • Ferritin   • CBC With Differential With Platelet   • Iron And Tibc   • Remove Impacted Cerumen - Bilateral   • Removal Impacted Cerumen Using Irrigation/Lavage, Bilateral       Meds This Visit:  Requested Prescriptions     Signed Prescriptions Disp Refills   • alendronate 70 MG Oral Tab 12 tablet 3     Sig: Take 1 tablet (70 mg total) by mouth every 7 days.       Imaging & Referrals:  XR DEXA BONE DENSITOMETRY (CPT=77080)      Has set phil't.          [1]   Allergies  Allergen Reactions   • Amlodipine SHORTNESS OF BREATH     Chest pressure.  Patient does not recall having any allergies

## 2025-02-04 LAB
EST. AVERAGE GLUCOSE BLD GHB EST-MCNC: 146 MG/DL (ref 68–126)
HBA1C MFR BLD: 6.7 % (ref ?–5.7)

## 2025-02-06 DIAGNOSIS — Z79.4 TYPE 2 DIABETES MELLITUS WITH STAGE 3 CHRONIC KIDNEY DISEASE, WITH LONG-TERM CURRENT USE OF INSULIN, UNSPECIFIED WHETHER STAGE 3A OR 3B CKD (HCC): Primary | ICD-10-CM

## 2025-02-06 DIAGNOSIS — E11.22 TYPE 2 DIABETES MELLITUS WITH STAGE 3 CHRONIC KIDNEY DISEASE, WITH LONG-TERM CURRENT USE OF INSULIN, UNSPECIFIED WHETHER STAGE 3A OR 3B CKD (HCC): Primary | ICD-10-CM

## 2025-02-06 DIAGNOSIS — N18.30 TYPE 2 DIABETES MELLITUS WITH STAGE 3 CHRONIC KIDNEY DISEASE, WITH LONG-TERM CURRENT USE OF INSULIN, UNSPECIFIED WHETHER STAGE 3A OR 3B CKD (HCC): Primary | ICD-10-CM

## 2025-02-07 ENCOUNTER — TELEPHONE (OUTPATIENT)
Dept: INTERNAL MEDICINE CLINIC | Facility: CLINIC | Age: 75
End: 2025-02-07

## 2025-02-07 NOTE — TELEPHONE ENCOUNTER
With Language Line  Thais   ID # [id number]  Patient calling ( name and date of birth of patient verified ) states LOV  23/2025  Unsure if she was issues the correct RX     Alendronate 70mg weekly       Explained to the patient this is to help her bones  ( osteopenia ) , and to take the medication once a week , try to take on the same day of the week     Patient verbalizes understanding and agrees with plan.

## 2025-02-12 NOTE — TELEPHONE ENCOUNTER
Call returned to Angelique. Scheduled f/u appointment for 2/26/2025 at 11:30 AM at the [facility] [address]. Will discuss if she needs another set of labs done with Dr. Garza tomorrow. If so, can add onto her lab appointment on 2/14. Angelique verbalized understanding and agreeable. No additional questions or concerns. Encouraged to call if any arise.    Rx. Sent.

## 2025-02-20 ENCOUNTER — HOSPITAL ENCOUNTER (OUTPATIENT)
Dept: ULTRASOUND IMAGING | Facility: HOSPITAL | Age: 75
Discharge: HOME | End: 2025-02-20
Attending: INTERNAL MEDICINE
Payer: MEDICARE

## 2025-02-20 ENCOUNTER — HOSPITAL ENCOUNTER (OUTPATIENT)
Dept: MAMMOGRAPHY | Facility: HOSPITAL | Age: 75
Discharge: HOME | End: 2025-02-20
Attending: INTERNAL MEDICINE
Payer: MEDICARE

## 2025-02-20 DIAGNOSIS — R92.1 MAMMOGRAPHIC CALCIFICATION FOUND ON DIAGNOSTIC IMAGING OF BREAST: ICD-10-CM

## 2025-02-20 DIAGNOSIS — N60.09 SOLITARY CYST OF BREAST, UNSPECIFIED LATERALITY: ICD-10-CM

## 2025-02-20 PROCEDURE — 77066 DX MAMMO INCL CAD BI: CPT | Performed by: INTERNAL MEDICINE

## 2025-02-20 PROCEDURE — 77062 BREAST TOMOSYNTHESIS BI: CPT | Performed by: INTERNAL MEDICINE

## 2025-02-20 PROCEDURE — 76642 ULTRASOUND BREAST LIMITED: CPT | Performed by: INTERNAL MEDICINE

## 2025-02-21 RX ORDER — METOPROLOL TARTRATE 100 MG/1
TABLET ORAL
Qty: 90 TABLET | Refills: 0 | Status: SHIPPED | OUTPATIENT
Start: 2025-02-21

## 2025-02-21 NOTE — TELEPHONE ENCOUNTER
Last office visit- 8/29/24    Return to clinic- 6 months    Follow up- 2/28/25    Last refill-  12/17/24

## 2025-02-24 ENCOUNTER — ANTI-COAG VISIT (OUTPATIENT)
Dept: ANTICOAGULATION | Facility: CLINIC | Age: 75
End: 2025-02-24

## 2025-02-24 ENCOUNTER — TELEPHONE (OUTPATIENT)
Dept: NEPHROLOGY | Facility: CLINIC | Age: 75
End: 2025-02-24

## 2025-02-24 DIAGNOSIS — Z51.81 MONITORING FOR LONG-TERM ANTICOAGULANT USE: ICD-10-CM

## 2025-02-24 DIAGNOSIS — D68.61 ANTIPHOSPHOLIPID SYNDROME (HCC): Primary | ICD-10-CM

## 2025-02-24 DIAGNOSIS — Z79.01 LONG TERM (CURRENT) USE OF ANTICOAGULANTS: ICD-10-CM

## 2025-02-24 DIAGNOSIS — Z79.01 MONITORING FOR LONG-TERM ANTICOAGULANT USE: ICD-10-CM

## 2025-02-24 DIAGNOSIS — I82.532 CHRONIC DEEP VEIN THROMBOSIS OF LEFT POPLITEAL VEIN (HCC): ICD-10-CM

## 2025-02-24 LAB
INR: 2.3 (ref 2–3)
TEST STRIP EXPIRATION DATE: ABNORMAL DATE

## 2025-02-24 NOTE — PROGRESS NOTES
Face-to-Face  / INR 2.3,  therapeutic. (Goal 2.5 ) TTR 76.6 %     Etiology: stable. No changes.    PLAN: continue the current dose.    Recheck INR 4 weeks.    Pt reports:    No sign of unusual bruising or bleeding.  Any missed doses: No   Medications changes: No  Diet changes:  No    Contacted  Julia & friend verbalized understanding and agreement.    WARFARIN Plan per protocol: 7.5 mg every Tue, Thu; 5 mg all other days

## 2025-02-25 ENCOUNTER — MED REC SCAN ONLY (OUTPATIENT)
Dept: INTERNAL MEDICINE CLINIC | Facility: CLINIC | Age: 75
End: 2025-02-25

## 2025-02-25 ENCOUNTER — TELEPHONE (OUTPATIENT)
Dept: INTERNAL MEDICINE CLINIC | Facility: CLINIC | Age: 75
End: 2025-02-25

## 2025-02-25 ENCOUNTER — LAB ENCOUNTER (OUTPATIENT)
Dept: LAB | Facility: HOSPITAL | Age: 75
End: 2025-02-25
Attending: INTERNAL MEDICINE
Payer: MEDICARE

## 2025-02-25 DIAGNOSIS — E66.01 SEVERE OBESITY (BMI 35.0-39.9) WITH COMORBIDITY (HCC): Chronic | ICD-10-CM

## 2025-02-25 DIAGNOSIS — L40.50 PSORIATIC ARTHRITIS (HCC): ICD-10-CM

## 2025-02-25 DIAGNOSIS — N18.31 STAGE 3A CHRONIC KIDNEY DISEASE (HCC): Primary | ICD-10-CM

## 2025-02-25 DIAGNOSIS — E11.22 TYPE 2 DIABETES MELLITUS WITH STAGE 3B CHRONIC KIDNEY DISEASE, WITH LONG-TERM CURRENT USE OF INSULIN (HCC): ICD-10-CM

## 2025-02-25 DIAGNOSIS — N18.32 TYPE 2 DIABETES MELLITUS WITH STAGE 3B CHRONIC KIDNEY DISEASE, WITH LONG-TERM CURRENT USE OF INSULIN (HCC): ICD-10-CM

## 2025-02-25 DIAGNOSIS — N18.32 STAGE 3B CHRONIC KIDNEY DISEASE (HCC): Chronic | ICD-10-CM

## 2025-02-25 DIAGNOSIS — D68.61 ANTIPHOSPHOLIPID SYNDROME (HCC): ICD-10-CM

## 2025-02-25 DIAGNOSIS — Z79.4 TYPE 2 DIABETES MELLITUS WITH STAGE 3B CHRONIC KIDNEY DISEASE, WITH LONG-TERM CURRENT USE OF INSULIN (HCC): ICD-10-CM

## 2025-02-25 LAB
BILIRUB UR QL: NEGATIVE
CLARITY UR: CLEAR
CREAT UR-SCNC: 78.5 MG/DL
GLUCOSE UR-MCNC: NORMAL MG/DL
KETONES UR-MCNC: NEGATIVE MG/DL
LEUKOCYTE ESTERASE UR QL STRIP.AUTO: NEGATIVE
MICROALBUMIN UR-MCNC: 0.3 MG/DL
MICROALBUMIN/CREAT 24H UR-RTO: 3.8 UG/MG (ref ?–30)
NITRITE UR QL STRIP.AUTO: NEGATIVE
PH UR: 5.5 [PH] (ref 5–8)
PROT UR-MCNC: NEGATIVE MG/DL
SP GR UR STRIP: 1.01 (ref 1–1.03)
UROBILINOGEN UR STRIP-ACNC: NORMAL

## 2025-02-25 PROCEDURE — 82570 ASSAY OF URINE CREATININE: CPT

## 2025-02-25 PROCEDURE — 81001 URINALYSIS AUTO W/SCOPE: CPT

## 2025-02-25 PROCEDURE — 82043 UR ALBUMIN QUANTITATIVE: CPT

## 2025-02-28 ENCOUNTER — OFFICE VISIT (OUTPATIENT)
Dept: NEPHROLOGY | Facility: CLINIC | Age: 75
End: 2025-02-28

## 2025-02-28 VITALS
HEART RATE: 58 BPM | DIASTOLIC BLOOD PRESSURE: 60 MMHG | SYSTOLIC BLOOD PRESSURE: 135 MMHG | BODY MASS INDEX: 37 KG/M2 | WEIGHT: 248 LBS

## 2025-02-28 DIAGNOSIS — I82.532 CHRONIC DEEP VEIN THROMBOSIS OF LEFT POPLITEAL VEIN (HCC): Primary | ICD-10-CM

## 2025-02-28 DIAGNOSIS — N18.32 STAGE 3B CHRONIC KIDNEY DISEASE (HCC): Chronic | ICD-10-CM

## 2025-02-28 DIAGNOSIS — D68.61 ANTIPHOSPHOLIPID SYNDROME (HCC): ICD-10-CM

## 2025-02-28 DIAGNOSIS — E03.9 HYPOTHYROIDISM (ACQUIRED): Chronic | ICD-10-CM

## 2025-02-28 DIAGNOSIS — N18.32 TYPE 2 DIABETES MELLITUS WITH STAGE 3B CHRONIC KIDNEY DISEASE, WITH LONG-TERM CURRENT USE OF INSULIN (HCC): ICD-10-CM

## 2025-02-28 DIAGNOSIS — Z79.4 TYPE 2 DIABETES MELLITUS WITH STAGE 3B CHRONIC KIDNEY DISEASE, WITH LONG-TERM CURRENT USE OF INSULIN (HCC): ICD-10-CM

## 2025-02-28 DIAGNOSIS — E11.22 TYPE 2 DIABETES MELLITUS WITH STAGE 3B CHRONIC KIDNEY DISEASE, WITH LONG-TERM CURRENT USE OF INSULIN (HCC): ICD-10-CM

## 2025-02-28 DIAGNOSIS — I10 ESSENTIAL HYPERTENSION WITH GOAL BLOOD PRESSURE LESS THAN 130/80: ICD-10-CM

## 2025-02-28 PROCEDURE — 3078F DIAST BP <80 MM HG: CPT | Performed by: INTERNAL MEDICINE

## 2025-02-28 PROCEDURE — 99214 OFFICE O/P EST MOD 30 MIN: CPT | Performed by: INTERNAL MEDICINE

## 2025-02-28 PROCEDURE — 1126F AMNT PAIN NOTED NONE PRSNT: CPT | Performed by: INTERNAL MEDICINE

## 2025-02-28 PROCEDURE — 3075F SYST BP GE 130 - 139MM HG: CPT | Performed by: INTERNAL MEDICINE

## 2025-02-28 PROCEDURE — 3061F NEG MICROALBUMINURIA REV: CPT | Performed by: INTERNAL MEDICINE

## 2025-02-28 PROCEDURE — 1159F MED LIST DOCD IN RCRD: CPT | Performed by: INTERNAL MEDICINE

## 2025-02-28 PROCEDURE — 3044F HG A1C LEVEL LT 7.0%: CPT | Performed by: INTERNAL MEDICINE

## 2025-02-28 NOTE — PROGRESS NOTES
Progress Note     Julia Hernandez    Here with son Bebeto actually doing quite well pressures 135/60 pressures at home are good sugars have been doing good patient has history of blood clots takes Coumadin history of CKD 3 history of hypertension on Aldactone is on glipizide inhalers valsartan Synthroid indapamide minoxidil insulin and Cosentyx.  Mood is good no chest pain or shortness of breath no edema      HISTORY:  Past Medical History:    Appendicitis    Cholelithiasis    Deep vein thrombosis (HCC)    L leg     Diabetes (HCC)    Disorder of thyroid    Essential hypertension    Goiter    Multinodular goiter S/P thyroidectomy.     High blood pressure    High cholesterol    Osteopenia    Vertigo, aural      Past Surgical History:   Procedure Laterality Date    Appendectomy  1993    Cataract extraction w/  intraocular lens implant Right 1988    in [place]    Cholecystectomy  2006    Colonoscopy N/A 8/12/2020    Procedure: COLONOSCOPY;  Surgeon: José Antonio Fletcher MD;  Location: [facility] ENDOSCOPY    Colonoscopy N/A 6/14/2024    Procedure: COLONOSCOPY;  Surgeon: José Antonio Fletcher MD;  Location: [facility] ENDOSCOPY    Electrocardiogram, complete  01/03/2014    SCANNED TO MEDIA TAB - 01/03/2014    Fracture surgery Left     LEFT FOOT SURGERY WITH HARDWARE    Knee scope,abrasn arthroplasty Right 10-4-16    Thyroidectomy  08/17/15    TOTAL    Tubal ligation        Social History     Tobacco Use    Smoking status: Never     Passive exposure: Never    Smokeless tobacco: Never   Substance Use Topics    Alcohol use: No         Medications (Active prior to today's visit):  Current Outpatient Medications   Medication Sig Dispense Refill    METOPROLOL TARTRATE 100 MG Oral Tab Take one tablet by mouth in the morning and a half  tablet in the afternoon 90 tablet 0    alendronate 70 MG Oral Tab Take 1 tablet (70 mg total) by mouth every 7 days. 12 tablet 3    Dextromethorphan-guaiFENesin (TUSSIN COUGH DM OR) Take by mouth.       clobetasol 0.05 % External Solution Apply 1 Application topically 2 (two) times daily. 1 each 0    glipiZIDE 10 MG Oral Tab Take 1 tablet (10 mg total) by mouth 2 (two) times daily before meals. 180 tablet 3    fluticasone-salmeterol (WIXELA INHUB) 250-50 MCG/ACT Inhalation Aerosol Powder, Breath Activated Inhale 1 puff into the lungs 2 (two) times daily. Follow with gargle rinse and spit 1 each 5    fluticasone furoate-vilanterol (BREO ELLIPTA) 100-25 MCG/ACT Inhalation Aerosol Powder, Breath Activated Inhale 1 puff into the lungs daily. Rinse your mouth with water without swallowing after using BREO to help reduce your chance of getting thrush. 1 each 5    simvastatin 20 MG Oral Tab Take 1 tablet (20 mg total) by mouth twice a week. 24 tablet 3    SPIRONOLACTONE 25 MG Oral Tab TOME 1 TABLETA DIARIAMENTE 90 tablet 1    insulin glargine (LANTUS SOLOSTAR) 100 UNIT/ML Subcutaneous Solution Pen-injector INJECT 45 UNITS INTHE MORNING AND 5 UNITS IN THE EVENING 45 mL 3    Betamethasone Dipropionate Aug 0.05 % External Cream Apply 30 g topically 2 (two) times daily. 30 g 2    warfarin 5 MG Oral Tab Take as directed by INR clinic or take 1 AND 1/2 TABLETS ON  Tuesdays & Thursdays, take 1 TABLET all other days 110 tablet 11    albuterol 108 (90 Base) MCG/ACT Inhalation Aero Soln Inhale 1 puff into the lungs every 6 (six) hours as needed for Wheezing. 1 each 1    indapamide 2.5 MG Oral Tab Take 1 tablet (2.5 mg total) by mouth daily and every night. 180 tablet 3    Insulin Pen Needle (TRUEPLUS 5-BEVEL PEN NEEDLES) 32G X 4 MM Does not apply Misc Injects at bedtime 90 each 3    enoxaparin 100 MG/ML Injection Solution Prefilled Syringe Inject 1 mL (100 mg total) into the skin 2 (two) times daily. 4 each 0    levothyroxine 88 MCG Oral Tab Take 1 tablet (88 mcg total) by mouth before breakfast. 90 tablet 3    levothyroxine 100 MCG Oral Tab Take 1 tablet (100 mcg total) by mouth before breakfast. 90 tablet 3    valsartan 160 MG  Oral Tab TAKE ONE TABLET BY MOUTH TWICE DAILY 180 tablet 3    Glucose Blood (ACCU-CHEK GUIDE) In Vitro Strip 1 strip by In Vitro route 2 (two) times daily. 200 strip 3    fluticasone furoate-vilanterol (BREO ELLIPTA) 100-25 MCG/ACT Inhalation Aerosol Powder, Breath Activated Inhale 1 puff into the lungs daily. Rinse your mouth with water without swallowing after using BREO to help reduce your chance of getting thrush. 1 each 5    escitalopram 5 MG Oral Tab Take 1 tablet (5 mg total) by mouth nightly. 90 tablet 3    Secukinumab, 300 MG Dose, (COSENTYX SENSOREADY, 300 MG,) 150 MG/ML Subcutaneous Solution Auto-injector Inject 300 mg into the skin every 28 days. 2 each 2    minoxidil 2.5 MG Oral Tab Take 2 tablets (5 mg total) by mouth 2 (two) times daily. (Patient taking differently: Take 1 tablet (2.5 mg total) by mouth 2 (two) times daily.) 360 tablet 3    PEG 3350-KCl-Na Bicarb-NaCl (TRILYTE) 420 g Oral Recon Soln Take prep as directed by gastro office. May substitute with Trilyte/generic equivalent if needed. 1 each 0    albuterol (2.5 MG/3ML) 0.083% Inhalation Nebu Soln Take 3 mL (2.5 mg total) by nebulization every 6 (six) hours as needed for Wheezing. 50 each 0    dicyclomine 10 MG Oral Cap Take 1 capsule (10 mg total) by mouth 2 (two) times daily as needed. 60 capsule 1    Insulin Pen Needle (BD PEN NEEDLE ORIGINAL U/F) 29G X 12.7MM Does not apply Misc Dx. E11.9. Checks q12hrs. BD ultrafine MINI. 100 each 6    Alcohol Swabs Does not apply Pads Code: E11.9.  Checks twice daily. 100 each 11    ACCU-CHEK FASTCLIX LANCETS Does not apply Misc CHECK EVERY MORNING AND EVENING 204 each 11    Omega-3-acid Ethyl Esters (LOVAZA) 1 G Oral Cap Take 1 capsule (1 g total) by mouth 2 (two) times daily with meals.         Allergies:  Allergies[1]      ROS:     Constitutional: Feels good  ENMT:  Negative for ear drainage, hearing loss and nasal drainage  Eyes:  Negative for eye discharge and vision loss  Cardiovascular:   Negative for chest pain, sob  Respiratory:  Negative for cough, dyspnea and wheezing  Gastrointestinal:  Negative for abdominal pain, constipation  Genitourinary:  Negative for dysuria and hematuria  Endocrine:  Negative for abnormal sleep patterns  Hema/Lymph:  Negative for easy bleeding and easy bruising  Integumentary:  Negative for pruritus and rash  Musculoskeletal:  Negative for bone/joint symptoms  Neurological:  Negative for gait disturbance  Psychiatric: Nerves are under good control      There were no vitals filed for this visit.    PHYSICAL EXAM:   Constitutional: appears well hydrated alert and responsive   Head/Face: normocephalic  Eyes/Vision: normal extraocular motion is intact  Nose/Mouth/Throat:mucous membranes are moist   Neck/Thyroid: neck is supple without adenopathy  Lymphatic: no abnormal cervical, supraclavicular adenopathy is noted  Respiratory:  lungs are clear to auscultation bilaterally  Cardiovascular: regular rate and rhythm   Abdomen: soft, non-tender, non-distended, BS normal  Skin/Hair: no unusual rashes present, no abnormal bruising noted  Back/Spine: no abnormalities noted  Musculoskeletal: no deformities  Extremities: no edema  Neurological:  Grossly normal       ASSESSMENT/PLAN:   Assessment   Encounter Diagnoses   Name Primary?    Chronic deep vein thrombosis of left popliteal vein (HCC) Yes    Antiphospholipid syndrome (HCC)     Hypothyroidism (acquired)     Essential hypertension with goal blood pressure less than 130/80        1 anxiety stable    #2 diabetes last A1c reviewed 6.7 good    #3 hypercoagulability stable on Coumadin   #4 hypothyroid stable  #5 CKD 3B GFR 38 creatinine 1.45 creatinine is up a little bit  No proteinuria we will watch labs    Osteoporosis she was not taking Fosamax correctly will take an empty stomach [place] for least 1 hour take any medicines for at least 1 hour do not lie down for 2 hours we will do labs again the end of May including lipids      Nicolas increased her cholesterol to 3 times a week medications see her back in 6 months spent 40 minutes with her and her son  Orders This Visit:  No orders of the defined types were placed in this encounter.      Meds This Visit:  Requested Prescriptions      No prescriptions requested or ordered in this encounter       Imaging & Referrals:  None     2/28/2025  Rajat Hurd MD               [1]   Allergies  Allergen Reactions    Amlodipine SHORTNESS OF BREATH     Chest pressure.  Patient does not recall having any allergies

## 2025-02-28 NOTE — PATIENT INSTRUCTIONS
Please take alendronate on an empty stomach do not eat for at least 1 hour or take any medicine for at least hour  Do this on Fridays do not lie down for 2 hours    Repeat labs at the end of May they are in the computer    Same medications include except increase the cholesterol medicine to 3 times a week      Good to see you both see me back in 6 months  Have a great spring

## 2025-03-04 RX ORDER — INSULIN GLARGINE 100 [IU]/ML
INJECTION, SOLUTION SUBCUTANEOUS
Qty: 45 ML | Refills: 3 | Status: SHIPPED | OUTPATIENT
Start: 2025-03-04

## 2025-03-04 NOTE — TELEPHONE ENCOUNTER
Called the patient to clarify - she states she is sick right now and on amoxicillin from her dentist and her blood sugars have been elevated so she takes 5-7 units at night if she needs to. She states recently she has been having to take more then 7 units in the evening. She was unable to tell me what her sugar has been recently. Dr. Valenzuela please advise - the patient is asking for a refill of her insulin but she has been using 45 units in the morning and 5-7 sometimes more in the evening. She is requesting refill for 45 in the morning and 7 in the evening.    She's on amoxicillin 1500 mg a day for 21 days. She is on it for infection in one of her molars.

## 2025-03-04 NOTE — TELEPHONE ENCOUNTER
Dear nursing staff,    Please call patient to verify how many units of Lantus they are injecting daily.    Per patient's active medication list - patient's recent prescription shows 45 units in the morning and 5 units in the evening.    We are receiving a refill request for patient's previous dose 45 in the morning and 7 in the evening.    Patient has been refilling the 45 units and 7 units prescription. Was this prescription meant to be discontinued?

## 2025-03-10 ENCOUNTER — TELEPHONE (OUTPATIENT)
Dept: INTERNAL MEDICINE CLINIC | Facility: CLINIC | Age: 75
End: 2025-03-10

## 2025-03-10 NOTE — TELEPHONE ENCOUNTER
Late entry: Dr. Acosta dentist called from [phone] regarding pt. LMTCB at 330 on 3-5-25.   Spoke today. Needs root canal on one tooth.  Has been progressing quite a while on that tooth.  OK to get oral bisphonate.

## 2025-03-10 NOTE — TELEPHONE ENCOUNTER
Dr. Valenzuela, is there any action that you need the RN's to take at this time regarding this message?

## 2025-03-11 NOTE — TELEPHONE ENCOUNTER
With  Marly #[id number], Advised patient of Dr. Valenzuela's note. Patient stated that she started the alendronate 70mg weekly. Has taken 3 doses. Taking her 4th dose this Friday. Patient needs to have a root canal. No appointment scheduled yet. Stated that not sure if needs to stop the alendronate or if can continue prior to procedure? Please advise.

## 2025-03-11 NOTE — TELEPHONE ENCOUNTER
Called the patient, she answered the phone but first stated she was busy and did not want tot alk and then stated she could not hear the  when he would speak. Ended the call, will attempted to call later.

## 2025-03-11 NOTE — TELEPHONE ENCOUNTER
She should be okay to continue with the root canal.  But I just have her check with her dentist.  She can stop the Fosamax at least a week before her root canal and resume when  in 2 weeks.

## 2025-03-19 ENCOUNTER — TELEPHONE (OUTPATIENT)
Dept: INTERNAL MEDICINE CLINIC | Facility: CLINIC | Age: 75
End: 2025-03-19

## 2025-03-19 DIAGNOSIS — E11.22 TYPE 2 DIABETES MELLITUS WITH STAGE 3 CHRONIC KIDNEY DISEASE, WITH LONG-TERM CURRENT USE OF INSULIN, UNSPECIFIED WHETHER STAGE 3A OR 3B CKD (HCC): Primary | ICD-10-CM

## 2025-03-19 DIAGNOSIS — N18.30 TYPE 2 DIABETES MELLITUS WITH STAGE 3 CHRONIC KIDNEY DISEASE, WITH LONG-TERM CURRENT USE OF INSULIN, UNSPECIFIED WHETHER STAGE 3A OR 3B CKD (HCC): Primary | ICD-10-CM

## 2025-03-19 DIAGNOSIS — Z79.4 TYPE 2 DIABETES MELLITUS WITH STAGE 3 CHRONIC KIDNEY DISEASE, WITH LONG-TERM CURRENT USE OF INSULIN, UNSPECIFIED WHETHER STAGE 3A OR 3B CKD (HCC): Primary | ICD-10-CM

## 2025-03-19 DIAGNOSIS — M85.89 OSTEOPENIA OF MULTIPLE SITES: ICD-10-CM

## 2025-03-19 RX ORDER — SIMVASTATIN 20 MG
TABLET ORAL
Qty: 36 TABLET | Refills: 0 | Status: SHIPPED | OUTPATIENT
Start: 2025-03-19

## 2025-03-19 NOTE — TELEPHONE ENCOUNTER
Patient states when she takes alendronate she gets diarrhea and stomach upset.  Patient asking if there is an alternative or can she stop medication.

## 2025-03-19 NOTE — TELEPHONE ENCOUNTER
Chart reviewed. Per lab notes 2/3/25:  \"Patient will increase to 3 pills per week of Simvastatin and will have her  Lipid blood drawn in 3 months \"    Per written orders from Dr Valenzuela, New prescription sent.  Patient informed also to follow up in 3 months to repeat labs.

## 2025-03-19 NOTE — TELEPHONE ENCOUNTER
Patient called in and said  increase Rx simvastatin 20MG to 3 times a week  Patient is looking for new script to be sent to pharmacy. Patient has 1 tablet left. Please advise         Verified pharmacy  [facility]

## 2025-03-20 NOTE — TELEPHONE ENCOUNTER
[place], [place] [id number], Jeb.  Patient was given Dr Valenzuela's message below.  Gave number to call Endocrinology as she expressed interest.  She plans to discontinue alendronate and make appointment with Endocrinology.  Medication list upated.  Dr Valenzuela this is FYI.    24 minute call.

## 2025-03-20 NOTE — TELEPHONE ENCOUNTER
We can do couple different alternatives we can try Boniva 150 once a month hopefully she does not have that same side effect.  She takes it 30 minutes before she eats on empty stomach and make sure she is sitting or standing after she takes it do not take it and then lay down right away.  Other option is to see endocrine and talk about some injectable ones?

## 2025-03-24 ENCOUNTER — ANTI-COAG VISIT (OUTPATIENT)
Dept: ANTICOAGULATION | Facility: CLINIC | Age: 75
End: 2025-03-24

## 2025-03-24 DIAGNOSIS — I82.532 CHRONIC DEEP VEIN THROMBOSIS OF LEFT POPLITEAL VEIN (HCC): ICD-10-CM

## 2025-03-24 DIAGNOSIS — D68.61 ANTIPHOSPHOLIPID SYNDROME (HCC): Primary | ICD-10-CM

## 2025-03-24 DIAGNOSIS — Z79.01 LONG TERM (CURRENT) USE OF ANTICOAGULANTS: ICD-10-CM

## 2025-03-24 DIAGNOSIS — Z79.01 MONITORING FOR LONG-TERM ANTICOAGULANT USE: ICD-10-CM

## 2025-03-24 DIAGNOSIS — Z51.81 MONITORING FOR LONG-TERM ANTICOAGULANT USE: ICD-10-CM

## 2025-03-24 LAB
INR: 1.8 (ref 2–3)
TEST STRIP EXPIRATION DATE: ABNORMAL DATE

## 2025-03-24 PROCEDURE — 85610 PROTHROMBIN TIME: CPT | Performed by: FAMILY MEDICINE

## 2025-03-24 PROCEDURE — 93793 ANTICOAG MGMT PT WARFARIN: CPT | Performed by: FAMILY MEDICINE

## 2025-03-24 NOTE — PROGRESS NOTES
Face-to-Face  / INR 1.8, SUB therapeutic. (Goal 2.5 ) TTR 76.3 %     Etiology: Dr Valenzuela reduced her esitalopram in Feb to every other day.    PLAN: increase warfarin to 7.5mg MWF 5mg all other.    Recheck INR 2 weeks.    Pt reports:    No sign of unusual bruising or bleeding.  Any missed doses: No   Medications changes: Yes, alendronate added. No interaction.  Diet changes:  No    Contacted  Julia verbalized understanding and agreement.    WARFARIN Plan per protocol: 7.5 mg every Mon, Wed, Fri; 5 mg all other days

## 2025-03-26 ENCOUNTER — TELEPHONE (OUTPATIENT)
Dept: RHEUMATOLOGY | Facility: CLINIC | Age: 75
End: 2025-03-26

## 2025-03-26 DIAGNOSIS — Z51.81 MEDICATION MONITORING ENCOUNTER: Primary | ICD-10-CM

## 2025-03-26 DIAGNOSIS — L40.50 PSORIATIC ARTHRITIS (HCC): ICD-10-CM

## 2025-03-26 NOTE — TELEPHONE ENCOUNTER
Patient calling to ask if needs to complete blood work before visit on 04/07/25. Asking for call if so and once placed.

## 2025-03-31 NOTE — TELEPHONE ENCOUNTER
This was address by the Rheumatology office.    Future Appointments   Date Time Provider Department Center   4/7/2025  8:45 AM Halle Nassar RN [facility]   4/7/2025 11:40 AM Kelsey Lopez MD ECWMORHEVELYN [facility]   6/14/2025 10:30 AM Sara Valenzuela MD [id number] [id number]   9/18/2025  5:00 PM Rajat Hurd MD [id number] [facility]   11/13/2025 11:15 AM Bharathi Quinonez MD [id number] [facility]

## 2025-04-01 ENCOUNTER — LAB ENCOUNTER (OUTPATIENT)
Dept: LAB | Facility: HOSPITAL | Age: 75
End: 2025-04-01
Attending: INTERNAL MEDICINE
Payer: MEDICARE

## 2025-04-01 DIAGNOSIS — Z51.81 MEDICATION MONITORING ENCOUNTER: ICD-10-CM

## 2025-04-01 DIAGNOSIS — L40.50 PSORIATIC ARTHRITIS (HCC): ICD-10-CM

## 2025-04-01 LAB
ALBUMIN SERPL-MCNC: 4.3 G/DL (ref 3.2–4.8)
ALT SERPL-CCNC: 11 U/L
AST SERPL-CCNC: 20 U/L (ref ?–34)
BASOPHILS # BLD AUTO: 0.04 X10(3) UL (ref 0–0.2)
BASOPHILS NFR BLD AUTO: 0.5 %
CREAT BLD-MCNC: 1.4 MG/DL
CRP SERPL-MCNC: <0.4 MG/DL (ref ?–1)
DEPRECATED RDW RBC AUTO: 44.3 FL (ref 35.1–46.3)
EGFRCR SERPLBLD CKD-EPI 2021: 40 ML/MIN/1.73M2 (ref 60–?)
EOSINOPHIL # BLD AUTO: 0.41 X10(3) UL (ref 0–0.7)
EOSINOPHIL NFR BLD AUTO: 5.6 %
ERYTHROCYTE [DISTWIDTH] IN BLOOD BY AUTOMATED COUNT: 12.8 % (ref 11–15)
ERYTHROCYTE [SEDIMENTATION RATE] IN BLOOD: 49 MM/HR
HCT VFR BLD AUTO: 36.2 %
HGB BLD-MCNC: 12.2 G/DL
IMM GRANULOCYTES # BLD AUTO: 0.01 X10(3) UL (ref 0–1)
IMM GRANULOCYTES NFR BLD: 0.1 %
LYMPHOCYTES # BLD AUTO: 2.14 X10(3) UL (ref 1–4)
LYMPHOCYTES NFR BLD AUTO: 29.2 %
MCH RBC QN AUTO: 31.6 PG (ref 26–34)
MCHC RBC AUTO-ENTMCNC: 33.7 G/DL (ref 31–37)
MCV RBC AUTO: 93.8 FL
MONOCYTES # BLD AUTO: 0.85 X10(3) UL (ref 0.1–1)
MONOCYTES NFR BLD AUTO: 11.6 %
NEUTROPHILS # BLD AUTO: 3.87 X10 (3) UL (ref 1.5–7.7)
NEUTROPHILS # BLD AUTO: 3.87 X10(3) UL (ref 1.5–7.7)
NEUTROPHILS NFR BLD AUTO: 53 %
PLATELET # BLD AUTO: 273 10(3)UL (ref 150–450)
RBC # BLD AUTO: 3.86 X10(6)UL
WBC # BLD AUTO: 7.3 X10(3) UL (ref 4–11)

## 2025-04-01 PROCEDURE — 84450 TRANSFERASE (AST) (SGOT): CPT

## 2025-04-01 PROCEDURE — 85652 RBC SED RATE AUTOMATED: CPT

## 2025-04-01 PROCEDURE — 84460 ALANINE AMINO (ALT) (SGPT): CPT

## 2025-04-01 PROCEDURE — 82040 ASSAY OF SERUM ALBUMIN: CPT

## 2025-04-01 PROCEDURE — 86140 C-REACTIVE PROTEIN: CPT

## 2025-04-01 PROCEDURE — 85025 COMPLETE CBC W/AUTO DIFF WBC: CPT

## 2025-04-01 PROCEDURE — 82565 ASSAY OF CREATININE: CPT

## 2025-04-01 PROCEDURE — 36415 COLL VENOUS BLD VENIPUNCTURE: CPT

## 2025-04-07 ENCOUNTER — OFFICE VISIT (OUTPATIENT)
Age: 75
End: 2025-04-07

## 2025-04-07 ENCOUNTER — ANTI-COAG VISIT (OUTPATIENT)
Dept: ANTICOAGULATION | Facility: CLINIC | Age: 75
End: 2025-04-07
Payer: MEDICARE

## 2025-04-07 VITALS
HEART RATE: 56 BPM | HEIGHT: 69 IN | WEIGHT: 238 LBS | SYSTOLIC BLOOD PRESSURE: 138 MMHG | BODY MASS INDEX: 35.25 KG/M2 | DIASTOLIC BLOOD PRESSURE: 65 MMHG

## 2025-04-07 DIAGNOSIS — D68.61 ANTIPHOSPHOLIPID SYNDROME (HCC): Primary | ICD-10-CM

## 2025-04-07 DIAGNOSIS — I82.532 CHRONIC DEEP VEIN THROMBOSIS OF LEFT POPLITEAL VEIN (HCC): ICD-10-CM

## 2025-04-07 DIAGNOSIS — Z79.01 LONG TERM (CURRENT) USE OF ANTICOAGULANTS: ICD-10-CM

## 2025-04-07 DIAGNOSIS — M81.0 AGE-RELATED OSTEOPOROSIS WITHOUT CURRENT PATHOLOGICAL FRACTURE: ICD-10-CM

## 2025-04-07 DIAGNOSIS — L40.50 PSORIATIC ARTHRITIS (HCC): Primary | ICD-10-CM

## 2025-04-07 DIAGNOSIS — Z51.81 MONITORING FOR LONG-TERM ANTICOAGULANT USE: ICD-10-CM

## 2025-04-07 DIAGNOSIS — Z79.01 MONITORING FOR LONG-TERM ANTICOAGULANT USE: ICD-10-CM

## 2025-04-07 DIAGNOSIS — Z51.81 MEDICATION MONITORING ENCOUNTER: ICD-10-CM

## 2025-04-07 LAB
INR: 2.2 (ref 2–3)
TEST STRIP EXPIRATION DATE: ABNORMAL DATE

## 2025-04-07 PROCEDURE — 3078F DIAST BP <80 MM HG: CPT | Performed by: INTERNAL MEDICINE

## 2025-04-07 PROCEDURE — 1159F MED LIST DOCD IN RCRD: CPT | Performed by: INTERNAL MEDICINE

## 2025-04-07 PROCEDURE — 93793 ANTICOAG MGMT PT WARFARIN: CPT | Performed by: FAMILY MEDICINE

## 2025-04-07 PROCEDURE — G2211 COMPLEX E/M VISIT ADD ON: HCPCS | Performed by: INTERNAL MEDICINE

## 2025-04-07 PROCEDURE — 99215 OFFICE O/P EST HI 40 MIN: CPT | Performed by: INTERNAL MEDICINE

## 2025-04-07 PROCEDURE — 1160F RVW MEDS BY RX/DR IN RCRD: CPT | Performed by: INTERNAL MEDICINE

## 2025-04-07 PROCEDURE — 3075F SYST BP GE 130 - 139MM HG: CPT | Performed by: INTERNAL MEDICINE

## 2025-04-07 PROCEDURE — 1126F AMNT PAIN NOTED NONE PRSNT: CPT | Performed by: INTERNAL MEDICINE

## 2025-04-07 PROCEDURE — 3008F BODY MASS INDEX DOCD: CPT | Performed by: INTERNAL MEDICINE

## 2025-04-07 PROCEDURE — 85610 PROTHROMBIN TIME: CPT | Performed by: FAMILY MEDICINE

## 2025-04-07 NOTE — PATIENT INSTRUCTIONS
You were seen today for psoriatic arthritis and osteoporosis  Continue Cosentyx every month  Continue calcium and vitamin D  Plan to get you approved for Prolia injections every 6 months for osteoporosis  Before every Prolia injection you will have to get blood work done 1 to 2 weeks before to make sure the calcium is normal  See me in 6 months

## 2025-04-07 NOTE — PROGRESS NOTES
Face-to-Face  / INR 2.2,  therapeutic. (Goal 2.5 ) TTR 76.2 %     Etiology: better after increased dose. Because she cut down of SSRI.    PLAN: continue the current increased dose.    Recheck INR 4 weeks.    Pt reports:    No sign of unusual bruising or bleeding.  Any missed doses: No   Medications changes: No  Diet changes:  No    Contacted  Julia verbalized understanding and agreement.    WARFARIN Plan per protocol: 7.5 mg every Mon, Wed, Fri; 5 mg all other days

## 2025-04-07 NOTE — PROGRESS NOTES
Julia Hernandez is a 74 year old female.    HPI:     Chief Complaint   Patient presents with    Psoriatic Arthritis       Julia was seen today 4/7/2025 Psoriasis with psoriatic arthritis and Antiphospholipid syndrome.    Current Medications:  Cosentyx 300 mg- started Dec 2019  Clobetasol cream  Triamcinolone cream  Previous medications:  MTX 6 pills weekly, FA daily- stopped in July 2021  Alendronate took it for a month but had s/e  Blood work:  ADRIEL 1: 640  ESR 46-->57-->43-->72-->normal and CRP 1.17-->normal  + APL (+ DRVVT, beta-2 glycoprotein IgM 66, cardiolipin IgM 82)  low C4 of 13, Normal C3  Slightly elevated Cr 1.3  Normal dsDNA, SSA, SSB, Scl-70, centromere, smith, RNP, Negative RF and CCP    Interval History:  This is a 68 yo F with hx of HTN, HLD, DM-2, Hypothyroidism (thyroidectomy), Osteopenia, R TKR about 2-3 years ago, L DVT dx in 12/2019 (extensive, mostly occlusive thrombus involving L peroneal veins not on anticoagulation) unprovoked but has been more sedentary presents for f/u for PsA (initially started in L wrist with swelling and pain).      7/21/2021:  Presents for Psoriasis and PsA  She remains on Cosentyx 300 mg monthly and methotrexate 6 pills weekly  Most recent blood work shows normal ESR and CRP  She has been having a chronic cough for the past couple months.  It starts off with a dry cough and then gets congested and loose.  She has been on multiple antibiotics.  Chest x-ray shows prominent pulmonary markings, they are concerned of methotrexate could be causing her symptoms.  She will be seeing the pulmonologist on August 5  Reports very minimal joint pain.  No active psoriasis    11/17/2021  Presents for Psoriasis and PsA  During her last visit in July she was having a lot of dry cough and persistent cough for couple of months.  We discontinued her methotrexate and that has resolved  Reports minimal joint pain involving her hands, wrist or ankles.  At times will swell but  only last 1 day.  Pain is very short-lived.  It does not affect her ADLs.  She has not had to take any other medication for her pain  Remains on Cosentyx monthly  Reviewed blood work with patient, normal ESR.  CRP very slightly elevated at 1.12    4/2/2024:  Presents for follow-up of psoriasis and psoriatic arthritis  Last seen November 2021  Recent blood work showing elevated ESR of 33  She was on Cosentyx but stopped November 2023 as she ran out and needed a follow up visit  Has stiffness in the ankles in the morning  Some swelling wrists and some stiffness  She feels better on the injections  No rash or psoriasis  Some dryness in the scalp    10/8/2024:  Presents for follow-up of psoriasis and psoriatic arthritis  On Cosentyx 300 mg monthly  Recent blood work showing mildly elevated inflammation markers ESR 48 and CRP 1.4  Has been having a rash in the back and very itchy  Has been having an itchy scalp, has been using clobetasol  Also using betamethasone for the skin  No active psoriasis lesions   Minimal pain in the hands and wrists, no swelling   Having some pain in the feet  Having some weakness in lower back pain and at times if feels tired, minimal pain in lower back   Recent x-ray of the lower spine showing moderate to advanced multilevel degenerative disc disease  X-ray of the left knee also showed mild to moderate tricompartmental OA with small suprapatellar knee effusion    4/7/2025:  Presents for follow-up of psoriasis and psoriatic arthritis  On Cosentyx 300 mg monthly  Recent blood work shows elevated creatinine 1.4 and ESR 49  She also has CKD stage 3 and is following with nephrologist Dr. Hurd  Joints overall stable, has minimal joint pain   Hands will go numb at times, intermittently   She has some swelling in the knees, minimal pain in the knees  Right knee was replaced, xray of left knee showed moderate OA  She had injections in the past but helped minimally  Psoriasis is controlled   Bone  density done 2024 showing osteoporosis in hip, she takes calcium and vitamin D. She fractured her right shoulder a few years ago. She was put on alendronate weekly  She was having burning sensation in the foot but went away      HISTORY:  Past Medical History:    Appendicitis    Cholelithiasis    Deep vein thrombosis (HCC)    L leg     Diabetes (HCC)    Diabetes mellitus (HCC)    Disorder of thyroid    Essential hypertension    Goiter    Multinodular goiter S/P thyroidectomy.     High blood pressure    High cholesterol    Osteopenia    Vertigo, aural      Social Hx Reviewed   Family Hx Reviewed     Medications (Active prior to today's visit):  Current Outpatient Medications   Medication Sig Dispense Refill    simvastatin 20 MG Oral Tab Take one tablet by mouth 3 times a week 36 tablet 0    insulin glargine (LANTUS SOLOSTAR) 100 UNIT/ML Subcutaneous Solution Pen-injector INJECT 45 UNITS IN THE MORNING AND 7 UNITS IN THE EVENING 45 mL 3    METOPROLOL TARTRATE 100 MG Oral Tab Take one tablet by mouth in the morning and a half  tablet in the afternoon 90 tablet 0    clobetasol 0.05 % External Solution Apply 1 Application topically 2 (two) times daily. 1 each 0    glipiZIDE 10 MG Oral Tab Take 1 tablet (10 mg total) by mouth 2 (two) times daily before meals. 180 tablet 3    SPIRONOLACTONE 25 MG Oral Tab TOME 1 TABLETA DIARIAMENTE 90 tablet 1    Betamethasone Dipropionate Aug 0.05 % External Cream Apply 30 g topically 2 (two) times daily. 30 g 2    warfarin 5 MG Oral Tab Take as directed by INR clinic or take 1 AND 1/2 TABLETS ON  Tuesdays & Thursdays, take 1 TABLET all other days 110 tablet 11    albuterol 108 (90 Base) MCG/ACT Inhalation Aero Soln Inhale 1 puff into the lungs every 6 (six) hours as needed for Wheezing. 1 each 1    indapamide 2.5 MG Oral Tab Take 1 tablet (2.5 mg total) by mouth daily and every night. 180 tablet 3    Insulin Pen Needle (TRUEPLUS 5-BEVEL PEN NEEDLES) 32G X 4 MM Does not apply Misc Injects  at bedtime 90 each 3    enoxaparin 100 MG/ML Injection Solution Prefilled Syringe Inject 1 mL (100 mg total) into the skin 2 (two) times daily. 4 each 0    levothyroxine 88 MCG Oral Tab Take 1 tablet (88 mcg total) by mouth before breakfast. 90 tablet 3    levothyroxine 100 MCG Oral Tab Take 1 tablet (100 mcg total) by mouth before breakfast. 90 tablet 3    valsartan 160 MG Oral Tab TAKE ONE TABLET BY MOUTH TWICE DAILY 180 tablet 3    Glucose Blood (ACCU-CHEK GUIDE) In Vitro Strip 1 strip by In Vitro route 2 (two) times daily. 200 strip 3    escitalopram 5 MG Oral Tab Take 1 tablet (5 mg total) by mouth nightly. 90 tablet 3    Secukinumab, 300 MG Dose, (COSENTYX SENSOREADY, 300 MG,) 150 MG/ML Subcutaneous Solution Auto-injector Inject 300 mg into the skin every 28 days. 2 each 2    minoxidil 2.5 MG Oral Tab Take 2 tablets (5 mg total) by mouth 2 (two) times daily. 360 tablet 3    albuterol (2.5 MG/3ML) 0.083% Inhalation Nebu Soln Take 3 mL (2.5 mg total) by nebulization every 6 (six) hours as needed for Wheezing. 50 each 0    Insulin Pen Needle (BD PEN NEEDLE ORIGINAL U/F) 29G X 12.7MM Does not apply Misc Dx. E11.9. Checks q12hrs. BD ultrafine MINI. 100 each 6    ACCU-CHEK FASTCLIX LANCETS Does not apply Misc CHECK EVERY MORNING AND EVENING 204 each 11    Omega-3-acid Ethyl Esters (LOVAZA) 1 G Oral Cap Take 1 capsule (1 g total) by mouth 2 (two) times daily with meals.      Dextromethorphan-guaiFENesin (TUSSIN COUGH DM OR) Take by mouth. (Patient not taking: Reported on 4/7/2025)      fluticasone-salmeterol (WIXELA INHUB) 250-50 MCG/ACT Inhalation Aerosol Powder, Breath Activated Inhale 1 puff into the lungs 2 (two) times daily. Follow with gargle rinse and spit (Patient not taking: Reported on 4/7/2025) 1 each 5    fluticasone furoate-vilanterol (BREO ELLIPTA) 100-25 MCG/ACT Inhalation Aerosol Powder, Breath Activated Inhale 1 puff into the lungs daily. Rinse your mouth with water without swallowing after using  BREO to help reduce your chance of getting thrush. (Patient not taking: Reported on 4/7/2025) 1 each 5    PEG 3350-KCl-Na Bicarb-NaCl (TRILYTE) 420 g Oral Recon Soln Take prep as directed by gastro office. May substitute with Trilyte/generic equivalent if needed. (Patient not taking: Reported on 4/7/2025) 1 each 0    dicyclomine 10 MG Oral Cap Take 1 capsule (10 mg total) by mouth 2 (two) times daily as needed. (Patient not taking: Reported on 4/7/2025) 60 capsule 1    Alcohol Swabs Does not apply Pads Code: E11.9.  Checks twice daily. (Patient not taking: Reported on 4/7/2025) 100 each 11     .cmed  Allergies:  Allergies   Allergen Reactions    Amlodipine SHORTNESS OF BREATH     Chest pressure.  Patient does not recall having any allergies         ROS:   All other ROS are negative.     PHYSICAL EXAM:   GEN: AAOx3, NAD  HEENT: EOMI, PERRLA, no injection or icterus, oral mucosa moist, no oral lesions. No lymphadenopathy. No facial rash  CVS: RRR, no murmurs rubs or gallops. Equal 2+ distal pulses.   LUNGS: CTAB, no increased work of breathing  ABDOMEN:  soft NT/ND, +BS, no HSM  SKIN: No rashes or skin lesions. No nail findings  MSK:  Wrist with chronic synovitis but no active swelling, nontender to palpation  Right shoulder abduction limited to 90 degrees, has a history of right shoulder fracture.  NEURO: Cranial nerves II-XII intact grossly. 5/5 strength throughout in both upper and lower extremities, sensation intact.  PSYCH: normal mood        LABS:     Component      Latest Ref Rng & Units 4/26/2020 1/24/2020   WBC      4.0 - 11.0 x10(3) uL 5.5 6.1   RBC      3.80 - 5.30 x10(6)uL 3.45 (L) 3.59 (L)   Hemoglobin      12.0 - 16.0 g/dL 11.1 (L) 11.3 (L)   Hematocrit      35.0 - 48.0 % 34.3 (L) 34.5 (L)   MCV      80.0 - 100.0 fL 99.4 96.1   MCH      26.0 - 34.0 pg 32.2 31.5   MCHC      31.0 - 37.0 g/dL 32.4 32.8   RDW-SD      35.1 - 46.3 fL 54.3 (H) 50.5 (H)   RDW      11.0 - 15.0 % 15.1 (H) 14.6   Platelet Count       150.0 - 450.0 10(3)uL 257.0 351.0   Prelim Neutrophil Abs      1.50 - 7.70 x10 (3) uL 3.24 4.16   Neutrophils Absolute      1.50 - 7.70 x10(3) uL 3.24 4.16   Lymphocytes Absolute      1.00 - 4.00 x10(3) uL 0.98 (L) 1.06   Monocytes Absolute      0.10 - 1.00 x10(3) uL 0.78 0.50   Eosinophils Absolute      0.00 - 0.70 x10(3) uL 0.39 0.30   Basophils Absolute      0.00 - 0.20 x10(3) uL 0.05 0.02   Immature Granulocyte Absolute      0.00 - 1.00 x10(3) uL 0.02 0.02   Neutrophils %      % 59.4 68.6   Lymphocytes %      % 17.9 17.5   Monocytes %      % 14.3 8.3   Eosinophils %      % 7.1 5.0   Basophils %      % 0.9 0.3   Immature Granulocyte %      % 0.4 0.3   CREATININE      0.55 - 1.02 mg/dL 1.07 (H) 1.19 (H)   eGFR NON-AFR. AMERICAN      >=60 53 (L) 47 (L)   eGFR       >=60 61 54 (L)   SED RATE      0 - 30 mm/Hr 28 37 (H)   C-REACTIVE PROTEIN      <0.30 mg/dL <0.29 <0.29   AST (SGOT)      15 - 37 U/L 19 49 (H)   ALT (SGPT)      13 - 56 U/L 25 82 (H)   Albumin      3.4 - 5.0 g/dL 3.4 3.5       Component      Latest Ref Rng & Units 1/16/2019   Quantiferon TB NIL      IU/mL 0.140   QUANTIFERON TB MINUS NIL      IU/mL 0.070   Quantiferon TB Mitogen minus NIL      IU/mL 8.977   QTB.RESULT      Negative Negative   HEPATITIS B SURF AB QUANT      <10.00 mIU/mL <3.10   HEPATITIS B SURFACE AB QUAL      Nonreactive  Nonreactive   GLUCOSE-6-PHOSPHATE DEHYDROGEN      9.9 - 16.6 U/g Hb 11.7   HEPATITIS B CORE ANTIBODY(S)      Nonreactive  Nonreactive   HCV AB      Nonreactive Nonreactive     Component      Latest Ref Rng & Units 3/16/2019 3/6/2019   Lupus Anticoag Screen Interp       Conclusion: Positive . . .    PT      11.8 - 14.5 seconds 13.0    APTT      23.2 - 35.3 seconds 30.3    Hexagonal Phase Staclot LA      Negative Positive (A)    DRVVT Ratio      0.0 - 1.1 1.4 (H)    DRVV Mix Ratio      0.0 - 1.1 1.4 (H)    DRVV Confirm      0.0 - 1.1 1.4 (H)    BETA 2 GLYCOPROTEIN 1 AB, IgM      <=15.0 U/mL 112.3 (H)  152.8 (H)   BETA 2 GLYCOPROTEIN 1 AB, IgG      <=15.0 U/mL 5.0 9.9   Cardiolipin IgG Antibody      0.0 - 14.9 GPL  3.9   Cardiolipin IgM Antibody      0.0 - 12.4 MPL  88.7 (H)     Component      Latest Ref Rng & Units 12/12/2018   Lupus Anticoag Screen Interp       Conclusion: Positive . . .   PT      11.8 - 14.5 seconds 12.9   APTT      23.2 - 35.3 seconds 29.2   Hexagonal Phase Staclot LA      Negative Positive (A)   DRVVT Ratio      0.0 - 1.1 1.2 (H)   DRVV Mix Ratio      0.0 - 1.1 1.3 (H)   DRVV Confirm      0.0 - 1.1 1.2 (H)   BETA 2 GLYCOPROTEIN 1 AB, IgM      <=15.0 U/mL 66.8 (H)   BETA 2 GLYCOPROTEIN 1 AB, IgG      <=15.0 U/mL 6.3   Cardiolipin IgG Antibody      0.0 - 14.9 GPL 6.2   Cardiolipin IgM Antibody      0.0 - 12.4 MPL 82.2 (H)       Component      Latest Ref Rng & Units 12/12/2018   TOTAL PROTEIN      6.5 - 9.1 g/dL 7.5   Albumin      3.75 - 5.21 g/dL 3.74 (L)   ALPHA-1-GLOBULINS      0.19 - 0.46 g/dL 0.41   ALPHA-2-GLOBULINS      0.48 - 1.05 g/dL 0.88   BETA GLOBULINS      0.68 - 1.23 g/dL 1.00   GAMMA GLOBULINS      0.62 - 1.70 g/dL 1.49   ALBUMIN/GLOBULIN RATIO      1.00 - 2.00 0.99 (L)   SPE INTERPRETATION       Minimally decreased albumin is present. . . .   Reviewed By:       Alexi Recio M.D.   C-Citrullinated Peptide IgG AB      0.0 - 6.9 U/mL 1.4   RHEUMATOID FACTOR      <11 IU/mL <5   COMPLEMENT C4      18 - 55 mg/dL 13 (L)   COMPLEMENT C3      88 - 201 mg/dL 98   Centromere Ab, IgG      0 - 40 AU/mL 6   Anti-Crespo/RNP Antibody      Negative Negative   Anti-Smith Antibody      Negative Negative   Anti-Sjogren's A      Negative Negative   Anti-Sjogren's B      Negative Negative   Scleroderma (Scl-70) (JAH) Antibody, IgG      0 - 40 AU/mL 3   Anti Double Strand DNA      <10 <10     Component      Latest Ref Rng & Units 12/12/2018 10/25/2018   ADRIEL Titer/Pattern      <80  640 (A)   Reviewed By:        Mich Martinez M.D.   ADRIEL SCREEN      Negative  Positive (A)   SED RATE      0 - 30 mm/Hr  46  (H)   RHEUMATOID FACTOR      <11 IU/mL  <5   C-REACTIVE PROTEIN      0.0 - 0.9 mg/dL 1.2 (H)        Imaging:     XR B/L hand 12/2018:  There is demineralization of the bony structures. Minimal osteoarthritic change is seen about the triscaphe joint as well as multiple interphalangeal joints. There is narrowing of the fifth MCP joint    L Venous US 12/4/18:  1. Extensive, mostly occlusive thrombus involving the more anterior of the 2 paired left peroneal veins with mild partial flow at the mid aspect. Otherwise, no evidence of left lower extremity deep venous thrombosis.  2. Unremarkable right lower extremity study. No evidence of right-sided DVT.    ASSESSMENT/PLAN:   74-year-old Belarusian-speaking female initially referred for a + ADRIEL and rash.  She was seen by dermatology and rashes consistent with psoriasis.  She was prescribed clobetasol cream and shampoo her scalp and her rash is improved.  She also reports joint pain and swelling in her wrists, MCPs and PIPs with morning stiffness.  On examination she initially had synovitis in her wrists and decreased range of motion.  Symptoms consistent with psoriatic arthritis.  She also was found to have an incidental L LE DVT diagnosed in December 2018.  Further workup was done and she was found to have + antiphospholipid antibodies.     Psoriasis with PsA- improved  - She was seen by dermatology and they were confident that she has psoriasis.  She was placed on clobetasol cream and shampoo her scalp and her lesions have improved significantly.  She reports her pruritus has improved also  - X-rays the wrist showed carpal narrowing and on examination she does have chronic synovitis in her wrists.  - Continue Cosentyx every 4 weeks  - Blood work reviewed, repeat in 6 mos   - TNFi CI due to dx of APL and +ADRIEL  - Xeljanz CI due to DVT    Osteoporosis   - managed by PCP  - Bone density was done 7/2024, showing osteoporosis in left femoral neck and osteopenia in left total hip    - she has a hx of right shoulder fracture  - she was on alendronate weekly, started 1 month ago but had diarrhea and dizziness  - last visit we discussed prolia, she was agreeable to start it, will get PA done. Risk/benefits of Prolia d/w patient which include: back pain, pain in the hands and feet, and increased cholesterol. Rare s/e include an increased risk for infections (especially if you’re already taking medications that lower your immune system), low calcium levels (called hypocalcemia), and osteonecrosis of the jaw. Will need to obtain calcium and vitamin D level prior to starting  - Advised patient that she can take calcium 1200 mg daily and vitamin D supplements.  Also advised weightbearing exercises    CKD stage 3  - She is following with nephrologist Dr. Hurd    R shoulder fracture s/p fall  APL  - She was found to have an incidental left DVT, ultrasound was done for screening of varicose veins  - APL Abs were positive (tripple +).  She is also found to have low C4  - She has no other features of lupus therefore diagnosis is mostly consistent with antiphospholipid syndrome.  Repeat testing 12 weeks apart confirm diagnosis of APL  - She is following with hematology, continue Coumadin    Per pt can contact daughter Taylor [phone]    Spent 40 minutes obtaining history, evaluating patient, reviewing labs, discussing treatment options and completing documentation    Pt will f/u in 6 mos    There is a longitudinal care relationship with me, the care plan reflects the ongoing nature of the continuous relationship of care, and the medical record indicates that there is ongoing treatment of a serious/complex medical condition which I am currently managing.  is Applicable.     Kelsey Lopez MD  4/7/2025  11:39 AM

## 2025-04-08 ENCOUNTER — TELEPHONE (OUTPATIENT)
Dept: RHEUMATOLOGY | Facility: CLINIC | Age: 75
End: 2025-04-08

## 2025-04-08 PROBLEM — M81.0 AGE-RELATED OSTEOPOROSIS WITHOUT CURRENT PATHOLOGICAL FRACTURE: Status: ACTIVE | Noted: 2025-04-08

## 2025-04-17 RX ORDER — METOPROLOL TARTRATE 100 MG/1
TABLET ORAL
Qty: 90 TABLET | Refills: 1 | Status: SHIPPED | OUTPATIENT
Start: 2025-04-17

## 2025-04-17 RX ORDER — SPIRONOLACTONE 25 MG/1
25 TABLET ORAL DAILY
Qty: 90 TABLET | Refills: 1 | Status: SHIPPED | OUTPATIENT
Start: 2025-04-17

## 2025-04-17 NOTE — TELEPHONE ENCOUNTER
Last office visit: 2/28/2025    Return to office: 6  months     Follow up appointment:  9/18/2025

## 2025-04-29 NOTE — TELEPHONE ENCOUNTER
PA start    Prior authorization for: Prolia    Medication form: 60 mg subcutaneous injection    Submission method: Availity    Reference Number: [id number]

## 2025-04-30 NOTE — TELEPHONE ENCOUNTER
PA Approved    Prior authorization for: Prolia () has been approved [id number]     Medication form: 60 mg subcutaneous injection    Reference #: [id number]    Approved dates: 05/05/2025 to 12/31/2025

## 2025-05-05 ENCOUNTER — ANTI-COAG VISIT (OUTPATIENT)
Dept: ANTICOAGULATION | Facility: CLINIC | Age: 75
End: 2025-05-05

## 2025-05-05 DIAGNOSIS — I82.532 CHRONIC DEEP VEIN THROMBOSIS OF LEFT POPLITEAL VEIN (HCC): ICD-10-CM

## 2025-05-05 DIAGNOSIS — D68.61 ANTIPHOSPHOLIPID SYNDROME (HCC): Primary | ICD-10-CM

## 2025-05-05 DIAGNOSIS — Z51.81 MONITORING FOR LONG-TERM ANTICOAGULANT USE: ICD-10-CM

## 2025-05-05 DIAGNOSIS — Z79.01 LONG TERM (CURRENT) USE OF ANTICOAGULANTS: ICD-10-CM

## 2025-05-05 DIAGNOSIS — Z79.01 MONITORING FOR LONG-TERM ANTICOAGULANT USE: ICD-10-CM

## 2025-05-05 LAB
INR: 2.4 (ref 2–3)
TEST STRIP EXPIRATION DATE: ABNORMAL DATE

## 2025-05-05 NOTE — PROGRESS NOTES
Face-to-Face  / INR 2.4,  therapeutic. (Goal 2.5 ) TTR 76.5 %     Etiology: stable. No change.     PLAN: continue the current dose.    Recheck INR 4 weeks.    Pt reports:    No sign of unusual bruising or bleeding.  Any missed doses: No   Medications changes: No  Diet changes:  No    Contacted  Julia verbalized understanding and agreement.    WARFARIN Plan per protocol: 7.5 mg every Mon, Wed, Fri; 5 mg all other days

## 2025-05-07 RX ORDER — ESCITALOPRAM OXALATE 5 MG/1
5 TABLET ORAL NIGHTLY
Qty: 90 TABLET | Refills: 3 | Status: SHIPPED | OUTPATIENT
Start: 2025-05-07

## 2025-05-07 RX ORDER — LEVOTHYROXINE SODIUM 100 UG/1
100 TABLET ORAL
Qty: 90 TABLET | Refills: 3 | Status: SHIPPED | OUTPATIENT
Start: 2025-05-07

## 2025-05-08 NOTE — TELEPHONE ENCOUNTER
Called daughter, Kelli, (on verbal release form) and she states she will call back later to schedule prolia injection.

## 2025-05-14 NOTE — TELEPHONE ENCOUNTER
Patient called and said she picked up medication below but was only given 12 asking if a new script can be sent because that wont be enough for her and requesting refills on medication     [id number] - [place], [place] - [address] [place] [address]     Medication Detail    Medication Quantity Refills Start End   levothyroxine 88 MCG Oral Tab 90 tablet 3 5/31/2024 --   Sig:   Take 1 tablet (88 mcg total) by mouth before breakfast.     Route:   Oral     Order #:   [id number]

## 2025-05-14 NOTE — TELEPHONE ENCOUNTER
Spoke with pharmacist who states insurance was not allowing for the 90 tablets with each refill, only 87 tablets. 12 tablets were all that was remaining with this last refill.  Patient needs a new prescription sent.

## 2025-05-15 RX ORDER — LEVOTHYROXINE SODIUM 88 UG/1
88 TABLET ORAL
Qty: 90 TABLET | Refills: 3 | Status: SHIPPED | OUTPATIENT
Start: 2025-05-15

## 2025-05-15 NOTE — TELEPHONE ENCOUNTER
Refill passed per Clinic protocol.  Requested Prescriptions   Pending Prescriptions Disp Refills    levothyroxine 88 MCG Oral Tab 90 tablet 3     Sig: Take 1 tablet (88 mcg total) by mouth before breakfast. Take with 100 mcg tablet before breakfast       Thyroid Medication Protocol Passed - 5/15/2025  9:55 AM        Passed - TSH in past 12 months        Passed - Last TSH value is normal     Lab Results   Component Value Date    TSH 1.513 02/03/2025    THYROIDFUNC 2.29 06/04/2016    TSHT4 0.58 01/21/2023                 Passed - In person appointment or virtual visit in the past 12 mos or appointment in next 3 mos     Recent Outpatient Visits              1 month ago Psoriatic arthritis ([facility])    [facility] Kelsey Lopez MD    Office Visit    2 months ago Chronic deep vein thrombosis of left popliteal vein ([facility])    [facility] Rajat Hurd MD    Office Visit    3 months ago B12 deficiency    [facility] Sara Valenzuela MD    Office Visit    3 months ago Chronic deep vein thrombosis of left popliteal vein ([facility])    [facility] Bharathi Quinonez MD    Office Visit    4 months ago Foot pain, right    [facility] Jovan Johnson MD    Office Visit          Future Appointments         Provider Department Appt Notes    In 2 weeks Halle Nassar, RN [facility]     In 1 month Sara Valenzuela MD [facility] [place] px    In 4 months Rajat Hurd MD [facility] Kidney function    In 4 months Kelsey Lopez MD [facility] 6 month f/u, policy informed    In 6 months Bharathi Quinonez MD [facility]  [facility]ai yearly                    Passed - Medication is active on med list

## 2025-05-20 RX ORDER — MINOXIDIL 2.5 MG/1
5 TABLET ORAL 2 TIMES DAILY
Qty: 360 TABLET | Refills: 3 | Status: SHIPPED | OUTPATIENT
Start: 2025-05-20

## 2025-05-22 RX ORDER — VALSARTAN 160 MG/1
160 TABLET ORAL 2 TIMES DAILY
Qty: 180 TABLET | Refills: 3 | Status: SHIPPED | OUTPATIENT
Start: 2025-05-22

## 2025-05-22 NOTE — TELEPHONE ENCOUNTER
Please review: medication fails/has no protocol attached.    Future Appointments   Date Time Provider Department Center   6/14/2025 10:30 AM Sara Valenzuela MD [id number]  [facility]

## 2025-06-02 ENCOUNTER — ANTI-COAG VISIT (OUTPATIENT)
Dept: ANTICOAGULATION | Facility: CLINIC | Age: 75
End: 2025-06-02

## 2025-06-02 DIAGNOSIS — I82.532 CHRONIC DEEP VEIN THROMBOSIS OF LEFT POPLITEAL VEIN (HCC): ICD-10-CM

## 2025-06-02 DIAGNOSIS — Z79.01 MONITORING FOR LONG-TERM ANTICOAGULANT USE: ICD-10-CM

## 2025-06-02 DIAGNOSIS — Z51.81 MONITORING FOR LONG-TERM ANTICOAGULANT USE: ICD-10-CM

## 2025-06-02 DIAGNOSIS — Z79.01 LONG TERM (CURRENT) USE OF ANTICOAGULANTS: ICD-10-CM

## 2025-06-02 DIAGNOSIS — D68.61 ANTIPHOSPHOLIPID SYNDROME (HCC): Primary | ICD-10-CM

## 2025-06-02 LAB
INR: 2.3 (ref 2–3)
TEST STRIP EXPIRATION DATE: NORMAL DATE

## 2025-06-02 PROCEDURE — 93793 ANTICOAG MGMT PT WARFARIN: CPT | Performed by: FAMILY MEDICINE

## 2025-06-02 PROCEDURE — 85610 PROTHROMBIN TIME: CPT | Performed by: FAMILY MEDICINE

## 2025-06-02 NOTE — PROGRESS NOTES
Face-to-Face  / INR 2.3,  therapeutic. (Goal 2.5 ) TTR 76.8 %     Etiology: stable.    PLAN: continue the current dose.    Recheck INR 4 weeks.    Pt reports:    No sign of unusual bruising or bleeding.  Any missed doses: No   Medications changes: No  Diet changes:  No    Contacted  Julia verbalized understanding and agreement.    WARFARIN Plan per protocol: 7.5 mg every Mon, Wed, Fri; 5 mg all other days

## 2025-06-03 RX ORDER — SIMVASTATIN 20 MG
20 TABLET ORAL
Qty: 36 TABLET | Refills: 0 | Status: SHIPPED | OUTPATIENT
Start: 2025-06-04

## 2025-06-03 NOTE — TELEPHONE ENCOUNTER
Refill passed per [facility] protocols.    memloom message sent to patient to complete follow-up labs outstanding.     Requested Prescriptions   Pending Prescriptions Disp Refills    SIMVASTATIN 20 MG Oral Tab [Pharmacy Med Name: Simvastatin 20 Mg Tab Nort] 36 tablet 0     Sig: Take one tablet by mouth 3 times a week  **Please complete labs for further refills       Cholesterol Medication Protocol Passed - 6/3/2025  1:01 PM

## 2025-06-04 PROBLEM — Z86.718 HISTORY OF DVT (DEEP VEIN THROMBOSIS): Status: ACTIVE | Noted: 2025-06-04

## 2025-06-04 PROBLEM — I82.532: Status: RESOLVED | Noted: 2024-09-03 | Resolved: 2025-06-04

## 2025-06-05 ENCOUNTER — TELEPHONE (OUTPATIENT)
Dept: INTERNAL MEDICINE CLINIC | Facility: CLINIC | Age: 75
End: 2025-06-05

## 2025-06-05 NOTE — TELEPHONE ENCOUNTER
Dental office calling, asking if we received form that was faxed yesterday requesting clearance for deep clean to be done. Can site staff confirm if received and then we will need to let dental office know outcome, if we can fill it out or if something more needed.     Please advise.

## 2025-06-05 NOTE — TELEPHONE ENCOUNTER
We have received the forms I sent a message back not sure if they received it that we cannot stop the Coumadin.  If dentist needs to stop the Coumadin they will need to bridge her with Lovenox.  She is too high risk.

## 2025-06-06 NOTE — TELEPHONE ENCOUNTER
The office the form is with Jeanine at the [place] office.  Please review the rest of the message.

## 2025-06-06 NOTE — TELEPHONE ENCOUNTER
Onsite Staff  Kelsie was called and inform of  message below and she stated that she has not received the form. If it can be refaxed to [phone] so they have a documentation on this patient chart. Thank you

## 2025-06-09 ENCOUNTER — NURSE TRIAGE (OUTPATIENT)
Dept: INTERNAL MEDICINE CLINIC | Facility: CLINIC | Age: 75
End: 2025-06-09

## 2025-06-09 NOTE — TELEPHONE ENCOUNTER
Spoke to patient, full name and date of birth verified.  With [employer]  Shakila ID # [id number].    Patient stated she has not missed any blood pressure medications.   Patient stated she uses a \"pink\" salt, but does not remember the name.     Patient agreed to schedule an appointment 6/19/25 with Dr. Valenzuela.     Patient asked about her other appointment - RN explained her complete physical is scheduled for 8/25/25 and patient also needs to keep this appointment with Dr. Valenzuela.     RN recommended patient decrease the salt she uses, this will also help lower her blood pressure.     Patient verbalized understanding and agreement with plan of care.

## 2025-06-09 NOTE — TELEPHONE ENCOUNTER
Action Requested: Summary for Provider     []  Critical Lab, Recommendations Needed  [x] Need Additional Advice  []   FYI    []   Need Orders  [] Need Medications Sent to Pharmacy  []  Other     SUMMARY: Patient reports blood pressure running high over past 4 days.  Getting readings:  183/73, pulse 56, 4 days ago.  Today 155/68, pulse 59.  Rechecked 186/68, pulse 76.  States left arm gives lower blood pressure than right arm.  Sometimes feels headachy or a little dizzy.  Denies vision problems.  She had to cancel appointment scheduled for 6/14/2025.    She is asking Dr Valenzuela if she can come in for a blood pressure check tomorrow?  Dr Valenzuela please advise? No open appointments.    Reason for call: Acute Elevated blood pressure readings  Onset: past 4 days.      Language [place], [place] [id number], Bulgarian  Spoke with patient.    Since past 4 days, blood pressure running high.Had to cancel appointment, rescheduled to August.    Indapamide 2.5 mg twice daily  Valsasrtan 160 mg twice daily  Metoprolol 100 mg in morning, 50 mg in afternoon.  Minoxidil 2.5 mg, two tablets in twice daily (patient states taking 1 in morning, 1 at night.)  Spironolactone 25 mg one tablet daily.    Reason for Disposition   Systolic BP >= 180 OR Diastolic >= 110    Protocols used: Blood Pressure - High-A-OH

## 2025-06-09 NOTE — TELEPHONE ENCOUNTER
Dr. Valenzuela - can patient be fit in tomorrow or should a nurse only visit be scheduled. Please advise.

## 2025-06-09 NOTE — TELEPHONE ENCOUNTER
Please check with Dr. Thompson not sure if she will be able to squeeze her in tomorrow but she definitely will need to be seen

## 2025-06-12 ENCOUNTER — NURSE ONLY (OUTPATIENT)
Dept: INTERNAL MEDICINE CLINIC | Facility: CLINIC | Age: 75
End: 2025-06-12

## 2025-06-12 VITALS — HEART RATE: 62 BPM | SYSTOLIC BLOOD PRESSURE: 168 MMHG | DIASTOLIC BLOOD PRESSURE: 65 MMHG

## 2025-06-12 DIAGNOSIS — Z01.30 BP CHECK: Primary | ICD-10-CM

## 2025-06-12 PROCEDURE — 3078F DIAST BP <80 MM HG: CPT | Performed by: INTERNAL MEDICINE

## 2025-06-12 PROCEDURE — 3077F SYST BP >= 140 MM HG: CPT | Performed by: INTERNAL MEDICINE

## 2025-06-19 ENCOUNTER — OFFICE VISIT (OUTPATIENT)
Dept: INTERNAL MEDICINE CLINIC | Facility: CLINIC | Age: 75
End: 2025-06-19

## 2025-06-19 VITALS
SYSTOLIC BLOOD PRESSURE: 158 MMHG | OXYGEN SATURATION: 100 % | BODY MASS INDEX: 37.83 KG/M2 | TEMPERATURE: 97 F | DIASTOLIC BLOOD PRESSURE: 69 MMHG | HEART RATE: 64 BPM | WEIGHT: 241 LBS | HEIGHT: 67 IN

## 2025-06-19 DIAGNOSIS — N18.31 STAGE 3A CHRONIC KIDNEY DISEASE (HCC): Chronic | ICD-10-CM

## 2025-06-19 DIAGNOSIS — N18.30 TYPE 2 DIABETES MELLITUS WITH STAGE 3 CHRONIC KIDNEY DISEASE, WITH LONG-TERM CURRENT USE OF INSULIN, UNSPECIFIED WHETHER STAGE 3A OR 3B CKD (HCC): ICD-10-CM

## 2025-06-19 DIAGNOSIS — E53.8 B12 DEFICIENCY: Primary | ICD-10-CM

## 2025-06-19 DIAGNOSIS — E11.22 TYPE 2 DIABETES MELLITUS WITH STAGE 3 CHRONIC KIDNEY DISEASE, WITH LONG-TERM CURRENT USE OF INSULIN, UNSPECIFIED WHETHER STAGE 3A OR 3B CKD (HCC): ICD-10-CM

## 2025-06-19 DIAGNOSIS — E03.9 HYPOTHYROIDISM (ACQUIRED): ICD-10-CM

## 2025-06-19 DIAGNOSIS — M85.89 OSTEOPENIA OF MULTIPLE SITES: ICD-10-CM

## 2025-06-19 DIAGNOSIS — Z79.4 TYPE 2 DIABETES MELLITUS WITH STAGE 3 CHRONIC KIDNEY DISEASE, WITH LONG-TERM CURRENT USE OF INSULIN, UNSPECIFIED WHETHER STAGE 3A OR 3B CKD (HCC): ICD-10-CM

## 2025-06-19 DIAGNOSIS — I10 ESSENTIAL HYPERTENSION WITH GOAL BLOOD PRESSURE LESS THAN 130/80: ICD-10-CM

## 2025-06-19 DIAGNOSIS — Z71.85 VACCINE COUNSELING: ICD-10-CM

## 2025-06-19 PROCEDURE — 3008F BODY MASS INDEX DOCD: CPT | Performed by: INTERNAL MEDICINE

## 2025-06-19 PROCEDURE — 3077F SYST BP >= 140 MM HG: CPT | Performed by: INTERNAL MEDICINE

## 2025-06-19 PROCEDURE — 3078F DIAST BP <80 MM HG: CPT | Performed by: INTERNAL MEDICINE

## 2025-06-19 PROCEDURE — G2211 COMPLEX E/M VISIT ADD ON: HCPCS | Performed by: INTERNAL MEDICINE

## 2025-06-19 PROCEDURE — 1160F RVW MEDS BY RX/DR IN RCRD: CPT | Performed by: INTERNAL MEDICINE

## 2025-06-19 PROCEDURE — 1126F AMNT PAIN NOTED NONE PRSNT: CPT | Performed by: INTERNAL MEDICINE

## 2025-06-19 PROCEDURE — 1159F MED LIST DOCD IN RCRD: CPT | Performed by: INTERNAL MEDICINE

## 2025-06-19 PROCEDURE — 99215 OFFICE O/P EST HI 40 MIN: CPT | Performed by: INTERNAL MEDICINE

## 2025-06-19 RX ORDER — INSULIN GLARGINE 100 [IU]/ML
INJECTION, SOLUTION SUBCUTANEOUS
Qty: 45 ML | Refills: 3 | Status: SHIPPED | OUTPATIENT
Start: 2025-06-19

## 2025-06-19 RX ORDER — GLIPIZIDE 10 MG/1
TABLET ORAL
Qty: 90 TABLET | Refills: 3 | Status: SHIPPED | OUTPATIENT
Start: 2025-06-19

## 2025-06-19 RX ORDER — MINOXIDIL 2.5 MG/1
TABLET ORAL
Qty: 360 TABLET | Refills: 3 | Status: SHIPPED | OUTPATIENT
Start: 2025-06-19

## 2025-06-19 NOTE — PROGRESS NOTES
HPI:    Patient ID: Julia Hernandez is a 75 year old female.    Chief Complaint   Patient presents with    Blood Pressure     Patient states she is here for an BP follow up.         Patient here for follow up of Diabetes.  Has been taking medications regularly.    Checks sugars 1 times daily.  Fasting sugars average 110-130's. Occ. 60's in AM. Lunch at 10 AM and dinner at 4 PM.   Watches diabetic diet, low salt.  Diabetic Flow sheet      6/19/2025    10:25 AM 6/19/2025     9:40 AM 6/19/2025     9:36 AM 6/12/2025     9:26 AM   DIABETIC FLOWSHEET (EE)   BMI   37.75 kg/m2    Valsartan 160 mg BID  mg BID OR  160 mg BID OR  160 mg BID OR    Weight (enc vitals)   241 lb    BP (enc vitals)  158/69 147/65 168/65        HISTORY OF DIABETES COMPLICATIONS: :  History of Retinopathy:   History of Neuropathy:   History of Nephropathy:      ASSOCIATED COMPLICATIONS:   HTN:   Hyperlipidemia:   Coronary Artery Disease:  CAD,  HF, PAD  Cerebrovascular Disease:         HOME GLUCOSE READINGS:   Fasting:   No specific pattern  Other days BG are between  Patient denies any co relation with meals/ activity     CURRENT DIABETIC MEDICATIONS INCLUDE:       MEALS:   meals a day  Cooks at home    EXERCISE:    Wt Readings from Last 3 Encounters:   06/19/25 241 lb (109.3 kg)   04/07/25 238 lb (108 kg)   02/28/25 248 lb (112.5 kg)     BP Readings from Last 3 Encounters:   06/19/25 158/69   06/12/25 (!) 168/65   04/07/25 138/65     Labs:   Lab Results   Component Value Date/Time    GLU 78 02/03/2025 03:07 PM     02/03/2025 03:07 PM    K 4.8 02/03/2025 03:07 PM     02/03/2025 03:07 PM    CO2 30.0 02/03/2025 03:07 PM    CREATSERUM 1.40 (H) 04/01/2025 08:13 AM    CA 9.2 02/03/2025 03:07 PM    AST 20 04/01/2025 08:13 AM    ALT 11 04/01/2025 08:13 AM    TSH 1.513 02/03/2025 03:07 PM    T4F 1.6 02/15/2024 02:35 PM        Lab Results   Component Value Date/Time    CHOLEST 167 02/03/2025 03:07 PM    HDL 55 02/03/2025 03:07  PM    TRIG 121 02/03/2025 03:07 PM    LDL 91 02/03/2025 03:07 PM    NONHDLC 112 02/03/2025 03:07 PM       Lab Results   Component Value Date/Time    A1C 6.7 (H) 02/03/2025 03:07 PM      Lab Results   Component Value Date    VITD 52.0 07/23/2024 6/19/2025    10:25 AM 6/19/2025     9:40 AM 6/19/2025     9:36 AM 6/12/2025     9:26 AM   DIABETIC FLOWSHEET ([facility])   BMI   37.75 kg/m2    Valsartan 160 mg BID  mg BID OR  160 mg BID OR  160 mg BID OR    Weight (enc vitals)   241 lb    BP (enc vitals)  158/69 147/65 168/65     -Re: potential DM medication contraindication (if positive, checkbox selected):  [] History of pancreatitis  [] Personal/fam hx of medullary thyroid cancer/MEN2 Paternal grandmother had thyroid removed, lived to be 80 years old, was secretive about their health conditions, does not know if they had thyroid cancer dx  [] History of recent/frequent UTI/yeast infxn  [] Previous amputation related to diabetes  [] Hx of EVHY1e-bcemusu euglycemic DKA     -Presence of associated DM complications (if positive, checkbox selected):  [] Macrovascular complications (CAD/CVA/PAD)  [] Neuropathy  [] Retinopathy  [] Nephropathy  [x] HTN  [x] Hyperlipidemia  [] Stroke/TIA  [] Gastroparesis  [] Wound, ulcer or amputations     Hypertension  Patient is here for follow up of hypertension. BP at home: same.   Has been compliant with medications.  Exercise level: somewhat active (treadmill X 25 minutes at qday) and has been following low salt diet.  Weight has been stable. Cooks more. Occ. sea salt.   Wt Readings from Last 3 Encounters:   06/19/25 241 lb (109.3 kg)   04/07/25 238 lb (108 kg)   02/28/25 248 lb (112.5 kg)     BP Readings from Last 3 Encounters:   06/19/25 158/69   06/12/25 (!) 168/65   04/07/25 138/65     Labs:   Lab Results   Component Value Date/Time    GLU 78 02/03/2025 03:07 PM     02/03/2025 03:07 PM    K 4.8 02/03/2025 03:07 PM     02/03/2025 03:07 PM    CO2 30.0 02/03/2025  03:07 PM    CREATSERUM 1.40 (H) 04/01/2025 08:13 AM    CA 9.2 02/03/2025 03:07 PM    AST 20 04/01/2025 08:13 AM    ALT 11 04/01/2025 08:13 AM    TSH 1.513 02/03/2025 03:07 PM    T4F 1.6 02/15/2024 02:35 PM        Lab Results   Component Value Date/Time    CHOLEST 167 02/03/2025 03:07 PM    HDL 55 02/03/2025 03:07 PM    TRIG 121 02/03/2025 03:07 PM    LDL 91 02/03/2025 03:07 PM    NONHDLC 112 02/03/2025 03:07 PM            Wt Readings from Last 3 Encounters:   06/19/25 241 lb (109.3 kg)   04/07/25 238 lb (108 kg)   02/28/25 248 lb (112.5 kg)     BP Readings from Last 3 Encounters:   06/19/25 158/69   06/12/25 (!) 168/65   04/07/25 138/65     Labs:   Lab Results   Component Value Date/Time    GLU 78 02/03/2025 03:07 PM     02/03/2025 03:07 PM    K 4.8 02/03/2025 03:07 PM     02/03/2025 03:07 PM    CO2 30.0 02/03/2025 03:07 PM    CREATSERUM 1.40 (H) 04/01/2025 08:13 AM    CA 9.2 02/03/2025 03:07 PM    AST 20 04/01/2025 08:13 AM    ALT 11 04/01/2025 08:13 AM    TSH 1.513 02/03/2025 03:07 PM    T4F 1.6 02/15/2024 02:35 PM        Lab Results   Component Value Date/Time    CHOLEST 167 02/03/2025 03:07 PM    HDL 55 02/03/2025 03:07 PM    TRIG 121 02/03/2025 03:07 PM    LDL 91 02/03/2025 03:07 PM    NONHDLC 112 02/03/2025 03:07 PM          HPI      Review of Systems   Constitutional:  Negative for activity change, appetite change, chills, diaphoresis, fatigue, fever and unexpected weight change.   Respiratory:  Negative for apnea, cough, choking, chest tightness, shortness of breath, wheezing and stridor.    Cardiovascular:  Negative for chest pain, palpitations and leg swelling.   Gastrointestinal:  Negative for abdominal distention and abdominal pain.   Endocrine: Negative for cold intolerance, heat intolerance, polydipsia, polyphagia and polyuria.   Neurological:  Negative for dizziness, tremors, seizures, syncope, facial asymmetry, speech difficulty, weakness, light-headedness, numbness and headaches.   All  other systems reviewed and are negative.        Current Outpatient Medications   Medication Sig Dispense Refill    insulin glargine (LANTUS SOLOSTAR) 100 UNIT/ML Subcutaneous Solution Pen-injector INJECT 45 UNITS IN THE night. 45 mL 3    glipiZIDE 10 MG Oral Tab 1/2 tab pre lunch. 90 tablet 3    minoxidil 2.5 MG Oral Tab 5 mg a day and 2.5 mg a day. 360 tablet 3    simvastatin 20 MG Oral Tab Take 1 tablet (20 mg total) by mouth 3 (three) times a week. **Please complete labs for further refills 36 tablet 0    valsartan 160 MG Oral Tab Take 1 tablet (160 mg total) by mouth 2 (two) times daily. 180 tablet 3    levothyroxine 88 MCG Oral Tab Take 1 tablet (88 mcg total) by mouth before breakfast. Take with 100 mcg tablet before breakfast 90 tablet 3    levothyroxine 100 MCG Oral Tab Take 1 tablet (100 mcg total) by mouth before breakfast. 90 tablet 3    escitalopram 5 MG Oral Tab Take 1 tablet (5 mg total) by mouth nightly. 90 tablet 3    METOPROLOL TARTRATE 100 MG Oral Tab Take one tablet by mouth in the morning and a half  tablet in the afternoon 90 tablet 1    SPIRONOLACTONE 25 MG Oral Tab TOME 1 TABLETA DIARIAMENTE 90 tablet 1    clobetasol 0.05 % External Solution Apply 1 Application topically 2 (two) times daily. 1 each 0    Betamethasone Dipropionate Aug 0.05 % External Cream Apply 30 g topically 2 (two) times daily. 30 g 2    warfarin 5 MG Oral Tab Take as directed by INR clinic or take 1 AND 1/2 TABLETS ON  Tuesdays & Thursdays, take 1 TABLET all other days 110 tablet 11    albuterol 108 (90 Base) MCG/ACT Inhalation Aero Soln Inhale 1 puff into the lungs every 6 (six) hours as needed for Wheezing. 1 each 1    indapamide 2.5 MG Oral Tab Take 1 tablet (2.5 mg total) by mouth daily and every night. 180 tablet 3    Insulin Pen Needle (TRUEPLUS 5-BEVEL PEN NEEDLES) 32G X 4 MM Does not apply Misc Injects at bedtime 90 each 3    Glucose Blood (ACCU-CHEK GUIDE) In Vitro Strip 1 strip by In Vitro route 2 (two) times  daily. 200 strip 3    Secukinumab, 300 MG Dose, (COSENTYX SENSOREADY, 300 MG,) 150 MG/ML Subcutaneous Solution Auto-injector Inject 300 mg into the skin every 28 days. 2 each 2    albuterol (2.5 MG/3ML) 0.083% Inhalation Nebu Soln Take 3 mL (2.5 mg total) by nebulization every 6 (six) hours as needed for Wheezing. 50 each 0    Insulin Pen Needle (BD PEN NEEDLE ORIGINAL U/F) 29G X 12.7MM Does not apply Misc Dx. E11.9. Checks q12hrs. BD ultrafine MINI. 100 each 6    ACCU-CHEK FASTCLIX LANCETS Does not apply Misc CHECK EVERY MORNING AND EVENING 204 each 11    Omega-3-acid Ethyl Esters (LOVAZA) 1 G Oral Cap Take 1 capsule (1 g total) by mouth 2 (two) times daily with meals.      Dextromethorphan-guaiFENesin (TUSSIN COUGH DM OR) Take by mouth. (Patient not taking: Reported on 6/19/2025)      fluticasone-salmeterol (WIXELA INHUB) 250-50 MCG/ACT Inhalation Aerosol Powder, Breath Activated Inhale 1 puff into the lungs 2 (two) times daily. Follow with gargle rinse and spit (Patient not taking: Reported on 6/19/2025) 1 each 5    enoxaparin 100 MG/ML Injection Solution Prefilled Syringe Inject 1 mL (100 mg total) into the skin 2 (two) times daily. 4 each 0    fluticasone furoate-vilanterol (BREO ELLIPTA) 100-25 MCG/ACT Inhalation Aerosol Powder, Breath Activated Inhale 1 puff into the lungs daily. Rinse your mouth with water without swallowing after using BREO to help reduce your chance of getting thrush. (Patient not taking: Reported on 6/19/2025) 1 each 5    PEG 3350-KCl-Na Bicarb-NaCl (TRILYTE) 420 g Oral Recon Soln Take prep as directed by gastro office. May substitute with Trilyte/generic equivalent if needed. (Patient not taking: Reported on 6/19/2025) 1 each 0    dicyclomine 10 MG Oral Cap Take 1 capsule (10 mg total) by mouth 2 (two) times daily as needed. (Patient not taking: Reported on 6/19/2025) 60 capsule 1    Alcohol Swabs Does not apply Pads Code: E11.9.  Checks twice daily. (Patient not taking: Reported on  6/19/2025) 100 each 11     Allergies:  Allergies   Allergen Reactions    Amlodipine SHORTNESS OF BREATH     Chest pressure.  Patient does not recall having any allergies       HISTORY:  Past Medical History:    Appendicitis    Cholelithiasis    Deep vein thrombosis (HCC)    L leg     Diabetes (HCC)    Diabetes mellitus (HCC)    Disorder of thyroid    Essential hypertension    Goiter    Multinodular goiter S/P thyroidectomy.     High blood pressure    High cholesterol    Osteopenia    Vertigo, aural      Past Surgical History:   Procedure Laterality Date    Appendectomy  1993    Cataract extraction w/  intraocular lens implant Right 1988    in [place]    Cholecystectomy  2006    Colonoscopy N/A 8/12/2020    Procedure: COLONOSCOPY;  Surgeon: José Antonio Fletcher MD;  Location: [facility] ENDOSCOPY    Colonoscopy N/A 6/14/2024    Procedure: COLONOSCOPY;  Surgeon: José Antonio Fletcher MD;  Location: [facility] ENDOSCOPY    Electrocardiogram, complete  01/03/2014    SCANNED TO MEDIA TAB - 01/03/2014    Fracture surgery Left     LEFT FOOT SURGERY WITH HARDWARE    Knee scope,abrasn arthroplasty Right 10-4-16    Thyroidectomy  08/17/15    TOTAL    Tubal ligation        Family History   Problem Relation Age of Onset    Diabetes Mother     Hypertension Mother     Dementia Mother         Alzheimer's Disease    Diabetes Brother     Lipids Brother     Hypertension Brother     Diabetes Paternal Uncle     Heart Disease Father         CAD    Diabetes Other     Dementia Other     No Known Problems Self     No Known Problems Sister     No Known Problems Daughter     No Known Problems Maternal Grandmother     No Known Problems Paternal Grandmother     No Known Problems Maternal Aunt     No Known Problems Paternal Aunt     No Known Problems Maternal Cousin Female     No Known Problems Maternal Cousin Male     No Known Problems Paternal Cousin Female     No Known Problems Paternal Cousin Male     Colon Cancer Son 43    Glaucoma Neg     Macular degeneration  Neg     Breast Cancer Neg     Ovarian Cancer Neg     DCIS Neg     LCIS Neg     BRCA gene + Neg     Ashkenazi [employer] Descent Neg       Social History:   Social History     Socioeconomic History    Marital status:     Number of children: 4   Tobacco Use    Smoking status: Never     Passive exposure: Never    Smokeless tobacco: Never   Vaping Use    Vaping status: Never Used   Substance and Sexual Activity    Alcohol use: No    Drug use: No    Sexual activity: Yes     Partners: Male   Other Topics Concern    Caffeine Concern Yes     Comment: Coffee, tea - 2 cups per day     Reaction to local anesthetic No        PHYSICAL EXAM:   /69 (BP Location: Left arm, Patient Position: Sitting, Cuff Size: large)   Pulse 64   Temp 97.3 °F (36.3 °C) (Temporal)   Ht 5' 7\" (1.702 m)   Wt 241 lb (109.3 kg)   SpO2 100%   BMI 37.75 kg/m²   BP Readings from Last 3 Encounters:   06/19/25 158/69   06/12/25 (!) 168/65   04/07/25 138/65     Wt Readings from Last 3 Encounters:   06/19/25 241 lb (109.3 kg)   04/07/25 238 lb (108 kg)   02/28/25 248 lb (112.5 kg)       Physical Exam  Vitals and nursing note reviewed.   Constitutional:       General: She is not in acute distress.     Appearance: Normal appearance. She is well-developed. She is not ill-appearing, toxic-appearing or diaphoretic.      Interventions: She is not intubated.  Neck:      Thyroid: No thyroid mass or thyromegaly.      Trachea: Trachea and phonation normal.   Cardiovascular:      Rate and Rhythm: Normal rate and regular rhythm.      Pulses: Normal pulses. No decreased pulses.           Carotid pulses are 2+ on the right side and 2+ on the left side.       Radial pulses are 2+ on the right side and 2+ on the left side.        Dorsalis pedis pulses are 2+ on the right side and 2+ on the left side.        Posterior tibial pulses are 2+ on the right side and 2+ on the left side.      Heart sounds: Normal heart sounds, S1 normal and S2 normal.   Pulmonary:       Effort: Pulmonary effort is normal. No tachypnea, bradypnea, accessory muscle usage, prolonged expiration, respiratory distress or retractions. She is not intubated.      Breath sounds: Normal breath sounds and air entry. No stridor, decreased air movement or transmitted upper airway sounds. No decreased breath sounds, wheezing, rhonchi or rales.   Chest:      Chest wall: No tenderness.   Abdominal:      General: There is no distension.      Palpations: Abdomen is soft.      Tenderness: There is no abdominal tenderness.   Musculoskeletal:      Right lower leg: No edema.      Left lower leg: No edema.   Skin:     General: Skin is warm and dry.   Neurological:      Mental Status: She is alert and oriented to person, place, and time.   Psychiatric:         Behavior: Behavior normal. Behavior is cooperative.         Thought Content: Thought content normal.              ASSESSMENT/PLAN:     Encounter Diagnoses   Name Primary?    B12 deficiency Check blood.    Yes    Type 2 diabetes mellitus with stage 3 chronic kidney disease, with long-term current use of insulin, unspecified whether stage 3a or 3b CKD (HCC) Lower glipizide to half a tablet prelunch which is the biggest meal.  Decrease insulin to 45 units prebed.  Call with sugars in 1 week. Careful with diet and excercise at least 30 minutes 3-4 times a week. Check sugars at different times on different dates. Careful with low sugars. Carry something with you and check sugar if can. Can carry carolyne cracker, etc. Decrease carbohydrates. But also, careful with fruits and natural sugars.One serving a day and no more than 1 handful every day. Check feet  every AM and careful with sores and ulcers on feet bilaterally. Check eyes every year with dilated eye exam.  Check sugars.  2-hour postmeal should be less than 140s.  Pre-meal should be 's.  Both equally affected A1c.  Discussed importance of glycemic control to prevent complications of diabetes  -Discussed  complications of diabetes include retinopathy, neuropathy, nephropathy and cardiovascular disease  -Discussed ABCs of DM  -Discussed importance of SBGM  -Discussed importance of low CHO diet, recommend 45gm per meal or 135gm per day  - Follow-up with optho       Stage 3a chronic kidney disease (HCC) No motrin, ibuprofen, advil, alleve, naprosyn  with these medications.         Hypothyroidism (acquired)Stable.        Essential hypertension with goal blood pressure less than 130/80. Higher. Increase minoxidil 5 milligrams in the morning and 2.5 mg in the evening.  Call in 1 week.  Stay on same other medications otherwise.Careful with diet and excercise at least 30 minutes 3-4 times a week. Check blood pressures at different times on different days. Can purchase own blood pressure monitor. If not, check at local pharmacy. Bake foods more and grill occasionally. Avoid fried foods. No salt. Use other seasonings.         Vaccine counseling Recommend RSV vaccine.  Would check on insurance coverage at local pharmacy.  RSV is a one-time vaccine as of now.  Potential side effects with RSV vaccine are low-grade fevers body aches etc.  Also would recommend flu shot.  Separate from RSV vaccine by 2 weeks.  Also would recommend new COVID-vaccine.  Separate from other 2 vaccines by 2 weeks.        Osteopenia of multiple sites Check dexa. Take calcium 600 every 12 hrs. With vitamin D 400 IU every  12 hrs. Excerise at least 30 minutes 3-4 times a week. May use calcium citrate as opposed to calcium carbonate which may be better absorbed in the setting of PPI use.  The preferred calcium supplement for people at risk of stone formation is calcium citrate because it helps to increase urinary citrate excretion. We recommend a dose of 200-400 mg if dietary calcium cannot be increased.   lifelong physical activity at all ages is strongly endorsed by the National Osteoporosis Foundation. Exercise recommendations generally should include  weight-bearing, muscle-strengthening, and balance training exercises for 30 minutes 5 days per week or 75 minutes twice weekly, often consistent with other general health recommendations.   Weight Bearing  There are two types of osteoporosis exercises that are important for building and maintaining bone density: weight-bearing and muscle-strengthening exercises.  Weight-bearing Exercises  These exercises include activities that make you move against gravity while staying upright. Weight-bearing exercises can be high-impact or low-impact.  High-impact weight-bearing exercises help build bones and keep them strong. If you have broken a bone due to osteoporosis or are at risk of breaking a bone, you may need to avoid high-impact exercises. If you’re not sure, you should check with your healthcare provider.  Examples of high-impact weight-bearing exercises are:  Dancing   Doing high-impact aerobics   Hiking   Jogging/running   Jumping Rope   Stair climbing   Tennis  Low-impact weight-bearing exercises can also help keep bones strong and are a safe alternative if you cannot do high-impact exercises. Examples of low-impact weight-bearing exercises are:  Using elliptical training machines   Doing low-impact aerobics   Using stair-step machines   Fast walking on a treadmill or outside           Mammogram screening.  Due for a follow-up mammogram 2025.    No orders of the defined types were placed in this encounter.      Meds This Visit:  Requested Prescriptions     Signed Prescriptions Disp Refills    insulin glargine (LANTUS SOLOSTAR) 100 UNIT/ML Subcutaneous Solution Pen-injector 45 mL 3     Sig: INJECT 45 UNITS IN THE night.    glipiZIDE 10 MG Oral Tab 90 tablet 3     Si/2 tab pre lunch.    minoxidil 2.5 MG Oral Tab 360 tablet 3     Si mg a day and 2.5 mg a day.       Imaging & Referrals:  OPHTHALMOLOGY - EXTERNAL      Has set phil't.

## 2025-06-19 NOTE — PATIENT INSTRUCTIONS
ASSESSMENT/PLAN:     Encounter Diagnoses   Name Primary?    B12 deficiency Check blood.    Yes    Type 2 diabetes mellitus with stage 3 chronic kidney disease, with long-term current use of insulin, unspecified whether stage 3a or 3b CKD (HCC) Lower glipizide to half a tablet prelunch which is the biggest meal.  Decrease insulin to 45 units prebed.  Call with sugars in 1 week.Careful with diet and excercise at least 30 minutes 3-4 times a week. Check sugars at different times on different dates. Careful with low sugars. Carry something with you and check sugar if can. Can carry carolyne cracker, etc. Decrease carbohydrates. But also, careful with fruits and natural sugars.One serving a day and no more than 1 handful every day. Check feet  every AM and careful with sores and ulcers on feet bilaterally. Check eyes every year with dilated eye exam.  Check sugars.  2-hour postmeal should be less than 140s.  Pre-meal should be 's.  Both equally affected A1c.  Discussed importance of glycemic control to prevent complications of diabetes  -Discussed complications of diabetes include retinopathy, neuropathy, nephropathy and cardiovascular disease  -Discussed ABCs of DM  -Discussed importance of SBGM  -Discussed importance of low CHO diet, recommend 45gm per meal or 135gm per day  - Follow-up with optho       Stage 3a chronic kidney disease (HCC) No motrin, ibuprofen, advil, alleve, naprosyn  with these medications.         Hypothyroidism (acquired)Stable.        Essential hypertension with goal blood pressure less than 130/80. Higher. Increase minoxidil 5 milligrams in the morning and 2.5 mg in the evening.  Call in 1 week.  Stay on same other medications otherwise.Careful with diet and excercise at least 30 minutes 3-4 times a week. Check blood pressures at different times on different days. Can purchase own blood pressure monitor. If not, check at local pharmacy. Bake foods more and grill occasionally. Avoid  fried foods. No salt. Use other seasonings.         Vaccine counseling Recommend RSV vaccine.  Would check on insurance coverage at local pharmacy.  RSV is a one-time vaccine as of now.  Potential side effects with RSV vaccine are low-grade fevers body aches etc.  Also would recommend flu shot.  Separate from RSV vaccine by 2 weeks.  Also would recommend new COVID-vaccine.  Separate from other 2 vaccines by 2 weeks.        Osteopenia of multiple sites Check dexa. Take calcium 600 every 12 hrs. With vitamin D 400 IU every  12 hrs. Excerise at least 30 minutes 3-4 times a week. May use calcium citrate as opposed to calcium carbonate which may be better absorbed in the setting of PPI use.  The preferred calcium supplement for people at risk of stone formation is calcium citrate because it helps to increase urinary citrate excretion. We recommend a dose of 200-400 mg if dietary calcium cannot be increased.   lifelong physical activity at all ages is strongly endorsed by the National Osteoporosis Foundation. Exercise recommendations generally should include weight-bearing, muscle-strengthening, and balance training exercises for 30 minutes 5 days per week or 75 minutes twice weekly, often consistent with other general health recommendations.   Weight Bearing  There are two types of osteoporosis exercises that are important for building and maintaining bone density: weight-bearing and muscle-strengthening exercises.  Weight-bearing Exercises  These exercises include activities that make you move against gravity while staying upright. Weight-bearing exercises can be high-impact or low-impact.  High-impact weight-bearing exercises help build bones and keep them strong. If you have broken a bone due to osteoporosis or are at risk of breaking a bone, you may need to avoid high-impact exercises. If you’re not sure, you should check with your healthcare provider.  Examples of high-impact weight-bearing exercises are:  Dancing    Doing high-impact aerobics   Hiking   Jogging/running   Jumping Rope   Stair climbing   Tennis  Low-impact weight-bearing exercises can also help keep bones strong and are a safe alternative if you cannot do high-impact exercises. Examples of low-impact weight-bearing exercises are:  Using elliptical training machines   Doing low-impact aerobics   Using stair-step machines   Fast walking on a treadmill or outside           Mammogram screening.  Due for a follow-up mammogram 2025.    No orders of the defined types were placed in this encounter.      Meds This Visit:  Requested Prescriptions     Signed Prescriptions Disp Refills    insulin glargine (LANTUS SOLOSTAR) 100 UNIT/ML Subcutaneous Solution Pen-injector 45 mL 3     Sig: INJECT 45 UNITS IN THE night.    glipiZIDE 10 MG Oral Tab 90 tablet 3     Si/2 tab pre lunch.    minoxidil 2.5 MG Oral Tab 360 tablet 3     Si mg a day and 2.5 mg a day.       Imaging & Referrals:  OPHTHALMOLOGY - EXTERNAL      Has set phil't.

## 2025-06-27 ENCOUNTER — TELEPHONE (OUTPATIENT)
Dept: INTERNAL MEDICINE CLINIC | Facility: CLINIC | Age: 75
End: 2025-06-27

## 2025-06-27 NOTE — TELEPHONE ENCOUNTER
Dr Valenzuela,    Please advise. Thank you    Condition update:    Patient calling with blood pressure readings as below:  25  9 a.m.125/56 heart rate 51  25 5:30 /53 heart rate 53  25 9 am 115/48 heart rate 53    Denied dizziness/lightheadedness with lower heart rate    Blood sugar readings:  Range between     At 2-3 a.m usually reading is 45-65    25 at 11 am 232    Taking medication as prescribed at last office visit on 25  Advised patient to continue to keep reading logs an place on [place], but she does not know how to do [address]. Informed patient that someone/family is on her [place].     insulin glargine (LANTUS SOLOSTAR) 100 UNIT/ML Subcutaneous Solution Pen-injector 45 mL 3       Sig: INJECT 45 UNITS IN THE night.    glipiZIDE 10 MG Oral Tab 90 tablet 3       Si/2 tab pre lunch.    minoxidil 2.5 MG Oral Tab 360 tablet 3       Si mg a day and 2.5 mg a day.

## 2025-06-27 NOTE — TELEPHONE ENCOUNTER
Patient stated medication below causes her to have diarrhea. Patient would like to know if she should still take medication or if there is another alternative.     famoTIDine 20 MG Oral Tab 180 tablet 1 6/10/2021    Sig:   Take 1 tablet (20 mg total) by colton Opt out

## 2025-06-28 RX ORDER — INSULIN GLARGINE 100 [IU]/ML
INJECTION, SOLUTION SUBCUTANEOUS
Qty: 45 ML | Refills: 3 | Status: SHIPPED | OUTPATIENT
Start: 2025-06-28

## 2025-06-28 RX ORDER — BLOOD SUGAR DIAGNOSTIC
STRIP MISCELLANEOUS
Qty: 200 STRIP | Refills: 11 | Status: SHIPPED | OUTPATIENT
Start: 2025-06-28

## 2025-06-28 NOTE — TELEPHONE ENCOUNTER
Blood pressures are much better.  Continue to monitor.  Heart rates as long as they are not going below 50s but good.  For the sugars issue okay to break up the Lantus insulin and rather the doing 45 units all at once lets try 30 units every morning and 15 units every afternoon.  Have her give us a call with sugars in 1 week.

## 2025-06-30 ENCOUNTER — ANTI-COAG VISIT (OUTPATIENT)
Dept: ANTICOAGULATION | Facility: CLINIC | Age: 75
End: 2025-06-30

## 2025-06-30 DIAGNOSIS — I82.532 CHRONIC DEEP VEIN THROMBOSIS OF LEFT POPLITEAL VEIN (HCC): ICD-10-CM

## 2025-06-30 DIAGNOSIS — D68.61 ANTIPHOSPHOLIPID SYNDROME (HCC): Primary | ICD-10-CM

## 2025-06-30 DIAGNOSIS — Z79.01 MONITORING FOR LONG-TERM ANTICOAGULANT USE: ICD-10-CM

## 2025-06-30 DIAGNOSIS — Z79.01 LONG TERM (CURRENT) USE OF ANTICOAGULANTS: ICD-10-CM

## 2025-06-30 DIAGNOSIS — Z51.81 MONITORING FOR LONG-TERM ANTICOAGULANT USE: ICD-10-CM

## 2025-06-30 LAB
INR: 2.6 (ref 2–3)
TEST STRIP EXPIRATION DATE: NORMAL DATE

## 2025-06-30 PROCEDURE — 93793 ANTICOAG MGMT PT WARFARIN: CPT | Performed by: FAMILY MEDICINE

## 2025-06-30 PROCEDURE — 85610 PROTHROMBIN TIME: CPT | Performed by: FAMILY MEDICINE

## 2025-06-30 NOTE — PROGRESS NOTES
Face-to-Face  / INR 2.6,  therapeutic. (Goal 2.5 ) TTR 77.1 %     Etiology: good. No changes.    PLAN: continue the current dose.    Recheck INR 4 weeks.    Pt reports:    No sign of unusual bruising or bleeding.  Any missed doses: No   Medications changes: No  Diet changes:  No    Contacted  Julia verbalized understanding and agreement.    Change % 0.0    WARFARIN Plan per protocol: 7.5 mg every Mon, Wed, Fri; 5 mg all other days

## 2025-07-03 ENCOUNTER — TELEPHONE (OUTPATIENT)
Dept: INTERNAL MEDICINE CLINIC | Facility: CLINIC | Age: 75
End: 2025-07-03

## 2025-07-03 NOTE — TELEPHONE ENCOUNTER
Dr. Valenzuela, periodontist office needs specific pre-procedure instructions in writing faxed to their office so procedure can be scheduled and performed.   Stated they prefer patient come off coumadin for the procedure if it is possible/safe for her. Thank you.    Please reply to pool: [facility]     Contacted Peyton from [facility] (name and  of patient verified).  She reports they sent a form after patient's first visit that was returned stating patient could not be off coumadin.  They are awaiting response from an additional form they faxed to primary care provider's office.  Procedure has not been scheduled since they are awaiting written pre-procedure instructions via fax from primary care provider.  Unable to schedule until this is received.  She states it is preferred that patient is off coumadin if possible and it is safe for her. But original form stated this medication could not be discontinued.

## 2025-07-03 NOTE — TELEPHONE ENCOUNTER
Mirtha from Dr. Cordell Schumacher of [facility] 's office is calling to confirm if the provider received a fax this morning.     It is a medical clearance form for procedures - procedures dates are still pending

## 2025-07-03 NOTE — TELEPHONE ENCOUNTER
Received forms for patient from [facility] saying that she has severe periodontitis and they need elective scaling and root planing.  Not sure if they need to take her off the Coumadin if they does they need to bridge her with Lovenox because of her history of hypercoagulable syndrome.  If they do not need to take her off the Coumadin can they work around the Coumadin INR did they need to lower her INR if that is the case we will need to check with her hematologist whether she can go on a lower INR without having a DVT excetra.

## 2025-07-08 ENCOUNTER — TELEPHONE (OUTPATIENT)
Dept: INTERNAL MEDICINE CLINIC | Facility: CLINIC | Age: 75
End: 2025-07-08

## 2025-07-08 RX ORDER — ENOXAPARIN SODIUM 100 MG/ML
1 INJECTION SUBCUTANEOUS 2 TIMES DAILY
Qty: 8 EACH | Refills: 3 | Status: SHIPPED | OUTPATIENT
Start: 2025-07-08

## 2025-07-08 NOTE — TELEPHONE ENCOUNTER
She will need to stop the Coumadin 3 days before the procedure.  She will need to be bridged with Lovenox 1 mg/kg twice a day and then 12 hours before the procedure she will stop the Lovenox.  Then she will resume the Lovenox once the bleeding is improved with the Coumadin.  She will need to check her Coumadin level and need to have a therapeutic INR for 3 days then she can stop the Lovenox.  This will need to be followed up with Coumadin clinic.

## 2025-07-08 NOTE — TELEPHONE ENCOUNTER
Pharmacy requesting Clarification of Dosage  enoxaparin 100 MG/ML Injection Solution Prefilled Syringe   Asking if Dr requesting was increasing the dosage 1.09 or mean 100 mg     Please advise

## 2025-07-08 NOTE — TELEPHONE ENCOUNTER
Onsite staff, was the form filled out with current instructions from Dr. Valenzuela, and faxed back to [facility]?  Per message below, they need a written pre procedure instructions via fax.  Please assist, thank you.    Spoke with Patient and provided instructions to make her aware.  Patient understands procedure will need to be scheduled first and will provide further instructions.

## 2025-07-09 NOTE — TELEPHONE ENCOUNTER
Please notify them.  Unless they can send over another form ASAP.  This I cannot write on the form.  They should be able to take a verbal order.

## 2025-07-09 NOTE — TELEPHONE ENCOUNTER
Patient has bridged with lovenox in the past. Once she has a procedure date, I can type out daily instructions for her and we can check/manage the INR.

## 2025-07-15 ENCOUNTER — MED REC SCAN ONLY (OUTPATIENT)
Dept: INTERNAL MEDICINE CLINIC | Facility: CLINIC | Age: 75
End: 2025-07-15

## 2025-07-28 ENCOUNTER — ANTI-COAG VISIT (OUTPATIENT)
Dept: ANTICOAGULATION | Facility: CLINIC | Age: 75
End: 2025-07-28

## 2025-07-28 DIAGNOSIS — Z79.01 MONITORING FOR LONG-TERM ANTICOAGULANT USE: ICD-10-CM

## 2025-07-28 DIAGNOSIS — D68.61 ANTIPHOSPHOLIPID SYNDROME (HCC): Primary | ICD-10-CM

## 2025-07-28 DIAGNOSIS — Z51.81 MONITORING FOR LONG-TERM ANTICOAGULANT USE: ICD-10-CM

## 2025-07-28 DIAGNOSIS — I82.532 CHRONIC DEEP VEIN THROMBOSIS OF LEFT POPLITEAL VEIN (HCC): ICD-10-CM

## 2025-07-28 DIAGNOSIS — Z79.01 LONG TERM (CURRENT) USE OF ANTICOAGULANTS: ICD-10-CM

## 2025-07-28 LAB
INR: 2.4 (ref 2–3)
TEST STRIP EXPIRATION DATE: NORMAL DATE

## 2025-07-28 PROCEDURE — 85610 PROTHROMBIN TIME: CPT | Performed by: FAMILY MEDICINE

## 2025-07-28 PROCEDURE — 93793 ANTICOAG MGMT PT WARFARIN: CPT | Performed by: FAMILY MEDICINE

## 2025-07-28 NOTE — PROGRESS NOTES
Face-to-Face  / INR 2.4,  therapeutic. (Goal 2.5 ) TTR 77.3 %     Etiology: stable    PLAN: continue the current dose.    Recheck INR 4 weeks.    Pt reports:    No sign of unusual bruising or bleeding.  Any missed doses: No   Medications changes: No  Diet changes:  No    Contacted  Julia verbalized understanding and agreement.    Change % 0.0    WARFARIN Plan per protocol: 7.5 mg every Mon, Wed, Fri; 5 mg all other days

## 2025-08-14 RX ORDER — METOPROLOL TARTRATE 100 MG/1
TABLET ORAL
Qty: 90 TABLET | Refills: 1 | Status: SHIPPED | OUTPATIENT
Start: 2025-08-14

## 2025-08-16 ENCOUNTER — LAB ENCOUNTER (OUTPATIENT)
Dept: LAB | Facility: HOSPITAL | Age: 75
End: 2025-08-16
Attending: INTERNAL MEDICINE

## 2025-08-16 DIAGNOSIS — E11.22 TYPE 2 DIABETES MELLITUS WITH STAGE 3 CHRONIC KIDNEY DISEASE, WITH LONG-TERM CURRENT USE OF INSULIN, UNSPECIFIED WHETHER STAGE 3A OR 3B CKD (HCC): ICD-10-CM

## 2025-08-16 DIAGNOSIS — N18.30 TYPE 2 DIABETES MELLITUS WITH STAGE 3 CHRONIC KIDNEY DISEASE, WITH LONG-TERM CURRENT USE OF INSULIN, UNSPECIFIED WHETHER STAGE 3A OR 3B CKD (HCC): ICD-10-CM

## 2025-08-16 DIAGNOSIS — N18.31 STAGE 3A CHRONIC KIDNEY DISEASE (HCC): ICD-10-CM

## 2025-08-16 DIAGNOSIS — Z79.4 TYPE 2 DIABETES MELLITUS WITH STAGE 3 CHRONIC KIDNEY DISEASE, WITH LONG-TERM CURRENT USE OF INSULIN, UNSPECIFIED WHETHER STAGE 3A OR 3B CKD (HCC): ICD-10-CM

## 2025-08-16 LAB
ALBUMIN SERPL-MCNC: 4.4 G/DL (ref 3.2–4.8)
ALBUMIN/GLOB SERPL: 1.5 (ref 1–2)
ALP LIVER SERPL-CCNC: 67 U/L (ref 55–142)
ALT SERPL-CCNC: 13 U/L (ref 10–49)
ANION GAP SERPL CALC-SCNC: 5 MMOL/L (ref 0–18)
AST SERPL-CCNC: 20 U/L (ref ?–34)
BILIRUB SERPL-MCNC: 0.4 MG/DL (ref 0.2–1.1)
BUN BLD-MCNC: 39 MG/DL (ref 9–23)
BUN/CREAT SERPL: 24.7 (ref 10–20)
CALCIUM BLD-MCNC: 9.1 MG/DL (ref 8.7–10.4)
CHLORIDE SERPL-SCNC: 103 MMOL/L (ref 98–112)
CHOLEST SERPL-MCNC: 125 MG/DL (ref ?–200)
CO2 SERPL-SCNC: 31 MMOL/L (ref 21–32)
CREAT BLD-MCNC: 1.58 MG/DL (ref 0.55–1.02)
EGFRCR SERPLBLD CKD-EPI 2021: 34 ML/MIN/1.73M2 (ref 60–?)
EST. AVERAGE GLUCOSE BLD GHB EST-MCNC: 126 MG/DL (ref 68–126)
FASTING PATIENT LIPID ANSWER: YES
FASTING STATUS PATIENT QL REPORTED: YES
GLOBULIN PLAS-MCNC: 2.9 G/DL (ref 2–3.5)
GLUCOSE BLD-MCNC: 106 MG/DL (ref 70–99)
HBA1C MFR BLD: 6 % (ref ?–5.7)
HDLC SERPL-MCNC: 51 MG/DL (ref 40–59)
LDLC SERPL CALC-MCNC: 57 MG/DL (ref ?–100)
NONHDLC SERPL-MCNC: 74 MG/DL (ref ?–130)
OSMOLALITY SERPL CALC.SUM OF ELEC: 298 MOSM/KG (ref 275–295)
POTASSIUM SERPL-SCNC: 5.4 MMOL/L (ref 3.5–5.1)
PROT SERPL-MCNC: 7.3 G/DL (ref 5.7–8.2)
SODIUM SERPL-SCNC: 139 MMOL/L (ref 136–145)
TRIGL SERPL-MCNC: 86 MG/DL (ref 30–149)
VLDLC SERPL CALC-MCNC: 13 MG/DL (ref 0–30)

## 2025-08-16 PROCEDURE — 80053 COMPREHEN METABOLIC PANEL: CPT

## 2025-08-16 PROCEDURE — 36415 COLL VENOUS BLD VENIPUNCTURE: CPT

## 2025-08-16 PROCEDURE — 83036 HEMOGLOBIN GLYCOSYLATED A1C: CPT

## 2025-08-16 PROCEDURE — 80061 LIPID PANEL: CPT

## 2025-08-18 PROBLEM — I82.532 CHRONIC EMBOLISM AND THROMBOSIS OF LEFT POPLITEAL VEIN (HCC): Status: RESOLVED | Noted: 2025-08-18 | Resolved: 2025-08-18

## 2025-08-25 ENCOUNTER — OFFICE VISIT (OUTPATIENT)
Dept: INTERNAL MEDICINE CLINIC | Facility: CLINIC | Age: 75
End: 2025-08-25

## 2025-08-25 ENCOUNTER — TELEPHONE (OUTPATIENT)
Dept: INTERNAL MEDICINE CLINIC | Facility: CLINIC | Age: 75
End: 2025-08-25

## 2025-08-25 ENCOUNTER — ANTI-COAG VISIT (OUTPATIENT)
Dept: ANTICOAGULATION | Facility: CLINIC | Age: 75
End: 2025-08-25

## 2025-08-25 VITALS
DIASTOLIC BLOOD PRESSURE: 78 MMHG | SYSTOLIC BLOOD PRESSURE: 142 MMHG | WEIGHT: 242 LBS | OXYGEN SATURATION: 97 % | HEART RATE: 53 BPM | BODY MASS INDEX: 38 KG/M2

## 2025-08-25 DIAGNOSIS — H17.9 CORNEAL SCAR: ICD-10-CM

## 2025-08-25 DIAGNOSIS — E11.22 TYPE 2 DIABETES MELLITUS WITH STAGE 3 CHRONIC KIDNEY DISEASE, WITH LONG-TERM CURRENT USE OF INSULIN, UNSPECIFIED WHETHER STAGE 3A OR 3B CKD (HCC): ICD-10-CM

## 2025-08-25 DIAGNOSIS — Z00.00 ENCOUNTER FOR ANNUAL HEALTH EXAMINATION: ICD-10-CM

## 2025-08-25 DIAGNOSIS — Z79.01 MONITORING FOR LONG-TERM ANTICOAGULANT USE: ICD-10-CM

## 2025-08-25 DIAGNOSIS — D68.61 ANTIPHOSPHOLIPID SYNDROME (HCC): Primary | ICD-10-CM

## 2025-08-25 DIAGNOSIS — Z51.81 MONITORING FOR LONG-TERM ANTICOAGULANT USE: ICD-10-CM

## 2025-08-25 DIAGNOSIS — D68.61 ANTIPHOSPHOLIPID SYNDROME (HCC): ICD-10-CM

## 2025-08-25 DIAGNOSIS — M85.89 OSTEOPENIA OF MULTIPLE SITES: ICD-10-CM

## 2025-08-25 DIAGNOSIS — G62.9 NEUROPATHY: ICD-10-CM

## 2025-08-25 DIAGNOSIS — I82.532 CHRONIC DEEP VEIN THROMBOSIS OF LEFT POPLITEAL VEIN (HCC): ICD-10-CM

## 2025-08-25 DIAGNOSIS — L30.8 OTHER ECZEMA: ICD-10-CM

## 2025-08-25 DIAGNOSIS — N18.31 STAGE 3A CHRONIC KIDNEY DISEASE (HCC): Chronic | ICD-10-CM

## 2025-08-25 DIAGNOSIS — H25.013 CORTICAL AGE-RELATED CATARACT OF BOTH EYES: ICD-10-CM

## 2025-08-25 DIAGNOSIS — Z12.31 ENCOUNTER FOR SCREENING MAMMOGRAM FOR MALIGNANT NEOPLASM OF BREAST: ICD-10-CM

## 2025-08-25 DIAGNOSIS — E03.9 HYPOTHYROIDISM (ACQUIRED): ICD-10-CM

## 2025-08-25 DIAGNOSIS — Z79.01 LONG TERM (CURRENT) USE OF ANTICOAGULANTS: ICD-10-CM

## 2025-08-25 DIAGNOSIS — N18.30 TYPE 2 DIABETES MELLITUS WITH STAGE 3 CHRONIC KIDNEY DISEASE, WITH LONG-TERM CURRENT USE OF INSULIN, UNSPECIFIED WHETHER STAGE 3A OR 3B CKD (HCC): ICD-10-CM

## 2025-08-25 DIAGNOSIS — H25.13 AGE-RELATED NUCLEAR CATARACT OF BOTH EYES: ICD-10-CM

## 2025-08-25 DIAGNOSIS — Z79.4 TYPE 2 DIABETES MELLITUS WITH STAGE 3 CHRONIC KIDNEY DISEASE, WITH LONG-TERM CURRENT USE OF INSULIN, UNSPECIFIED WHETHER STAGE 3A OR 3B CKD (HCC): ICD-10-CM

## 2025-08-25 DIAGNOSIS — Z12.11 COLON CANCER SCREENING: ICD-10-CM

## 2025-08-25 DIAGNOSIS — H04.123 DRY EYE SYNDROME OF BILATERAL LACRIMAL GLANDS: ICD-10-CM

## 2025-08-25 DIAGNOSIS — R21 RASH: ICD-10-CM

## 2025-08-25 DIAGNOSIS — E53.8 B12 DEFICIENCY: Primary | ICD-10-CM

## 2025-08-25 DIAGNOSIS — R31.29 MICROSCOPIC HEMATURIA: ICD-10-CM

## 2025-08-25 DIAGNOSIS — I10 ESSENTIAL HYPERTENSION WITH GOAL BLOOD PRESSURE LESS THAN 130/80: ICD-10-CM

## 2025-08-25 DIAGNOSIS — M81.0 AGE-RELATED OSTEOPOROSIS WITHOUT CURRENT PATHOLOGICAL FRACTURE: ICD-10-CM

## 2025-08-25 DIAGNOSIS — Z71.85 VACCINE COUNSELING: ICD-10-CM

## 2025-08-25 PROBLEM — H25.9 AGE-RELATED CATARACT: Status: ACTIVE | Noted: 2018-06-08

## 2025-08-25 LAB
INR: 2.4 (ref 2–3)
TEST STRIP EXPIRATION DATE: NORMAL DATE

## 2025-08-25 PROCEDURE — 93793 ANTICOAG MGMT PT WARFARIN: CPT | Performed by: FAMILY MEDICINE

## 2025-08-25 PROCEDURE — 85610 PROTHROMBIN TIME: CPT | Performed by: FAMILY MEDICINE

## 2025-08-25 RX ORDER — ALCLOMETASONE DIPROPIONATE 0.5 MG/G
CREAM TOPICAL
Qty: 60 G | Refills: 0 | Status: SHIPPED | OUTPATIENT
Start: 2025-08-25

## 2025-08-25 RX ORDER — INSULIN GLARGINE 100 [IU]/ML
INJECTION, SOLUTION SUBCUTANEOUS
Qty: 45 ML | Refills: 3 | Status: SHIPPED | OUTPATIENT
Start: 2025-08-25

## 2025-08-26 ENCOUNTER — TELEPHONE (OUTPATIENT)
Dept: INTERNAL MEDICINE CLINIC | Facility: CLINIC | Age: 75
End: 2025-08-26

## 2025-08-27 ENCOUNTER — MED REC SCAN ONLY (OUTPATIENT)
Dept: INTERNAL MEDICINE CLINIC | Facility: CLINIC | Age: 75
End: 2025-08-27

## 2025-08-27 ENCOUNTER — TELEPHONE (OUTPATIENT)
Dept: ANTICOAGULATION | Facility: CLINIC | Age: 75
End: 2025-08-27

## 2025-08-27 DIAGNOSIS — D68.61 ANTIPHOSPHOLIPID SYNDROME (HCC): ICD-10-CM

## 2025-08-27 DIAGNOSIS — I82.532 CHRONIC DEEP VEIN THROMBOSIS OF LEFT POPLITEAL VEIN (HCC): Primary | ICD-10-CM

## 2025-08-27 DIAGNOSIS — Z51.81 MONITORING FOR LONG-TERM ANTICOAGULANT USE: ICD-10-CM

## 2025-08-27 DIAGNOSIS — Z79.01 MONITORING FOR LONG-TERM ANTICOAGULANT USE: ICD-10-CM

## 2025-08-28 RX ORDER — BETAMETHASONE VALERATE 1.2 MG/G
CREAM TOPICAL
Qty: 45 G | Refills: 1 | Status: SHIPPED | OUTPATIENT
Start: 2025-08-28

## (undated) DIAGNOSIS — N18.32 STAGE 3B CHRONIC KIDNEY DISEASE (HCC): Primary | ICD-10-CM

## (undated) DIAGNOSIS — E11.9 CONTROLLED TYPE 2 DIABETES MELLITUS WITHOUT COMPLICATION, WITHOUT LONG-TERM CURRENT USE OF INSULIN (HCC): ICD-10-CM

## (undated) DEVICE — MEDI-VAC NON-CONDUCTIVE SUCTION TUBING 6MM X 1.8M (6FT.) L: Brand: CARDINAL HEALTH

## (undated) DEVICE — GIJAW SINGLE-USE BIOPSY FORCEPS WITH NEEDLE: Brand: GIJAW

## (undated) DEVICE — LASSO POLYPECTOMY SNARE: Brand: LASSO

## (undated) DEVICE — LINE MNTR ADLT SET O2 INTMD

## (undated) DEVICE — Device: Brand: DEFENDO AIR/WATER/SUCTION AND BIOPSY VALVE

## (undated) DEVICE — 35 ML SYRINGE REGULAR TIP: Brand: MONOJECT

## (undated) DEVICE — TRAP POLYP W/ 2 SPEC TY CLR MAGNIFYING WIND

## (undated) DEVICE — KIT CLEAN ENDOKIT 1.1OZ GOWNX2

## (undated) DEVICE — Device: Brand: DUAL NARE NASAL CANNULAE FEMALE LUER CON 7FT O2 TUBE

## (undated) DEVICE — KIT ENDO ORCAPOD 160/180/190

## (undated) DEVICE — SYRINGE, LUER SLIP, STERILE, 60ML: Brand: MEDLINE

## (undated) DEVICE — Device: Brand: CUSTOM PROCEDURE KIT

## (undated) DEVICE — FORCEP RADIAL JAW 4

## (undated) NOTE — MR AVS SNAPSHOT
[address] [facility] [address]  [phone]               Thank you for choosing us for your health care visit with Harvey Culp MD.  We are glad to serve you and happy to provide you with this summary of your v Diagnoses:  Varicose vein of leg   Order:  Op Referral To Interventional Radiology          Referral Orders      Normal Orders This Visit    OP REFERRAL TO INTERVENTIONAL RADIOLOGY [[id number] CUSTOM]  Order #:  [id number]         **REFERRAL REQUEST**    Chemo Ray Check feet  every AM and careful with sores and ulcers on feet bilaterally. Check eyes every year with dilated eye exam.     Essential hypertension with goal blood pressure less than 130/80 Good control.  Careful with diet and excercise at least 30 minutes GLUCOSAMINE CHONDROITIN COMPLX OR   Take by mouth. Glucose Blood Strp   Checks QAm. Relion.    What changed:  additional instructions   Commonly known as:  ULTIMA TEST           HydrALAZINE HCl 100 MG Tabs   Take 1 tablet (100 mg total) by mouth These medications were sent to [facility] [address] [facility], [address] [place] [address].  [phone], [phone]  [address], [facility] [address]     Phone:  [phone]    - Glucose Blood Strp            Health Goals discussed Today

## (undated) NOTE — LETTER
9/22/2023              [address] [facility]         Dear Howard Kuhn,    This letter is to inform you that our office has made several attempts to reach you by phone without success. We were attempting to contact you by phone to review some questions over the phone to start the process of scheduling your colonoscopy that is due. Please contact our office at the number listed below as soon as you receive this letter to discuss this issue and to make the necessary changes in our system to your contact information. Thank you for your cooperation. Sincerely,    Sarkis Gale.  Denise De La Cruz MD  [facility], [address] [address] [phone]

## (undated) NOTE — LETTER
7/6/2017              [address]         Dear Noman Tavarez,    It was a pleasure to see you.   Your celiac panel and allergy testing was normal.  There is no need for further testing at this johnson

## (undated) NOTE — LETTER
3/18/2023              [address] [facility]         Dear Howard Kuhn,    This letter is to inform you that our office has made several attempts to reach you by phone without success. We were attempting to contact you by phone regarding Mammogram report. Please contact our office at the number listed below as soon as you receive this letter to discuss this issue and to make the necessary changes in our system to your contact information. Thank you for your cooperation. Sincerely,    Annie Liang. Js Gregg, [address] [address] [phone]        Document electronically generated by:  Ernestina Mckinnon RN

## (undated) NOTE — LETTER
8/14/2020              [address] [facility]          Dear Lemuel Contreras,      I wanted to get back to you with your colonoscopy results. You had 4 colon polyps removed which were benign.   I would advise

## (undated) NOTE — LETTER
Patient Name: Julia Hernandez : 1950  Medical Record #: [id number] Printed: 2024  Page 1 of 2                                          [facility]  [address], [place], [place]  Autorización para operación y procedimiento quirúrgico                                                                                                      Por la presente, autorizo a José Antonio Fletcher MD, mi médico y al asistente a realizar la operación/procedimiento quirúrgico a continuación, así edwin a administrar la anestesia que determine necesaria mi médico Nombre (s) de la operación/procedimiento: COLONOSCOPY en Julia Hernandez     Reconozco que jacinta la operación/procedimiento quirúrgico, las condiciones imprevistas pueden requerir de procedimientos adicionales o diferentes a aquellos mencionados anteriormente.  Por lo tanto, autorizo y solicito además que el cirujano antes mencionado, los asistentes o las personas designadas realicen los procedimientos que, a lo juicio, tomas necesarios y convenientes.    Mi cirujano/médico villa discutido antes de mi cirugía los posibles beneficios, riesgos y efectos secundarios de gagandeep procedimiento, la probabilidad de alcanzar las metas y los posibles problemas que puedan ocurrir jacinta la recuperación.  Ellos también tay discutido las alternativas razonables al procedimiento, incluso los riesgos, beneficios y efectos secundarios relacionados con las alternativas y los riesgos relacionados con no realizar gagandeep procedimiento.  Tay respondido a todas mis preguntas y confirmo que no se ha dado ninguna garantía en cuanto a los resultados que pueda obtener.    En zac de que surja la necesidad jacinta mi operación o jacinta el periodo postoperatorio, también autorizo se aplique diogenes y/o productos sanguíneos.  Asimismo, entiendo que a pesar de las cuidadosas pruebas y análisis de diogenes o de los productos sanguíneos que realizan las entidades  recolectoras, todavía puedo estar sujeto a efectos adversos edwin resultado de recibir kodak transfusión de diogenes y/o productos sanguíneos.  A continuación se mencionan algunos, aunque no todos, los riesgos potenciales que pueden ocurrir: fiebre y reacciones alérgicas, reacciones hemolíticas, trasmisión de enfermedades edwin hepatitis, SIDA y citomegalovirus (CMV), así edwin sobrecarga de líquidos.   En zac de que desee tener kodak transfusión autóloga de mi propia diogenes o kodak transfusión de un donante dirigido.  Lo discutiré con mi médico.  Check only if Refusing Blood or Blood Products  I understand refusal of blood or blood products as deemed necessary by my physician may have serious consequences to my condition to include possible death. I hereby assume responsibility for my refusal and release the hospital, its personnel, and my physicians from any responsibility for the consequences of my refusal.           o  Refuse       Autorizo el uso de cualquier muestra, órgano, tejido, parte del cuerpo u objeto extraño que pueda ser extraído de mi cuerpo jacinta la operación/procedimiento para fines de diagnóstico, investigación o de enseñanza y lo desecho posterior por las autoridades del hospital.  También, autorizo la revelación de los resultados de las pruebas de muestras y/o los informes escritos al médico tratante edwin personal médico del hospital u otros médicos de referencia o consulta involucrados en mi atención, a discreción del patólogo o de mi médico tratante.    Doy consentimiento para que se fotografíen o graben vídeos de las operaciones o procedimientos a realizarse, incluidas las partes de mi cuerpo que tomas adecuadas para propósitos médicos, científicos o educativos, en el entendido de que, mi identidad no sea revelada por las fotografías o por textos descriptivos que las acompañen.  Si el procedimiento es fotografiado o grabado en vídeo, el cirujano obtendrá la imagen, cinta de vídeo o CD original.  El  hospital no se hará responsable por el almacenamiento, la revelación o el mantenimiento de la imagen, cinta o CD.    Autorizo la presencia de un especialista de producto u observadores en el quirófano, según lo considere necesario mi médico o las personas que éste designe.     Reconozco que en zac de que mi procedimiento resulte en un tiempo prolongado de radiografía/fluoroscopia, puedo desarrollar kodak reacción en la piel.    Si tengo kodak orden de No intentar la reanimación (SYLVIA), anselmo estado se suspenderá mientras esté en el quirófano, la meryl de procedimientos y jacinta el periodo de recuperación a menos que yo indique lo contrario explícitamente (o kodak persona autorizada a obed el consentimiento en mi nombre). El cirujano o mi médico tratante determinarán cuándo termina el periodo de recuperación aplicable a los efectos de restablecer la orden de SYLVIA.  Pacientes que se realizan un procedimiento de esterilización: Entiendo que si el procedimiento tiene éxito, los resultados serán permanentes y que, por lo tanto, me será imposible inseminar, concebir o tener hijos.  Asimismo, entiendo que el procedimiento tiene edwin propósito la esterilidad, aunque el resultado no está garantizado.   Admito que mi médico me ha explicado la aplicación de sedación/analgesia, incluidos los riesgos y beneficios y, consiento a la administración de sedación/analgesia conforme sea necesario o conveniente a juicio de mi médico.      CERTIFICO QUE HE LEÍDO Y COMPRENDIDO EL CONSENTIMIENTO ANTERIOR PARA LA OPERACIÓN y/o PROCEDIMIENTO.             ______________________________________  _______________________________  Firma del paciente      Firma de la persona responsible  Signature of patient      Signature of responsible person                        ___________________________________                                   Nombre en imprenta de la persona responsible         Name of responsible  person          ___________________________________                 Relación con el paciente         Relationship to patient    _________________________________________  ______________ ________________  Firma del testigo          Fecha / Date Hora / Time  Signature of witness    DECLARACÓN DEL MÉDICO Mediante mi firma al calce, afirmo que antes de la hora del procedimiento, he explicadoal paciente y/o a lo representante legal, los riesgos y beneficios involucrados en el tratamiento propuesto, así comocualquier alternativa razonable al tratamiento propuesto. También le(s) he explicado los riesgos y beneficios involucradosen el rechazo del tratarniento propuesto y alternativas al tratamiento propuesto, y he respondido a las preguntas del(la) paciente(My signature below affirms that prior to the time of the procedure, I have explained to the patient and/or his/her guardian, therisks and benefits involved in the proposed treatment and any reasonable alternative to the proposed treatment. I have alsoexplained the risks and benefits involved in refusal of the proposed treatment and have answered the patient's questions.)    ________________________________________   _________________________   _____________   (Firma del médico/Signature of Physician)                                    (Fecha/Date)                                             (Hora/Time)      Patient Name: Julia Henrandez     : 1950                 Printed: 2024      Medical Record #: [id number]                                              Page 2 of 2

## (undated) NOTE — MR AVS SNAPSHOT
[place]  [address]  [phone]               Thank you for choosing us for your health care visit with Stephen Dickerson MD.  We are glad to serve you and happy to provide you with this summa Exam - Established with Jayme Castleman.  Jessica [address], [place] ([address] )    [address] [facility] [address] [phone]              Allergies as of Feb 14, 2017     Metformin Diarrhea                   Curre Take 1 tablet (20 mg total) by mouth nightly. take 1 tablet (20MG)  by oral route  3 times a week M/W/F   Commonly known as:  ZOCOR           Valsartan-Hydrochlorothiazide 320-25 MG Tabs   Take 1 tablet by mouth once daily.                    Follow-up Inst

## (undated) NOTE — Clinical Note
February 14, 2017    [address] [facility] [place] [address]     Patient: Cinthia Lockhart   YOB: 1950   Date of Visit: 2/14/2017       Dear Dr. Patricio Charles MD:    Thank you for referring Cinthia Lockhart to me for evaluation. 10-4-16); and Cataract extraction w/  intraocular lens implant (Right, 1988) ([facility]).     Family History   Problem Relation Age of Onset   • Diabetes Mother    • Hypertension Mother    • Dementia Mother      Alzheimer's Disease   • Diabetes Brother    • once daily.  Disp: 90 tablet Rfl: 1   Metoprolol Succinate ER 50 MG Oral Tablet 24 Hr TAKE ONE TABLET BY MOUTH EVERY 12 HOURS Disp: 180 tablet Rfl: 1   Insulin Pen Needle (BD PEN NEEDLE ULTRAFINE) 29G X 12.7MM Does not apply Misc Dx. 250.00 Disp: 100 each R Right Left    Disc Good rim, Temporal crescent Good rim, Temporal crescent    C/D Ratio 0.2 0.3    Macula Normal- no BDR Normal- no BDR    Vessels Normal Normal    Periphery Normal Normal            Refraction     Wearing Rx      Sphere Cylinder Axis Add to following Bear Uziel along with you.     Sincerely,        Adele Montague MD        CC: No Recipients    Document electronically generated by: Adele Montague

## (undated) NOTE — LETTER
12/1/2021              [address] [facility]         Dear Marleni Smith,      It was a [facility] to see you at [facility] [address] [place] [place] [address]. Your a1c lab test was good at 6.2.   The

## (undated) NOTE — LETTER
3/4/2020            Kamran Heck 54        [place] [id number]         Dear Anil Jacobs,    Per your request here is a copy of your medication adjustments prior to and after your colonoscopy on 5/15/2020:    Tuesday 5/12/202

## (undated) NOTE — LETTER
5/11/2023    [address] [facility] [place]            Dear Floyd Vivar,      Our records indicate that you are due for an appointment for a Colonoscopy with Rosa Mcintsoh MD. Our doctors are booking out about 3-5 months in advance for procedures. Please call our office to schedule a phone screening appointment to plan for the procedure(s). Your medical well-being is important to us. If your insurance requires a referral, please call your primary care office to request one.       Thank you,      [facility] and [place] [facility]

## (undated) NOTE — LETTER
[facility]   Administracion de Anestesia  Yo, Julia Hernandez, acepto ser atendido por un anestesiólogo, quien está especialmente capacitado para monitorearme y darme medicamento para hacerme dormir o mantenerme cómodo jacinta mi procedimiento.   Entiendo que mi anestesiólogo no es un empleado o agente de [facility]. Él o rupert trabaja para [facility]  [place] el paciente que solicita los servicios de anestesia, estoy de acuerdo con lo siguiente:  Permitir al anestesiólogo (médico de anestesia) que me suministre el medicamento y hacer los procedimientos adicionales que tomas necesarios. Algunos ejemplos son: Al iniciar o utilizar kodak “IV” para suministrarme medicamentos, fluidos o diogenes jacinta mi procedimiento, y colocarme kodak sonda de respiración, me ayudará a respirar cuando esté dormido (intubación). En el zac de que mi corazón deje de funcionar correctamente, entiendo que mi anestesiólogo hará todo lo posible para salvar mi sinan, a menos que los documentos de No resucitar de [facility] lo indiquen de otra manera.  Informar a mi anestesista antes del procedimiento:  Si estoy embarazada.  La última vez que comí o bebí algo.  Todos los medicamentos que day (incluyendo medicamentos recetados, suplementos herbales y pastillas que puedo comprar sin receta médica (incluyendo drogas en la degroot [medicamentos ilegales]). No informar a mi anestesiólogo acerca de estos medicamentos puede aumentar mi riesgo de complicaciones con la anestesia.  Si soy alérgico a cualquier cosa o he tenido previamente kodak reacción adversa a la anestesia.  Entiendo cómo la anestesia me ayudará (beneficios).  Entiendo que con cualquier tipo de anestesia hay riesgos:  Los riesgos más comunes son: náuseas, vómitos, dolor de garganta, dolor muscular, daño a los ojos, la boca o los dientes (por la colocación de la sonda de respiración).  Los riesgos poco comunes  incluyen: recordar lo que sucedió jacinta mi procedimiento, reacciones alérgicas a los medicamentos, lesiones en las vías respiratorias, el corazón, los pulmones, la visión, los nervios o músculos, y en casos sumamente raros, la muerte.  Mii médico me ha explicado otras opciones de atención disponibles para mí (alternativas).  Pacientes embarazadas (“epidural”):    Entiendo que los riesgos de recibir kodak inyección epidural (medicamento que se aplica en la espalda para ayudar a controlar el dolor jacinta el parto), incluyen picazón, presión arterial baja, dificultad para orinar, dolor de ghanshyam o disminución en el ritmo del corazón del bebé. Los riesgos muy poco comunes incluyen infección, hemorragia, convulsiones, ritmo cardíaco irregular y lesión del nervio.  Anestesia regional (“columna vertebral”, “epidural” y “bloqueo de los nervios”):  Entiendo que hay complicaciones poco frecuentes evi posibles, e incluyen dolor de ghanshyam, sangrado, infección, convulsiones, ritmo cardíaco irregular y la lesión del nervio.  .   Puedo cambiar de opinión acerca de recibir servicios de anestesia en cualquier momento antes de que se me administre el medicamento.         Paciente (o representante) Firma/Relación con el paciente  Fecha  Hora  Patient Signature/Signature of Responsible person Date Time           Nombre del intérprete (en lo zac)  Idioma/Organización  Hora  Name of  Language/Organization Time          Firma del anestesiólogo Fecha  Hora  Signature of anesthesiologist Date Time    He hablado del procedimiento y la información anterior con el paciente (o el representante del paciente) y respondí nasima preguntas. El paciente o lo representante ha aceptado recibir los servicios de anestesia.         Testigo Fecha  Hora  Witness Date Time  He verificado que la firma es la del paciente o del representante del paciente, y que se firmó antes del procedimiento.    Nombre del paciente: Julia Hernandez   Fec. de Nac.: 4/21/1950                 En letra de imprenta: June 11, 2024  Expediente médico No. [id number]                         Página 1 de 2  ----------ANESTHESIA CONSENT----------

## (undated) NOTE — ED AVS SNAPSHOT
Karyna Pruitt   MRN: [id number]    Department:  [facility] Emergency Department   Date of Visit:  2/6/2019           Disclosure     Insurance plans vary and the physician(s) referred by the ER may not be covered by your plan.  Vern within the next three months to obtain basic health screening including reassessment of your blood pressure.     IF THERE IS ANY CHANGE OR WORSENING OF YOUR CONDITION, CALL YOUR PRIMARY CARE PHYSICIAN AT ONCE OR RETURN IMMEDIATELY TO THE EMERGENCY DEPARTMEN